# Patient Record
Sex: MALE | Race: WHITE | NOT HISPANIC OR LATINO | Employment: OTHER | URBAN - METROPOLITAN AREA
[De-identification: names, ages, dates, MRNs, and addresses within clinical notes are randomized per-mention and may not be internally consistent; named-entity substitution may affect disease eponyms.]

---

## 2020-03-02 NOTE — PATIENT INSTRUCTIONS
Heart Healthy Diet   AMBULATORY CARE:   A heart healthy diet  is an eating plan low in total fat, unhealthy fats, and sodium (salt)  A heart healthy diet helps decrease your risk for heart disease and stroke  Limit the amount of fat you eat to 25% to 35% of your total daily calories  Limit sodium to less than 2,300 mg each day  Healthy fats:  Healthy fats can help improve cholesterol levels  The risk for heart disease is decreased when cholesterol levels are normal  Choose healthy fats, such as the following:  · Unsaturated fat  is found in foods such as soybean, canola, olive, corn, and safflower oils  It is also found in soft tub margarine that is made with liquid vegetable oil  · Omega-3 fat  is found in certain fish, such as salmon, tuna, and trout, and in walnuts and flaxseed  Unhealthy fats:  Unhealthy fats can cause unhealthy cholesterol levels in your blood and increase your risk of heart disease  Limit unhealthy fats, such as the following:  · Cholesterol  is found in animal foods, such as eggs and lobster, and in dairy products made from whole milk  Limit cholesterol to less than 300 milligrams (mg) each day  You may need to limit cholesterol to 200 mg each day if you have heart disease  · Saturated fat  is found in meats, such as pitts and hamburger  It is also found in chicken or turkey skin, whole milk, and butter  Limit saturated fat to less than 7% of your total daily calories  Limit saturated fat to less than 6% if you have heart disease or are at increased risk for it  · Trans fat  is found in packaged foods, such as potato chips and cookies  It is also in hard margarine, some fried foods, and shortening  Avoid trans fats as much as possible    Heart healthy foods and drinks to include:  Ask your dietitian or healthcare provider how many servings to have from each of the following food groups:  · Grains:      ¨ Whole-wheat breads, cereals, and pastas, and brown rice    ¨ Low-fat, low-sodium crackers and chips    · Vegetables:      ¨ Broccoli, green beans, green peas, and spinach    ¨ Collards, kale, and lima beans    ¨ Carrots, sweet potatoes, tomatoes, and peppers    ¨ Canned vegetables with no salt added    · Fruits:      ¨ Bananas, peaches, pears, and pineapple    ¨ Grapes, raisins, and dates    ¨ Oranges, tangerines, grapefruit, orange juice, and grapefruit juice    ¨ Apricots, mangoes, melons, and papaya    ¨ Raspberries and strawberries    ¨ Canned fruit with no added sugar    · Low-fat dairy products:      ¨ Nonfat (skim) milk, 1% milk, and low-fat almond, cashew, or soy milks fortified with calcium    ¨ Low-fat cheese, regular or frozen yogurt, and cottage cheese    · Meats and proteins , such as lean cuts of beef and pork (loin, leg, round), skinless chicken and turkey, legumes, soy products, egg whites, and nuts  Foods and drinks to limit or avoid:  Ask your dietitian or healthcare provider about these and other foods that are high in unhealthy fat, sodium, and sugar:  · Snack or packaged foods , such as frozen dinners, cookies, macaroni and cheese, and cereals with more than 300 mg of sodium per serving    · Canned or dry mixes  for cakes, soups, sauces, or gravies    · Vegetables with added sodium , such as instant potatoes, vegetables with added sauces, or regular canned vegetables    · Other foods high in sodium , such as ketchup, barbecue sauce, salad dressing, pickles, olives, soy sauce, and miso    · High-fat dairy foods  such as whole or 2% milk, cream cheese, or sour cream, and cheeses     · High-fat protein foods  such as high-fat cuts of beef (T-bone steaks, ribs), chicken or turkey with skin, and organ meats, such as liver    · Cured or smoked meats , such as hot dogs, pitts, and sausage    · Unhealthy fats and oils , such as butter, stick margarine, shortening, and cooking oils such as coconut or palm oil    · Food and drinks high in sugar , such as soft drinks (soda), sports drinks, sweetened tea, candy, cake, cookies, pies, and doughnuts  Other diet guidelines to follow:   · Eat more foods containing omega-3 fats  Eat fish high in omega-3 fats at least 2 times a week  · Limit alcohol  Too much alcohol can damage your heart and raise your blood pressure  Women should limit alcohol to 1 drink a day  Men should limit alcohol to 2 drinks a day  A drink of alcohol is 12 ounces of beer, 5 ounces of wine, or 1½ ounces of liquor  · Choose low-sodium foods  High-sodium foods can lead to high blood pressure  Add little or no salt to food you prepare  Use herbs and spices in place of salt  · Eat more fiber  to help lower cholesterol levels  Eat at least 5 servings of fruits and vegetables each day  Eat 3 ounces of whole-grain foods each day  Legumes (beans) are also a good source of fiber  Lifestyle guidelines:   · Do not smoke  Nicotine and other chemicals in cigarettes and cigars can cause lung and heart damage  Ask your healthcare provider for information if you currently smoke and need help to quit  E-cigarettes or smokeless tobacco still contain nicotine  Talk to your healthcare provider before you use these products  · Exercise regularly  to help you maintain a healthy weight and improve your blood pressure and cholesterol levels  Ask your healthcare provider about the best exercise plan for you  Do not start an exercise program without asking your healthcare provider  Follow up with your healthcare provider as directed:  Write down your questions so you remember to ask them during your visits  © 2017 [address] [employer] Information is for End User's use only and may not be sold, redistributed or otherwise used for commercial purposes  All illustrations and images included in CareNotes® are the copyrighted property of A [employer]  or Hussein Muller  The above information is an  only   It is not intended as medical advice for individual conditions or treatments  Talk to your doctor, nurse or pharmacist before following any medical regimen to see if it is safe and effective for you  Exercise Safety   AMBULATORY CARE:   Exercise safety  includes using correct equipment and positions to work the muscles without causing more injury  You will need to know which exercises to do and how often to do them  You will also need to know what to do if you feel pain or think an exercise caused injury  Do not start an exercise program before you talk to your healthcare provider  You may need to wait until your swelling and pain have gone down before you start to exercise  Contact your healthcare provider if:   · You have sharp pain during exercise or at rest     · You have questions or concerns about your stretches or exercises  What you need to know before you exercise:   · Exercises help lower pain and swelling after an injury or surgery  They also help strengthen the muscles that support your joints and bones  This may help prevent another injury  · You may need a light weight or an exercise band for some exercises  Your healthcare provider will tell you how much weight to exercise with  Ask your healthcare provider where to buy a weight or an exercise band  · Your healthcare provider will tell you how often to do each exercise  The provider will also tell you how many times to repeat each exercise and how many sets you should do  You may be told to do different exercises on different days  · Warm up and stretch before you exercise  Walk or ride a stationary bike for 5 to 10 minutes to help you warm up  Stretching helps increase range of motion  It may also relieve muscle soreness and help prevent another injury  Your healthcare provider will show you which stretches you need to do  What you can do to exercise safely:   · Move slowly and smoothly  Avoid fast or jerky motions  This will help prevent another injury       · Breathe normally  Do not hold your breath  It is important to breathe in and out so you do not tense up during exercise  Tension could prevent you from moving your joint in a full range of motion  · Stop if you feel sharp pain or an increase in pain  Stop the exercise and contact your healthcare provider if you have these symptoms  It is normal to feel some discomfort, such as a dull ache, during exercise  Regular exercise will help decrease your discomfort over time  Follow up with your physical therapist as directed:  Write down your questions so you remember to ask them during your visits  © 2017 [address] [facility] Information is for End User's use only and may not be sold, redistributed or otherwise used for commercial purposes  All illustrations and images included in CareNotes® are the copyrighted property of A D A M , Inc  or Hussein Muller  The above information is an  only  It is not intended as medical advice for individual conditions or treatments  Talk to your doctor, nurse or pharmacist before following any medical regimen to see if it is safe and effective for you  Prostate specific antigen measurement   GENERAL INFORMATION:  What is this test?  This test measures the amount of prostate specific antigen (PSA) in blood  This test may be used when benign prostatic hyperplasia (BPH) is suspected  It may also be used to screen for prostate cancer, to help diagnose prostate cancer when it is suspected, and to monitor prostate cancer after treatment  What are other names for this test?  · PSA measurement  · PSA - Prostate-specific antigen level  · PSA - Serum prostate specific antigen level  · tPSA measurement - Total prostate specific antigen measurement  What are related tests? · Rectal examination  · Transrectal biopsy of prostate  Why do I need this test?  Laboratory tests may be done for many reasons   Tests are performed for routine health screenings or if a disease or toxicity is suspected  Lab tests may be used to determine if a medical condition is improving or worsening  Lab tests may also be used to measure the success or failure of a medication or treatment plan  Lab tests may be ordered for professional or legal reasons  You may need this test if you have:   · BPH - Benign prostatic hypertrophy  · Prostate cancer  When and how often should I have this test?  When and how often laboratory tests are done may depend on many factors  The timing of laboratory tests may rely on the results or completion of other tests, procedures, or treatments  Lab tests may be performed immediately in an emergency, or tests may be delayed as a condition is treated or monitored  A test may be suggested or become necessary when certain signs or symptoms appear  Due to changes in the way your body naturally functions through the course of a day, lab tests may need to be performed at a certain time of day  If you have prepared for a test by changing your food or fluid intake, lab tests may be timed in accordance with those changes  Timing of tests may be based on increased and decreased levels of medications, drugs or other substances in the body  The age or gender of the person being tested may affect when and how often a lab test is required  Chronic or progressive conditions may need ongoing monitoring through the use of lab tests  Conditions that worsen and improve may also need frequent monitoring  Certain tests may be repeated to obtain a series of results, or tests may need to be repeated to confirm or disprove results  Timing and frequency of lab tests may vary if they are performed for professional or legal reasons  How should I get ready for the test?  Before having blood collected, tell the person drawing your blood if you are allergic to latex  Tell the healthcare worker if you have a medical condition or are using a medication or supplement that causes excessive bleeding  Also tell the healthcare worker if you have felt nauseated, lightheaded, or have fainted while having blood drawn in the past   How is the test done? When a blood sample from a vein is needed, a vein in your arm is usually selected  A tourniquet (large rubber strap) may be secured above the vein  The skin over the vein will be cleaned, and a needle will be inserted  You will be asked to hold very still while your blood is collected  Blood will be collected into one or more tubes, and the tourniquet will be removed  When enough blood has been collected, the healthcare worker will take the needle out  How will the test feel? The amount of discomfort you feel will depend on many factors, including your sensitivity to pain  Communicate how you are feeling with the person doing the test  Inform the person doing the test if you feel that you cannot continue with the test   During a blood draw, you may feel mild discomfort at the location where the blood sample is being collected  What should I do after the test?  After a blood sample is collected from your vein, a bandage, cotton ball, or gauze may be placed on the area where the needle was inserted  You may be asked to apply pressure to the area  Avoid strenuous exercise immediately after your blood draw  Contact your healthcare worker if you feel pain or see redness, swelling, or discharge from the puncture site  What are the risks? Blood: During a blood draw, a hematoma (blood-filled bump under the skin) or slight bleeding from the puncture site may occur  After a blood draw, a bruise or infection may occur at the puncture site  The person doing this test may need to perform it more than once  Talk to your healthcare worker if you have any concerns about the risks of this test   What are normal results for this test?  Laboratory test results may vary depending on your age, gender, health history, the method used for the test, and many other factors   If your results are different from the results suggested below, this may not mean that you have a disease  Contact your healthcare worker if you have any questions  The following are considered to be normal results for this test:  · Males, ?36years of age: 0-2 ng/mL (0-2 mcg/L) :  · Males, >36years of age: 0-4 ng/mL (0-4 mcg/L)  · Females: <0 5 ng/mL (<0 5 mcg/L) : What might affect my test results? Drug Therapy Use:  Some medications may affect the results of the test  These medications include:  · Finasteride (Oral route, Tablet)  · Indium In 111 Capromab Pendetide (Intravenous route, Kit)  Ejaculation and digital rectal exam can cause an insignificant rise in PSA levels while prostate biopsy and cystoscopy can cause substantial increases; PSA testing should be delayed until 3 to 4 weeks after these procedures  Exercise, infection, and some medications can also cause a rise in PSA levels  Finasteride will lower PSA by approximately 50%   What follow up should I do after this test?  Ask your healthcare worker how you will be informed of the test results  You may be asked to call for results, schedule an appointment to discuss results, or notified of results by mail  Follow up care varies depending on many factors related to your test  Sometimes there is no follow up after you have been notified of test results  At other times follow up may be suggested or necessary  Some examples of follow up care include changes to medication or treatment plans, referral to a specialist, more or less frequent monitoring, and additional tests or procedures  Talk with your healthcare worker about any concerns or questions you have regarding follow up care or instructions  Where can I get more information? Related Companies   ? American Urological Association - https://www foster org/  org  ? National Kidney and Urologic Diseases Information Clearinghouse - http://kidney  niddk nih gov/    CARE AGREEMENT:   You have the right to help plan your care  To help with this plan, you must learn about your health condition and how it may be treated  You can then discuss treatment options with your caregivers  Work with them to decide what care may be used to treat you  You always have the right to refuse treatment  © Copyright [employer] 2018  Information is for End User's use only and may not be sold, redistributed or otherwise used for commercial purposes  The above information is an  only  It is not intended as a substitute for medical advice for your individual conditions or treatments  Talk to your doctor, nurse or pharmacist before following any medical regimen to see if it is safe and effective for you  Hypertension   AMBULATORY CARE:   Hypertension  is high blood pressure (BP)  Your BP is the force of your blood moving against the walls of your arteries  Normal BP is less than 120/80  Prehypertension is between 120/80 and 139/89  Hypertension is 140/90 or higher  Hypertension causes your BP to get so high that your heart has to work much harder than normal  This can damage your heart  You can control hypertension with a healthy lifestyle or medicines  A controlled blood pressure helps protect your organs, such as your heart, lungs, brain, and kidneys  Common symptoms include the following:   · Headache     · Blurred vision     · Chest pain     · Dizziness or weakness     · Trouble breathing    · Nosebleeds  Call 911 for any of the following:   · You have discomfort in your chest that feels like squeezing, pressure, fullness, or pain  · You become confused or have difficulty speaking  · You suddenly feel lightheaded or have trouble breathing  · You have pain or discomfort in your back, neck, jaw, stomach, or arm  Seek care immediately if:   · You have a severe headache or vision loss  · You have weakness in an arm or leg    Contact your healthcare provider if:   · You feel faint, dizzy, confused, or drowsy  · You have been taking your BP medicine and your BP is still higher than your healthcare provider says it should be  · You have questions or concerns about your condition or care  Treatment for hypertension  may include medicine to lower your BP and lower your cholesterol level  A low cholesterol level helps prevent heart disease and makes it easier to control your blood pressure  You may also need to make lifestyle changes  Take your medicine exactly as directed  Manage hypertension:  Talk with your healthcare provider about these and other ways to manage hypertension:  · Check your BP at home  Sit and rest for 5 minutes before you take your BP  Extend your arm and support it on a flat surface  Your arm should be at the same level as your heart  Follow the directions that came with your BP monitor  If possible, take at least 2 BP readings each time  Take your BP at least twice a day at the same times each day, such as morning and evening  Keep a record of your BP readings and bring it to your follow-up visits  Ask your healthcare provider what your BP should be  · Limit sodium (salt) as directed  Too much sodium can affect your fluid balance  Check labels to find low-sodium or no-salt-added foods  Some low-sodium foods use potassium salts for flavor  Too much potassium can also cause health problems  Your healthcare provider will tell you how much sodium and potassium are safe for you to have in a day  He or she may recommend that you limit sodium to 2,300 mg a day  · Follow the meal plan recommended by your healthcare provider  A dietitian or your provider can give you more information on low-sodium plans or the DASH (Dietary Approaches to Stop Hypertension) eating plan  The DASH plan is low in sodium, unhealthy fats, and total fat  It is high in potassium, calcium, and fiber  · Exercise to maintain a healthy weight    Exercise at least 30 minutes per day, on most days of the week  This will help decrease your blood pressure  Ask your healthcare provider about the best exercise plan for you  · Decrease stress  This may help lower your BP  Learn ways to relax, such as deep breathing or listening to music  · Limit alcohol  Women should limit alcohol to 1 drink a day  Men should limit alcohol to 2 drinks a day  A drink of alcohol is 12 ounces of beer, 5 ounces of wine, or 1½ ounces of liquor  · Do not smoke  Nicotine and other chemicals in cigarettes and cigars can increase your BP and also cause lung damage  Ask your healthcare provider for information if you currently smoke and need help to quit  E-cigarettes or smokeless tobacco still contain nicotine  Talk to your healthcare provider before you use these products  · Manage any other health conditions you have  Health conditions such as diabetes can increase your risk for hypertension  Follow your healthcare provider's instructions and take all your medicines as directed  Follow up with your healthcare provider as directed: You will need to return to have your BP checked and to have other lab tests done  Write down your questions so you remember to ask them during your visits  © 2017 [address] [facility] Information is for End User's use only and may not be sold, redistributed or otherwise used for commercial purposes  All illustrations and images included in CareNotes® are the copyrighted property of A D A M , Inc  or Hussein Muller  The above information is an  only  It is not intended as medical advice for individual conditions or treatments  Talk to your doctor, nurse or pharmacist before following any medical regimen to see if it is safe and effective for you  DASH Eating Plan   AMBULATORY CARE:   The DASH (Dietary Approaches to Stop Hypertension) Eating Plan  is designed to help prevent or lower high blood pressure   It can also help to lower LDL (bad) cholesterol and decrease your risk for heart disease  The plan is low in sodium, sugar, unhealthy fats, and total fat  It is high in potassium, calcium, magnesium, and fiber  These nutrients are added when you eat more fruits, vegetables, and whole grains  Your sodium limit each day: Your dietitian will tell you how much sodium is safe for you to have each day  People with high blood pressure should have no more than 1,500 to 2,300 mg of sodium in a day  A teaspoon (tsp) of salt has 2,300 mg of sodium  This may seem like a difficult goal, but small changes to the foods you eat can make a big difference  Your healthcare provider or dietitian can help you create a meal plan that follows your sodium limit  How to limit sodium:   · Read food labels  Food labels can help you choose foods that are low in sodium  The amount of sodium is listed in milligrams (mg)  The % Daily Value (DV) column tells you how much of your daily needs are met by 1 serving of the food for each nutrient listed  Choose foods that have less than 5% of the DV of sodium  These foods are considered low in sodium  Foods that have 20% or more of the DV of sodium are considered high in sodium  Avoid foods that have more than 300 mg of sodium in each serving  Choose foods that say low-sodium, reduced-sodium, or no salt added on the food label  · Avoid salt  Do not salt food at the table, and add very little salt to foods during cooking  Use herbs and spices, such as onions, garlic, and salt-free seasonings to add flavor to foods  Try lemon or lime juice or vinegar to give foods a tart flavor  Use hot peppers or a small amount of hot pepper sauce to add a spicy flavor to foods  · Ask about salt substitutes  Ask your healthcare provider if you may use salt substitutes  Some salt substitutes have ingredients that can be harmful if you have certain health conditions  · Choose foods carefully at restaurants    Meals from restaurants, especially fast food restaurants, are often high in sodium  Some restaurants have nutrition information that tells you the amount of sodium in their foods  Ask to have your food prepared with less, or no salt  What you need to know about fats:   · Include healthy fats  Examples are unsaturated fats and omega-3 fatty acids  Unsaturated fats are found in soybean, canola, olive, or sunflower oil, and liquid and soft tub margarines  Omega-3 fatty acids are found in fatty fish, such as salmon, tuna, mackerel, and sardines  It is also found in flaxseed oil and ground flaxseed  · Avoid unhealthy fats  Do not eat unhealthy fats, such as saturated fats and trans fats  Saturated fats are found in foods that contain fat from animals  Examples are fatty meats, whole milk, butter, cream, and other dairy foods  It is also found in shortening, stick margarine, palm oil, and coconut oil  Trans fats are found in fried foods, crackers, chips, and baked goods made with margarine or shortening  Foods to include: With the DASH eating plan, you need to eat a certain number of servings from each food group  This will help you get enough of certain nutrients and limit others  The amount of servings you should eat depends on how many calories you need  Your dietitian can tell you how many calories you need  The number of servings listed next to the food groups below are for people who need about 2,000 calories each day    · Grains:  6 to 8 servings (3 of these servings should be whole-grain foods)    ¨ 1 slice of whole-grain bread     ¨ 1 ounce of dry cereal    ¨ ½ cup of cooked cereal, pasta, or brown rice    · Vegetables and fruits:  4 to 5 servings of fruits and 4 to 5 servings of vegetables    ¨ 1 medium fruit    ¨ ½ cup of frozen, canned (no added salt), or chopped fresh vegetables     ¨ ½ cup of fresh, frozen, dried, or canned fruit (canned in light syrup or fruit juice)    ¨ ½ cup of vegetable or fruit juice    · Dairy:  2 to 3 servings    ¨ 1 cup of nonfat (skim) or 1% milk    ¨ 1½ ounces of fat-free or low-fat cheese    ¨ 6 ounces of nonfat or low-fat yogurt    · Lean meat, poultry, and fish:  6 ounces or less    [employer] (chicken, turkey) with no skin    ¨ Fish (especially fatty fish, such as salmon, fresh tuna, or mackerel)    ¨ Lean beef and pork (loin, round, extra lean hamburger)    ¨ Egg whites and egg substitutes    · Nuts, seeds, and legumes:  4 to 5 servings each week    ¨ ½ cup of cooked beans and peas    ¨ 1½ ounces of unsalted nuts    ¨ 2 tablespoons of peanut butter or seeds    · Sweets and added sugars:  5 or less each week    ¨ 1 tablespoon of sugar, jelly, or jam    ¨ ½ cup of sorbet or gelatin    ¨ 1 cup of lemonade    · Fats:  2 to 3 servings each week    ¨ 1 teaspoon of soft margarine or vegetable oil    ¨ 1 tablespoon of mayonnaise    ¨ 2 tablespoons of salad dressing  Foods to avoid:   · Grains:      [employer], such as doughnuts, pastries, cookies, and biscuits (high in fat and sugar)    ¨ Mixes for cornbread and biscuits, packaged foods, such as bread stuffing, rice and pasta mixes, macaroni and cheese, and instant cereals (high in sodium)    · Fruits and vegetables:      ¨ Regular, canned vegetables (high in sodium)    ¨ Sauerkraut, pickled vegetables, and other foods prepared in brine (high in sodium)    ¨ Fried vegetables or vegetables in butter or high-fat sauces    ¨ Fruit in cream or butter sauce (high in fat)    · Dairy:      ¨ Whole milk, 2% milk, and cream (high in fat)    ¨ Regular cheese and processed cheese (high in fat and sodium)    · Meats and protein foods:      ¨ Smoked or cured meat, such as corned beef, pitts, ham, hot dogs, and sausage (high in fat and sodium)    ¨ Canned beans and canned meats or spreads, such as potted meats, sardines, anchovies, and imitation seafood (high in sodium)    ¨ Deli or lunch meats, such as bologna, ham, turkey, and roast beef (high in sodium)    ¨ High-fat meat (T-bone steak, regular hamburger, and ribs)    ¨ Whole eggs and egg yolks (high in fat)    · Other:      ¨ Seasonings made with salt, such as garlic salt, celery salt, onion salt, seasoned salt, meat tenderizers, and monosodium glutamate (MSG)    ¨ Miso soup and canned or dried soup mixes (high in sodium)    ¨ Regular soy sauce, barbecue sauce, teriyaki sauce, steak sauce, Worcestershire sauce, and most flavored vinegars (high in sodium)    ¨ Regular condiments, such as mustard, ketchup, and salad dressings (high in sodium)    ¨ Gravy and sauces, such as [employer] or cheese sauces (high in sodium and fat)    ¨ Drinks high in sugar, such as soda or fruit drinks    [employer] foods, such as salted chips, popcorn, pretzels, pork rinds, salted crackers, and salted nuts    ¨ Frozen foods, such as dinners, entrees, vegetables with sauces, and breaded meats (high in sodium)  Other guidelines to follow:   · Maintain a healthy weight  Your risk for heart disease is higher if you are overweight  Your healthcare provider may suggest that you lose weight if you are overweight  You can lose weight by eating fewer calories and foods that have added sugars and fat  The DASH meal plan can help you do this  Decrease calories by eating smaller portions at each meal and fewer snacks  Ask your healthcare provider for more information about how to lose weight  · Exercise regularly  Regular exercise can help you reach or maintain a healthy weight  Regular exercise can also help decrease your blood pressure and improve your cholesterol levels  Get 30 minutes or more of moderate exercise each day of the week  To lose weight, get at least 60 minutes of exercise  Talk to your healthcare provider about the best exercise program for you  · Limit alcohol  Women should limit alcohol to 1 drink a day  Men should limit alcohol to 2 drinks a day  A drink of alcohol is 12 ounces of beer, 5 ounces of wine, or 1½ ounces of liquor    © 2017 [facility] [address] is for End User's use only and may not be sold, redistributed or otherwise used for commercial purposes  All illustrations and images included in CareNotes® are the copyrighted property of A D A M , Inc  or Hussein Muller  The above information is an  only  It is not intended as medical advice for individual conditions or treatments  Talk to your doctor, nurse or pharmacist before following any medical regimen to see if it is safe and effective for you  Blood Pressure Diary  Jessica Gonzalez  COMMENTS                                                                            Keep a log of your blood pressure readings at home  Please take blood pressure at same time of day  Please record date and time blood pressure was taken  Make sure to take your blood pressure when you are seated and resting for about 3 minutes  You can send these to me via [employer] or by calling the office  This will help me manage your blood pressure better

## 2020-03-02 NOTE — PROGRESS NOTES
Assessment/Plan:    Problem List Items Addressed This Visit     Essential hypertension    Relevant Medications    lisinopril (ZESTRIL) 40 mg tablet    nadolol (CORGARD) 40 mg tablet      Other Visit Diagnoses     Encounter to establish care    -  Primary    Screening for thyroid disorder        Relevant Orders    TSH, 3rd generation with Free T4 reflex    Screening for hyperlipidemia        Relevant Orders    Lipid panel    Screening for deficiency anemia        Relevant Orders    CBC and differential    Screening for diabetes mellitus        Relevant Orders    Comprehensive metabolic panel    Vitamin D deficiency        Relevant Orders    Vitamin D 25 hydroxy    Screening for HIV (human immunodeficiency virus)        Relevant Orders    HIV 1/2 AG-AB combo    Screening for malignant neoplasm of prostate        Relevant Orders    PSA, Total Screen    Need for hepatitis C screening test        Relevant Orders    Hepatitis C antibody    Anxiety        Relevant Medications    nadolol (CORGARD) 40 mg tablet           Diagnoses and all orders for this visit:    Encounter to establish care    Essential hypertension  -     lisinopril (ZESTRIL) 40 mg tablet; Take 1 tablet (40 mg total) by mouth daily  -     nadolol (CORGARD) 40 mg tablet; Take 1 tablet (40 mg total) by mouth daily    Screening for thyroid disorder  -     TSH, 3rd generation with Free T4 reflex; Future    Screening for hyperlipidemia  -     Lipid panel; Future    Screening for deficiency anemia  -     CBC and differential; Future    Screening for diabetes mellitus  -     Comprehensive metabolic panel; Future    Vitamin D deficiency  -     Vitamin D 25 hydroxy; Future    Screening for HIV (human immunodeficiency virus)  -     HIV 1/2 AG-AB combo; Future    Screening for malignant neoplasm of prostate  -     PSA, Total Screen; Future    Need for hepatitis C screening test  -     Hepatitis C antibody; Future    Anxiety  -     nadolol (CORGARD) 40 mg tablet;  Take 1 tablet (40 mg total) by mouth daily    Other orders  -     Cancel: Ambulatory referral to Gastroenterology; Future  -     Cancel: Cologuard; Future  -     Cancel: influenza vaccine, [id number], quadrivalent, recombinant, PF, 0 5 mL, for patients 18 yr+ (FLUBLOK)  -     Discontinue: lisinopril (ZESTRIL) 40 mg tablet; Take 40 mg by mouth daily  -     Discontinue: nadolol (CORGARD) 40 mg tablet; Take 40 mg by mouth daily  -     Multiple Vitamin (MULTIVITAMIN) tablet; Take 1 tablet by mouth daily        No problem-specific Assessment & Plan notes found for this encounter  Subjective:      Patient ID: Samanta De Leon is a 63 y o  male  Samanta De Leon presents today to establish care at this office and routine visit  Previous PCP Consuelo Camilo at [facility] in [place], [place]  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[place], [place]  Pt miranda have Hx of HTN which he states is controlled with ACE-I and Beta blocker  He has been cutting ACE-I in half in fear he would run out  States he take BP at home, average BP is 130/75-80  Retired 2006 when he had 3 ruptured LS discs  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  Provider had Pt on lorazepam 6mg daily in divided doses for anxiety  Reports that the provider was investigated for improper medication prescriptions and no onther provider would prescribe the BZD at the dose Pt ws taking  Pt reports he was in the hospital for a BZD seizure which lead him to outpatient rehab  Since Pt has been off all BZDs and is on the [facility] for anxiety and HTN   Last CRC screening about 5 years ago - states no polyps found and recommended repeat in 10 yrs  Hypertension   This is a chronic problem  The current episode started more than 1 year ago  The problem is uncontrolled  Associated symptoms include anxiety and palpitations   Pertinent negatives include no blurred vision, chest pain, headaches, malaise/fatigue, neck pain, orthopnea, peripheral edema, PND, shortness of breath or sweats  There are no associated agents to hypertension  Risk factors for coronary artery disease include male gender and stress  Past treatments include beta blockers and ACE inhibitors  The current treatment provides moderate improvement  There are no compliance problems  There is no history of angina, kidney disease, CAD/MI, CVA, heart failure, left ventricular hypertrophy, PVD or retinopathy  There is no history of chronic renal disease, a hypertension causing med, sleep apnea or a thyroid problem  Anxiety   Presents for follow-up visit  Symptoms include irritability, nervous/anxious behavior and palpitations  Patient reports no chest pain or shortness of breath  Symptoms occur occasionally  The severity of symptoms is moderate  The quality of sleep is good  Nighttime awakenings: occasional      Compliance with medications is % (nadalol 40mg daily)  The following portions of the patient's history were reviewed and updated as appropriate:   He has a past medical history of Anxiety and Hypertension  ,  does not have any pertinent problems on file  ,   has a past surgical history that includes Rotator cuff repair (Left) and Hand surgery (Left)  ,  family history includes Dementia in his father; Heart disease in his mother; Hypertension in his father  ,   reports that he has never smoked  He has never used smokeless tobacco  He reports that he drinks alcohol  He reports that he has current or past drug history  ,  has No Known Allergies     Current Outpatient Medications   Medication Sig Dispense Refill    lisinopril (ZESTRIL) 40 mg tablet Take 1 tablet (40 mg total) by mouth daily 90 tablet 1    Multiple Vitamin (MULTIVITAMIN) tablet Take 1 tablet by mouth daily      nadolol (CORGARD) 40 mg tablet Take 1 tablet (40 mg total) by mouth daily 90 tablet 1     No current facility-administered medications for this visit        PHQ-9 Depression Screening    PHQ-9: Frequency of the following problems over the past two weeks:       Little interest or pleasure in doing things:  0 - not at all  Feeling down, depressed, or hopeless:  0 - not at all  PHQ-2 Score:  0           Review of Systems   Constitutional: Positive for irritability  Negative for malaise/fatigue  HENT: Negative  Eyes: Negative  Negative for blurred vision  Respiratory: Negative  Negative for shortness of breath  Cardiovascular: Positive for palpitations  Negative for chest pain, orthopnea and PND  Gastrointestinal: Negative  Endocrine: Negative  Genitourinary: Negative  Musculoskeletal: Negative  Negative for neck pain  Skin: Negative  Allergic/Immunologic: Negative  Neurological: Negative  Negative for headaches  Hematological: Negative  Psychiatric/Behavioral: The patient is nervous/anxious  Objective:  Vitals:    03/03/20 1430   BP: 130/90   BP Location: Left arm   Patient Position: Sitting   Cuff Size: Large   Pulse: 61   Temp: 99 7 °F (37 6 °C)   TempSrc: Tympanic   SpO2: 99%   Weight: 84 6 kg (186 lb 9 6 oz)   Height: 5' 9" (1 753 m)     Body mass index is 27 56 kg/m²  BMI Counseling: Body mass index is 27 56 kg/m²  Discussed the patient's BMI with him  The BMI is above normal  Nutrition recommendations include reducing portion sizes, decreasing overall calorie intake, 3-5 servings of fruits/vegetables daily, reducing fast food intake, consuming healthier snacks, decreasing soda and/or juice intake, moderation in carbohydrate intake, increasing intake of lean protein, reducing intake of saturated fat and trans fat and reducing intake of cholesterol  Exercise recommendations include vigorous aerobic physical activity for 75 minutes/week, exercising 3-5 times per week and strength training exercises  Physical Exam   Constitutional: He is oriented to person, place, and time  He appears well-developed and well-nourished     HENT:   Head: Normocephalic and atraumatic  Right Ear: Tympanic membrane, external ear and ear canal normal    Left Ear: Tympanic membrane, external ear and ear canal normal    Nose: Nose normal    Mouth/Throat: Uvula is midline, oropharynx is clear and moist and mucous membranes are normal    Eyes: Pupils are equal, round, and reactive to light  Conjunctivae, EOM and lids are normal    Neck: Trachea normal, normal range of motion, full passive range of motion without pain and phonation normal  Neck supple  No JVD present  No thyroid mass and no thyromegaly present  Cardiovascular: Normal rate, regular rhythm, S1 normal, S2 normal, normal heart sounds, intact distal pulses and normal pulses  Exam reveals no gallop and no friction rub  No murmur heard  Pulmonary/Chest: Effort normal and breath sounds normal  He has no decreased breath sounds  Abdominal: Soft  Normal appearance, normal aorta and bowel sounds are normal  There is no hepatosplenomegaly  There is no tenderness  Genitourinary:   Genitourinary Comments: Deferred   Musculoskeletal: Normal range of motion  Neurological: He is alert and oriented to person, place, and time  He has normal strength  No cranial nerve deficit  Skin: Skin is warm, dry and intact  Capillary refill takes less than 2 seconds  Psychiatric: He has a normal mood and affect  His speech is normal and behavior is normal  Judgment and thought content normal  Cognition and memory are normal    Nursing note and vitals reviewed

## 2020-03-03 ENCOUNTER — OFFICE VISIT (OUTPATIENT)
Dept: FAMILY MEDICINE CLINIC | Facility: CLINIC | Age: 60
End: 2020-03-03
Payer: COMMERCIAL

## 2020-03-03 VITALS
HEIGHT: 69 IN | HEART RATE: 61 BPM | SYSTOLIC BLOOD PRESSURE: 130 MMHG | DIASTOLIC BLOOD PRESSURE: 90 MMHG | OXYGEN SATURATION: 99 % | WEIGHT: 186.6 LBS | TEMPERATURE: 99.7 F | BODY MASS INDEX: 27.64 KG/M2

## 2020-03-03 DIAGNOSIS — Z13.29 SCREENING FOR THYROID DISORDER: ICD-10-CM

## 2020-03-03 DIAGNOSIS — Z13.0 SCREENING FOR DEFICIENCY ANEMIA: ICD-10-CM

## 2020-03-03 DIAGNOSIS — Z13.1 SCREENING FOR DIABETES MELLITUS: ICD-10-CM

## 2020-03-03 DIAGNOSIS — Z11.59 NEED FOR HEPATITIS C SCREENING TEST: ICD-10-CM

## 2020-03-03 DIAGNOSIS — E55.9 VITAMIN D DEFICIENCY: ICD-10-CM

## 2020-03-03 DIAGNOSIS — Z76.89 ENCOUNTER TO ESTABLISH CARE: Primary | ICD-10-CM

## 2020-03-03 DIAGNOSIS — I10 ESSENTIAL HYPERTENSION: ICD-10-CM

## 2020-03-03 DIAGNOSIS — Z13.220 SCREENING FOR HYPERLIPIDEMIA: ICD-10-CM

## 2020-03-03 DIAGNOSIS — F41.9 ANXIETY: ICD-10-CM

## 2020-03-03 DIAGNOSIS — Z12.5 SCREENING FOR MALIGNANT NEOPLASM OF PROSTATE: ICD-10-CM

## 2020-03-03 DIAGNOSIS — Z11.4 SCREENING FOR HIV (HUMAN IMMUNODEFICIENCY VIRUS): ICD-10-CM

## 2020-03-03 PROCEDURE — 3075F SYST BP GE 130 - 139MM HG: CPT | Performed by: NURSE PRACTITIONER

## 2020-03-03 PROCEDURE — 3008F BODY MASS INDEX DOCD: CPT | Performed by: NURSE PRACTITIONER

## 2020-03-03 PROCEDURE — 1036F TOBACCO NON-USER: CPT | Performed by: NURSE PRACTITIONER

## 2020-03-03 PROCEDURE — 99203 OFFICE O/P NEW LOW 30 MIN: CPT | Performed by: NURSE PRACTITIONER

## 2020-03-03 PROCEDURE — 3080F DIAST BP >= 90 MM HG: CPT | Performed by: NURSE PRACTITIONER

## 2020-03-03 RX ORDER — LISINOPRIL 40 MG/1
40 TABLET ORAL DAILY
COMMUNITY
End: 2020-03-03 | Stop reason: SDUPTHER

## 2020-03-03 RX ORDER — MULTIVITAMIN
1 TABLET ORAL DAILY
COMMUNITY
End: 2021-07-15 | Stop reason: HOSPADM

## 2020-03-03 RX ORDER — NADOLOL 40 MG/1
40 TABLET ORAL DAILY
Qty: 90 TABLET | Refills: 1 | Status: SHIPPED | OUTPATIENT
Start: 2020-03-03 | End: 2020-08-19

## 2020-03-03 RX ORDER — NADOLOL 40 MG/1
40 TABLET ORAL DAILY
COMMUNITY
End: 2020-03-03 | Stop reason: SDUPTHER

## 2020-03-03 RX ORDER — LISINOPRIL 40 MG/1
40 TABLET ORAL DAILY
Qty: 90 TABLET | Refills: 1 | Status: SHIPPED | OUTPATIENT
Start: 2020-03-03 | End: 2020-08-19

## 2020-08-19 DIAGNOSIS — I10 ESSENTIAL HYPERTENSION: ICD-10-CM

## 2020-08-19 DIAGNOSIS — F41.9 ANXIETY: ICD-10-CM

## 2020-08-19 RX ORDER — NADOLOL 40 MG/1
TABLET ORAL
Qty: 90 TABLET | Refills: 1 | Status: SHIPPED | OUTPATIENT
Start: 2020-08-19 | End: 2021-03-05 | Stop reason: SDUPTHER

## 2020-08-19 RX ORDER — LISINOPRIL 40 MG/1
TABLET ORAL
Qty: 90 TABLET | Refills: 1 | Status: SHIPPED | OUTPATIENT
Start: 2020-08-19 | End: 2021-03-05 | Stop reason: SDUPTHER

## 2021-02-27 DIAGNOSIS — I10 ESSENTIAL HYPERTENSION: ICD-10-CM

## 2021-02-27 DIAGNOSIS — F41.9 ANXIETY: ICD-10-CM

## 2021-03-01 RX ORDER — NADOLOL 40 MG/1
TABLET ORAL
Qty: 90 TABLET | Refills: 1 | OUTPATIENT
Start: 2021-03-01

## 2021-03-01 RX ORDER — LISINOPRIL 40 MG/1
TABLET ORAL
Qty: 90 TABLET | Refills: 1 | OUTPATIENT
Start: 2021-03-01

## 2021-03-05 ENCOUNTER — OFFICE VISIT (OUTPATIENT)
Dept: FAMILY MEDICINE CLINIC | Facility: CLINIC | Age: 61
End: 2021-03-05
Payer: COMMERCIAL

## 2021-03-05 ENCOUNTER — TELEPHONE (OUTPATIENT)
Dept: ADMINISTRATIVE | Facility: OTHER | Age: 61
End: 2021-03-05

## 2021-03-05 VITALS
HEIGHT: 69 IN | DIASTOLIC BLOOD PRESSURE: 74 MMHG | TEMPERATURE: 98.5 F | HEART RATE: 84 BPM | WEIGHT: 190 LBS | SYSTOLIC BLOOD PRESSURE: 138 MMHG | OXYGEN SATURATION: 98 % | BODY MASS INDEX: 28.14 KG/M2

## 2021-03-05 DIAGNOSIS — Z13.220 SCREENING FOR HYPERLIPIDEMIA: ICD-10-CM

## 2021-03-05 DIAGNOSIS — I10 ESSENTIAL HYPERTENSION: ICD-10-CM

## 2021-03-05 DIAGNOSIS — Z13.0 SCREENING FOR DEFICIENCY ANEMIA: ICD-10-CM

## 2021-03-05 DIAGNOSIS — I10 WHITE COAT SYNDROME WITH DIAGNOSIS OF HYPERTENSION: ICD-10-CM

## 2021-03-05 DIAGNOSIS — E55.9 VITAMIN D DEFICIENCY: ICD-10-CM

## 2021-03-05 DIAGNOSIS — F41.9 ANXIETY: ICD-10-CM

## 2021-03-05 DIAGNOSIS — Z00.01 ENCOUNTER FOR WELL ADULT EXAM WITH ABNORMAL FINDINGS: ICD-10-CM

## 2021-03-05 DIAGNOSIS — Z11.59 NEED FOR HEPATITIS C SCREENING TEST: ICD-10-CM

## 2021-03-05 DIAGNOSIS — Z13.29 SCREENING FOR THYROID DISORDER: ICD-10-CM

## 2021-03-05 DIAGNOSIS — Z00.00 ANNUAL PHYSICAL EXAM: Primary | ICD-10-CM

## 2021-03-05 DIAGNOSIS — H25.9 AGE-RELATED CATARACT OF BOTH EYES, UNSPECIFIED AGE-RELATED CATARACT TYPE: ICD-10-CM

## 2021-03-05 DIAGNOSIS — Z11.4 SCREENING FOR HUMAN IMMUNODEFICIENCY VIRUS: ICD-10-CM

## 2021-03-05 DIAGNOSIS — Z13.1 SCREENING FOR DIABETES MELLITUS: ICD-10-CM

## 2021-03-05 PROCEDURE — 3008F BODY MASS INDEX DOCD: CPT | Performed by: NURSE PRACTITIONER

## 2021-03-05 PROCEDURE — 1036F TOBACCO NON-USER: CPT | Performed by: NURSE PRACTITIONER

## 2021-03-05 PROCEDURE — 99396 PREV VISIT EST AGE 40-64: CPT | Performed by: NURSE PRACTITIONER

## 2021-03-05 PROCEDURE — 3725F SCREEN DEPRESSION PERFORMED: CPT | Performed by: NURSE PRACTITIONER

## 2021-03-05 RX ORDER — NADOLOL 40 MG/1
40 TABLET ORAL DAILY
Qty: 90 TABLET | Refills: 1 | Status: SHIPPED | OUTPATIENT
Start: 2021-03-05 | End: 2021-05-05

## 2021-03-05 RX ORDER — LISINOPRIL 40 MG/1
40 TABLET ORAL DAILY
Qty: 90 TABLET | Refills: 1 | Status: SHIPPED | OUTPATIENT
Start: 2021-03-05 | End: 2021-05-05 | Stop reason: ALTCHOICE

## 2021-03-05 NOTE — TELEPHONE ENCOUNTER
----- Message from Bryanna Rocha MA sent at 3/5/2021 11:19 AM EST -----  Regarding: colonoscopy  Looking for pts most recent colonoscopy done within the past 5 years at    [place], [place] · [phone]

## 2021-03-05 NOTE — LETTER
Procedure Request Form: Colonoscopy      Date Requested: 21  Patient: Yoan Jimenes  Patient : 1960   Referring Provider: Roslyn Huggins, ANIA        Date of Procedure ______________________________       The above patient has informed us that they have completed their   most recent Colonoscopy at your facility  Please complete   this form and attach all corresponding procedure reports/results  Comments __________________________________________________________  ____________________________________________________________________  ____________________________________________________________________  ____________________________________________________________________    Facility Completing Procedure _________________________________________    Form Completed By (print name) _______________________________________      Signature __________________________________________________________      These reports are needed for  compliance    Please fax this completed form and a copy of the procedure report to our office located at [place] [address] as soon as possible to [phone] deepti Freire Class: Phone [phone]    We thank you for your assistance in treating our mutual patient

## 2021-03-05 NOTE — PATIENT INSTRUCTIONS

## 2021-03-05 NOTE — TELEPHONE ENCOUNTER
Upon review of the In Basket request and the patient's chart, initial outreach has been made via fax, please see Contacts section for details       Thank you  Geoffrey Pride

## 2021-03-05 NOTE — PROGRESS NOTES
[facility] CARE    NAME: Pamela Number  AGE: 62 y o  SEX: male  : 1960     DATE: 3/5/2021     Assessment and Plan:     Problem List Items Addressed This Visit     White coat syndrome with diagnosis of hypertension    Relevant Medications    lisinopril (ZESTRIL) 40 mg tablet    nadolol (CORGARD) 40 mg tablet    Anxiety    Relevant Medications    nadolol (CORGARD) 40 mg tablet      Other Visit Diagnoses     Annual physical exam    -  Primary    Screening for human immunodeficiency virus        Relevant Orders    HIV 1/2 Antigen/Antibody (4th Generation) w Reflex [facility]    Need for hepatitis C screening test        Relevant Orders    Hepatitis C antibody    Screening for deficiency anemia        Relevant Orders    CBC and differential    Screening for diabetes mellitus        Relevant Orders    Comprehensive metabolic panel    Screening for hyperlipidemia        Relevant Orders    Lipid panel    Screening for thyroid disorder        Relevant Orders    TSH, 3rd generation with Free T4 reflex    Vitamin D deficiency        Relevant Orders    Vitamin D 25 hydroxy    Encounter for well adult exam with abnormal findings        Age-related cataract of both eyes, unspecified age-related cataract type        Relevant Orders    Ambulatory Referral to Ophthalmology          Immunizations and preventive care screenings were discussed with patient today  Appropriate education was printed on patient's after visit summary  Counseling:  Alcohol/drug use: discussed moderation in alcohol intake, the recommendations for healthy alcohol use, and avoidance of illicit drug use  Dental Health: discussed importance of regular tooth brushing, flossing, and dental visits  Injury prevention: discussed safety/seat belts, safety helmets, smoke detectors, carbon dioxide detectors, and smoking near bedding or upholstery    Sexual health: discussed sexually transmitted diseases, partner selection, use of condoms, avoidance of unintended pregnancy, and contraceptive alternatives  · Exercise: the importance of regular exercise/physical activity was discussed  Recommend exercise 3-5 times per week for at least 30 minutes  BMI Counseling: Body mass index is 28 06 kg/m²  The BMI is above normal  Nutrition recommendations include decreasing portion sizes, encouraging healthy choices of fruits and vegetables, consuming healthier snacks, limiting drinks that contain sugar and moderation in carbohydrate intake  Exercise recommendations include exercising 3-5 times per week  No pharmacotherapy was ordered  Depression Screening and Follow-up Plan: Patient's depression screening was positive with a PHQ-2 score of 0  Clincally patient does not have depression  No treatment is required  Return in 6 months (on 9/5/2021)  Chief Complaint:     Chief Complaint   Patient presents with    Hypertension    Annual Exam      History of Present Illness:   Pt states he was at [facility] for decreased vision R>L  Was advised he has catract R>L and will need ophthalmology referral for consult  Pt found a provider in [facility] he would like to go to  Risks and benefits of Hep C testing discussed to screen for Hep C infection  Neli Kitchen agreeable to the test and order for Hep C ab placed  Discussed with Neli Kitchen CDC recommendations for HIV screening for Patients between 13 y o  and 72 y o  Neli Kitchen acknowledges the test and order for HIV screening placed  Wants to be called with serology results  Hypertension  This is a chronic problem  The problem is uncontrolled  Associated symptoms include anxiety  Pertinent negatives include no blurred vision, chest pain, headaches, neck pain, orthopnea, palpitations, peripheral edema, PND or shortness of breath   Risk factors for coronary artery disease include male gender  Past treatments include ACE inhibitors and beta blockers  The current treatment provides moderate improvement  There are no compliance problems  There is no history of angina, kidney disease, CAD/MI, CVA, heart failure, left ventricular hypertrophy, PVD or retinopathy  There is no history of chronic renal disease, a hypertension causing med, sleep apnea or a thyroid problem  Adult Annual Physical   Patient here for a comprehensive physical exam  The patient reports no problems  Diet and Physical Activity  · Diet/Nutrition: well balanced diet, limited junk food, consuming 3-5 servings of fruits/vegetables daily, adequate fiber intake and adequate whole grain intake  · Exercise: strength training exercises, 5-7 times a week on average and less than 30 minutes on average  Depression Screening  PHQ-9 Depression Screening    PHQ-9:   Frequency of the following problems over the past two weeks:      Little interest or pleasure in doing things: 0 - not at all  Feeling down, depressed, or hopeless: 0 - not at all  PHQ-2 Score: 0       General Health  · Sleep: sleeps well and gets 7-8 hours of sleep on average  · Hearing: normal - bilateral   · Vision: vision problems: cataract OU R>L, most recent eye exam <1 year ago and wears glasses  · Dental: no dental visits for >1 year, brushes teeth twice daily and does not floss   Health  · Symptoms include: none     Review of Systems:     Review of Systems   Constitutional: Negative  HENT: Negative  Eyes: Negative  Negative for blurred vision  Respiratory: Negative  Negative for shortness of breath  Cardiovascular: Negative  Negative for chest pain, palpitations, orthopnea and PND  Gastrointestinal: Negative  Endocrine: Negative  Genitourinary: Negative  Musculoskeletal: Negative  Negative for neck pain  Skin: Negative  Allergic/Immunologic: Negative  Neurological: Negative  Negative for headaches  Hematological: Negative  Psychiatric/Behavioral: Negative         Past Medical History:     Past Medical History:   Diagnosis Date    Anxiety     Hypertension       Past Surgical History:     Past Surgical History:   Procedure Laterality Date    HAND SURGERY Left     ROTATOR CUFF REPAIR Left       Family History:     Family History   Problem Relation Age of Onset    Heart disease Mother     Hypertension Father     Dementia Father       Social History:     E-Cigarette/Vaping    E-Cigarette Use Never User      E-Cigarette/Vaping Substances    Nicotine No     THC No     CBD No     Flavoring No     Other No     Unknown No      Social History     Socioeconomic History    Marital status: /Civil Union     Spouse name: None    Number of children: None    Years of education: None    Highest education level: None   Occupational History    Occupation: Retired   Social Needs    Financial resource strain: None    Food insecurity     Worry: None     Inability: None    Transportation needs     Medical: None     Non-medical: None   Tobacco Use    Smoking status: Never Smoker    Smokeless tobacco: Never Used   Substance and Sexual Activity    Alcohol use: Yes     Frequency: 4 or more times a week    Drug use: Not Currently    Sexual activity: None   Lifestyle    Physical activity     Days per week: None     Minutes per session: None    Stress: None   Relationships    Social connections     Talks on phone: None     Gets together: None     Attends Anabaptism service: None     Active member of club or organization: None     Attends meetings of clubs or organizations: None     Relationship status: None    Intimate partner violence     Fear of current or ex partner: None     Emotionally abused: None     Physically abused: None     Forced sexual activity: None   Other Topics Concern    None   Social History Narrative    None      Current Medications:     Current Outpatient Medications Medication Sig Dispense Refill    lisinopril (ZESTRIL) 40 mg tablet Take 1 tablet (40 mg total) by mouth daily 90 tablet 1    Multiple Vitamin (MULTIVITAMIN) tablet Take 1 tablet by mouth daily      nadolol (CORGARD) 40 mg tablet Take 1 tablet (40 mg total) by mouth daily 90 tablet 1     No current facility-administered medications for this visit  Allergies:     No Known Allergies   Physical Exam:     /74 (BP Location: Left arm, Patient Position: Sitting, Cuff Size: Large)   Pulse 84   Temp 98 5 °F (36 9 °C) (Tympanic)   Ht 5' 9" (1 753 m)   Wt 86 2 kg (190 lb)   SpO2 98%   BMI 28 06 kg/m²     Physical Exam  Vitals signs and nursing note reviewed  Constitutional:       Appearance: He is well-developed  HENT:      Head: Normocephalic and atraumatic  Right Ear: External ear normal       Left Ear: External ear normal       Nose: Nose normal    Eyes:      Conjunctiva/sclera: Conjunctivae normal       Pupils: Pupils are equal, round, and reactive to light  Neck:      Musculoskeletal: Normal range of motion and neck supple  Cardiovascular:      Rate and Rhythm: Normal rate and regular rhythm  Heart sounds: Normal heart sounds  Pulmonary:      Effort: Pulmonary effort is normal       Breath sounds: Normal breath sounds  Abdominal:      General: Bowel sounds are normal       Palpations: Abdomen is soft  Genitourinary:     Comments: Deferred  Musculoskeletal: Normal range of motion  Skin:     General: Skin is warm and dry  Capillary Refill: Capillary refill takes less than 2 seconds  Neurological:      Mental Status: He is alert and oriented to person, place, and time  Psychiatric:         Behavior: Behavior normal          Thought Content:  Thought content normal          Judgment: Judgment normal           ANIA [facility] [address] [place] [address]

## 2021-03-08 NOTE — TELEPHONE ENCOUNTER
Upon review of the In Basket request we were able to locate, review, and update the patient chart as requested for CRC: Colonoscopy  Any additional questions or concerns should be emailed to the Practice Liaisons via [email or web address] email, please do not reply via In Basket      Thank you  Derian Szymanski

## 2021-04-15 ENCOUNTER — APPOINTMENT (INPATIENT)
Dept: RADIOLOGY | Facility: HOSPITAL | Age: 61
DRG: 025 | End: 2021-04-15
Payer: COMMERCIAL

## 2021-04-15 ENCOUNTER — APPOINTMENT (EMERGENCY)
Dept: RADIOLOGY | Facility: HOSPITAL | Age: 61
DRG: 025 | End: 2021-04-15
Payer: COMMERCIAL

## 2021-04-15 ENCOUNTER — HOSPITAL ENCOUNTER (INPATIENT)
Facility: HOSPITAL | Age: 61
LOS: 7 days | DRG: 025 | End: 2021-04-22
Attending: EMERGENCY MEDICINE | Admitting: SURGERY
Payer: COMMERCIAL

## 2021-04-15 ENCOUNTER — ANESTHESIA (EMERGENCY)
Dept: PERIOP | Facility: HOSPITAL | Age: 61
DRG: 025 | End: 2021-04-15
Payer: COMMERCIAL

## 2021-04-15 ENCOUNTER — ANESTHESIA EVENT (EMERGENCY)
Dept: PERIOP | Facility: HOSPITAL | Age: 61
DRG: 025 | End: 2021-04-15
Payer: COMMERCIAL

## 2021-04-15 DIAGNOSIS — S06.5X9A SUBDURAL HEMATOMA (HCC): Primary | ICD-10-CM

## 2021-04-15 DIAGNOSIS — S06.5X9A SDH (SUBDURAL HEMATOMA) (HCC): ICD-10-CM

## 2021-04-15 PROBLEM — S06.5XAA SDH (SUBDURAL HEMATOMA): Status: ACTIVE | Noted: 2021-04-15

## 2021-04-15 PROBLEM — J96.00 ACUTE RESPIRATORY FAILURE (HCC): Status: ACTIVE | Noted: 2021-04-15

## 2021-04-15 PROBLEM — G93.40 ACUTE ENCEPHALOPATHY: Status: ACTIVE | Noted: 2021-04-15

## 2021-04-15 PROBLEM — E87.8 HYPERCHLOREMIA: Status: ACTIVE | Noted: 2021-04-15

## 2021-04-15 LAB
ABO GROUP BLD: NORMAL
ABO GROUP BLD: NORMAL
ALBUMIN SERPL BCP-MCNC: 2.7 G/DL (ref 3.5–5)
ALP SERPL-CCNC: 164 U/L (ref 46–116)
ALT SERPL W P-5'-P-CCNC: 43 U/L (ref 12–78)
ANION GAP SERPL CALCULATED.3IONS-SCNC: 5 MMOL/L (ref 4–13)
ANION GAP SERPL CALCULATED.3IONS-SCNC: 5 MMOL/L (ref 4–13)
APTT PPP: 33 SECONDS (ref 23–37)
APTT PPP: 37 SECONDS (ref 23–37)
AST SERPL W P-5'-P-CCNC: 33 U/L (ref 5–45)
BACTERIA UR QL AUTO: ABNORMAL /HPF
BASE EXCESS BLDA CALC-SCNC: -0.3 MMOL/L
BASE EXCESS BLDA CALC-SCNC: 2 MMOL/L (ref -2–3)
BASOPHILS # BLD AUTO: 0.03 THOUSANDS/ΜL (ref 0–0.1)
BASOPHILS # BLD AUTO: 0.04 THOUSANDS/ΜL (ref 0–0.1)
BASOPHILS NFR BLD AUTO: 0 % (ref 0–1)
BASOPHILS NFR BLD AUTO: 0 % (ref 0–1)
BILIRUB DIRECT SERPL-MCNC: 0.55 MG/DL (ref 0–0.2)
BILIRUB SERPL-MCNC: 0.97 MG/DL (ref 0.2–1)
BILIRUB UR QL STRIP: NEGATIVE
BLD GP AB SCN SERPL QL: NEGATIVE
BODY TEMPERATURE: 99.3 DEGREES FEHRENHEIT
BUN SERPL-MCNC: 11 MG/DL (ref 5–25)
BUN SERPL-MCNC: 12 MG/DL (ref 5–25)
CA-I BLD-SCNC: 1.17 MMOL/L (ref 1.12–1.32)
CA-I BLD-SCNC: 1.18 MMOL/L (ref 1.12–1.32)
CALCIUM SERPL-MCNC: 8.4 MG/DL (ref 8.3–10.1)
CALCIUM SERPL-MCNC: 8.9 MG/DL (ref 8.3–10.1)
CHLORIDE SERPL-SCNC: 110 MMOL/L (ref 100–108)
CHLORIDE SERPL-SCNC: 112 MMOL/L (ref 100–108)
CLARITY UR: CLEAR
CO2 SERPL-SCNC: 24 MMOL/L (ref 21–32)
CO2 SERPL-SCNC: 25 MMOL/L (ref 21–32)
COLOR UR: ABNORMAL
CREAT SERPL-MCNC: 0.6 MG/DL (ref 0.6–1.3)
CREAT SERPL-MCNC: 0.63 MG/DL (ref 0.6–1.3)
EOSINOPHIL # BLD AUTO: 0.01 THOUSAND/ΜL (ref 0–0.61)
EOSINOPHIL # BLD AUTO: 0.03 THOUSAND/ΜL (ref 0–0.61)
EOSINOPHIL NFR BLD AUTO: 0 % (ref 0–6)
EOSINOPHIL NFR BLD AUTO: 0 % (ref 0–6)
ERYTHROCYTE [DISTWIDTH] IN BLOOD BY AUTOMATED COUNT: 12.4 % (ref 11.6–15.1)
ERYTHROCYTE [DISTWIDTH] IN BLOOD BY AUTOMATED COUNT: 12.5 % (ref 11.6–15.1)
GFR SERPL CREATININE-BSD FRML MDRD: 108 ML/MIN/1.73SQ M
GFR SERPL CREATININE-BSD FRML MDRD: 110 ML/MIN/1.73SQ M
GLUCOSE SERPL-MCNC: 143 MG/DL (ref 65–140)
GLUCOSE SERPL-MCNC: 146 MG/DL (ref 65–140)
GLUCOSE SERPL-MCNC: 162 MG/DL (ref 65–140)
GLUCOSE UR STRIP-MCNC: NEGATIVE MG/DL
HCO3 BLDA-SCNC: 24.8 MMOL/L (ref 22–28)
HCO3 BLDA-SCNC: 26.3 MMOL/L (ref 22–28)
HCT VFR BLD AUTO: 41.5 % (ref 36.5–49.3)
HCT VFR BLD AUTO: 45.5 % (ref 36.5–49.3)
HCT VFR BLD CALC: 38 % (ref 36.5–49.3)
HGB BLD-MCNC: 14.2 G/DL (ref 12–17)
HGB BLD-MCNC: 15.4 G/DL (ref 12–17)
HGB BLDA-MCNC: 12.9 G/DL (ref 12–17)
HGB UR QL STRIP.AUTO: NEGATIVE
HOROWITZ INDEX BLDA+IHG-RTO: 50 MM[HG]
HYALINE CASTS #/AREA URNS LPF: ABNORMAL /LPF
IMM GRANULOCYTES # BLD AUTO: 0.1 THOUSAND/UL (ref 0–0.2)
IMM GRANULOCYTES # BLD AUTO: 0.12 THOUSAND/UL (ref 0–0.2)
IMM GRANULOCYTES NFR BLD AUTO: 1 % (ref 0–2)
IMM GRANULOCYTES NFR BLD AUTO: 1 % (ref 0–2)
INR PPP: 1.2 (ref 0.84–1.19)
INR PPP: 1.27 (ref 0.84–1.19)
KETONES UR STRIP-MCNC: NEGATIVE MG/DL
LACTATE SERPL-SCNC: 1.9 MMOL/L (ref 0.5–2)
LACTATE SERPL-SCNC: 2.4 MMOL/L (ref 0.5–2)
LEUKOCYTE ESTERASE UR QL STRIP: ABNORMAL
LYMPHOCYTES # BLD AUTO: 0.61 THOUSANDS/ΜL (ref 0.6–4.47)
LYMPHOCYTES # BLD AUTO: 1.26 THOUSANDS/ΜL (ref 0.6–4.47)
LYMPHOCYTES NFR BLD AUTO: 4 % (ref 14–44)
LYMPHOCYTES NFR BLD AUTO: 9 % (ref 14–44)
MAGNESIUM SERPL-MCNC: 2.1 MG/DL (ref 1.6–2.6)
MCH RBC QN AUTO: 32.9 PG (ref 26.8–34.3)
MCH RBC QN AUTO: 33 PG (ref 26.8–34.3)
MCHC RBC AUTO-ENTMCNC: 33.8 G/DL (ref 31.4–37.4)
MCHC RBC AUTO-ENTMCNC: 34.2 G/DL (ref 31.4–37.4)
MCV RBC AUTO: 97 FL (ref 82–98)
MCV RBC AUTO: 97 FL (ref 82–98)
MONOCYTES # BLD AUTO: 0.7 THOUSAND/ΜL (ref 0.17–1.22)
MONOCYTES # BLD AUTO: 1.28 THOUSAND/ΜL (ref 0.17–1.22)
MONOCYTES NFR BLD AUTO: 5 % (ref 4–12)
MONOCYTES NFR BLD AUTO: 9 % (ref 4–12)
NEUTROPHILS # BLD AUTO: 11.16 THOUSANDS/ΜL (ref 1.85–7.62)
NEUTROPHILS # BLD AUTO: 12.67 THOUSANDS/ΜL (ref 1.85–7.62)
NEUTS SEG NFR BLD AUTO: 81 % (ref 43–75)
NEUTS SEG NFR BLD AUTO: 90 % (ref 43–75)
NITRITE UR QL STRIP: NEGATIVE
NON-SQ EPI CELLS URNS QL MICRO: ABNORMAL /HPF
NRBC BLD AUTO-RTO: 0 /100 WBCS
NRBC BLD AUTO-RTO: 0 /100 WBCS
O2 CT BLDA-SCNC: 20.7 ML/DL (ref 16–23)
OXYHGB MFR BLDA: 98.1 % (ref 94–97)
PCO2 BLD: 27 MMOL/L (ref 21–32)
PCO2 BLD: 38.9 MM HG (ref 36–44)
PCO2 BLDA: 42 MM HG (ref 36–44)
PCO2 TEMP ADJ BLDA: 42.7 MM HG (ref 36–44)
PEEP RESPIRATORY: 6 CM[H2O]
PH BLD: 7.38 [PH] (ref 7.35–7.45)
PH BLD: 7.44 [PH] (ref 7.35–7.45)
PH BLDA: 7.39 [PH] (ref 7.35–7.45)
PH UR STRIP.AUTO: 7.5 [PH]
PHOSPHATE SERPL-MCNC: 3 MG/DL (ref 2.7–4.5)
PLATELET # BLD AUTO: 215 THOUSANDS/UL (ref 149–390)
PLATELET # BLD AUTO: 248 THOUSANDS/UL (ref 149–390)
PMV BLD AUTO: 11.8 FL (ref 8.9–12.7)
PMV BLD AUTO: 11.9 FL (ref 8.9–12.7)
PO2 BLD: 170.5 MM HG (ref 75–129)
PO2 BLD: >400 MM HG (ref 75–129)
PO2 BLDA: 168.2 MM HG (ref 75–129)
POTASSIUM BLD-SCNC: 3.6 MMOL/L (ref 3.5–5.3)
POTASSIUM SERPL-SCNC: 4 MMOL/L (ref 3.5–5.3)
POTASSIUM SERPL-SCNC: 4.1 MMOL/L (ref 3.5–5.3)
PROCALCITONIN SERPL-MCNC: 0.17 NG/ML
PROT SERPL-MCNC: 8.1 G/DL (ref 6.4–8.2)
PROT UR STRIP-MCNC: ABNORMAL MG/DL
PROTHROMBIN TIME: 15.2 SECONDS (ref 11.6–14.5)
PROTHROMBIN TIME: 15.8 SECONDS (ref 11.6–14.5)
RBC # BLD AUTO: 4.3 MILLION/UL (ref 3.88–5.62)
RBC # BLD AUTO: 4.68 MILLION/UL (ref 3.88–5.62)
RBC #/AREA URNS AUTO: ABNORMAL /HPF
RH BLD: POSITIVE
RH BLD: POSITIVE
SODIUM BLD-SCNC: 144 MMOL/L (ref 136–145)
SODIUM SERPL-SCNC: 140 MMOL/L (ref 136–145)
SODIUM SERPL-SCNC: 141 MMOL/L (ref 136–145)
SP GR UR STRIP.AUTO: >1.045 (ref 1–1.03)
SPECIMEN EXPIRATION DATE: NORMAL
SPECIMEN SOURCE: ABNORMAL
SPECIMEN SOURCE: ABNORMAL
UROBILINOGEN UR QL STRIP.AUTO: 4 E.U./DL
VENT AC: 14
VENT- AC: AC
VT SETTING VENT: 500 ML
WBC # BLD AUTO: 13.87 THOUSAND/UL (ref 4.31–10.16)
WBC # BLD AUTO: 14.14 THOUSAND/UL (ref 4.31–10.16)
WBC #/AREA URNS AUTO: ABNORMAL /HPF

## 2021-04-15 PROCEDURE — 85014 HEMATOCRIT: CPT

## 2021-04-15 PROCEDURE — 00C40ZZ EXTIRPATION OF MATTER FROM INTRACRANIAL SUBDURAL SPACE, OPEN APPROACH: ICD-10-PCS | Performed by: NEUROLOGICAL SURGERY

## 2021-04-15 PROCEDURE — 94760 N-INVAS EAR/PLS OXIMETRY 1: CPT

## 2021-04-15 PROCEDURE — 36415 COLL VENOUS BLD VENIPUNCTURE: CPT | Performed by: EMERGENCY MEDICINE

## 2021-04-15 PROCEDURE — 85025 COMPLETE CBC W/AUTO DIFF WBC: CPT | Performed by: EMERGENCY MEDICINE

## 2021-04-15 PROCEDURE — 82803 BLOOD GASES ANY COMBINATION: CPT

## 2021-04-15 PROCEDURE — 85730 THROMBOPLASTIN TIME PARTIAL: CPT | Performed by: STUDENT IN AN ORGANIZED HEALTH CARE EDUCATION/TRAINING PROGRAM

## 2021-04-15 PROCEDURE — 85610 PROTHROMBIN TIME: CPT | Performed by: EMERGENCY MEDICINE

## 2021-04-15 PROCEDURE — 80048 BASIC METABOLIC PNL TOTAL CA: CPT | Performed by: EMERGENCY MEDICINE

## 2021-04-15 PROCEDURE — 87186 SC STD MICRODIL/AGAR DIL: CPT | Performed by: STUDENT IN AN ORGANIZED HEALTH CARE EDUCATION/TRAINING PROGRAM

## 2021-04-15 PROCEDURE — 82330 ASSAY OF CALCIUM: CPT

## 2021-04-15 PROCEDURE — 85025 COMPLETE CBC W/AUTO DIFF WBC: CPT | Performed by: STUDENT IN AN ORGANIZED HEALTH CARE EDUCATION/TRAINING PROGRAM

## 2021-04-15 PROCEDURE — 61312 CRNEC/CRNOT STTL XDRL/SDRL: CPT | Performed by: NEUROLOGICAL SURGERY

## 2021-04-15 PROCEDURE — 99291 CRITICAL CARE FIRST HOUR: CPT | Performed by: SURGERY

## 2021-04-15 PROCEDURE — 87086 URINE CULTURE/COLONY COUNT: CPT | Performed by: STUDENT IN AN ORGANIZED HEALTH CARE EDUCATION/TRAINING PROGRAM

## 2021-04-15 PROCEDURE — 99223 1ST HOSP IP/OBS HIGH 75: CPT | Performed by: NEUROLOGICAL SURGERY

## 2021-04-15 PROCEDURE — NC001 PR NO CHARGE: Performed by: EMERGENCY MEDICINE

## 2021-04-15 PROCEDURE — 84100 ASSAY OF PHOSPHORUS: CPT | Performed by: STUDENT IN AN ORGANIZED HEALTH CARE EDUCATION/TRAINING PROGRAM

## 2021-04-15 PROCEDURE — 86901 BLOOD TYPING SEROLOGIC RH(D): CPT | Performed by: NEUROLOGICAL SURGERY

## 2021-04-15 PROCEDURE — C1781 MESH (IMPLANTABLE): HCPCS | Performed by: NEUROLOGICAL SURGERY

## 2021-04-15 PROCEDURE — 83605 ASSAY OF LACTIC ACID: CPT | Performed by: EMERGENCY MEDICINE

## 2021-04-15 PROCEDURE — 80076 HEPATIC FUNCTION PANEL: CPT | Performed by: EMERGENCY MEDICINE

## 2021-04-15 PROCEDURE — 86900 BLOOD TYPING SEROLOGIC ABO: CPT | Performed by: NEUROLOGICAL SURGERY

## 2021-04-15 PROCEDURE — 85610 PROTHROMBIN TIME: CPT | Performed by: STUDENT IN AN ORGANIZED HEALTH CARE EDUCATION/TRAINING PROGRAM

## 2021-04-15 PROCEDURE — 81001 URINALYSIS AUTO W/SCOPE: CPT | Performed by: STUDENT IN AN ORGANIZED HEALTH CARE EDUCATION/TRAINING PROGRAM

## 2021-04-15 PROCEDURE — 61312 CRNEC/CRNOT STTL XDRL/SDRL: CPT | Performed by: PHYSICIAN ASSISTANT

## 2021-04-15 PROCEDURE — 86850 RBC ANTIBODY SCREEN: CPT | Performed by: NEUROLOGICAL SURGERY

## 2021-04-15 PROCEDURE — 71260 CT THORAX DX C+: CPT

## 2021-04-15 PROCEDURE — 82805 BLOOD GASES W/O2 SATURATION: CPT | Performed by: STUDENT IN AN ORGANIZED HEALTH CARE EDUCATION/TRAINING PROGRAM

## 2021-04-15 PROCEDURE — 84132 ASSAY OF SERUM POTASSIUM: CPT

## 2021-04-15 PROCEDURE — 87040 BLOOD CULTURE FOR BACTERIA: CPT | Performed by: EMERGENCY MEDICINE

## 2021-04-15 PROCEDURE — 99285 EMERGENCY DEPT VISIT HI MDM: CPT | Performed by: EMERGENCY MEDICINE

## 2021-04-15 PROCEDURE — 84295 ASSAY OF SERUM SODIUM: CPT

## 2021-04-15 PROCEDURE — 009400Z DRAINAGE OF INTRACRANIAL SUBDURAL SPACE WITH DRAINAGE DEVICE, OPEN APPROACH: ICD-10-PCS | Performed by: NEUROLOGICAL SURGERY

## 2021-04-15 PROCEDURE — 74177 CT ABD & PELVIS W/CONTRAST: CPT

## 2021-04-15 PROCEDURE — 99285 EMERGENCY DEPT VISIT HI MDM: CPT

## 2021-04-15 PROCEDURE — 71045 X-RAY EXAM CHEST 1 VIEW: CPT

## 2021-04-15 PROCEDURE — 80048 BASIC METABOLIC PNL TOTAL CA: CPT | Performed by: STUDENT IN AN ORGANIZED HEALTH CARE EDUCATION/TRAINING PROGRAM

## 2021-04-15 PROCEDURE — 83735 ASSAY OF MAGNESIUM: CPT | Performed by: STUDENT IN AN ORGANIZED HEALTH CARE EDUCATION/TRAINING PROGRAM

## 2021-04-15 PROCEDURE — 87077 CULTURE AEROBIC IDENTIFY: CPT | Performed by: STUDENT IN AN ORGANIZED HEALTH CARE EDUCATION/TRAINING PROGRAM

## 2021-04-15 PROCEDURE — 94002 VENT MGMT INPAT INIT DAY: CPT

## 2021-04-15 PROCEDURE — 00N00ZZ RELEASE BRAIN, OPEN APPROACH: ICD-10-PCS | Performed by: NEUROLOGICAL SURGERY

## 2021-04-15 PROCEDURE — 82330 ASSAY OF CALCIUM: CPT | Performed by: STUDENT IN AN ORGANIZED HEALTH CARE EDUCATION/TRAINING PROGRAM

## 2021-04-15 PROCEDURE — 70450 CT HEAD/BRAIN W/O DYE: CPT

## 2021-04-15 PROCEDURE — 83605 ASSAY OF LACTIC ACID: CPT | Performed by: STUDENT IN AN ORGANIZED HEALTH CARE EDUCATION/TRAINING PROGRAM

## 2021-04-15 PROCEDURE — 86920 COMPATIBILITY TEST SPIN: CPT

## 2021-04-15 PROCEDURE — 85730 THROMBOPLASTIN TIME PARTIAL: CPT | Performed by: EMERGENCY MEDICINE

## 2021-04-15 PROCEDURE — 84145 PROCALCITONIN (PCT): CPT | Performed by: EMERGENCY MEDICINE

## 2021-04-15 PROCEDURE — 82947 ASSAY GLUCOSE BLOOD QUANT: CPT

## 2021-04-15 DEVICE — DURA 62105 SUBSTITUTE DUREPAIR 3X3IN NCE
Type: IMPLANTABLE DEVICE | Site: BRAIN | Status: FUNCTIONAL
Brand: DUREPAIR®

## 2021-04-15 RX ORDER — FENTANYL CITRATE 50 UG/ML
50 INJECTION, SOLUTION INTRAMUSCULAR; INTRAVENOUS
Status: DISCONTINUED | OUTPATIENT
Start: 2021-04-15 | End: 2021-04-16

## 2021-04-15 RX ORDER — CEFAZOLIN SODIUM 2 G/50ML
2000 SOLUTION INTRAVENOUS ONCE
Status: DISCONTINUED | OUTPATIENT
Start: 2021-04-15 | End: 2021-04-15 | Stop reason: CLARIF

## 2021-04-15 RX ORDER — ROCURONIUM BROMIDE 10 MG/ML
INJECTION, SOLUTION INTRAVENOUS AS NEEDED
Status: DISCONTINUED | OUTPATIENT
Start: 2021-04-15 | End: 2021-04-15

## 2021-04-15 RX ORDER — DEXAMETHASONE SODIUM PHOSPHATE 10 MG/ML
INJECTION, SOLUTION INTRAMUSCULAR; INTRAVENOUS AS NEEDED
Status: DISCONTINUED | OUTPATIENT
Start: 2021-04-15 | End: 2021-04-15

## 2021-04-15 RX ORDER — ONDANSETRON 2 MG/ML
4 INJECTION INTRAMUSCULAR; INTRAVENOUS EVERY 6 HOURS PRN
Status: DISCONTINUED | OUTPATIENT
Start: 2021-04-15 | End: 2021-04-22 | Stop reason: HOSPADM

## 2021-04-15 RX ORDER — CHLORHEXIDINE GLUCONATE 0.12 MG/ML
15 RINSE ORAL EVERY 12 HOURS SCHEDULED
Status: DISCONTINUED | OUTPATIENT
Start: 2021-04-15 | End: 2021-04-16

## 2021-04-15 RX ORDER — CHLORHEXIDINE GLUCONATE 0.12 MG/ML
15 RINSE ORAL EVERY 12 HOURS SCHEDULED
Status: DISCONTINUED | OUTPATIENT
Start: 2021-04-15 | End: 2021-04-15

## 2021-04-15 RX ORDER — SODIUM CHLORIDE 3 G/100ML
250 INJECTION, SOLUTION INTRAVENOUS ONCE
Status: DISCONTINUED | OUTPATIENT
Start: 2021-04-15 | End: 2021-04-15

## 2021-04-15 RX ORDER — HYDROMORPHONE HCL/PF 1 MG/ML
SYRINGE (ML) INJECTION AS NEEDED
Status: DISCONTINUED | OUTPATIENT
Start: 2021-04-15 | End: 2021-04-15

## 2021-04-15 RX ORDER — LABETALOL 20 MG/4 ML (5 MG/ML) INTRAVENOUS SYRINGE
Status: COMPLETED
Start: 2021-04-15 | End: 2021-04-15

## 2021-04-15 RX ORDER — CEFAZOLIN SODIUM 1 G/3ML
INJECTION, POWDER, FOR SOLUTION INTRAMUSCULAR; INTRAVENOUS AS NEEDED
Status: DISCONTINUED | OUTPATIENT
Start: 2021-04-15 | End: 2021-04-15

## 2021-04-15 RX ORDER — LIDOCAINE HYDROCHLORIDE AND EPINEPHRINE 10; 10 MG/ML; UG/ML
INJECTION, SOLUTION INFILTRATION; PERINEURAL AS NEEDED
Status: DISCONTINUED | OUTPATIENT
Start: 2021-04-15 | End: 2021-04-15 | Stop reason: HOSPADM

## 2021-04-15 RX ORDER — SODIUM CHLORIDE, SODIUM LACTATE, POTASSIUM CHLORIDE, CALCIUM CHLORIDE 600; 310; 30; 20 MG/100ML; MG/100ML; MG/100ML; MG/100ML
INJECTION, SOLUTION INTRAVENOUS CONTINUOUS PRN
Status: DISCONTINUED | OUTPATIENT
Start: 2021-04-15 | End: 2021-04-15

## 2021-04-15 RX ORDER — PROPOFOL 10 MG/ML
INJECTION, EMULSION INTRAVENOUS AS NEEDED
Status: DISCONTINUED | OUTPATIENT
Start: 2021-04-15 | End: 2021-04-15

## 2021-04-15 RX ORDER — LABETALOL 20 MG/4 ML (5 MG/ML) INTRAVENOUS SYRINGE
10 EVERY 4 HOURS PRN
Status: DISCONTINUED | OUTPATIENT
Start: 2021-04-15 | End: 2021-04-16

## 2021-04-15 RX ORDER — SODIUM CHLORIDE 9 MG/ML
125 INJECTION, SOLUTION INTRAVENOUS CONTINUOUS
Status: DISCONTINUED | OUTPATIENT
Start: 2021-04-15 | End: 2021-04-15

## 2021-04-15 RX ORDER — FENTANYL CITRATE 50 UG/ML
INJECTION, SOLUTION INTRAMUSCULAR; INTRAVENOUS AS NEEDED
Status: DISCONTINUED | OUTPATIENT
Start: 2021-04-15 | End: 2021-04-15

## 2021-04-15 RX ORDER — CHLORHEXIDINE GLUCONATE 0.12 MG/ML
15 RINSE ORAL ONCE
Status: DISCONTINUED | OUTPATIENT
Start: 2021-04-15 | End: 2021-04-15

## 2021-04-15 RX ORDER — LEVETIRACETAM 500 MG/5ML
INJECTION, SOLUTION, CONCENTRATE INTRAVENOUS AS NEEDED
Status: DISCONTINUED | OUTPATIENT
Start: 2021-04-15 | End: 2021-04-15

## 2021-04-15 RX ORDER — 3% SODIUM CHLORIDE 3 G/100ML
30 INJECTION, SOLUTION INTRAVENOUS CONTINUOUS
Status: DISCONTINUED | OUTPATIENT
Start: 2021-04-15 | End: 2021-04-15

## 2021-04-15 RX ORDER — SODIUM CHLORIDE, SODIUM LACTATE, POTASSIUM CHLORIDE, CALCIUM CHLORIDE 600; 310; 30; 20 MG/100ML; MG/100ML; MG/100ML; MG/100ML
125 INJECTION, SOLUTION INTRAVENOUS CONTINUOUS
Status: DISCONTINUED | OUTPATIENT
Start: 2021-04-15 | End: 2021-04-16

## 2021-04-15 RX ORDER — SODIUM CHLORIDE 9 MG/ML
INJECTION, SOLUTION INTRAVENOUS CONTINUOUS PRN
Status: DISCONTINUED | OUTPATIENT
Start: 2021-04-15 | End: 2021-04-15

## 2021-04-15 RX ORDER — CEFAZOLIN SODIUM 2 G/50ML
2000 SOLUTION INTRAVENOUS EVERY 8 HOURS
Status: COMPLETED | OUTPATIENT
Start: 2021-04-15 | End: 2021-04-16

## 2021-04-15 RX ORDER — SODIUM CHLORIDE 9 MG/ML
100 INJECTION, SOLUTION INTRAVENOUS CONTINUOUS
Status: DISCONTINUED | OUTPATIENT
Start: 2021-04-15 | End: 2021-04-15

## 2021-04-15 RX ADMIN — HYDROMORPHONE HYDROCHLORIDE 1 MG: 1 INJECTION, SOLUTION INTRAMUSCULAR; INTRAVENOUS; SUBCUTANEOUS at 16:13

## 2021-04-15 RX ADMIN — ROCURONIUM BROMIDE 100 MG: 50 INJECTION, SOLUTION INTRAVENOUS at 14:36

## 2021-04-15 RX ADMIN — PROPOFOL 150 MG: 10 INJECTION, EMULSION INTRAVENOUS at 14:36

## 2021-04-15 RX ADMIN — DEXAMETHASONE SODIUM PHOSPHATE 10 MG: 10 INJECTION, SOLUTION INTRAMUSCULAR; INTRAVENOUS at 14:39

## 2021-04-15 RX ADMIN — PHENYLEPHRINE HYDROCHLORIDE 200 MCG: 10 INJECTION INTRAVENOUS at 14:57

## 2021-04-15 RX ADMIN — PHENYLEPHRINE HYDROCHLORIDE 200 MCG: 10 INJECTION INTRAVENOUS at 14:39

## 2021-04-15 RX ADMIN — SODIUM CHLORIDE: 0.9 INJECTION, SOLUTION INTRAVENOUS at 14:36

## 2021-04-15 RX ADMIN — PHENYLEPHRINE HYDROCHLORIDE 200 MCG: 10 INJECTION INTRAVENOUS at 14:44

## 2021-04-15 RX ADMIN — CEFAZOLIN SODIUM 2000 MG: 2 SOLUTION INTRAVENOUS at 23:09

## 2021-04-15 RX ADMIN — FENTANYL CITRATE 100 MCG: 50 INJECTION INTRAMUSCULAR; INTRAVENOUS at 14:35

## 2021-04-15 RX ADMIN — LABETALOL 20 MG/4 ML (5 MG/ML) INTRAVENOUS SYRINGE 10 MG: at 17:18

## 2021-04-15 RX ADMIN — IOHEXOL 100 ML: 350 INJECTION, SOLUTION INTRAVENOUS at 13:35

## 2021-04-15 RX ADMIN — PHENYLEPHRINE HYDROCHLORIDE 60 MCG/MIN: 10 INJECTION INTRAVENOUS at 14:57

## 2021-04-15 RX ADMIN — PHENYLEPHRINE HYDROCHLORIDE 200 MCG: 10 INJECTION INTRAVENOUS at 14:42

## 2021-04-15 RX ADMIN — LEVETIRACETAM 500 MG: 100 INJECTION, SOLUTION INTRAVENOUS at 22:32

## 2021-04-15 RX ADMIN — CEFAZOLIN 2000 MG: 1 INJECTION, POWDER, FOR SOLUTION INTRAMUSCULAR; INTRAVENOUS at 14:37

## 2021-04-15 RX ADMIN — SODIUM CHLORIDE, SODIUM LACTATE, POTASSIUM CHLORIDE, AND CALCIUM CHLORIDE 125 ML/HR: .6; .31; .03; .02 INJECTION, SOLUTION INTRAVENOUS at 17:29

## 2021-04-15 RX ADMIN — CHLORHEXIDINE GLUCONATE 15 ML: 1.2 SOLUTION ORAL at 22:31

## 2021-04-15 RX ADMIN — PHENYLEPHRINE HYDROCHLORIDE 100 MCG: 10 INJECTION INTRAVENOUS at 14:38

## 2021-04-15 RX ADMIN — SUGAMMADEX 200 MG: 100 INJECTION, SOLUTION INTRAVENOUS at 16:15

## 2021-04-15 RX ADMIN — SODIUM CHLORIDE, SODIUM LACTATE, POTASSIUM CHLORIDE, AND CALCIUM CHLORIDE: .6; .31; .03; .02 INJECTION, SOLUTION INTRAVENOUS at 14:45

## 2021-04-15 RX ADMIN — LEVETIRACETAM 1000 MG: 100 INJECTION, SOLUTION INTRAVENOUS at 14:38

## 2021-04-15 RX ADMIN — PHENYLEPHRINE HYDROCHLORIDE 200 MCG: 10 INJECTION INTRAVENOUS at 14:50

## 2021-04-15 RX ADMIN — SODIUM CHLORIDE, SODIUM LACTATE, POTASSIUM CHLORIDE, AND CALCIUM CHLORIDE: .6; .31; .03; .02 INJECTION, SOLUTION INTRAVENOUS at 16:05

## 2021-04-15 RX ADMIN — PHENYLEPHRINE HYDROCHLORIDE 300 MCG: 10 INJECTION INTRAVENOUS at 15:02

## 2021-04-15 NOTE — ANESTHESIA PREPROCEDURE EVALUATION
Procedure:  Right CRANIOTOMY FOR SUBDURAL HEMATOMA (Right Head)    61 y/o with Hx of SDH and SAH after a fall on 4/4/21-4/14 in [place], G tube placed, discharged on tube feeds  Presented to the ED for lethargy  Transferred to [facility] yesterday  Transferred here for worsening mental status and midline shift seen on CT Head  Relevant Problems   CARDIO   (+) White coat syndrome with diagnosis of hypertension      NEURO/PSYCH   (+) Anxiety      CT Scan shows worsening right sided subdural hematoma with midline shft       Anesthesia Plan  ASA Score- 5 Emergent    Anesthesia Type- general with ASA Monitors  Additional Monitors:   Airway Plan: ETT  Comment: I spoke with the patient's wife, Markus Waite, regarding the plan for general anesthesia  I explained that the patient would have an ETT placed as well as an arterial line and extra peripheral IV  I explained that the patient is at high risk for complications from anesthesia during the perioperative period secondary to the nature of his acute illness  I also explained that he may remain intubated and in the ICU after the procedure  Markus Waite said that she understood the risks and would like to proceed  She also admitted that the patient does not smoke but prior to his fall he did drink one 6 pack of beer per day  She stated that his last drink was on 4/4/21  Socorro Peggy Plan Factors-    Chart reviewed  Imaging results reviewed  Patient summary reviewed  Induction- rapid sequence induction  Postoperative Plan- Plan for postoperative opioid use  Informed Consent- Anesthetic plan and risks discussed with spouse  I personally reviewed this patient with the CRNA  Discussed and agreed on the Anesthesia Plan with the CRNA  Socorro Woods

## 2021-04-15 NOTE — OP NOTE
OPERATIVE REPORT  PATIENT NAME: Dustin Gu    :  1960  MRN: [id number]  Pt Location: BE OR ROOM 18    SURGERY DATE: 4/15/2021    Surgeon(s) and Role:     * Daphne Villagomez MD - Primary     * Soren Muhammad PA-C - Assisting    Preop Diagnosis:  Subdural hematoma ([address] ) [K85 [id number]]    Post-Op Diagnosis Codes:     * Subdural hematoma ([address] ) [[id number]]    Procedure(s) (LRB):  Right CRANIOTOMY FOR SUBDURAL HEMATOMA (Right)    Specimen(s):  * No specimens in log *    Estimated Blood Loss:   350 mL    Drains:  Closed/Suction Drain Right Head Bulb 10 Fr  (Active)   Number of days: 0       Urethral Catheter Latex 16 Fr  (Active)   Number of days: 0       Anesthesia Type:   General    Operative Indications:  Subdural hematoma ([address] ) [[id number]]   32-year-old gentleman with subacute subdural hematoma and 15 mm midline shift to presented with altered mental status and declining neurologic exam   Given his current exam and radiographic findings at a discussion with the wife regarding surgery including decompressive craniectomy versus evacuation of subdural   She understood the risks and benefits including bleeding, infection, stroke, seizure and death    Operative Findings:   compressive subdural hematoma with mass effect  Contused brain  Complications:   None    Procedure and Technique:  After obtaining verbal informed consent the patient was brought to the operating room   The general endotracheal anesthesia was induced   the head was then rotated to the left  Leita Lipa was clipped in the usual fashion   A reverse question jhonny incision was planned   The head was prepped and draped in the usual sterile fashion  A surgical time-out was performed      A 10 blade was then used to open the incision   Myocutaneous flap was then reflected anteriorly   Monopolar cautery was then used to dissect the temporalis   With the Myocutaneous flap reflected anteriorly with the temporalis muscle a Midas Issac  drill was used to make bur holes, 1 along the keyhole, temporal fossa, parietal   A B1 craniotome was then used to turn a craniotomy flap   The bone flap was then stored on the back table in bacitracin soaked sponge       The dura was then opened with Metzenbaum scissors and reflected anteriorly   The brain was under pressure    the large hematoma was evacuated  The brain had multiple areas of contusion  The brain did not reclaim the space, however it did appear pulsatile  After ensuring decompression of the brain and evacuation of the subdural I turned my attention to further bony decompression  The temporal fossa was evaluated and using a Leksell rongeur bone removed until it was decompressed to the temporal floor   Peripheral tack-up stitches were then made and secured with 4 0 Nurolon      At this juncture I ensured hemostasis  Once this was done 3 dura repair were placed over the dura to allow for an expansile duraplasty    A 10 Kinyarwanda PVC drain was then tunneled in a subgaleal fashion   The Myocutaneous flap was then reflected back onto the drain       The galea was then closed with 2 0 Vicryl   The skin was closed staples   The drain was secured in place with a 2-0 nylon   Sterile dressing was applied   The patient was then transferred to the ICU in stable but critical condition      There were 2 uninterrupted counts   A surgical sign-out was performed      There was no qualified resident aid in this case  As such, due to its complex nature Luis Eduardo Sevilla was present and assisted for the duration of the case including exposure,  Evacuation, and closure        I was present for the entire procedure    Patient Disposition:  Critical Care Unit    SIGNATURE: Asuncion Soliz MD  DATE: April 15, 2021  TIME: 5:11 PM

## 2021-04-15 NOTE — H&P
H&P Exam - Trauma   Cayden Cordova 62 y o  male MRN: [id number]  Unit/Bed#: ICU 04 Encounter: [id number]    Assessment/Plan   Trauma Alert: Evaluation  Model of Arrival: Ambulance  Trauma Team: Attending Barrie Salas and ABDULAZIZ Velázquez  Consultants: Neurosurgery: Dr Jeaneth Hernandez  Time of Arrival: 1300    Trauma Active Problems:   Acute on Chronic SDH  Altered Mental Status    Trauma Plan:   Admit to critical care  OR with neurosurgery now    Chief Complaint: AMS    History of Present Illness   HPI:  Cayden Cordova is a 62 y o  male who presents with worsening mental status following a previous fall and SDH  Patient fell on 4/04 in [facility] and was found to have a SDH/SAH  He was treated and discharged to [facility] for rehabilitation yesterday  Patient reported to be more lethargic and was sent to the ER for evaluation  Repeat CTH showed acute on chronic subdural hemorrhage with surrounding edema and midline shift  Patient protecting his airway in the ED but with GCS of 10  Neurosurgery planning to take patient to the OR  Mechanism: Fall    Review of Systems   Unable to perform ROS: Mental status change   Endocrine: Negative for heat intolerance  12-point, complete review of systems was reviewed and negative except as stated above  Historical Information   History is unobtainable from the patient due to AMS    Efforts to obtain history included the following sources: obtained from other records    Past Medical History:   Diagnosis Date    Anxiety     Hypertension      Past Surgical History:   Procedure Laterality Date    HAND SURGERY Left     ROTATOR CUFF REPAIR Left      Social History   Social History     Substance and Sexual Activity   Alcohol Use Yes    Frequency: 4 or more times a week     Social History     Substance and Sexual Activity   Drug Use Not Currently     Social History     Tobacco Use   Smoking Status Never Smoker   Smokeless Tobacco Never Used     E-Cigarette/Vaping    E-Cigarette Use Never User      E-Cigarette/Vaping Substances    Nicotine No     THC No     CBD No     Flavoring No     Other No     Unknown No        There is no immunization history on file for this patient  Last Tetanus: Unknown  Family History: Non-contributory  Unable to obtain/limited by AMS      Meds/Allergies   all current active meds have been reviewed    No Known Allergies      PHYSICAL EXAM      Objective   Vitals:   First set: Temperature: 99 6 °F (37 6 °C) (04/15/21 1255)  Pulse: 89 (04/15/21 1255)  Respirations: 20 (04/15/21 1255)  Blood Pressure: 167/77 (04/15/21 1255)  Arterial Line BP: 162/64 (04/15/21 1700)    Primary Survey:   (A) Airway: Intact  (B) Breathing: Bilateral breath sounds, equal  (C) Circulation: Pulses:   normal  (D) Disabliity:  GCS Total:  10  (E) Expose:  Completed    Secondary Survey: (Click on Physical Exam tab above)  Physical Exam  Vitals signs reviewed  HENT:      Head: Normocephalic and atraumatic  Nose: Nose normal       Mouth/Throat:      Mouth: Mucous membranes are moist    Eyes:      Pupils: Pupils are equal, round, and reactive to light  Cardiovascular:      Rate and Rhythm: Normal rate and regular rhythm  Pulses: Normal pulses  Heart sounds: Normal heart sounds  Pulmonary:      Effort: Pulmonary effort is normal       Breath sounds: Normal breath sounds  No wheezing, rhonchi or rales  Abdominal:      General: Abdomen is flat  Bowel sounds are normal  There is no distension  Palpations: Abdomen is soft  Tenderness: There is no abdominal tenderness  Musculoskeletal:      Right lower leg: No edema  Left lower leg: No edema  Skin:     General: Skin is warm and dry  Capillary Refill: Capillary refill takes less than 2 seconds  Neurological:      GCS: GCS eye subscore is 3  GCS verbal subscore is 1  GCS motor subscore is 6  Comments: Patient following commands in RUE, not following commands in LUE   Withdraws to pain weakly in LUE  Will not follow commands in b/l LE but withdrwas to pain in b/l LE  Invasive Devices     Peripheral Intravenous Line            Peripheral IV 04/15/21 Distal;Right;Upper;Ventral (anterior) Arm less than 1 day    Peripheral IV 04/15/21 Left Hand less than 1 day    Peripheral IV 04/15/21 Left;Ventral (anterior) Hand less than 1 day          Arterial Line            Arterial Line 04/15/21 Right Radial less than 1 day          Drain            Closed/Suction Drain Right Head Bulb 10 Fr  less than 1 day    Urethral Catheter Latex 16 Fr  less than 1 day          Airway            ETT  Hi-Lo 8 mm less than 1 day                Lab Results: Results: I have personally reviewed pertinent reports  Imaging/EKG Studies: Results: I have personally reviewed pertinent reports      Other Studies: N/A    Code Status: Level 1 - Full Code  Advance Directive and Living Will:      Power of :    POLST:

## 2021-04-15 NOTE — PROGRESS NOTES
Patient seen in ER  Somulent  Opens eyes the deep stim  Slowly localizes with RUE and moves lue  Wd bl lowers  Spoke to wife regarding emergent craniotomy/craniectomy for sdh  She understands risks/benefits including bleeding infection seizure stroke and death  Verbal consent witnessed   Stat or    Lab Results   Component Value Date/Time    SODIUM 140 04/15/2021 01:20 PM    CREATININE 0 63 04/15/2021 01:20 PM    WBC 13 87 (H) 04/15/2021 01:20 PM    HGB 15 4 04/15/2021 01:20 PM     04/15/2021 01:20 PM    PTT 33 04/15/2021 01:24 PM    INR 1 20 (H) 04/15/2021 01:20 PM

## 2021-04-15 NOTE — H&P
H&P Exam - 1 Cj Schneider 62 y o  male MRN: [id number]  Unit/Bed#: ICU 04 Encounter: [id number]      -------------------------------------------------------------------------------------------------------------  Chief Complaint: Subdural hematoma, Subarachnoid hemorrhage    History of Present Illness   HX and PE limited by: Intubated patient, history obtained from chart review  Joy Gorman is a 62 y o  male who presents with multi compartmental intracranial hemorrhage  He reportedly had a fall on 04/04 in [place] and was found to have a subdural hemorrhage and subarachnoid hemorrhage  He had a G-tube placed was discharged on tube feeds  He was subsequently transferred to [facility] for rehabilitation yesterday  Upon arrival he was found to be more lethargic than was reported in [place] so he was referred to the emergency department for further evaluation  A repeat CT scan showed increased subdural hemorrhage with severe midline shift  History obtained from chart review    ---------------------------------------------------------------------------------------s----------------------  Assessment and Plan:    Neuro:    Diagnosis:  Multicompartmental intracranial hemorrhage  o CT showed large subdural hematoma along the right hemisphere and subarachnoid blood in the right temporal, bilateral frontal, and bilateral parietal lobes  o Status post right craniectomy and subdural drain placement on 04/15  o Frequent neurological checks  o Target systolic blood pressure less than 160  o Keppra 500 BID for seizure prophylaxis   Diagnosis: Sedation  o Plan: PRN Fentanyl      CV:    History of HTN  o PRN labetalol, hydralazine  o Maintain SBP < 160  o Maintain right radial arterial line for monitoring      Pulm:   Diagnosis: Intubated   o Continue mechanical ventilation  o Wean settings as able      GI:    Diagnosis: No acute issues  o G tube in place from outside hospital, leave to low intermittent suction/gravity      :    No acute issues  o Bai catheter in situ, maintain for accurate I&O's  o Cr 0 60      F/E/N:    Plan: [email or web address], Trend and replete electrolytes PRN, NPO      Heme/Onc:    No acute issues  o DVT PPX held in the setting of intracranial hemorrhage, will defer to neurosurgery      Endo:    No acute issues      ID:    No acute issues      MSK/Skin:    No acute issues  o Frequent turning      Disposition: Continue Critical Care   Code Status: Level 1 - Full Code  --------------------------------------------------------------------------------------------------------------  Review of Systems   Unable to perform ROS: Intubated     Review of systems was unable to be performed secondary to intubated patient    Physical Exam  Constitutional:       Comments: NAD, Intubated   HENT:      Head:      Comments: S/P Right craniotomy, subdural YANETH drain in place with scant sanguinous output     Nose: Nose normal       Mouth/Throat:      Mouth: Mucous membranes are moist       Pharynx: Oropharynx is clear  Cardiovascular:      Rate and Rhythm: Normal rate and regular rhythm  Pulses: Normal pulses  Pulmonary:      Comments: Intubated and mechanically ventilated, ACVC 14/500/50/6  Abdominal:      General: Abdomen is flat  Palpations: Abdomen is soft  Tenderness: There is no abdominal tenderness  Musculoskeletal:      Comments: No signs of external trauma   Skin:     General: Skin is warm and dry  Neurological:      GCS: GCS eye subscore is 3  GCS verbal subscore is 1   GCS motor subscore is 4        --------------------------------------------------------------------------------------------------------------  Vitals:   Vitals:    04/15/21 1330 04/15/21 1345 04/15/21 1415 04/15/21 1700   BP: (!) 183/86 (!) 178/81 (!) 179/83 144/76   BP Location:   Right arm    Pulse: 99 102 104 80   Resp: 20 22 16 18   Temp:    99 3 °F (37 4 °C)   TempSrc:    Oral   SpO2: 97% 99% 99% 100%     Temp  Min: 99 3 °F (37 4 °C)  Max: 99 6 °F (37 6 °C)        There is no height or weight on file to calculate BMI  N/A    Laboratory and Diagnostics:  Results from last 7 days   Lab Units 04/15/21  1320   WBC Thousand/uL 13 87*   HEMOGLOBIN g/dL 15 4   HEMATOCRIT % 45 5   PLATELETS Thousands/uL 248   NEUTROS PCT % 81*   MONOS PCT % 9     Results from last 7 days   Lab Units 04/15/21  1320   SODIUM mmol/L 140   POTASSIUM mmol/L 4 1   CHLORIDE mmol/L 110*   CO2 mmol/L 25   ANION GAP mmol/L 5   BUN mg/dL 12   CREATININE mg/dL 0 63   CALCIUM mg/dL 8 9   GLUCOSE RANDOM mg/dL 162*   ALT U/L 43   AST U/L 33   ALK PHOS U/L 164*   ALBUMIN g/dL 2 7*   TOTAL BILIRUBIN mg/dL 0 97          Results from last 7 days   Lab Units 04/15/21  1324 04/15/21  1320   INR   --  1 20*   PTT seconds 33  --           Results from last 7 days   Lab Units 04/15/21  1320   LACTIC ACID mmol/L 2 4*     ABG:    VBG:    Results from last 7 days   Lab Units 04/15/21  1320   PROCALCITONIN ng/ml 0 17       Micro:  Results from last 7 days   Lab Units 04/15/21  1320   BLOOD CULTURE  Received in Microbiology Lab  Culture in Progress  EKG: See telemetry  Imaging: I have personally reviewed pertinent reports        Historical Information   Past Medical History:   Diagnosis Date    Anxiety     Hypertension      Past Surgical History:   Procedure Laterality Date    HAND SURGERY Left     ROTATOR CUFF REPAIR Left      Social History   Social History     Substance and Sexual Activity   Alcohol Use Yes    Frequency: 4 or more times a week     Social History     Substance and Sexual Activity   Drug Use Not Currently     Social History     Tobacco Use   Smoking Status Never Smoker   Smokeless Tobacco Never Used     Family History:   Family History   Problem Relation Age of Onset    Heart disease Mother     Hypertension Father     Dementia Father      I have reviewed this patient's family history and commented on sigificant items within the HPI      Medications:  Current Facility-Administered Medications   Medication Dose Route Frequency    chlorhexidine (PERIDEX) 0 12 % oral rinse 15 mL  15 mL Swish & Spit Q12H [facility]    lactated ringers infusion  125 mL/hr Intravenous Continuous    levETIRAcetam (KEPPRA) 500 mg in sodium chloride 0 9 % 100 mL IVPB  500 mg Intravenous Q12H [facility] medications:  Prior to Admission Medications   Prescriptions Last Dose Informant Patient Reported? Taking? Multiple Vitamin (MULTIVITAMIN) tablet  Self Yes No   Sig: Take 1 tablet by mouth daily   lisinopril (ZESTRIL) 40 mg tablet   No No   Sig: Take 1 tablet (40 mg total) by mouth daily   nadolol (CORGARD) 40 mg tablet   No No   Sig: Take 1 tablet (40 mg total) by mouth daily      Facility-Administered Medications: None     Allergies:  No Known Allergies  ------------------------------------------------------------------------------------------------------------  Advance Directive and Living Will:      Power of :    POLST:    ------------------------------------------------------------------------------------------------------------  Anticipated Length of Stay is > 2 midnights    Care Time Delivered:   No Critical Care time spent       An Jewell MD        Portions of the record may have been created with voice recognition software  Occasional wrong word or "sound a like" substitutions may have occurred due to the inherent limitations of voice recognition software    Read the chart carefully and recognize, using context, where substitutions have occurred

## 2021-04-15 NOTE — ANESTHESIA POSTPROCEDURE EVALUATION
Post-Op Assessment Note    CV Status:  Stable    Pain management: adequate     Mental Status:  Sleepy   Hydration Status:  Euvolemic   PONV Controlled:  Controlled      Post Op Vitals Reviewed: Yes      Staff: Anesthesiologist, CRNA   Comments: intubated, vital signs stable, report to ICU staff        No complications documented      BP  145/67   Temp      Pulse  76   Resp   AC/14/50/6   SpO2   100

## 2021-04-15 NOTE — RESPIRATORY THERAPY NOTE
RT Ventilator Management Note  Hannah Hernandez 62 y o  male MRN: [id number]  Unit/Bed#: ICU 04 Encounter: [id number]      Daily Screen     No data found in the last 10 encounters  Physical Exam:   Assessment Type: (P) Assess only  General Appearance: (P) Sedated  Respiratory Pattern: (P) Assisted  Chest Assessment: (P) Chest expansion symmetrical, Trachea midline  Bilateral Breath Sounds: (P) Clear, Diminished      Resp Comments: (P) Pt evaluated per resp protocol after being received from the OR s/p subdural hemorrhage  Pt has no pulmonary hx in his EMR and his BS are CTA B/L  Will keep pt on protocol per policy and continue to monitor and adjust as necessary

## 2021-04-15 NOTE — ASSESSMENT & PLAN NOTE
Transfer from TBI rehab with AMS, found to have large right sided SDH with midline shift and brain compression  Recent hospitalization from 04/04/2021-04/14/2021 in [facility] secondary to fall on the sidewalk with head strike, noted to have right subdural hematoma, GCS 14 on discharge to TBI rehab with PEG tube    Imaging reviewed personally and with attending, results are as follows:   CT head wo contrast 4/15/2021:  Official read pending however per my interpretation large subdural hematoma with midline shift and brain compression noted    Plan:  [address] [place] [address] for urgent neurosurgical intervention today for evacuation of subdural hematoma  o NPO  o No supposed protocol ordered however given urgency of surgical intervention, likely will not be able to be done  o Labs reviewed, UA when able  o Hold all anticoagulation/antiplatelet therapy, no DVT prophylaxis for now  o Consent verbally obtained by attending from significant other   Systolic blood pressure less than 160   Hold all anticoagulation/antiplatelet therapy   Will likely need Keppra postoperatively for 1 week for seizure prophylaxis   Medical management and pain control per primary team, will likely need ICU stay   DVT ppx:  SCDs, hold for surgical intervention   Mobilize as tolerated with assistance, PT / OT evaluation postoperatively    Neurosurgery will continue to follow  Please call with questions or concerns

## 2021-04-15 NOTE — PLAN OF CARE
Problem: PAIN - ADULT  Goal: Verbalizes/displays adequate comfort level or baseline comfort level  Description: Interventions:  - Encourage patient to monitor pain and request assistance  - Assess pain using appropriate pain scale  - Administer analgesics based on type and severity of pain and evaluate response  - Implement non-pharmacological measures as appropriate and evaluate response  - Consider cultural and social influences on pain and pain management  - Notify physician/advanced practitioner if interventions unsuccessful or patient reports new pain  Outcome: Progressing     Problem: INFECTION - ADULT  Goal: Absence or prevention of progression during hospitalization  Description: INTERVENTIONS:  - Assess and monitor for signs and symptoms of infection  - Monitor lab/diagnostic results  - Monitor all insertion sites, i e  indwelling lines, tubes, and drains  - Monitor endotracheal if appropriate and nasal secretions for changes in amount and color  - Onward appropriate cooling/warming therapies per order  - Administer medications as ordered  - Instruct and encourage patient and family to use good hand hygiene technique  - Identify and instruct in appropriate isolation precautions for identified infection/condition  Outcome: Progressing  Goal: Absence of fever/infection during neutropenic period  Description: INTERVENTIONS:  - Monitor WBC    Outcome: Progressing     Problem: SAFETY ADULT  Goal: Patient will remain free of falls  Description: INTERVENTIONS:  - Assess patient frequently for physical needs  -  Identify cognitive and physical deficits and behaviors that affect risk of falls    -  Onward fall precautions as indicated by assessment   - Educate patient/family on patient safety including physical limitations  - Instruct patient to call for assistance with activity based on assessment  - Modify environment to reduce risk of injury  - Consider OT/PT consult to assist with strengthening/mobility  Outcome: Progressing  Goal: Maintain or return to baseline ADL function  Description: INTERVENTIONS:  -  Assess patient's ability to carry out ADLs; assess patient's baseline for ADL function and identify physical deficits which impact ability to perform ADLs (bathing, care of mouth/teeth, toileting, grooming, dressing, etc )  - Assess/evaluate cause of self-care deficits   - Assess range of motion  - Assess patient's mobility; develop plan if impaired  - Assess patient's need for assistive devices and provide as appropriate  - Encourage maximum independence but intervene and supervise when necessary  - Involve family in performance of ADLs  - Assess for home care needs following discharge   - Consider OT consult to assist with ADL evaluation and planning for discharge  - Provide patient education as appropriate  Outcome: Progressing  Goal: Maintain or return mobility status to optimal level  Description: INTERVENTIONS:  - Assess patient's baseline mobility status (ambulation, transfers, stairs, etc )    - Identify cognitive and physical deficits and behaviors that affect mobility  - Identify mobility aids required to assist with transfers and/or ambulation (gait belt, sit-to-stand, lift, walker, cane, etc )  - Sutton fall precautions as indicated by assessment  - Record patient progress and toleration of activity level on Mobility SBAR; progress patient to next Phase/Stage  - Instruct patient to call for assistance with activity based on assessment  - Consider rehabilitation consult to assist with strengthening/weightbearing, etc   Outcome: Progressing     Problem: DISCHARGE PLANNING  Goal: Discharge to home or other facility with appropriate resources  Description: INTERVENTIONS:  - Identify barriers to discharge w/patient and caregiver  - Arrange for needed discharge resources and transportation as appropriate  - Identify discharge learning needs (meds, wound care, etc )  - Arrange for interpretive services to assist at discharge as needed  - Refer to Case Management Department for coordinating discharge planning if the patient needs post-hospital services based on physician/advanced practitioner order or complex needs related to functional status, cognitive ability, or social support system  Outcome: Progressing     Problem: Knowledge Deficit  Goal: Patient/family/caregiver demonstrates understanding of disease process, treatment plan, medications, and discharge instructions  Description: Complete learning assessment and assess knowledge base  Interventions:  - Provide teaching at level of understanding  - Provide teaching via preferred learning methods  Outcome: Progressing     Problem: Nutrition/Hydration-ADULT  Goal: Nutrient/Hydration intake appropriate for improving, restoring or maintaining nutritional needs  Description: Monitor and assess patient's nutrition/hydration status for malnutrition  Collaborate with interdisciplinary team and initiate plan and interventions as ordered  Monitor patient's weight and dietary intake as ordered or per policy  Utilize nutrition screening tool and intervene as necessary  Determine patient's food preferences and provide high-protein, high-caloric foods as appropriate       INTERVENTIONS:  - Monitor oral intake, urinary output, labs, and treatment plans  - Assess nutrition and hydration status and recommend course of action  - Evaluate amount of meals eaten  - Assist patient with eating if necessary   - Allow adequate time for meals  - Recommend/ encourage appropriate diets, oral nutritional supplements, and vitamin/mineral supplements  - Order, calculate, and assess calorie counts as needed  - Recommend, monitor, and adjust tube feedings and TPN/PPN based on assessed needs  - Assess need for intravenous fluids  - Provide specific nutrition/hydration education as appropriate  - Include patient/family/caregiver in decisions related to nutrition  Outcome: Progressing     Problem: Potential for Falls  Goal: Patient will remain free of falls  Description: INTERVENTIONS:  - Assess patient frequently for physical needs  -  Identify cognitive and physical deficits and behaviors that affect risk of falls    -  Sutton fall precautions as indicated by assessment   - Educate patient/family on patient safety including physical limitations  - Instruct patient to call for assistance with activity based on assessment  - Modify environment to reduce risk of injury  - Consider OT/PT consult to assist with strengthening/mobility  Outcome: Progressing     Problem: SAFETY,RESTRAINT: NV/NON-SELF DESTRUCTIVE BEHAVIOR  Goal: Remains free of harm/injury (restraint for non violent/non self-detsructive behavior)  Description: INTERVENTIONS:  - Instruct patient/family regarding restraint use   - Assess and monitor physiologic and psychological status   - Provide interventions and comfort measures to meet assessed patient needs   - Identify and implement measures to help patient regain control  - Assess readiness for release of restraint   Outcome: Progressing  Goal: Returns to optimal restraint-free functioning  Description: INTERVENTIONS:  - Assess the patient's behavior and symptoms that indicate continued need for restraint  - Identify and implement measures to help patient regain control  - Assess readiness for release of restraint   Outcome: Progressing     Problem: Prexisting or High Potential for Compromised Skin Integrity  Goal: Skin integrity is maintained or improved  Description: INTERVENTIONS:  - Identify patients at risk for skin breakdown  - Assess and monitor skin integrity  - Assess and monitor nutrition and hydration status  - Monitor labs   - Assess for incontinence   - Turn and reposition patient  - Assist with mobility/ambulation  - Relieve pressure over bony prominences  - Avoid friction and shearing  - Provide appropriate hygiene as needed including keeping skin clean and dry  - Evaluate need for skin moisturizer/barrier cream  - Collaborate with interdisciplinary team   - Patient/family teaching  - Consider wound care consult   Outcome: Progressing

## 2021-04-15 NOTE — RESPIRATORY THERAPY NOTE
RT Ventilator Management Note  Jersey Blocker 62 y o  male MRN: [id number]  Unit/Bed#: ICU 04 Encounter: [id number]      Daily Screen     No data found in the last 10 encounters  Physical Exam:   Assessment Type: Assess only  General Appearance: Sedated  Respiratory Pattern: Assisted  Chest Assessment: Chest expansion symmetrical, Trachea midline  Bilateral Breath Sounds: Clear, Diminished      Resp Comments: (P) pt remains on ventilator at this time  vt decreased at this time per order  will continue to monitor pt per protocol  vent disinfected at this time  pt suctioned inline

## 2021-04-15 NOTE — ED PROVIDER NOTES
History  Chief Complaint   Patient presents with    Head Injury     pt brought in by EMS from [facility] reports of traumatic subdural hemorrhage worsening since yesterday     61 y/o with Hx of SDH and SAH after a fall on 4/4/21-4/14 in [place], G tube placed, discharged on tube feeds  Presents for increasing lethargy  Transferred to [facility] yesterday  Was more lethargic than what was reported in [place]  Repeat CT scan with increased SDH and shift  Patient is unable to speak or give history  History obtained via chart review  CT on 4/4:  Subarachnoid hemorrhage, extending along the right sylvian fissure to the right parietal convexity  Subdural hematoma, extending from the right frontal convexity to the occipital lobe, measuring 9 mm in greatest transverse dimension, axial image 44  Approximately 5 mm of midline shift, right to left, at the level of the third ventricle, image 35  Small subdural adjacent to the left frontal lobe, measuring 5 mm in greatest dimension, axial image 22  At this level bilateral intraparenchymal hemorrhages suspected, axial image 22; coronal image 16  Probable small focus of subarachnoid hemorrhage involving the right frontal lobe at this level, image 24  Possible small amount of hemorrhage in the third ventricle, image 33  No gross layering hematocrit level within the occipital horns      Altered Mental Status  Presenting symptoms: disorientation, lethargy and unresponsiveness    Timing:  Constant  Progression:  Worsening  Chronicity:  Recurrent  Context: head injury        Prior to Admission Medications   Prescriptions Last Dose Informant Patient Reported? Taking?    Multiple Vitamin (MULTIVITAMIN) tablet  Self Yes No   Sig: Take 1 tablet by mouth daily   lisinopril (ZESTRIL) 40 mg tablet   No No   Sig: Take 1 tablet (40 mg total) by mouth daily   nadolol (CORGARD) 40 mg tablet   No No   Sig: Take 1 tablet (40 mg total) by mouth daily      Facility-Administered Medications: None       Past Medical History:   Diagnosis Date    Anxiety     Hypertension        Past Surgical History:   Procedure Laterality Date    HAND SURGERY Left     ROTATOR CUFF REPAIR Left        Family History   Problem Relation Age of Onset    Heart disease Mother     Hypertension Father     Dementia Father      I have reviewed and agree with the history as documented  E-Cigarette/Vaping    E-Cigarette Use Never User      E-Cigarette/Vaping Substances    Nicotine No     THC No     CBD No     Flavoring No     Other No     Unknown No      Social History     Tobacco Use    Smoking status: Never Smoker    Smokeless tobacco: Never Used   Substance Use Topics    Alcohol use: Yes     Frequency: 4 or more times a week    Drug use: Not Currently        Review of Systems   Unable to perform ROS: Acuity of condition       Physical Exam  ED Triage Vitals [04/15/21 1255]   Temperature Pulse Respirations Blood Pressure SpO2   99 6 °F (37 6 °C) 89 20 167/77 99 %      Temp Source Heart Rate Source Patient Position - Orthostatic VS BP Location FiO2 (%)   Oral Monitor Lying Right arm --      Pain Score       --             Orthostatic Vital Signs  Vitals:    04/15/21 1315 04/15/21 1330 04/15/21 1345 04/15/21 1415   BP: 168/79 (!) 183/86 (!) 178/81 (!) 179/83   Pulse: 96 99 102 104   Patient Position - Orthostatic VS: Lying Lying Lying Lying       Physical Exam  Vitals signs and nursing note reviewed  Constitutional:       General: He is not in acute distress  Appearance: He is well-developed  He is not diaphoretic  HENT:      Head: Normocephalic  Comments: Well-healing ecchymosis to the face  Right Ear: External ear normal       Left Ear: External ear normal    Eyes:      Conjunctiva/sclera: Conjunctivae normal    Neck:      Vascular: No JVD  Trachea: No tracheal deviation  Cardiovascular:      Rate and Rhythm: Normal rate and regular rhythm        Heart sounds: Normal heart sounds  No murmur  Pulmonary:      Effort: No respiratory distress  Breath sounds: Normal breath sounds  No stridor  No wheezing or rales  Comments: Tachypnea    Abdominal:      General: Bowel sounds are normal  There is no distension  Palpations: Abdomen is soft  There is no mass  Tenderness: There is abdominal tenderness  There is no guarding or rebound  Genitourinary:     Comments: Deferred  Musculoskeletal:         General: No tenderness or deformity  Skin:     General: Skin is warm and dry  Capillary Refill: Capillary refill takes less than 2 seconds  Coloration: Skin is not pale  Findings: No erythema or rash  Neurological:      Motor: No abnormal muscle tone  Comments: GCS 10  Patient opens his eyes to loud noises  Patient is able to follow my finger with his eyes  Patient is unable to speak  Patient squeezes my hand when instructed, but is very slowed  Moves both upper extremities very slowly  Retracts from pain when squeezed in lower extremities  Upgoing Babinski bilaterally      Patient appears very sedated         ED Medications  Medications   sodium chloride (HYPERTONIC) 3 % bolus (TRAUMA BAY) 250 mL ( Intravenous MAR Hold 4/15/21 1429)     Followed by   sodium chloride (HYPERTONIC) 3 % infusion ( Intravenous MAR Hold 4/15/21 1429)   bacitracin 100,000 Units in sodium chloride 0 9 % 1,000 mL irrigation bottle (has no administration in time range)   lidocaine-epinephrine (XYLOCAINE/EPINEPHRINE) 1 %-1:100,000 injection (20 mL Infiltration Given 4/15/21 1501)   chlorhexidine (PERIDEX) 0 12 % oral rinse 15 mL (has no administration in time range)   thrombin (THROMBIN-JMI) 5,000 units topical kit (5,000 Units Topical Given 4/15/21 1548)   iohexol (OMNIPAQUE) 350 MG/ML injection (MULTI-DOSE) 100 mL (100 mL Intravenous Given 4/15/21 1335)   bacitracin 100,000 Units in sodium chloride 0 9 % 1,000 mL irrigation bottle (2,000 mL Irrigation Given 4/15/21 1534) Diagnostic Studies  Results Reviewed     Procedure Component Value Units Date/Time    Blood culture #1 [[id number]] Collected: 04/15/21 1433    Lab Status: No result Specimen: Blood from Arm, Left     Procalcitonin with AM Reflex [[id number]]  (Normal) Collected: 04/15/21 1320    Lab Status: Final result Specimen: Blood from Arm, Right Updated: 04/15/21 1429     Procalcitonin 0 17 ng/ml     Procalcitonin Reflex [[id number]]     Lab Status: No result Specimen: Blood     Lactic acid [[id number]]  (Abnormal) Collected: 04/15/21 1320    Lab Status: Final result Specimen: Blood from Arm, Right Updated: 04/15/21 1419     LACTIC ACID 2 4 mmol/L     Narrative:      Result may be elevated if tourniquet was used during collection      Lactic acid 2 Hours [[id number]]     Lab Status: No result Specimen: Blood     Protime-INR [[id number]]  (Abnormal) Collected: 04/15/21 1320    Lab Status: Final result Specimen: Blood from Arm, Right Updated: 04/15/21 1411     Protime 15 2 seconds      INR 1 20    APTT [[id number]]  (Normal) Collected: 04/15/21 1324    Lab Status: Final result Specimen: Blood from Arm, Right Updated: 04/15/21 1407     PTT 33 seconds     Basic metabolic panel [[id number]]  (Abnormal) Collected: 04/15/21 1320    Lab Status: Final result Specimen: Blood from Arm, Right Updated: 04/15/21 1354     Sodium 140 mmol/L      Potassium 4 1 mmol/L      Chloride 110 mmol/L      CO2 25 mmol/L      ANION GAP 5 mmol/L      BUN 12 mg/dL      Creatinine 0 63 mg/dL      Glucose 162 mg/dL      Calcium 8 9 mg/dL      eGFR 108 ml/min/1 73sq m     Narrative:      Katelyn guidelines for Chronic Kidney Disease (CKD):     Stage 1 with normal or high GFR (GFR > 90 mL/min/1 73 square meters)    Stage 2 Mild CKD (GFR = 60-89 mL/min/1 73 square meters)    Stage 3A Moderate CKD (GFR = 45-59 mL/min/1 73 square meters)    Stage 3B Moderate CKD (GFR = 30-44 mL/min/1 73 square meters)    Stage 4 Severe CKD (GFR = 15-29 mL/min/1 73 square meters)    Stage 5 End Stage CKD (GFR <15 mL/min/1 73 square meters)  Note: GFR calculation is accurate only with a steady state creatinine    Hepatic function panel [[id number]]  (Abnormal) Collected: 04/15/21 1320    Lab Status: Final result Specimen: Blood from Arm, Right Updated: 04/15/21 1354     Total Bilirubin 0 97 mg/dL      Bilirubin, Direct 0 55 mg/dL      Alkaline Phosphatase 164 U/L      AST 33 U/L      ALT 43 U/L      Total Protein 8 1 g/dL      Albumin 2 7 g/dL     CBC and differential [[id number]]  (Abnormal) Collected: 04/15/21 1320    Lab Status: Final result Specimen: Blood from Arm, Right Updated: 04/15/21 1343     WBC 13 87 Thousand/uL      RBC 4 68 Million/uL      Hemoglobin 15 4 g/dL      Hematocrit 45 5 %      MCV 97 fL      MCH 32 9 pg      MCHC 33 8 g/dL      RDW 12 5 %      MPV 11 9 fL      Platelets 446 Thousands/uL      nRBC 0 /100 WBCs      Neutrophils Relative 81 %      Immat GRANS % 1 %      Lymphocytes Relative 9 %      Monocytes Relative 9 %      Eosinophils Relative 0 %      Basophils Relative 0 %      Neutrophils Absolute 11 16 Thousands/µL      Immature Grans Absolute 0 10 Thousand/uL      Lymphocytes Absolute 1 26 Thousands/µL      Monocytes Absolute 1 28 Thousand/µL      Eosinophils Absolute 0 03 Thousand/µL      Basophils Absolute 0 04 Thousands/µL     Blood culture #2 [[id number]] Collected: 04/15/21 1320    Lab Status: In process Specimen: Blood from Arm, Right Updated: 04/15/21 1329    UA w Reflex to Microscopic w Reflex to Culture [[id number]]     Lab Status: No result Specimen: Urine                  CT head without contrast   Final Result by Reji Iniguez MD (04/15 1441)      Stable multicompartment hemorrhage compared with the CT from earlier the same day including a large subdural hematoma along the right hemisphere and subarachnoid blood in the right temporal, bilateral frontal, and bilateral parietal lobes    There is    surrounding edema particularly in the bilateral frontal and right temporal lobes with stable severe mass effect and leftward midline shift  I personally discussed this study with SAILAJA SOW on 4/15/2021 at 2:40 PM                         Workstation performed: [id number]         CT chest abdomen pelvis w contrast   Final Result by Jennifer Corrales MD (04/15 3435)      1  Focal opacity in the left upper lobe  This has a patchy appearance and may represent atelectasis versus a small infiltrate  Evaluation is limited by respiratory motion, however  A short interval follow-up CT in 3 months is recommended to ensure    resolution  2   No acute abnormality in the abdomen or pelvis  3   Cholelithiasis  The study was marked in [facility] for immediate notification  Workstation performed: [id number]               Procedures  Procedures      ED Course                                       MDM  Number of Diagnoses or Management Options  Diagnosis management comments: 55-year-old male presents for altered mental status  Known intracranial bleed  Worsening per the nursing home  Tachypneic and altered on physical examination  GCS 10  Protecting his airway this point  Very sluggish in his muscular movements  Opens his eyes to voice  Likely intracranial bleed, possibly sepsis  Will evaluate for both      Patient also has abdominal pain when palpating around his gastric tube  Will evaluate for acute intrathoracic and intra-abdominal abnormalities with CT scan  CT of head with 13 mm of shift per my read  Acute on chronic subdural bleed  Subarachnoid blood also noted  Patient be admitted to Trauma  Neurosurgery consulted who will take the patient directly to the OR from the emergency department         Amount and/or Complexity of Data Reviewed  Clinical lab tests: ordered and reviewed  Tests in the radiology section of CPT®: ordered and reviewed  Decide to obtain previous medical records or to obtain history from someone other than the patient: yes  Review and summarize past medical records: yes  Discuss the patient with other providers: yes  Independent visualization of images, tracings, or specimens: yes    Risk of Complications, Morbidity, and/or Mortality  Presenting problems: high  Diagnostic procedures: high  Management options: high    Patient Progress  Patient progress: stable      Disposition  Final diagnoses:   Subdural hematoma ([address] )     Time reflects when diagnosis was documented in both MDM as applicable and the Disposition within this note     Time User Action Codes Description Comment    4/15/2021  2:05 PM Soraya Garnett Add [[id number]] Subdural hematoma [facility])       ED Disposition     ED Disposition Condition Date/Time Comment    Admit Critical Thu Apr 15, 2021  2:13 PM Case was discussed with trauma and the patient's admission status was agreed to be Admission Status: inpatient status to the service of Dr Sherlyn Esteves  Follow-up Information    None         Current Discharge Medication List      CONTINUE these medications which have NOT CHANGED    Details   lisinopril (ZESTRIL) 40 mg tablet Take 1 tablet (40 mg total) by mouth daily  Qty: 90 tablet, Refills: 1    Comments: DX Code Needed    Associated Diagnoses: Essential hypertension      Multiple Vitamin (MULTIVITAMIN) tablet Take 1 tablet by mouth daily      nadolol (CORGARD) 40 mg tablet Take 1 tablet (40 mg total) by mouth daily  Qty: 90 tablet, Refills: 1    Comments: DX Code Needed    Associated Diagnoses: Essential hypertension; Anxiety           No discharge procedures on file  PDMP Review       Value Time User    PDMP Reviewed  Yes 3/3/2020  2:15 PM Lalitha Nelson, [facility]           ED Provider  Attending physically available and evaluated Hannah Hernandez I managed the patient along with the ED Attending      Electronically Signed by         Annmarie Jovel DO  04/15/21 7060

## 2021-04-15 NOTE — ED ATTENDING ATTESTATION
4/15/2021  Alirio PATINO DO, saw and evaluated the patient  I have discussed the patient with the resident/non-physician practitioner and agree with the resident's/non-physician practitioner's findings, Plan of Care, and MDM as documented in the resident's/non-physician practitioner's note, except where noted  All available labs and Radiology studies were reviewed  I was present for key portions of any procedure(s) performed by the resident/non-physician practitioner and I was immediately available to provide assistance  At this point I agree with the current assessment done in the Emergency Department  I have conducted an independent evaluation of this patient a history and physical is as follows:    ED Course     Patient transferred from [facility] secondary to altered mental status  Patient was transferred to [facility] after acute hospitalization for fall for which she had a subdural hematoma  CT scan was performed at [facility] which showed increasing shift with cerebral edema  Patient was known to have subdural     Upon initial evaluation in emergency department patient is not verbalizing  He will open his eyes to voice and does not appear to gaze in all directions  He did follow commands squeezing his right upper extremity as well as within his toes on the right and some slight movement was appreciated left lower extremity  No wheezing/hand grasp appreciated with left upper extremity  Patient does have ecchymosis of left upper eyelid  Peg tube is also noted to be in place  Patient did have some abdominal tenderness with palpation in area adjacent to PEG tube  No diffuse tenderness or rebound appreciated      Visit Vitals  /77   Pulse 86   Temp 99 6 °F (37 6 °C) (Oral)   Resp 20   SpO2 99%   Smoking Status Never Smoker     GEN: NAD,  HEENT: PERRLA, MMM, left upper lid ecchymosis  CV: RRR,  no Murmur  Resp:  CTA, no R/R/W  GI: soft,NT/ND, peg tube in place  : urinary catheter external  Neuro:  Nonverbal, squeezes right hand and moves right foot with slight movement of left foot, no movement of left upper extremity  Skin: warm, dry    MDM:  With patient's altered mental status plan to repeat imaging concern that subdural may have expanded in size  Patient may require surgical intervention  Also plan to check rectal temperature and perform infectious workup with recent hospitalization since 04/04/2021  Patient is at risk for pneumonia versus UTI  Laboratory data and imaging pending    Patient will likely require admission    Critical Care Time  Procedures

## 2021-04-15 NOTE — ANESTHESIA PROCEDURE NOTES
Arterial Line Insertion  Performed by: Chintan Gamble CRNA  Authorized by: Glynn Nelson DO   Consent: The procedure was performed in an emergent situation  Patient identity confirmed: arm band  Preparation: Patient was prepped and draped in the usual sterile fashion  Indications: hemodynamic monitoring  Orientation:  Right  Location: radial artery  Sedation:  Patient sedated: geta      Procedure Details:  Clayton's test normal: yes  Needle gauge: 20  Number of attempts: 1    Post-procedure:  Post-procedure: dressing applied  Waveform: good waveform and waveform confirmed  Post-procedure CNS: normal and unchanged  Patient tolerance: Patient tolerated the procedure well with no immediate complications

## 2021-04-15 NOTE — PLAN OF CARE
Problem: PAIN - ADULT  Goal: Verbalizes/displays adequate comfort level or baseline comfort level  Description: Interventions:  - Encourage patient to monitor pain and request assistance  - Assess pain using appropriate pain scale  - Administer analgesics based on type and severity of pain and evaluate response  - Implement non-pharmacological measures as appropriate and evaluate response  - Consider cultural and social influences on pain and pain management  - Notify physician/advanced practitioner if interventions unsuccessful or patient reports new pain  Outcome: Progressing     Problem: INFECTION - ADULT  Goal: Absence or prevention of progression during hospitalization  Description: INTERVENTIONS:  - Assess and monitor for signs and symptoms of infection  - Monitor lab/diagnostic results  - Monitor all insertion sites, i e  indwelling lines, tubes, and drains  - Monitor endotracheal if appropriate and nasal secretions for changes in amount and color  - [place] appropriate cooling/warming therapies per order  - Administer medications as ordered  - Instruct and encourage patient and family to use good hand hygiene technique  - Identify and instruct in appropriate isolation precautions for identified infection/condition  Outcome: Progressing  Goal: Absence of fever/infection during neutropenic period  Description: INTERVENTIONS:  - Monitor WBC    Outcome: Progressing     Problem: SAFETY ADULT  Goal: Patient will remain free of falls  Description: INTERVENTIONS:  - Assess patient frequently for physical needs  -  Identify cognitive and physical deficits and behaviors that affect risk of falls    -  [place] fall precautions as indicated by assessment   - Educate patient/family on patient safety including physical limitations  - Instruct patient to call for assistance with activity based on assessment  - Modify environment to reduce risk of injury  - Consider OT/PT consult to assist with strengthening/mobility  Outcome: Progressing  Goal: Maintain or return to baseline ADL function  Description: INTERVENTIONS:  -  Assess patient's ability to carry out ADLs; assess patient's baseline for ADL function and identify physical deficits which impact ability to perform ADLs (bathing, care of mouth/teeth, toileting, grooming, dressing, etc )  - Assess/evaluate cause of self-care deficits   - Assess range of motion  - Assess patient's mobility; develop plan if impaired  - Assess patient's need for assistive devices and provide as appropriate  - Encourage maximum independence but intervene and supervise when necessary  - Involve family in performance of ADLs  - Assess for home care needs following discharge   - Consider OT consult to assist with ADL evaluation and planning for discharge  - Provide patient education as appropriate  Outcome: Progressing  Goal: Maintain or return mobility status to optimal level  Description: INTERVENTIONS:  - Assess patient's baseline mobility status (ambulation, transfers, stairs, etc )    - Identify cognitive and physical deficits and behaviors that affect mobility  - Identify mobility aids required to assist with transfers and/or ambulation (gait belt, sit-to-stand, lift, walker, cane, etc )  - [place] fall precautions as indicated by assessment  - Record patient progress and toleration of activity level on Mobility SBAR; progress patient to next Phase/Stage  - Instruct patient to call for assistance with activity based on assessment  - Consider rehabilitation consult to assist with strengthening/weightbearing, etc   Outcome: Progressing     Problem: DISCHARGE PLANNING  Goal: Discharge to home or other facility with appropriate resources  Description: INTERVENTIONS:  - Identify barriers to discharge w/patient and caregiver  - Arrange for needed discharge resources and transportation as appropriate  - Identify discharge learning needs (meds, wound care, etc )  - Arrange for interpretive services to assist at discharge as needed  - Refer to Case Management Department for coordinating discharge planning if the patient needs post-hospital services based on physician/advanced practitioner order or complex needs related to functional status, cognitive ability, or social support system  Outcome: Progressing     Problem: Knowledge Deficit  Goal: Patient/family/caregiver demonstrates understanding of disease process, treatment plan, medications, and discharge instructions  Description: Complete learning assessment and assess knowledge base  Interventions:  - Provide teaching at level of understanding  - Provide teaching via preferred learning methods  Outcome: Progressing     Problem: Nutrition/Hydration-ADULT  Goal: Nutrient/Hydration intake appropriate for improving, restoring or maintaining nutritional needs  Description: Monitor and assess patient's nutrition/hydration status for malnutrition  Collaborate with interdisciplinary team and initiate plan and interventions as ordered  Monitor patient's weight and dietary intake as ordered or per policy  Utilize nutrition screening tool and intervene as necessary  Determine patient's food preferences and provide high-protein, high-caloric foods as appropriate       INTERVENTIONS:  - Monitor oral intake, urinary output, labs, and treatment plans  - Assess nutrition and hydration status and recommend course of action  - Evaluate amount of meals eaten  - Assist patient with eating if necessary   - Allow adequate time for meals  - Recommend/ encourage appropriate diets, oral nutritional supplements, and vitamin/mineral supplements  - Order, calculate, and assess calorie counts as needed  - Recommend, monitor, and adjust tube feedings and TPN/PPN based on assessed needs  - Assess need for intravenous fluids  - Provide specific nutrition/hydration education as appropriate  - Include patient/family/caregiver in decisions related to nutrition  Outcome: Progressing     Problem: Potential for Falls  Goal: Patient will remain free of falls  Description: INTERVENTIONS:  - Assess patient frequently for physical needs  -  Identify cognitive and physical deficits and behaviors that affect risk of falls    -  [place] fall precautions as indicated by assessment   - Educate patient/family on patient safety including physical limitations  - Instruct patient to call for assistance with activity based on assessment  - Modify environment to reduce risk of injury  - Consider OT/PT consult to assist with strengthening/mobility  Outcome: Progressing     Problem: SAFETY,RESTRAINT: NV/NON-SELF DESTRUCTIVE BEHAVIOR  Goal: Remains free of harm/injury (restraint for non violent/non self-detsructive behavior)  Description: INTERVENTIONS:  - Instruct patient/family regarding restraint use   - Assess and monitor physiologic and psychological status   - Provide interventions and comfort measures to meet assessed patient needs   - Identify and implement measures to help patient regain control  - Assess readiness for release of restraint   Outcome: Progressing  Goal: Returns to optimal restraint-free functioning  Description: INTERVENTIONS:  - Assess the patient's behavior and symptoms that indicate continued need for restraint  - Identify and implement measures to help patient regain control  - Assess readiness for release of restraint   Outcome: Progressing

## 2021-04-16 ENCOUNTER — APPOINTMENT (INPATIENT)
Dept: RADIOLOGY | Facility: HOSPITAL | Age: 61
DRG: 025 | End: 2021-04-16
Payer: COMMERCIAL

## 2021-04-16 PROBLEM — R13.10 DYSPHAGIA: Status: ACTIVE | Noted: 2021-04-16

## 2021-04-16 PROBLEM — E87.8 HYPERCHLOREMIA: Status: RESOLVED | Noted: 2021-04-15 | Resolved: 2021-04-16

## 2021-04-16 LAB
ANION GAP SERPL CALCULATED.3IONS-SCNC: 4 MMOL/L (ref 4–13)
APTT PPP: 34 SECONDS (ref 23–37)
ATRIAL RATE: 75 BPM
BASOPHILS # BLD AUTO: 0.01 THOUSANDS/ΜL (ref 0–0.1)
BASOPHILS NFR BLD AUTO: 0 % (ref 0–1)
BUN SERPL-MCNC: 11 MG/DL (ref 5–25)
CA-I BLD-SCNC: 1.13 MMOL/L (ref 1.12–1.32)
CALCIUM SERPL-MCNC: 8.4 MG/DL (ref 8.3–10.1)
CHLORIDE SERPL-SCNC: 112 MMOL/L (ref 100–108)
CO2 SERPL-SCNC: 28 MMOL/L (ref 21–32)
CREAT SERPL-MCNC: 0.64 MG/DL (ref 0.6–1.3)
EOSINOPHIL # BLD AUTO: 0 THOUSAND/ΜL (ref 0–0.61)
EOSINOPHIL NFR BLD AUTO: 0 % (ref 0–6)
ERYTHROCYTE [DISTWIDTH] IN BLOOD BY AUTOMATED COUNT: 12.2 % (ref 11.6–15.1)
GFR SERPL CREATININE-BSD FRML MDRD: 107 ML/MIN/1.73SQ M
GLUCOSE SERPL-MCNC: 126 MG/DL (ref 65–140)
HCT VFR BLD AUTO: 38.4 % (ref 36.5–49.3)
HGB BLD-MCNC: 13.2 G/DL (ref 12–17)
IMM GRANULOCYTES # BLD AUTO: 0.09 THOUSAND/UL (ref 0–0.2)
IMM GRANULOCYTES NFR BLD AUTO: 1 % (ref 0–2)
INR PPP: 1.31 (ref 0.84–1.19)
LYMPHOCYTES # BLD AUTO: 1.21 THOUSANDS/ΜL (ref 0.6–4.47)
LYMPHOCYTES NFR BLD AUTO: 8 % (ref 14–44)
MAGNESIUM SERPL-MCNC: 2.3 MG/DL (ref 1.6–2.6)
MCH RBC QN AUTO: 33.4 PG (ref 26.8–34.3)
MCHC RBC AUTO-ENTMCNC: 34.4 G/DL (ref 31.4–37.4)
MCV RBC AUTO: 97 FL (ref 82–98)
MONOCYTES # BLD AUTO: 1.51 THOUSAND/ΜL (ref 0.17–1.22)
MONOCYTES NFR BLD AUTO: 9 % (ref 4–12)
NEUTROPHILS # BLD AUTO: 13.3 THOUSANDS/ΜL (ref 1.85–7.62)
NEUTS SEG NFR BLD AUTO: 82 % (ref 43–75)
NRBC BLD AUTO-RTO: 0 /100 WBCS
P AXIS: 53 DEGREES
PHOSPHATE SERPL-MCNC: 3.5 MG/DL (ref 2.7–4.5)
PLATELET # BLD AUTO: 205 THOUSANDS/UL (ref 149–390)
PMV BLD AUTO: 12 FL (ref 8.9–12.7)
POTASSIUM SERPL-SCNC: 4.3 MMOL/L (ref 3.5–5.3)
PR INTERVAL: 163 MS
PROTHROMBIN TIME: 16.3 SECONDS (ref 11.6–14.5)
QRS AXIS: 40 DEGREES
QRSD INTERVAL: 79 MS
QT INTERVAL: 388 MS
QTC INTERVAL: 434 MS
RBC # BLD AUTO: 3.95 MILLION/UL (ref 3.88–5.62)
SODIUM SERPL-SCNC: 144 MMOL/L (ref 136–145)
T WAVE AXIS: 52 DEGREES
VENTRICULAR RATE: 75 BPM
WBC # BLD AUTO: 16.12 THOUSAND/UL (ref 4.31–10.16)

## 2021-04-16 PROCEDURE — 70450 CT HEAD/BRAIN W/O DYE: CPT

## 2021-04-16 PROCEDURE — 97167 OT EVAL HIGH COMPLEX 60 MIN: CPT

## 2021-04-16 PROCEDURE — 97163 PT EVAL HIGH COMPLEX 45 MIN: CPT

## 2021-04-16 PROCEDURE — 80048 BASIC METABOLIC PNL TOTAL CA: CPT | Performed by: STUDENT IN AN ORGANIZED HEALTH CARE EDUCATION/TRAINING PROGRAM

## 2021-04-16 PROCEDURE — 84100 ASSAY OF PHOSPHORUS: CPT | Performed by: STUDENT IN AN ORGANIZED HEALTH CARE EDUCATION/TRAINING PROGRAM

## 2021-04-16 PROCEDURE — 99024 POSTOP FOLLOW-UP VISIT: CPT | Performed by: PHYSICIAN ASSISTANT

## 2021-04-16 PROCEDURE — 93010 ELECTROCARDIOGRAM REPORT: CPT | Performed by: INTERNAL MEDICINE

## 2021-04-16 PROCEDURE — 94760 N-INVAS EAR/PLS OXIMETRY 1: CPT

## 2021-04-16 PROCEDURE — 83735 ASSAY OF MAGNESIUM: CPT | Performed by: STUDENT IN AN ORGANIZED HEALTH CARE EDUCATION/TRAINING PROGRAM

## 2021-04-16 PROCEDURE — 85025 COMPLETE CBC W/AUTO DIFF WBC: CPT | Performed by: STUDENT IN AN ORGANIZED HEALTH CARE EDUCATION/TRAINING PROGRAM

## 2021-04-16 PROCEDURE — 82330 ASSAY OF CALCIUM: CPT | Performed by: STUDENT IN AN ORGANIZED HEALTH CARE EDUCATION/TRAINING PROGRAM

## 2021-04-16 PROCEDURE — 85730 THROMBOPLASTIN TIME PARTIAL: CPT | Performed by: STUDENT IN AN ORGANIZED HEALTH CARE EDUCATION/TRAINING PROGRAM

## 2021-04-16 PROCEDURE — 94003 VENT MGMT INPAT SUBQ DAY: CPT

## 2021-04-16 PROCEDURE — G1004 CDSM NDSC: HCPCS

## 2021-04-16 PROCEDURE — 85610 PROTHROMBIN TIME: CPT | Performed by: STUDENT IN AN ORGANIZED HEALTH CARE EDUCATION/TRAINING PROGRAM

## 2021-04-16 PROCEDURE — 99233 SBSQ HOSP IP/OBS HIGH 50: CPT | Performed by: SURGERY

## 2021-04-16 PROCEDURE — 93005 ELECTROCARDIOGRAM TRACING: CPT

## 2021-04-16 RX ORDER — LABETALOL 20 MG/4 ML (5 MG/ML) INTRAVENOUS SYRINGE
10 EVERY 4 HOURS PRN
Status: DISCONTINUED | OUTPATIENT
Start: 2021-04-16 | End: 2021-04-22 | Stop reason: HOSPADM

## 2021-04-16 RX ORDER — ACETAMINOPHEN 160 MG/5ML
650 SUSPENSION, ORAL (FINAL DOSE FORM) ORAL EVERY 6 HOURS PRN
Status: DISCONTINUED | OUTPATIENT
Start: 2021-04-16 | End: 2021-04-22 | Stop reason: HOSPADM

## 2021-04-16 RX ORDER — SENNOSIDES 8.8 MG/5ML
8.8 LIQUID ORAL
Status: DISCONTINUED | OUTPATIENT
Start: 2021-04-16 | End: 2021-04-22 | Stop reason: HOSPADM

## 2021-04-16 RX ORDER — HYDROMORPHONE HCL/PF 1 MG/ML
0.5 SYRINGE (ML) INJECTION EVERY 4 HOURS PRN
Status: DISCONTINUED | OUTPATIENT
Start: 2021-04-16 | End: 2021-04-22 | Stop reason: HOSPADM

## 2021-04-16 RX ORDER — OXYCODONE HCL 5 MG/5 ML
2.5 SOLUTION, ORAL ORAL EVERY 4 HOURS PRN
Status: DISCONTINUED | OUTPATIENT
Start: 2021-04-16 | End: 2021-04-22 | Stop reason: HOSPADM

## 2021-04-16 RX ORDER — LEVETIRACETAM 100 MG/ML
500 SOLUTION ORAL EVERY 12 HOURS SCHEDULED
Status: DISCONTINUED | OUTPATIENT
Start: 2021-04-16 | End: 2021-04-22

## 2021-04-16 RX ORDER — LISINOPRIL 20 MG/1
40 TABLET ORAL DAILY
Status: DISCONTINUED | OUTPATIENT
Start: 2021-04-16 | End: 2021-04-22 | Stop reason: HOSPADM

## 2021-04-16 RX ORDER — OXYCODONE HCL 5 MG/5 ML
5 SOLUTION, ORAL ORAL EVERY 4 HOURS PRN
Status: DISCONTINUED | OUTPATIENT
Start: 2021-04-16 | End: 2021-04-22 | Stop reason: HOSPADM

## 2021-04-16 RX ADMIN — SENNOSIDES 8.8 MG: 8.8 SYRUP ORAL at 21:02

## 2021-04-16 RX ADMIN — SODIUM CHLORIDE, SODIUM LACTATE, POTASSIUM CHLORIDE, AND CALCIUM CHLORIDE 125 ML/HR: .6; .31; .03; .02 INJECTION, SOLUTION INTRAVENOUS at 09:59

## 2021-04-16 RX ADMIN — LEVETIRACETAM 500 MG: 100 SOLUTION ORAL at 21:02

## 2021-04-16 RX ADMIN — LISINOPRIL 40 MG: 20 TABLET ORAL at 09:54

## 2021-04-16 RX ADMIN — SODIUM CHLORIDE, SODIUM LACTATE, POTASSIUM CHLORIDE, AND CALCIUM CHLORIDE 125 ML/HR: .6; .31; .03; .02 INJECTION, SOLUTION INTRAVENOUS at 01:12

## 2021-04-16 RX ADMIN — LEVETIRACETAM 500 MG: 100 INJECTION, SOLUTION INTRAVENOUS at 08:26

## 2021-04-16 RX ADMIN — ENOXAPARIN SODIUM 30 MG: 30 INJECTION SUBCUTANEOUS at 09:54

## 2021-04-16 RX ADMIN — CHLORHEXIDINE GLUCONATE 15 ML: 1.2 SOLUTION ORAL at 08:55

## 2021-04-16 RX ADMIN — ENOXAPARIN SODIUM 30 MG: 30 INJECTION SUBCUTANEOUS at 21:02

## 2021-04-16 RX ADMIN — OXYCODONE HYDROCHLORIDE 5 MG: 5 SOLUTION ORAL at 21:14

## 2021-04-16 RX ADMIN — LABETALOL 20 MG/4 ML (5 MG/ML) INTRAVENOUS SYRINGE 10 MG: at 21:18

## 2021-04-16 RX ADMIN — CEFAZOLIN SODIUM 2000 MG: 2 SOLUTION INTRAVENOUS at 05:53

## 2021-04-16 NOTE — OCCUPATIONAL THERAPY NOTE
OT CANCEL NOTE    OT orders received  Chart reviewed  Pt is currently intubated/sedated and not appropriate to engage in skilled OT services at this time  Will hold initial OT evaluation  Will continue to follow pt on caseload and see pt when medically stable and as clinically appropriate         04/16/21 5594   Note Type   Cancel Reasons Intubated/sedated       Zenon Ortega MS, OTR/L

## 2021-04-16 NOTE — CASE MANAGEMENT
Pt is not a Bundle  Pt is a 30 Day Readmission (but was at another hospital)    CM spoke to pt's wife Mark Ruvalcaba, as the pt is currently intubated  Prior to coming to this hospital, the pt was injured in [place], had a hospital course and then was discharged to [facility] in [facility]  Pt was at [facility] for less than 24 hours before being admitted to [facility]  Prior to that, the pt was living with his wife (who works) in a 2 story home which has 12STE from the garage and 5 LIONEL from the front door  Pt's bedroom and bathroom (large walk-in shower with grab bars, built in bench and raised toilet) on the 1st floor  Pt has 13 steps to the 2nd floor  Pt drives, works as a , and reports being fully independent for all ADL/IADLs PTA  Pt's had 0 falls in the last 6 months  Pt enjoys flying model airplanes, fishing, and photography  Pt owns no DME  Pt has a living will  Pt's pharmacy is [facility] in [place]  Pt has a hx of Anxiety but no IP Psych  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  Pt has no hx of IP rehab (aside from this recent [facility] admission) or [facility]  CM reviewed d/c planning process including the following: identifying help at home, patient preference for d/c planning needs, Discharge Lounge, [facility] Meds to Bed program, availability of treatment team to discuss questions or concerns patient and/or family may have regarding understanding medications and recognizing signs and symptoms once discharged  CM also encouraged patient to follow up with all recommended appointments after discharge  Patient advised of importance for patient and family to participate in managing patients medical well being

## 2021-04-16 NOTE — RESPIRATORY THERAPY NOTE
RT Ventilator Management Note  Mark Khan 62 y o  male MRN: [id number]  Unit/Bed#: ICU 04 Encounter: [id number]      Daily Screen       4/16/2021  0042 4/16/2021  0731          Patient safety screen outcome[de-identified]  Passed  Passed      Spont breathing trial % for 30 min:  --  --      Spont breathing trial outcome[de-identified]  --  Passed              Physical Exam:   Assessment Type: (P) Assess only  General Appearance: (P) Awake, Drowsy  Respiratory Pattern: (P) Assisted, Spontaneous  Chest Assessment: (P) Chest expansion symmetrical  Bilateral Breath Sounds: (P) Clear, Diminished      Resp Comments: (P) Pt continues on PS settings  No acute resp distress noted  No changes made to settings at this time  Will continue to monitor per resp protocol

## 2021-04-16 NOTE — PLAN OF CARE
Problem: PAIN - ADULT  Goal: Verbalizes/displays adequate comfort level or baseline comfort level  Description: Interventions:  - Encourage patient to monitor pain and request assistance  - Assess pain using appropriate pain scale  - Administer analgesics based on type and severity of pain and evaluate response  - Implement non-pharmacological measures as appropriate and evaluate response  - Consider cultural and social influences on pain and pain management  - Notify physician/advanced practitioner if interventions unsuccessful or patient reports new pain  4/16/2021 0756 by Yvon Funes RN  Outcome: Progressing  4/15/2021 1916 by Yvon Funes RN  Outcome: Progressing     Problem: INFECTION - ADULT  Goal: Absence or prevention of progression during hospitalization  Description: INTERVENTIONS:  - Assess and monitor for signs and symptoms of infection  - Monitor lab/diagnostic results  - Monitor all insertion sites, i e  indwelling lines, tubes, and drains  - Monitor endotracheal if appropriate and nasal secretions for changes in amount and color  - [place] appropriate cooling/warming therapies per order  - Administer medications as ordered  - Instruct and encourage patient and family to use good hand hygiene technique  - Identify and instruct in appropriate isolation precautions for identified infection/condition  4/16/2021 0756 by Yvon Funes RN  Outcome: Progressing  4/15/2021 1916 by Yvon Funes RN  Outcome: Progressing  Goal: Absence of fever/infection during neutropenic period  Description: INTERVENTIONS:  - Monitor WBC    4/16/2021 0756 by Yvon Funes RN  Outcome: Progressing  4/15/2021 1916 by Yvon Funes RN  Outcome: Progressing     Problem: SAFETY ADULT  Goal: Patient will remain free of falls  Description: INTERVENTIONS:  - Assess patient frequently for physical needs  -  Identify cognitive and physical deficits and behaviors that affect risk of falls    -  [place] fall precautions as indicated by assessment   - Educate patient/family on patient safety including physical limitations  - Instruct patient to call for assistance with activity based on assessment  - Modify environment to reduce risk of injury  - Consider OT/PT consult to assist with strengthening/mobility  4/16/2021 0756 by Cass Bustillos RN  Outcome: Progressing  4/15/2021 1916 by Cass Bustillos RN  Outcome: Progressing  Goal: Maintain or return to baseline ADL function  Description: INTERVENTIONS:  -  Assess patient's ability to carry out ADLs; assess patient's baseline for ADL function and identify physical deficits which impact ability to perform ADLs (bathing, care of mouth/teeth, toileting, grooming, dressing, etc )  - Assess/evaluate cause of self-care deficits   - Assess range of motion  - Assess patient's mobility; develop plan if impaired  - Assess patient's need for assistive devices and provide as appropriate  - Encourage maximum independence but intervene and supervise when necessary  - Involve family in performance of ADLs  - Assess for home care needs following discharge   - Consider OT consult to assist with ADL evaluation and planning for discharge  - Provide patient education as appropriate  4/16/2021 0756 by Cass Bustillos RN  Outcome: Progressing  4/15/2021 1916 by Cass Bustillos RN  Outcome: Progressing  Goal: Maintain or return mobility status to optimal level  Description: INTERVENTIONS:  - Assess patient's baseline mobility status (ambulation, transfers, stairs, etc )    - Identify cognitive and physical deficits and behaviors that affect mobility  - Identify mobility aids required to assist with transfers and/or ambulation (gait belt, sit-to-stand, lift, walker, cane, etc )  - [place] fall precautions as indicated by assessment  - Record patient progress and toleration of activity level on Mobility SBAR; progress patient to next Phase/Stage  - Instruct patient to call for assistance with activity based on assessment  - Consider rehabilitation consult to assist with strengthening/weightbearing, etc   4/16/2021 0756 by Rosemarie Becerra RN  Outcome: Progressing  4/15/2021 1916 by Rosemarie Becerra RN  Outcome: Progressing     Problem: DISCHARGE PLANNING  Goal: Discharge to home or other facility with appropriate resources  Description: INTERVENTIONS:  - Identify barriers to discharge w/patient and caregiver  - Arrange for needed discharge resources and transportation as appropriate  - Identify discharge learning needs (meds, wound care, etc )  - Arrange for interpretive services to assist at discharge as needed  - Refer to Case Management Department for coordinating discharge planning if the patient needs post-hospital services based on physician/advanced practitioner order or complex needs related to functional status, cognitive ability, or social support system  4/16/2021 0756 by Rosemarie Becerra RN  Outcome: Progressing  4/15/2021 1916 by Rosemarie Becerra RN  Outcome: Progressing     Problem: Knowledge Deficit  Goal: Patient/family/caregiver demonstrates understanding of disease process, treatment plan, medications, and discharge instructions  Description: Complete learning assessment and assess knowledge base  Interventions:  - Provide teaching at level of understanding  - Provide teaching via preferred learning methods  4/16/2021 0756 by Rosemarie Becerra RN  Outcome: Progressing  4/15/2021 1916 by Rosemarie Becerra RN  Outcome: Progressing     Problem: Nutrition/Hydration-ADULT  Goal: Nutrient/Hydration intake appropriate for improving, restoring or maintaining nutritional needs  Description: Monitor and assess patient's nutrition/hydration status for malnutrition  Collaborate with interdisciplinary team and initiate plan and interventions as ordered  Monitor patient's weight and dietary intake as ordered or per policy  Utilize nutrition screening tool and intervene as necessary   Determine patient's food preferences and provide high-protein, high-caloric foods as appropriate  INTERVENTIONS:  - Monitor oral intake, urinary output, labs, and treatment plans  - Assess nutrition and hydration status and recommend course of action  - Evaluate amount of meals eaten  - Assist patient with eating if necessary   - Allow adequate time for meals  - Recommend/ encourage appropriate diets, oral nutritional supplements, and vitamin/mineral supplements  - Order, calculate, and assess calorie counts as needed  - Recommend, monitor, and adjust tube feedings and TPN/PPN based on assessed needs  - Assess need for intravenous fluids  - Provide specific nutrition/hydration education as appropriate  - Include patient/family/caregiver in decisions related to nutrition  4/16/2021 0756 by Thelma Garcia RN  Outcome: Progressing  4/15/2021 1916 by Thelma Garcia RN  Outcome: Progressing     Problem: Potential for Falls  Goal: Patient will remain free of falls  Description: INTERVENTIONS:  - Assess patient frequently for physical needs  -  Identify cognitive and physical deficits and behaviors that affect risk of falls    -  [place] fall precautions as indicated by assessment   - Educate patient/family on patient safety including physical limitations  - Instruct patient to call for assistance with activity based on assessment  - Modify environment to reduce risk of injury  - Consider OT/PT consult to assist with strengthening/mobility  4/16/2021 0756 by Thelma Garcia RN  Outcome: Progressing  4/15/2021 1916 by Thelma Garcia RN  Outcome: Progressing     Problem: SAFETY,RESTRAINT: NV/NON-SELF DESTRUCTIVE BEHAVIOR  Goal: Remains free of harm/injury (restraint for non violent/non self-detsructive behavior)  Description: INTERVENTIONS:  - Instruct patient/family regarding restraint use   - Assess and monitor physiologic and psychological status   - Provide interventions and comfort measures to meet assessed patient needs   - Identify and implement measures to help patient regain control  - Assess readiness for release of restraint   4/16/2021 0756 by Aneta Oneal RN  Outcome: Progressing  4/15/2021 1916 by Aneta Oneal RN  Outcome: Progressing  Goal: Returns to optimal restraint-free functioning  Description: INTERVENTIONS:  - Assess the patient's behavior and symptoms that indicate continued need for restraint  - Identify and implement measures to help patient regain control  - Assess readiness for release of restraint   4/16/2021 0756 by Aneta Oneal RN  Outcome: Progressing  4/15/2021 1916 by Aneta Oneal RN  Outcome: Progressing     Problem: Prexisting or High Potential for Compromised Skin Integrity  Goal: Skin integrity is maintained or improved  Description: INTERVENTIONS:  - Identify patients at risk for skin breakdown  - Assess and monitor skin integrity  - Assess and monitor nutrition and hydration status  - Monitor labs   - Assess for incontinence   - Turn and reposition patient  - Assist with mobility/ambulation  - Relieve pressure over bony prominences  - Avoid friction and shearing  - Provide appropriate hygiene as needed including keeping skin clean and dry  - Evaluate need for skin moisturizer/barrier cream  - Collaborate with interdisciplinary team   - Patient/family teaching  - Consider wound care consult   4/16/2021 0756 by Aneta Oneal RN  Outcome: Progressing  4/15/2021 1916 by Aneta Oneal RN  Outcome: Progressing

## 2021-04-16 NOTE — PLAN OF CARE
Problem: OCCUPATIONAL THERAPY ADULT  Goal: Performs self-care activities at highest level of function for planned discharge setting  See evaluation for individualized goals  Description: Treatment Interventions: ADL retraining, Functional transfer training, Visual perceptual retraining, UE strengthening/ROM, Endurance training, Cognitive reorientation, Patient/family training, Equipment evaluation/education, Fine motor coordination activities, Neuromuscular reeducation, Compensatory technique education, Continued evaluation, Energy conservation, Activityengagement          See flowsheet documentation for full assessment, interventions and recommendations  Note: Limitation: Decreased ADL status, Decreased UE ROM, Decreased UE strength, Decreased Safe judgement during ADL, Decreased cognition, Decreased endurance, Visual deficit, Decreased fine motor control, Decreased self-care trans, Decreased high-level ADLs  Prognosis: Fair  Assessment: Pt is a 63 y/o male seen for OT eval s/p adm to B w/ worsening mental status while @ rehab  Pt had recent fall on 4/4 in [place] and found to have SDH/SAH  Pt transferred to [facility] on 4/14 and then found to be more lethargic  CTH showed acute on chronic subdural hemorrhage  Pt is s/p the following procedure "Right CRANIOTOMY FOR SUBDURAL HEMATOMA (Right)" performed on 4/15  Pt is dx'd w/ subdural hematoma  Pt  has a past medical history of Anxiety and Hypertension  Pt with active OT orders and up with assistance  orders  Pt lives with his spouse in [address] [facility] w/ basement, 5-6 LIONEL front, 0 LIONEL basement then FF to 1st floor where bed/bath is located  Pt was I w/  ADLS and IADLS, drove, & required no use of DME PTA but has available a built in shower bench and GB  Pt is currently demonstrating the following occupational deficits: Max A UB ADLS, Total A LB ADLS, Max A X2 bed mobility, Max A x2 STS transfers, Min-Mod A x1 EOB sitting balance   These deficits that are impacting pt's baseline areas of occupation are a result of the following impairments: pain, endurance, activity tolerance, functional mobility, forward functional reach, balance, trunk control, functional standing tolerance, decreased I w/ ADLS/IADLS, strength, ROM, visual deficits, cognitive impairments, decreased safety awareness, decreased insight into deficits, impaired fine motor skills and coordination deficits  The following Occupational Performance Areas to address include: eating, grooming, bathing/shower, toilet hygiene, dressing, medication management, socialization, health maintenance, functional mobility, community mobility, clothing management and household maintenance  Based on the aforementioned OT evaluation, functional performance deficits, and assessments, pt has been identified as a high complexity evaluation  Recommend STR upon D/C  The patient's raw score on the AM-PAC Daily Activity inpatient short form is 11, standardized score is 29 04, less than 39 4  Patients at this level are likely to benefit from discharge to post-acute rehabilitation services  Please refer to the recommendation of the Occupational Therapist for safe discharge planning  Pt to continue to benefit from acute immediate OT services to address the following goals 3-5x/week to  w/in 10-14 days:      OT Discharge Recommendation: Post acute rehabilitation services  OT - OK to Discharge: Yes(when medically stable)     Felicity Harrell MS, OTR/L

## 2021-04-16 NOTE — NUTRITION
Recommend start Jevity 1 2 at 10 ml/hr and increase by 10 ml q 4 hrs as angel to goal rate of 80 ml/hr  (no PROSOURCE) to provide qd: 1920 ml,2304 Kcal,107 gm PRO,325 gm CHO,75 gm Fat,1549 ml Free H2O,2 7 mg CHO/kg/min  Recommend 120 ml Free H2O flush q 4 hrs

## 2021-04-16 NOTE — PROGRESS NOTES
Progress Note - Critical Care   Jersey Patel 62 y o  male MRN: [id number]  Unit/Bed#: ICU 04 Encounter: [id number]    HPI/24hr events: HX and PE limited by: Intubated patient, history obtained from chart review  eJrsey Patel is a 62 y o  male who presents with multi compartmental intracranial hemorrhage  He reportedly had a fall on 04/04 in [place] and was found to have a subdural hemorrhage and subarachnoid hemorrhage  He had a G-tube placed was discharged on tube feeds  He was subsequently transferred to [facility] for rehabilitation yesterday  Upon arrival he was found to be more lethargic than was reported in [place] so he was referred to the emergency department for further evaluation  A repeat CT scan showed increased subdural hemorrhage with severe midline shift  4/15 he was taken for right sided craniotomy for subdural hematoma, returned from OR intubated  GCS 10t     Lines   Right subgaleal drain  Right radial arterial line  PEG tube   Bai catheter   ETT  Left and right UE peripheral IVs     Infusions  LR 125mL/hr          Assessment:   Principal Problem:    SDH (subdural hematoma) (HCC)  Active Problems:    Hypertension    Hyperchloremia    Acute respiratory failure (HCC)    Acute encephalopathy  Resolved Problems:    * No resolved hospital problems  *    Plan:   · Neuro:   · Diagnosis:  Multicompartmental intracranial hemorrhage  ? CT showed large subdural hematoma along the right hemisphere and subarachnoid blood in the right temporal, bilateral frontal, and bilateral parietal lobes  ? Status post right craniectomy and subdural drain placement on 04/15  ? Frequent neurological checks  ? Target systolic blood pressure less than 160  ?  GCS 10T, follows commands, opens eyes spontaneously  · Keppra 500 BID for seizure prophylaxis  · CT head post procedure shows anticipated postoperative changes and improved midline shift     · CV:   · History of HTN  ? PRN labetalol, hydralazine  ? Maintain SBP < 160  · Maintain right radial arterial line for monitoring    · Lung:   · Diagnosis: Intubated   ? Continue mechanical ventilation  ? Wean settings as able  Respiratory    Lab Data (Last 4 hours)    None         O2/Vent Data (Last 4 hours)      04/16 0425           Vent Mode CPAP/PS Spont       FIO2 (%) (%) 40       PEEP (cmH2O) (cmH2O) 6       Pressure Support (cmH2O) (cmH20) 5       MV (Obs) 5 97       RSBI 60               ·    · Continue Respiratory Protocol and Airway Clearance Protocol      · GI:   · Diagnosis: No acute issues  · G tube in place from outside hospital, leave to low intermittent suction/gravity  · FEN:   · Plan: [email or web address], Trend and replete electrolytes PRN, NPO  · K 4 3 today  · :   · No acute issues  ? Bai catheter in situ, maintain for accurate I&O's  ? Cr 0 64  I/O last 24 hours: In: 3714 6 [I V :3564 6; IV Piggyback:150]  Out: 1167 [Urine:1025; Blood:350]  · Monitor I&O's, BUN/Cr  · ID:   · No acute issues  · Monitor WBC/temp curve  · Heme:   · No acute issues  ? DVT PPX held in the setting of intracranial hemorrhage, will defer to neurosurgery  ? WBC 15 8-> 16 3  ? Hbg 14 2-> 13 2    · Endo:   · No acute issues  · Goal -180    · Msk/Skin:   · S/P craniotomy, monitor surgical site  · PT/OT  · Turning/repositioning  · Wound care   · Disposition: Continue critical care    ______________________________________________________________________    ______________________________________________________________________  Physical Exam:       Physical Exam  Vitals signs reviewed  Constitutional:       Comments: intubated   HENT:      Head:      Comments: Right craniotomy     Right Ear: External ear normal       Left Ear: External ear normal       Nose: Nose normal       Mouth/Throat:      Mouth: Mucous membranes are moist    Eyes:      Extraocular Movements: Extraocular movements intact  Pupils: Pupils are equal, round, and reactive to light     Neck: Musculoskeletal: Neck supple  Cardiovascular:      Rate and Rhythm: Normal rate and regular rhythm  Pulmonary:      Breath sounds: Normal breath sounds  Abdominal:      Palpations: Abdomen is soft  Tenderness: There is no abdominal tenderness  Neurological:      Comments: GCS 10t    Left UE and LE weaker than right        ______________________________________________________________________  Temperature:   Temp (24hrs), Av 8 °F (37 7 °C), Min:99 3 °F (37 4 °C), Max:100 3 °F (37 9 °C)    Current Temperature: 100 3 °F (37 9 °C)    Vitals:    21 0425 21 0500 21 0558 21 0600   BP:  146/65     Pulse: 80 78  56   Resp:  14  16   Temp:       TempSrc:       SpO2: 100% 100%  100%   Weight:   82 9 kg (182 lb 12 2 oz)      Arterial Line BP: 100/68       Weights:        Body mass index is 26 99 kg/m²  Weight (last 2 days)     Date/Time   Weight    21 0558   82 9 (182 76)               Hemodynamic Monitoring:  PAP:         Ventilator Settings:   Vent Mode: CPAP/PS Spont                      Lab Results   Component Value Date    PHART 7 389 04/15/2021    YFW4BPA 42 0 04/15/2021    PO2ART 168 2 (H) 04/15/2021    WJQ3MQT 24 8 04/15/2021    BEART -0 3 04/15/2021    SOURCE Line, Arterial 04/15/2021       Intake and Outputs:  I/O        0701 - 04/15 0700 04/15 0701 -  0700    I V   2066 7    Total Intake  2066 7    Urine  405    Drains  0    Blood  350    Total Output  755    Net  +1311 7                Nutrition:        Diet Orders   (From admission, onward)             Start     Ordered    04/15/21 1725  Diet NPO  Diet effective now     Question Answer Comment   Diet Type NPO    RD to adjust diet per protocol?  Yes        04/15/21 172                Labs:   Results from last 7 days   Lab Units 21  0543 04/15/21  1712 04/15/21  1448 04/15/21  1320   WBC Thousand/uL 16 12* 14 14*  --  13 87*   HEMOGLOBIN g/dL 13 2 14 2  --  15 4   I STAT HEMOGLOBIN g/dl  -- --  12 9  --    HEMATOCRIT % 38 4 41 5  --  45 5   HEMATOCRIT, ISTAT %  --   --  38  --    PLATELETS Thousands/uL 205 215  --  248   NEUTROS PCT % 82* 90*  --  81*   MONOS PCT % 9 5  --  9      Results from last 7 days   Lab Units 04/16/21  0543 04/15/21  1712 04/15/21  1448 04/15/21  1320   POTASSIUM mmol/L 4 3 4 0  --  4 1   CHLORIDE mmol/L 112* 112*  --  110*   CO2 mmol/L 28 24  --  25   CO2, I-STAT mmol/L  --   --  27  --    BUN mg/dL 11 11  --  12   CREATININE mg/dL 0 64 0 60  --  0 63   CALCIUM mg/dL 8 4 8 4  --  8 9   ALK PHOS U/L  --   --   --  164*   ALT U/L  --   --   --  43   AST U/L  --   --   --  33   GLUCOSE, ISTAT mg/dl  --   --  146*  --      Results from last 7 days   Lab Units 04/16/21  0543 04/15/21  1712   MAGNESIUM mg/dL 2 3 2 1     Results from last 7 days   Lab Units 04/16/21  0543 04/15/21  1712   PHOSPHORUS mg/dL 3 5 3 0      Results from last 7 days   Lab Units 04/16/21  0543 04/15/21  1712 04/15/21  1324 04/15/21  1320   INR  1 31* 1 27*  --  1 20*   PTT seconds 34 37 33  --      Results from last 7 days   Lab Units 04/15/21  1716   LACTIC ACID mmol/L 1 9     No results found for: TROPONINI  Imaging:  I have personally reviewed pertinent reports  EKG: see tele  Micro:  Blood Culture:   Lab Results   Component Value Date    BLOODCX Received in Microbiology Lab  Culture in Progress  04/15/2021     Urine Culture: No results found for: URINECX  Sputum Culture: No components found for: SPUTUMCX  Wound Culure: No results found for: [facility]     Lab Results   Component Value Date    BLOODCX Received in Microbiology Lab  Culture in Progress   04/15/2021     Allergies: No Known Allergies  Medications:   Scheduled Meds:  Current Facility-Administered Medications   Medication Dose Route Frequency Provider Last Rate    chlorhexidine  15 mL Swish & Spit Q12H [address] Alicia Arnold MD      fentanyl citrate (PF)  50 mcg Intravenous Q1H PRN Nubia Figueroa PA-C      Labetalol HCl  10 mg Intravenous Q4H PRN Frank Sierra PA-C      lactated ringers  125 mL/hr Intravenous Continuous Bacilio Mckeon  mL/hr (04/16/21 0112)    levETIRAcetam  500 mg Intravenous Q12H [address] Alicia Finley MD Stopped (04/15/21 2247)    ondansetron  4 mg Intravenous Q6H PRN Alicia Finley MD       Continuous Infusions:lactated ringers, 125 mL/hr, Last Rate: 125 mL/hr (04/16/21 0112)      PRN Meds:  fentanyl citrate (PF), 50 mcg, Q1H PRN  Labetalol HCl, 10 mg, Q4H PRN  ondansetron, 4 mg, Q6H PRN      VTE Pharmacologic Prophylaxis: Sequential compression device (Venodyne)   VTE Mechanical Prophylaxis: sequential compression device  Invasive lines and devices: Invasive Devices     Peripheral Intravenous Line            Peripheral IV 04/15/21 Distal;Right;Upper;Ventral (anterior) Arm less than 1 day    Peripheral IV 04/15/21 Left Hand less than 1 day    Peripheral IV 04/15/21 Left;Ventral (anterior) Hand less than 1 day          Arterial Line            Arterial Line 04/15/21 Right Radial less than 1 day          Drain            Gastrostomy/Enterostomy Percutaneous endoscopic gastrostomy (PEG) -- days    Closed/Suction Drain Right Head Bulb 10 Fr  less than 1 day    Urethral Catheter Latex 16 Fr  less than 1 day          Airway            ETT  Hi-Lo 8 mm less than 1 day                   Code Status: Level 1 - Full Code    Portions of the record may have been created with voice recognition software  Occasional wrong word or "sound a like" substitutions may have occurred due to the inherent limitations of voice recognition software  Read the chart carefully and recognize, using context, where substitutions have occurred      Stephanie Martinez,

## 2021-04-16 NOTE — NURSING NOTE
Patient's wife called via phone  All questions were answered  Wife is cooperative and friendly via phone

## 2021-04-16 NOTE — ASSESSMENT & PLAN NOTE
· Patient was a GCS 10 on presentation, not following commands  · Patient remains a GCS 10-11T currently but is following commands in all 4 extremities  · Continue to monitor neuro exam

## 2021-04-16 NOTE — UTILIZATION REVIEW
Notification of Inpatient Admission/Inpatient Authorization Request   This is a Notification of Inpatient Admission for [address]  Be advised that this patient was admitted to our facility under Inpatient Status  Contact José Manuel Sheehan at [phone] for additional admission information  [facility]  DEDICATED -[phone]  Patient Name:   Shaun Amaral   YOB: 1960       [address]   [address]:   [facility]  Tax ID: [id number]  NPI: [id number] Attending Provider/NPI:  Phone:  Address: [place], [place] [[id number]]  [phone]  Same as NICKOLAS/Checo Mendez 1106 of Service Code: 24 Place of Service Name:  [address] [place] [address]   Start Date: 4/15/21 1523 Discharge Date & Time: No discharge date for patient encounter  Type of Admission: Inpatient Status Discharge Disposition (if discharged): Final discharge disposition not confirmed   Patient Diagnoses: Subdural hematoma ([address] ) [T22 5G5R]  Intracranial bleed ([address] ) [I62 9]     Orders: Admission Orders (From admission, onward)     Ordered        04/15/21 1524  Inpatient Admission  Once         04/15/21 1523  Inpatient Admission  Once                    Assigned Utilization Review Contact: José Manuel Sheehan  Utilization ,   Network Utilization Review Department  Phone: [phone]; Fax [phone]   Email: [email or web address]   ATTENTION PAYERS: Please call the assigned Utilization  directly with any questions or concerns ALL voicemails in the department are confidential  Send all requests for admission clinical reviews, approved or denied determinations and any other requests to dedicated fax number belonging to the campus where the patient is receiving treatment

## 2021-04-16 NOTE — RESPIRATORY THERAPY NOTE
RT Ventilator Management Note  Maggie Duncan 62 y o  male MRN: [id number]  Unit/Bed#: ICU 04 Encounter: [id number]      Daily Screen     No data found in the last 10 encounters  Physical Exam:   Assessment Type: Assess only  General Appearance: Sedated  Respiratory Pattern: Assisted  Chest Assessment: Chest expansion symmetrical, Trachea midline  Bilateral Breath Sounds: Clear, Diminished      Resp Comments: (P) pt placed on cpap/ps at this time

## 2021-04-16 NOTE — ORTHOTIC NOTE
Orthotic Note            Date: 4/16/2021      Patient Name: Harshad Robbins            Reason for Consult:  Patient Active Problem List   Diagnosis    Hypertension    Anxiety    [facility]) [facility] )    Acute respiratory failure ([facility] )    Acute encephalopathy    Dysphagia     Measured patient at 22 1/2 inches at the brow line  Fit patient for a large Danmar helmet while he was sitting up in bed, with assistance from SUKUMAR Jimenez  Patient still had YANETH drain in during fitting  Micaela Cowart PA-C, approved fitting with drain in place  Added thin layer of padding at the forehead to prevent helmet from sliding  Helmet was doffed after fitting and left in box in patient's room  Will continue to follow up  Recommendations:  Please call [employer] at extension [phone] with any questions or adjustments      JUAN Herrera

## 2021-04-16 NOTE — ASSESSMENT & PLAN NOTE
POD#1 Right CRANIOTOMY FOR SUBDURAL HEMATOMA  · Transfer from TBI rehab with AMS, found to have large right sided SDH with midline shift and brain compression  · Recent hospitalization from 04/04/2021-04/14/2021 in [facility] secondary to fall on the sidewalk with head strike, noted to have right subdural hematoma, GCS 14 on discharge to TBI rehab with PEG tube    Imaging reviewed personally and with attending, results are as follows:   CT head without contrast 04/16/2021:  Status post right-sided craniectomy and evacuation of subdural hematoma  Anticipated postoperative changes of the brain parenchyma and overlying soft tissues  Improved mass effect and midline shift  Areas of hemorrhagic contusion in the frontal lobes bilaterally and right temporal lobe  Small amount of subarachnoid hemorrhage along the high parietal convexities bilaterally  Plan:   Continue to monitor neuro exam, patient currently GCS 10-11T (E3-4, V1T, M6), follows commands in all 4 extremities with generalized weakness slightly worse on the left, +cough, no Berry or clonus noted   Repeat CT head stat if GCS declines more than 2 points in 1 hour   YANETH drain with 0 output since surgery, will remain in for now   Systolic blood pressure less than 160   Hold all anticoagulation/antiplatelet therapy   Continue Keppra 500 mg b i d  X1 week for seizure prophylaxis   Medical management and pain control per primary team   DVT ppx:  SCDs, okay for DVT prophylaxis, can start Lovenox if warranted per primary team   Mobilize as tolerated with assistance, PT / OT evaluation postoperatively    Neurosurgery will continue to follow  Please call with questions or concerns

## 2021-04-16 NOTE — PHYSICAL THERAPY NOTE
Physical Therapy Evaluation    Patient's Name: Pauly Painter    Admitting Diagnosis  Subdural hematoma ([facility] ) [S06 5X9A]  Intrac[facility] bleed [facility] ) [I62 9]    Problem List  Patient Active Problem List   Diagnosis    Hypertension    Anxiety    SDH (subdural hematoma) ([facility] )    Acute respiratory failure (HCC)    Acute encephalopathy    Dysphagia       Past Medical History  Past Medical History:   Diagnosis Date    Anxiety     Hypertension        Past Surgical History  Past Surgical History:   Procedure Laterality Date    BRAIN HEMATOMA EVACUATION Right 4/15/2021    Procedure: Right CRANIOTOMY FOR SUBDURAL HEMATOMA;  Surgeon: Tabby Jackson MD;  [facility];  Service: Neurosurgery    HAND SURGERY Left     ROTATOR CUFF REPAIR Left           04/16/21 1448   PT Last Visit   PT Visit Date 04/16/21   Note Type   Note type Evaluation   Pain Assessment   Pain Assessment Tool FLACC   Pain Rating: FLACC (Rest) - Face 0   Pain Rating: FLACC (Rest) - Legs 0   Pain Rating: FLACC (Rest) - Activity 1   Pain Rating: FLACC (Rest) - Cry 1   Pain Rating: FLACC (Rest) - Consolability 0   Score: FLACC (Rest) 2   Pain Rating: FLACC (Activity) - Face 0   Pain Rating: FLACC (Activity) - Legs 0   Pain Rating: FLACC (Activity) - Activity 1   Pain Rating: FLACC (Activity) - Cry 1   Pain Rating: FLACC (Activity) - Consolability 0   Score: FLACC (Activity) 2   Home Living   Type of Home House   Home Layout Two level; Able to live on main level with bedroom/bathroom;Stairs to enter with rails  ([facility] + basement; 5 LION[address])   Bathroom Shower/Tub Walk-in shower   Bathroom Toilet Standard   Bathroom Equipment Grab bars in shower;Built-in [facility] Other (Comment)  (none)   Additional Comments Home setup gathered from wife   Prior Function   Level of [place] Independent with ADLs and functional mobility   Lives With Spouse   Receives Help From Family   ADL Assistance Independent   IADLs Independent   Falls in the last 6 months 1 to 4  (1 (4/4/2021))   Vocational Retired  (Retired )   Comments PLOF gathered from wife   Restrictions/Precautions   [place] Bearing Precautions Per Order No   Braces or Orthoses Craniectomy Helmet   Other Precautions Bed Alarm;Multiple lines;Telemetry; Fall Risk;Pain;Visual impairment   General   Family/Caregiver Present Yes  (wife)   Cognition   Overall Cognitive Status Impaired   Orientation Level Oriented to person;Oriented to place;Oriented to time;Disoriented to situation  (able to respond by hand sqeezes (yes/no))   Memory Unable to assess   Comments Pt lethargic throughout session  Cooperative when upright and asked to participate  Easily fatigued   RLE Assessment   RLE Assessment X   Strength RLE   R Knee Extension 3+/5   R Ankle Dorsiflexion 3+/5   LLE Assessment   LLE Assessment X   Strength LLE   L Knee Extension 3-/5   L Ankle Dorsiflexion 3-/5   Bed Mobility   Supine to Sit 2  Maximal assistance   Additional items Assist x 2;HOB elevated; Increased time required;Verbal cues;LE management   Sit to Supine 2  Maximal assistance   Additional items Assist x 2; Increased time required;Verbal cues;LE management   Additional Comments Pt completed supine>sit transfer requiring Max Ax2  LE management and UB support secondary to LE weakness and decreased trunk support/control  Able to sit EOB with Poor(+) sitting balance/trunk control  Unable to recover from posterior lean  Tolerated sitting EOB for 8-10 minutes during UE/LE MMTs  VC for bedrail use to improve stability  Pt demonstrates ability to grasp bedrail but with insignificant effect to trunk control and sitting balance  Pt returned to supine at end of session with Max Ax2 for LE management and UB support  Repositioned into chair position with pillows for comfort and helmet on for safety     Transfers   Sit to Stand 2  Maximal assistance   Additional items Assist x 2   Additional Comments Attempted to perform STS with Max Ax2 and B/L A for support  B/L knee blocks for safety  Pt unable to clear backside at this time  Plan to reassess when appropriate  Balance   Static Sitting Poor +   Dynamic Sitting Poor   Static Standing Zero   Endurance Deficit   Endurance Deficit Yes   Endurance Deficit Description limited by fatigue, pain   Activity Tolerance   Activity Tolerance Patient limited by fatigue;Patient limited by pain   Medical Staff Made Aware OT   Nurse Made Aware Yes   Assessment   Prognosis Guarded   Problem List Decreased strength;Decreased range of motion;Decreased endurance; Impaired balance;Decreased mobility; Decreased coordination;Decreased cognition;Decreased safety awareness; Impaired vision;Pain   Assessment Pt is a 62 y o  male who [facility] with admitting dx of SDH  HPI: Pt presented to ED by EMS from [facility] on 4/15/2021 with worsening mental status following a previous fall (4/4/2021) and SDH  Pt previously treated for SDH/SAH and was D/C to [facility] for rehab 4/14/2021  CTH showed an acute on chronic SDH with surrounding edema and midline shift  Pt is now s/p craniotomy for right SDH (4/15/2021)  Pt's pertinent PMH includes: HTN, HLD, acute respiratory failure, acute encephalopathy and anxiety  Pt's PSH includes: left hand and left rotator cuff surgery  PT with active orders to assess pt's functional mobility and determine D/C needs  Pt currently unable to respond to PT questions regarding home setup and PLOF  Pt's wife reports pt lives with her in a [facility] with 5 LIONEL (+) railing  PTA, pt's wife states pt was independent with all ADLs/IADLs  No use of AD at baseline  Other home setup information can be found above  Pt's wife is retired and is able and willing to help prn  Pt is currently demonstrating the following mobility deficits: Max Ax2 for bed mobility, Poor(+) balance for EOB sitting, and is unable to perform STS transfers or ambulate  Other objective findings can be found above   Pt's impairments in pain, LE strength, ROM, endurance, activity tolerance, sitting/standing balance, sitting/standing tolerance, trunk control/core strength, functional mobility, and decreased safety awareness impact their ability to function at baseline  Pt is of high complexity at this time secondary to the aforementioned impairments and deficits as well as the acuity of his current diagnosis that increases the risk for falls and injury  Pt would benefit from skilled PT 3-6x/wk to address these deficits as well as immediate acute care needs and underlying performance skills to maximize functional mobility and safety to return to PLOF  The patient's AM-PAC Basic Mobility Inpatient Short Form Low Function Raw Score 12 , Standardized Score is 18 33  A standardized score less 42 9 suggests the patient may benefit from discharge to post-acute rehab services  Please also refer to the recommendation of the Physical Therapist for safe discharge planning  Pt's wife updated with examination findings from a mobility standpoint  Educated on patient progress and plan of care while in hospital    Barriers to Discharge Inaccessible home environment   Goals   Patient Goals None verbalized at this time  STG Expiration Date 04/28/21   Short Term Goal #1 In 12 days, pt will be able to: STG1) perform all aspects of bed mobility requiring Min A/supervision to decrease burden on caregiver  STG2) tolerate 35-50 minutes of increase activity to improve activity tolerance and endurance to allow for progression to higher level activities  STG3) improve sitting and standing balance by 1-2 grades for increased stability with mobility and to decrease risk for falls and injury  STG4) improve LE strength by 1-2 grades to prepare for STS transfers and increase ease of bed mobility  Short Term Goal #2 Plan to establish STS, ambulation, and stair goals when appropriate     PT Treatment Day 0   Plan   Treatment/Interventions ADL retraining;Functional transfer training;LE strengthening/ROM; Elevations; Therapeutic exercise; Endurance training;Cognitive reorientation;Patient/family training;Bed mobility;Gait training;Continued evaluation;Spoke to nursing;Spoke to case management;OT;Family   PT Frequency Other (Comment)  (3-6x/wk)   Recommendation   PT Discharge Recommendation Post acute rehabilitation services   PT - OK to Discharge Yes   Additional Comments if to rehab; pending medical clearance   AM-PAC Basic Mobility Inpatient   Turning in Bed Without Bedrails 1   Lying on Back to Sitting on Edge of Flat Bed 1   Moving Bed to Chair 1   Standing Up From Chair 1   Walk in Room 1   Climb 3-5 Stairs 1   Basic Mobility Inpatient Raw Score 6   Turning Head Towards Sound 3   Follow Simple Instructions 3   Low Function Basic Mobility Raw Score 12   Low Function Basic Mobility Standardized Score 18 33         Hood Avila, SPT

## 2021-04-16 NOTE — PROGRESS NOTES
Emilee Tripp visited Pt      04/16/21 1300   Clinical Encounter Type   Visited With Patient   Surgical Visit Post-op   Tenriism Encounters   Tenriism Needs Prayer   Sacramental Encounters   Sacrament of Sick-Anointing Anointed

## 2021-04-16 NOTE — PROGRESS NOTES
[address] [facility]  Progress Note - Sol Velarde 1960, 62 y o  male MRN: [id number]  Unit/Bed#: ICU 04 Encounter: [id number]  Primary Care Provider: ANIA Read   Date and time admitted to hospital: 4/15/2021 12:50 PM    * SDH (subdural hematoma) ([facility] [address] )  Assessment & Plan  POD#1 Right CRANIOTOMY FOR SUBDURAL HEMATOMA  · Transfer from TBI rehab with AMS, found to have large right sided SDH with midline shift and brain compression  · Recent hospitalization from 04/04/2021-04/14/2021 in [facility] secondary to fall on the sidewalk with head strike, noted to have right subdural hematoma, GCS 14 on discharge to TBI rehab with PEG tube    Imaging reviewed personally and with attending, results are as follows:   CT head without contrast 04/16/2021:  Status post right-sided craniectomy and evacuation of subdural hematoma  Anticipated postoperative changes of the brain parenchyma and overlying soft tissues  Improved mass effect and midline shift  Areas of hemorrhagic contusion in the frontal lobes bilaterally and right temporal lobe  Small amount of subarachnoid hemorrhage along the high parietal convexities bilaterally  Plan:   Continue to monitor neuro exam, patient currently GCS 10-11T (E3-4, V1T, M6), follows commands in all 4 extremities with generalized weakness slightly worse on the left, +cough, no Berry or clonus noted   Repeat CT head stat if GCS declines more than 2 points in 1 hour   YANETH drain with 0 output since surgery, will remain in for now   Systolic blood pressure less than 160   Hold all anticoagulation/antiplatelet therapy   Continue Keppra 500 mg b i d   X1 week for seizure prophylaxis   Medical management and pain control per primary team   DVT ppx:  SCDs, okay for DVT prophylaxis, can start Lovenox if warranted per primary team   Mobilize as tolerated with assistance, PT / OT evaluation postoperatively    Neurosurgery will continue to follow  Please call with questions or concerns  Acute encephalopathy  Assessment & Plan  · Patient was a GCS 10 on presentation, not following commands  · Patient remains a GCS 10-11T currently but is following commands in all 4 extremities  · Continue to monitor neuro exam    Acute respiratory failure ([address] )  Assessment & Plan  · Remains intubated at this time  · Wean as tolerated per primary team    Subjective/Objective   Chief Complaint:  Follow-up right craniectomy    Subjective:  Patient intubated, cannot participate in ROS  No significant overnight events noted  Objective:  Lying in bed, intubated, no sedation, NAD    I/O       04/14 0701 - 04/15 0700 04/15 0701 - 04/16 0700 04/16 0701 - 04/17 0700    I V  (mL/kg)  3564 6 (43)     IV Piggyback  150     Total Intake(mL/kg)  3714 6 (44 8)     Urine (mL/kg/hr)  1025     Drains  0     Blood  350     Total Output  1375     Net  +[phone] 6                  Invasive Devices     Peripheral Intravenous Line            Peripheral IV 04/15/21 Distal;Right;Upper;Ventral (anterior) Arm less than 1 day    Peripheral IV 04/15/21 Left Hand less than 1 day    Peripheral IV 04/15/21 Left;Ventral (anterior) Hand less than 1 day          Arterial Line            Arterial Line 04/15/21 Right Radial less than 1 day          Drain            Gastrostomy/Enterostomy Percutaneous endoscopic gastrostomy (PEG) -- days    Closed/Suction Drain Right Head Bulb 10 Fr  less than 1 day    Urethral Catheter Latex 16 Fr  less than 1 day          Airway            ETT  Hi-Lo 8 mm less than 1 day                Physical Exam:  Vitals: Blood pressure 156/61, pulse 57, temperature 100 3 °F (37 9 °C), temperature source Oral, resp  rate 17, weight 82 9 kg (182 lb 12 2 oz), SpO2 100 %  ,Body mass index is 26 99 kg/m²        General appearance:  Intubated, no sedation appears stated age, cooperative and no distress  Head:  Right craniectomy site full but ballotable, dressing over incision clean dry intact  Eyes: EOMI  Neck: supple, symmetrical, trachea midline   Lungs: non labored breathing  Heart: regular heart rate  Neurologic:   Mental status:  GCS 10-11T (E3-4, V1T, M6), intubated, not sedated, questionably has eyes open spontaneously but does open to voice, following commands in all 4 extremities with slight weakness noted more so on the left  Cranial nerves: grossly intact (Cranial nerves II-XII)  Sensory:  Intact to crude touch  Motor: moving all extremities with generalized weakness but slightly worse on the left   RUE:  4/5 , 4/5 bicep, 4-/5 tricep, 3/5 deltoid   LUE:  4-/5 , 3-4-/5 bicep/tricep, 2-3/5 deltoid   RLE:  4+/5 HF, 4/5 DF/PF   LLE:  4/5 HF, 4-/5 DF/PF  Reflexes: no obrien or clonus noted, +cough      Lab Results:  Results from last 7 days   Lab Units 04/16/21  0543 04/15/21  1712 04/15/21  1448 04/15/21  1320   WBC Thousand/uL 16 12* 14 14*  --  13 87*   HEMOGLOBIN g/dL 13 2 14 2  --  15 4   I STAT HEMOGLOBIN g/dl  --   --  12 9  --    HEMATOCRIT % 38 4 41 5  --  45 5   HEMATOCRIT, ISTAT %  --   --  38  --    PLATELETS Thousands/uL 205 215  --  248   NEUTROS PCT % 82* 90*  --  81*   MONOS PCT % 9 5  --  9     Results from last 7 days   Lab Units 04/16/21  0543 04/15/21  1712 04/15/21  1448 04/15/21  1320   POTASSIUM mmol/L 4 3 4 0  --  4 1   CHLORIDE mmol/L 112* 112*  --  110*   CO2 mmol/L 28 24  --  25   CO2, I-STAT mmol/L  --   --  27  --    BUN mg/dL 11 11  --  12   CREATININE mg/dL 0 64 0 60  --  0 63   CALCIUM mg/dL 8 4 8 4  --  8 9   ALK PHOS U/L  --   --   --  164*   ALT U/L  --   --   --  43   AST U/L  --   --   --  33   GLUCOSE, ISTAT mg/dl  --   --  146*  --      Results from last 7 days   Lab Units 04/16/21  0543 04/15/21  1712   MAGNESIUM mg/dL 2 3 2 1     Results from last 7 days   Lab Units 04/16/21  0543 04/15/21  1712   PHOSPHORUS mg/dL 3 5 3 0     Results from last 7 days   Lab Units 04/16/21  0543 04/15/21  1712 04/15/21  1324 04/15/21  1320 INR  1 31* 1 27*  --  1 20*   PTT seconds 34 37 33  --      No results found for: TROPONINT  ABG:  Lab Results   Component Value Date    PHART 7 389 04/15/2021    BLB3YPX 42 0 04/15/2021    PO2ART 168 2 (H) 04/15/2021    [id number] 24 8 04/15/2021    BEART -0 3 04/15/2021    SOURCE Line, Arterial 04/15/2021       Imaging Studies: I have personally reviewed pertinent reports  and I have personally reviewed pertinent films in PACS    Xr Chest Portable    Result Date: 4/16/2021  Impression: 1  Focal opacification left mid lung which may reflect atelectasis or pneumonia  2   Endotracheal tube as noted  3   Persistent elevation right hemidiaphragm  Workstation performed: [id number]     Ct Head Wo Contrast    Result Date: 4/16/2021  Impression: 1  Status post right-sided craniectomy and evacuation of subdural hematoma  Anticipated postoperative changes within the brain parenchyma and overlying soft tissues  Improved mass effect and midline shift  2   Areas of hemorrhagic contusion in the frontal lobes bilaterally and right temporal lobe  3   Small amount of subarachnoid hemorrhage along the high parietal convexities bilaterally  Workstation performed: [id number]     Ct Head Without Contrast    Result Date: 4/15/2021  Impression: Stable multicompartment hemorrhage compared with the CT from earlier the same day including a large subdural hematoma along the right hemisphere and subarachnoid blood in the right temporal, bilateral frontal, and bilateral parietal lobes  There is surrounding edema particularly in the bilateral frontal and right temporal lobes with stable severe mass effect and leftward midline shift  I personally discussed this study with Viss on 4/15/2021 at 2:40 PM  Workstation performed: [id number]     Ct Chest Abdomen Pelvis W Contrast    Result Date: 4/15/2021  Impression: 1  Focal opacity in the left upper lobe    This has a patchy appearance and may represent atelectasis versus a small infiltrate  Evaluation is limited by respiratory motion, however  A short interval follow-up CT in 3 months is recommended to ensure resolution  2   No acute abnormality in the abdomen or pelvis  3   Cholelithiasis  The study was marked in [facility] for immediate notification  Workstation performed: [id number]       EKG, Pathology, and Other Studies: I have personally reviewed pertinent reports        VTE Pharmacologic Prophylaxis:  Okay for pharmacological DVT prophylaxis from Neurosurgery standpoint    VTE Mechanical Prophylaxis: sequential compression device

## 2021-04-16 NOTE — PLAN OF CARE
Problem: PHYSICAL THERAPY ADULT  Goal: Performs mobility at highest level of function for planned discharge setting  See evaluation for individualized goals  Description: Treatment/Interventions: ADL retraining, Functional transfer training, LE strengthening/ROM, Elevations, Therapeutic exercise, Endurance training, Cognitive reorientation, Patient/family training, Bed mobility, Gait training, Continued evaluation, Spoke to nursing, Spoke to case management, OT, Family          See flowsheet documentation for full assessment, interventions and recommendations  Note: Prognosis: Guarded  Problem List: Decreased strength, Decreased range of motion, Decreased endurance, Impaired balance, Decreased mobility, Decreased coordination, Decreased cognition, Decreased safety awareness, Impaired vision, Pain  Assessment: Pt is a 62 y o  male who [facility] with admitting dx of SDH  HPI: Pt presented to ED by EMS from [facility] on 4/15/2021 with worsening mental status following a previous fall (4/4/2021) and SDH  Pt previously treated for SDH/SAH and was D/C to [facility] for rehab 4/14/2021  CTH showed an acute on chronic SDH with surrounding edema and midline shift  Pt is now s/p craniotomy for right SDH (4/15/2021)  Pt's pertinent PMH includes: HTN, HLD, acute respiratory failure, acute encephalopathy and anxiety  Pt's PSH includes: left hand and left rotator cuff surgery  PT with active orders to assess pt's functional mobility and determine D/C needs  Pt currently unable to respond to PT questions regarding home setup and PLOF  Pt's wife reports pt lives with her in a [facility] with 5 LIONEL (+) railing  PTA, pt's wife states pt was independent with all ADLs/IADLs  No use of AD at baseline  Other home setup information can be found above  Pt's wife is retired and is able and willing to help prn  Pt is currently demonstrating the following mobility deficits:  Max Ax2 for bed mobility, Poor(+) balance for EOB sitting, and is unable to perform STS transfers or ambulate  Other objective findings can be found above  Pt's impairments in pain, LE strength, ROM, endurance, activity tolerance, sitting/standing balance, sitting/standing tolerance, trunk control/core strength, functional mobility, and decreased safety awareness impact their ability to function at baseline  Pt is of high complexity at this time secondary to the aforementioned impairments and deficits as well as the acuity of his current diagnosis that increases the risk for falls and injury  Pt would benefit from skilled PT 3-6x/wk to address these deficits as well as immediate acute care needs and underlying performance skills to maximize functional mobility and safety to return to PLOF  The patient's AM-PAC Basic Mobility Inpatient Short Form Low Function Raw Score 12 , Standardized Score is 18 33  A standardized score less 42 9 suggests the patient may benefit from discharge to post-acute rehab services  Please also refer to the recommendation of the Physical Therapist for safe discharge planning  Pt's wife updated with examination findings from a mobility standpoint  Educated on patient progress and plan of care while in hospital   Barriers to Discharge: Inaccessible home environment        PT Discharge Recommendation: Post acute rehabilitation services     PT - OK to Discharge: Yes    See flowsheet documentation for full assessment

## 2021-04-16 NOTE — UTILIZATION REVIEW
Initial Clinical Review    Admission: Date/Time/Statement:   Admission Orders (From admission, onward)     Ordered        04/15/21 1523  Inpatient Admission  Once                Orders Placed This Encounter   Procedures    Inpatient Admission     Standing Status:   Standing     Number of Occurrences:   1     Order Specific Question:   Level of Care     Answer:   Critical Care [15]     Order Specific Question:   Estimated length of stay     Answer:   More than 2 Midnights     Order Specific Question:   Certification     Answer:   I certify that inpatient services are medically necessary for this patient for a duration of greater than two midnights  See H&P and MD Progress Notes for additional information about the patient's course of treatment  ED Arrival Information     Expected Arrival Acuity Means of Arrival Escorted By Service Admission Type    - 4/15/2021 12:50 Emergent Ambulance [facility] Trauma Emergency    Arrival Complaint    brain bleed        Chief Complaint   Patient presents with    Head Injury     pt brought in by EMS from [facility] reports of traumatic subdural hemorrhage worsening since yesterday       Initial Presentation: 61 y/o male presented to ED by EMS with worsening mental status following a previous fall and SDH  Pt fell on 4/4 in [place] and was found to have a SDH/SAH, tx'd and d/c'd to [facility] for rehab yesterday, and ths AM was found to be more lethargic and sent to ED for eval   Repeat CTH showed an acute on chronic SDH with surrounding edema and midline shift  On exam GCS 10, following commands in RUE, not following commands in LUE  Withdraws to pain weakly in LUE  Will not follow commands in b/l LE but withdrwas to pain in b/l LE   NSx eval'd and plan to take to OR  Admit inpatient to Critical Care unit with acute SDH  Npo, SCD's for OR  Hold all anticoagulants       OPERATIVE REPORT  SURGERY DATE: 4/15/2021  Procedure(s) (LRB):  Right CRANIOTOMY FOR SUBDURAL HEMATOMA (Right)   Anesthesia Type:   General   Operative Indications:  Subdural hematoma ([address] ) [S55X9A]   52-year-old gentleman with subacute subdural hematoma and 15 mm midline shift to presented with altered mental status and declining neurologic exam   Given his current exam and radiographic findings at a discussion with the wife regarding surgery including decompressive craniectomy versus evacuation of subdural   She understood the risks and benefits including bleeding, infection, stroke, seizure and death   Operative Findings:   compressive subdural hematoma with mass effect  Contused brain        Date: 4/16   Day 2:  Weaned off vent to 4L nc  Continue neuro checks q2h, follows commands in all 4 extremities with generalized weakness slightly worse on the left, +cough, no Berry or clonus noted  Repeat CTH stat if GCS declines >2 points in 1 hr  YANETH drain with 0 output since surgery, will remain for now  SBP <160  Hold all AC/Ap  IVF's  Continue Keppra for seizure ppx  Pain control  SCD's  PT/OT, mobilize as angel with assist  A-line, R subgaleal drain, Peg tube, haque in place       ED Triage Vitals [04/15/21 1255]   Temperature Pulse Respirations Blood Pressure SpO2   99 6 °F (37 6 °C) 89 20 167/77 99 %      Temp Source Heart Rate Source Patient Position - Orthostatic VS BP Location FiO2 (%)   Oral Monitor Lying Right arm --      Pain Score       --          Wt Readings from Last 1 Encounters:   04/16/21 82 9 kg (182 lb 12 2 oz)     Additional Vital Signs:   Date/Time  Temp  Pulse  Resp  BP  MAP (mmHg)  Arterial Line BP  MAP  SpO2  Calculated FIO2 (%) - Nasal Cannula  Nasal Cannula O2 Flow Rate (L/min)  O2 Device   04/16/21 1000  --  58  18  --  --  --  --  100 %  --  --  --   04/16/21 0930  --  76  --  --  --  --  --  99 %  36  4 L/min  Nasal cannula   04/16/21 0900  100 5 °F (38 1 °C)  48Abnormal   15  159/68  104  --  --  100 %  --  --  --   04/16/21 0800  --  72  12  152/66  106  100/92  96 mmHg 100 %  --  --  Ventilator   04/16/21 0700  --  54Abnormal   17  156/61  89  106/82  96 mmHg  100 %  --  --  --   04/16/21 0600  --  56  16  --  --  100/68  84 mmHg  100 %  --  --  --   04/16/21 0000  100 °F (37 8 °C)  66  15  111/55  79  102/40  60 mmHg  100 %  --  --  --   04/15/21 1700  99 3 °F (37 4 °C)  80  18  144/76  98  162/64  92 mmHg  100 %  --  --  Ventilator   04/15/21 1415  --  104  16  179/83Abnormal   119  --  --  99 %  --  --  None (Room air)   04/15/21 1330  --  99  20  183/86Abnormal   --  --  --  97 %  --  --  --   04/15/21 1300  --  86  20  167/77  --  --  --  99 %  --  --  --   04/15/21 1255  99 6 °F (37 6 °C)  89  20  167/77  111  --  --  99 %  --  --  None (Room air)       Pertinent Labs/Diagnostic Test Results:   · CT head without contrast 04/16/2021:  Status post right-sided craniectomy and evacuation of subdural hematoma  Anticipated postoperative changes of the brain parenchyma and overlying soft tissues  Improved mass effect and midline shift  Areas of hemorrhagic contusion in the frontal lobes bilaterally and right temporal lobe  Small amount of subarachnoid hemorrhage along the high parietal convexities bilaterally  · CT head without contract 4/15/2021: Stable multicompartment hemorrhage compared with the CT from earlier the same day including a large subdural hematoma along the right hemisphere and subarachnoid blood in the right temporal, bilateral frontal, and bilateral parietal lobes   There is surrounding edema particularly in the bilateral frontal and right temporal lobes with stable severe mass effect and leftward midline shift  · CT c/a/p 4/15/2021: 1   Focal opacity in the left upper lobe   This has a patchy appearance and may represent atelectasis versus a small infiltrate   Evaluation is limited by respiratory motion, however   A short interval follow-up CT in 3 months is recommended to ensure   resolution  2   No acute abnormality in the abdomen or pelvis   3   Cholelithiasis  · CXR 4/16/2021: 1   Focal opacification left mid lung which may reflect atelectasis or pneumonia  2   Endotracheal tube as noted  3   Persistent elevation right hemidiaphragm         Results from last 7 days   Lab Units 04/16/21  0543 04/15/21  1712 04/15/21  1448 04/15/21  1320   WBC Thousand/uL 16 12* 14 14*  --  13 87*   HEMOGLOBIN g/dL 13 2 14 2  --  15 4   I STAT HEMOGLOBIN g/dl  --   --  12 9  --    HEMATOCRIT % 38 4 41 5  --  45 5   HEMATOCRIT, ISTAT %  --   --  38  --    PLATELETS Thousands/uL 205 215  --  248   NEUTROS ABS Thousands/µL 13 30* 12 67*  --  11 16*     Results from last 7 days   Lab Units 04/16/21  0543 04/15/21  1712 04/15/21  1448 04/15/21  1320   SODIUM mmol/L 144 141  --  140   POTASSIUM mmol/L 4 3 4 0  --  4 1   CHLORIDE mmol/L 112* 112*  --  110*   CO2 mmol/L 28 24  --  25   CO2, I-STAT mmol/L  --   --  27  --    ANION GAP mmol/L 4 5  --  5   BUN mg/dL 11 11  --  12   CREATININE mg/dL 0 64 0 60  --  0 63   EGFR ml/min/1 73sq m 107 110  --  108   CALCIUM mg/dL 8 4 8 4  --  8 9   CALCIUM, IONIZED mmol/L 1 13 1 18  --   --    CALCIUM, IONIZED, ISTAT mmol/L  --   --  1 17  --    MAGNESIUM mg/dL 2 3 2 1  --   --    PHOSPHORUS mg/dL 3 5 3 0  --   --      Results from last 7 days   Lab Units 04/15/21  1320   AST U/L 33   ALT U/L 43   ALK PHOS U/L 164*   TOTAL PROTEIN g/dL 8 1   ALBUMIN g/dL 2 7*   TOTAL BILIRUBIN mg/dL 0 97   BILIRUBIN DIRECT mg/dL 0 55*     Results from last 7 days   Lab Units 04/16/21  0543 04/15/21  1712 04/15/21  1320   GLUCOSE RANDOM mg/dL 126 143* 162*     Results from last 7 days   Lab Units 04/15/21  1716   PH ART  7 389   PCO2 ART mm Hg 42 0   PO2 ART mm Hg 168 2*   HCO3 ART mmol/L 24 8   BASE EXC ART mmol/L -0 3   O2 CONTENT ART mL/dL 20 7   O2 HGB, ARTERIAL % 98 1*   ABG SOURCE  Line, Arterial     Results from last 7 days   Lab Units 04/15/21  1448   I STAT BASE EXC mmol/L 2   ISTAT PH ART  7 437   I STAT ART PCO2 mm HG 38 9   I STAT ART PO2 mm HG >400 0*   I STAT ART HCO3 mmol/L 26 3     Results from last 7 days   Lab Units 04/16/21  0543 04/15/21  1712 04/15/21  1324 04/15/21  1320   PROTIME seconds 16 3* 15 8*  --  15 2*   INR  1 31* 1 27*  --  1 20*   PTT seconds 34 37 33  --      Results from last 7 days   Lab Units 04/15/21  1320   PROCALCITONIN ng/ml 0 17     Results from last 7 days   Lab Units 04/15/21  1716 04/15/21  1320   LACTIC ACID mmol/L 1 9 2 4*     Results from last 7 days   Lab Units 04/15/21  1815   CLARITY UA  Clear   COLOR UA  Dk Yellow   SPEC GRAV UA  >1 045*   PH UA  7 5   GLUCOSE UA mg/dl Negative   KETONES UA mg/dl Negative   BLOOD UA  Negative   PROTEIN UA mg/dl Trace*   NITRITE UA  Negative   BILIRUBIN UA  Negative   UROBILINOGEN UA E U /dl 4 0*   LEUKOCYTES UA  Small*   WBC UA /hpf 20-30*   RBC UA /hpf None Seen   BACTERIA UA /hpf None Seen   EPITHELIAL CELLS WET PREP /hpf None Seen     Results from last 7 days   Lab Units 04/15/21  1320   BLOOD CULTURE  Received in Microbiology Lab  Culture in Progress       ED Treatment:  No meds given    Past Medical History:   Diagnosis Date    Anxiety     Hypertension      Present on Admission:   SDH (subdural hematoma) (HCC)   Hypertension   (Resolved) Hyperchloremia      Admitting Diagnosis: Subdural hematoma (HCC) [S06 5X9A]  Intracranial bleed (HCC) [I62 9]  Age/Sex: 62 y o  male  Admission Orders:  Scheduled Medications:  enoxaparin, 30 mg, Subcutaneous, Q12H [address]  levETIRAcetam, 500 mg, Per PEG Tube, Q12H [address]  lisinopril, 40 mg, Per PEG Tube, Daily  senna, 8 8 mg, Per PEG Tube, HS       PRN Meds:  acetaminophen, 650 mg, Per PEG Tube, Q6H PRN  HYDROmorphone, 0 5 mg, Intravenous, Q4H PRN  Labetalol HCl, 10 mg, Intravenous, Q4H PRN 4/15 x1  ondansetron, 4 mg, Intravenous, Q6H PRN  oxyCODONE, 2 5 mg, Per PEG Tube, Q4H PRN    Or  oxyCODONE, 5 mg, Per PEG Tube, Q4H PRN        IP CONSULT TO CASE MANAGEMENT  IP CONSULT TO NUTRITION SERVICES    Network Utilization Review Department  ATTENTION: Please call with any questions or concerns to [phone] and carefully listen to the prompts so that you are directed to the right person  All voicemails are confidential   Candia Siemens all requests for admission clinical reviews, approved or denied determinations and any other requests to dedicated fax number below belonging to the campus where the patient is receiving treatment   List of dedicated fax numbers for the Facilities:  [address] DENIALS (Administrative/Medical Necessity) [phone]   [address] (Maternity/NICU/Pediatrics) [phone] [address] Demian De La Rosa 0390 (  Kylah Ortiz "Sayra" 103) [address] [place] [address] [address] [place] [address] [phone]

## 2021-04-16 NOTE — OCCUPATIONAL THERAPY NOTE
Occupational Therapy Evaluation     Patient Name: Harshad Robbins  [id number] Date: 4/16/2021  Problem List  Principal Problem:    SDH (subdural hematoma) ([facility] [address] )  Active Problems:    Hypertension    Acute respiratory failure ([facility] [address] )    Acute encephalopathy    Dysphagia    Past Medical History  Past Medical History:   Diagnosis Date    Anxiety     Hypertension      Past Surgical History  Past Surgical History:   Procedure Laterality Date    BRAIN HEMATOMA EVACUATION Right 4/15/2021    Procedure: Right CRANIOTOMY FOR SUBDURAL HEMATOMA;  Surgeon: Asuncion Soliz MD;  [facility];  Service: Neurosurgery    HAND SURGERY Left     ROTATOR CUFF REPAIR Left              04/16/21 1449   OT Last Visit   OT Visit Date 04/16/21   Note Type   Note type Evaluation   Restrictions/Precautions   Weight Bearing Precautions Per Order No   Braces or Orthoses Craniectomy Helmet   Other Precautions Cognitive; Bed Alarm;Multiple lines;Telemetry; Fall Risk;Pain;Visual impairment   Pain Assessment   Pain Assessment Tool FLACC   Pain Rating: FLACC (Rest) - Face 0   Pain Rating: FLACC (Rest) - Legs 0   Pain Rating: FLACC (Rest) - Activity 1   Pain Rating: FLACC (Rest) - Cry 1   Pain Rating: FLACC (Rest) - Consolability 0   Score: FLACC (Rest) 2   Pain Rating: FLACC (Activity) - Face 0   Pain Rating: FLACC (Activity) - Legs 0   Pain Rating: FLACC (Activity) - Activity 1   Pain Rating: FLACC (Activity) - Cry 1   Pain Rating: FLACC (Activity) - Consolability 0   Score: FLACC (Activity) 2   Home Living   Type of Home House   Home Layout Two level;Performs ADLs on one level; Able to live on main level with bedroom/bathroom  (5-6 LIONEL)   Bathroom Shower/Tub Walk-in shower   Bathroom Toilet Raised   Bathroom Equipment Built-in shower seat;Grab bars in [facility] Other (Comment)  (denies any)   Additional Comments Pt nonverbal @ this time, home setup obtained from pt's spouse who was present in room   Per spouse, pt and spouse live in [address] [place] [address] w/ basement, 5-6 LIONEL through front w/ 1st floor setup w/ full bed/bath  0 LIONEL through garage/basement level then full flight of steps to 1st floor  Prior Function   Level of [place] Independent with ADLs and functional mobility   Lives With Spouse   Receives Help From Family   ADL Assistance Independent   IADLs Independent   Falls in the last 6 months   (1)   Vocational Retired   Comments Per pt's spouse, pt was I w/ ADLS/IADLS, transfers and functional mobility  Pt had recent fall and was dx'd w/ SDH (at hospital in [place]), pt was transferred to [facility] on 4/14 so recently has been needing assistance but prior to rehab and the fall pt was I w/ functional tasks  Lifestyle   Autonomy I ADLS/IADLS, transfers and functional mobility PTA   Reciprocal Relationships Pt lives w/ his spouse   Service to Others Pt is a retired    Intrinsic Gratification Enjoys staying active; recently purchased a [employer] in [place], likes fishing/boating   Psychosocial   Psychosocial ([id number] [employer] X   Patient Behaviors/Mood Appropriate for situation;Calm   Length of Time/Family Visitation 31 min -1hr  (spouse)   ADL   Eating Assistance Unable to assess   Grooming Assistance 2  Maximal Assistance   [id number] 2  Maximal Assistance   LB Bathing Assistance 1  Total Assistance   UB Dressing Assistance 2  Maximal [address] 1  Total [address]  2  Maximal [address] 2  Maximal Assistance   Functional Deficit Steadying;Verbal cueing;Supervision/safety; Increased time to complete  (AX2)   Bed Mobility   Supine to Sit 2  Maximal assistance   Additional items Assist x 2;HOB elevated; Increased time required;Verbal cues;LE management   Sit to Supine 2  Maximal assistance   Additional items Assist x 2; Increased time required;Verbal cues;LE management   Additional Comments Pt went from supine<>sit w/ Max A x2, HOB elevated for support   Assist for UB and LE management  Pt sat EOB w/ flucuating Min-Mod A for sitting balance/trunk control  Presents w/ R lateral and posterior lean  Needs VC/TC for upright posture/trunk control  Attempts to actively initiate grabbing R sided bed rail for support into upright sitting  Needed ongoing VC/TC for head/neck control and extension   Transfers   Sit to Stand 2  Maximal assistance   Additional items Assist x 2; Increased time required;Verbal cues   Stand to Sit 2  Maximal assistance   Additional items Assist x 2; Increased time required;Verbal cues   Additional Comments Attempted STS from EOB w/ Max A x2, B/L Knee block performed  HHA used  VC for hand placement  Pt unable to clear buttocks from EOB  Functional Mobility   Additional Comments Not appropriate @ this time   Balance   Static Sitting Poor +   Dynamic Sitting Poor   Static Standing Poor -   Activity Tolerance   Activity Tolerance Patient limited by fatigue;Patient limited by pain;Treatment limited secondary to medical complications (Comment); Other (Comment)  (lethargy)   Medical Staff Made Aware PT   Nurse Made Aware yes   RUE Assessment   RUE Assessment X   RUE Overall PROM   R Mass Grasp Fair (+)   RUE Strength   R Shoulder Flexion 3/5   R Elbow Flexion 4-/5   R Elbow Extension 3+/5   LUE Assessment   LUE Assessment X   LUE Overall PROM   L Mass Grasp Fair (+)   LUE Strength   L Shoulder Flexion 3-/5   L Elbow Flexion 4-/5   L Elbow Extension 3+/5   Hand Function   Gross Motor Coordination Impaired   Fine Motor Coordination Impaired   Sensation   Light Touch   (pt unable to report)   Vision - Complex Assessment   Ocular Range of Motion Restricted looking up; Restricted looking down;Restricted on the right   Tracking Requires cues, head turns, or add eye shifts to track; Decreased smoothness of horizontal tracking;Decreased smoothness of vertical tracking   Additional Comments Pt's R eye is swollen, L eye attempts to track but does not follow in all directions   Pt can turn head L/R and up/down w/ VC   Cognition   Overall Cognitive Status Impaired   Arousal/Participation Arousable; Cooperative;Persistent stimuli required   Attention Difficulty attending to directions   Orientation Level Oriented to person;Oriented to place;Oriented to time   Memory Unable to assess   Following Commands Follows one step commands with increased time or repetition   Comments Pt is cooperative; nonverbal except for occasional groaning  Able to follow simple 1 step commands atleast 75% of the time or more  Squeezes hand to yes/no or multiple choice questions  Overall is lethargic, needs VC/TC to remain alert/awake and to initiate functional tasks   Assessment   Limitation Decreased ADL status; Decreased UE ROM; Decreased UE strength;Decreased Safe judgement during ADL;Decreased cognition;Decreased endurance;Visual deficit; Decreased fine motor control;Decreased self-care trans;Decreased high-level ADLs   Prognosis Fair   Assessment Pt is a 61 y/o male seen for OT eval s/p adm to [facility] w/ worsening mental status while @ rehab  Pt had recent fall on 4/4 in [place] and found to have SDH/SAH  Pt transferred to [facility] on 4/14 and then found to be more lethargic  CTH showed acute on chronic subdural hemorrhage  Pt is s/p the following procedure "Right CRANIOTOMY FOR SUBDURAL HEMATOMA (Right)" performed on 4/15  Pt is dx'd w/ subdural hematoma  Pt  has a past medical history of Anxiety and Hypertension  Pt with active OT orders and up with assistance  orders  Pt lives with his spouse in [address] [place] [address] w/ basement, 5-6 LIONEL front, 0 LIONEL basement then FF to 1st floor where bed/bath is located  Pt was I w/  ADLS and IADLS, drove, & required no use of DME PTA but has available a built in shower bench and GB  Pt is currently demonstrating the following occupational deficits: Max A UB ADLS, Total A LB ADLS, Max A X2 bed mobility, Max A x2 STS transfers, Min-Mod A x1 EOB sitting balance   These deficits that are impacting pt's baseline areas of occupation are a result of the following impairments: pain, endurance, activity tolerance, functional mobility, forward functional reach, balance, trunk control, functional standing tolerance, decreased I w/ ADLS/IADLS, strength, ROM, visual deficits, cognitive impairments, decreased safety awareness, decreased insight into deficits, impaired fine motor skills and coordination deficits  The following Occupational Performance Areas to address include: eating, grooming, bathing/shower, toilet hygiene, dressing, medication management, socialization, health maintenance, functional mobility, community mobility, clothing management and household maintenance  Based on the aforementioned OT evaluation, functional performance deficits, and assessments, pt has been identified as a high complexity evaluation  Recommend STR upon D/C  The patient's raw score on the -PAC Daily Activity inpatient short form is 11, standardized score is 29 04, less than 39 4  Patients at this level are likely to benefit from discharge to post-acute rehabilitation services  Please refer to the recommendation of the Occupational Therapist for safe discharge planning  Pt to continue to benefit from acute immediate OT services to address the following goals 3-5x/week to  w/in 10-14 days:    Goals   Patient Goals not able to express 2/2 being nonverbal @ this time   LTG Time Frame 10-14   Long Term Goal #1 see below listed goals   Plan   Treatment Interventions ADL retraining;Functional transfer training;Visual perceptual retraining;UE strengthening/ROM; Endurance training;Cognitive reorientation;Patient/family training;Equipment evaluation/education; Fine motor coordination activities; Neuromuscular reeducation; Compensatory technique education;Continued evaluation; Energy conservation; Activityengagement   Goal Expiration Date 21   OT Frequency 3-5x/wk   Recommendation   OT Discharge Recommendation Post acute rehabilitation services OT - OK to Discharge Yes  (when medically stable)   AM-PAC Daily Activity Inpatient   Lower Body Dressing 2   Bathing 2   Toileting 2   Upper Body Dressing 2   Grooming 2   Eating 1   Daily Activity Raw Score 11   Daily Activity Standardized Score (Calc for Raw Score >=11) 29 04   AM-PAC Applied Cognition Inpatient   Following a Speech/Presentation 1   Understanding Ordinary Conversation 2   Taking Medications 2   Remembering Where Things Are Placed or Put Away 1   Remembering List of 4-5 Errands 1   Taking Care of Complicated Tasks 1   Applied Cognition Raw Score 8   Applied Cognition Standardized Score 19 32     GOALS    1) Pt will complete rolling left/right in bed with Mod assist, as prerequisite for further engagement in ADLS   2) Pt will complete supine to sit transfer with Mod A using B/L UEs to initiate bed mobility   3) Pt will tolerate sitting at EOB 20 minutes with S assist and stable vital signs, as prerequisite for further engagement in ADLS   4) Pt will complete grooming task with Min assist and increased time to increase independence in functional tasks  5) Pt will improve UB fx'l use/ROM to [facility] and strength 1/2 MMT via AROM/AAROM/PROM in all planes as tolerated s/p skilled education of HEP w/ 2 verbal cues for carryover  6) Pt will complete UB ADLS with Min A and use of AD/DME as needed to increase independence in functional tasks  7) Pt will complete LB ADLS with Mod A and use of AD/DME as needed to increase independence in functional tasks  8) Pt will complete sit to stand transfer / sit pivot transfer / stand pivot transfer with Mod assist on/off all ADL surfaces   9) Pt will follow 100% simple one step verbal commands and be A/Ox4 consistently t/o use of external environmental cues to increase awareness for functional tasks  10) Pt will demonstrate 100% carryover of E C  techniques t/o fx'l I/ADL/ leisure tasks w/ 2 verbal cues s/p skilled education  11) Pt will engage be attentive 100% of the time during ongoing functional/formal cognitive assessment w/ G participation to A w/ safe d/c planning/recommendations  12) Pt will engage in depression screen/leisure interest checklist w/ G participation to monitor s/s depression and ID 3 positive coping strategies to A w/ emotional regulation and management  13) Pt will engage in ongoing  assessments, screens, and activities t/o fx'l I/ADL/leisure tasks w/ G participation to A w/ adaptation and accomodations or rule out visual perceptual impairments    Alen Rehman MS, OTR/L

## 2021-04-17 LAB
ANION GAP SERPL CALCULATED.3IONS-SCNC: 3 MMOL/L (ref 4–13)
BACTERIA UR CULT: ABNORMAL
BUN SERPL-MCNC: 12 MG/DL (ref 5–25)
CALCIUM SERPL-MCNC: 8.4 MG/DL (ref 8.3–10.1)
CHLORIDE SERPL-SCNC: 108 MMOL/L (ref 100–108)
CO2 SERPL-SCNC: 28 MMOL/L (ref 21–32)
CREAT SERPL-MCNC: 0.58 MG/DL (ref 0.6–1.3)
ERYTHROCYTE [DISTWIDTH] IN BLOOD BY AUTOMATED COUNT: 11.9 % (ref 11.6–15.1)
GFR SERPL CREATININE-BSD FRML MDRD: 112 ML/MIN/1.73SQ M
GLUCOSE SERPL-MCNC: 129 MG/DL (ref 65–140)
HCT VFR BLD AUTO: 39.1 % (ref 36.5–49.3)
HGB BLD-MCNC: 13.4 G/DL (ref 12–17)
MAGNESIUM SERPL-MCNC: 2.3 MG/DL (ref 1.6–2.6)
MCH RBC QN AUTO: 32.9 PG (ref 26.8–34.3)
MCHC RBC AUTO-ENTMCNC: 34.3 G/DL (ref 31.4–37.4)
MCV RBC AUTO: 96 FL (ref 82–98)
PHOSPHATE SERPL-MCNC: 3.1 MG/DL (ref 2.7–4.5)
PLATELET # BLD AUTO: 184 THOUSANDS/UL (ref 149–390)
PMV BLD AUTO: 11.9 FL (ref 8.9–12.7)
POTASSIUM SERPL-SCNC: 4 MMOL/L (ref 3.5–5.3)
RBC # BLD AUTO: 4.07 MILLION/UL (ref 3.88–5.62)
SODIUM SERPL-SCNC: 139 MMOL/L (ref 136–145)
WBC # BLD AUTO: 10.76 THOUSAND/UL (ref 4.31–10.16)

## 2021-04-17 PROCEDURE — 83735 ASSAY OF MAGNESIUM: CPT | Performed by: PHYSICIAN ASSISTANT

## 2021-04-17 PROCEDURE — 85027 COMPLETE CBC AUTOMATED: CPT | Performed by: PHYSICIAN ASSISTANT

## 2021-04-17 PROCEDURE — 80048 BASIC METABOLIC PNL TOTAL CA: CPT | Performed by: PHYSICIAN ASSISTANT

## 2021-04-17 PROCEDURE — 84100 ASSAY OF PHOSPHORUS: CPT | Performed by: PHYSICIAN ASSISTANT

## 2021-04-17 PROCEDURE — 99024 POSTOP FOLLOW-UP VISIT: CPT | Performed by: NEUROLOGICAL SURGERY

## 2021-04-17 PROCEDURE — 99233 SBSQ HOSP IP/OBS HIGH 50: CPT | Performed by: SURGERY

## 2021-04-17 RX ORDER — HYDRALAZINE HYDROCHLORIDE 20 MG/ML
10 INJECTION INTRAMUSCULAR; INTRAVENOUS EVERY 6 HOURS PRN
Status: DISCONTINUED | OUTPATIENT
Start: 2021-04-17 | End: 2021-04-22 | Stop reason: HOSPADM

## 2021-04-17 RX ADMIN — LEVETIRACETAM 500 MG: 100 SOLUTION ORAL at 21:23

## 2021-04-17 RX ADMIN — SENNOSIDES 8.8 MG: 8.8 SYRUP ORAL at 21:23

## 2021-04-17 RX ADMIN — OXYCODONE HYDROCHLORIDE 5 MG: 5 SOLUTION ORAL at 09:00

## 2021-04-17 RX ADMIN — ENOXAPARIN SODIUM 30 MG: 30 INJECTION SUBCUTANEOUS at 08:44

## 2021-04-17 RX ADMIN — LISINOPRIL 40 MG: 20 TABLET ORAL at 08:44

## 2021-04-17 RX ADMIN — LEVETIRACETAM 500 MG: 100 SOLUTION ORAL at 08:45

## 2021-04-17 NOTE — PROGRESS NOTES
Daily Progress Note - Critical Care   Leanna Rock 62 y o  male MRN: [id number]  Unit/Bed#: ICU 04 Encounter: [id number]        ----------------------------------------------------------------------------------------  HPI/24hr events: 63-yr old male with multi-compartmental intracranial hemorrhage; now status post R craniectomy: 4/15  Still quite somnolent; he however obeys commands and responds with single words to questions asked  R cranial YANETH: 0   Heart rate in the 50s/60s; otherwise, stable VS in room air with SBPs mainly ranging between 130-150; had a dose of hydralazine to keep SBP < 160       ---------------------------------------------------------------------------------------  SUBJECTIVE  C/o: somnolent and really non-verbal; said 'no' when asked if he was in pain  Review of Systems   Unable to perform ROS: Mental status change     Review of systems was reviewed and negative unless stated above in HPI/24-hour events   ---------------------------------------------------------------------------------------  Assessment and Plan:    Neuro:    Diagnosis: multi-compartmental intracranial hemorrhage; s/p R craniectomy with placement of subgaleal drain  o Plan: continue serial neuro checks  o Target SBP < 160; PRN labetalol and hydralazine to achieve this   o GCS: 14 (confused); [id number]   o keppra BID (500 mg) for seizure prophylaxis  o Post-operative head CT showed improvement in midline shift (5 mm from 1 4 cm); residual bilateral frontal SAH with significant edema   o Subgaleal drain in place; no effluent in > 24 hours; management per NSGY team   o Continue multi-modal analgesia  CV:    Diagnosis: HTN  o Plan: PRN labetalol and hydralazine  o Maintain SBP < 160  Pulm:   Diagnosis: extubated 4/16  o Plan: O2 sats: % on room air   o Incentive spirometry  o PT/OOB  GI:    Diagnosis: no acute issues  o Plan: PEG tube in place; TFs currently running at 40    o Advance TFs to goal of 70 as tolerated  :    Diagnosis: asymptomatic bacteriuria    o Plan: condom cath in place  o Continue daily monitoring of UO  F/E/N:    Plan: IVF have been discontinued   Tube feeds currently running at 40; will advance to goal of 70 as tolerated   Continue prosource; one packed daily   Continue 200 mL Q6H free water flushes  Heme/Onc:    Diagnosis: no acute issues  o Plan: continue DVT/PE chemoprophylaxis (lovenox)  o Leucocytosis resolving: white count today 10 (16)  Endo:    Diagnosis: no acute issues  o Plan: Blood glucose goal: 140-180  ID:    Diagnosis: no acute issues; leucocytosis resolving; afebrile  o Plan: monitor temperature curve/trend white counts  MSK/Skin:    Diagnosis: s/p R craniectomy  o Plan: local wound care  o PT/OT  o Padding of pressure points/pressure offloading  Disposition: Continue Critical Care   Code Status: Level 1 - Full Code  ---------------------------------------------------------------------------------------  ICU CORE MEASURES    Prophylaxis   VTE Pharmacologic Prophylaxis: Enoxaparin (Lovenox)  VTE Mechanical Prophylaxis: sequential compression device  Stress Ulcer Prophylaxis: Prophylaxis Not Indicated     ABCDE Protocol (if indicated)  Plan to perform spontaneous awakening trial today? Not applicable  Plan to perform spontaneous breathing trial today? Not applicable  Obvious barriers to extubation? Not applicable    Invasive Devices Review  Invasive Devices     Peripheral Intravenous Line            Peripheral IV 04/15/21 Distal;Right;Upper;Ventral (anterior) Arm 1 day    Peripheral IV 04/15/21 Left Hand 1 day          Drain            Gastrostomy/Enterostomy Percutaneous endoscopic gastrostomy (PEG) -- days    Closed/Suction Drain Right Head Bulb 10 Fr  1 day    External Urinary Catheter Medium less than 1 day              Can any invasive devices be discontinued today? NA  ---------------------------------------------------------------------------------------  OBJECTIVE    Vitals   Vitals:    21 0200 21 0300 21 0400 21 0447   BP: 152/65 157/68 158/65    Pulse: (!) 52 (!) 54 (!) 54    Resp:  18    Temp:    99 9 °F (37 7 °C)   TempSrc:    Oral   SpO2: 97% 100% 96%    Weight:       Height:         Temp (24hrs), Av 6 °F (37 6 °C), Min:98 8 °F (37 1 °C), Max:100 5 °F (38 1 °C)  Current: Temperature: 99 9 °F (37 7 °C)    Respiratory:  SpO2: SpO2: 96 %  Nasal Cannula O2 Flow Rate (L/min): 4 L/min    Invasive/non-invasive ventilation settings   Respiratory    Lab Data (Last 4 hours)    None         O2/Vent Data (Last 4 hours)    None                Physical Exam  Vitals signs reviewed  Constitutional:       General: He is not in acute distress  Appearance: He is ill-appearing  He is not diaphoretic  HENT:      Head: Normocephalic  Comments: Dressing over scalp encompassing R craniectomy site; minimally blood stained  R subgaleal drain in situ; no effluent  Right Ear: External ear normal       Left Ear: External ear normal    Eyes:      Pupils: Pupils are equal, round, and reactive to light  Comments: R jackie-orbital edema preventing spontaneous eye opening on the ipsilateral side  Cardiovascular:      Rate and Rhythm: Regular rhythm  Bradycardia present  Pulses: Normal pulses  Pulmonary:      Effort: Pulmonary effort is normal  No respiratory distress  Abdominal:      Palpations: Abdomen is soft  Tenderness: There is no abdominal tenderness  Comments: PEG tube in situ; non-erythematous surrounding skin,   Genitourinary:     Comments: condom catheter in situ  Skin:     General: Skin is warm  Capillary Refill: Capillary refill takes less than 2 seconds  Neurological:      Mental Status: He is lethargic and confused  GCS: GCS eye subscore is 4  GCS verbal subscore is 4  GCS motor subscore is 6  Motor: Weakness and atrophy present           Laboratory and Diagnostics:  Results from last 7 days   Lab Units 04/17/21  0453 04/16/21  0543 04/15/21  1712 04/15/21  1448 04/15/21  1320   WBC Thousand/uL 10 76* 16 12* 14 14*  --  13 87*   HEMOGLOBIN g/dL 13 4 13 2 14 2  --  15 4   I STAT HEMOGLOBIN g/dl  --   --   --  12 9  --    HEMATOCRIT % 39 1 38 4 41 5  --  45 5   HEMATOCRIT, ISTAT %  --   --   --  38  --    PLATELETS Thousands/uL 184 205 215  --  248   NEUTROS PCT %  --  82* 90*  --  81*   MONOS PCT %  --  9 5  --  9     Results from last 7 days   Lab Units 04/17/21  0453 04/16/21  0543 04/15/21  1712 04/15/21  1448 04/15/21  1320   SODIUM mmol/L 139 144 141  --  140   POTASSIUM mmol/L 4 0 4 3 4 0  --  4 1   CHLORIDE mmol/L 108 112* 112*  --  110*   CO2 mmol/L 28 28 24  --  25   CO2, I-STAT mmol/L  --   --   --  27  --    ANION GAP mmol/L 3* 4 5  --  5   BUN mg/dL 12 11 11  --  12   CREATININE mg/dL 0 58* 0 64 0 60  --  0 63   CALCIUM mg/dL 8 4 8 4 8 4  --  8 9   GLUCOSE RANDOM mg/dL 129 126 143*  --  162*   ALT U/L  --   --   --   --  43   AST U/L  --   --   --   --  33   ALK PHOS U/L  --   --   --   --  164*   ALBUMIN g/dL  --   --   --   --  2 7*   TOTAL BILIRUBIN mg/dL  --   --   --   --  0 97     Results from last 7 days   Lab Units 04/17/21  0453 04/16/21  0543 04/15/21  1712   MAGNESIUM mg/dL 2 3 2 3 2 1   PHOSPHORUS mg/dL 3 1 3 5 3 0      Results from last 7 days   Lab Units 04/16/21  0543 04/15/21  1712 04/15/21  1324 04/15/21  1320   INR  1 31* 1 27*  --  1 20*   PTT seconds 34 37 33  --           Results from last 7 days   Lab Units 04/15/21  1716 04/15/21  1320   LACTIC ACID mmol/L 1 9 2 4*     ABG:  Results from last 7 days   Lab Units 04/15/21  1716   PH ART  7 389   PCO2 ART mm Hg 42 0   PO2 ART mm Hg 168 2*   HCO3 ART mmol/L 24 8   BASE EXC ART mmol/L -0 3   ABG SOURCE  Line, Arterial     VBG:  Results from last 7 days   Lab Units 04/15/21  1716   ABG SOURCE  Line, Arterial     Results from last 7 days   Lab Units 04/15/21  1320   PROCALCITONIN ng/ml 0 17       Micro  Results from last 7 days   Lab Units 04/15/21  1815 04/15/21  1320   BLOOD CULTURE   --  No Growth at 24 hrs  URINE CULTURE  40,000-49,000 cfu/ml Enterococcus faecalis*  --        Imaging:  I have personally reviewed pertinent reports  Intake and Output  I/O       04/15 0701 - 04/16 0700 04/16 0701 - 04/17 0700    I V  (mL/kg) 3564 6 (43) 970 8 (11 7)    NG/GT  30    IV Piggyback 150 100    Total Intake(mL/kg) 3714 6 (44 8) 1100 8 (13 3)    Urine (mL/kg/hr) 1025 1975 (1)    Drains 0 0    Blood 350     Total Output 1375 1975    Net +2339 6 -874 2          Unmeasured Urine Occurrence  1 x          Height and Weights   Height: 5' 9" (175 3 cm)  IBW (Ideal Body Weight): 70 7 kg  Body mass index is 26 99 kg/m²  Weight (last 2 days)     Date/Time   Weight    04/16/21 1421   82 9 (182 76)    04/16/21 0558   82 9 (182 76)                Nutrition       Diet Orders   (From admission, onward)             Start     Ordered    04/16/21 1040  Diet Enteral/Parenteral; Tube Feeding No Oral Diet; Jevity 1 2 Omar; Continuous; 70; Prosource Protein Liquid - One Packet; 200; Water; Every 6 hours  Diet effective now     Comments: Please start @ 10cc/hr and increase by 20cc/hr to goal   Question Answer Comment   Diet Type Enteral/Parenteral    Enteral/Parenteral Tube Feeding No Oral Diet    Tube Feeding Formula: Jevity 1 2 Omar    Bolus/Cyclic/Continuous Continuous    Tube Feeding Goal Rate (mL/hr): 70    Prosource Protein Liquid - No Carb Prosource Protein Liquid - One Packet    Tube Feeding flush (mL): 200    Water Flush type: Water    Water flush frequency: Every 6 hours    RD to adjust diet per protocol? Yes        04/16/21 1041              TF currently running at 40 ml/hr with a goal of 70 ml/hr   Formula: jevity 1 2      Active Medications  Scheduled Meds:  Current Facility-Administered Medications   Medication Dose Route Frequency Provider Last Rate    acetaminophen  650 mg Per PEG Tube Q6H PRN Christine Person PA-C      enoxaparin  30 mg Subcutaneous Q12H [address] Segundo Nguyen PA-C      hydrALAZINE  10 mg Intravenous Q6H PRN Francisca Raman PA-C      HYDROmorphone  0 5 mg Intravenous Q4H PRN Christine Person PA-C      Labetalol HCl  10 mg Intravenous Q4H PRN Christine Person PA-C      levETIRAcetam  500 mg Per PEG Tube Q12H [address] Segundo Nguyen PA-C      lisinopril  40 mg Per PEG Tube Daily Segundo Nguyen PA-C      ondansetron  4 mg Intravenous Q6H PRN Alicia Panchal MD      oxyCODONE  2 5 mg Per PEG Tube Q4H PRN Christine Person PA-C      Or    oxyCODONE  5 mg Per PEG Tube Q4H PRN Christine Person PA-C      senna  8 8 mg Per PEG Tube HS Lucina FLY Nguyen PA-C       Continuous Infusions:     PRN Meds:   acetaminophen, 650 mg, Q6H PRN  hydrALAZINE, 10 mg, Q6H PRN  HYDROmorphone, 0 5 mg, Q4H PRN  Labetalol HCl, 10 mg, Q4H PRN  ondansetron, 4 mg, Q6H PRN  oxyCODONE, 2 5 mg, Q4H PRN    Or  oxyCODONE, 5 mg, Q4H PRN        Allergies   No Known Allergies  ---------------------------------------------------------------------------------------  Advance Directive and Living Will:      Power of :    POLST:    ---------------------------------------------------------------------------------------  Care Time Delivered:   40 mins    Grazyna Velazquez MD      Portions of the record may have been created with voice recognition software  Occasional wrong word or "sound a like" substitutions may have occurred due to the inherent limitations of voice recognition software    Read the chart carefully and recognize, using context, where substitutions have occurred

## 2021-04-17 NOTE — PROGRESS NOTES
Progress Note - Neurosurgery   Don Powers 62 y o  male MRN: [id number]  Unit/Bed#: ICU 04 Encounter: [id number]    Assessment/Plan:    · POD #2 from Procedure(s):  · Right CRANIOTOMY FOR SUBDURAL HEMATOMA  · Neuro: GCS 10; baseline unknown; on AED  · Will need helmet  · CV: WNL  · Pulm: RA  · FEN: TF, Na WNL; I/O -1 L last 24h  · ID: no ABx, afeb; leukocytosis normalized  · Heme: stable  · GI: PEG  · Endo: stable  · : [place] cath  · VTE ppx: Enox BID; given this AM  Will d/c drain later today or tomorr  Subjective:     No events o/n    Meds:     all current active meds have been reviewed and current meds:   Current Facility-Administered Medications   Medication Dose Route Frequency    acetaminophen (TYLENOL) oral suspension 650 mg  650 mg Per PEG Tube Q6H PRN    enoxaparin (LOVENOX) subcutaneous injection 30 mg  30 mg Subcutaneous Q12H [facility]    hydrALAZINE (APRESOLINE) injection 10 mg  10 mg Intravenous Q6H PRN    HYDROmorphone (DILAUDID) injection 0 5 mg  0 5 mg Intravenous Q4H PRN    Labetalol HCl (NORMODYNE) injection 10 mg  10 mg Intravenous Q4H PRN    levETIRAcetam (KEPPRA) oral solution 500 mg  500 mg Per PEG Tube Q12H [facility]    lisinopril (ZESTRIL) tablet 40 mg  40 mg Per PEG Tube Daily    ondansetron (ZOFRAN) injection 4 mg  4 mg Intravenous Q6H PRN    oxyCODONE (ROXICODONE) oral solution 2 5 mg  2 5 mg Per PEG Tube Q4H PRN    Or    oxyCODONE (ROXICODONE) oral solution 5 mg  5 mg Per PEG Tube Q4H PRN    senna oral syrup 8 8 mg  8 8 mg Per PEG Tube HS       Objective:     Vitals: Blood pressure 147/58, pulse 56, temperature 99 2 °F (37 3 °C), temperature source Oral, resp  rate (!) 37, height 5' 9" (1 753 m), weight 82 9 kg (182 lb 12 2 oz), SpO2 96 %  ,Body mass index is 26 99 kg/m²  Drain with no output last 24h    EO to voice  L>R 2/2 cranial incision (v  Mild periorb edema)  +/- FC    Somnolent  Essentially non-verbal - simple sounds only  MCKEON - localizes BUE and moves BLE spontaneously/to stim  GCS [id number]    Incision CDI, flap flat  Data:    Labs:    Results from last 7 days   Lab Units 04/17/21 0453 04/16/21  0543 04/15/21  1712  04/15/21  1320   WBC Thousand/uL 10 76* 16 12* 14 14*  --  13 87*   HEMOGLOBIN g/dL 13 4 13 2 14 2  --  15 4   I STAT HEMOGLOBIN   --   --   --    < >  --    HEMATOCRIT % 39 1 38 4 41 5  --  45 5   HEMATOCRIT, ISTAT   --   --   --    < >  --    PLATELETS Thousands/uL 184 205 215  --  248   NEUTROS PCT %  --  82* 90*  --  81*   MONOS PCT %  --  9 5  --  9    < > = values in this interval not displayed  Results from last 7 days   Lab Units 04/17/21  0453 04/16/21  0543 04/15/21  1712  04/15/21  1320   SODIUM mmol/L 139 144 141  --  140   POTASSIUM mmol/L 4 0 4 3 4 0  --  4 1   CHLORIDE mmol/L 108 112* 112*  --  110*   CO2 mmol/L 28 28 24  --  25   CO2, I-STAT   --   --   --    < >  --    BUN mg/dL 12 11 11  --  12   CREATININE mg/dL 0 58* 0 64 0 60  --  0 63   CALCIUM mg/dL 8 4 8 4 8 4  --  8 9   ALK PHOS U/L  --   --   --   --  164*   ALT U/L  --   --   --   --  43   AST U/L  --   --   --   --  33   GLUCOSE RANDOM mg/dL 129 126 143*  --  162*    < > = values in this interval not displayed       Results from last 7 days   Lab Units 04/16/21  0543 04/15/21  1712 04/15/21  1324 04/15/21  1320   INR  1 31* 1 27*  --  1 20*   PTT seconds 34 37 33  --

## 2021-04-18 PROBLEM — F10.10 ALCOHOL ABUSE: Status: ACTIVE | Noted: 2021-04-18

## 2021-04-18 PROBLEM — J96.00 ACUTE RESPIRATORY FAILURE (HCC): Status: RESOLVED | Noted: 2021-04-15 | Resolved: 2021-04-18

## 2021-04-18 LAB
ANION GAP SERPL CALCULATED.3IONS-SCNC: 5 MMOL/L (ref 4–13)
BASOPHILS # BLD AUTO: 0.01 THOUSANDS/ΜL (ref 0–0.1)
BASOPHILS NFR BLD AUTO: 0 % (ref 0–1)
BUN SERPL-MCNC: 10 MG/DL (ref 5–25)
CA-I BLD-SCNC: 1.14 MMOL/L (ref 1.12–1.32)
CALCIUM SERPL-MCNC: 8.7 MG/DL (ref 8.3–10.1)
CHLORIDE SERPL-SCNC: 108 MMOL/L (ref 100–108)
CO2 SERPL-SCNC: 25 MMOL/L (ref 21–32)
CREAT SERPL-MCNC: 0.49 MG/DL (ref 0.6–1.3)
EOSINOPHIL # BLD AUTO: 0.04 THOUSAND/ΜL (ref 0–0.61)
EOSINOPHIL NFR BLD AUTO: 1 % (ref 0–6)
ERYTHROCYTE [DISTWIDTH] IN BLOOD BY AUTOMATED COUNT: 11.9 % (ref 11.6–15.1)
GFR SERPL CREATININE-BSD FRML MDRD: 120 ML/MIN/1.73SQ M
GLUCOSE SERPL-MCNC: 151 MG/DL (ref 65–140)
GLUCOSE SERPL-MCNC: 152 MG/DL (ref 65–140)
GLUCOSE SERPL-MCNC: 177 MG/DL (ref 65–140)
HCT VFR BLD AUTO: 39.1 % (ref 36.5–49.3)
HGB BLD-MCNC: 13.7 G/DL (ref 12–17)
IMM GRANULOCYTES # BLD AUTO: 0.07 THOUSAND/UL (ref 0–0.2)
IMM GRANULOCYTES NFR BLD AUTO: 1 % (ref 0–2)
LYMPHOCYTES # BLD AUTO: 1.53 THOUSANDS/ΜL (ref 0.6–4.47)
LYMPHOCYTES NFR BLD AUTO: 18 % (ref 14–44)
MAGNESIUM SERPL-MCNC: 2.4 MG/DL (ref 1.6–2.6)
MCH RBC QN AUTO: 33.3 PG (ref 26.8–34.3)
MCHC RBC AUTO-ENTMCNC: 35 G/DL (ref 31.4–37.4)
MCV RBC AUTO: 95 FL (ref 82–98)
MONOCYTES # BLD AUTO: 0.86 THOUSAND/ΜL (ref 0.17–1.22)
MONOCYTES NFR BLD AUTO: 10 % (ref 4–12)
NEUTROPHILS # BLD AUTO: 6.21 THOUSANDS/ΜL (ref 1.85–7.62)
NEUTS SEG NFR BLD AUTO: 70 % (ref 43–75)
NRBC BLD AUTO-RTO: 0 /100 WBCS
PHOSPHATE SERPL-MCNC: 2.9 MG/DL (ref 2.7–4.5)
PLATELET # BLD AUTO: 214 THOUSANDS/UL (ref 149–390)
PMV BLD AUTO: 12.2 FL (ref 8.9–12.7)
POTASSIUM SERPL-SCNC: 4.1 MMOL/L (ref 3.5–5.3)
RBC # BLD AUTO: 4.12 MILLION/UL (ref 3.88–5.62)
SODIUM SERPL-SCNC: 138 MMOL/L (ref 136–145)
WBC # BLD AUTO: 8.72 THOUSAND/UL (ref 4.31–10.16)

## 2021-04-18 PROCEDURE — 80048 BASIC METABOLIC PNL TOTAL CA: CPT | Performed by: PHYSICIAN ASSISTANT

## 2021-04-18 PROCEDURE — NC001 PR NO CHARGE: Performed by: SURGERY

## 2021-04-18 PROCEDURE — 82948 REAGENT STRIP/BLOOD GLUCOSE: CPT

## 2021-04-18 PROCEDURE — 84100 ASSAY OF PHOSPHORUS: CPT | Performed by: PHYSICIAN ASSISTANT

## 2021-04-18 PROCEDURE — 82330 ASSAY OF CALCIUM: CPT | Performed by: PHYSICIAN ASSISTANT

## 2021-04-18 PROCEDURE — 83735 ASSAY OF MAGNESIUM: CPT | Performed by: PHYSICIAN ASSISTANT

## 2021-04-18 PROCEDURE — 99024 POSTOP FOLLOW-UP VISIT: CPT | Performed by: NEUROLOGICAL SURGERY

## 2021-04-18 PROCEDURE — 85025 COMPLETE CBC W/AUTO DIFF WBC: CPT | Performed by: PHYSICIAN ASSISTANT

## 2021-04-18 RX ADMIN — INSULIN LISPRO 1 UNITS: 100 INJECTION, SOLUTION INTRAVENOUS; SUBCUTANEOUS at 13:28

## 2021-04-18 RX ADMIN — LEVETIRACETAM 500 MG: 100 SOLUTION ORAL at 23:07

## 2021-04-18 RX ADMIN — LEVETIRACETAM 500 MG: 100 SOLUTION ORAL at 08:58

## 2021-04-18 RX ADMIN — LISINOPRIL 40 MG: 20 TABLET ORAL at 08:57

## 2021-04-18 RX ADMIN — ENOXAPARIN SODIUM 30 MG: 30 INJECTION SUBCUTANEOUS at 23:07

## 2021-04-18 RX ADMIN — INSULIN LISPRO 1 UNITS: 100 INJECTION, SOLUTION INTRAVENOUS; SUBCUTANEOUS at 18:19

## 2021-04-18 RX ADMIN — OXYCODONE HYDROCHLORIDE 5 MG: 5 SOLUTION ORAL at 08:58

## 2021-04-18 RX ADMIN — HYDRALAZINE HYDROCHLORIDE 10 MG: 20 INJECTION, SOLUTION INTRAMUSCULAR; INTRAVENOUS at 00:44

## 2021-04-18 RX ADMIN — OXYCODONE HYDROCHLORIDE 5 MG: 5 SOLUTION ORAL at 00:44

## 2021-04-18 NOTE — ASSESSMENT & PLAN NOTE
Nursing Transfer Note    Receiving Transfer Note    2018 3215  Received in transfer from PICU to [facility] in arms of mom w/ myself and picu nurse( Cookie) at side- portable o2 in place at 0.5 lpm by nasal cannual.   Report received as documented in PER Handoff on Doc Flowsheet.  See Doc Flowsheet for VS's and complete assessment.  Continuous EKG monitoring in place  Yes  Chart received with patient: Yes  What Caregiver / Guardian was Notified of Arrival: Mother at side.  Patient and / or caregiver / guardian oriented to room and nurse call system.  MARY Arriaga  2018 1308.       · Patient with about 6 pack per day drinking history  · Has had W/D seizures in past  · Had extended course of libirium at last hospital stay  · Hold now for lethargy  · Follow for S/S of withdrawal

## 2021-04-18 NOTE — PROGRESS NOTES
[facility]  Transfer Note - Juan José Motley 1960, 62 y o  male MRN: [id number]  Unit/Bed#: ICU 04 Encounter: [id number]  Primary Care Provider: ANIA Smallwood   Date and time admitted to hospital: 4/15/2021 12:50 PM    Acute encephalopathy  Assessment & Plan  · Multifactorial, acute on chronic subdural, toxic/metabolic, delirium  · Patient arrived GCS 6, after crani, has been GCS 10-14, initially non-verbal but now starting to say yes/no at times and answer simple questions  · Lingering encephalopathy may be 2/2 long acting benzo use at OSH with initial trauma vs delirium from days of poor sleep  · Delirium precautions  · Holding benzos, monitor closely for S/S of withdrawal    * SDH (subdural hematoma) ([facility])  Assessment & Plan  · S/p right crani, subgaleal drain  · Drain removed today  · Lovenox restart this evening  · Will need helmet  · Keppra for 7 days  · Neurosurgery following  · No plan for MMA embo    Dysphagia  Assessment & Plan  · S/p PEG placement in outside hospital  · Continue TF, Jevity 1 2 at 70 cc/hr    Hypertension  Assessment & Plan  · Continue outpatient lisinopril    Alcohol abuse  Assessment & Plan  · Patient with about 6 pack per day drinking history  · Has had W/D seizures in past  · Had extended course of libirium at last hospital stay  · Hold now for lethargy  · Follow for S/S of withdrawal        Code Status: Level 1 - Full Code  POA:    POLST:      Reason for ICU admission:   Acute on Chronic SDH    Active problems:   Principal Problem:    SDH (subdural hematoma) ([facility] [address] )  Active Problems:    Acute encephalopathy    Hypertension    Dysphagia    Alcohol abuse  Resolved Problems:    Hyperchloremia    Acute respiratory failure [facility])      Consultants:   Neurosurgery    History of Present Illness:   Per H&P by Latesha Leslie PA-C DOS 4/15:  "Juan José Motley is a 62 y o  male who presents with worsening mental status following a previous fall and SDH  Patient fell on 4/04 in [facility] and was found to have a SDH/SAH  He was treated and discharged to [facility] for rehabilitation yesterday  Patient reported to be more lethargic and was sent to the ER for evaluation  Repeat CTH showed acute on chronic subdural hemorrhage with surrounding edema and midline shift  Patient protecting his airway in the ED but with GCS of 10  Neurosurgery planning to take patient to the OR "    Summary of clinical course:   Patient went for crani on 4/15 and a subgaleal drain was placed  He had minimal improvement in his mental status initially  Over the course of the next 3 days, mental status is slowly improving  He now at times speaks name and "yes/no" questions  He will consistently awaken to voice and follow commands  Etiology of this persistent encephalopathy is unclear but may be an iatrogenic component due to librium use in first hospital stay as well as delirium component given poor sleep/wake hygiene as reported by patient's spouse  He is now off of benzos, but vigilance is needed in assessing for signs of W/D given the patient's seizure history with EtOH withdrawal  He had subgaleal drain out today and has lovenox ordered to start tonight       Recent or scheduled procedures:   4/15: Right craniectomy  4/16: extubated    Outstanding/pending diagnostics:   None    Cultures:   N/A       Mobilization Plan:   OOB with assist    Nutrition Plan:   Clarke CHEATHAM 1 2 at 70 cc/hr through existing G tube    Invasive Devices Review  Invasive Devices     Peripheral Intravenous Line            Peripheral IV 04/15/21 Distal;Right;Upper;Ventral (anterior) Arm 2 days    Peripheral IV 04/15/21 Left Hand 2 days          Drain            Gastrostomy/Enterostomy Percutaneous endoscopic gastrostomy (PEG) -- days    External Urinary Catheter Medium 2 days                Rationale for remaining devices: N/A    VTE Pharmacologic Prophylaxis: Enoxaparin (Lovenox)  VTE Mechanical Prophylaxis: sequential compression device    Discharge Plan:   Patient should be ready for discharge to [facility] in next 48-72 hours    Initial Physical Therapy Recommendations: Post Acute  Initial Occupational Therapy Recommendations: Post Acute  Initial /Plan: Following    Home medications that are not reordered and reason why:   N/A    Spoke with Dima Rivero PA-C  regarding transfer  Please contact critical care via [employer] with any questions or concerns  Portions of the record may have been created with voice recognition software  Occasional wrong word or "sound a like" substitutions may have occurred due to the inherent limitations of voice recognition software  Read the chart carefully and recognize, using context, where substitutions have occurred

## 2021-04-18 NOTE — ASSESSMENT & PLAN NOTE
· Multifactorial, acute on chronic subdural, toxic/metabolic, delirium  · Patient arrived GCS 6, after crani, has been GCS 10-14, initially non-verbal but now starting to say yes/no at times and answer simple questions  · Lingering encephalopathy may be 2/2 long acting benzo use at OSH with initial trauma vs delirium from days of poor sleep  · Delirium precautions  · Holding benzos, monitor closely for S/S of withdrawal

## 2021-04-18 NOTE — PROGRESS NOTES
Progress Note - Critical Care   David Soliz 62 y o  male MRN: [id number]  Unit/Bed#: ICU 04 Encounter: [id number]    HPI/24hr events: No significant overnight events  63-yr old male with multi-compartmental intracranial hemorrhage; now status post R craniectomy: 4/15  R cranial YANETH drain output: 5cc    Lines   R cranial YANETH drain  B/L peripheral UE IVs  PEG tube   haque catheter        Infusions  Tube feeds at 70    Assessment:   Principal Problem:    SDH (subdural hematoma) (HCC)  Active Problems:    Hypertension    Acute respiratory failure (HCC)    Acute encephalopathy    Dysphagia  Resolved Problems:    Hyperchloremia    Plan:   · Neuro:   · Diagnosis: multi-compartmental intracranial hemorrhage; s/p R craniectomy with placement of subgaleal drain  ? Plan: continue serial neuro checks  ? Target SBP < 160; PRN labetalol and hydralazine to achieve this  ? GCS: 14; E4V4M6  Could say his name  ? keppra BID (500 mg) for seizure prophylaxis  ? Post-operative head CT showed improvement in midline shift (5 mm from 1 4 cm); residual bilateral frontal SAH with significant edema  ? Subgaleal drain in place; no effluent in > 24 hours; management per NSGY team   · Continue multi-modal analgesia  · CV:   · Diagnosis: HTN  ? Plan: PRN labetalol and hydralazine  ? Maintain SBP < 160  · Lung:   · Diagnosis: extubated 4/16  ? Plan: O2 sats: % on room air  · Incentive spirometry  Respiratory    Lab Data (Last 4 hours)    None         O2/Vent Data (Last 4 hours)    None              · GI:   · Diagnosis: no acute issues  · Plan: PEG tube in place; TFs currently running at 70    · FEN:   · Fluids: no maintenance   · Electrolytes: trend and replete as needed  · K=4  · :   ? Diagnosis: asymptomatic bacteriuria   ? Plan: condom cath in place  · Cr 0 58 this AM  I/O last 24 hours: In: 0196 [NG/GT:1060;  VRFKLCXS:2768]  Out: 3225 [Urine:3225]  · Monitor I&O's, BUN/Cr  · ID:   · No acute issues · WBC 16 12-> 10 76  · Monitor WBC/temp curve  · Heme:   · Diagnosis: no acute issues  · Plan: continue DVT/PE chemoprophylaxis (lovenox)  · Hbg 13  · Endo:   · Diagnosis: no acute issues  · Plan: Blood glucose goal: 140-180  · Msk/Skin:   · Diagnosis: s/p R craniectomy  ? Plan: local wound care   ? PT/OT  · Turning/repositioning  · Wound care       ______________________________________________________________________    ROS   cannot be completed due to pt's limited ability to speak    ______________________________________________________________________  Physical Exam:       Physical Exam  Vitals signs reviewed  Constitutional:       Comments: somnolent   HENT:      Head:      Comments: Right craniectomy with tube in place     Right Ear: External ear normal       Left Ear: External ear normal       Nose: Nose normal       Mouth/Throat:      Mouth: Mucous membranes are moist    Eyes:      Extraocular Movements: Extraocular movements intact  Conjunctiva/sclera: Conjunctivae normal       Pupils: Pupils are equal, round, and reactive to light  Neck:      Musculoskeletal: Normal range of motion  Cardiovascular:      Rate and Rhythm: Normal rate and regular rhythm  Pulses: Normal pulses  Heart sounds: Normal heart sounds  Pulmonary:      Effort: Pulmonary effort is normal       Breath sounds: Normal breath sounds  Abdominal:      Palpations: Abdomen is soft  Tenderness: There is no abdominal tenderness  Musculoskeletal:      Comments: Moves all extremities to commands, left sided weakness   Skin:     General: Skin is warm  Capillary Refill: Capillary refill takes less than 2 seconds     Neurological:      Comments: GCS 14        ______________________________________________________________________  Temperature:   Temp (24hrs), Av 1 °F (37 3 °C), Min:98 2 °F (36 8 °C), Max:100 1 °F (37 8 °C)    Current Temperature: 99 °F (37 2 °C)    Vitals:    21 2355 21 0100 04/18/21 0545 04/18/21 0554   BP:  157/68     BP Location:       Pulse: 64 58     Resp: 16 19     Temp: 98 2 °F (36 8 °C)  99 °F (37 2 °C)    TempSrc: Oral  Oral    SpO2: 99% 98%     Weight:    79 1 kg (174 lb 6 1 oz)   Height:         Arterial Line BP: 100/92       Weights:   IBW (Ideal Body Weight): 70 7 kg    Body mass index is 25 75 kg/m²  Weight (last 2 days)     Date/Time   Weight    04/18/21 0554   79 1 (174 38)    04/16/21 1421   82 9 (182 76)    04/16/21 0558   82 9 (182 76)            Height: 5' 9" (175 3 cm)  Hemodynamic Monitoring:  PAP:         Ventilator Settings:   Vent Mode: CPAP/PS Spont                      No results found for: PHART, BMG7GAR, PO2ART, DKO5AYD, E9LHBMRB, BEART, SOURCE    Intake and Outputs:  I/O       04/16 0701 - 04/17 0700 04/17 0701 - 04/18 0700    I V  (mL/kg) 970 8 (11 7)     NG/GT 30 560    IV Piggyback 100     Feedings 360 1114    Total Intake(mL/kg) 1460 8 (17 6) 1674 (20 2)    Urine (mL/kg/hr) 2375 (1 2) 1525 (0 8)    Drains 0     Total Output 2375 1525    Net -914 2 +149          Unmeasured Urine Occurrence 1 x           Nutrition:        Diet Orders   (From admission, onward)             Start     Ordered    04/16/21 1040  Diet Enteral/Parenteral; Tube Feeding No Oral Diet; Jevity 1 2 Omar; Continuous; 70; Prosource Protein Liquid - One Packet; 200; Water; Every 6 hours  Diet effective now     Comments: Please start @ 10cc/hr and increase by 20cc/hr to goal   Question Answer Comment   Diet Type Enteral/Parenteral    Enteral/Parenteral Tube Feeding No Oral Diet    Tube Feeding Formula: Jevity 1 2 Omar    Bolus/Cyclic/Continuous Continuous    Tube Feeding Goal Rate (mL/hr): 70    Prosource Protein Liquid - No Carb Prosource Protein Liquid - One Packet    Tube Feeding flush (mL): 200    Water Flush type: Water    Water flush frequency: Every 6 hours    RD to adjust diet per protocol?  Yes        04/16/21 1041                Labs:   Results from last 7 days   Lab Units 04/17/21 0453 04/16/21  0543 04/15/21  1712  04/15/21  1320   WBC Thousand/uL 10 76* 16 12* 14 14*  --  13 87*   HEMOGLOBIN g/dL 13 4 13 2 14 2  --  15 4   I STAT HEMOGLOBIN   --   --   --    < >  --    HEMATOCRIT % 39 1 38 4 41 5  --  45 5   HEMATOCRIT, ISTAT   --   --   --    < >  --    PLATELETS Thousands/uL 184 205 215  --  248   NEUTROS PCT %  --  82* 90*  --  81*   MONOS PCT %  --  9 5  --  9    < > = values in this interval not displayed  Results from last 7 days   Lab Units 04/17/21 0453 04/16/21  0543 04/15/21  1712 04/15/21  1448 04/15/21  1320   POTASSIUM mmol/L 4 0 4 3 4 0  --  4 1   CHLORIDE mmol/L 108 112* 112*  --  110*   CO2 mmol/L 28 28 24  --  25   CO2, I-STAT mmol/L  --   --   --  27  --    BUN mg/dL 12 11 11  --  12   CREATININE mg/dL 0 58* 0 64 0 60  --  0 63   CALCIUM mg/dL 8 4 8 4 8 4  --  8 9   ALK PHOS U/L  --   --   --   --  164*   ALT U/L  --   --   --   --  43   AST U/L  --   --   --   --  33   GLUCOSE, ISTAT mg/dl  --   --   --  146*  --      Results from last 7 days   Lab Units 04/17/21 0453 04/16/21  0543 04/15/21  1712   MAGNESIUM mg/dL 2 3 2 3 2 1     Results from last 7 days   Lab Units 04/17/21 0453 04/16/21  0543 04/15/21  1712   PHOSPHORUS mg/dL 3 1 3 5 3 0      Results from last 7 days   Lab Units 04/16/21  0543 04/15/21  1712 04/15/21  1324 04/15/21  1320   INR  1 31* 1 27*  --  1 20*   PTT seconds 34 37 33  --      Results from last 7 days   Lab Units 04/15/21  1716   LACTIC ACID mmol/L 1 9     No results found for: TROPONINI  Imaging:  I have personally reviewed pertinent reports      EKG: see tele  Micro:  Blood Culture:   Lab Results   Component Value Date    BLOODCX No Growth at 48 hrs  04/15/2021     Urine Culture:   Lab Results   Component Value Date    URINECX 40,000-49,000 cfu/ml Enterococcus faecalis (A) 04/15/2021     Sputum Culture: No components found for: SPUTUMCX  Wound Culure: No results found for: [facility]     Lab Results   Component Value Date BLOODCX No Growth at 48 hrs  04/15/2021    URINECX 40,000-49,000 cfu/ml Enterococcus faecalis (A) 04/15/2021     Allergies: No Known Allergies  Medications:   Scheduled Meds:  Current Facility-Administered Medications   Medication Dose Route Frequency Provider Last Rate    acetaminophen  650 mg Per PEG Tube Q6H PRN Anice Pall, PA-C      hydrALAZINE  10 mg Intravenous Q6H PRN Earlis Shark, PA-C      HYDROmorphone  0 5 mg Intravenous Q4H PRN Anice Pall, PA-C      Labetalol HCl  10 mg Intravenous Q4H PRN Anice Pall, PA-C      levETIRAcetam  500 mg Per PEG Tube Q12H [address] Sanjay Nguyen PA-C      lisinopril  40 mg Per PEG Tube Daily Sanjay Nguyen PA-NICKOLAS      ondansetron  4 mg Intravenous Q6H PRN Alicia Chowdary MD      oxyCODONE  2 5 mg Per PEG Tube Q4H PRN Anice Pall, PA-C      Or    oxyCODONE  5 mg Per PEG Tube Q4H PRN Anice Pall, PA-C      senna  8 8 mg Per PEG Tube HS Lucina R Patrick PA-C       Continuous Infusions:   PRN Meds:  acetaminophen, 650 mg, Q6H PRN  hydrALAZINE, 10 mg, Q6H PRN  HYDROmorphone, 0 5 mg, Q4H PRN  Labetalol HCl, 10 mg, Q4H PRN  ondansetron, 4 mg, Q6H PRN  oxyCODONE, 2 5 mg, Q4H PRN    Or  oxyCODONE, 5 mg, Q4H PRN      VTE Pharmacologic Prophylaxis: Sequential compression device (Venodyne)  and Fondaparinux (Arixtra)  VTE Mechanical Prophylaxis: sequential compression device  Invasive lines and devices: Invasive Devices     Peripheral Intravenous Line            Peripheral IV 04/15/21 Distal;Right;Upper;Ventral (anterior) Arm 2 days    Peripheral IV 04/15/21 Left Hand 2 days          Drain            Gastrostomy/Enterostomy Percutaneous endoscopic gastrostomy (PEG) -- days    Closed/Suction Drain Right Head Bulb 10 Fr  2 days    External Urinary Catheter Medium 2 days                   Code Status: Level 1 - Full Code    Portions of the record may have been created with voice recognition software    Occasional wrong word or "sound a like" substitutions may have occurred due to the inherent limitations of voice recognition software  Read the chart carefully and recognize, using context, where substitutions have occurred      Janina Barron, DO

## 2021-04-18 NOTE — ASSESSMENT & PLAN NOTE
· S/p right crani, subgaleal drain  · Drain removed today  · Lovenox restart this evening  · Will need helmet  · Keppra for 7 days  · Neurosurgery following  · No plan for MMA embo

## 2021-04-18 NOTE — PROGRESS NOTES
Progress Note - Neurosurgery   Sonia Hughes 62 y o  male MRN: [id number]  Unit/Bed#: ICU 04 Encounter: [id number]    Assessment/Plan:    · POD #3 from Procedure(s):  · Right CRANIOTOMY FOR SUBDURAL HEMATOMA  · Neuro: GCS 11-12, better slightly than yesterday; baseline unknown; on AED  · SCC believe some of his MS change may be related to medications - they are titrating  · Will need helmet  · CV: WNL  · Pulm: RA  · FEN: TF, Na WNL; I/O +668 L last 24h  · ID: no ABx, afeb; leukocytosis resolved  · Heme: stable  · GI: PEG  · Endo: stable  · : [place] cath  · VTE ppx: Enox can restart in PM, now that YANETH D/C'd  Subjective:     No events o/n    Meds:     all current active meds have been reviewed and current meds:   Current Facility-Administered Medications   Medication Dose Route Frequency    acetaminophen (TYLENOL) oral suspension 650 mg  650 mg Per PEG Tube Q6H PRN    hydrALAZINE (APRESOLINE) injection 10 mg  10 mg Intravenous Q6H PRN    HYDROmorphone (DILAUDID) injection 0 5 mg  0 5 mg Intravenous Q4H PRN    Labetalol HCl (NORMODYNE) injection 10 mg  10 mg Intravenous Q4H PRN    levETIRAcetam (KEPPRA) oral solution 500 mg  500 mg Per PEG Tube Q12H [address]    lisinopril (ZESTRIL) tablet 40 mg  40 mg Per PEG Tube Daily    ondansetron (ZOFRAN) injection 4 mg  4 mg Intravenous Q6H PRN    oxyCODONE (ROXICODONE) oral solution 2 5 mg  2 5 mg Per PEG Tube Q4H PRN    Or    oxyCODONE (ROXICODONE) oral solution 5 mg  5 mg Per PEG Tube Q4H PRN    senna oral syrup 8 8 mg  8 8 mg Per PEG Tube HS       Objective:     Vitals: Blood pressure 136/56, pulse 68, temperature 99 9 °F (37 7 °C), temperature source Oral, resp  rate (!) 29, height 5' 9" (1 753 m), weight 79 1 kg (174 lb 6 1 oz), SpO2 97 %  ,Body mass index is 25 75 kg/m²  Drain with no output last 24h - D/C'd     EO to voice  +/- FC    Somnolent  Said name and 'ow that hurts' to noxious  MCKEON - localizes BUE and moves BLE spontaneously/to stim  GCS [id number]    Incision CDI, flap flat  Data:    Labs:    Results from last 7 days   Lab Units 04/18/21  0553 04/17/21  0453 04/16/21  0543 04/15/21  1712   WBC Thousand/uL 8 72 10 76* 16 12* 14 14*   HEMOGLOBIN g/dL 13 7 13 4 13 2 14 2   HEMATOCRIT % 39 1 39 1 38 4 41 5   PLATELETS Thousands/uL 214 184 205 215   NEUTROS PCT % 70  --  82* 90*   MONOS PCT % 10  --  9 5     Results from last 7 days   Lab Units 04/18/21  0553 04/17/21  0453 04/16/21  0543  04/15/21  1320   SODIUM mmol/L 138 139 144   < > 140   POTASSIUM mmol/L 4 1 4 0 4 3   < > 4 1   CHLORIDE mmol/L 108 108 112*   < > 110*   CO2 mmol/L 25 28 28   < > 25   CO2, I-STAT   --   --   --    < >  --    BUN mg/dL 10 12 11   < > 12   CREATININE mg/dL 0 49* 0 58* 0 64   < > 0 63   CALCIUM mg/dL 8 7 8 4 8 4   < > 8 9   ALK PHOS U/L  --   --   --   --  164*   ALT U/L  --   --   --   --  43   AST U/L  --   --   --   --  33   GLUCOSE RANDOM mg/dL 151* 129 126   < > 162*    < > = values in this interval not displayed       Results from last 7 days   Lab Units 04/16/21  0543 04/15/21  1712 04/15/21  1324 04/15/21  1320   INR  1 31* 1 27*  --  1 20*   PTT seconds 34 37 33  --

## 2021-04-19 PROBLEM — Z74.09 IMPAIRED MOBILITY AND ACTIVITIES OF DAILY LIVING: Status: ACTIVE | Noted: 2021-04-19

## 2021-04-19 PROBLEM — Z78.9 IMPAIRED MOBILITY AND ACTIVITIES OF DAILY LIVING: Status: ACTIVE | Noted: 2021-04-19

## 2021-04-19 LAB
ABO GROUP BLD BPU: NORMAL
ABO GROUP BLD BPU: NORMAL
BPU ID: NORMAL
BPU ID: NORMAL
CROSSMATCH: NORMAL
CROSSMATCH: NORMAL
GLUCOSE SERPL-MCNC: 136 MG/DL (ref 65–140)
GLUCOSE SERPL-MCNC: 145 MG/DL (ref 65–140)
GLUCOSE SERPL-MCNC: 149 MG/DL (ref 65–140)
GLUCOSE SERPL-MCNC: 153 MG/DL (ref 65–140)
UNIT DISPENSE STATUS: NORMAL
UNIT DISPENSE STATUS: NORMAL
UNIT PRODUCT CODE: NORMAL
UNIT PRODUCT CODE: NORMAL
UNIT RH: NORMAL
UNIT RH: NORMAL

## 2021-04-19 PROCEDURE — 99024 POSTOP FOLLOW-UP VISIT: CPT | Performed by: PHYSICIAN ASSISTANT

## 2021-04-19 PROCEDURE — 97535 SELF CARE MNGMENT TRAINING: CPT

## 2021-04-19 PROCEDURE — 99255 IP/OBS CONSLTJ NEW/EST HI 80: CPT | Performed by: PHYSICAL MEDICINE & REHABILITATION

## 2021-04-19 PROCEDURE — 82948 REAGENT STRIP/BLOOD GLUCOSE: CPT

## 2021-04-19 PROCEDURE — 99232 SBSQ HOSP IP/OBS MODERATE 35: CPT | Performed by: SURGERY

## 2021-04-19 RX ADMIN — ENOXAPARIN SODIUM 30 MG: 30 INJECTION SUBCUTANEOUS at 22:22

## 2021-04-19 RX ADMIN — ENOXAPARIN SODIUM 30 MG: 30 INJECTION SUBCUTANEOUS at 08:48

## 2021-04-19 RX ADMIN — LISINOPRIL 40 MG: 20 TABLET ORAL at 08:48

## 2021-04-19 RX ADMIN — ACETAMINOPHEN 650 MG: 650 SUSPENSION ORAL at 14:36

## 2021-04-19 RX ADMIN — LEVETIRACETAM 500 MG: 100 SOLUTION ORAL at 12:13

## 2021-04-19 RX ADMIN — INSULIN LISPRO 1 UNITS: 100 INJECTION, SOLUTION INTRAVENOUS; SUBCUTANEOUS at 05:23

## 2021-04-19 RX ADMIN — LEVETIRACETAM 500 MG: 100 SOLUTION ORAL at 22:23

## 2021-04-19 NOTE — ASSESSMENT & PLAN NOTE
POD#4 Right CRANIOTOMY FOR SUBDURAL HEMATOMA  · Transfer from TBI rehab with AMS, found to have large right sided SDH with midline shift and brain compression  · Recent hospitalization from 04/04/2021-04/14/2021 in [facility] secondary to fall on the sidewalk with head strike, noted to have right subdural hematoma, GCS 14 on discharge to TBI rehab with PEG tube    Imaging reviewed personally and with attending, results are as follows:   CT head without contrast 04/16/2021:  Status post right-sided craniectomy and evacuation of subdural hematoma  Anticipated postoperative changes of the brain parenchyma and overlying soft tissues  Improved mass effect and midline shift  Areas of hemorrhagic contusion in the frontal lobes bilaterally and right temporal lobe  Small amount of subarachnoid hemorrhage along the high parietal convexities bilaterally  Plan:   Continue to monitor neuro exam   Repeat CT head stat if GCS declines more than 2 points in 1 hour   YANETH drain was d/c'ed 4/18/21   Systolic blood pressure goal less than 160   Hold all anticoagulation/antiplatelet therapy   Continue Keppra 500 mg b i d  X1 week for seizure prophylaxis   Medical management and pain control per primary team   DVT ppx:  SCDs, Lovenox   Mobilize as tolerated with assistance, PT / OT evaluation postoperatively   No further nsx intervention anticipated at this time   Nsx will see pt PRN during the remainder of his hospitalization  Pt will have 2 week POV with repeat CTH and 6 wk POV with repeat CTH  Please call with any questions or concerns

## 2021-04-19 NOTE — UTILIZATION REVIEW
Continued Stay Review    Date: 4/18                        Current Patient Class: Inpatient Current Level of Care: Med Surg    HPI:60 y o  male initially admitted on 4/15    Assessment/Plan: 4/15 POD #3 S/p right crani, subgaleal drain  4/16 Extubated    Acute encephalopathy - Multifactorial, acute on chronic subdural, toxic/metabolic, delirium  Holding benzos, monitor closely for S/S of withdrawal  SDH - Drain removed today  Restart Lovenox this evening  Will need helmet  Keppra 7 days  No plan for MMA endo  Dysphagia - PEG, continue tube feeds  Alcohol abuse; Had extended course of libirium at last hospital stay  Hold now for lethargy  Per Neurosurgery; Neuro: GCS 11-12, better slightly than yesterday; baseline unknown; on AED  Believe some of his MS change may be related to medications - they are titrating  Somnolent       Vital Signs:   04/18/21 1200  99 1 °F (37 3 °C)  64  13  124/55  72  98 %  36  4 L/min  None (Room air)  Lying   04/18/21 1100  --  62  12  115/54  85  97 %  --  --  --  --   04/18/21 1000  --  62  13  113/50  79  96 %  --  --  --  --   04/18/21 0900  --  68  29Abnormal   136/56  87  97 %  --  --  --  --   04/18/21 0800  99 9 °F (37 7 °C)  80  27Abnormal   131/55  81  98 %  --  --  None (Room air)       Pertinent Labs/Diagnostic Results:       Results from last 7 days   Lab Units 04/18/21  0553 04/17/21  0453 04/16/21  0543 04/15/21  1712 04/15/21  1448   WBC Thousand/uL 8 72 10 76* 16 12* 14 14*  --    HEMOGLOBIN g/dL 13 7 13 4 13 2 14 2  --    I STAT HEMOGLOBIN g/dl  --   --   --   --  12 9   HEMATOCRIT % 39 1 39 1 38 4 41 5  --    HEMATOCRIT, ISTAT %  --   --   --   --  38   PLATELETS Thousands/uL 214 184 205 215  --    NEUTROS ABS Thousands/µL 6 21  --  13 30* 12 67*  --          Results from last 7 days   Lab Units 04/18/21  0553 04/17/21  0453 04/16/21  0543 04/15/21  1712 04/15/21  1448 04/15/21  1320   SODIUM mmol/L 138 139 144 141  --  140   POTASSIUM mmol/L 4 1 4 0 4 3 4 0  -- 4  1   CHLORIDE mmol/L 108 108 112* 112*  --  110*   CO2 mmol/L 25 28 28 24  --  25   CO2, I-STAT mmol/L  --   --   --   --  27  --    ANION GAP mmol/L 5 3* 4 5  --  5   BUN mg/dL 10 12 11 11  --  12   CREATININE mg/dL 0 49* 0 58* 0 64 0 60  --  0 63   EGFR ml/min/1 73sq m 120 112 107 110  --  108   CALCIUM mg/dL 8 7 8 4 8 4 8 4  --  8 9   CALCIUM, IONIZED mmol/L 1 14  --  1 13 1 18  --   --    CALCIUM, IONIZED, ISTAT mmol/L  --   --   --   --  1 17  --    MAGNESIUM mg/dL 2 4 2 3 2 3 2 1  --   --    PHOSPHORUS mg/dL 2 9 3 1 3 5 3 0  --   --      Results from last 7 days   Lab Units 04/15/21  1320   AST U/L 33   ALT U/L 43   ALK PHOS U/L 164*   TOTAL PROTEIN g/dL 8 1   ALBUMIN g/dL 2 7*   TOTAL BILIRUBIN mg/dL 0 97   BILIRUBIN DIRECT mg/dL 0 55*     Results from last 7 days   Lab Units 04/19/21  1157 04/19/21  0522 04/19/21  0038 04/18/21  1734 04/18/21  1325   POC GLUCOSE mg/dl 145* 153* 149* 177* 152*     Results from last 7 days   Lab Units 04/18/21  0553 04/17/21  0453 04/16/21  0543 04/15/21  1712 04/15/21  1320   GLUCOSE RANDOM mg/dL 151* 129 126 143* 162*       Results from last 7 days   Lab Units 04/15/21  1716   PH ART  7 389   PCO2 ART mm Hg 42 0   PO2 ART mm Hg 168 2*   HCO3 ART mmol/L 24 8   BASE EXC ART mmol/L -0 3   O2 CONTENT ART mL/dL 20 7   O2 HGB, ARTERIAL % 98 1*   ABG SOURCE  Line, Arterial         Results from last 7 days   Lab Units 04/15/21  1448   I STAT BASE EXC mmol/L 2   ISTAT PH ART  7 437   I STAT ART PCO2 mm HG 38 9   I STAT ART PO2 mm HG >400 0*   I STAT ART HCO3 mmol/L 26 3                 Results from last 7 days   Lab Units 04/16/21  0543 04/15/21  1712 04/15/21  1324 04/15/21  1320   PROTIME seconds 16 3* 15 8*  --  15 2*   INR  1 31* 1 27*  --  1 20*   PTT seconds 34 37 33  --          Results from last 7 days   Lab Units 04/15/21  1320   PROCALCITONIN ng/ml 0 17     Results from last 7 days   Lab Units 04/15/21  1716 04/15/21  1320   LACTIC ACID mmol/L 1 9 2 4*       Results from last 7 days   Lab Units 04/15/21  1815   CLARITY UA  Clear   COLOR UA  Dk Yellow   SPEC GRAV UA  >1 045*   PH UA  7 5   GLUCOSE UA mg/dl Negative   KETONES UA mg/dl Negative   BLOOD UA  Negative   PROTEIN UA mg/dl Trace*   NITRITE UA  Negative   BILIRUBIN UA  Negative   UROBILINOGEN UA E U /dl 4 0*   LEUKOCYTES UA  Small*   WBC UA /hpf 20-30*   RBC UA /hpf None Seen   BACTERIA UA /hpf None Seen   EPITHELIAL CELLS WET PREP /hpf None Seen       Results from last 7 days   Lab Units 04/15/21  1815 04/15/21  1320   BLOOD CULTURE   --  No Growth After 4 Days  URINE CULTURE  40,000-49,000 cfu/ml Enterococcus faecalis*  --      Medications:   Scheduled Medications:  enoxaparin, 30 mg, Subcutaneous, Q12H JOSEFINA  insulin lispro, 1-5 Units, Subcutaneous, Q6H JOSEFINA  levETIRAcetam, 500 mg, Per PEG Tube, Q12H [address]  lisinopril, 40 mg, Per PEG Tube, Daily  senna, 8 8 mg, Per PEG Tube, HS      Continuous IV Infusions: None     PRN Meds:  acetaminophen, 650 mg, Per PEG Tube, Q6H PRN  hydrALAZINE, 10 mg, Intravenous, Q6H PRN  HYDROmorphone, 0 5 mg, Intravenous, Q4H PRN 4/18 x1  Labetalol HCl, 10 mg, Intravenous, Q4H PRN  ondansetron, 4 mg, Intravenous, Q6H PRN  oxyCODONE, 2 5 mg, Per PEG Tube, Q4H PRN    Or  oxyCODONE, 5 mg, Per PEG Tube, Q4H PRN 4/18 x2      Discharge Plan: D    Network Utilization Review Department  ATTENTION: Please call with any questions or concerns to [phone] and carefully listen to the prompts so that you are directed to the right person  All voicemails are confidential   Rocco Morales all requests for admission clinical reviews, approved or denied determinations and any other requests to dedicated fax number below belonging to the campus where the patient is receiving treatment   List of dedicated fax numbers for the Facilities:  [address] DENIALS (Administrative/Medical Necessity) [phone]   [address] (Maternity/NICU/Pediatrics) [phone] 5000 [facility] Sera Moore [phone]   [address] [facility] [place] [facility] [address] [address] [place] [address] [address]z [address] [phone] 4191 [facility] [phone]

## 2021-04-19 NOTE — ASSESSMENT & PLAN NOTE
- initially patient had fall on 4/4 and sustained SDH/SAH and was admitted to [facility] and eventually tx to [facility] AT [facility] however patient admitted to acute care on 4/15 with lethargy and on [facility] with chronic SDH with surrounding edema and midline shift  - underwent Rt craniotomy for SDH on 4/15 by Dr Trung Mcallistre   - helmet per neurosx recommendation   - on keppra per neurosx

## 2021-04-19 NOTE — QUICK NOTE
QUICK NOTE - Deterioration Index  Don Powers 62 y o  male MRN: [id number]  [facility] Encounter: [id number]    Date Paged: 21  Time Paged: 1619  Room #: 65  Arrival Time: 1650  Deterioration index score at time of page: 58 2  %  Spoke with primary RN from primary team  Need to escalate level of care: no     PROBLEMS resulting in high DI score:   37% Chay coma scale is 10   17% Age is 62   16% Neurological exam is Lethargic   42% Systolic is 92   8% Pulse is 112   7% Cardiac rhythm is Sinus bradycardia   2% WBC count is 8 72 Thousand/uL   1% Sodium is 138 mmol/L   1% Respiratory rate is 17   <1% Pulse oximetry is 98 %   <1% Potassium is 4 1 mmol/L   <1% Temperature is 98 5 °F (36 9 °C)   <1% Hematocrit is 39 1 %          PLAN:     DI score down to 58 upon exam   Bedside RN reports no significant changes or any concerns at this time    Vitals and labs reviewed   Continue to monitor under current service    Please contact critical care via [employer] with any questions or concerns       Vitals:   Vitals:    21 0817 21 0818 21 0845 21 1549   BP: 129/80 129/80  92/61   BP Location:    Right arm   Pulse:  100  99   Resp: 18 16  17   Temp: 98 4 °F (36 9 °C) 98 4 °F (36 9 °C)  98 5 °F (36 9 °C)   TempSrc:    Oral   SpO2:  97% 99% 97%   Weight:       Height:           Respiratory:  SpO2: SpO2: 97 %, SpO2 Activity: SpO2 Activity: At Rest, SpO2 Device: O2 Device: None (Room air)  Nasal Cannula O2 Flow Rate (L/min): 4 L/min    Temperature: Temp (24hrs), Av 4 °F (36 9 °C), Min:98 4 °F (36 9 °C), Max:98 5 °F (36 9 °C)  Current: Temperature: 98 5 °F (36 9 °C)    Labs:   Results from last 7 days   Lab Units 21  0553 21  0453 21  0543 04/15/21  1712   WBC Thousand/uL 8 72 10 76* 16 12* 14 14*   HEMOGLOBIN g/dL 13 7 13 4 13 2 14 2   HEMATOCRIT % 39 1 39 1 38 4 41 5   PLATELETS Thousands/uL 214 184 205 215   NEUTROS PCT % 70  --  82* 90*   MONOS PCT % 10  --  9 5     Results from last 7 days   Lab Units 04/18/21  0553 04/17/21  0453 04/16/21  0543  04/15/21  1448 04/15/21  1320   SODIUM mmol/L 138 139 144   < >  --  140   POTASSIUM mmol/L 4 1 4 0 4 3   < >  --  4 1   CHLORIDE mmol/L 108 108 112*   < >  --  110*   CO2 mmol/L 25 28 28   < >  --  25   CO2, I-STAT mmol/L  --   --   --   --  27  --    BUN mg/dL 10 12 11   < >  --  12   CREATININE mg/dL 0 49* 0 58* 0 64   < >  --  0 63   CALCIUM mg/dL 8 7 8 4 8 4   < >  --  8 9   ALK PHOS U/L  --   --   --   --   --  164*   ALT U/L  --   --   --   --   --  43   AST U/L  --   --   --   --   --  33   GLUCOSE, ISTAT mg/dl  --   --   --   --  146*  --     < > = values in this interval not displayed       Results from last 7 days   Lab Units 04/18/21  0553 04/17/21  0453 04/16/21  0543   MAGNESIUM mg/dL 2 4 2 3 2 3     Results from last 7 days   Lab Units 04/15/21  1716 04/15/21  1320   LACTIC ACID mmol/L 1 9 2 4*         Results from last 7 days   Lab Units 04/15/21  1320   PROCALCITONIN ng/ml 0 17       Code Status: Level 1 - Full Code

## 2021-04-19 NOTE — ASSESSMENT & PLAN NOTE
· S/p R craniectomy and evacuation   · Craniectomy helmet when OOB  · keppra x7 days  · Subgaleal drain has been discontinued   · Continue Q4hr NC  · GCS 12 ([id number]), L sided hemiparesis  · PT/OT appreciated

## 2021-04-19 NOTE — DISCHARGE INSTRUCTIONS
Discharge Instructions  Evacuation of subdural hematoma    Activity:  1  Do not lift, push or pull more than 10 pounds for 2 weeks  2  Avoid bending, lifting and twisting for 2 weeks  No running  No athletic activities until cleared  3  No driving for at least 2 weeks or until cleared by Neurosurgery  4  When able to shower, continue to use clean towel and washcloth for 2 weeks post-op  5  Do not use a hair dryer, and avoid hair products such as mousse, oils, and gels  Do not brush your hair away from the incision since this will put strain on the suture line  6  Do not dye or perm hair for 6 weeks or until cleared by physician  7  Continue to change bed linens and pajamas more frequently  Wear clean clothes daily  8  May walk as tolerated  Recommend 4 short walks daily  Surgical incision care:  1  Keep dressings in place for 3 days  After 3 days, incisions may be left open to air, but should remain clean  2  Keep incisions dry for 3 days  3  May shower after 3 days using a baby shampoo including head incision  Rinse off shampoo and pat dry  4  Avoid rubbing the incision but gently massage hair  5  Do not immerse the incisions in water for 6 weeks  6  Staples/suture will be removed at your 2 week postoperative visit  7  Do not apply any creams or ointments to the incision, unless otherwise instructed by [facility]  8  Contact office if increasing redness, drainage, pain or swelling occurs around the incisions or if you develop a fever greater than 101F  9  Do not dye/perm hair or use any hair products until cleared by Neurosurgery  Postoperative medication:  1  [facility] will provide pain medication in the postoperative period  All prescriptions must come from a single practice  a  Take medications as prescribed  Call office with any questions/concerns  b  May use over the counter Tylenol  No NSAIDs (ie   Ibuprofen, Aleve, Advil, Naproxen)  2  Please contact office for questions regarding dosage and modifications  3  No antiplatelet or anticoagulation medication (ie  Coumadin, Aspirin, Plavix) until cleared by [facility], unless otherwise instructed  Please contact [facility] if you have any questions about the effects of any of your medications on blood clotting  4  Do not operate heavy machinery or vehicles while taking sedating medications  5  Use a bowel regimen while on opioids as they induce constipation  Ie  Senokot-S, Miralax, Colace, etc  Increase fiber and water intake  Follow-up for 2 week incision check/suture removal with a repeat CT head without contrast prior to visit  Follow-up for a 6 week post-operative visit  Please complete a repeat CT head without contrast to be completed prior to visit  ** Please notify the office if incision becomes red, swollen, tender, or has increased drainage, and temp>101  Return to the ER if you experience increased headache, drowsiness, weakness, nausea/vomiting, or seizures  **

## 2021-04-19 NOTE — OCCUPATIONAL THERAPY NOTE
Occupational Therapy Treatment Note:       04/19/21 1535   OT Last Visit   OT Visit Date 04/19/21   Note Type   Note Type Treatment   Restrictions/Precautions   Braces or Orthoses Craniectomy Helmet   Other Precautions Cognitive; Bed Alarm;Aspiration; Fall Risk  (npo, peg tube)   Pain Assessment   Pain Assessment Tool FLACC   Pain Rating: FLACC (Rest) - Face 0   Pain Rating: FLACC (Rest) - Legs 0   Pain Rating: FLACC (Rest) - Activity 0   Pain Rating: FLACC (Rest) - Cry 0   Pain Rating: FLACC (Rest) - Consolability 0   Score: FLACC (Rest) 0   Pain Rating: FLACC (Activity) - Face 0   Pain Rating: FLACC (Activity) - Legs 0   Pain Rating: FLACC (Activity) - Activity 0   Pain Rating: FLACC (Activity) - Cry 0   Pain Rating: FLACC (Activity) - Consolability 0   Score: FLACC (Activity) 0   ADL   Where Assessed Supine, bed   Grooming Assistance 2  Maximal Assistance   Grooming Comments pt was handed a toothbrush and was noted to take to top of head and performed combing motion   UB Dressing Assistance 2  Maximal Assistance   LB Dressing Assistance 2  Maximal Assistance   Toileting Assistance  1  Total Assistance   Bed Mobility   Rolling R 2  Maximal assistance   Rolling L 2  Maximal assistance   Supine to Sit 2  Maximal assistance   Additional items Assist x 2   Sit to Supine 2  Maximal assistance   Additional items Assist x 2   Therapeutic Exercise - ROM   UE-ROM Yes   ROM - Left Upper Extremities    L Shoulder AAROM   L Elbow AAROM   L Wrist PROM   L Hand PROM   L Weight/Reps/Sets pt with noted tone and aarom in l ue although when not asked does not use functionally  or as an assist   Cognition   Overall Cognitive Status Impaired   Arousal/Participation Lethargic  (pt with eyes closed 95 % of session, followed 80% commands)   Attention Attends with cues to redirect   Memory Unable to assess   Following Commands Follows one step commands with increased time or repetition   Vision   Vision Comments eyes closed most of session  pts wife reports pt had had eyes open for hours this am attempting to communicate with her   Activity Tolerance   Activity Tolerance Patient tolerated treatment well   Assessment   Assessment pt participated in am ot session and was seen focusing on  v/p cognitive tasks, direction following and sitting balance posture  pt with poor sitting balance , needed asst to maintain upright head and neck/ trunk vis maximal posterior support  pt with + helmet donned and doffed  in bed   despite eyes closed pt was able to follow more simple commands than previous session  pt requires heavy asst x 2 for bed mobility   pt with mnimal tonal / movement / aarom in l ue  pt with incontinence of bowel prior to session  peg tube   Plan   Treatment Interventions ADL retraining;Visual perceptual retraining;Functional transfer training;UE strengthening/ROM; Endurance training;Cognitive reorientation;Patient/family training;Equipment evaluation/education; Neuromuscular reeducation; Fine motor coordination activities; Compensatory technique education; Activityengagement   Goal Expiration Date 04/30/21   OT Treatment Day 1   OT Frequency 3-5x/wk   Recommendation   OT Discharge Recommendation Post acute rehabilitation services   OT - OK to Discharge Yes   AM-PAC Daily Activity Inpatient   Lower Body Dressing 2   Bathing 2   Toileting 1   Upper Body Dressing 2   Grooming 2   Eating 1   Daily Activity Raw Score 10   Turning Head Towards Sound 2   Follow Simple Instructions 2   Low Function Daily Activity Raw Score 14   Low Function Daily Activity Standardized Score 24 79   April A RUSLAN Mosquera

## 2021-04-19 NOTE — ASSESSMENT & PLAN NOTE
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

## 2021-04-19 NOTE — CONSULTS
PHYSICAL MEDICINE AND REHABILITATION CONSULT NOTE  Dustin Gu 62 y o  male MRN: [id number]  [facility] Encounter: [id number]      CC: SDH    History of Present Illness:   Dustin Gu is a 62 y o  male who initially had a fall on 4/4 and sustained a SDH/SAH and was admitted to OSH and eventually transferred to [facility] for rehab however was transferred to [facility] due to lethargy and CTH revealed chronic SDH with surrounding edema and midline shift therefore patient underwent Rt craniotomy on 4/15 by Dr Bernabe Linn      Location: brain   Quality: SDH  Severity: severe  Duration: initial event occurred on 4/4  Timing: acute   Context: fall  Modifying factors: craniotomy   Associating signs & symptoms: lethargy        Subjective: patient seen at bedside and appeared comfortable     Review of Systems: A 10-point review of systems was performed  Negative except as listed above      Plan:     Impaired mobility and activities of daily living  [address] [place] [address]  - lives with spouse  -  setup available  - 5-6 LIONEL  - I PTA   - 4/16 max-total ADLs  - 4/16 max x 2 bed mobility and transfers   - will continue to follow however anticipate patient will require acute inpt rehab in dedicated TBI unit    * SDH (subdural hematoma) (HCC)  Assessment & Plan  - initially patient had fall on 4/4 and sustained SDH/SAH and was admitted to OHS and eventually tx to [facility] however patient admitted to acute care on 4/15 with lethargy and on [facility] with chronic SDH with surrounding edema and midline shift  - underwent Rt craniotomy for SDH on 4/15 by Dr Bernabe Linn   - helmet per neurosx recommendation   - on keppra per neurosx      Dysphagia  Assessment & Plan  - on TF via PEG    Hypertension  Assessment & Plan  - on lisinopril per primary team      Drug regimen reviewed, all potential adverse effects identified and addressed:    Scheduled Meds:  Current Facility-Administered Medications   Medication Dose Route Frequency Provider Last Rate    acetaminophen  650 mg Per PEG Tube Q6H PRN Constantinozuhair Cortes, PA-C      enoxaparin  30 mg Subcutaneous Q12H [address] [place], [place]      hydrALAZINE  10 mg Intravenous Q6H PRN Constantinozuhair Cortes, PA-C      HYDROmorphone  0 5 mg Intravenous Q4H PRN Constantino Sophia, PA-C      insulin lispro  1-5 Units Subcutaneous Q6H [address] [place], [place]      Labetalol HCl  10 mg Intravenous Q4H PRN [facility], PA-C      levETIRAcetam  500 mg Per PEG Tube Q12H [address] [facility], PA-NICKOLAS      lisinopril  40 mg Per PEG Tube Daily Constantino Sophia, PA-NICKOLAS      ondansetron  4 mg Intravenous Q6H PRN Constantinozuhair Cortes, PA-C      oxyCODONE  2 5 mg Per PEG Tube Q4H PRN [facility], PA-[facility]      Or    oxyCODONE  5 mg Per PEG Tube Q4H PRN [facility], PA-C      senna  8 8 mg Per PEG Tube HS [place], [place]                 Physical Exam:  Vitals:    04/19/21 0818   BP: 129/80   Pulse: 100   Resp: 16   Temp: 98 4 °F (36 9 °C)   SpO2: 97%         General: NAD  HEENT:  craniotomy site C/DI   Ears: Normal external ears  Nose: Normal external nose, mucus membranes  Pulmonary: respirations unlabored   Cardiovascular: +S1/2  Abdomen: soft NT   Extremity/skin: no cyanosis or clubbing  Neurologic: 4/5 RUE/RLE, 0-1/5 LUE/LLE, limited command following    Psych: mood/affect currently stable         Laboratory:    Results from last 7 days   Lab Units 04/18/21  0553 04/17/21  0453 04/16/21  0543   HEMOGLOBIN g/dL 13 7 13 4 13 2   HEMATOCRIT % 39 1 39 1 38 4   WBC Thousand/uL 8 72 10 76* 16 12*     Results from last 7 days   Lab Units 04/18/21  0553 04/17/21  0453 04/16/21  0543  04/15/21  1448 04/15/21  1320   BUN mg/dL 10 12 11   < >  --  12   SODIUM mmol/L 138 139 144   < >  --  140   POTASSIUM mmol/L 4 1 4 0 4 3   < >  --  4 1   CHLORIDE mmol/L 108 108 112*   < >  --  110*   GLUCOSE, ISTAT mg/dl  --   --   --   --  146*  --    CREATININE mg/dL 0 49* 0 58* 0 64   < >  --  0 63   AST U/L  --   --   --   --   --  33   ALT U/L  --   --   --   --   --  43    < > = values in this interval not displayed       Results from last 7 days   Lab Units 04/16/21  0543 04/15/21  1712 04/15/21  1320   PROTIME seconds 16 3* 15 8* 15 2*   INR  1 31* 1 27* 1 20*            Past Medical History:   Past Surgical History:   Family History:   Social history:   Past Medical History:   Diagnosis Date    Anxiety     Hypertension     Past Surgical History:   Procedure Laterality Date    BRAIN HEMATOMA EVACUATION Right 4/15/2021    Procedure: Right CRANIOTOMY FOR SUBDURAL HEMATOMA;  Surgeon: Kathie Hernandez MD;  [facility];  Service: Neurosurgery    HAND SURGERY Left     ROTATOR CUFF REPAIR Left      Family History   Problem Relation Age of Onset    Heart disease Mother     Hypertension Father     Dementia Father       Social History     Socioeconomic History    Marital status: /Civil Union     Spouse name: Not on file    Number of children: Not on file    Years of education: Not on file    Highest education level: Not on file   Occupational History    Occupation: Retired   Social Needs    Financial resource strain: Not on file    Food insecurity     Worry: Not on file     Inability: Not on file   [employer] needs     Medical: Not on file     Non-medical: Not on file   Tobacco Use    Smoking status: Never Smoker    Smokeless tobacco: Never Used   Substance and Sexual Activity    Alcohol use: Yes     Frequency: 4 or more times a week    Drug use: Not Currently    Sexual activity: Not on file   Lifestyle    Physical activity     Days per week: Not on file     Minutes per session: Not on file    Stress: Not on file   Relationships    Social connections     Talks on phone: Not on file     Gets together: Not on file     Attends Sikhism service: Not on file     Active member of club or organization: Not on file     Attends meetings of clubs or organizations: Not on file     Relationship status: Not on file    Intimate partner violence     Fear of current or ex partner: Not on file     Emotionally abused: Not on file     Physically abused: Not on file     Forced sexual activity: Not on file   Other Topics Concern    Not on file   Social History Narrative    Not on file          Current Medical Diagnosis Allergies   Patient Active Problem List   Diagnosis    Hypertension    Anxiety    SDH (subdural hematoma) ([facility] [address] )    Acute encephalopathy    Dysphagia    Alcohol abuse    Impaired mobility and activities of daily living    No Known Allergies

## 2021-04-19 NOTE — ASSESSMENT & PLAN NOTE
· S/p PEG tube placement  · All meds/feeds through PEG  · Consider speech consult if mental status improves

## 2021-04-19 NOTE — PLAN OF CARE
Problem: OCCUPATIONAL THERAPY ADULT  Goal: Performs self-care activities at highest level of function for planned discharge setting  See evaluation for individualized goals  Description: Treatment Interventions: ADL retraining, Visual perceptual retraining, Functional transfer training, UE strengthening/ROM, Endurance training, Cognitive reorientation, Patient/family training, Equipment evaluation/education, Neuromuscular reeducation, Fine motor coordination activities, Compensatory technique education, Activityengagement          See flowsheet documentation for full assessment, interventions and recommendations  Outcome: Progressing  Note: Limitation: Decreased ADL status, Decreased UE ROM, Decreased UE strength, Decreased Safe judgement during ADL, Decreased cognition, Decreased endurance, Visual deficit, Decreased fine motor control, Decreased self-care trans, Decreased high-level ADLs  Prognosis: Fair  Assessment: pt participated in am ot session and was seen focusing on  v/p cognitive tasks, direction following and sitting balance posture  pt with poor sitting balance , needed asst to maintain upright head and neck/ trunk vis maximal posterior support  pt with + helmet donned and doffed  in bed   despite eyes closed pt was able to follow more simple commands than previous session  pt requires heavy asst x 2 for bed mobility   pt with mnimal tonal / movement / aarom in l ue  pt with incontinence of bowel prior to session  peg tube     OT Discharge Recommendation: Post acute rehabilitation services  OT - OK to Discharge:  Yes     April A RUSLAN Mosquera

## 2021-04-19 NOTE — CASE MANAGEMENT
Cm met with pt and his wife to discuss d/c plan  Pt is recommended for a dedicated TBI rehab  CM reviewed multiple TBI rehab locations  A post acute care recommendation was made by your care team for Acute Rehab  Discussed [facility] with caregiver  List of facilities given to caregiver via in person  caregiver aware the list is custom filtered for them by preference  and that [facility] post acute providers are designated  Pt's wife would like CM to place referral to all and then follow up

## 2021-04-19 NOTE — PLAN OF CARE
Problem: PAIN - ADULT  Goal: Verbalizes/displays adequate comfort level or baseline comfort level  Description: Interventions:  - Encourage patient to monitor pain and request assistance  - Assess pain using appropriate pain scale  - Administer analgesics based on type and severity of pain and evaluate response  - Implement non-pharmacological measures as appropriate and evaluate response  - Consider cultural and social influences on pain and pain management  - Notify physician/advanced practitioner if interventions unsuccessful or patient reports new pain  Outcome: Progressing     Problem: INFECTION - ADULT  Goal: Absence or prevention of progression during hospitalization  Description: INTERVENTIONS:  - Assess and monitor for signs and symptoms of infection  - Monitor lab/diagnostic results  - Monitor all insertion sites, i e  indwelling lines, tubes, and drains  - Monitor endotracheal if appropriate and nasal secretions for changes in amount and color  - [place] appropriate cooling/warming therapies per order  - Administer medications as ordered  - Instruct and encourage patient and family to use good hand hygiene technique  - Identify and instruct in appropriate isolation precautions for identified infection/condition  Outcome: Progressing  Goal: Absence of fever/infection during neutropenic period  Description: INTERVENTIONS:  - Monitor WBC    Outcome: Progressing     Problem: SAFETY ADULT  Goal: Patient will remain free of falls  Description: INTERVENTIONS:  - Assess patient frequently for physical needs  -  Identify cognitive and physical deficits and behaviors that affect risk of falls    -  [place] fall precautions as indicated by assessment   - Educate patient/family on patient safety including physical limitations  - Instruct patient to call for assistance with activity based on assessment  - Modify environment to reduce risk of injury  - Consider OT/PT consult to assist with strengthening/mobility  Outcome: Progressing  Goal: Maintain or return to baseline ADL function  Description: INTERVENTIONS:  -  Assess patient's ability to carry out ADLs; assess patient's baseline for ADL function and identify physical deficits which impact ability to perform ADLs (bathing, care of mouth/teeth, toileting, grooming, dressing, etc )  - Assess/evaluate cause of self-care deficits   - Assess range of motion  - Assess patient's mobility; develop plan if impaired  - Assess patient's need for assistive devices and provide as appropriate  - Encourage maximum independence but intervene and supervise when necessary  - Involve family in performance of ADLs  - Assess for home care needs following discharge   - Consider OT consult to assist with ADL evaluation and planning for discharge  - Provide patient education as appropriate  Outcome: Progressing  Goal: Maintain or return mobility status to optimal level  Description: INTERVENTIONS:  - Assess patient's baseline mobility status (ambulation, transfers, stairs, etc )    - Identify cognitive and physical deficits and behaviors that affect mobility  - Identify mobility aids required to assist with transfers and/or ambulation (gait belt, sit-to-stand, lift, walker, cane, etc )  - [place] fall precautions as indicated by assessment  - Record patient progress and toleration of activity level on Mobility SBAR; progress patient to next Phase/Stage  - Instruct patient to call for assistance with activity based on assessment  - Consider rehabilitation consult to assist with strengthening/weightbearing, etc   Outcome: Progressing     Problem: DISCHARGE PLANNING  Goal: Discharge to home or other facility with appropriate resources  Description: INTERVENTIONS:  - Identify barriers to discharge w/patient and caregiver  - Arrange for needed discharge resources and transportation as appropriate  - Identify discharge learning needs (meds, wound care, etc )  - Arrange for interpretive services to assist at discharge as needed  - Refer to Case Management Department for coordinating discharge planning if the patient needs post-hospital services based on physician/advanced practitioner order or complex needs related to functional status, cognitive ability, or social support system  Outcome: Progressing     Problem: Knowledge Deficit  Goal: Patient/family/caregiver demonstrates understanding of disease process, treatment plan, medications, and discharge instructions  Description: Complete learning assessment and assess knowledge base  Interventions:  - Provide teaching at level of understanding  - Provide teaching via preferred learning methods  Outcome: Progressing     Problem: Nutrition/Hydration-ADULT  Goal: Nutrient/Hydration intake appropriate for improving, restoring or maintaining nutritional needs  Description: Monitor and assess patient's nutrition/hydration status for malnutrition  Collaborate with interdisciplinary team and initiate plan and interventions as ordered  Monitor patient's weight and dietary intake as ordered or per policy  Utilize nutrition screening tool and intervene as necessary  Determine patient's food preferences and provide high-protein, high-caloric foods as appropriate       INTERVENTIONS:  - Monitor oral intake, urinary output, labs, and treatment plans  - Assess nutrition and hydration status and recommend course of action  - Evaluate amount of meals eaten  - Assist patient with eating if necessary   - Allow adequate time for meals  - Recommend/ encourage appropriate diets, oral nutritional supplements, and vitamin/mineral supplements  - Order, calculate, and assess calorie counts as needed  - Recommend, monitor, and adjust tube feedings and TPN/PPN based on assessed needs  - Assess need for intravenous fluids  - Provide specific nutrition/hydration education as appropriate  - Include patient/family/caregiver in decisions related to nutrition  Outcome: Progressing     Problem: Potential for Falls  Goal: Patient will remain free of falls  Description: INTERVENTIONS:  - Assess patient frequently for physical needs  -  Identify cognitive and physical deficits and behaviors that affect risk of falls    -  [place] fall precautions as indicated by assessment   - Educate patient/family on patient safety including physical limitations  - Instruct patient to call for assistance with activity based on assessment  - Modify environment to reduce risk of injury  - Consider OT/PT consult to assist with strengthening/mobility  Outcome: Progressing     Problem: Prexisting or High Potential for Compromised Skin Integrity  Goal: Skin integrity is maintained or improved  Description: INTERVENTIONS:  - Identify patients at risk for skin breakdown  - Assess and monitor skin integrity  - Assess and monitor nutrition and hydration status  - Monitor labs   - Assess for incontinence   - Turn and reposition patient  - Assist with mobility/ambulation  - Relieve pressure over bony prominences  - Avoid friction and shearing  - Provide appropriate hygiene as needed including keeping skin clean and dry  - Evaluate need for skin moisturizer/barrier cream  - Collaborate with interdisciplinary team   - Patient/family teaching  - Consider wound care consult   Outcome: Progressing

## 2021-04-19 NOTE — PROGRESS NOTES
[facility] [facility]  Progress Note - David Soliz 1960, 62 y o  male MRN: [id number]  Unit/Bed#: [facility] 620-01 Encounter: [id number]  Primary Care Provider: Waynard Leventhal, CRNP   Date and time admitted to hospital: 4/15/2021 12:50 PM    * SDH (subdural hematoma) ([address] )  Assessment & Plan  POD#4 Right CRANIOTOMY FOR SUBDURAL HEMATOMA  · Transfer from TBI rehab with AMS, found to have large right sided SDH with midline shift and brain compression  · Recent hospitalization from 04/04/2021-04/14/2021 in [facility] secondary to fall on the sidewalk with head strike, noted to have right subdural hematoma, GCS 14 on discharge to TBI rehab with PEG tube    Imaging reviewed personally and with attending, results are as follows:   CT head without contrast 04/16/2021:  Status post right-sided craniectomy and evacuation of subdural hematoma  Anticipated postoperative changes of the brain parenchyma and overlying soft tissues  Improved mass effect and midline shift  Areas of hemorrhagic contusion in the frontal lobes bilaterally and right temporal lobe  Small amount of subarachnoid hemorrhage along the high parietal convexities bilaterally  Plan:   Continue to monitor neuro exam   Repeat CT head stat if GCS declines more than 2 points in 1 hour   YANETH drain was d/c'ed 4/18/21   Systolic blood pressure goal less than 160   Hold all anticoagulation/antiplatelet therapy   Continue Keppra 500 mg b i d  X1 week for seizure prophylaxis   Medical management and pain control per primary team   DVT ppx:  SCDs, Lovenox   Mobilize as tolerated with assistance, PT / OT evaluation postoperatively   No further nsx intervention anticipated at this time   Nsx will see pt PRN during the remainder of his hospitalization  Pt will have 2 week POV with repeat CTH and 6 wk POV with repeat CTH  Please call with any questions or concerns         Subjective/Objective     Chief Complaint: Pt with minimal verbal response but indicated he had headache by gripping hand to command    Subjective: Per report pt with minimal verbal response and on this assessment did not provide comprehensible response to questions asked but followed motor comamnds  Pt did squeeze hand to indicate that he had a headache  Per report, pt had bowel movement today  Continues to have haque catheter in place  Objective: Drowsy but opens eyes spontaneously at times to environmental stimuli  I/O       04/17 0701 - 04/18 0700 04/18 0701 - 04/19 0700 04/19 0701 - 04/20 0700    I V  (mL/kg)       NG/GT 1060 1170 260    IV Piggyback       Feedings 1638 1680 560    Total Intake(mL/kg) 2698 (34 1) 2850 (36 9) 820 (10 6)    Urine (mL/kg/hr) 2025 (1 1) 1950 (1 1) 1000 (1 5)    Drains 5      Stool 0 0     Total Output 2030 1950 1000    Net +668 +900 -180           Unmeasured Urine Occurrence 1 x 2 x     Unmeasured Stool Occurrence 3 x 2 x           Invasive Devices     Peripheral Intravenous Line            Peripheral IV 04/15/21 Distal;Right;Upper;Ventral (anterior) Arm 3 days    Peripheral IV 04/19/21 Left;Proximal;Ventral (anterior) Forearm less than 1 day          Drain            Gastrostomy/Enterostomy Percutaneous endoscopic gastrostomy (PEG) -- days    External Urinary Catheter Medium 3 days                Physical Exam:  Vitals: Blood pressure 129/80, pulse 100, temperature 98 4 °F (36 9 °C), resp  rate 16, height 5' 9" (1 753 m), weight 77 2 kg (170 lb 3 1 oz), SpO2 97 %  ,Body mass index is 25 13 kg/m²  General appearance:  Appears stated age  Head: Right scalp incision CDI with staples in place  Eyes: Opens eyes to stimuli, tracks well in the room  Neck: supple, symmetrical, trachea midline   Lungs: non labored breathing  Heart: regular heart rate  Neurologic:   Mental status: GCS 11  E3 V2 M6  Cranial nerves: grossly intact (Cranial nerves II-XII)  Sensory: withdraws to pain x 4     Motor: Left sided weakness, LUE/LLE 3-/5, RUE 4/5, LLE 4-/5  Coordination:Drift on left arm, no drift on right arm  Lab Results:  Results from last 7 days   Lab Units 04/18/21  0553 04/17/21  0453 04/16/21  0543 04/15/21  1712   WBC Thousand/uL 8 72 10 76* 16 12* 14 14*   HEMOGLOBIN g/dL 13 7 13 4 13 2 14 2   HEMATOCRIT % 39 1 39 1 38 4 41 5   PLATELETS Thousands/uL 214 184 205 215   NEUTROS PCT % 70  --  82* 90*   MONOS PCT % 10  --  9 5     Results from last 7 days   Lab Units 04/18/21  0553 04/17/21  0453 04/16/21  0543  04/15/21  1448 04/15/21  1320   POTASSIUM mmol/L 4 1 4 0 4 3   < >  --  4 1   CHLORIDE mmol/L 108 108 112*   < >  --  110*   CO2 mmol/L 25 28 28   < >  --  25   CO2, I-STAT mmol/L  --   --   --   --  27  --    BUN mg/dL 10 12 11   < >  --  12   CREATININE mg/dL 0 49* 0 58* 0 64   < >  --  0 63   CALCIUM mg/dL 8 7 8 4 8 4   < >  --  8 9   ALK PHOS U/L  --   --   --   --   --  164*   ALT U/L  --   --   --   --   --  43   AST U/L  --   --   --   --   --  33   GLUCOSE, ISTAT mg/dl  --   --   --   --  146*  --     < > = values in this interval not displayed  Results from last 7 days   Lab Units 04/18/21  0553 04/17/21  0453 04/16/21  0543   MAGNESIUM mg/dL 2 4 2 3 2 3     Results from last 7 days   Lab Units 04/18/21  0553 04/17/21  0453 04/16/21  0543   PHOSPHORUS mg/dL 2 9 3 1 3 5     Results from last 7 days   Lab Units 04/16/21  0543 04/15/21  1712 04/15/21  1324 04/15/21  1320   INR  1 31* 1 27*  --  1 20*   PTT seconds 34 37 33  --      No results found for: TROPONINT  ABG:  Lab Results   Component Value Date    PHART 7 389 04/15/2021    VBJ6IQR 42 0 04/15/2021    PO2ART 168 2 (H) 04/15/2021    HDX8EZY 24 8 04/15/2021    BEART -0 3 04/15/2021    SOURCE Line, Arterial 04/15/2021       Imaging Studies: I have personally reviewed pertinent reports and I have personally reviewed pertinent films in PACS    EKG, Pathology, and Other Studies: I have personally reviewed pertinent reports        VTE Pharmacologic Prophylaxis: Enoxaparin (Lovenox)    VTE Mechanical Prophylaxis: sequential compression device    PLEASE NOTE:  This encounter may have been completed utilizing the M- Mod[employer]Fluency Direct Speech Voice Recognition Software  Grammatical errors, random word insertions, pronoun errors and incomplete sentences are occasional consequences of the system due to software limitations, ambient noise and hardware issues  These may be missed by proof reading prior to affixing electronic signature  Any questions or concerns about the content, text or information contained within the body of this dictation should be directly addressed to the advanced practitioner or physician for clarification

## 2021-04-19 NOTE — PROGRESS NOTES
[facility]  Progress Note - Harshad Robbins 1960, 62 y o  male MRN: [id number]  Unit/Bed#: [facility] 620-01 Encounter: [id number]  Primary Care Provider: ANIA Rosenberg   Date and time admitted to hospital: 4/15/2021 12:50 PM    Alcohol abuse  Assessment & Plan  · Per wife has had w/darawal seizure in the past  · Was treated for w/drawal with extended course librium at OSH    Dysphagia  Assessment & Plan  · S/p PEG tube placement  · All meds/feeds through PEG  · Consider speech consult if mental status improves    Acute encephalopathy  Assessment & Plan  · 2/2 SDH as above    Hypertension  Assessment & Plan  · Continue lisinopril   · PRN hydralazine     * SDH (subdural hematoma) (HCC)  Assessment & Plan  · S/p R craniectomy and evacuation   · Craniectomy helmet when OOB  · keppra x7 days  · Subgaleal drain has been discontinued   · Continue Q4hr NC  · GCS 12 (e4v2m6)  · PT/OT appreciated          Disposition: med surg, needs placement       SUBJECTIVE:  Chief Complaint: Unable to offer    Subjective: Patient's mental status seems to be minimally improved this AM, GCS 12   Not speaking but making attempts      OBJECTIVE:     Meds/Allergies     Current Facility-Administered Medications:     acetaminophen (TYLENOL) oral suspension 650 mg, 650 mg, Per PEG Tube, Q6H PRN, Karen Cea, PA-C    enoxaparin (LOVENOX) subcutaneous injection 30 mg, 30 mg, Subcutaneous, Q12H [address], Karen Cea, PA-C, 30 mg at 04/19/21 0848    hydrALAZINE (APRESOLINE) injection 10 mg, 10 mg, Intravenous, Q6H PRN, Karen Cea, PA-C, 10 mg at 04/18/21 0044    HYDROmorphone (DILAUDID) injection 0 5 mg, 0 5 mg, Intravenous, Q4H PRN, Karen Cea, PA-C    insulin lispro (HumaLOG) 100 units/mL subcutaneous injection 1-5 Units, 1-5 Units, Subcutaneous, Q6H [address], 1 Units at 04/19/21 0523 **AND** Fingerstick Glucose (POCT), , , Q6H, Karen Cea, PA-C    Labetalol HCl (NORMODYNE) injection 10 mg, 10 mg, Intravenous, Q4H PRN, Horacio Oats, PA-C, 10 mg at 04/16/21 2118    levETIRAcetam (KEPPRA) oral solution 500 mg, 500 mg, Per PEG Tube, Q12H [facility], Horacio Beckwiths, PA-C, 500 mg at 04/18/21 2307    lisinopril (ZESTRIL) tablet 40 mg, 40 mg, Per PEG Tube, Daily, Horacio Oats, PA-C, 40 mg at 04/19/21 0848    ondansetron (ZOFRAN) injection 4 mg, 4 mg, Intravenous, Q6H PRN, Horacio Oats, PA-C    oxyCODONE (ROXICODONE) oral solution 2 5 mg, 2 5 mg, Per PEG Tube, Q4H PRN **OR** oxyCODONE (ROXICODONE) oral solution 5 mg, 5 mg, Per PEG Tube, Q4H PRN, Horacio Oats, PA-C, 5 mg at 04/18/21 0858    senna oral syrup 8 8 mg, 8 8 mg, Per PEG Tube, HS, Horacio Oats, PA-C, 8 8 mg at 04/17/21 2123     Vitals:   Vitals:    04/19/21 0818   BP: 129/80   Pulse: 100   Resp: 16   Temp: 98 4 °F (36 9 °C)   SpO2: 97%       Intake/Output:  I/O       04/17 0701 - 04/18 0700 04/18 0701 - 04/19 0700 04/19 0701 - 04/20 0700    I V  (mL/kg)       NG/GT 1060 1170     IV Piggyback       Feedings 1638 1680     Total Intake(mL/kg) 2698 (34 1) 2850 (36 9)     Urine (mL/kg/hr) 2025 (1 1) 1950 (1 1)     Drains 5      Stool 0 0     Total Output 2030 1950     Net +668 +900            Unmeasured Urine Occurrence 1 x 2 x     Unmeasured Stool Occurrence 3 x 2 x            Nutrition/GI Proph/Bowel Reg: tube feeds via PEG    Physical Exam:   GENERAL APPEARANCE: WNWd  NEURO: GCS 12 (e4v2m6), Flap soft   HEENT: PERRLA  CV: RRR  LUNGS: CTAB  GI: PEG in place  MSK: L sided hemiparesis    Invasive Devices     Peripheral Intravenous Line            Peripheral IV 04/15/21 Distal;Right;Upper;Ventral (anterior) Arm 3 days    Peripheral IV 04/19/21 Left;Proximal;Ventral (anterior) Forearm less than 1 day          Drain            Gastrostomy/Enterostomy Percutaneous endoscopic gastrostomy (PEG) -- days    External Urinary Catheter Medium 3 days                 Lab Results: Results: I have personally reviewed pertinent reports     and I have personally reviewed pertinent films in PACS and BMP/CMP: No results found for: SODIUM, K, CL, CO2, ANIONGAP, BUN, CREATININE, GLUCOSE, CALCIUM, AST, ALT, ALKPHOS, PROT, BILITOT, EGFR    VTE Prophylaxis: Enoxaparin (Lovenox)

## 2021-04-19 NOTE — ASSESSMENT & PLAN NOTE
- lives with spouse  - FF setup available  - 5-6 LIONEL  - I PTA   - 4/19 OT--> max x 2 bed mobility, max-total ADLs  - 4/16 max x 2 bed mobility and transfers   - will continue to follow however anticipate patient will require acute inpt rehab in dedicated TBI unit

## 2021-04-20 ENCOUNTER — BMR PREADMISSION ASSESSMENT (OUTPATIENT)
Dept: ADMISSIONS | Facility: REHABILITATION | Age: 61
End: 2021-04-20

## 2021-04-20 ENCOUNTER — TELEPHONE (OUTPATIENT)
Dept: NEUROSURGERY | Facility: CLINIC | Age: 61
End: 2021-04-20

## 2021-04-20 LAB
ANION GAP SERPL CALCULATED.3IONS-SCNC: 5 MMOL/L (ref 4–13)
BACTERIA BLD CULT: NORMAL
BUN SERPL-MCNC: 13 MG/DL (ref 5–25)
CALCIUM SERPL-MCNC: 8.9 MG/DL (ref 8.3–10.1)
CHLORIDE SERPL-SCNC: 109 MMOL/L (ref 100–108)
CO2 SERPL-SCNC: 25 MMOL/L (ref 21–32)
CREAT SERPL-MCNC: 0.53 MG/DL (ref 0.6–1.3)
ERYTHROCYTE [DISTWIDTH] IN BLOOD BY AUTOMATED COUNT: 11.9 % (ref 11.6–15.1)
GFR SERPL CREATININE-BSD FRML MDRD: 116 ML/MIN/1.73SQ M
GLUCOSE SERPL-MCNC: 128 MG/DL (ref 65–140)
GLUCOSE SERPL-MCNC: 142 MG/DL (ref 65–140)
GLUCOSE SERPL-MCNC: 142 MG/DL (ref 65–140)
GLUCOSE SERPL-MCNC: 153 MG/DL (ref 65–140)
GLUCOSE SERPL-MCNC: 162 MG/DL (ref 65–140)
HCT VFR BLD AUTO: 38.5 % (ref 36.5–49.3)
HGB BLD-MCNC: 13.1 G/DL (ref 12–17)
MCH RBC QN AUTO: 32.7 PG (ref 26.8–34.3)
MCHC RBC AUTO-ENTMCNC: 34 G/DL (ref 31.4–37.4)
MCV RBC AUTO: 96 FL (ref 82–98)
PLATELET # BLD AUTO: 225 THOUSANDS/UL (ref 149–390)
PMV BLD AUTO: 13.1 FL (ref 8.9–12.7)
POTASSIUM SERPL-SCNC: 4.3 MMOL/L (ref 3.5–5.3)
RBC # BLD AUTO: 4.01 MILLION/UL (ref 3.88–5.62)
SODIUM SERPL-SCNC: 139 MMOL/L (ref 136–145)
WBC # BLD AUTO: 9.37 THOUSAND/UL (ref 4.31–10.16)

## 2021-04-20 PROCEDURE — 85027 COMPLETE CBC AUTOMATED: CPT | Performed by: PHYSICIAN ASSISTANT

## 2021-04-20 PROCEDURE — 80048 BASIC METABOLIC PNL TOTAL CA: CPT | Performed by: PHYSICIAN ASSISTANT

## 2021-04-20 PROCEDURE — 99232 SBSQ HOSP IP/OBS MODERATE 35: CPT | Performed by: PHYSICAL MEDICINE & REHABILITATION

## 2021-04-20 PROCEDURE — 82948 REAGENT STRIP/BLOOD GLUCOSE: CPT

## 2021-04-20 PROCEDURE — 97535 SELF CARE MNGMENT TRAINING: CPT

## 2021-04-20 PROCEDURE — 99232 SBSQ HOSP IP/OBS MODERATE 35: CPT | Performed by: SURGERY

## 2021-04-20 RX ADMIN — ENOXAPARIN SODIUM 30 MG: 30 INJECTION SUBCUTANEOUS at 21:25

## 2021-04-20 RX ADMIN — LEVETIRACETAM 500 MG: 100 SOLUTION ORAL at 21:32

## 2021-04-20 RX ADMIN — LISINOPRIL 40 MG: 20 TABLET ORAL at 10:18

## 2021-04-20 RX ADMIN — INSULIN LISPRO 1 UNITS: 100 INJECTION, SOLUTION INTRAVENOUS; SUBCUTANEOUS at 14:25

## 2021-04-20 RX ADMIN — ENOXAPARIN SODIUM 30 MG: 30 INJECTION SUBCUTANEOUS at 10:18

## 2021-04-20 RX ADMIN — INSULIN LISPRO 1 UNITS: 100 INJECTION, SOLUTION INTRAVENOUS; SUBCUTANEOUS at 17:10

## 2021-04-20 RX ADMIN — ACETAMINOPHEN 650 MG: 650 SUSPENSION ORAL at 06:13

## 2021-04-20 RX ADMIN — LEVETIRACETAM 500 MG: 100 SOLUTION ORAL at 10:14

## 2021-04-20 NOTE — TELEPHONE ENCOUNTER
04/23/2021-PT DISCHARGED TO [facility]     WAITING FOR CT TO BE SCHEDULED PRIOR TO 2 WK POV APT         04/22/2021-PT STILL IN HOSPITAL    04/21/2021-PT STILL IN HOSPITAL    04/20/2021-PT STILL IN HOSPITAL  05/05/2021-2 WK POV W/CT HEAD  05/26/2021-6 WK POV      04/19/2021-(KENIA)WILL SEE PRN  PT HAS 2 WEEK AND 6WK POV APTS MADE WITH [facility]

## 2021-04-20 NOTE — PLAN OF CARE
Problem: OCCUPATIONAL THERAPY ADULT  Goal: Performs self-care activities at highest level of function for planned discharge setting  See evaluation for individualized goals  Description: Treatment Interventions: ADL retraining, Visual perceptual retraining, Functional transfer training, UE strengthening/ROM, Endurance training, Cognitive reorientation, Patient/family training, Equipment evaluation/education, Neuromuscular reeducation, Fine motor coordination activities, Compensatory technique education, Activityengagement          See flowsheet documentation for full assessment, interventions and recommendations  Outcome: Progressing  Note: Limitation: Decreased ADL status, Decreased UE ROM, Decreased UE strength, Decreased Safe judgement during ADL, Decreased cognition, Decreased endurance, Visual deficit, Decreased fine motor control, Decreased self-care trans, Decreased high-level ADLs  Prognosis: Fair  Assessment: pt participated in am ot session and was seen focusng on grooming, direction following, cognitive and visual scanning task  pt  required ta for toileting / incontence, max asst hands and finger washing  pt with good ability to point out appropriate color number and word card from [employer] deck  answered 5/5 correctly  mild tone  noted l ue this session  was unable to lift and sustain hold on beachball using b ue's  pt was incontinent of bowel needing clean up at beginning of session  max asst to roll  nsg notified as pt had soreness / redened skin in jackie area  OT Discharge Recommendation: Post acute rehabilitation services  OT - OK to Discharge:  Yes  RUSLAN Wilcox

## 2021-04-20 NOTE — OCCUPATIONAL THERAPY NOTE
Occupational Therapy Treatment Note:       04/20/21 1300   ADL   Grooming Assistance 2  Maximal Assistance   Grooming Deficit   (pt initiated washing b hands)   Grooming Comments washing face, comb mustache and wash hands    LB Bathing Assistance 2  Maximal Assistance   Toileting Assistance  1  Total Assistance   Toileting Comments pt with incont of soft stool needing ta for clean up  pt rolled r and l for bed change   Bed Mobility   Rolling R 2  Maximal assistance   Rolling L 2  Maximal assistance   ROM - Left Upper Extremities    L Shoulder PROM   L Elbow PROM   L Wrist PROM   L Hand PROM   LUE ROM Comment   (minimal tone noted throughout ue)   Cognition   Overall Cognitive Status Impaired   Arousal/Participation Lethargic   Attention Attends with cues to redirect   Following Commands Follows one step commands with increased time or repetition   Comments pt  was able to point to approprite color, number and word card when 2 cards held in visual field  pt needed hand oiver hand guiding inorder to initiate using  r fingers to  touch appropriate card  pt was noted to keep eyes mostly closed  does open briefly with request    Vision   Vision Comments pt was able to locate 2 cards held in her midline  with good ability to cross to r and l side of midline   Activity Tolerance   Activity Tolerance Patient tolerated treatment well   Assessment   Assessment pt participated in am ot session and was seen focusng on grooming, direction following, cognitive and visual scanning task  pt  required ta for toileting / incontence, max asst hands and finger washing  pt with good ability to point out appropriate color number and word card from [employer] deck  answered 5/5 correctly  mild tone  noted l ue this session  was unable to lift and sustain hold on beachball using b ue's  pt was incontinent of bowel needing clean up at beginning of session  max asst to roll  nsg notified as pt had soreness / redened skin in jackie area     Plan Treatment Interventions ADL retraining;Functional transfer training;UE strengthening/ROM; Endurance training;Cognitive reorientation;Patient/family training;Equipment evaluation/education; Activityengagement   Goal Expiration Date 04/30/21   OT Treatment Day 2   OT Frequency 3-5x/wk   Recommendation   OT Discharge Recommendation Post acute rehabilitation services   OT - OK to Discharge Yes   AM-PAC Daily Activity Inpatient   Lower Body Dressing 2   Bathing 2   Toileting 1   Upper Body Dressing 2   Grooming 2   Eating 1   Daily Activity Raw Score 10   Turning Head Towards Sound 4   Follow Simple Instructions 3   Low Function Daily Activity Raw Score 17   Low Function Daily Activity Standardized Score 28 95   AM-PAC Applied Cognition Inpatient   Following a Speech/Presentation 1   Understanding Ordinary Conversation 2   Taking Medications 1   Remembering Where Things Are Placed or Put Away 1   Remembering List of 4-5 Errands 1   Taking Care of Complicated Tasks 1   Applied Cognition Raw Score 7   Applied Cognition Standardized Score 15 17   April A RUSLAN Mosquera

## 2021-04-20 NOTE — PROGRESS NOTES
[address] [facility]  Progress Note - Harshad Robbins 1960, 62 y o  male MRN: [id number]  Unit/Bed#: [facility] 620-01 Encounter: [id number]  Primary Care Provider: ANIA Rosenberg   Date and time admitted to hospital: 4/15/2021 12:50 PM    Alcohol abuse  Assessment & Plan  · Per wife has had w/darawal seizure in the past  · Was treated for w/drawal with extended course librium at OSH    Dysphagia  Assessment & Plan  · S/p PEG tube placement  · All meds/feeds through PEG  · Consider speech consult if mental status improves    Acute encephalopathy  Assessment & Plan  · 2/2 SDH as above    Hypertension  Assessment & Plan  · Continue lisinopril   · PRN hydralazine     * SDH (subdural hematoma) (HCC)  Assessment & Plan  · S/p R craniectomy and evacuation   · Craniectomy helmet when OOB  · keppra x7 days  · Subgaleal drain has been discontinued   · Continue Q4hr NC  · GCS 12 (e4v2m6), L sided hemiparesis  · PT/OT appreciated          Disposition: Pending placement      SUBJECTIVE:  Chief Complaint: Confusion    Subjective: No acute events overnight  Pt remains alert but confused, stable GCS of 12  Pt appears comfortable, follows commands but does not communicate further        OBJECTIVE:     Meds/Allergies     Current Facility-Administered Medications:     acetaminophen (TYLENOL) oral suspension 650 mg, 650 mg, Per PEG Tube, Q6H PRN, Karen Cea, PA-C, 650 mg at 04/20/21 7845    enoxaparin (LOVENOX) subcutaneous injection 30 mg, 30 mg, Subcutaneous, Q12H [address], Karen Cea, PA-C, 30 mg at 04/20/21 1018    hydrALAZINE (APRESOLINE) injection 10 mg, 10 mg, Intravenous, Q6H PRN, Karen Cea, PA-C, 10 mg at 04/18/21 0044    HYDROmorphone (DILAUDID) injection 0 5 mg, 0 5 mg, Intravenous, Q4H PRN, Karen Cea, PA-C    insulin lispro (HumaLOG) 100 units/mL subcutaneous injection 1-5 Units, 1-5 Units, Subcutaneous, Q6H [address], 1 Units at 04/19/21 0523 **AND** Fingerstick Glucose (POCT), , , Q6H, Horacio Saldaña, PA-C    Labetalol HCl (NORMODYNE) injection 10 mg, 10 mg, Intravenous, Q4H PRN, Horacio Oats, PA-C, 10 mg at 04/16/21 2118    levETIRAcetam (KEPPRA) oral solution 500 mg, 500 mg, Per PEG Tube, Q12H [facility], Horaciorobbi Saldaña, PA-C, 500 mg at 04/20/21 1014    lisinopril (ZESTRIL) tablet 40 mg, 40 mg, Per PEG Tube, Daily, Horacio Saldaña, PA-C, 40 mg at 04/20/21 1018    ondansetron (ZOFRAN) injection 4 mg, 4 mg, Intravenous, Q6H PRN, Horacio Saldaña, PA-C    oxyCODONE (ROXICODONE) oral solution 2 5 mg, 2 5 mg, Per PEG Tube, Q4H PRN **OR** oxyCODONE (ROXICODONE) oral solution 5 mg, 5 mg, Per PEG Tube, Q4H PRN, Horacio Saldaña, PA-C, 5 mg at 04/18/21 0858    senna oral syrup 8 8 mg, 8 8 mg, Per PEG Tube, HS, Horacio Saldaña, PA-C, 8 8 mg at 04/17/21 2123     Vitals:   Vitals:    04/20/21 0825   BP: 131/74   Pulse: 102   Resp: 16   Temp: 98 4 °F (36 9 °C)   SpO2: 94%       Intake/Output:  I/O       04/18 0701 - 04/19 0700 04/19 0701 - 04/20 0700 04/20 0701 - 04/21 0700    P  O   0     NG/GT 1170 320     Feedings 1680 560     Total Intake(mL/kg) 2850 (36 9) 880 (11 8)     Urine (mL/kg/hr) 1950 (1 1) 2184 (1 2)     Drains       Stool 0 0     Total Output 1950 2184     Net +900 -1304            Unmeasured Urine Occurrence 2 x      Unmeasured Stool Occurrence 2 x 3 x            Nutrition/GI Proph/Bowel Reg: Senna    Physical Exam:   Vitals signs and nursing note reviewed  Constitutional:       General: No acute distress  Appearance: Well-developed  Not diaphoretic  HENT:      Head: Normocephalic  Right Ear: External ear normal       Left Ear: External ear normal       Nose: Nose normal    Eyes:      Conjunctiva/sclera: Conjunctivae normal    Neck:      Musculoskeletal: Normal range of motion  Trachea: No tracheal deviation  Cardiovascular:      Rate and Rhythm: Normal rate  Pulmonary:      Effort: Pulmonary effort is normal  No respiratory distress  Abdominal:      General: There is no distension  Musculoskeletal:         General: No deformity  Skin:     General: Normal skin color  No rash  Neurological:      General: No focal deficit present  Mental Status: Alert  GCS 12  Follows commands and moves extremities x4  Psychiatric:         Behavior: Behavior normal          Invasive Devices     Peripheral Intravenous Line            Peripheral IV 04/19/21 Left;Proximal;Ventral (anterior) Forearm 1 day          Drain            Gastrostomy/Enterostomy Percutaneous endoscopic gastrostomy (PEG) -- days    External Urinary Catheter Medium 4 days                 Lab Results: Results: I have personally reviewed pertinent reports  Imaging/EKG Studies: Results: I have personally reviewed pertinent reports      Other Studies: Reviewed  VTE Prophylaxis: Sequential compression device (Venodyne)  and Enoxaparin (Lovenox)

## 2021-04-20 NOTE — PROGRESS NOTES
Physical Medicine and Rehabilitation Progress Note  Sol Velarde 62 y o  male MRN: [id number]  Unit/Bed#: [facility] 620-01 Encounter: [id number]    HPI: Sol Velarde is a 62 y o  male who initially had a fall on 4/4 and sustained a SDH/SAH and was admitted to OSH and eventually transferred to [facility] for rehab however was transferred to [facility] due to lethargy and CTH revealed chronic SDH with surrounding edema and midline shift therefore patient underwent Rt craniotomy on 4/15 by Dr Demetrio Brush    Chief Complaint: SDH    Subjective: patient seen at bedside and was lethargic     ROS: A 10 point ROS was performed; negative except as noted above       Assessment/Plan:      Impaired mobility and activities of daily living  [address] [place] [address]  - lives with spouse  - FF setup available  - 5-6 LIONEL  - I PTA   - 4/19 OT--> max x 2 bed mobility, max-total ADLs  - 4/16 max x 2 bed mobility and transfers   - will continue to follow however anticipate patient will require acute inpt rehab in dedicated TBI unit    * SDH (subdural hematoma) ([facility] [address] )  Assessment & Plan  - initially patient had fall on 4/4 and sustained SDH/SAH and was admitted to OHS and eventually tx to [facility] however patient admitted to acute care on 4/15 with lethargy and on CTH with chronic SDH with surrounding edema and midline shift  - underwent Rt craniotomy for SDH on 4/15 by Dr Demetrio Brush   - helmet per neurosx recommendation   - on keppra per neurosx      Dysphagia  Assessment & Plan  - on TF via PEG    Hypertension  Assessment & Plan  - on lisinopril per primary team      Scheduled Meds:  Current Facility-Administered Medications   Medication Dose Route Frequency Provider Last Rate    acetaminophen  650 mg Per PEG Tube Q6H PRN [place] [employer], PA-C      enoxaparin  30 mg Subcutaneous Q12H [facility], [place]      hydrALAZINE  10 mg Intravenous Q6H PRN [place] [employer], PA-C      HYDROmorphone  0 5 mg Intravenous Q4H PRN [place] [employer], PA-C      insulin lispro  1-5 Units Subcutaneous [facility] [place]      Labetalol HCl  10 mg Intravenous Q4H PRN Darío Latif PA-C      levETIRAcetam  500 mg Per PEG Tube Q12H [facility] [place], [place]      lisinopril  40 mg Per PEG Tube [facility]      ondansetron  4 mg Intravenous Q6H PRN [place], [place]      oxyCODONE  2 5 mg Per PEG Tube Q4H PRN Darío Latif PA-C      Or    oxyCODONE  5 mg Per PEG Tube Q4H PRN Darío Latif PA-C      senna  8 8 mg Per PEG Tube HS Darío Latif PA-C          Objective:      Physical Exam:  Vitals:    04/20/21 0825   BP: 131/74   Pulse: 102   Resp: 16   Temp: 98 4 °F (36 9 °C)   SpO2: 94%         General: lethargic  HEENT:  Eye: Normal external eye, conjunctiva, lidsc cornea  Ears: Normal external ears  Nose: Normal external nose, mucus membranes  s/p craniotomy   CARDIAC:  +S1/2  LUNGS:  no abnormal respiratory pattern, no retractions noted, non-labored breathing   ABDOMEN:  soft NT  EXTREMITIES:  no cyanosis or clubbing  NEURO:  awake, alert  PSYCH:  currently calm

## 2021-04-20 NOTE — CASE MANAGEMENT
Pt accepted to both [facility] and [facility]   Pt's wife aware and in attempting to decide on location

## 2021-04-21 ENCOUNTER — APPOINTMENT (INPATIENT)
Dept: RADIOLOGY | Facility: HOSPITAL | Age: 61
DRG: 025 | End: 2021-04-21
Payer: COMMERCIAL

## 2021-04-21 LAB
GLUCOSE SERPL-MCNC: 141 MG/DL (ref 65–140)
GLUCOSE SERPL-MCNC: 147 MG/DL (ref 65–140)
GLUCOSE SERPL-MCNC: 154 MG/DL (ref 65–140)
GLUCOSE SERPL-MCNC: 168 MG/DL (ref 65–140)
GLUCOSE SERPL-MCNC: 177 MG/DL (ref 65–140)

## 2021-04-21 PROCEDURE — 99024 POSTOP FOLLOW-UP VISIT: CPT | Performed by: PHYSICIAN ASSISTANT

## 2021-04-21 PROCEDURE — 99232 SBSQ HOSP IP/OBS MODERATE 35: CPT | Performed by: SURGERY

## 2021-04-21 PROCEDURE — 97112 NEUROMUSCULAR REEDUCATION: CPT

## 2021-04-21 PROCEDURE — 70450 CT HEAD/BRAIN W/O DYE: CPT

## 2021-04-21 PROCEDURE — 99024 POSTOP FOLLOW-UP VISIT: CPT | Performed by: NEUROLOGICAL SURGERY

## 2021-04-21 PROCEDURE — 82948 REAGENT STRIP/BLOOD GLUCOSE: CPT

## 2021-04-21 PROCEDURE — G1004 CDSM NDSC: HCPCS

## 2021-04-21 RX ORDER — MICONAZOLE NITRATE 20 MG/G
CREAM TOPICAL 2 TIMES DAILY
Status: DISCONTINUED | OUTPATIENT
Start: 2021-04-21 | End: 2021-04-22 | Stop reason: HOSPADM

## 2021-04-21 RX ADMIN — LEVETIRACETAM 500 MG: 100 SOLUTION ORAL at 10:13

## 2021-04-21 RX ADMIN — ENOXAPARIN SODIUM 30 MG: 30 INJECTION SUBCUTANEOUS at 10:13

## 2021-04-21 RX ADMIN — INSULIN LISPRO 1 UNITS: 100 INJECTION, SOLUTION INTRAVENOUS; SUBCUTANEOUS at 05:50

## 2021-04-21 RX ADMIN — MICONAZOLE NITRATE 1 APPLICATION: 20 CREAM TOPICAL at 18:06

## 2021-04-21 RX ADMIN — INSULIN LISPRO 1 UNITS: 100 INJECTION, SOLUTION INTRAVENOUS; SUBCUTANEOUS at 12:11

## 2021-04-21 RX ADMIN — LEVETIRACETAM 500 MG: 100 SOLUTION ORAL at 22:08

## 2021-04-21 RX ADMIN — LISINOPRIL 40 MG: 20 TABLET ORAL at 10:13

## 2021-04-21 RX ADMIN — INSULIN LISPRO 1 UNITS: 100 INJECTION, SOLUTION INTRAVENOUS; SUBCUTANEOUS at 00:54

## 2021-04-21 NOTE — PHYSICAL THERAPY NOTE
Physical Therapy Progress Note     04/21/21 1003   PT Last Visit   PT Visit Date 04/21/21   Note Type   Note Type Treatment   Pain Assessment   Pain Assessment Tool FLACC   Pain Rating: FLACC (Rest) - Face 1   Pain Rating: FLACC (Rest) - Legs 0   Pain Rating: FLACC (Rest) - Activity 1   Pain Rating: FLACC (Rest) - Cry 0   Pain Rating: FLACC (Rest) - Consolability 0   Score: FLACC (Rest) 2   Pain Rating: FLACC (Activity) - Face 1   Pain Rating: FLACC (Activity) - Legs 0   Pain Rating: FLACC (Activity) - Activity 1   Pain Rating: FLACC (Activity) - Cry 0   Pain Rating: FLACC (Activity) - Consolability 0   Score: FLACC (Activity) 2   Restrictions/Precautions   Braces or Orthoses Craniectomy Helmet   Other Precautions Cognitive; Bed Alarm;Pain; Fall Risk;Multiple lines  (PEG tube, Bai)   Subjective   Subjective pt encountered supine in bed, appeared fatigued & lethargic  Did demonstrate slightly increased alertness with activity, and was able to attend to instructions with increased time to procress & occassional repetition  Bed Mobility   Supine to Sit 2  Maximal assistance   Additional items Assist x 2   Sit to Supine 2  Maximal assistance   Additional items Assist x 2   Balance   Static Sitting Poor  (poor + with instructions)   Dynamic Sitting Poor   Endurance Deficit   Endurance Deficit Yes   Endurance Deficit Description fatigue   Activity Tolerance   Activity Tolerance Patient tolerated treatment well;Patient limited by fatigue   Nurse [address], RN   Assessment   Prognosis Guarded   Problem List Decreased strength;Decreased range of motion;Decreased endurance; Impaired balance;Decreased mobility; Decreased coordination;Decreased cognition;Decreased safety awareness; Impaired vision;Pain   Assessment Pt continues to require considerable assist for all mobiilty tasks at this time  was able to sit EOB ~12 minutes with varying levels of assist for balance    Performed LE exercises as noted above with constant physical assist & instructions for participation  Palpated trace muscle strength LLE, but pt demosntrated minimal active movement without assist   Demosntrated improved stength on RLE, but required assist to improve AROM  Pt able to follow commands for reaching out & touching hand in front while static sitting,with RUE, but not with L  Frequent instructions given for upright posture & cervical extension with inconsistant follow through  Anticipate improved participation & strength when pt more alert, and will attempt further mobiilty at that time  Pt unable  to participate to assist with lateral scoots at EOB  POC and discharge plan remain appropriate at this time to maximize activity tolerance & participation in tasks to reduce burden of care at this time  Goals   Patient Goals none stated due to aphasia   STG Expiration Date 04/28/21   PT Treatment Day 1   Plan   Treatment/Interventions Functional transfer training;LE strengthening/ROM; Therapeutic exercise; Endurance training;Patient/family training;Equipment eval/education; Bed mobility   Progress Progressing toward goals   PT Frequency Other (Comment)  (3-6x/week)   Recommendation   PT Discharge Recommendation Post acute rehabilitation services   Equipment Recommended   (TBD)   AM-PAC Basic Mobility Inpatient   Turning in Bed Without Bedrails 1   Lying on Back to Sitting on Edge of Flat Bed 1   Moving Bed to Chair 1   Standing Up From Chair 1   Walk in Room 1   Climb 3-5 Stairs 1   Basic Mobility Inpatient Raw Score 6   Turning Head Towards Sound 3   Follow Simple Instructions 3   Low Function Basic Mobility Raw Score 12   Low Function Basic Mobility Standardized Score 18 33   The patient's AM-PAC Basic Mobility Inpatient Short Form Low Function Raw Score 12 , Standardized Score is 18 33  A standardized score less 42 9 suggests the patient may benefit from discharge to post-acute rehab services   Please also refer to the recommendation of the Physical Therapist for safe discharge planning        Miguel Woodward, PTA

## 2021-04-21 NOTE — WOUND OSTOMY CARE
Consult Note - Wound   Jun Benito 62 y o  male MRN: [id number]  Unit/Bed#: [facility] [place] 620-01 Encounter: [id number]        History and Present Illness:  Patient is seen for wound care consult today   The patient is a 62year old male with a SDH   Max A of 2 for turning in the bed   Incontinent at of bowel and bladder   Condom catheter on to manage the urine   Patient turned and off loaded the area   Heels elevated   Assessment Findings:   1  Bilateral heels dry and intact   2  Scrotal and inner jackie rectal area with scattered MASD and noted redness related to a candidiasis rash from moisture   3  Sacral is dry and intact   Discussed the plan of care with the bedside RN   Skin care plans:  1-Hydraguard to bilateral  heels BID and PRN  2-Elevate heels to offload pressure  3-Ehob cushion in chair when out of bed  4-Moisturize skin daily with skin nourishing cream   5-Turn/reposition q2h or when medically stable for pressure re-distribution on skin  6  Jackie rectal / buttocks scrotal area - cleanse with soap and water then pat dry apply FAUSTINA antifungal cream bid and prn         Wounds:  Wound 04/15/21 Head Right (Active)   Wound Description Clean;Dry 04/21/21 0830   Wound Site Closure Staples 04/21/21 0830   Drainage Amount None 04/21/21 0100   Drainage Description Serosanguineous 04/21/21 0100   Dressing Open to air 04/21/21 0830   Dressing Status Dry; Intact 04/21/21 0100       Wound 04/21/21 Perineum (Active)   Wound Image     04/21/21 0929   Wound Description Beefy red 04/21/21 0830   Jackie-wound Assessment Clean;Fragile 04/21/21 0929   Wound Length (cm) 28 cm 04/21/21 0929   Wound Width (cm) 6 cm 04/21/21 0929   Wound Depth (cm) 0 1 cm 04/21/21 0929   Wound Surface Area (cm^2) 168 cm^2 04/21/21 0929   Wound Volume (cm^3) 16 8 cm^3 04/21/21 0929   Calculated Wound Volume (cm^3) 16 8 cm^3 04/21/21 0929   Drainage Amount Scant 04/21/21 0929   Drainage Description Serous 04/21/21 0929   Non-staged Wound Description Partial thickness 04/21/21 0929   Treatments Cleansed;Site care 04/21/21 0929   Patient Tolerance Tolerated well 04/21/21 0929         Wound care will follow weekly call or tiger text with questions     Onecamryn Desouza RN BSN CWOCN

## 2021-04-21 NOTE — PROGRESS NOTES
[address] [facility]  Progress Note - Mercy Saver 1960, 62 y o  male MRN: [id number]  Unit/Bed#: [facility] 620-01 Encounter: [id number]  Primary Care Provider: ANIA Adams   Date and time admitted to hospital: 4/15/2021 12:50 PM    Alcohol abuse  Assessment & Plan  · Per wife has had w/darawal seizure in the past  · Was treated for w/drawal with extended course librium at OSH    Dysphagia  Assessment & Plan  · S/p PEG tube placement  · All meds/feeds through PEG  · Consider speech consult if mental status improves    Acute encephalopathy  Assessment & Plan  · 2/2 SDH as above    Hypertension  Assessment & Plan  · Continue lisinopril   · PRN hydralazine     * SDH (subdural hematoma) (HCC)  Assessment & Plan  · S/p R craniectomy and evacuation   · Craniectomy helmet when OOB  · keppra x7 days  · Continue Q4hr NC  · GCS 12 (e4v2m6), L sided hemiparesis  · PT/OT appreciated          Disposition: TBI Rehab      SUBJECTIVE:  Chief Complaint: Pt is non verbal    Subjective: No acute overnight events      OBJECTIVE:     Meds/Allergies     Current Facility-Administered Medications:     acetaminophen (TYLENOL) oral suspension 650 mg, 650 mg, Per PEG Tube, Q6H PRN, Constantino Cortes PA-C, 650 mg at 04/20/21 6120    enoxaparin (LOVENOX) subcutaneous injection 30 mg, 30 mg, Subcutaneous, Q12H [facility], Constantino Cortes PA-C, 30 mg at 04/21/21 1013    hydrALAZINE (APRESOLINE) injection 10 mg, 10 mg, Intravenous, Q6H PRN, Constantino Cortes PA-C, 10 mg at 04/18/21 0044    HYDROmorphone (DILAUDID) injection 0 5 mg, 0 5 mg, Intravenous, Q4H PRN, Constantino Cortes PA-C    insulin lispro (HumaLOG) 100 units/mL subcutaneous injection 1-5 Units, 1-5 Units, Subcutaneous, Q6H [facility], 1 Units at 04/21/21 1211 **AND** Fingerstick Glucose (POCT), , , Q6H, Constantino Cortes PA-C    Labetalol HCl (NORMODYNE) injection 10 mg, 10 mg, Intravenous, Q4H PRN, Constantino Cortes PA-C, 10 mg at 04/16/21 2118    levETIRAcetam (KEPPRA) oral solution 500 mg, 500 mg, Per PEG Tube, Q12H [facility], Nguyen Rodas PA-C, 500 mg at 04/21/21 1013    lisinopril (ZESTRIL) tablet 40 mg, 40 mg, Per PEG Tube, Daily, Nguyen Rodas PA-C, 40 mg at 04/21/21 1013    ondansetron (ZOFRAN) injection 4 mg, 4 mg, Intravenous, Q6H PRN, Nguyen Rodas PA-C    oxyCODONE (ROXICODONE) oral solution 2 5 mg, 2 5 mg, Per PEG Tube, Q4H PRN **OR** oxyCODONE (ROXICODONE) oral solution 5 mg, 5 mg, Per PEG Tube, Q4H PRN, Nguyen Rodas PA-C, 5 mg at 04/18/21 0858    senna oral syrup 8 8 mg, 8 8 mg, Per PEG Tube, HS, Nguyen Rodas PA-C, Stopped at 04/20/21 2124     Vitals:   Vitals:    04/21/21 1026   BP:    Pulse: 95   Resp:    Temp: 98 4 °F (36 9 °C)   SpO2: 97%       Intake/Output:  I/O       04/19 0701 - 04/20 0700 04/20 0701 - 04/21 0700 04/21 0701 - 04/22 0700    P  O  0 0     NG/ 200     Feedings 560 350     Total Intake(mL/kg) 880 (11 8) 550 (7 3)     Urine (mL/kg/hr) 2184 (1 2) 1650 (0 9)     Stool 0 0     Total Output 2184 1650     Net -1304 -1100            Unmeasured Stool Occurrence 3 x 1 x            Nutrition/GI Proph/Bowel Reg: Tolerating tube feeds    Physical Exam:   NEURO: Left sided weakness, follows commands with right hand, nonverbal  HEENT: Craniectomy scar with staples in place, pupils equal and reactive  CV: RRR, no murmurs appreciated  LUNGS: clear and equal, no wheezes  GI: Soft/NT/ND - PEG in place without drainage  SKIN: No breakdown, rash on buttock and groin    Invasive Devices     Peripheral Intravenous Line            Peripheral IV 04/19/21 Left;Proximal;Ventral (anterior) Forearm 2 days          Drain            Gastrostomy/Enterostomy Percutaneous endoscopic gastrostomy (PEG) -- days    External Urinary Catheter Medium 5 days                   VTE Prophylaxis: Enoxaparin (Lovenox)

## 2021-04-21 NOTE — ASSESSMENT & PLAN NOTE
· S/p R craniectomy and evacuation   · Craniectomy helmet when OOB  · keppra x7 days  · Continue Q4hr NC  · GCS 12 (e4v2m6), L sided hemiparesis  · PT/OT appreciated

## 2021-04-21 NOTE — DISCHARGE INSTR - OTHER ORDERS
Skin care plans:  1-Hydraguard to bilateral  heels BID and PRN  2-Elevate heels to offload pressure  3-Ehob cushion in chair when out of bed  4-Moisturize skin daily with skin nourishing cream   5-Turn/reposition q2h or when medically stable for pressure re-distribution on skin     6  Briseyda rectal / buttocks scrotal area - cleanse with soap and water then pat dry apply FAUSTINA antifungal cream bid and prn

## 2021-04-21 NOTE — PROGRESS NOTES
Patient seen and examined  Had routine head CT prior to dc planning  On exam he has eyes closed, but opens to voice and stim  He follows commands with right upper, but does not fc with lue  He says "ow" to pain and will localize with his lue  Moves bilateral lowers spontaneously  His flap is soft  HCT with evidence of his prior extensive trauma, but new extra-axial hemorrhage  There is increasing shift from his post-op scan  Despite this new hemorrhage, it does not appear that his exam has worsened  Clinically, he has had a stable exam since the weekend  Today he spoke to his wife softly  I will repeat HCT for the morning but defer further surgery at this time  Should hemorrhage increase tomorrow will re-evaluate  If stable will defer surgery  I had an extensive conversation with his wife regarding this plan  She is in agreement and understands the plan

## 2021-04-21 NOTE — CASE MANAGEMENT
CM spoke to pt's wife  She would like the pt to d/c to [facility] for TBI rehab  CM informed facility who will submit for auth and pt will hopefully d/c tomorrow   Pt's wife also made it clear she doesn't want the pt discharging to rehab without a CT Head before leaving

## 2021-04-21 NOTE — PROGRESS NOTES
[address] [facility]  Progress Note - Leanna Rock 1960, 62 y o  male MRN: [id number]  Unit/Bed#: [facility] 620-01 Encounter: [id number]  Primary Care Provider: ANIA Messer   Date and time admitted to hospital: 4/15/2021 12:50 PM    * SDH (subdural hematoma) ([facility] [address] )  Assessment & Plan  POD#6 Right CRANIOTOMY FOR SUBDURAL HEMATOMA  · Transfer from TBI rehab with AMS, found to have large right sided SDH with midline shift and brain compression  · Recent hospitalization from 04/04/2021-04/14/2021 in [facility] secondary to fall on the sidewalk with head strike, noted to have right subdural hematoma, GCS 14 on discharge to TBI rehab with PEG tube    Imaging reviewed personally and with attending, results are as follows:   CT head without contrast 04/16/2021:  Status post right-sided craniectomy and evacuation of subdural hematoma  Anticipated postoperative changes of the brain parenchyma and overlying soft tissues  Improved mass effect and midline shift  Areas of hemorrhagic contusion in the frontal lobes bilaterally and right temporal lobe  Small amount of subarachnoid hemorrhage along the high parietal convexities bilaterally   [facility] 4/21/21: There is an enlarging hematoma identified within the extradural space anteriorly at the craniectomy site on the right  This results in increasing mass effect upon the lateral aspect of the right frontal lobe and increasing right to left shift, now measuring 1 3 cm compared to  0 5 cm previously  Basilar cisterns remain patent  Stable sequela of hemorrhagic contusions within the inferior frontal lobes and right posterior lateral temporal lobe undergoing expected evolutionary change  Slight improvement in mild residual peripheral subarachnoid hemorrhage    Plan:   Continue to monitor neuro exam   Repeat CT head stat if GCS declines more than 2 points in 1 hour   YANETH drain was d/c'ed 4/18/21     Systolic blood pressure goal less than 160   Hold all anticoagulation/antiplatelet therapy   Continue Keppra 500 mg b i d  X1 week for seizure prophylaxis   Medical management and pain control per primary team   DVT ppx:  SCDs, hold pharm dvt ppx at this time   Eval and Mobilize as tolerated by PT/OT   Pt noted to have increased hematoma in the right extradural space in [facility] today compared to prior CT  Will plan for repeat CTH in the AM   Keep NPO tonight   Will re-evaluate tomorrow, if pt needs further surgical intervention for evacuation of the right extra axial collection  [facility] will continue to follow  Please call with any questions or concerns  Subjective/Objective     Chief Complaint: Pt did not offer a complaint on this assessment/ follow up s/p Right craniectomy for evacuation of SDH  Subjective: Pt did not answer questions asked but did whisper to wife once  Per pt's wife who was at bedside, pt was more awake this morning and whispered to her that he wanted to use the bathroom and commented on the weather when it got cloudy/darker outside  She reported that he was drowsy this afternoon compared to this AM  Per report, pt continues to have left sided weakness particularly in the left arm than left leg  Objective: Drowsy but awakens to verbal or tactile stimuli  Laying in bed  NAD  I/O       04/19 0701 - 04/20 0700 04/20 0701 - 04/21 0700 04/21 0701 - 04/22 0700    P  O  0 0 0    NG/ 200 200    Feedings 560 350 560    Total Intake(mL/kg) 880 (11 8) 550 (7 3) 760 (10 1)    Urine (mL/kg/hr) 2184 (1 2) 1650 (0 9)     Stool 0 0     Total Output 2184 1650     Net -1304 -1100 +760           Unmeasured Urine Occurrence   2 x    Unmeasured Stool Occurrence 3 x 1 x           Invasive Devices     Peripheral Intravenous Line            Peripheral IV 04/19/21 Left;Proximal;Ventral (anterior) Forearm 2 days          Drain            Gastrostomy/Enterostomy Percutaneous endoscopic gastrostomy (PEG) -- days    External Urinary Catheter Medium 5 days                Physical Exam:  Vitals: Blood pressure 119/70, pulse 89, temperature 98 3 °F (36 8 °C), resp  rate 16, height 5' 9" (1 753 m), weight 74 9 kg (165 lb 2 oz), SpO2 97 %  ,Body mass index is 24 38 kg/m²  General appearance:  Appears stated age  Head: Right scalp incision is CDI with staples in place  Eyes: EOMI  Neck: supple, symmetrical, trachea midline   Lungs: non labored breathing  Heart: regular heart rate  Neurologic:   Mental status: GCS 11-12 E3-4 V2-3 M6 (follows commands on the right)  Sensory: Withdraws to pain  Motor:  LUE 2/5, LLE HF/KF 3/5 (crossed left leg over right leg spontaneously), RUE 4/5, RLE 3-4/5  Coordination: Not able to lift the left arm against gravity, no drift on the right  Lab Results:  Results from last 7 days   Lab Units 04/20/21  0505 04/18/21  0553 04/17/21  0453 04/16/21  0543 04/15/21  1712   WBC Thousand/uL 9 37 8 72 10 76* 16 12* 14 14*   HEMOGLOBIN g/dL 13 1 13 7 13 4 13 2 14 2   HEMATOCRIT % 38 5 39 1 39 1 38 4 41 5   PLATELETS Thousands/uL 225 214 184 205 215   NEUTROS PCT %  --  70  --  82* 90*   MONOS PCT %  --  10  --  9 5     Results from last 7 days   Lab Units 04/20/21  0505 04/18/21  0553 04/17/21  0453  04/15/21  1448 04/15/21  1320   POTASSIUM mmol/L 4 3 4 1 4 0   < >  --  4 1   CHLORIDE mmol/L 109* 108 108   < >  --  110*   CO2 mmol/L 25 25 28   < >  --  25   CO2, I-STAT mmol/L  --   --   --   --  27  --    BUN mg/dL 13 10 12   < >  --  12   CREATININE mg/dL 0 53* 0 49* 0 58*   < >  --  0 63   CALCIUM mg/dL 8 9 8 7 8 4   < >  --  8 9   ALK PHOS U/L  --   --   --   --   --  164*   ALT U/L  --   --   --   --   --  43   AST U/L  --   --   --   --   --  33   GLUCOSE, ISTAT mg/dl  --   --   --   --  146*  --     < > = values in this interval not displayed       Results from last 7 days   Lab Units 04/18/21  0553 04/17/21  0453 04/16/21  0543   MAGNESIUM mg/dL 2 4 2 3 2 3     Results from last 7 days   Lab Units 04/18/21  0553 04/17/21  0453 04/16/21  0543   PHOSPHORUS mg/dL 2 9 3 1 3 5     Results from last 7 days   Lab Units 04/16/21  0543 04/15/21  1712 04/15/21  1324 04/15/21  1320   INR  1 31* 1 27*  --  1 20*   PTT seconds 34 37 33  --      No results found for: TROPONINT  ABG:  Lab Results   Component Value Date    PHART 7 389 04/15/2021    VBP1MCS 42 0 04/15/2021    PO2ART 168 2 (H) 04/15/2021    FRU8DSH 24 8 04/15/2021    BEART -0 3 04/15/2021    SOURCE Line, Arterial 04/15/2021       Imaging Studies: I have personally reviewed pertinent reports and I have personally reviewed pertinent films in PACS    EKG, Pathology, and Other Studies: I have personally reviewed pertinent reports  VTE Pharmacologic Prophylaxis: Reason for no pharmacologic prophylaxis hold at this time  VTE Mechanical Prophylaxis: sequential compression device    PLEASE NOTE:  This encounter may have been completed utilizing the [email or web address] Direct Speech Voice Recognition Software  Grammatical errors, random word insertions, pronoun errors and incomplete sentences are occasional consequences of the system due to software limitations, ambient noise and hardware issues  These may be missed by proof reading prior to affixing electronic signature  Any questions or concerns about the content, text or information contained within the body of this dictation should be directly addressed to the advanced practitioner or physician for clarification

## 2021-04-21 NOTE — PHYSICAL THERAPY NOTE
Physical Therapy Progress Note     04/21/21 1003   PT Last Visit   PT Visit Date 04/21/21   Note Type   Note Type Treatment   Pain Assessment   Pain Assessment Tool FLACC   Pain Rating: FLACC (Rest) - Face 1   Pain Rating: FLACC (Rest) - Legs 0   Pain Rating: FLACC (Rest) - Activity 1   Pain Rating: FLACC (Rest) - Cry 0   Pain Rating: FLACC (Rest) - Consolability 0   Score: FLACC (Rest) 2   Pain Rating: FLACC (Activity) - Face 1   Pain Rating: FLACC (Activity) - Legs 0   Pain Rating: FLACC (Activity) - Activity 1   Pain Rating: FLACC (Activity) - Cry 0   Pain Rating: FLACC (Activity) - Consolability 0   Score: FLACC (Activity) 2   Restrictions/Precautions   Braces or Orthoses Craniectomy Helmet   Other Precautions Cognitive; Bed Alarm;Pain; Fall Risk;Multiple lines  (PEG tube, Bai)   Subjective   Subjective pt encountered supine in bed, appeared fatigued & lethargic  Did demonstrate slightly increased alertness with activity, and was able to attend to instructions with increased time to procress & occassional repetition  Bed Mobility   Supine to Sit 2  Maximal assistance   Additional items Assist x 2   Sit to Supine 2  Maximal assistance   Additional items Assist x 2   Balance   Static Sitting Poor  (poor + with instructions)   Dynamic Sitting Poor   Endurance Deficit   Endurance Deficit Yes   Endurance Deficit Description fatigue   Activity Tolerance   Activity Tolerance Patient tolerated treatment well;Patient limited by fatigue   Nurse [address], RN   Exercises   Hip Abduction Sitting;10 reps;AAROM; Bilateral   Knee AROM Long Arc Quad Sitting;10 reps;AAROM; Bilateral   Marching Sitting;10 reps;AAROM; Bilateral   Assessment   Prognosis Guarded   Problem List Decreased strength;Decreased range of motion;Decreased endurance; Impaired balance;Decreased mobility; Decreased coordination;Decreased cognition;Decreased safety awareness; Impaired vision;Pain   Assessment Pt continues to require considerable assist for all mobiilty tasks at this time  was able to sit EOB ~12 minutes with varying levels of assist for balance  Performed LE exercises as noted above with constant physical assist & instructions for participation  Palpated trace muscle strength LLE, but pt demosntrated minimal active movement without assist   Demosntrated improved stength on RLE, but required assist to improve AROM  Pt able to follow commands for reaching out & touching hand in front while static sitting,with RUE, but not with L  Frequent instructions given for upright posture & cervical extension with inconsistant follow through  Anticipate improved participation & strength when pt more alert, and will attempt further mobiilty at that time  Pt unable  to participate to assist with lateral scoots at EOB  POC and discharge plan remain appropriate at this time to maximize activity tolerance & participation in tasks to reduce burden of care at this time  Goals   Patient Goals none stated due to aphasia   STG Expiration Date 04/28/21   PT Treatment Day 1   Plan   Treatment/Interventions Functional transfer training;LE strengthening/ROM; Therapeutic exercise; Endurance training;Patient/family training;Equipment eval/education; Bed mobility   Progress Progressing toward goals   PT Frequency Other (Comment)  (3-6x/week)   Recommendation   PT Discharge Recommendation Post acute rehabilitation services   Equipment Recommended   (TBD)   AM-PAC Basic Mobility Inpatient   Turning in Bed Without Bedrails 1   Lying on Back to Sitting on Edge of Flat Bed 1   Moving Bed to Chair 1   Standing Up From Chair 1   Walk in Room 1   Climb 3-5 Stairs 1   Basic Mobility Inpatient Raw Score 6   Turning Head Towards Sound 3   Follow Simple Instructions 3   Low Function Basic Mobility Raw Score 12   Low Function Basic Mobility Standardized Score 18 33   The patient's AM-PAC Basic Mobility Inpatient Short Form Low Function Raw Score 12 , Standardized Score is 18 33   A standardized score less 42 9 suggests the patient may benefit from discharge to post-acute rehab services  Please also refer to the recommendation of the Physical Therapist for safe discharge planning        John Bustamante PTA

## 2021-04-21 NOTE — ASSESSMENT & PLAN NOTE
POD#6 Right CRANIOTOMY FOR SUBDURAL HEMATOMA  · Transfer from TBI rehab with AMS, found to have large right sided SDH with midline shift and brain compression  · Recent hospitalization from 04/04/2021-04/14/2021 in [facility] secondary to fall on the sidewalk with head strike, noted to have right subdural hematoma, GCS 14 on discharge to TBI rehab with PEG tube    Imaging reviewed personally and with attending, results are as follows:   CT head without contrast 04/16/2021:  Status post right-sided craniectomy and evacuation of subdural hematoma  Anticipated postoperative changes of the brain parenchyma and overlying soft tissues  Improved mass effect and midline shift  Areas of hemorrhagic contusion in the frontal lobes bilaterally and right temporal lobe  Small amount of subarachnoid hemorrhage along the high parietal convexities bilaterally   [facility] 4/21/21: There is an enlarging hematoma identified within the extradural space anteriorly at the craniectomy site on the right  This results in increasing mass effect upon the lateral aspect of the right frontal lobe and increasing right to left shift, now measuring 1 3 cm compared to  0 5 cm previously  Basilar cisterns remain patent  Stable sequela of hemorrhagic contusions within the inferior frontal lobes and right posterior lateral temporal lobe undergoing expected evolutionary change  Slight improvement in mild residual peripheral subarachnoid hemorrhage    Plan:   Continue to monitor neuro exam   Repeat CT head stat if GCS declines more than 2 points in 1 hour   YANETH drain was d/c'ed 4/18/21   Systolic blood pressure goal less than 160   Hold all anticoagulation/antiplatelet therapy   Continue Keppra 500 mg b i d  X1 week for seizure prophylaxis   Medical management and pain control per primary team   DVT ppx:  SCDs, hold pharm dvt ppx at this time      Eval and Mobilize as tolerated by PT/OT   Pt noted to have increased hematoma in the right extradural space in [facility] today compared to prior CT  Will plan for repeat CTH in the AM   Keep NPO tonight   Will re-evaluate tomorrow, if pt needs further surgical intervention for evacuation of the right extra axial collection  [facility] will continue to follow  Please call with any questions or concerns

## 2021-04-21 NOTE — PLAN OF CARE
Problem: PHYSICAL THERAPY ADULT  Goal: Performs mobility at highest level of function for planned discharge setting  See evaluation for individualized goals  Description: Treatment/Interventions: ADL retraining, Functional transfer training, LE strengthening/ROM, Elevations, Therapeutic exercise, Endurance training, Cognitive reorientation, Patient/family training, Bed mobility, Gait training, Continued evaluation, Spoke to nursing, Spoke to case management, OT, Family          See flowsheet documentation for full assessment, interventions and recommendations  Outcome: Progressing  Note: Prognosis: Guarded  Problem List: Decreased strength, Decreased range of motion, Decreased endurance, Impaired balance, Decreased mobility, Decreased coordination, Decreased cognition, Decreased safety awareness, Impaired vision, Pain  Assessment: Pt continues to require considerable assist for all mobiilty tasks at this time  was able to sit EOB ~12 minutes with varying levels of assist for balance  Performed LE exercises as noted above with constant physical assist & instructions for participation  Palpated trace muscle strength LLE, but pt demosntrated minimal active movement without assist   Demosntrated improved stength on RLE, but required assist to improve AROM  Pt able to follow commands for reaching out & touching hand in front while static sitting,with RUE, but not with L  Frequent instructions given for upright posture & cervical extension with inconsistant follow through  Anticipate improved participation & strength when pt more alert, and will attempt further mobiilty at that time  Pt unable  to participate to assist with lateral scoots at EOB  POC and discharge plan remain appropriate at this time to maximize activity tolerance & participation in tasks to reduce burden of care at this time    Barriers to Discharge: Inaccessible home environment        PT Discharge Recommendation: Post acute rehabilitation services     PT - OK to Discharge: Yes    See flowsheet documentation for full assessment

## 2021-04-21 NOTE — PLAN OF CARE
Problem: PAIN - ADULT  Goal: Verbalizes/displays adequate comfort level or baseline comfort level  Description: Interventions:  - Encourage patient to monitor pain and request assistance  - Assess pain using appropriate pain scale  - Administer analgesics based on type and severity of pain and evaluate response  - Implement non-pharmacological measures as appropriate and evaluate response  - Consider cultural and social influences on pain and pain management  - Notify physician/advanced practitioner if interventions unsuccessful or patient reports new pain  Outcome: Progressing     Problem: SAFETY ADULT  Goal: Patient will remain free of falls  Description: INTERVENTIONS:  - Assess patient frequently for physical needs  -  Identify cognitive and physical deficits and behaviors that affect risk of falls    -  [place] fall precautions as indicated by assessment   - Educate patient/family on patient safety including physical limitations  - Instruct patient to call for assistance with activity based on assessment  - Modify environment to reduce risk of injury  - Consider OT/PT consult to assist with strengthening/mobility  Outcome: Progressing  Goal: Maintain or return to baseline ADL function  Description: INTERVENTIONS:  -  Assess patient's ability to carry out ADLs; assess patient's baseline for ADL function and identify physical deficits which impact ability to perform ADLs (bathing, care of mouth/teeth, toileting, grooming, dressing, etc )  - Assess/evaluate cause of self-care deficits   - Assess range of motion  - Assess patient's mobility; develop plan if impaired  - Assess patient's need for assistive devices and provide as appropriate  - Encourage maximum independence but intervene and supervise when necessary  - Involve family in performance of ADLs  - Assess for home care needs following discharge   - Consider OT consult to assist with ADL evaluation and planning for discharge  - Provide patient education as appropriate  Outcome: Progressing  Goal: Maintain or return mobility status to optimal level  Description: INTERVENTIONS:  - Assess patient's baseline mobility status (ambulation, transfers, stairs, etc )    - Identify cognitive and physical deficits and behaviors that affect mobility  - Identify mobility aids required to assist with transfers and/or ambulation (gait belt, sit-to-stand, lift, walker, cane, etc )  - [place] fall precautions as indicated by assessment  - Record patient progress and toleration of activity level on Mobility SBAR; progress patient to next Phase/Stage  - Instruct patient to call for assistance with activity based on assessment  - Consider rehabilitation consult to assist with strengthening/weightbearing, etc   Outcome: Progressing

## 2021-04-22 ENCOUNTER — APPOINTMENT (INPATIENT)
Dept: RADIOLOGY | Facility: HOSPITAL | Age: 61
DRG: 025 | End: 2021-04-22
Payer: COMMERCIAL

## 2021-04-22 VITALS
BODY MASS INDEX: 24.46 KG/M2 | HEIGHT: 69 IN | OXYGEN SATURATION: 97 % | HEART RATE: 99 BPM | DIASTOLIC BLOOD PRESSURE: 74 MMHG | SYSTOLIC BLOOD PRESSURE: 131 MMHG | TEMPERATURE: 98.6 F | RESPIRATION RATE: 16 BRPM | WEIGHT: 165.12 LBS

## 2021-04-22 PROBLEM — R13.10 DYSPHAGIA: Status: ACTIVE | Noted: 2021-04-16

## 2021-04-22 PROBLEM — S06.5XAA SDH (SUBDURAL HEMATOMA) (CMS/HCC): Status: ACTIVE | Noted: 2021-04-15

## 2021-04-22 PROBLEM — G93.40 ACUTE ENCEPHALOPATHY: Status: ACTIVE | Noted: 2021-04-15

## 2021-04-22 PROBLEM — I10 HYPERTENSION: Status: ACTIVE | Noted: 2020-03-03

## 2021-04-22 PROBLEM — Z78.9 IMPAIRED MOBILITY AND ACTIVITIES OF DAILY LIVING: Status: ACTIVE | Noted: 2021-04-19

## 2021-04-22 PROBLEM — Z74.09 IMPAIRED MOBILITY AND ACTIVITIES OF DAILY LIVING: Status: ACTIVE | Noted: 2021-04-19

## 2021-04-22 PROBLEM — F10.10 ALCOHOL ABUSE: Status: ACTIVE | Noted: 2021-04-18

## 2021-04-22 LAB
ABO GROUP BLD: NORMAL
ANION GAP SERPL CALCULATED.3IONS-SCNC: 8 MMOL/L (ref 4–13)
BLD GP AB SCN SERPL QL: NEGATIVE
BUN SERPL-MCNC: 16 MG/DL (ref 5–25)
CALCIUM SERPL-MCNC: 9.6 MG/DL (ref 8.3–10.1)
CHLORIDE SERPL-SCNC: 107 MMOL/L (ref 100–108)
CO2 SERPL-SCNC: 24 MMOL/L (ref 21–32)
CREAT SERPL-MCNC: 0.56 MG/DL (ref 0.6–1.3)
ERYTHROCYTE [DISTWIDTH] IN BLOOD BY AUTOMATED COUNT: 12.1 % (ref 11.6–15.1)
GFR SERPL CREATININE-BSD FRML MDRD: 113 ML/MIN/1.73SQ M
GLUCOSE SERPL-MCNC: 108 MG/DL (ref 65–140)
GLUCOSE SERPL-MCNC: 109 MG/DL (ref 65–140)
GLUCOSE SERPL-MCNC: 120 MG/DL (ref 65–140)
HCT VFR BLD AUTO: 40.4 % (ref 36.5–49.3)
HGB BLD-MCNC: 13.8 G/DL (ref 12–17)
INR PPP: 1.14 (ref 0.84–1.19)
MCH RBC QN AUTO: 33.3 PG (ref 26.8–34.3)
MCHC RBC AUTO-ENTMCNC: 34.2 G/DL (ref 31.4–37.4)
MCV RBC AUTO: 97 FL (ref 82–98)
PLATELET # BLD AUTO: 287 THOUSANDS/UL (ref 149–390)
PMV BLD AUTO: 12 FL (ref 8.9–12.7)
POTASSIUM SERPL-SCNC: 4.9 MMOL/L (ref 3.5–5.3)
PROTHROMBIN TIME: 14.6 SECONDS (ref 11.6–14.5)
RBC # BLD AUTO: 4.15 MILLION/UL (ref 3.88–5.62)
RH BLD: POSITIVE
SODIUM SERPL-SCNC: 139 MMOL/L (ref 136–145)
SPECIMEN EXPIRATION DATE: NORMAL
WBC # BLD AUTO: 11.05 THOUSAND/UL (ref 4.31–10.16)

## 2021-04-22 PROCEDURE — 82948 REAGENT STRIP/BLOOD GLUCOSE: CPT

## 2021-04-22 PROCEDURE — G1004 CDSM NDSC: HCPCS

## 2021-04-22 PROCEDURE — 86901 BLOOD TYPING SEROLOGIC RH(D): CPT | Performed by: NEUROLOGICAL SURGERY

## 2021-04-22 PROCEDURE — 99238 HOSP IP/OBS DSCHRG MGMT 30/<: CPT | Performed by: SURGERY

## 2021-04-22 PROCEDURE — 86900 BLOOD TYPING SEROLOGIC ABO: CPT | Performed by: NEUROLOGICAL SURGERY

## 2021-04-22 PROCEDURE — 99024 POSTOP FOLLOW-UP VISIT: CPT | Performed by: NEUROLOGICAL SURGERY

## 2021-04-22 PROCEDURE — 85027 COMPLETE CBC AUTOMATED: CPT | Performed by: PHYSICIAN ASSISTANT

## 2021-04-22 PROCEDURE — 80048 BASIC METABOLIC PNL TOTAL CA: CPT | Performed by: PHYSICIAN ASSISTANT

## 2021-04-22 PROCEDURE — 85610 PROTHROMBIN TIME: CPT | Performed by: PHYSICIAN ASSISTANT

## 2021-04-22 PROCEDURE — 70450 CT HEAD/BRAIN W/O DYE: CPT

## 2021-04-22 PROCEDURE — 99024 POSTOP FOLLOW-UP VISIT: CPT | Performed by: PHYSICIAN ASSISTANT

## 2021-04-22 PROCEDURE — 86850 RBC ANTIBODY SCREEN: CPT | Performed by: NEUROLOGICAL SURGERY

## 2021-04-22 RX ORDER — ACETAMINOPHEN 160 MG/5ML
650 SUSPENSION, ORAL (FINAL DOSE FORM) ORAL EVERY 6 HOURS PRN
Refills: 0
Start: 2021-04-22 | End: 2021-05-05

## 2021-04-22 RX ORDER — SENNOSIDES 8.8 MG/5ML
8.8 LIQUID ORAL
Refills: 0
Start: 2021-04-22 | End: 2021-05-05

## 2021-04-22 RX ADMIN — MICONAZOLE NITRATE: 20 CREAM TOPICAL at 08:38

## 2021-04-22 RX ADMIN — INSULIN LISPRO 1 UNITS: 100 INJECTION, SOLUTION INTRAVENOUS; SUBCUTANEOUS at 00:22

## 2021-04-22 RX ADMIN — LISINOPRIL 40 MG: 20 TABLET ORAL at 08:38

## 2021-04-22 NOTE — INCIDENTAL FINDINGS
The following findings require follow up:  Radiographic finding    Focal opacity in the left upper lobe  This has a patchy appearance and may represent atelectasis versus a small infiltrate  Evaluation is limited by respiratory motion, however  A short interval follow-up CT in 3 months is recommended to ensure resolution  Spoke with wife, Addis Pérez, regarding incidental finding and follow up      Please notify the following clinician to assist with the follow up:  Mattie Brush

## 2021-04-22 NOTE — PROGRESS NOTES
[address] [facility]  Progress Note - David Soliz 1960, 62 y o  male MRN: [id number]  Unit/Bed#: [facility] 620-01 Encounter: [id number]  Primary Care Provider: Waynard Leventhal, CRNP   Date and time admitted to hospital: 4/15/2021 12:50 PM    * SDH (subdural hematoma) ([address] )  Assessment & Plan  7 Days Post-Op Procedure(s):  Right CRANIOTOMY FOR SUBDURAL HEMATOMA    · Transfer from TBI rehab with AMS, found to have large right sided SDH with midline shift and brain compression  · Recent hospitalization from 04/04/2021-04/14/2021 in [facility] secondary to fall on the sidewalk with head strike, noted to have right subdural hematoma, GCS 14 on discharge to TBI rehab with PEG tube    Imaging reviewed personally and with attending, results are as follows:   CT head without contrast 04/16/2021:  Status post right-sided craniectomy and evacuation of subdural hematoma  Anticipated postoperative changes of the brain parenchyma and overlying soft tissues  Improved mass effect and midline shift  Areas of hemorrhagic contusion in the frontal lobes bilaterally and right temporal lobe  Small amount of subarachnoid hemorrhage along the high parietal convexities bilaterally   [facility] 4/21/21: There is an enlarging hematoma identified within the extradural space anteriorly at the craniectomy site on the right  This results in increasing mass effect upon the lateral aspect of the right frontal lobe and increasing right to left shift, now measuring 1 3 cm compared to  0 5 cm previously  Basilar cisterns remain patent  Stable sequela of hemorrhagic contusions within the inferior frontal lobes and right posterior lateral temporal lobe undergoing expected evolutionary change    Slight improvement in mild residual peripheral subarachnoid hemorrhage    Plan:   Continue to monitor neuro exam   Repeat CT head stat if GCS declines more than 2 points in 1 hour   YANETH drain was d/c'ed 4/18/21   Systolic blood pressure goal less than 160   Hold all anticoagulation/antiplatelet therapy   Continue Keppra 500 mg b i d  X1 week for seizure prophylaxis   Medical management and pain control per primary team   DVT ppx:  SCDs, hold pharm dvt ppx at this time   Eval and Mobilize as tolerated by PT/OT   Pt noted to have increased hematoma in the right extradural space in [facility] today compared to prior CT  Will plan for repeat CTH in the AM     o CTH was reviewed by Dr Brendan Beauchamp and team  - Stable to improving scan in comparison to previous scan   o Discussed plan of care an patient's exam with wife, Danni  - Family noted that plan of care was also discussed by Dr Brendan Beauchamp  Neurosurgery will follow PRN until he is discharged to rehab sometime today around 2pm per  CM  Please call with any questions or concerns  Rounds conducted with Gladis Dawn RN      Subjective/Objective     Patient seen laying comfortably sleeping in his bed  Will open his eyes and responds to his name  Patient is slow to speak however showed evidence of his sense of humor  Unable to with his left arm with much strength but placed effort into responding to commands  Patient denied any symptoms of headaches, nausea, dizziness, of tingling  I/O       04/20 0701 - 04/21 0700 04/21 0701 - 04/22 0700 04/22 0701 - 04/23 0700    P  O  0 0 0    NG/ 200     Feedings 350 560     Total Intake(mL/kg) 550 (7 3) 760 (10 1) 0 (0)    Urine (mL/kg/hr) 1650 (0 9) 1000 (0 6) 600 (1 7)    Stool 0      Total Output 1650 1000 600    Net -1100 -240 -600           Unmeasured Urine Occurrence  2 x     Unmeasured Stool Occurrence 1 x            Invasive Devices     Peripheral Intravenous Line            Peripheral IV 04/19/21 Left;Proximal;Ventral (anterior) Forearm 3 days          Drain            Gastrostomy/Enterostomy Percutaneous endoscopic gastrostomy (PEG) -- days    External Urinary Catheter Medium 6 days                Physical Exam:  Vitals: Blood pressure 131/74, pulse 99, temperature 98 6 °F (37 °C), resp  rate 16, height 5' 9" (1 753 m), weight 74 9 kg (165 lb 2 oz), SpO2 97 %  ,Body mass index is 24 38 kg/m²  Hemodynamic Monitoring: MAP: Arterial Line MAP (mmHg): 96 mmHg    General appearance: alert, appears stated age, cooperative and no distress  Head: Normocephalic, without obvious abnormality, atraumatic  Eyes: EOMI, PERRL  Neck: supple, symmetrical, trachea midline and NT  Back: no kyphosis present, no tenderness to percussion or palpation  Lungs: non labored breathing  Heart: regular heart rate  Neurologic:   Mental status: sleepy, arousalble to saying his name, oriented x 3, thought content appropriate  Cranial nerves: grossly intact (Cranial nerves II-XII)  Sensory: normal to LT  Motor: moving all extremities except his left upper extremity has noted 3+-4-/5 weakness  Left Dorsiflexion 4-/5  Coordination: Unable to complete finger to nose normal bilaterally and drift bilaterally- due to his inability top keep left arm up against gravity  GCS: [id number]    Lab Results:  Results from last 7 days   Lab Units 04/22/21  0751 04/20/21  0505 04/18/21  0553  04/16/21  0543 04/15/21  1712   WBC Thousand/uL 11 05* 9 37 8 72   < > 16 12* 14 14*   HEMOGLOBIN g/dL 13 8 13 1 13 7   < > 13 2 14 2   HEMATOCRIT % 40 4 38 5 39 1   < > 38 4 41 5   PLATELETS Thousands/uL 287 225 214   < > 205 215   NEUTROS PCT %  --   --  70  --  82* 90*   MONOS PCT %  --   --  10  --  9 5    < > = values in this interval not displayed       Results from last 7 days   Lab Units 04/22/21  0531 04/20/21  0505 04/18/21  0553  04/15/21  1448 04/15/21  1320   POTASSIUM mmol/L 4 9 4 3 4 1   < >  --  4 1   CHLORIDE mmol/L 107 109* 108   < >  --  110*   CO2 mmol/L 24 25 25   < >  --  25   CO2, I-STAT mmol/L  --   --   --   --  27  --    BUN mg/dL 16 13 10   < >  --  12   CREATININE mg/dL 0 56* 0 53* 0 49*   < >  --  0 63   CALCIUM mg/dL 9 6 8 9 8 7   < >  --  8 9   ALK PHOS U/L  --   --   --   --   --  164*   ALT U/L  --   --   --   --   --  43   AST U/L  --   --   --   --   --  33   GLUCOSE, ISTAT mg/dl  --   --   --   --  146*  --     < > = values in this interval not displayed  Results from last 7 days   Lab Units 04/18/21  0553 04/17/21  0453 04/16/21  0543   MAGNESIUM mg/dL 2 4 2 3 2 3     Results from last 7 days   Lab Units 04/18/21  0553 04/17/21  0453 04/16/21  0543   PHOSPHORUS mg/dL 2 9 3 1 3 5     Results from last 7 days   Lab Units 04/22/21  0531 04/16/21  0543 04/15/21  1712 04/15/21  1324   INR  1 14 1 31* 1 27*  --    PTT seconds  --  34 37 33     No results found for: TROPONINT  ABG:  Lab Results   Component Value Date    PHART 7 389 04/15/2021    KZX4VTC 42 0 04/15/2021    PO2ART 168 2 (H) 04/15/2021    VBA7UPO 24 8 04/15/2021    BEART -0 3 04/15/2021    SOURCE Line, Arterial 04/15/2021       Imaging Studies: I have personally reviewed pertinent reports  and I have personally reviewed pertinent films in PACS    Xr Chest Portable    Result Date: 4/16/2021  Impression: 1  Focal opacification left mid lung which may reflect atelectasis or pneumonia  2   Endotracheal tube as noted  3   Persistent elevation right hemidiaphragm  Workstation performed: [id number]     Ct Head Wo Contrast    Result Date: 4/16/2021  Impression: 1  Status post right-sided craniectomy and evacuation of subdural hematoma  Anticipated postoperative changes within the brain parenchyma and overlying soft tissues  Improved mass effect and midline shift  2   Areas of hemorrhagic contusion in the frontal lobes bilaterally and right temporal lobe  3   Small amount of subarachnoid hemorrhage along the high parietal convexities bilaterally   Workstation performed: [id number]     Ct Head Without Contrast    Result Date: 4/15/2021  Impression: Stable multicompartment hemorrhage compared with the CT from earlier the same day including a large subdural hematoma along the right hemisphere and subarachnoid blood in the right temporal, bilateral frontal, and bilateral parietal lobes  There is surrounding edema particularly in the bilateral frontal and right temporal lobes with stable severe mass effect and leftward midline shift  I personally discussed this study with Jason Estrada on 4/15/2021 at 2:40 PM  Workstation performed: [id number]     Ct Chest Abdomen Pelvis W Contrast    Result Date: 4/15/2021  Impression: 1  Focal opacity in the left upper lobe  This has a patchy appearance and may represent atelectasis versus a small infiltrate  Evaluation is limited by respiratory motion, however  A short interval follow-up CT in 3 months is recommended to ensure resolution  2   No acute abnormality in the abdomen or pelvis  3   Cholelithiasis  The study was marked in [facility] for immediate notification  Workstation performed: [id number]       EKG, Pathology, and Other Studies: I have personally reviewed pertinent reports

## 2021-04-22 NOTE — PROGRESS NOTES
Patient seen and examined independently this morning  He is asleep but arouses with stimulation  He mumbles, but when I ask him if he says he is okay he says yes  He spontaneously moves his right upper and lower extremity  He localizes with his left upper extremity and will spontaneously move it  He does not follow commands with his left upper extremity  His flap is sunken and soft  Head CT is stable to improved today  No plan for surgical intervention at this time  Would plan on repeating head CT in 2 weeks or if any neurologic deterioration  I discussed this with his wife

## 2021-04-22 NOTE — HOSPITAL COURSE
Mark Vicente is a 60 y.o. male who presents with worsening mental status following a previous fall and SDH. Patient fell on 4/04 in [place] and was found to have a SDH/SAH. He was treated and discharged to [facility] for rehabilitation yesterday (4/14). Patient reported to be more lethargic and was sent to the ER for evaluation. Repeat CTH showed acute on chronic subdural hemorrhage with surrounding edema and midline shift. Patient protecting his airway in the ED but with GCS of 10. Neurosurgery planning to take patient to the OR.

## 2021-04-22 NOTE — SPEECH THERAPY NOTE
Speech Language/Pathology  Speech-Language Pathology Bedside Note      Patient Name: Hannah Hernandez    [id number] Date: 4/22/2021     Problem List  Principal Problem:    SDH (subdural hematoma) (HCC)  Active Problems:    Hypertension    Acute encephalopathy    Dysphagia    Alcohol abuse    Impaired mobility and activities of daily living      Past Medical History  Past Medical History:   Diagnosis Date    Anxiety     Hypertension        Past Surgical History  Past Surgical History:   Procedure Laterality Date    BRAIN HEMATOMA EVACUATION Right 4/15/2021    Procedure: Right CRANIOTOMY FOR SUBDURAL HEMATOMA;  Surgeon: Kathie Hernandez MD;  [facility]: [facility];  Service: Neurosurgery    HAND SURGERY Left     ROTATOR CUFF REPAIR Left        Summary   Pt to d/c to facility today  Will defer full assessments at this time  Please continue w/u at admitting rehab

## 2021-04-22 NOTE — ASSESSMENT & PLAN NOTE
· S/p R craniectomy and evacuation   · Craniectomy helmet when OOB  · keppra x7 days, completed  · Continue Q4hr NC  · GCS 12 (e4v2m6), L sided hemiparesis  · PT/OT appreciated

## 2021-04-22 NOTE — CASE MANAGEMENT
Pt's Nancy Clay was approved for [facility] TBI rehab   Cm will send pt to their facility today should they be cleared by [place]

## 2021-04-22 NOTE — TRANSPORTATION MEDICAL NECESSITY
Section I - General Information    Name of Patient: Harshad Robbins                 : 1960    Medicare #: [id number]  Transport Date: 21 (PCS is valid for round trips on this date and for all repetitive trips in the 60-day range as noted below )  Origin: [address] [facility]                                                         Destination: [facility]  Is the pt's stay covered under Medicare Part A (PPS/DRG)   []     Closest appropriate facility? If no, why is transport to more distant facility required? Yes  If hospice pt, is this transport related to pt's terminal illness? No       Section II - Medical Necessity Questionnaire  Ambulance transportation is medically necessary only if other means of transport are contraindicated or would be potentially harmful to the patient  To meet this requirement, the patient must either be "bed confined" or suffer from a condition such that transport by means other than ambulance is contraindicated by the patient's condition  The following questions must be answered by the medical professional signing below for this form to be valid:    1)  Describe the MEDICAL CONDITION (physical and/or mental) of this patient AT [address] [place] [address] that requires the patient to be transported in an ambulance and why transport by other means is contraindicated by the patient's condition: fall, SDH, s/p crani    2) Is the patient "bed confined" as defined below? Yes  To be "be confined" the patient must satisfy all three of the following conditions: (1) unable to get up from bed without Assistance; AND (2) unable to ambulate; AND (3) unable to sit in a chair or wheelchair  3) Can this patient safely be transported by car or wheelchair van (i e , seated during transport without a medical attendant or monitoring)?    No    4) In addition to completing questions 1-3 above, please check any of the following conditions that apply*:   *Note: supporting documentation for any boxes checked must be maintained in the patient's medical records  If hosp-hosp transfer, describe services needed at 2nd facility not available at 1st facility? Patient is confused  Moderate/severe pain on movement   Unable to tolerate seated position for time needed to transport       Section III - Signature of Physician or Healthcare Professional  I certify that the above information is true and correct based on my evaluation of this patient, and represent that the patient requires transport by ambulance and that other forms of transport are contraindicated  I understand that this information will be used by the Centers for Medicare and Medicaid Services (CMS) to support the determination of medical necessity for ambulance services, and I represent that I have personal knowledge of the patient's condition at time of transport  []  If this box is checked, I also certify that the patient is physically or mentally incapable of signing the ambulance service's claim and that the institution with which I am affiliated has furnished care, services, or assistance to the patient  My signature below is made on behalf of the patient pursuant to 42 CFR §424 36(b)(4)  In accordance with 42 CFR §424 37, the specific reason(s) that the patient is physically or mentally incapable of signing the claim form is as follows:      Signature of Physician* or Healthcare Professional______________________________________________________________  Signature Date 04/22/21 (For scheduled repetitive transports, this form is not valid for transports performed more than 60 days after this date)    Printed Name & Credentials of Physician or Healthcare Professional (MD, DO, RN, etc )___Felix Clemens _____________________________  *Form must be signed by patient's attending physician for scheduled, repetitive transports   For non-repetitive, unscheduled ambulance transports, if unable to obtain the signature of the attending physician, any of the following may sign (choose appropriate option below)  [] Physician Assistant []  Clinical Nurse Specialist []  Registered Nurse  []  Nurse Practitioner  [x] Discharge Planner

## 2021-04-22 NOTE — DISCHARGE SUMMARY
[facility]  Discharge- Zayra Watson 1960, 62 y o  male MRN: [id number]  Unit/Bed#: [facility] 620-01 Encounter: [id number]  Primary Care Provider: ANIA Angulo   Date and time admitted to hospital: 4/15/2021 12:50 PM    Impaired mobility and activities of daily living  [address] [place] [address]  - PMR consult  - DC to [facility] today    Alcohol abuse  Assessment & Plan  · Per wife has had withdrawl seizure in the past  · Was treated for withdrawl with extended course librium at OSH    Dysphagia  Assessment & Plan  · S/p PEG tube placement  · All meds/feeds through PEG  · Consider speech consult if mental status improves    Acute encephalopathy  Assessment & Plan  · 2/2 SDH as above    Hypertension  Assessment & Plan  · Continue lisinopril   · PRN hydralazine     * SDH (subdural hematoma) (HCC)  Assessment & Plan  · S/p R craniectomy and evacuation   · Craniectomy helmet when OOB  · keppra x7 days, completed  · Continue Q4hr NC  · GCS 12 (e4v2m6), L sided hemiparesis  · PT/OT appreciated      Disposition: Discharge to [facility], follow up with Neurosurgery and PCP        SUBJECTIVE:  Chief Complaint: "yes"    Subjective:  Patient softly responds "yes" when asked if he is doing okay  Patient has GCS of 14 (4, 4, 6) which is baseline          OBJECTIVE:     Meds/Allergies     Current Facility-Administered Medications:     acetaminophen (TYLENOL) oral suspension 650 mg, 650 mg, Per PEG Tube, Q6H PRN, Nguyen Rodas PA-C, 650 mg at 04/20/21 9638    hydrALAZINE (APRESOLINE) injection 10 mg, 10 mg, Intravenous, Q6H PRN, Nguyen Rodas PA-C, 10 mg at 04/18/21 0044    HYDROmorphone (DILAUDID) injection 0 5 mg, 0 5 mg, Intravenous, Q4H PRN, Nguyen Rodas PA-C    insulin lispro (HumaLOG) 100 units/mL subcutaneous injection 1-5 Units, 1-5 Units, Subcutaneous, Q6H [address], 1 Units at 04/22/21 0022 **AND** Fingerstick Glucose (POCT), , , Q6H, Nguyen Rodas PA-C    Labetalol HCl (Simeon Warner) injection 10 mg, 10 mg, Intravenous, Q4H PRN, GradyJESSICA Ramsey-C, 10 mg at 04/16/21 2118    lisinopril (ZESTRIL) tablet 40 mg, 40 mg, Per PEG Tube, Daily, GradyJESSICA Ramsey-NICKOLAS, 40 mg at 04/22/21 0838    moisture barrier miconazole 2% cream (aka FAUSTINA MOISTURE BARRIER ANTIFUNGAL CREAM), , Topical, BID, Anya Romo PA-C, Given at 04/22/21 0838    ondansetron (ZOFRAN) injection 4 mg, 4 mg, Intravenous, Q6H PRN, GradyJESSICA Ramsey-NICKOLAS    oxyCODONE (ROXICODONE) oral solution 2 5 mg, 2 5 mg, Per PEG Tube, Q4H PRN **OR** oxyCODONE (ROXICODONE) oral solution 5 mg, 5 mg, Per PEG Tube, Q4H PRN, Grady Jane PA-C, 5 mg at 04/18/21 0858    senna oral syrup 8 8 mg, 8 8 mg, Per PEG Tube, HS, Grady Jane PA-C, Stopped at 04/20/21 2124     Vitals:   Vitals:    04/22/21 0728   BP: 131/74   Pulse: 99   Resp: 16   Temp: 98 6 °F (37 °C)   SpO2: 97%       Intake/Output:  I/O       04/20 0701 - 04/21 0700 04/21 0701 - 04/22 0700 04/22 0701 - 04/23 0700    P  O  0 0 0    NG/ 200     Feedings 350 560     Total Intake(mL/kg) 550 (7 3) 760 (10 1) 0 (0)    Urine (mL/kg/hr) 1650 (0 9) 1000 (0 6) 600 (2 3)    Stool 0      Total Output 1650 1000 600    Net -1100 -240 -600           Unmeasured Urine Occurrence  2 x     Unmeasured Stool Occurrence 1 x             Nutrition/GI Proph/Bowel Reg: Senna syrup    Physical Exam:   GENERAL APPEARANCE: Patient in no acute distress  Sleeping  HEENT: Staples in place, healing surgical wound; PERRL, EOMs intact; Mucous membranes moist  CV: Regular rate and rhythm; no murmur/gallops/rubs appreciated  CHEST / LUNGS: Clear to auscultation; no wheezes/rales/rhonci  ABD: NABS; soft; non-distended; non-tender  : Condom catheter in place  EXT: Left sided hemiparalysis improving, moves LUE, LLE spontaneously  +2 pulses bilaterally upper & lower extremities; no edema  NEURO: GCS 14 (4, 4, 6);  neurovascularly intact  SKIN: Warm, dry and well perfused; no rash; no jaundice        Invasive Devices Peripheral Intravenous Line            Peripheral IV 04/19/21 Left;Proximal;Ventral (anterior) Forearm 3 days          Drain            Gastrostomy/Enterostomy Percutaneous endoscopic gastrostomy (PEG) -- days    External Urinary Catheter Medium 6 days                 Lab Results:   BMP/CMP:   Lab Results   Component Value Date    SODIUM 139 04/22/2021    K 4 9 04/22/2021     04/22/2021    CO2 24 04/22/2021    BUN 16 04/22/2021    CREATININE 0 56 (L) 04/22/2021    CALCIUM 9 6 04/22/2021    EGFR 113 04/22/2021    and CBC:   Lab Results   Component Value Date    WBC 11 05 (H) 04/22/2021    HGB 13 8 04/22/2021    HCT 40 4 04/22/2021    MCV 97 04/22/2021     04/22/2021    MCH 33 3 04/22/2021    MCHC 34 2 04/22/2021    RDW 12 1 04/22/2021    MPV 12 0 04/22/2021     Imaging/EKG Studies: Results: I have personally reviewed pertinent reports  Other Studies:   CT head wo contrast   Final Result by Manuel Howell DO (04/22 1090)   1  Status post right-sided craniectomy  Resolving fluid, air and blood products in the extra-axial space along the right cerebral convexity  2   Stable areas of hemorrhagic contusion in the frontal lobes bilaterally, right side larger than left, as well as the right temporal lobe  3   Slight decrease in the size of the collection of fluid, air and blood products in the soft tissues above the dura along the right craniectomy site  There is improved mass effect and midline shift  Midline shift now measures 9 mm to the left    (previously 12 mm)  Workstation performed: [id number]         CT head wo contrast   Final Result by Aissatou Aguilar DO (04/21 2446)      There is an enlarging hematoma identified within the extradural space anteriorly at the craniectomy site on the right  This results in increasing mass effect upon the lateral aspect of the right frontal lobe and increasing right to left shift, now    measuring 1 3 cm compared to  0 5 cm previously  Basilar cisterns remain patent  Stable sequela of hemorrhagic contusions within the inferior frontal lobes and right posterior lateral temporal lobe undergoing expected evolutionary change  Slight improvement in mild residual peripheral subarachnoid hemorrhage  I personally discussed this study with Dr Josette Boyd of neurosurgery on 4/21/2021 at 3:46 PM                            Workstation performed: [id number]         CT head wo contrast   Final Result by Julio Edwards DO (04/16 0018)   1  Status post right-sided craniectomy and evacuation of subdural hematoma  Anticipated postoperative changes within the brain parenchyma and overlying soft tissues  Improved mass effect and midline shift  2   Areas of hemorrhagic contusion in the frontal lobes bilaterally and right temporal lobe  3   Small amount of subarachnoid hemorrhage along the high parietal convexities bilaterally  Workstation performed: [id number]         XR chest portable   Final Result by Cherelle Barbosa MD (04/16 0830)         1  Focal opacification left mid lung which may reflect atelectasis or pneumonia  2   Endotracheal tube as noted  3   Persistent elevation right hemidiaphragm  Workstation performed: [id number]         CT head without contrast   Final Result by Catherine Leija MD (04/15 1441)      Stable multicompartment hemorrhage compared with the CT from earlier the same day including a large subdural hematoma along the right hemisphere and subarachnoid blood in the right temporal, bilateral frontal, and bilateral parietal lobes  There is    surrounding edema particularly in the bilateral frontal and right temporal lobes with stable severe mass effect and leftward midline shift         I personally discussed this study with [employer] on 4/15/2021 at 2:40 PM                         Workstation performed: [id number]         CT chest abdomen pelvis w contrast   Final Result by Reid Moeller MD (24/07 8573)      1  Focal opacity in the left upper lobe  This has a patchy appearance and may represent atelectasis versus a small infiltrate  Evaluation is limited by respiratory motion, however  A short interval follow-up CT in 3 months is recommended to ensure    resolution  2   No acute abnormality in the abdomen or pelvis  3   Cholelithiasis  The study was marked in [facility] for immediate notification  Workstation performed: [id number]         CT head wo contrast    (Results Pending)   CT head wo contrast    (Results Pending)       VTE Prophylaxis:Sequential compression device Abundio Emily)            Resolved Problems  Date Reviewed: 4/22/2021          Resolved    Hyperchloremia 4/16/2021     Resolved by  James Lopez PA-C    Acute respiratory failure ([facility] [address] ) 4/18/2021     Resolved by  Rober Mondragon PA-C          Admission Date:   Admission Orders (From admission, onward)     Ordered        04/15/21 1524  Inpatient Admission  Once         04/15/21 1523  Inpatient Admission  Once                     Admitting Diagnosis: Subdural hematoma ([address] ) [S06 5X9A]  Intracranial bleed ([address] ) [I62 9]    HPI written by Kristian Link PA-C 4/15/2021 "Maggie Duncan is a 62 y o  male who presents with worsening mental status following a previous fall and SDH  Patient fell on 4/04 in [facility] and was found to have a SDH/SAH  He was treated and discharged to [facility] for rehabilitation yesterday  Patient reported to be more lethargic and was sent to the ER for evaluation  Repeat CTH showed acute on chronic subdural hemorrhage with surrounding edema and midline shift  Patient protecting his airway in the ED but with GCS of 10  Neurosurgery planning to take patient to the OR "    Procedures Performed: No orders of the defined types were placed in this encounter        Summary of Hospital Course:  52-year-old male evaluated by trauma service after history of traumatic subdural/subarachnoid hemorrhages and worsening GCS at his rehabilitation center  Repeat imaging indicated worsening of intracerebral hemorrhages  Patient was admitted to critical care and taken to the OR with Neurosurgery 4/15 for right craniotomy for evacuation of right SDH  Patient was transitioned out of the ICU to step down 2 level of care 4/18 after YANETH drain removal   He was on Keppra 500 mg b i d  For 1 week for seizure prophylaxis  SCD only for DVT prophylaxis  He was evaluated and treated by Physical therapy, Occupational therapy, consultation by Physical Medicine Rehab  Patient will be discharged to [facility] rehabilitation  He will follow up outpatient with Neurosurgery  Significant Findings, Care, Treatment and Services Provided: Neurosurgery intervention of subdural hematoma  Complications: None    Condition at Discharge: stable         Discharge instructions/Information to patient and family:   See after visit summary for information provided to patient and family  Provisions for Follow-Up Care:  See after visit summary for information related to follow-up care and any pertinent home health orders  PCP: ANIA Angulo    Disposition: [facility] Rehab     Planned Readmission: No    Discharge Statement   I spent 25 minutes discharging the patient  This time was spent on the day of discharge  I had direct contact with the patient on the day of discharge  Additional documentation is required if more than 30 minutes were spent on discharge  Discharge Medications:  See after visit summary for reconciled discharge medications provided to patient and family

## 2021-04-22 NOTE — ASSESSMENT & PLAN NOTE
7 Days Post-Op Procedure(s):  Right CRANIOTOMY FOR SUBDURAL HEMATOMA    · Transfer from TBI rehab with [facility], found to have large right sided SDH with midline shift and brain compression  · Recent hospitalization from 04/04/2021-04/14/2021 in [facility] secondary to fall on the sidewalk with head strike, noted to have right subdural hematoma, GCS 14 on discharge to TBI rehab with PEG tube    Imaging reviewed personally and with attending, results are as follows:   CT head without contrast 04/16/2021:  Status post right-sided craniectomy and evacuation of subdural hematoma  Anticipated postoperative changes of the brain parenchyma and overlying soft tissues  Improved mass effect and midline shift  Areas of hemorrhagic contusion in the frontal lobes bilaterally and right temporal lobe  Small amount of subarachnoid hemorrhage along the high parietal convexities bilaterally   [facility] 4/21/21: There is an enlarging hematoma identified within the extradural space anteriorly at the craniectomy site on the right  This results in increasing mass effect upon the lateral aspect of the right frontal lobe and increasing right to left shift, now measuring 1 3 cm compared to  0 5 cm previously  Basilar cisterns remain patent  Stable sequela of hemorrhagic contusions within the inferior frontal lobes and right posterior lateral temporal lobe undergoing expected evolutionary change  Slight improvement in mild residual peripheral subarachnoid hemorrhage    Plan:   Continue to monitor neuro exam   Repeat CT head stat if GCS declines more than 2 points in 1 hour   YANETH drain was d/c'ed 4/18/21   Systolic blood pressure goal less than 160   Hold all anticoagulation/antiplatelet therapy   Continue Keppra 500 mg b i d  X1 week for seizure prophylaxis   Medical management and pain control per primary team   DVT ppx:  SCDs, hold pharm dvt ppx at this time      Eval and Mobilize as tolerated by PT/OT   Pt noted to have increased hematoma in the right extradural space in [address] today compared to prior CT  Will plan for repeat CTH in the AM     o CTH was reviewed by Dr Giuseppe Valdivia and team  - Stable to improving scan in comparison to previous scan   o Discussed plan of care an patient's exam with wife, Emma Palomo  - Family noted that plan of care was also discussed by Dr Giuseppe Valdivia  Neurosurgery will follow PRN until he is discharged to rehab sometime today around 2pm per  CM  Please call with any questions or concerns       Rounds conducted with Jesusita Bryant

## 2021-04-22 NOTE — PROGRESS NOTES
Pastoral Care Progress Note    2021  Patient: Merlin Better : 1960  Admission Date & Time: 4/15/2021 1250  MRN: [id number] [facility]: [id number]                     Chaplaincy Interventions Utilized:   Empowerment: Encouraged focus on present, Normalized experience of patient/family and Provided chaplaincy education    Exploration: Explored hope, Explored emotional needs & resources and Explored spiritual needs & resources    Relationship Building: Cultivated a relationship of care and support and Listened empathically    Chaplaincy Outcomes Achieved:  Expressed gratitude, Expressed intermediate hope and Identified meaningful connections    Spiritual Coping Strategies Utilized:   Connectedness       21 1200   Clinical Encounter Type   Visited With Patient   Routine Visit Introduction

## 2021-04-22 NOTE — ASSESSMENT & PLAN NOTE
· Per wife has had withdrawl seizure in the past  · Was treated for withdrawl with extended course librium at OSH

## 2021-04-22 NOTE — PROGRESS NOTES
04/22/21 1300   Clinical Encounter Type   Visited With Patient   Sikhism Encounters   Sikhism Needs Prayer   Sacramental Encounters   Sacrament of Sick-Anointing Anointed

## 2021-04-23 ENCOUNTER — TRANSITIONAL CARE MANAGEMENT (OUTPATIENT)
Dept: FAMILY MEDICINE CLINIC | Facility: CLINIC | Age: 61
End: 2021-04-23

## 2021-04-23 NOTE — PROGRESS NOTES
Called patient, patient wife answered the phone patient was sent to rehab with [facility] [place] once he is discharge from rehab she will call us to schedule an appointment

## 2021-04-23 NOTE — UTILIZATION REVIEW
Notification of Discharge   This is a Notification of Discharge from our facility [address]  Please be advised that this patient has been discharge from our facility  Below you will find the admission and discharge date and time including the patients disposition  UTILIZATION REVIEW CONTACT:  Ruma Humphrey  Utilization   Network Utilization Review Department  Phone: [phone] x carefully listen to the prompts  All voicemails are confidential   Email: [email or web address]     PHYSICIAN ADVISORY SERVICES:  FOR ENAJ-YK-BRRE REVIEW - MEDICAL NECESSITY DENIAL  Phone: [phone]  Fax: [phone]  Email: [email or web address]     PRESENTATION DATE: 4/15/2021 12:50 PM  OBERVATION ADMISSION DATE:   INPATIENT ADMISSION DATE: 4/15/21 1523   DISCHARGE DATE: 4/22/2021  3:14 PM  DISPOSITION: Non [facility] Non [facility]      IMPORTANT INFORMATION:  Send all requests for admission clinical reviews, approved or denied determinations and any other requests to dedicated fax number below belonging to the campus where the patient is receiving treatment   List of dedicated fax numbers:  [address] [place] [address] DENIALS (Administrative/Medical Necessity) [phone]   [address] [place] [address] (Maternity/NICU/Pediatrics) [phone]   [place] Landaverde [phone]   Latonya Saldana [phone]   [place]awk Moreno [phone]   Vito Worthington [facility] [address] [facility] [phone]   [facility]  [phone]   [address] [facility]  [address] [address] [facility] [phone]

## 2021-04-27 NOTE — TELEPHONE ENCOUNTER
Received call back from [place] [phone]  Confirmed follow up appt with them and reminded them that patient needs CT head prior to appt on 5/5  He will be completing it there on 5/3 and they will have the disc w/ the report couriered to our office after it is completed

## 2021-05-05 ENCOUNTER — TELEMEDICINE (OUTPATIENT)
Dept: NEUROSURGERY | Facility: CLINIC | Age: 61
End: 2021-05-05

## 2021-05-05 DIAGNOSIS — S06.5X9A SDH (SUBDURAL HEMATOMA) (HCC): Primary | ICD-10-CM

## 2021-05-05 PROCEDURE — 99024 POSTOP FOLLOW-UP VISIT: CPT | Performed by: PHYSICIAN ASSISTANT

## 2021-05-05 RX ORDER — OXYCODONE HYDROCHLORIDE 5 MG/1
5 CAPSULE ORAL EVERY 6 HOURS PRN
COMMUNITY
End: 2021-05-21 | Stop reason: SDUPTHER

## 2021-05-05 RX ORDER — ACETAMINOPHEN 500 MG
500 TABLET ORAL EVERY 4 HOURS PRN
COMMUNITY
End: 2022-03-10 | Stop reason: HOSPADM

## 2021-05-05 RX ORDER — LISINOPRIL 20 MG/1
20 TABLET ORAL DAILY
COMMUNITY
End: 2021-05-21 | Stop reason: SDUPTHER

## 2021-05-05 RX ORDER — PANTOPRAZOLE SODIUM 20 MG/1
20 TABLET, DELAYED RELEASE ORAL DAILY
COMMUNITY
End: 2021-05-21 | Stop reason: DRUGHIGH

## 2021-05-05 NOTE — PROGRESS NOTES
Virtual Regular Visit      Assessment/Plan:    Problem List Items Addressed This Visit        Nervous and Auditory    SDH (subdural hematoma) ([facility] [address] ) - Primary     2 weeks s/p right craniectomy for evacuation of SDH on 4/15/21 by Dr Trung Mcallister  · Transfer from TBI rehab with AMS, found to have large right sided SDH with midline shift and brain compression  · Recent hospitalization from 04/04/2021-04/14/2021 at [facility], [facility] in [place] secondary to fall on the sidewalk with head strike, noted to have right subdural hematoma, GCS 14 on discharge to TBI rehab with PEG tube  · LUE weakness post op with confusion  · Patient currently at [facility] making excellent progress    Imaging:   CT head without contrast, 5/3/21: imaging was obtained at [facility]   This shows evidence of a right-sided craniectomy defect with a persistent 6 cm fluid collection  There is slightly less mass effect compared to CT head on 04/22/2021  Acute blood appears to be resolving  Overall, CT is improved compared to priors  Plan:  · Patient is currently A&O x4, able to answer all questions appropriately and make jokes, and can move all extremities antigravity  He is wearing a craniectomy home it  He can raise his left upper extremity above his head  He feels he is making excellent progress  Per provider report, he is able to walk 300 ft with assistance  · He is scheduled to be discharged from [facility] to home tomorrow  · Continue to work craniectomy helmet at all times  · Continue to avoid all anticoagulant and anti-platelet medication  · Call with any exam changes, worsening headaches or dizziness, or falls  · Patient will follow-up in 4 weeks for a 6 week postoperative visit with Dr Shoshana Lombard  Patient should obtain a repeat CT head prior to that appointment                 Reason for visit is   Chief Complaint   Patient presents with    Virtual Regular Visit        Encounter provider Leidy Carpio PA-C    Provider located at [address] [place]  [address] [place] [id number] [phone]      Recent Visits  No visits were found meeting these conditions  Showing recent visits within past 7 days and meeting all other requirements     Today's Visits  Date Type Provider Dept   05/05/21 Telemedicine Vazquez Sullivan today's visits and meeting all other requirements     Future Appointments  No visits were found meeting these conditions  Showing future appointments within next 150 days and meeting all other requirements        The patient was identified by name and date of birth  Karo Ledezma was informed that this is a telemedicine visit and that the visit is being conducted through [address] [place] [address] Now and patient was informed that this is a secure, HIPAA-compliant platform  He agrees to proceed     My office door was closed  No one else was in the room  He acknowledged consent and understanding of privacy and security of the video platform  The patient has agreed to participate and understands they can discontinue the visit at any time  This appointment was transitioned to a telephone call 10 minutes into the appointment secondary to difficulties with Internet connection  Patient is aware this is a billable service  Subjective  Karo Ledezma is a 62 y o  male with past medical history significant for hypertension, anxiety who presented to [facility] on 04/15/2021 from TBI rehab with altered mental status  He had fallen onto the sidewalk and was hospitalized at [facility], [facility] in [address] [place] [address] for 10 days with a right subdural hematoma  Unfortunately, upon his discharge he developed altered mental status and was transferred to our hospital for operative management  On initial exam, he was nonverbal and unable to provide any history  He had failed a swallow study and had a PEG tube placed    He underwent right craniectomy for subdural evaluation on 04/15/2021 by Dr Portia Cody  Repeat CT head on 04/21/2021 did show an enlarging extradural hematoma at the craniectomy site  However, he was deemed stable for discharge to [facility]   Postoperatively he did have persistent confusion and mental slowness in addition to left upper extremity decreased strength  Today he is doing very well  He is wearing his craniectomy helmet  He states that he can walk approximately 300 ft with assistance  He has improved strength in his left upper extremity and can lift his arm above his head  He is able to answer all orientation questions appropriately  He is able to make jokes and have a conversation  He is scheduled to be discharged from [facility] tomorrow and go home with his wife  Incision is CDI  There is some pressure over the incision  There is no drainage or erythema per provider report as Internet was not working well during this part of the exam   The AP will remove staples prior to his discharge  CT head was reviewed with the patient and his wife today  It shows a it slowly improving right-sided extra-axial collection with slightly improved midline shift  There is still some pneumocephalus present  At this time, he should follow-up in 4 weeks for his postoperative visit  He will need a repeat CT head prior to that appointment  All questions were answered to their satisfaction      HPI     Past Medical History:   Diagnosis Date    Anxiety     Hypertension        Past Surgical History:   Procedure Laterality Date    BRAIN HEMATOMA EVACUATION Right 4/15/2021    Procedure: Right CRANIOTOMY FOR SUBDURAL HEMATOMA;  Surgeon: Kat Renteria MD;  [facility];  Service: Neurosurgery    HAND SURGERY Left     ROTATOR CUFF REPAIR Left        Current Outpatient Medications   Medication Sig Dispense Refill    acetaminophen (TYLENOL) 500 mg tablet Take 500 mg by mouth every 4 (four) hours as needed for mild pain      lisinopril (ZESTRIL) 20 mg tablet Take 20 mg by mouth daily      metoprolol tartrate (LOPRESSOR) 25 mg tablet Take 25 mg by mouth every 12 (twelve) hours      Multiple Vitamin (MULTIVITAMIN) tablet Take 1 tablet by mouth daily      oxyCODONE (OXY-IR) 5 MG capsule Take 5 mg by mouth every 6 (six) hours as needed for moderate pain      pantoprazole (PROTONIX) 20 mg tablet Take 20 mg by mouth daily       No current facility-administered medications for this visit  No Known Allergies    Review of Systems   HENT: Negative  Eyes: Negative  Respiratory: Negative  Cardiovascular: Negative  Gastrointestinal: Negative  Endocrine: Negative  Genitourinary: Negative  Musculoskeletal: Positive for gait problem (rolling walker w/supervision)  Skin: Negative  Allergic/Immunologic: Negative  Neurological: Positive for headaches (intermittent; improving)  Hematological: Negative  Psychiatric/Behavioral: Negative  All other systems reviewed and are negative  ROS was personally reviewed and changes made as needed     Video Exam    There were no vitals filed for this visit  Physical Exam     AOx4, able to perform simple calculations and name 3 cities in [address] [place] [address] to name president and make jokes  Able to lift all extremities antigravity; able to lift LUE above his head  Craniectomy helmet in place  Incision healing well without drainage  Some eschar present over incision  AP at [facility] will remove stables today  I spent 20 minutes with patient today in which greater than 50% of the time was spent in counseling/coordination of care regarding imaging review, physical exam, treatment plan      VIRTUAL VISIT [address] acknowledges that he has consented to an online visit or consultation   He understands that the online visit is based solely on information provided by him, and that, in the absence of a face-to-face physical evaluation by the physician, the diagnosis he receives is both limited and provisional in terms of accuracy and completeness  This is not intended to replace a full medical face-to-face evaluation by the physician  Harshad Robbins understands and accepts these terms

## 2021-05-05 NOTE — ASSESSMENT & PLAN NOTE
2 weeks s/p right craniectomy for evacuation of SDH on 4/15/21 by Dr Jakob Lopez  · Transfer from TBI rehab with AMS, found to have large right sided SDH with midline shift and brain compression  · Recent hospitalization from 04/04/2021-04/14/2021 at [facility], [facility] in [place] secondary to fall on the sidewalk with head strike, noted to have right subdural hematoma, GCS 14 on discharge to TBI rehab with PEG tube  · LUE weakness post op with confusion  · Patient currently at [facility] making excellent progress    Imaging:   CT head without contrast, 5/3/21: imaging was obtained at [facility]   This shows evidence of a right-sided craniectomy defect with a persistent 6 cm fluid collection  There is slightly less mass effect compared to CT head on 04/22/2021  Acute blood appears to be resolving  Overall, CT is improved compared to priors  Plan:  · Patient is currently A&O x4, able to answer all questions appropriately and make jokes, and can move all extremities antigravity  He is wearing a craniectomy home it  He can raise his left upper extremity above his head  He feels he is making excellent progress  Per provider report, he is able to walk 300 ft with assistance  · He is scheduled to be discharged from [facility] to home tomorrow  · Continue to work craniectomy helmet at all times  · Continue to avoid all anticoagulant and anti-platelet medication  · Call with any exam changes, worsening headaches or dizziness, or falls  · Patient will follow-up in 4 weeks for a 6 week postoperative visit with Dr Haim Orozco  Patient should obtain a repeat CT head prior to that appointment

## 2021-05-19 ENCOUNTER — OFFICE VISIT (OUTPATIENT)
Dept: SURGERY | Facility: CLINIC | Age: 61
End: 2021-05-19
Payer: COMMERCIAL

## 2021-05-19 ENCOUNTER — HOSPITAL ENCOUNTER (OUTPATIENT)
Dept: RADIOLOGY | Facility: HOSPITAL | Age: 61
Discharge: HOME | End: 2021-05-19
Payer: COMMERCIAL

## 2021-05-19 ENCOUNTER — TRANSCRIBE ORDERS (OUTPATIENT)
Dept: RADIOLOGY | Facility: HOSPITAL | Age: 61
End: 2021-05-19

## 2021-05-19 VITALS — HEIGHT: 69 IN | WEIGHT: 171 LBS | TEMPERATURE: 99.7 F | BODY MASS INDEX: 25.33 KG/M2

## 2021-05-19 DIAGNOSIS — S06.5X9A SDH (SUBDURAL HEMATOMA) (HCC): ICD-10-CM

## 2021-05-19 DIAGNOSIS — Z93.1 STATUS POST INSERTION OF PERCUTANEOUS ENDOSCOPIC GASTROSTOMY (PEG) TUBE (HCC): Primary | ICD-10-CM

## 2021-05-19 PROCEDURE — 70450 CT HEAD/BRAIN W/O DYE: CPT

## 2021-05-19 PROCEDURE — 99213 OFFICE O/P EST LOW 20 MIN: CPT | Performed by: SURGERY

## 2021-05-19 PROCEDURE — G1004 CDSM NDSC: HCPCS

## 2021-05-19 PROCEDURE — 1036F TOBACCO NON-USER: CPT | Performed by: SURGERY

## 2021-05-19 NOTE — ASSESSMENT & PLAN NOTE
Feeding tube no longer needed  Tube was removed without incident in the office  He is asked to stay NPO for couple of hours  I instructed them that he may continue to have some drainage but it should settle down in the next few days  See him back here if needed

## 2021-05-19 NOTE — PROGRESS NOTES
Office Visit - General Surgery  Bernardo Alford MRN: [id number]  Encounter: [id number]    Assessment and Plan    Problem List Items Addressed This Visit        Other    Status post insertion of percutaneous endoscopic gastrostomy (PEG) tube ([facility] [address] ) - Primary      Feeding tube no longer needed  Tube was removed without incident in the office  He is asked to stay NPO for couple of hours  I instructed them that he may continue to have some drainage but it should settle down in the next few days  See him back here if needed  Chief Complaint:  Bernardo Alford is a 62 y o  male who presents for Follow-up (Peg tube removal )    Subjective  72-year-old male status post  PEG tube     He is tolerating diet having no problems no longer needs a PEG tube    Past Medical History  Past Medical History:   Diagnosis Date    Anxiety     Hypertension        Past Surgical History  Past Surgical History:   Procedure Laterality Date    BRAIN HEMATOMA EVACUATION Right 4/15/2021    Procedure: Right CRANIOTOMY FOR SUBDURAL HEMATOMA;  Surgeon: Sadiq Raymond MD;  [facility];  Service: Neurosurgery    HAND SURGERY Left     ROTATOR CUFF REPAIR Left        Family History  Family History   Problem Relation Age of Onset    Heart disease Mother     Hypertension Father     Dementia Father        Social History  Social History     Socioeconomic History    Marital status: /Civil Union     Spouse name: None    Number of children: None    Years of education: None    Highest education level: None   Occupational History    Occupation: Retired   Social Needs    Financial resource strain: None    Food insecurity     Worry: None     Inability: None    Transportation needs     Medical: None     Non-medical: None   Tobacco Use    Smoking status: Never Smoker    Smokeless tobacco: Never Used   Substance and Sexual Activity    Alcohol use: Yes     Frequency: 4 or more times a week    Drug use: Not Currently    Sexual activity: None   Lifestyle    Physical activity     Days per week: None     Minutes per session: None    Stress: None   Relationships    Social connections     Talks on phone: None     Gets together: None     Attends Sikhism service: None     Active member of club or organization: None     Attends meetings of clubs or organizations: None     Relationship status: None    Intimate partner violence     Fear of current or ex partner: None     Emotionally abused: None     Physically abused: None     Forced sexual activity: None   Other Topics Concern    None   Social History Narrative    None        Medications  Current Outpatient Medications on File Prior to Visit   Medication Sig Dispense Refill    acetaminophen (TYLENOL) 500 mg tablet Take 500 mg by mouth every 4 (four) hours as needed for mild pain      lisinopril (ZESTRIL) 20 mg tablet Take 20 mg by mouth daily      metoprolol tartrate (LOPRESSOR) 25 mg tablet Take 25 mg by mouth every 12 (twelve) hours      Multiple Vitamin (MULTIVITAMIN) tablet Take 1 tablet by mouth daily      oxyCODONE (OXY-IR) 5 MG capsule Take 5 mg by mouth every 6 (six) hours as needed for moderate pain      pantoprazole (PROTONIX) 20 mg tablet Take 20 mg by mouth daily       No current facility-administered medications on file prior to visit          Allergies  No Known Allergies    Review of Systems    Objective  Vitals:    05/19/21 1259   Temp: 99 7 °F (37 6 °C)       Physical Exam    abdomen:  Peg tube upper midline

## 2021-05-20 NOTE — PROGRESS NOTES
Transition of Care  Follow-up After Hospitalization    Karo Ledezma 62 y o  male   Date:  5/21/2021    TCM Call (since 4/20/2021)     Date and time call was made  4/23/2021  8:22 9 Asia Rutledge care reviewed  Records reviewed    Patient was hospitialized at  [facility]        Date of Admission  04/15/21    Date of discharge  04/22/21    Diagnosis  SDH (subdural hematoma)    Disposition  Home      TCM Call (since 4/20/2021)     Scheduled for follow up? Yes    Did you obtain your prescribed medications  Yes    Do you need help managing your prescriptions or medications  No    Is transportation to your appointment needed  No    I have advised the patient to call PCP with any new or worsening symptoms  Devang FALL     Are you recieving any outpatient services  No    Are you recieving home care services  No    Are you using any community resources  No    Current waiver services  No    Have you fallen in the last 12 months  No    Interperter language line needed  No    Counseling  Patient          Hospital records were reviewed  Medications upon discharge reviewed/updated  Medication Changes: none  Imaging: CTH W/O  Consults: Neurosurgery, PMR,   Follow up visits with other specialists: Neurosurgery      Assessment and Plan:    Antonio Ramsey was seen today for transition of care management  Diagnoses and all orders for this visit:    Encounter for support and coordination of transition of care    Essential hypertension  -     metoprolol tartrate (LOPRESSOR) 25 mg tablet; Take 1 tablet (25 mg total) by mouth every 12 (twelve) hours  -     lisinopril (ZESTRIL) 20 mg tablet; Take 1 tablet (20 mg total) by mouth daily    Status post craniotomy  -     oxyCODONE (OXY-IR) 5 MG capsule;  Take 1 capsule (5 mg total) by mouth every 6 (six) hours as needed for severe painMax Daily Amount: 20 mg    SDH (subdural hematoma) (HCC)    Acute encephalopathy            HPI:  Karo Ledezma present for TCM visit s/p hospitalization for SDH, acute encephalopathy, dysphagia, HTN  SDH occurred in neighbors driveway s/p fall  Pt is taking food PO w/o difficulty    F/U Neuro Sx 5/26/2021    Hypertension  This is a chronic problem  The problem is controlled  Pertinent negatives include no anxiety, blurred vision, chest pain, headaches, neck pain, orthopnea, palpitations, peripheral edema, PND or shortness of breath  There are no associated agents to hypertension  Risk factors for coronary artery disease include male gender  Past treatments include beta blockers and ACE inhibitors  The current treatment provides mild improvement  There are no compliance problems  There is no history of angina, kidney disease, CAD/MI, CVA, heart failure, left ventricular hypertrophy, PVD or retinopathy  There is no history of chronic renal disease, a hypertension causing med, sleep apnea or a thyroid problem  ROS: Review of Systems   Constitutional: Negative  HENT: Negative  Eyes: Negative  Negative for blurred vision  Respiratory: Negative  Negative for shortness of breath  Cardiovascular: Negative  Negative for chest pain, palpitations, orthopnea and PND  Gastrointestinal: Negative  Endocrine: Negative  Genitourinary: Negative  Musculoskeletal: Negative  Negative for neck pain  Skin: Negative  Allergic/Immunologic: Negative  Neurological: Negative  Negative for headaches  Hematological: Negative  Psychiatric/Behavioral: Negative          Past Medical History:   Diagnosis Date    Anxiety     Dysphagia 4/16/2021    Hematoma, subdural, with loss of consciousness, traumatic ([address] )     Hypertension     Status post insertion of percutaneous endoscopic gastrostomy (PEG) tube ([address] ) 5/19/2021       Past Surgical History:   Procedure Laterality Date    BRAIN HEMATOMA EVACUATION Right 4/15/2021    Procedure: Right CRANIOTOMY FOR SUBDURAL HEMATOMA;  Surgeon: Kathie Hernandez MD;  [facility];  Service: Neurosurgery    CRANIOTOMY Right     HAND SURGERY Left     PEG TUBE PLACEMENT      PEG TUBE REMOVAL      ROTATOR CUFF REPAIR Left        Social History     Socioeconomic History    Marital status: /Civil Union     Spouse name: None    Number of children: None    Years of education: None    Highest education level: None   Occupational History    Occupation: Retired   Social Needs    Financial resource strain: None    Food insecurity     Worry: None     Inability: None    Transportation needs     Medical: None     Non-medical: None   Tobacco Use    Smoking status: Never Smoker    Smokeless tobacco: Never Used   Substance and Sexual Activity    Alcohol use: Yes     Frequency: 4 or more times a week    Drug use: Not Currently    Sexual activity: None   Lifestyle    Physical activity     Days per week: None     Minutes per session: None    Stress: None   Relationships    Social connections     Talks on phone: None     Gets together: None     Attends Mosque service: None     Active member of club or organization: None     Attends meetings of clubs or organizations: None     Relationship status: None    Intimate partner violence     Fear of current or ex partner: None     Emotionally abused: None     Physically abused: None     Forced sexual activity: None   Other Topics Concern    None   Social History Narrative    None       Family History   Problem Relation Age of Onset    Heart disease Mother     Hypertension Father     Dementia Father        No Known Allergies      Current Outpatient Medications:     acetaminophen (TYLENOL) 500 mg tablet, Take 500 mg by mouth every 4 (four) hours as needed for mild pain, Disp: , Rfl:     lisinopril (ZESTRIL) 20 mg tablet, Take 1 tablet (20 mg total) by mouth daily, Disp: 90 tablet, Rfl: 1    Melatonin 5 MG TABS, Take 1 tablet by mouth daily at bedtime, Disp: , Rfl:     metoprolol tartrate (LOPRESSOR) 25 mg tablet, Take 1 tablet (25 mg total) by mouth every 12 (twelve) hours, Disp: 180 tablet, Rfl: 1    Multiple Vitamin (MULTIVITAMIN) tablet, Take 1 tablet by mouth daily, Disp: , Rfl:     oxyCODONE (OXY-IR) 5 MG capsule, Take 1 capsule (5 mg total) by mouth every 6 (six) hours as needed for severe painMax Daily Amount: 20 mg, Disp: 30 capsule, Rfl: 0    pantoprazole (PROTONIX) 40 mg tablet, Take 1 tablet by mouth daily, Disp: , Rfl:       Physical Exam:  /70 (BP Location: Left arm, Patient Position: Sitting, Cuff Size: Standard)   Pulse 74   Temp 98 6 °F (37 °C) (Tympanic)   Ht 5' 9" (1 753 m)   Wt 77 1 kg (170 lb)   SpO2 99%   BMI 25 10 kg/m²     Physical Exam  Vitals signs and nursing note reviewed  Constitutional:       Appearance: Normal appearance  He is well-developed  HENT:      Head: Normocephalic and atraumatic  Right Ear: Tympanic membrane, ear canal and external ear normal       Left Ear: Tympanic membrane, ear canal and external ear normal       Nose: Nose normal       Mouth/Throat:      Mouth: Mucous membranes are moist       Pharynx: Uvula midline  Eyes:      General: Lids are normal       Conjunctiva/sclera: Conjunctivae normal       Pupils: Pupils are equal, round, and reactive to light  Neck:      Musculoskeletal: Full passive range of motion without pain, normal range of motion and neck supple  Thyroid: No thyroid mass  Vascular: No JVD  Trachea: Trachea and phonation normal    Cardiovascular:      Rate and Rhythm: Normal rate and regular rhythm  Pulses: Normal pulses  Heart sounds: Normal heart sounds, S1 normal and S2 normal  No murmur  No friction rub  No gallop  Pulmonary:      Effort: Pulmonary effort is normal       Breath sounds: Normal breath sounds  Abdominal:      General: Bowel sounds are normal       Palpations: Abdomen is soft  Tenderness: There is no abdominal tenderness     Genitourinary:     Comments: Deferred  Musculoskeletal: Normal range of motion  Right lower leg: No edema  Left lower leg: No edema  Lymphadenopathy:      Head:      Right side of head: No submental, submandibular, tonsillar, preauricular, posterior auricular or occipital adenopathy  Left side of head: No submental, submandibular, tonsillar, preauricular, posterior auricular or occipital adenopathy  Cervical: No cervical adenopathy  Skin:     General: Skin is warm and dry  Capillary Refill: Capillary refill takes less than 2 seconds  Neurological:      General: No focal deficit present  Mental Status: He is alert and oriented to person, place, and time  GCS: GCS eye subscore is 4  GCS verbal subscore is 5  GCS motor subscore is 6  Cranial Nerves: Cranial nerves are intact  Sensory: Sensation is intact  Motor: Motor function is intact  Coordination: Coordination is intact  Gait: Gait is intact  Deep Tendon Reflexes: Reflexes are normal and symmetric  Psychiatric:         Attention and Perception: Attention and perception normal          Mood and Affect: Mood and affect normal          Speech: Speech normal          Behavior: Behavior normal  Behavior is cooperative  Thought Content:  Thought content normal          Cognition and Memory: Cognition normal          Judgment: Judgment normal              Labs:  Lab Results   Component Value Date    WBC 11 05 (H) 04/22/2021    HGB 13 8 04/22/2021    HCT 40 4 04/22/2021    MCV 97 04/22/2021     04/22/2021     Lab Results   Component Value Date    K 4 9 04/22/2021     04/22/2021    CO2 24 04/22/2021    BUN 16 04/22/2021    CREATININE 0 56 (L) 04/22/2021    GLUCOSE 146 (H) 04/15/2021    CALCIUM 9 6 04/22/2021    AST 33 04/15/2021    ALT 43 04/15/2021    ALKPHOS 164 (H) 04/15/2021    EGFR 113 04/22/2021

## 2021-05-21 ENCOUNTER — OFFICE VISIT (OUTPATIENT)
Dept: FAMILY MEDICINE CLINIC | Facility: CLINIC | Age: 61
End: 2021-05-21
Payer: COMMERCIAL

## 2021-05-21 VITALS
WEIGHT: 170 LBS | SYSTOLIC BLOOD PRESSURE: 140 MMHG | HEART RATE: 74 BPM | DIASTOLIC BLOOD PRESSURE: 70 MMHG | BODY MASS INDEX: 25.18 KG/M2 | HEIGHT: 69 IN | TEMPERATURE: 98.6 F | OXYGEN SATURATION: 99 %

## 2021-05-21 DIAGNOSIS — G93.40 ACUTE ENCEPHALOPATHY: ICD-10-CM

## 2021-05-21 DIAGNOSIS — I10 ESSENTIAL HYPERTENSION: ICD-10-CM

## 2021-05-21 DIAGNOSIS — Z98.890 STATUS POST CRANIOTOMY: ICD-10-CM

## 2021-05-21 DIAGNOSIS — S06.5X9A SDH (SUBDURAL HEMATOMA) (HCC): ICD-10-CM

## 2021-05-21 DIAGNOSIS — Z76.89 ENCOUNTER FOR SUPPORT AND COORDINATION OF TRANSITION OF CARE: Primary | ICD-10-CM

## 2021-05-21 PROBLEM — R13.10 DYSPHAGIA: Status: RESOLVED | Noted: 2021-04-16 | Resolved: 2021-05-21

## 2021-05-21 PROBLEM — Z93.1 STATUS POST INSERTION OF PERCUTANEOUS ENDOSCOPIC GASTROSTOMY (PEG) TUBE (HCC): Status: RESOLVED | Noted: 2021-05-19 | Resolved: 2021-05-21

## 2021-05-21 PROCEDURE — 99496 TRANSJ CARE MGMT HIGH F2F 7D: CPT | Performed by: NURSE PRACTITIONER

## 2021-05-21 RX ORDER — OXYCODONE HYDROCHLORIDE 5 MG/1
5 CAPSULE ORAL EVERY 6 HOURS PRN
Qty: 30 CAPSULE | Refills: 0 | Status: SHIPPED | OUTPATIENT
Start: 2021-05-21 | End: 2021-07-07 | Stop reason: ALTCHOICE

## 2021-05-21 RX ORDER — PANTOPRAZOLE SODIUM 40 MG/1
1 TABLET, DELAYED RELEASE ORAL DAILY
COMMUNITY
Start: 2021-05-06 | End: 2021-07-07 | Stop reason: ALTCHOICE

## 2021-05-21 RX ORDER — LISINOPRIL 20 MG/1
20 TABLET ORAL DAILY
Qty: 90 TABLET | Refills: 1 | Status: ON HOLD | OUTPATIENT
Start: 2021-05-21 | End: 2021-11-22

## 2021-05-21 RX ORDER — CHOLECALCIFEROL (VITAMIN D3) 125 MCG
1 CAPSULE ORAL AS NEEDED
Status: ON HOLD | COMMUNITY
End: 2022-07-19 | Stop reason: ALTCHOICE

## 2021-05-26 ENCOUNTER — OFFICE VISIT (OUTPATIENT)
Dept: NEUROSURGERY | Facility: CLINIC | Age: 61
End: 2021-05-26

## 2021-05-26 VITALS
TEMPERATURE: 99 F | HEIGHT: 70 IN | SYSTOLIC BLOOD PRESSURE: 144 MMHG | WEIGHT: 173 LBS | BODY MASS INDEX: 24.77 KG/M2 | RESPIRATION RATE: 16 BRPM | HEART RATE: 76 BPM | DIASTOLIC BLOOD PRESSURE: 86 MMHG

## 2021-05-26 DIAGNOSIS — Z98.890 STATUS POST CRANIECTOMY: Primary | ICD-10-CM

## 2021-05-26 PROCEDURE — 3008F BODY MASS INDEX DOCD: CPT | Performed by: SURGERY

## 2021-05-26 PROCEDURE — 99024 POSTOP FOLLOW-UP VISIT: CPT | Performed by: NEUROLOGICAL SURGERY

## 2021-05-26 RX ORDER — CHLORHEXIDINE GLUCONATE 0.12 MG/ML
15 RINSE ORAL ONCE
Status: CANCELLED | OUTPATIENT
Start: 2021-05-26 | End: 2021-05-26

## 2021-05-26 NOTE — PROGRESS NOTES
Patient Id: Juan Vance is a 62 y o  male        Handedness: Right     Assessment/Plan:    Diagnoses and all orders for this visit:    Status post craniectomy  -     Case request operating room: RIGHT AUTOLOGOUS CRANIOPLASTY; Standing  -     PAT Covid Screening; Future  -     Case request operating room: RIGHT AUTOLOGOUS CRANIOPLASTY    Other orders  -     Diet NPO; Sips with meds; Standing  -     Nursing Communication [facility] Interventions Implemented; Standing  -     Nursing Communication CHG bath, have staff wash entire body (neck down) per pre-op bathing protocol  Routine, evening prior to, and day of surgery ; Standing  -     Nursing Communication Swab both nares with Povidone-Iodine solution, EXCLUDE if patient has shellfish/Iodine allergy  Routine, day of surgery, on call to OR; Standing  -     chlorhexidine (PERIDEX) 0 12 % oral rinse 15 mL  -     Void on call to OR; Standing  -     Insert peripheral IV; Standing        Discussion Summary:   1  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, 04/15/2021  He has made a significant neurologic recovery  He is now back at his neurologic baseline per his wife  He is independent and walking with a cane  They have canceled his physical therapy as they do not believe he needs it  His CT scan demonstrates significant decompression of his brain and is ready for cranioplasty  We discussed the risks and benefits associated with cranioplasty including bleeding, infection, seizure, stroke, paralysis and death  He signed consent today  Unfortunately, his wound has significant scabbing  I would like to see some of this improving prior to proceeding with a cranioplasty  We discussed the importance of wound care including daily showering and antibiotic ointment twice daily      I will see him back 2 weeks as a nurse visit to evaluate his incision prior to cranioplasty      Chief Complaint: Post-op      HPI:   This is a 30-year-old gentleman who presented to the hospital after a transfer from rehab after sustaining a TBI and subdural hematoma in [place]  Fortunately his exam significantly deteriorated to where he would barely open eyes and minimally localized his bilateral uppers and lowers  He had significant bifrontal contusions as well as a right-sided subdural with significant amount of compression and mass effect with shift  As such she underwent a right-sided craniectomy  His brain was found to be quite contused at the time of surgery  The hematoma was evacuated with a craniectomy  Ultimately he was discharged to [facility]   There he made a significant recovery  His PEG tube has been since removed  He returns today without any significant complaints  He is eager to have his bone flap back in  He does note some significant scabbing along his incision as well as intermittent reddish drainage from his head  No evidence of purulence  No fevers, chills nausea or vomiting  Review of systems obtained by the MA reviewed and updated below  Review of Systems   Constitutional: Negative  HENT: Negative  Eyes: Negative  Respiratory: Negative  Cardiovascular: Negative  Gastrointestinal: Negative  Endocrine: Negative  Genitourinary: Negative  Musculoskeletal: Negative  Skin: Negative  Allergic/Immunologic: Negative  Neurological: Negative  Hematological: Negative  Psychiatric/Behavioral: Negative  Physical Exam  Vitals:    05/26/21 1028   BP: 144/86   Pulse: 76   Resp: 16   Temp: 99 °F (37 2 °C)   He is well appearing  Affect is appropriate  His BMI is Body mass index is 24 82 kg/m²  Gerhardt Sep He is awake alert and oriented  Hearing and vision are grossly intact  His pupils are equal round reactive to light  His extraocular movements are intact  His face is symmetric  Tongue is midline  Facial sensation is intact and symmetric throughout    Shoulder shrug is 5/5   There is no drift or dysmetria  He has full strength in his bilateral upper and lower extremities  He has normal muscle tone muscle bulk  Sensation intact to light touch and pinprick throughout  He ambulates with minimal assistance of a cane    His heart rate is regular  Normal respiratory effort  Incision is healing well, unfortunately there was significant scabbing along the entirety views incision  No evidence of significant erythema  No drainage    The following portions of the patient's history were reviewed and updated as appropriate: allergies, current medications, past family history, past medical history, past social history, past surgical history and problem list     Active Ambulatory Problems     Diagnosis Date Noted    Hypertension 03/03/2020    Anxiety 03/05/2021    SDH [facility]subdural hematoma) ([facility] ) 04/15/2021    Acute encephalopathy 04/15/2021    Alcohol abuse 04/18/2021    Impaired mobility and activities of daily living 04/19/2021     Resolved Ambulatory Problems     Diagnosis Date Noted    Hyperchloremia 04/15/2021    Acute respiratory failure ([facility] ) 04/15/2021    Dysphagia 04/16/2021    Status post insertion of percutaneous endoscopic gastrostomy (PEG) tube ([facility] ) 05/19/2021     Past Medical History:   Diagnosis Date    Hematoma, subdural, with loss of consciousness, traumatic ([facility] )        Past Surgical History:   Procedure Laterality Date    BRAIN HEMATOMA EVACUATION Right 4/15/2021    Procedure: Right CRANIOTOMY FOR SUBDURAL HEMATOMA;  Surgeon: Bartolo Peacock MD;  [facility]: [facility];  Service: Neurosurgery    CRANIOTOMY Right     HAND SURGERY Left     PEG TUBE PLACEMENT      PEG TUBE REMOVAL      ROTATOR CUFF REPAIR Left          Current Outpatient Medications:     acetaminophen (TYLENOL) 500 mg tablet, Take 500 mg by mouth every 4 (four) hours as needed for mild pain, Disp: , Rfl:     lisinopril (ZESTRIL) 20 mg tablet, Take 1 tablet (20 mg total) by mouth daily, Disp: 90 tablet, Rfl: 1    Melatonin 5 MG TABS, Take 1 tablet by mouth daily at bedtime, Disp: , Rfl:     metoprolol tartrate (LOPRESSOR) 25 mg tablet, Take 1 tablet (25 mg total) by mouth every 12 (twelve) hours, Disp: 180 tablet, Rfl: 1    Multiple Vitamin (MULTIVITAMIN) tablet, Take 1 tablet by mouth daily, Disp: , Rfl:     oxyCODONE (OXY-IR) 5 MG capsule, Take 1 capsule (5 mg total) by mouth every 6 (six) hours as needed for severe painMax Daily Amount: 20 mg, Disp: 30 capsule, Rfl: 0    pantoprazole (PROTONIX) 40 mg tablet, Take 1 tablet by mouth daily, Disp: , Rfl:     Results/Data: We reviewed his imaging in detail

## 2021-06-07 ENCOUNTER — TELEPHONE (OUTPATIENT)
Dept: NEUROSURGERY | Facility: CLINIC | Age: 61
End: 2021-06-07

## 2021-06-07 NOTE — TELEPHONE ENCOUNTER
Returned call to [facility] to update on Dr Stacy Calle response  She clarified to indicated that the incision was unchanged from their last visit and so she wanted to know if they can send a picture to show that the scabbing is still present  Returned call to [facility] to update on Dr Stacy Calle response  She clarified to indicated that the incision was unchanged from their last visit and so she wanted to know if they can send a picture to show that the scabbing is still present  Discussed with Dr Miguel Taylor who said ok to move OV out another week or 2 and see if the appearance of the incision improves  Reinforced bathing instructions to wash the incision gently with the pad of a clean hand and apply neosporin BID to help break up the scabbing  Rescheduled OV for incision check

## 2021-06-07 NOTE — TELEPHONE ENCOUNTER
I would like to look at the incision so I can touch and inspect it as needed to assess it prior to proceeding with surgery  I apologize for the inconvenience

## 2021-06-07 NOTE — TELEPHONE ENCOUNTER
Received a call from Jorden Luna asking if just sending a picture of Veronica Gudino incision would be acceptable or if he absolutely had to come in person for his next visit  Noted that he was here with Dr Miguel Taylor about 2 weeks ago and was asked to return to assess incision before proceeding with cranioplasty  Will confirm Dr Stacy Calle preference before providing answer

## 2021-06-10 DIAGNOSIS — Z98.890 STATUS POST CRANIOTOMY: Primary | ICD-10-CM

## 2021-06-11 RX ORDER — OXYCODONE HYDROCHLORIDE 5 MG/1
5 TABLET ORAL EVERY 4 HOURS PRN
Qty: 30 TABLET | Refills: 0 | Status: SHIPPED | OUTPATIENT
Start: 2021-06-11 | End: 2021-07-15 | Stop reason: HOSPADM

## 2021-06-14 NOTE — TELEPHONE ENCOUNTER
Received updated picture of surgical site  Discussed with Dr Trung Mcallister who would like to keep patients planned in person visit for Wednesday 6/16/2021   Notified patient of this request

## 2021-06-15 NOTE — CONSULTS
Uus-Kimberly 24 1960, 62 y o  male MRN: [id number]  Unit/Bed#: OR POOL Encounter: [id number]  Primary Care Provider: ANIA Adams   Date and time admitted to hospital: 4/15/2021 12:50 PM    Consults    SDH (subdural hematoma) [facility])  Assessment & Plan  Transfer from TBI rehab with AMS, found to have large right sided SDH with midline shift and brain compression  Recent hospitalization from 04/04/2021-04/14/2021 in [facility] secondary to fall on the sidewalk with head strike, noted to have right subdural hematoma, GCS 14 on discharge to TBI rehab with PEG tube    Imaging reviewed personally and with attending, results are as follows:   CT head wo contrast 4/15/2021:  Official read pending however per my interpretation large subdural hematoma with midline shift and brain compression noted    Plan:  [address] for urgent neurosurgical intervention today for evacuation of subdural hematoma  o NPO  o No supposed protocol ordered however given urgency of surgical intervention, likely will not be able to be done  o Labs reviewed, UA when able  o Hold all anticoagulation/antiplatelet therapy, no DVT prophylaxis for now  o Consent verbally obtained by attending from significant other   Systolic blood pressure less than 160   Hold all anticoagulation/antiplatelet therapy   Will likely need Keppra postoperatively for 1 week for seizure prophylaxis   Medical management and pain control per primary team, will likely need ICU stay   DVT ppx:  SCDs, hold for surgical intervention   Mobilize as tolerated with assistance, PT / OT evaluation postoperatively    Neurosurgery will continue to follow  Please call with questions or concerns      History of Present Illness   HPI: Judy Henry is a 62y o  year old male with PMH including known history of subdural hematoma with recent admission in [facility] [address] and recent discharge to TBI rehab, hypertension, anxiety who presents to the hospital from TBI rehab with altered mental status, stat CT scan demonstrates large right subdural hematoma  Patient is essentially nonverbal on examination and unable to provide history  Per record review patient had about a 2 week stay in the beginning of April 2021 (4/4-4/14) status post fall from standing with right-sided subdural hematoma  Per Care everywhere patient tripped over the sidewalk and fell backwards with positive head strike  At that time he denies any anticoagulation therapies but did admit to alcohol use prior to fall  At the time of his discharge he was a GCS 14, confusion, was moving everything with about 4/5 strength, had PEG tube placed for repeated failed swallows  Apparently upon discharge to TBI rehab on 04/14/2021, patient was noted to have altered mental status and stat imaging was significant for right-sided subdural hematoma with brain compression  He was transferred for neurosurgical evaluation and operative management        Review of Systems   Unable to perform ROS: Patient nonverbal       Historical Information   Past Medical History:   Diagnosis Date    Anxiety     Hypertension      Past Surgical History:   Procedure Laterality Date    HAND SURGERY Left     ROTATOR CUFF REPAIR Left      Social History     Substance and Sexual Activity   Alcohol Use Yes    Frequency: 4 or more times a week     Social History     Substance and Sexual Activity   Drug Use Not Currently     Social History     Tobacco Use   Smoking Status Never Smoker   Smokeless Tobacco Never Used     Family History   Problem Relation Age of Onset    Heart disease Mother     Hypertension Father     Dementia Father        Meds/Allergies   all current active meds have been reviewed, current meds:   Current Facility-Administered Medications   Medication Dose Route Frequency    [MAR Hold] sodium chloride (HYPERTONIC) 3 % bolus (TRAUMA BAY) 250 mL  250 mL Intravenous Once    Followed by   Stefano Hudson Hold] sodium chloride (HYPERTONIC) 3 % infusion  30 mL/hr Intravenous Continuous    and PTA meds:   Prior to Admission Medications   Prescriptions Last Dose Informant Patient Reported? Taking? Multiple Vitamin (MULTIVITAMIN) tablet  Self Yes No   Sig: Take 1 tablet by mouth daily   lisinopril (ZESTRIL) 40 mg tablet   No No   Sig: Take 1 tablet (40 mg total) by mouth daily   nadolol (CORGARD) 40 mg tablet   No No   Sig: Take 1 tablet (40 mg total) by mouth daily      Facility-Administered Medications: None     No Known Allergies    Objective   I/O     None          Physical Exam  Constitutional:       Comments: Patient is drowsy, not   HENT:      Head: Normocephalic and atraumatic  Eyes:      Conjunctiva/sclera: Conjunctivae normal       Comments: Gaze appears conjugate   Cardiovascular:      Rate and Rhythm: Regular rhythm  Pulmonary:      Effort: Pulmonary effort is normal  No respiratory distress  Skin:     General: Skin is warm and dry  Psychiatric:         Speech: He is noncommunicative  Behavior: Behavior is uncooperative  Cognition and Memory: Cognition is impaired  Memory is impaired         Neurologic Exam     Mental Status   GCS 10 (E3, V2, M5), grossly nonverbal but does say ouch to painful stimuli once, localizing to painful stimuli right upper extremity more than left upper extremity, withdrawing from pain briskly right lower extremity, mild withdrawal to pain left lower extremity, opens eyes to voice, does not follow commands, does not answer any orientation questions     Cranial Nerves     CN III, IV, VI   Conjugate gaze: present  Patient does not track finger, no facial droop appreciated     Motor Exam   Muscle bulk: normal  Overall muscle tone: normal  Right arm pronator drift: absent  Left arm pronator drift: absentUnable to assess strength accurately as patient is not following commands     Sensory Exam   Intact to crude touch Gait, Coordination, and Reflexes     Reflexes   Right [place]: absent  Left [place]: absent  Right ankle clonus: absent  Left ankle clonus: absent      Vitals:Blood pressure (!) 179/83, pulse 104, temperature 99 6 °F (37 6 °C), temperature source Oral, resp  rate 16, SpO2 99 %  ,There is no height or weight on file to calculate BMI  Lab Results:   Results from last 7 days   Lab Units 04/15/21  1320   WBC Thousand/uL 13 87*   HEMOGLOBIN g/dL 15 4   HEMATOCRIT % 45 5   PLATELETS Thousands/uL 248   NEUTROS PCT % 81*   MONOS PCT % 9     Results from last 7 days   Lab Units 04/15/21  1320   POTASSIUM mmol/L 4 1   CHLORIDE mmol/L 110*   CO2 mmol/L 25   BUN mg/dL 12   CREATININE mg/dL 0 63   CALCIUM mg/dL 8 9   ALK PHOS U/L 164*   ALT U/L 43   AST U/L 33             Results from last 7 days   Lab Units 04/15/21  1324 04/15/21  1320   INR   --  1 20*   PTT seconds 33  --        Imaging Studies: I have personally reviewed pertinent reports  and I have personally reviewed pertinent films in PACS    No results found  EKG, Pathology, and Other Studies: I have personally reviewed pertinent reports  VTE Prophylaxis: Sequential compression device (Venodyne)  and Reason for no pharmacologic prophylaxis - OR today    Code Status: No Order  Advance Directive and Living Will:      Power of :    POLST:      Counseling / Coordination of Care  I spent 20 minutes with the patient  14-Feb-2021

## 2021-06-16 ENCOUNTER — CLINICAL SUPPORT (OUTPATIENT)
Dept: NEUROSURGERY | Facility: CLINIC | Age: 61
End: 2021-06-16

## 2021-06-16 VITALS — SYSTOLIC BLOOD PRESSURE: 124 MMHG | DIASTOLIC BLOOD PRESSURE: 76 MMHG | TEMPERATURE: 98.3 F

## 2021-06-16 DIAGNOSIS — Z98.890 POST-OPERATIVE STATE: Primary | ICD-10-CM

## 2021-06-16 PROCEDURE — 99024 POSTOP FOLLOW-UP VISIT: CPT

## 2021-06-16 RX ORDER — OMEPRAZOLE 20 MG/1
20 CAPSULE, DELAYED RELEASE ORAL DAILY
COMMUNITY

## 2021-06-16 NOTE — PROGRESS NOTES
Post-Op Visit- Neurosurgery    Grace Velazquez 62 y o  male MRN: [id number]    Chief Complaint:  Patient presents post: Right CRANIOTOMY FOR SUBDURAL HEMATOMA - Right    History of Present Illness:  Patient presents for surgical site assessment  Is approximately 2 month post op  Was asked to return for reassessment due to persistent scabbing before proceeding with cranioplasty  Current Outpatient Medications:     acetaminophen (TYLENOL) 500 mg tablet, Take 500 mg by mouth every 4 (four) hours as needed for mild pain, Disp: , Rfl:     lisinopril (ZESTRIL) 20 mg tablet, Take 1 tablet (20 mg total) by mouth daily, Disp: 90 tablet, Rfl: 1    Melatonin 5 MG TABS, Take 1 tablet by mouth daily at bedtime, Disp: , Rfl:     metoprolol tartrate (LOPRESSOR) 25 mg tablet, Take 1 tablet (25 mg total) by mouth every 12 (twelve) hours, Disp: 180 tablet, Rfl: 1    Multiple Vitamin (MULTIVITAMIN) tablet, Take 1 tablet by mouth daily, Disp: , Rfl:     omeprazole (PriLOSEC) 20 mg delayed release capsule, Take 20 mg by mouth daily, Disp: , Rfl:     oxyCODONE (OXY-IR) 5 MG capsule, Take 1 capsule (5 mg total) by mouth every 6 (six) hours as needed for severe painMax Daily Amount: 20 mg, Disp: 30 capsule, Rfl: 0    oxyCODONE (ROXICODONE) 5 mg immediate release tablet, Take 1 tablet (5 mg total) by mouth every 4 (four) hours as needed for moderate painMax Daily Amount: 30 mg (Patient not taking: Reported on 6/16/2021), Disp: 30 tablet, Rfl: 0    pantoprazole (PROTONIX) 40 mg tablet, Take 1 tablet by mouth daily, Disp: , Rfl:      No Known Allergies       Assessment:    Vitals:    06/16/21 1041   BP: 124/76   Temp: 98 3 °F (36 8 °C)       Wound Exam: Incision is dry and in tact  Darm scabbing still observed along the incision  See below:     Before          Procedure:  Surgical site assessment  Dr Marlena Hoyos inspected the incision thoroughly  Cleansed the area with chlorhexidine swab and removed some of the remaining scabbing  See below:    After            Complications: None  Discussion/Summary  Patient was instructed to apply neosporin to the site daily  Scabbing expected to have fallen off in the next few days  Will return 6/28 for reassessment

## 2021-06-28 ENCOUNTER — CLINICAL SUPPORT (OUTPATIENT)
Dept: NEUROSURGERY | Facility: CLINIC | Age: 61
End: 2021-06-28

## 2021-06-28 VITALS — DIASTOLIC BLOOD PRESSURE: 70 MMHG | SYSTOLIC BLOOD PRESSURE: 140 MMHG | TEMPERATURE: 99.1 F

## 2021-06-28 DIAGNOSIS — Z98.890 POST-OPERATIVE STATE: Primary | ICD-10-CM

## 2021-06-28 PROCEDURE — 99024 POSTOP FOLLOW-UP VISIT: CPT

## 2021-06-28 NOTE — PROGRESS NOTES
Post-Op Visit- Neurosurgery    Don Powers 62 y o  male MRN: [id number]    Chief Complaint:  Patient presents post: Right CRANIOTOMY FOR SUBDURAL HEMATOMA - Right    History of Present Illness:  Patient presents for repeat surgical site assessment to determine if ok to move forward with cranioplasty  Arrived accompanied by his wife, wearing helmet, and ambulated well without assistive device  Current Outpatient Medications:     acetaminophen (TYLENOL) 500 mg tablet, Take 500 mg by mouth every 4 (four) hours as needed for mild pain, Disp: , Rfl:     lisinopril (ZESTRIL) 20 mg tablet, Take 1 tablet (20 mg total) by mouth daily, Disp: 90 tablet, Rfl: 1    Melatonin 5 MG TABS, Take 1 tablet by mouth daily at bedtime, Disp: , Rfl:     metoprolol tartrate (LOPRESSOR) 25 mg tablet, Take 1 tablet (25 mg total) by mouth every 12 (twelve) hours, Disp: 180 tablet, Rfl: 1    Multiple Vitamin (MULTIVITAMIN) tablet, Take 1 tablet by mouth daily, Disp: , Rfl:     omeprazole (PriLOSEC) 20 mg delayed release capsule, Take 20 mg by mouth daily, Disp: , Rfl:     oxyCODONE (OXY-IR) 5 MG capsule, Take 1 capsule (5 mg total) by mouth every 6 (six) hours as needed for severe painMax Daily Amount: 20 mg, Disp: 30 capsule, Rfl: 0    pantoprazole (PROTONIX) 40 mg tablet, Take 1 tablet by mouth daily, Disp: , Rfl:     oxyCODONE (ROXICODONE) 5 mg immediate release tablet, Take 1 tablet (5 mg total) by mouth every 4 (four) hours as needed for moderate painMax Daily Amount: 30 mg (Patient not taking: Reported on 6/16/2021), Disp: 30 tablet, Rfl: 0     No Known Allergies       Assessment:    Vitals:    06/28/21 1318   BP: 140/70   Temp: 99 1 °F (37 3 °C)       Wound Exam: Remaining scabbing finally fell off  No drainage or dehiscence observed  See below:             Discussion/Summary  Dr Thomas Gallo saw the patient and assessed personally   Consent was signed to proceed with cranioplasty  Provided letter to excuse him from jury duty  Patient should expect call from surgery coordinator to arrange surgery

## 2021-07-07 ENCOUNTER — APPOINTMENT (OUTPATIENT)
Dept: LAB | Facility: HOSPITAL | Age: 61
End: 2021-07-07
Payer: COMMERCIAL

## 2021-07-07 ENCOUNTER — CONSULT (OUTPATIENT)
Dept: FAMILY MEDICINE CLINIC | Facility: CLINIC | Age: 61
End: 2021-07-07
Payer: COMMERCIAL

## 2021-07-07 ENCOUNTER — LAB REQUISITION (OUTPATIENT)
Dept: LAB | Facility: HOSPITAL | Age: 61
End: 2021-07-07
Payer: COMMERCIAL

## 2021-07-07 VITALS
WEIGHT: 179 LBS | TEMPERATURE: 97.4 F | OXYGEN SATURATION: 99 % | BODY MASS INDEX: 25.62 KG/M2 | SYSTOLIC BLOOD PRESSURE: 152 MMHG | HEIGHT: 70 IN | HEART RATE: 100 BPM | DIASTOLIC BLOOD PRESSURE: 68 MMHG

## 2021-07-07 DIAGNOSIS — Z98.890 STATUS POST CRANIECTOMY: ICD-10-CM

## 2021-07-07 DIAGNOSIS — Z98.890 OTHER SPECIFIED POSTPROCEDURAL STATES: ICD-10-CM

## 2021-07-07 DIAGNOSIS — Z01.818 PREOPERATIVE EXAMINATION: Primary | ICD-10-CM

## 2021-07-07 LAB
ABO GROUP BLD: NORMAL
ALBUMIN SERPL BCP-MCNC: 3.9 G/DL (ref 3.5–5.7)
ALP SERPL-CCNC: 76 U/L (ref 40–150)
ALT SERPL W P-5'-P-CCNC: 21 U/L (ref 7–52)
ANION GAP SERPL CALCULATED.3IONS-SCNC: 9 MMOL/L (ref 4–13)
APTT PPP: 29 SECONDS (ref 23–37)
AST SERPL W P-5'-P-CCNC: 27 U/L (ref 13–39)
BACTERIA UR QL AUTO: NORMAL /HPF
BASOPHILS # BLD AUTO: 0 THOUSANDS/ΜL (ref 0–0.1)
BASOPHILS NFR BLD AUTO: 0 % (ref 0–2)
BILIRUB SERPL-MCNC: 1.1 MG/DL (ref 0.2–1)
BILIRUB UR QL STRIP: NEGATIVE
BLD GP AB SCN SERPL QL: NEGATIVE
BUN SERPL-MCNC: 8 MG/DL (ref 7–25)
CALCIUM SERPL-MCNC: 9.5 MG/DL (ref 8.6–10.5)
CHLORIDE SERPL-SCNC: 102 MMOL/L (ref 98–107)
CLARITY UR: ABNORMAL
CO2 SERPL-SCNC: 28 MMOL/L (ref 21–31)
COLOR UR: ABNORMAL
CREAT SERPL-MCNC: 0.65 MG/DL (ref 0.7–1.3)
EOSINOPHIL # BLD AUTO: 0.1 THOUSAND/ΜL (ref 0–0.61)
EOSINOPHIL NFR BLD AUTO: 1 % (ref 0–5)
ERYTHROCYTE [DISTWIDTH] IN BLOOD BY AUTOMATED COUNT: 14.1 % (ref 11.5–14.5)
EST. AVERAGE GLUCOSE BLD GHB EST-MCNC: 97 MG/DL
GFR SERPL CREATININE-BSD FRML MDRD: 106 ML/MIN/1.73SQ M
GLUCOSE P FAST SERPL-MCNC: 99 MG/DL (ref 65–99)
GLUCOSE UR STRIP-MCNC: NEGATIVE MG/DL
HBA1C MFR BLD: 5 %
HCT VFR BLD AUTO: 46.1 % (ref 42–47)
HGB BLD-MCNC: 15.7 G/DL (ref 14–18)
HGB UR QL STRIP.AUTO: NEGATIVE
INR PPP: 1.19 (ref 0.84–1.19)
KETONES UR STRIP-MCNC: NEGATIVE MG/DL
LEUKOCYTE ESTERASE UR QL STRIP: ABNORMAL
LYMPHOCYTES # BLD AUTO: 2 THOUSANDS/ΜL (ref 0.6–4.47)
LYMPHOCYTES NFR BLD AUTO: 26 % (ref 21–51)
MCH RBC QN AUTO: 32.3 PG (ref 26–34)
MCHC RBC AUTO-ENTMCNC: 34.1 G/DL (ref 31–37)
MCV RBC AUTO: 95 FL (ref 81–99)
MONOCYTES # BLD AUTO: 0.6 THOUSAND/ΜL (ref 0.17–1.22)
MONOCYTES NFR BLD AUTO: 8 % (ref 2–12)
NEUTROPHILS # BLD AUTO: 5 THOUSANDS/ΜL (ref 1.4–6.5)
NEUTS SEG NFR BLD AUTO: 64 % (ref 42–75)
NITRITE UR QL STRIP: NEGATIVE
NON-SQ EPI CELLS URNS QL MICRO: NORMAL /HPF
PH UR STRIP.AUTO: 6 [PH]
PLATELET # BLD AUTO: 149 THOUSANDS/UL (ref 149–390)
PMV BLD AUTO: 10 FL (ref 8.6–11.7)
POTASSIUM SERPL-SCNC: 4 MMOL/L (ref 3.5–5.5)
PROT SERPL-MCNC: 8.3 G/DL (ref 6.4–8.9)
PROT UR STRIP-MCNC: NEGATIVE MG/DL
PROTHROMBIN TIME: 15 SECONDS (ref 11.6–14.5)
RBC # BLD AUTO: 4.87 MILLION/UL (ref 4.3–5.9)
RBC #/AREA URNS AUTO: NORMAL /HPF
RH BLD: POSITIVE
SODIUM SERPL-SCNC: 139 MMOL/L (ref 134–143)
SP GR UR STRIP.AUTO: 1.02 (ref 1–1.03)
SPECIMEN EXPIRATION DATE: NORMAL
UROBILINOGEN UR QL STRIP.AUTO: 0.2 E.U./DL
WBC # BLD AUTO: 7.7 THOUSAND/UL (ref 4.8–10.8)
WBC #/AREA URNS AUTO: NORMAL /HPF

## 2021-07-07 PROCEDURE — U0003 INFECTIOUS AGENT DETECTION BY NUCLEIC ACID (DNA OR RNA); SEVERE ACUTE RESPIRATORY SYNDROME CORONAVIRUS 2 (SARS-COV-2) (CORONAVIRUS DISEASE [COVID-19]), AMPLIFIED PROBE TECHNIQUE, MAKING USE OF HIGH THROUGHPUT TECHNOLOGIES AS DESCRIBED BY CMS-2020-01-R: HCPCS | Performed by: NEUROLOGICAL SURGERY

## 2021-07-07 PROCEDURE — 85730 THROMBOPLASTIN TIME PARTIAL: CPT

## 2021-07-07 PROCEDURE — 1036F TOBACCO NON-USER: CPT | Performed by: FAMILY MEDICINE

## 2021-07-07 PROCEDURE — 81001 URINALYSIS AUTO W/SCOPE: CPT | Performed by: NEUROLOGICAL SURGERY

## 2021-07-07 PROCEDURE — 83036 HEMOGLOBIN GLYCOSYLATED A1C: CPT

## 2021-07-07 PROCEDURE — 80053 COMPREHEN METABOLIC PANEL: CPT

## 2021-07-07 PROCEDURE — 3725F SCREEN DEPRESSION PERFORMED: CPT | Performed by: FAMILY MEDICINE

## 2021-07-07 PROCEDURE — U0005 INFEC AGEN DETEC AMPLI PROBE: HCPCS | Performed by: NEUROLOGICAL SURGERY

## 2021-07-07 PROCEDURE — 86850 RBC ANTIBODY SCREEN: CPT | Performed by: NEUROLOGICAL SURGERY

## 2021-07-07 PROCEDURE — 86901 BLOOD TYPING SEROLOGIC RH(D): CPT | Performed by: NEUROLOGICAL SURGERY

## 2021-07-07 PROCEDURE — 99214 OFFICE O/P EST MOD 30 MIN: CPT | Performed by: FAMILY MEDICINE

## 2021-07-07 PROCEDURE — 85025 COMPLETE CBC W/AUTO DIFF WBC: CPT

## 2021-07-07 PROCEDURE — 85610 PROTHROMBIN TIME: CPT

## 2021-07-07 PROCEDURE — 86900 BLOOD TYPING SEROLOGIC ABO: CPT | Performed by: NEUROLOGICAL SURGERY

## 2021-07-07 PROCEDURE — 3008F BODY MASS INDEX DOCD: CPT | Performed by: FAMILY MEDICINE

## 2021-07-07 PROCEDURE — 36415 COLL VENOUS BLD VENIPUNCTURE: CPT

## 2021-07-07 NOTE — PROGRESS NOTES
TIARA Soto is a 62y o  year old male who presents for preoperative evaluation at the request of Maria Del Rosario Cabral MD (surgeon) who plans on performing RIGHT AUTOLOGOUS CRANIOPLASTY (Right Head)  (surgery) on 07/13/21 0730  He previously had a craniotomy for a subdural hematoma/TBI 3 months ago on 04/15/2021  Per chart review "Patient fell on 4/04 in [facility] and was found to have a SDH/SAH  He was treated and discharged to [facility] for rehabilitation yesterday  Patient reported to be more lethargic and was sent to the ER for evaluation  Repeat CTH showed acute on chronic subdural hemorrhage with surrounding edema and midline shift "  Planned anesthesia is general  The patient has the following known anesthesia issues:  no prior problems with endotracheal intubation or anesthesia  Predictors  Unstable coronary syndromes including unstable or severe angina or recent MI? NO   Decompensated heart failure including NYHA functional class IV or worsening or new-onset HF? NO   Significant arrhythmias including high grade AV block, symptomatic ventricular arrhythmias, supraventricular arrhythmias with ventricular rate >100 bpm at rest, symptomatic bradycardia, and newly recognized ventricular tachycardia? NO   Severe heart valve disease including severe aortic stenosis or symptomatic mitral stenosis? NO   History of ischemic heart disease? NO   History of cerebrovascular disease? NO   History of compensated heart failure or prior heart failure? NO   DM? NO   Renal Insufficiency? NO       Functional Status  Can take care of self, such as eat, dress or use the toilet (1 MET)? YES   Can walk up a flight of steps or a hill (4 METs)? YES   Can do heavy work around the house such as scrubbing floors or lifting or moving heavy furniture (between 4 and 10 METs)? YES   Can participate in strenuous sports such as swimming, singles tennis, football, basketball, and skiing (>10 METs)?  NO       Surgery Risk  Patients Risk High       High  (Death or MI Often) Aortic and other major vascular surgery, Peripheral artery surgery  Intermediate  (Death or MI 1-5%) Carotid endarterectomy, Head and neck surgery, Intraperitoneal and intrathoracic surgery, Orthopedic surgery, Prostate surgery   Low  (Death or MI <1%) Ambulatory surgery, Endoscopic procedures, Superficial procedure, Cataract surgery, Breast surgery     Social History     Tobacco Use    Smoking status: Never Smoker    Smokeless tobacco: Never Used   Vaping Use    Vaping Use: Never used   Substance Use Topics    Alcohol use: Yes    Drug use: Not Currently       ROS  Review of Systems    Physical Exam  /68 (BP Location: Left arm, Patient Position: Sitting, Cuff Size: Standard)   Pulse 100   Temp (!) 97 4 °F (36 3 °C) (Tympanic)   Ht 5' 10" (1 778 m)   Wt 81 2 kg (179 lb)   SpO2 99%   BMI 25 68 kg/m²   Physical Exam    Pre-Operative Testing  EKG · Patients with a least one clinical risk factor who require vascular surgical procedures  1  Patients with atherosclerotic cardiovascular disease scheduled for intermediate-risk procedures and above  · Patients with no clinical risk factors who require vascular surgical procedures  NO   CXR · New or unstable cardiopulmonary signs or symptoms  NO   U/A · Patients having urologic procedure  · Implantation of foreign material  NO   Electrolytes · Electrolyte Abnormalities  · Renal Impairment NO   A1C · Only if result would   NO   CBC · History of Anemia  · Extensive blood loss anticipated  YES   Coagulation · Taking Anti-Coagulants  · History of Bleeding  · Hypocoagulable Conditions  (Liver Disease) NO     Cardiac Studies (if available or applicable):  @ECG@    Assessment / Hiral Charlton was seen today for pre-op exam     Diagnoses and all orders for this visit:    Preoperative examination  Comments:  for cranioplasty s/p craniotomy from sub/sah following fall   overall doing well and denies symptoms  Advised to stop use of alcohol prior to surgery as of now        Known risk factors for perioperative complications: Alcohol overuse  Patient currently drinks 2-3, 12 ounce beers nightly  CAGE negative  Wife and patient both report they "do not think its a problem " On chart review there was not of withdrawal seizure from alcohol but patient reports it was from ativan withdrawal  Advised no drinking starting at the visit  Advised on signs of withdrawal to look out for  Last drink was 7/6/2021 last night had only one beer, 12 ounces around 800 PM     Difficulty with intubations is not anticipated  Functional Status Assessment: 4-10 METS (Climbing stairs-1 flight, walking level ground 4mph, running short distance, game of golf)  Surgery Risk High      Current medications which may produce withdrawal symptoms if withheld perioperatively: none  - Preoperative workup as follows hemoglobin recommended due to expected blood loss; however, surgeon had ordered multiple other labs  Prothrombin was slightly high but remaining LFTS within normal limits  May be sign of alcohol use but unclear at this time and apart from discontinuing alcohol use no other treatment warranted prior to surgery  Hemoglobin A1c within normal limits  UA with slight leuks but patient denied symptoms during visit  CBC without anemia  CMP with elevated bilirubin but no other significant findings  - Change in medication regimen before surgery: none, continue medication regimen including morning of surgery, with sip of water and or per surgeons instructions  - Patient is not Opioid Tolerant   - Prophylaxis for cardiac events with perioperative beta-blockers: not indicated  - Invasive hemodynamic monitoring perioperatively and surveillance for postoperative MI at the discretion of anesthesiologist  DVT prophylaxis postoperatively at the discretion of the surgical team if applicable    - Post-Operative Recovery: Patient currently lives with their spouse and post-operative rehab or home health care can be considered  Patient is medically optimized for surgery with the above recommendations

## 2021-07-07 NOTE — PATIENT INSTRUCTIONS
Stop drinking as of now, no more alcohol atleast until the recovery of the surgery   If any withdrawal, seizure like activity etc please go to the emergency department    Take sips of water with metoprolol and lisinopril the morning of surgery unless otherwise advised by your surgeon  - hold the rest of your medications  - Avoid NSAIDs until after the surgery

## 2021-07-09 NOTE — PRE-PROCEDURE INSTRUCTIONS
Pre-Surgery Instructions:   Medication Instructions    acetaminophen (TYLENOL) 500 mg tablet Instructed to take as needed including DOS with sips water    lisinopril (ZESTRIL) 20 mg tablet Instructed to take per normal schedule except DOS    Melatonin 5 MG TABS Take per normal schedule     metoprolol tartrate (LOPRESSOR) 25 mg tablet Instructed to take per normal schedule including DOS with sips water    Multiple Vitamin (MULTIVITAMIN) tablet Instructed to avoid all ASA/NSAIDs and OTC Vit/Supp from now until after surgery  Tylenol ok prn    omeprazole (PriLOSEC) 20 mg delayed release capsule Instructed to take per normal schedule including DOS with sips water   Pre-op medication, and showering instructions with antibacteral soap reviewed  Instructed to avoid all ASA/NSAIDs and OTC Vit/Supp from now until after surgery  Tylenol ok prn   Pt  Verbalized an understanding of all instructions reviewed and offers no concerns at this time

## 2021-07-12 ENCOUNTER — ANESTHESIA EVENT (OUTPATIENT)
Dept: PERIOP | Facility: HOSPITAL | Age: 61
DRG: 517 | End: 2021-07-12
Payer: COMMERCIAL

## 2021-07-12 ENCOUNTER — DOCUMENTATION (OUTPATIENT)
Dept: NEUROSURGERY | Facility: CLINIC | Age: 61
End: 2021-07-12

## 2021-07-12 NOTE — ANESTHESIA PREPROCEDURE EVALUATION
Procedure:  RIGHT AUTOLOGOUS CRANIOPLASTY (Right Head)    Relevant Problems   CARDIO   (+) Hypertension      NEURO/PSYCH   (+) Anxiety      Hypertension  Anxiety    Hematoma, subdural, with loss of consciousness, traumatic (HCC)  Dysphagia    Status post insertion of percutaneous endoscopic gastrostomy (PEG) tube (HCC)  GERD (gastroesophageal reflux disease)        Physical Exam    Airway    Mallampati score: II  TM Distance: >3 FB  Neck ROM: full     Dental       Cardiovascular      Pulmonary      Other Findings        Anesthesia Plan  ASA Score- 3     Anesthesia Type- general with ASA Monitors  Additional Monitors:   Airway Plan: ETT  Plan Factors-    Chart reviewed  Induction- intravenous  Postoperative Plan-     Informed Consent- Anesthetic plan and risks discussed with patient  I personally reviewed this patient with the CRNA  Discussed and agreed on the Anesthesia Plan with the CRNA  Pierce Villanueva

## 2021-07-13 ENCOUNTER — HOSPITAL ENCOUNTER (INPATIENT)
Facility: HOSPITAL | Age: 61
LOS: 2 days | Discharge: HOME | DRG: 517 | End: 2021-07-15
Attending: NEUROLOGICAL SURGERY | Admitting: NEUROLOGICAL SURGERY
Payer: COMMERCIAL

## 2021-07-13 ENCOUNTER — ANESTHESIA (OUTPATIENT)
Dept: PERIOP | Facility: HOSPITAL | Age: 61
DRG: 517 | End: 2021-07-13
Payer: COMMERCIAL

## 2021-07-13 DIAGNOSIS — Z98.890 POST-OPERATIVE STATE: Primary | ICD-10-CM

## 2021-07-13 LAB — GLUCOSE SERPL-MCNC: 129 MG/DL (ref 65–140)

## 2021-07-13 PROCEDURE — 62143 RPL B1 FLP/PROSTC PLATE SKL: CPT | Performed by: NEUROLOGICAL SURGERY

## 2021-07-13 PROCEDURE — 99024 POSTOP FOLLOW-UP VISIT: CPT | Performed by: NEUROLOGICAL SURGERY

## 2021-07-13 PROCEDURE — C1713 ANCHOR/SCREW BN/BN,TIS/BN: HCPCS | Performed by: NEUROLOGICAL SURGERY

## 2021-07-13 PROCEDURE — 82948 REAGENT STRIP/BLOOD GLUCOSE: CPT

## 2021-07-13 PROCEDURE — 0NS004Z REPOSITION SKULL WITH INTERNAL FIXATION DEVICE, OPEN APPROACH: ICD-10-PCS | Performed by: NEUROLOGICAL SURGERY

## 2021-07-13 DEVICE — IMPLANTABLE DEVICE
Type: IMPLANTABLE DEVICE | Site: CRANIAL | Status: NON-FUNCTIONAL
Brand: THINFLAP
Removed: 2021-11-16

## 2021-07-13 DEVICE — IMPLANTABLE DEVICE
Type: IMPLANTABLE DEVICE | Site: CRANIAL | Status: NON-FUNCTIONAL
Brand: THINFLAP SYSTEM
Removed: 2021-11-16

## 2021-07-13 DEVICE — BONE FLAP
Type: IMPLANTABLE DEVICE | Site: CRANIAL | Status: NON-FUNCTIONAL
Removed: 2021-11-16

## 2021-07-13 RX ORDER — SODIUM CHLORIDE 9 MG/ML
INJECTION, SOLUTION INTRAVENOUS CONTINUOUS PRN
Status: DISCONTINUED | OUTPATIENT
Start: 2021-07-13 | End: 2021-07-13

## 2021-07-13 RX ORDER — PANTOPRAZOLE SODIUM 40 MG/1
40 TABLET, DELAYED RELEASE ORAL
Status: DISCONTINUED | OUTPATIENT
Start: 2021-07-14 | End: 2021-07-15 | Stop reason: HOSPADM

## 2021-07-13 RX ORDER — ALBUMIN, HUMAN INJ 5% 5 %
SOLUTION INTRAVENOUS CONTINUOUS PRN
Status: DISCONTINUED | OUTPATIENT
Start: 2021-07-13 | End: 2021-07-13

## 2021-07-13 RX ORDER — ACETAMINOPHEN 325 MG/1
975 TABLET ORAL EVERY 8 HOURS SCHEDULED
Status: DISCONTINUED | OUTPATIENT
Start: 2021-07-13 | End: 2021-07-15 | Stop reason: HOSPADM

## 2021-07-13 RX ORDER — LIDOCAINE HYDROCHLORIDE 10 MG/ML
INJECTION, SOLUTION EPIDURAL; INFILTRATION; INTRACAUDAL; PERINEURAL AS NEEDED
Status: DISCONTINUED | OUTPATIENT
Start: 2021-07-13 | End: 2021-07-13

## 2021-07-13 RX ORDER — ONDANSETRON 2 MG/ML
4 INJECTION INTRAMUSCULAR; INTRAVENOUS ONCE AS NEEDED
Status: COMPLETED | OUTPATIENT
Start: 2021-07-13 | End: 2021-07-13

## 2021-07-13 RX ORDER — ONDANSETRON 2 MG/ML
INJECTION INTRAMUSCULAR; INTRAVENOUS AS NEEDED
Status: DISCONTINUED | OUTPATIENT
Start: 2021-07-13 | End: 2021-07-13

## 2021-07-13 RX ORDER — HYDROMORPHONE HCL/PF 1 MG/ML
0.5 SYRINGE (ML) INJECTION
Status: DISCONTINUED | OUTPATIENT
Start: 2021-07-13 | End: 2021-07-13 | Stop reason: HOSPADM

## 2021-07-13 RX ORDER — CHLORHEXIDINE GLUCONATE 0.12 MG/ML
15 RINSE ORAL ONCE
Status: COMPLETED | OUTPATIENT
Start: 2021-07-13 | End: 2021-07-13

## 2021-07-13 RX ORDER — FENTANYL CITRATE 50 UG/ML
INJECTION, SOLUTION INTRAMUSCULAR; INTRAVENOUS AS NEEDED
Status: DISCONTINUED | OUTPATIENT
Start: 2021-07-13 | End: 2021-07-13

## 2021-07-13 RX ORDER — OXYCODONE HYDROCHLORIDE 5 MG/1
10 TABLET ORAL EVERY 4 HOURS PRN
Status: DISCONTINUED | OUTPATIENT
Start: 2021-07-13 | End: 2021-07-15 | Stop reason: HOSPADM

## 2021-07-13 RX ORDER — LEVETIRACETAM 500 MG/1
500 TABLET ORAL EVERY 12 HOURS SCHEDULED
Status: DISCONTINUED | OUTPATIENT
Start: 2021-07-13 | End: 2021-07-15 | Stop reason: HOSPADM

## 2021-07-13 RX ORDER — SODIUM CHLORIDE, SODIUM LACTATE, POTASSIUM CHLORIDE, CALCIUM CHLORIDE 600; 310; 30; 20 MG/100ML; MG/100ML; MG/100ML; MG/100ML
50 INJECTION, SOLUTION INTRAVENOUS CONTINUOUS
Status: DISCONTINUED | OUTPATIENT
Start: 2021-07-13 | End: 2021-07-13

## 2021-07-13 RX ORDER — GINSENG 100 MG
CAPSULE ORAL AS NEEDED
Status: DISCONTINUED | OUTPATIENT
Start: 2021-07-13 | End: 2021-07-13 | Stop reason: HOSPADM

## 2021-07-13 RX ORDER — CEFAZOLIN SODIUM 2 G/50ML
SOLUTION INTRAVENOUS AS NEEDED
Status: DISCONTINUED | OUTPATIENT
Start: 2021-07-13 | End: 2021-07-13

## 2021-07-13 RX ORDER — PROPOFOL 10 MG/ML
INJECTION, EMULSION INTRAVENOUS AS NEEDED
Status: DISCONTINUED | OUTPATIENT
Start: 2021-07-13 | End: 2021-07-13

## 2021-07-13 RX ORDER — METHOCARBAMOL 750 MG/1
750 TABLET, FILM COATED ORAL EVERY 8 HOURS SCHEDULED
Status: DISCONTINUED | OUTPATIENT
Start: 2021-07-13 | End: 2021-07-15 | Stop reason: HOSPADM

## 2021-07-13 RX ORDER — OXYCODONE HYDROCHLORIDE 5 MG/1
5 TABLET ORAL EVERY 4 HOURS PRN
Status: DISCONTINUED | OUTPATIENT
Start: 2021-07-13 | End: 2021-07-15 | Stop reason: HOSPADM

## 2021-07-13 RX ORDER — EPHEDRINE SULFATE 50 MG/ML
INJECTION INTRAVENOUS AS NEEDED
Status: DISCONTINUED | OUTPATIENT
Start: 2021-07-13 | End: 2021-07-13

## 2021-07-13 RX ORDER — KETAMINE HCL IN NACL, ISO-OSM 100MG/10ML
SYRINGE (ML) INJECTION AS NEEDED
Status: DISCONTINUED | OUTPATIENT
Start: 2021-07-13 | End: 2021-07-13

## 2021-07-13 RX ORDER — METOCLOPRAMIDE HYDROCHLORIDE 5 MG/ML
10 INJECTION INTRAMUSCULAR; INTRAVENOUS ONCE AS NEEDED
Status: DISCONTINUED | OUTPATIENT
Start: 2021-07-13 | End: 2021-07-13 | Stop reason: HOSPADM

## 2021-07-13 RX ORDER — ONDANSETRON 2 MG/ML
4 INJECTION INTRAMUSCULAR; INTRAVENOUS EVERY 4 HOURS PRN
Status: DISCONTINUED | OUTPATIENT
Start: 2021-07-13 | End: 2021-07-15 | Stop reason: HOSPADM

## 2021-07-13 RX ORDER — ROCURONIUM BROMIDE 10 MG/ML
INJECTION, SOLUTION INTRAVENOUS AS NEEDED
Status: DISCONTINUED | OUTPATIENT
Start: 2021-07-13 | End: 2021-07-13

## 2021-07-13 RX ORDER — CEFAZOLIN SODIUM 1 G/50ML
1000 SOLUTION INTRAVENOUS EVERY 8 HOURS
Status: COMPLETED | OUTPATIENT
Start: 2021-07-13 | End: 2021-07-14

## 2021-07-13 RX ORDER — LISINOPRIL 20 MG/1
20 TABLET ORAL DAILY
Status: DISCONTINUED | OUTPATIENT
Start: 2021-07-14 | End: 2021-07-15 | Stop reason: HOSPADM

## 2021-07-13 RX ORDER — POLYETHYLENE GLYCOL 3350 17 G/17G
17 POWDER, FOR SOLUTION ORAL DAILY
Status: DISCONTINUED | OUTPATIENT
Start: 2021-07-14 | End: 2021-07-15 | Stop reason: HOSPADM

## 2021-07-13 RX ORDER — SODIUM CHLORIDE 9 MG/ML
75 INJECTION, SOLUTION INTRAVENOUS CONTINUOUS
Status: DISCONTINUED | OUTPATIENT
Start: 2021-07-13 | End: 2021-07-14

## 2021-07-13 RX ORDER — CALCIUM CARBONATE 200(500)MG
1000 TABLET,CHEWABLE ORAL DAILY PRN
Status: DISCONTINUED | OUTPATIENT
Start: 2021-07-13 | End: 2021-07-15 | Stop reason: HOSPADM

## 2021-07-13 RX ORDER — SENNOSIDES 8.6 MG
1 TABLET ORAL DAILY
Status: DISCONTINUED | OUTPATIENT
Start: 2021-07-14 | End: 2021-07-15 | Stop reason: HOSPADM

## 2021-07-13 RX ORDER — GLYCOPYRROLATE 0.2 MG/ML
INJECTION INTRAMUSCULAR; INTRAVENOUS AS NEEDED
Status: DISCONTINUED | OUTPATIENT
Start: 2021-07-13 | End: 2021-07-13

## 2021-07-13 RX ORDER — LANOLIN ALCOHOL/MO/W.PET/CERES
6 CREAM (GRAM) TOPICAL
Status: DISCONTINUED | OUTPATIENT
Start: 2021-07-13 | End: 2021-07-15 | Stop reason: HOSPADM

## 2021-07-13 RX ORDER — HYDROMORPHONE HCL 110MG/55ML
PATIENT CONTROLLED ANALGESIA SYRINGE INTRAVENOUS AS NEEDED
Status: DISCONTINUED | OUTPATIENT
Start: 2021-07-13 | End: 2021-07-13

## 2021-07-13 RX ORDER — DOCUSATE SODIUM 100 MG/1
100 CAPSULE, LIQUID FILLED ORAL 2 TIMES DAILY
Status: DISCONTINUED | OUTPATIENT
Start: 2021-07-13 | End: 2021-07-15 | Stop reason: HOSPADM

## 2021-07-13 RX ORDER — DEXAMETHASONE SODIUM PHOSPHATE 10 MG/ML
INJECTION, SOLUTION INTRAMUSCULAR; INTRAVENOUS AS NEEDED
Status: DISCONTINUED | OUTPATIENT
Start: 2021-07-13 | End: 2021-07-13

## 2021-07-13 RX ORDER — HYDROMORPHONE HCL/PF 1 MG/ML
0.5 SYRINGE (ML) INJECTION
Status: DISCONTINUED | OUTPATIENT
Start: 2021-07-13 | End: 2021-07-15

## 2021-07-13 RX ADMIN — PROPOFOL 50 MG: 10 INJECTION, EMULSION INTRAVENOUS at 08:04

## 2021-07-13 RX ADMIN — OXYCODONE HYDROCHLORIDE 10 MG: 5 TABLET ORAL at 19:25

## 2021-07-13 RX ADMIN — HYDROMORPHONE HYDROCHLORIDE 0.5 MG: 1 INJECTION, SOLUTION INTRAMUSCULAR; INTRAVENOUS; SUBCUTANEOUS at 13:13

## 2021-07-13 RX ADMIN — FENTANYL CITRATE 50 MCG: 50 INJECTION INTRAMUSCULAR; INTRAVENOUS at 07:45

## 2021-07-13 RX ADMIN — FENTANYL CITRATE 50 MCG: 50 INJECTION INTRAMUSCULAR; INTRAVENOUS at 07:34

## 2021-07-13 RX ADMIN — LIDOCAINE HYDROCHLORIDE 100 MG: 10 INJECTION, SOLUTION EPIDURAL; INFILTRATION; INTRACAUDAL; PERINEURAL at 07:34

## 2021-07-13 RX ADMIN — HYDROMORPHONE HYDROCHLORIDE 0.5 MG: 2 INJECTION, SOLUTION INTRAMUSCULAR; INTRAVENOUS; SUBCUTANEOUS at 09:22

## 2021-07-13 RX ADMIN — HYDROMORPHONE HYDROCHLORIDE 0.5 MG: 1 INJECTION, SOLUTION INTRAMUSCULAR; INTRAVENOUS; SUBCUTANEOUS at 14:10

## 2021-07-13 RX ADMIN — Medication 10 MG: at 09:03

## 2021-07-13 RX ADMIN — SUGAMMADEX 159 MG: 100 INJECTION, SOLUTION INTRAVENOUS at 09:30

## 2021-07-13 RX ADMIN — CEFAZOLIN SODIUM 2000 MG: 2 SOLUTION INTRAVENOUS at 07:45

## 2021-07-13 RX ADMIN — SODIUM CHLORIDE: 9 INJECTION, SOLUTION INTRAVENOUS at 07:20

## 2021-07-13 RX ADMIN — METHOCARBAMOL TABLETS 750 MG: 750 TABLET, COATED ORAL at 15:25

## 2021-07-13 RX ADMIN — Medication 10 MG: at 09:27

## 2021-07-13 RX ADMIN — ROCURONIUM BROMIDE 30 MG: 50 INJECTION, SOLUTION INTRAVENOUS at 07:34

## 2021-07-13 RX ADMIN — CEFAZOLIN SODIUM 1000 MG: 1 SOLUTION INTRAVENOUS at 15:25

## 2021-07-13 RX ADMIN — HYDROMORPHONE HYDROCHLORIDE 0.5 MG: 1 INJECTION, SOLUTION INTRAMUSCULAR; INTRAVENOUS; SUBCUTANEOUS at 21:52

## 2021-07-13 RX ADMIN — METHOCARBAMOL TABLETS 750 MG: 750 TABLET, COATED ORAL at 21:52

## 2021-07-13 RX ADMIN — EPHEDRINE SULFATE 5 MG: 50 INJECTION, SOLUTION INTRAVENOUS at 09:05

## 2021-07-13 RX ADMIN — ROCURONIUM BROMIDE 20 MG: 50 INJECTION, SOLUTION INTRAVENOUS at 08:05

## 2021-07-13 RX ADMIN — HYDROMORPHONE HYDROCHLORIDE 0.5 MG: 2 INJECTION, SOLUTION INTRAMUSCULAR; INTRAVENOUS; SUBCUTANEOUS at 07:56

## 2021-07-13 RX ADMIN — PHENYLEPHRINE HYDROCHLORIDE 20 MCG/MIN: 10 INJECTION INTRAVENOUS at 07:40

## 2021-07-13 RX ADMIN — LEVETIRACETAM 500 MG: 500 TABLET, FILM COATED ORAL at 21:51

## 2021-07-13 RX ADMIN — PROPOFOL 200 MG: 10 INJECTION, EMULSION INTRAVENOUS at 07:34

## 2021-07-13 RX ADMIN — ONDANSETRON 4 MG: 2 INJECTION INTRAMUSCULAR; INTRAVENOUS at 07:52

## 2021-07-13 RX ADMIN — SODIUM CHLORIDE 75 ML/HR: 0.9 INJECTION, SOLUTION INTRAVENOUS at 13:14

## 2021-07-13 RX ADMIN — GLYCOPYRROLATE 0.1 MG: 0.2 INJECTION, SOLUTION INTRAMUSCULAR; INTRAVENOUS at 07:52

## 2021-07-13 RX ADMIN — ONDANSETRON 4 MG: 2 INJECTION INTRAMUSCULAR; INTRAVENOUS at 09:54

## 2021-07-13 RX ADMIN — HYDROMORPHONE HYDROCHLORIDE 0.5 MG: 1 INJECTION, SOLUTION INTRAMUSCULAR; INTRAVENOUS; SUBCUTANEOUS at 17:33

## 2021-07-13 RX ADMIN — Medication 10 MG: at 09:15

## 2021-07-13 RX ADMIN — CHLORHEXIDINE GLUCONATE 15 ML: 1.2 SOLUTION ORAL at 06:12

## 2021-07-13 RX ADMIN — DEXAMETHASONE SODIUM PHOSPHATE 10 MG: 10 INJECTION, SOLUTION INTRAMUSCULAR; INTRAVENOUS at 07:52

## 2021-07-13 RX ADMIN — MELATONIN 6 MG: at 21:51

## 2021-07-13 RX ADMIN — SODIUM CHLORIDE 0.2 MCG/KG/HR: 900 INJECTION INTRAVENOUS at 08:03

## 2021-07-13 RX ADMIN — FENTANYL CITRATE 50 MCG: 50 INJECTION INTRAMUSCULAR; INTRAVENOUS at 08:19

## 2021-07-13 RX ADMIN — ALBUMIN (HUMAN): 12.5 INJECTION, SOLUTION INTRAVENOUS at 08:36

## 2021-07-13 RX ADMIN — SODIUM CHLORIDE: 0.9 INJECTION, SOLUTION INTRAVENOUS at 07:37

## 2021-07-13 RX ADMIN — OXYCODONE HYDROCHLORIDE 10 MG: 5 TABLET ORAL at 15:25

## 2021-07-13 RX ADMIN — FENTANYL CITRATE 50 MCG: 50 INJECTION INTRAMUSCULAR; INTRAVENOUS at 08:25

## 2021-07-13 RX ADMIN — ACETAMINOPHEN 975 MG: 325 TABLET, FILM COATED ORAL at 21:52

## 2021-07-13 RX ADMIN — HYDROMORPHONE HYDROCHLORIDE 0.5 MG: 1 INJECTION, SOLUTION INTRAMUSCULAR; INTRAVENOUS; SUBCUTANEOUS at 11:54

## 2021-07-13 RX ADMIN — DOCUSATE SODIUM 100 MG: 100 CAPSULE ORAL at 17:33

## 2021-07-13 RX ADMIN — ACETAMINOPHEN 975 MG: 325 TABLET, FILM COATED ORAL at 15:25

## 2021-07-13 RX ADMIN — Medication 25 MG: at 21:51

## 2021-07-13 RX ADMIN — EPHEDRINE SULFATE 5 MG: 50 INJECTION, SOLUTION INTRAVENOUS at 08:06

## 2021-07-13 NOTE — OP NOTE
OPERATIVE REPORT  PATIENT NAME: Celine Mcgowan    :  1960  MRN: [id number]  Pt Location: [facility] OR ROOM 17    SURGERY DATE: 2021    Surgeon(s) and Role:     * Goldie Hogan MD - Primary     * Hillary Khan PA-C - Assisting    Preop Diagnosis:  Status post craniectomy [Z98 890]    Post-Op Diagnosis Codes:     * Status post craniectomy [Z98 890]    Procedure(s) (LRB):  RIGHT AUTOLOGOUS CRANIOPLASTY (Right)    Specimen(s):  * No specimens in log *    Estimated Blood Loss:   Minimal    Drains:  Closed/Suction Drain Right Head Bulb 10 Fr  (Active)   Dressing Status Clean;Dry; Intact 21 0908   Number of days: 0       Gastrostomy/Enterostomy Percutaneous endoscopic gastrostomy (PEG) (Active)   Number of days:        External Urinary Catheter Medium (Active)   Number of days: 88       Anesthesia Type:   General    Operative Indications:  Status post craniectomy [[id number]]  52-year-old gentleman with history of craniectomy who presents for cranioplasty today  He and his family understood the risks and benefits including bleeding, vascular injury, stroke, seizure, paralysis and death  They elected to proceed  Operative Findings:  Cranioplasty >12YE    Complications:   None    Procedure and Technique:  After obtaining written informed consent the patient was brought to the operating room  General endotracheal anesthesia was induced  The head was then placed on a horseshoe with the head turned exposing the prior craniectomy  The hair was then clipped and the head scrubbed in the usual fashion  Patient was then prepped and draped in the usual sterile fashion  A surgical time-out was performed  A separate sterile environment the bone flap was opened and scrubbed for 10 minutes with chlorhexidine scrub and then soaked in Betadine on the field  Using a 10 blade and scissors the prior incision was opened  Bharath clips were placed along the large flap    The temporalis was then isolated and opened  The myocutaneous flap was reflected anteriorly  Using a periosteal and monopolar cautery the plane between the prior dural repair and the temporalis was elevated  The bony edges of the craniectomy were exposed and the temporalis reflected anteriorly  The bone flap was then washed of the remaining Betadine and replaced using titanium plating system  The wound was then copiously irrigated with antibiotic irrigation  Once that was satisfied with hemostasis a 10 Haitian PVC drain was tunneled under the temporalis muscle  Temporalis was then reapproximated using 2 0 Vicryl  The galea was closed using inverted 2 0 Vicryl  The skin was closed using a running 3 0 nylon  The drain stitch was secured with a 2 0 prolene  A sterile dressing and head wrap was placed  There were 2 uninterrupted and correct surgical counts  Surgical sign-out was performed  The patient was then woken from general anesthesia and found to be in baseline neurologic condition  There was no qualified resident aid in this case  As such, due to its complex nature Hillary Khan was present and assisted for the duration of the case including exposure, cranioplasty, and closure        I was present for the entire procedure    Patient Disposition:  PACU     SIGNATURE: Goldie Hogan MD  DATE: July 13, 2021  TIME: 9:47 AM

## 2021-07-13 NOTE — H&P
Assessment and plan  HO right craniectomy after fall  R cranioplasty today  CC cranioplasty  Hpi:  This is a 71-year-old gentleman who presented to the hospital after a transfer from rehab after sustaining a TBI and subdural hematoma in [place]  Fortunately his exam significantly deteriorated to where he would barely open eyes and minimally localized his bilateral uppers and lowers  He had significant bifrontal contusions as well as a right-sided subdural with significant amount of compression and mass effect with shift  As such she underwent a right-sided craniectomy  His brain was found to be quite contused at the time of surgery  The hematoma was evacuated with a craniectomy  Ultimately he was discharged to [facility]   There he made a significant recovery  His PEG tube has been since removed  He returns today without any significant complaints  J Luis Sullivan for cranioplasty    ROS negative    Past Medical History:   Diagnosis Date    Anxiety     Dysphagia 4/16/2021    GERD (gastroesophageal reflux disease)     Hematoma, subdural, with loss of consciousness, traumatic ([address] )     Hypertension     Status post insertion of percutaneous endoscopic gastrostomy (PEG) tube ([facility] [address] ) 5/19/2021       Past Surgical History:   Procedure Laterality Date    BRAIN HEMATOMA EVACUATION Right 4/15/2021    Procedure: Right CRANIOTOMY FOR SUBDURAL HEMATOMA;  Surgeon: Roni Mendes MD;  Location: [facility];  Service: Neurosurgery    CRANIOTOMY Right     HAND SURGERY Left     PEG TUBE PLACEMENT      PEG TUBE REMOVAL      ROTATOR CUFF REPAIR Left      No Known Allergies  Temp:  [98 1 °F (36 7 °C)] 98 1 °F (36 7 °C)  HR:  [64] 64  Resp:  [16] 16  BP: (151)/(75) 151/75  He is well appearing  Affect is appropriate  His BMI is Body mass index is 25 11 kg/m²  Stephanie Romo He is awake alert and oriented  Hearing and vision are grossly intact  His pupils are equal round reactive to light    His extraocular movements are intact  His face is symmetric  Tongue is midline  Facial sensation is intact and symmetric throughout  Shoulder shrug is 5/5  There is no drift or dysmetria  He has full strength in his bilateral upper and lower extremities  His heart rate is regular   Normal resp effort

## 2021-07-13 NOTE — ANESTHESIA POSTPROCEDURE EVALUATION
Post-Op Assessment Note    CV Status:  Stable  Pain Score: 0    Pain management: adequate     Mental Status:  Alert and awake   Hydration Status:  Euvolemic and stable   PONV Controlled:  Controlled   Airway Patency:  Patent      Post Op Vitals Reviewed: Yes      Staff: CRNA         No complications documented      /64 (07/13/21 0946)    Temp (!) 96 9 °F (36 1 °C) (07/13/21 0946)    Pulse 80 (07/13/21 0946)   Resp 14 (07/13/21 0946)    SpO2   100 6L

## 2021-07-13 NOTE — CASE MANAGEMENT
Met with patient and wife, Anneliese Cohen, who was at bedside, explained CM role, CM program  LOS 1d  Bundle no  Unplanned readmit risk color green  30 day readmit no  Lives in 2 story home with wife, 5 steps to enter through front door, 15 steps to enter through garage  IADLS and ambulation, does not drive due to difficulty seeing, wife drives patient to appointments  Currently on disability  Has RW, shower chair, commode, and cane at home  Has hx of IP rehab at [facility], no VNA or OP therapy  Denies D or A abuse  Reports history of anxiety, for which patient has never been hospitalized  PCP [facility]  Primary contact Na Burciaga [phone]  States has POA and LW, wife, Anneliese Cohen is medical POA  Family can transport on DC    CM reviewed d/c planning process including the following: identifying help at home, patient preference for d/c planning needs, Discharge Lounge, [facility] Meds to Bed program, availability of treatment team to discuss questions or concerns patient and/or family may have regarding understanding medications and recognizing signs and symptoms once discharged  CM also encouraged patient to follow up with all recommended appointments after discharge  Patient advised of importance for patient and family to participate in managing patients medical well being

## 2021-07-14 ENCOUNTER — APPOINTMENT (INPATIENT)
Dept: RADIOLOGY | Facility: HOSPITAL | Age: 61
DRG: 517 | End: 2021-07-14
Payer: COMMERCIAL

## 2021-07-14 LAB
ANION GAP SERPL CALCULATED.3IONS-SCNC: 4 MMOL/L (ref 4–13)
APTT PPP: 28 SECONDS (ref 23–37)
BUN SERPL-MCNC: 9 MG/DL (ref 5–25)
CALCIUM SERPL-MCNC: 9 MG/DL (ref 8.3–10.1)
CHLORIDE SERPL-SCNC: 110 MMOL/L (ref 100–108)
CO2 SERPL-SCNC: 25 MMOL/L (ref 21–32)
CREAT SERPL-MCNC: 0.58 MG/DL (ref 0.6–1.3)
ERYTHROCYTE [DISTWIDTH] IN BLOOD BY AUTOMATED COUNT: 13.4 % (ref 11.6–15.1)
GFR SERPL CREATININE-BSD FRML MDRD: 112 ML/MIN/1.73SQ M
GLUCOSE SERPL-MCNC: 136 MG/DL (ref 65–140)
HCT VFR BLD AUTO: 39.3 % (ref 36.5–49.3)
HGB BLD-MCNC: 13.4 G/DL (ref 12–17)
INR PPP: 1.2 (ref 0.84–1.19)
MCH RBC QN AUTO: 32.5 PG (ref 26.8–34.3)
MCHC RBC AUTO-ENTMCNC: 34.1 G/DL (ref 31.4–37.4)
MCV RBC AUTO: 95 FL (ref 82–98)
PLATELET # BLD AUTO: 118 THOUSANDS/UL (ref 149–390)
PLATELET # BLD AUTO: 127 THOUSANDS/UL (ref 149–390)
PMV BLD AUTO: 11.4 FL (ref 8.9–12.7)
PMV BLD AUTO: 12 FL (ref 8.9–12.7)
POTASSIUM SERPL-SCNC: 3.9 MMOL/L (ref 3.5–5.3)
PROTHROMBIN TIME: 15.2 SECONDS (ref 11.6–14.5)
RBC # BLD AUTO: 4.12 MILLION/UL (ref 3.88–5.62)
SODIUM SERPL-SCNC: 139 MMOL/L (ref 136–145)
WBC # BLD AUTO: 9.06 THOUSAND/UL (ref 4.31–10.16)

## 2021-07-14 PROCEDURE — 80048 BASIC METABOLIC PNL TOTAL CA: CPT | Performed by: PHYSICIAN ASSISTANT

## 2021-07-14 PROCEDURE — 85027 COMPLETE CBC AUTOMATED: CPT | Performed by: PHYSICIAN ASSISTANT

## 2021-07-14 PROCEDURE — G1004 CDSM NDSC: HCPCS

## 2021-07-14 PROCEDURE — 70450 CT HEAD/BRAIN W/O DYE: CPT

## 2021-07-14 PROCEDURE — 85730 THROMBOPLASTIN TIME PARTIAL: CPT | Performed by: PHYSICIAN ASSISTANT

## 2021-07-14 PROCEDURE — 85610 PROTHROMBIN TIME: CPT | Performed by: PHYSICIAN ASSISTANT

## 2021-07-14 PROCEDURE — 97166 OT EVAL MOD COMPLEX 45 MIN: CPT

## 2021-07-14 PROCEDURE — 85049 AUTOMATED PLATELET COUNT: CPT | Performed by: PHYSICIAN ASSISTANT

## 2021-07-14 PROCEDURE — 97163 PT EVAL HIGH COMPLEX 45 MIN: CPT

## 2021-07-14 PROCEDURE — 99024 POSTOP FOLLOW-UP VISIT: CPT | Performed by: NURSE PRACTITIONER

## 2021-07-14 RX ADMIN — METHOCARBAMOL TABLETS 750 MG: 750 TABLET, COATED ORAL at 21:42

## 2021-07-14 RX ADMIN — OXYCODONE HYDROCHLORIDE 10 MG: 5 TABLET ORAL at 00:17

## 2021-07-14 RX ADMIN — ACETAMINOPHEN 975 MG: 325 TABLET, FILM COATED ORAL at 21:42

## 2021-07-14 RX ADMIN — METHOCARBAMOL TABLETS 750 MG: 750 TABLET, COATED ORAL at 06:00

## 2021-07-14 RX ADMIN — OXYCODONE HYDROCHLORIDE 10 MG: 5 TABLET ORAL at 19:43

## 2021-07-14 RX ADMIN — SODIUM CHLORIDE 75 ML/HR: 0.9 INJECTION, SOLUTION INTRAVENOUS at 02:23

## 2021-07-14 RX ADMIN — PANTOPRAZOLE SODIUM 40 MG: 40 TABLET, DELAYED RELEASE ORAL at 06:00

## 2021-07-14 RX ADMIN — OXYCODONE HYDROCHLORIDE 10 MG: 5 TABLET ORAL at 15:21

## 2021-07-14 RX ADMIN — ACETAMINOPHEN 975 MG: 325 TABLET, FILM COATED ORAL at 06:00

## 2021-07-14 RX ADMIN — ACETAMINOPHEN 975 MG: 325 TABLET, FILM COATED ORAL at 13:16

## 2021-07-14 RX ADMIN — LISINOPRIL 20 MG: 20 TABLET ORAL at 08:09

## 2021-07-14 RX ADMIN — HYDROMORPHONE HYDROCHLORIDE 0.5 MG: 1 INJECTION, SOLUTION INTRAMUSCULAR; INTRAVENOUS; SUBCUTANEOUS at 02:23

## 2021-07-14 RX ADMIN — HYDROMORPHONE HYDROCHLORIDE 0.5 MG: 1 INJECTION, SOLUTION INTRAMUSCULAR; INTRAVENOUS; SUBCUTANEOUS at 13:13

## 2021-07-14 RX ADMIN — MELATONIN 6 MG: at 21:42

## 2021-07-14 RX ADMIN — SENNOSIDES 8.6 MG: 8.6 TABLET ORAL at 08:09

## 2021-07-14 RX ADMIN — OXYCODONE HYDROCHLORIDE 10 MG: 5 TABLET ORAL at 06:00

## 2021-07-14 RX ADMIN — LEVETIRACETAM 500 MG: 500 TABLET, FILM COATED ORAL at 08:09

## 2021-07-14 RX ADMIN — METHOCARBAMOL TABLETS 750 MG: 750 TABLET, COATED ORAL at 13:16

## 2021-07-14 RX ADMIN — DOCUSATE SODIUM 100 MG: 100 CAPSULE ORAL at 19:06

## 2021-07-14 RX ADMIN — CEFAZOLIN SODIUM 1000 MG: 1 SOLUTION INTRAVENOUS at 00:17

## 2021-07-14 RX ADMIN — Medication 25 MG: at 08:09

## 2021-07-14 RX ADMIN — DOCUSATE SODIUM 100 MG: 100 CAPSULE ORAL at 08:09

## 2021-07-14 RX ADMIN — OXYCODONE HYDROCHLORIDE 10 MG: 5 TABLET ORAL at 10:07

## 2021-07-14 RX ADMIN — Medication 25 MG: at 21:44

## 2021-07-14 RX ADMIN — LEVETIRACETAM 500 MG: 500 TABLET, FILM COATED ORAL at 21:43

## 2021-07-14 NOTE — ASSESSMENT & PLAN NOTE
POD 1 right-sided cranioplasty  · Status post right craniectomy for evacuation of subdural hematoma on 04/15/2021 by Bk Joe  Patient initially was hospitalized from 04/04/2021-04/14/2021 at [facility] in [place] secondary to fall with head strike, noted to have right subdural hematoma and was discharged to TBI rehab  He was a transfer from TBI rehab with altered mental status and found to have a large right-sided subdural hematoma with midline shift and brain compression and subsequently underwent craniectomy for evacuation  Imaging:    CT head wo 7/14/21:Status post right-sided cranioplasty  Small amount of fluid, air and blood products are present in the subdural space  Anticipated postoperative changes in the overlying soft tissues  Encephalomalacia and gliosis in the frontal and temporal lobes bilaterally, consistent with prior hemorrhagic contusions and traumatic brain injury  No acute intracranial abnormality  Plan:  · Continue neurological checks  · Reviewed imaging with patient and wife  · 1 YANETH drain to FS with 158ml/24hrs, serosanguineous drainage  Drain was removed this afternoon without difficulty, 1 suture tied down in place  Dressing removed incision well approximated with sutures, clean, dry, intact    · STAT CT head with any neurological decline including drop GCS of 2pts within 1 hr   · Keppra 500mg Q 12H for seziure ppx x 1 wk  · Pain well controlled on current regimen:  · Tylenol 975 mg q 8 hours  · Robaxin 750 mg q 8 hours  · Oxycodone 5 mg q4hrs prn for moderate pain  · Oxycodone 10 mg q4hrs prn for severe pain  · IV Dilaudid 0 5 mg q3hrs prn for breakthrough pain  · Bowel regimen:  Colace, MiraLax, and Senokot  · PT/OT recommending home with family support in outpatient PT  · Home meds reordered   · IVF discontinued, patient tolerating oral diet and voiding without difficulty  · DVT PPX: SCDs and will start SQ Lovenox tomorrow    Neurosurgery will continue to follow as primary,  patient is not medically stable for discharge home, call with any further questions or concerns

## 2021-07-14 NOTE — UTILIZATION REVIEW
Initial Clinical Review    Elective INPT surgical procedure  Age/Sex: 62 y o  male  Surgery Date: 7/13/2021  Procedure: RIGHT AUTOLOGOUS CRANIOPLASTY (Right)  Anesthesia: GENERAL  Operative Findings: Cranioplasty >10cm    POD#1 Progress Note:   * Status post craniectomy  Assessment & Plan  POD 1 right-sided cranioplasty  · Status post right craniectomy for evacuation of subdural hematoma on 04/15/2021 by Tyson Hdz  Patient initially was hospitalized from 04/04/2021-04/14/2021 at [facility] in [place] secondary to fall with head strike, noted to have right subdural hematoma and was discharged to TBI rehab  He was a transfer from TBI rehab with altered mental status and found to have a large right-sided subdural hematoma with midline shift and brain compression and subsequently underwent craniectomy for evacuation      Imaging:     CT head wo 7/14/21:Status post right-sided cranioplasty   Small amount of fluid, air and blood products are present in the subdural space   Anticipated postoperative changes in the overlying soft tissues  Encephalomalacia and gliosis in the frontal and temporal lobes bilaterally, consistent with prior hemorrhagic contusions and traumatic brain injury  No acute intracranial abnormality      Plan:  · Continue neurological checks  · Reviewed imaging with patient and wife  · 1 YANETH drain to FS with 158ml/24hrs, serosanguineous drainage  Drain was removed this afternoon without difficulty, 1 suture tied down in place  Dressing removed incision well approximated with sutures, clean, dry, intact  · STAT CT head with any neurological decline including drop GCS of 2pts within 1 hr   · Keppra 500mg Q 12H for seziure ppx x 1 wk  · Pain well controlled on current regimen:  ? Tylenol 975 mg q 8 hours  ? Robaxin 750 mg q 8 hours  ? Oxycodone 5 mg q4hrs prn for moderate pain  ? Oxycodone 10 mg q4hrs prn for severe pain  ? IV Dilaudid 0 5 mg q3hrs prn for breakthrough pain  ?  Bowel regimen: Colace, MiraLax, and Senokot  · PT/OT recommending home with family support in outpatient PT  · Home meds reordered   · IVF discontinued, patient tolerating oral diet and voiding without difficulty  · DVT PPX: SCDs and will start SQ Lovenox tomorrow     Neurosurgery will continue to follow as primary,  patient is not medically stable for discharge home, call with any further questions or concerns            SDH (subdural hematoma) (HCC)  Assessment & Plan  · See above plan     Hypertension  Assessment & Plan  · Reordered home medication       Admission Orders: Date/Time/Statement:   Admission Orders (From admission, onward)     Ordered        07/13/21 1004  Inpatient Admission  Once                   Orders Placed This Encounter   Procedures    Inpatient Admission     Standing Status:   Standing     Number of Occurrences:   1     Order Specific Question:   Level of Care     Answer:   Level 2 Stepdown / HOT [14]     Order Specific Question:   Estimated length of stay     Answer:   Inpatient Only Surgery     Vital Signs: /74   Pulse 62   Temp 97 7 °F (36 5 °C)   Resp 19   Ht 5' 10" (1 778 m)   Wt 79 4 kg (175 lb)   SpO2 97%   BMI 25 11 kg/m²   07/14/21 06:57:13  97 7 °F (36 5 °C)  62  19  144/74  97  97 %  --  --  --  --   07/14/21 03:20:30  98 5 °F (36 9 °C)  61  --  146/75  99  98 %  --  --  --  --                                                 07/13/21 2225  --  67  --  135/78  --  --  --  --  --  --   07/13/21 21:45:39  --  107Abnormal   --  170/87  115  95 %  --  --  --  --   07/13/21 2000  --  --  --  --  --  --  --  --  None (Room air)  --   07/13/21 19:17:01  98 4 °F (36 9 °C)  96  20  144/80  101  94 %  --  --  --  --   07/13/21 1900  --  91  --  --  --  95 %  --  --  --  --   07/13/21 17:50:29  99 1 °F (37 3 °C)  112Abnormal   20  157/92  114  95 %  --  --  --  --   07/13/21 1733  98 °F (36 7 °C)  --  --  --  --  --  --  --  --  --   07/13/21 16:50:27  97 4 °F (36 3 °C)Abnormal   89  18 157/88  111  95 %  --  --  --  --   07/13/21 15:50:54  97 7 °F (36 5 °C)  76  18  160/86  111  94 %  --  --  --  --                                                                        07/13/21 1415  --  78  16  156/87  --  98 %  --  --  None (Room air)  --   07/13/21 1408  97 2 °F (36 2 °C)Abnormal   70  18  150/72  --  98 %  --  --  None (Room air)  --                                                 07/13/21 1330  --  70  16  155/76  --  97 %  --  --  None (Room air)  --                                                                                               07/13/21 1215  --  60  13  149/78  --  97 %  --  --  None (Room air)  --                                                 07/13/21 1130  --  70  13  154/85  --  98 %  28  2 L/min  Nasal cannula  --                                                                                               07/13/21 1015  --  66  12  143/81  --  98 %  28  2 L/min  Nasal cannula  --   07/13/21 1000  --  68  14  143/78  --  100 %  28  2 L/min  Nasal cannula  WDL   07/13/21 0946  96 9 °F (36 1 °C)Abnormal   80  14  107/64  --  98 %  44  6 L/min  Simple mask  --   07/13/21 0616  98 1 °F (36 7 °C)  64  16  151/75  --  100 %  --  --  None (Room air)         Pertinent Labs/Diagnostic Test Results:   Results from last 7 days   Lab Units 07/07/21  1245   SARS-COV-2  Negative     Results from last 7 days   Lab Units 07/14/21  0538 07/14/21  0015 07/07/21  1158   WBC Thousand/uL 9 06  --  7 70   HEMOGLOBIN g/dL 13 4  --  15 7   HEMATOCRIT % 39 3  --  46 1   PLATELETS Thousands/uL 127* 118* 149   NEUTROS ABS Thousands/µL  --   --  5 00         Results from last 7 days   Lab Units 07/14/21  0015 07/07/21  1158   SODIUM mmol/L 139 139   POTASSIUM mmol/L 3 9 4 0   CHLORIDE mmol/L 110* 102   CO2 mmol/L 25 28   ANION GAP mmol/L 4 9   BUN mg/dL 9 8   CREATININE mg/dL 0 58* 0 65*   EGFR ml/min/1 73sq m 112 106   CALCIUM mg/dL 9 0 9 5     Results from last 7 days   Lab Units 07/07/21  5116 AST U/L 27   ALT U/L 21   ALK PHOS U/L 76   TOTAL PROTEIN g/dL 8 3   ALBUMIN g/dL 3 9   TOTAL BILIRUBIN mg/dL 1 10*     Results from last 7 days   Lab Units 07/13/21  1003   POC GLUCOSE mg/dl 129     Results from last 7 days   Lab Units 07/14/21  0015   GLUCOSE RANDOM mg/dL 136         Results from last 7 days   Lab Units 07/07/21  1158   HEMOGLOBIN A1C % 5 0   EAG mg/dl 97       Results from last 7 days   Lab Units 07/14/21  0538 07/07/21  1158   PROTIME seconds 15 2* 15 0*   INR  1 20* 1 19   PTT seconds 28 29       Results from last 7 days   Lab Units 07/07/21  1255   CLARITY UA  Cloudy   COLOR UA  Dk Yellow   SPEC GRAV UA  1 022   PH UA  6 0   GLUCOSE UA mg/dl Negative   KETONES UA mg/dl Negative   BLOOD UA  Negative   PROTEIN UA mg/dl Negative   NITRITE UA  Negative   BILIRUBIN UA  Negative   UROBILINOGEN UA E U /dl 0 2   LEUKOCYTES UA  Trace*   WBC UA /hpf 2-4   RBC UA /hpf None Seen   BACTERIA UA /hpf None Seen   EPITHELIAL CELLS WET PREP /hpf None Seen     7/14 CT HEAD:1   Status post right-sided cranioplasty  Small amount of fluid, air and blood products are present in the subdural space  Anticipated postoperative changes in the overlying soft tissues      2   Encephalomalacia and gliosis in the frontal and temporal lobes bilaterally, consistent with prior hemorrhagic contusions and traumatic brain injury      3  No acute intracranial abnormality        Diet: REG DIET  Mobility: OOB  DVT Prophylaxis: SCD    Medications/Pain Control:   Scheduled Medications:  acetaminophen, 975 mg, Oral, Q8H JOSEFINA  docusate sodium, 100 mg, Oral, BID  enoxaparin, 40 mg, Subcutaneous, Daily  levETIRAcetam, 500 mg, Oral, Q12H JOSEFINA  lisinopril, 20 mg, Oral, Daily  melatonin, 6 mg, Oral, HS  methocarbamol, 750 mg, Oral, Q8H JOSEFINA  metoprolol tartrate, 25 mg, Oral, Q12H JOSEFINA  pantoprazole, 40 mg, Oral, Early Morning  polyethylene glycol, 17 g, Oral, Daily  senna, 1 tablet, Oral, Daily      Continuous IV Infusions:  sodium chloride, 75 mL/hr, Intravenous, Continuous      PRN Meds:  calcium carbonate, 1,000 mg, Oral, Daily PRN  HYDROmorphone, 0 5 mg, Intravenous, Q3H PRN 7/13 X 5, 7/14 X 1  ondansetron, 4 mg, Intravenous, Q4H PRN 7/13 X 1  oxyCODONE, 10 mg, Oral, Q4H PRN 7/13 X 2, 7/14 X 2  oxyCODONE, 5 mg, Oral, Q4H PRN    NEURO CHECKS Q1H  PT/OT  WOUND DRAIN AND CARE  DRAIN TO FULL SUCTION  SEIZURE PRECAUTIONS  I&O  DAILY WEIGHTS      Network Utilization Review Department  ATTENTION: Please call with any questions or concerns to [phone] and carefully listen to the prompts so that you are directed to the right person  All voicemails are confidential   Whitley Abdul all requests for admission clinical reviews, approved or denied determinations and any other requests to dedicated fax number below belonging to the campus where the patient is receiving treatment   List of dedicated fax numbers for the Facilities:  [address] DENIALS (Administrative/Medical Necessity) [phone]   [address] (Maternity/NICU/Pediatrics) [phone]   [address] [place] [address] [address] [place] [address] [address] [phone]

## 2021-07-14 NOTE — PROGRESS NOTES
[facility]  Progress Note - Aurelia Gruber 1960, 62 y o  male MRN: [id number]  Unit/Bed#: [facility] 858-40 Encounter: [id number]  Primary Care Provider: ANIA Bowman   Date and time admitted to hospital: 7/13/2021  5:30 AM    * Status post craniectomy  Assessment & Plan  POD 1 right-sided cranioplasty  · Status post right craniectomy for evacuation of subdural hematoma on 04/15/2021 by Charles Perry  Patient initially was hospitalized from 04/04/2021-04/14/2021 at [facility] in [place] secondary to fall with head strike, noted to have right subdural hematoma and was discharged to TBI rehab  He was a transfer from TBI rehab with altered mental status and found to have a large right-sided subdural hematoma with midline shift and brain compression and subsequently underwent craniectomy for evacuation  Imaging:    CT head wo 7/14/21:Status post right-sided cranioplasty  Small amount of fluid, air and blood products are present in the subdural space  Anticipated postoperative changes in the overlying soft tissues  Encephalomalacia and gliosis in the frontal and temporal lobes bilaterally, consistent with prior hemorrhagic contusions and traumatic brain injury  No acute intracranial abnormality  Plan:  · Continue neurological checks  · Reviewed imaging with patient and wife  · 1 YANETH drain to FS with 158ml/24hrs, serosanguineous drainage  Drain was removed this afternoon without difficulty, 1 suture tied down in place  Dressing removed incision well approximated with sutures, clean, dry, intact    · STAT CT head with any neurological decline including drop GCS of 2pts within 1 hr   · Keppra 500mg Q 12H for seziure ppx x 1 wk  · Pain well controlled on current regimen:  · Tylenol 975 mg q 8 hours  · Robaxin 750 mg q 8 hours  · Oxycodone 5 mg q4hrs prn for moderate pain  · Oxycodone 10 mg q4hrs prn for severe pain  · IV Dilaudid 0 5 mg q3hrs prn for breakthrough pain  · Bowel regimen:  Colace, MiraLax, and Senokot  · PT/OT recommending home with family support in outpatient PT  · Home meds reordered   · IVF discontinued, patient tolerating oral diet and voiding without difficulty  · DVT PPX: SCDs and will start SQ Lovenox tomorrow    Neurosurgery will continue to follow as primary,  patient is not medically stable for discharge home, call with any further questions or concerns  SDH (subdural hematoma) ([facility])  Assessment & Plan  · See above plan    Hypertension  Assessment & Plan  · Reordered home medication      Subjective/Objective     Chief Complaint: "Can you take drain out?"    Subjective:  Patient currently complaining of pain over his right eye which he rates on the pain scale 8/10 which is dull in nature  Patient reports his current pain regimen helps with his pain  Patient does report some visual changes which are unchanged, patient reports he was supposed to have cataract surgery but has since been on hold secondary to his fall  He denies any dizziness, chest pain, shortness of breath, abdominal pain, nausea, vomiting, diarrhea, no problems with bowel or bladder, no new weakness or numbness/tingling  Patient denies having a BM but is passing gas  Objective:  Patient comfortably sitting out recliner, YOLA  Spoke with [facility] RN, updated on plan of care  I/O       07/12 0701 - 07/13 0700 07/13 0701 - 07/14 0700 07/14 0701 - 07/15 0700    P  O   240 360    I V  (mL/kg)  3041 3 (38 3) 617 5 (7 8)    IV Piggyback  350     Total Intake(mL/kg)  3631 3 (45 7) 977 5 (12 3)    Urine (mL/kg/hr)  1825 (1)     Drains  158     Total Output  1983     Net  +1648 3 +977 5           Unmeasured Urine Occurrence   3 x    Unmeasured Stool Occurrence   0 x          Invasive Devices     Peripheral Intravenous Line            Peripheral IV 07/13/21 Left Antecubital 1 day    Peripheral IV 07/13/21 Left Forearm 1 day          Drain Gastrostomy/Enterostomy Percutaneous endoscopic gastrostomy (PEG) -- days    External Urinary Catheter Medium 89 days    Closed/Suction Drain Right Head Bulb 10 Fr  1 day                Physical Exam:  Vitals: Blood pressure 140/76, pulse 61, temperature 97 6 °F (36 4 °C), resp  rate 18, height 5' 10" (1 778 m), weight 79 4 kg (175 lb), SpO2 96 %  ,Body mass index is 25 11 kg/m²  General appearance: alert, appears stated age, cooperative and no distress  Head: Normocephalic, head dressing removed, incision well approximated with sutures clean, dry, intact  YANETH drain removed without difficulty, 1 suture tied down in place    Eyes: EOMI, PERRL, conjugate gaze  Neck: supple, symmetrical, trachea midline  Lungs: non labored breathing  Heart: regular heart rate  Neurologic:   Mental status: Alert, oriented x3, thought content appropriate, speech is clear, following commands  Cranial nerves: grossly intact (Cranial nerves II-XII)  Sensory: normal to LT in all extremities x4, JPS and DST intact  Motor: moving all extremities, strength 5/5 throughout  Reflexes: 2+ and symmetric, no Davenport's or clonus appreciated  Coordination: finger to nose normal bilaterally, no drift bilaterally      Lab Results:  Results from last 7 days   Lab Units 07/14/21  0538 07/14/21  0015   WBC Thousand/uL 9 06  --    HEMOGLOBIN g/dL 13 4  --    HEMATOCRIT % 39 3  --    PLATELETS Thousands/uL 127* 118*     Results from last 7 days   Lab Units 07/14/21  0015   POTASSIUM mmol/L 3 9   CHLORIDE mmol/L 110*   CO2 mmol/L 25   BUN mg/dL 9   CREATININE mg/dL 0 58*   CALCIUM mg/dL 9 0             Results from last 7 days   Lab Units 07/14/21  0538   INR  1 20*   PTT seconds 28     No results found for: TROPONINT  ABG:  Lab Results   Component Value Date    PHART 7 389 04/15/2021    HIZ7ZAP 42 0 04/15/2021    PO2ART 168 2 (H) 04/15/2021    ACK3PQA 24 8 04/15/2021    BEART -0 3 04/15/2021    SOURCE Line, Arterial 04/15/2021       Imaging Studies: I have personally reviewed pertinent reports  and I have personally reviewed pertinent films in PACS    CT head wo contrast    Result Date: 7/14/2021  Impression: 1  Status post right-sided cranioplasty  Small amount of fluid, air and blood products are present in the subdural space  Anticipated postoperative changes in the overlying soft tissues  2   Encephalomalacia and gliosis in the frontal and temporal lobes bilaterally, consistent with prior hemorrhagic contusions and traumatic brain injury  3   No acute intracranial abnormality  Workstation performed: [id number]       EKG, Pathology, and Other Studies: I have personally reviewed pertinent reports        VTE Pharmacologic Prophylaxis: Enoxaparin (Lovenox)    VTE Mechanical Prophylaxis: sequential compression device

## 2021-07-14 NOTE — UTILIZATION REVIEW
Inpatient Admission Authorization Request   NOTIFICATION OF INPATIENT ADMISSION/INPATIENT AUTHORIZATION REQUEST   SERVICING FACILITY:   [facility]  Address: [address] [place] [address], [address] [place] [id number]  Tax ID: [id number]  NPI: [id number]  Place of Service: Inpatient [address] [facility] Code: 24     ATTENDING PROVIDER:  Attending Name and NPI#: Scott Ramos Md [[id number]]  Address: [address] [place] [address], [address] [place] [address]  Phone: [phone]     UTILIZATION REVIEW CONTACT:  [facility] Utilization   Network Utilization Review Department  Phone: [phone]  Fax: [phone]  Email: [email or web address]     PHYSICIAN ADVISORY SERVICES:  FOR [id number]-OS-NPWZ REVIEW - MEDICAL NECESSITY DENIAL  Phone: [phone]  Fax: [phone]  Email: [email or web address]     TYPE OF REQUEST:  Inpatient Status     ADMISSION INFORMATION:  ADMISSION DATE/TIME: 7/13/21 10:04 AM  PATIENT DIAGNOSIS CODE/DESCRIPTION:  Status post craniectomy [[id number]]  DISCHARGE DATE/TIME: No discharge date for patient encounter  DISCHARGE DISPOSITION (IF DISCHARGED): Non [facility]     IMPORTANT INFORMATION:  Please contact the [facility] directly with any questions or concerns regarding this request  Department voicemails are confidential     Send requests for admission clinical reviews, concurrent reviews, approvals, and administrative denials due to lack of clinical to fax [phone]

## 2021-07-14 NOTE — PLAN OF CARE
Problem: Prexisting or High Potential for Compromised Skin Integrity  Goal: Skin integrity is maintained or improved  Description: INTERVENTIONS:  - Identify patients at risk for skin breakdown  - Assess and monitor skin integrity  - Assess and monitor nutrition and hydration status  - Monitor labs   - Assess for incontinence   - Turn and reposition patient  - Assist with mobility/ambulation  - Relieve pressure over bony prominences  - Avoid friction and shearing  - Provide appropriate hygiene as needed including keeping skin clean and dry  - Evaluate need for skin moisturizer/barrier cream  - Collaborate with interdisciplinary team   - Patient/family teaching  - Consider wound care consult   Outcome: Progressing     Problem: MOBILITY - ADULT  Goal: Maintain or return to baseline ADL function  Description: INTERVENTIONS:  -  Assess patient's ability to carry out ADLs; assess patient's baseline for ADL function and identify physical deficits which impact ability to perform ADLs (bathing, care of mouth/teeth, toileting, grooming, dressing, etc )  - Assess/evaluate cause of self-care deficits   - Assess range of motion  - Assess patient's mobility; develop plan if impaired  - Assess patient's need for assistive devices and provide as appropriate  - Encourage maximum independence but intervene and supervise when necessary  - Involve family in performance of ADLs  - Assess for home care needs following discharge   - Consider OT consult to assist with ADL evaluation and planning for discharge  - Provide patient education as appropriate  Outcome: Progressing  Goal: Maintains/Returns to pre admission functional level  Description: INTERVENTIONS:  - Perform BMAT or MOVE assessment daily    - Set and communicate daily mobility goal to care team and patient/family/caregiver     - Collaborate with rehabilitation services on mobility goals if consulted  - Out of bed for toileting  - Record patient progress and toleration of activity level   Outcome: Progressing     Problem: PAIN - ADULT  Goal: Verbalizes/displays adequate comfort level or baseline comfort level  Description: Interventions:  - Encourage patient to monitor pain and request assistance  - Assess pain using appropriate pain scale  - Administer analgesics based on type and severity of pain and evaluate response  - Implement non-pharmacological measures as appropriate and evaluate response  - Consider cultural and social influences on pain and pain management  - Notify physician/advanced practitioner if interventions unsuccessful or patient reports new pain  Outcome: Progressing     Problem: INFECTION - ADULT  Goal: Absence or prevention of progression during hospitalization  Description: INTERVENTIONS:  - Assess and monitor for signs and symptoms of infection  - Monitor lab/diagnostic results  - Monitor all insertion sites, i e  indwelling lines, tubes, and drains  - Monitor endotracheal if appropriate and nasal secretions for changes in amount and color  - [place] appropriate cooling/warming therapies per order  - Administer medications as ordered  - Instruct and encourage patient and family to use good hand hygiene technique  - Identify and instruct in appropriate isolation precautions for identified infection/condition  Outcome: Progressing     Problem: SAFETY ADULT  Goal: Maintain or return to baseline ADL function  Description: INTERVENTIONS:  -  Assess patient's ability to carry out ADLs; assess patient's baseline for ADL function and identify physical deficits which impact ability to perform ADLs (bathing, care of mouth/teeth, toileting, grooming, dressing, etc )  - Assess/evaluate cause of self-care deficits   - Assess range of motion  - Assess patient's mobility; develop plan if impaired  - Assess patient's need for assistive devices and provide as appropriate  - Encourage maximum independence but intervene and supervise when necessary  - Involve family in performance of ADLs  - Assess for home care needs following discharge   - Consider OT consult to assist with ADL evaluation and planning for discharge  - Provide patient education as appropriate  Outcome: Progressing  Goal: Maintains/Returns to pre admission functional level  Description: INTERVENTIONS:  - Perform BMAT or MOVE assessment daily    - Set and communicate daily mobility goal to care team and patient/family/caregiver     - Collaborate with rehabilitation services on mobility goals if consulted  - Out of bed for toileting  - Record patient progress and toleration of activity level   Outcome: Progressing  Goal: Patient will remain free of falls  Description: INTERVENTIONS:  - Educate patient/family on patient safety including physical limitations  - Instruct patient to call for assistance with activity   - Consult OT/PT to assist with strengthening/mobility   - Keep Call bell within reach  - Keep bed low and locked with side rails adjusted as appropriate  - Keep care items and personal belongings within reach  - Initiate and maintain comfort rounds  - Make Fall Risk Sign visible to staff  - Offer Toileting every 2 Hours, in advance of need  - Initiate/Maintain bed alarm  - Obtain necessary fall risk management equipment  - Apply yellow socks and bracelet for high fall risk patients  - Consider moving patient to room near nurses station  Outcome: Progressing     Problem: DISCHARGE PLANNING  Goal: Discharge to home or other facility with appropriate resources  Description: INTERVENTIONS:  - Identify barriers to discharge w/patient and caregiver  - Arrange for needed discharge resources and transportation as appropriate  - Identify discharge learning needs (meds, wound care, etc )  - Arrange for interpretive services to assist at discharge as needed  - Refer to Case Management Department for coordinating discharge planning if the patient needs post-hospital services based on physician/advanced practitioner order or complex needs related to functional status, cognitive ability, or social support system  Outcome: Progressing     Problem: Knowledge Deficit  Goal: Patient/family/caregiver demonstrates understanding of disease process, treatment plan, medications, and discharge instructions  Description: Complete learning assessment and assess knowledge base  Interventions:  - Provide teaching at level of understanding  - Provide teaching via preferred learning methods  Outcome: Progressing     Problem: Nutrition/Hydration-ADULT  Goal: Nutrient/Hydration intake appropriate for improving, restoring or maintaining nutritional needs  Description: Monitor and assess patient's nutrition/hydration status for malnutrition  Collaborate with interdisciplinary team and initiate plan and interventions as ordered  Monitor patient's weight and dietary intake as ordered or per policy  Utilize nutrition screening tool and intervene as necessary  Determine patient's food preferences and provide high-protein, high-caloric foods as appropriate       INTERVENTIONS:  - Monitor oral intake, urinary output, labs, and treatment plans  - Assess nutrition and hydration status and recommend course of action  - Evaluate amount of meals eaten  - Assist patient with eating if necessary   - Allow adequate time for meals  - Recommend/ encourage appropriate diets, oral nutritional supplements, and vitamin/mineral supplements  - Order, calculate, and assess calorie counts as needed  - Recommend, monitor, and adjust tube feedings and TPN/PPN based on assessed needs  - Assess need for intravenous fluids  - Provide specific nutrition/hydration education as appropriate  - Include patient/family/caregiver in decisions related to nutrition  Outcome: Progressing     Problem: NEUROSENSORY - ADULT  Goal: Achieves stable or improved neurological status  Description: INTERVENTIONS  - Monitor and report changes in neurological status  - Monitor vital signs such as temperature, blood pressure, glucose, and any other labs ordered   - Initiate measures to prevent increased intracranial pressure  - Monitor for seizure activity and implement precautions if appropriate      Outcome: Progressing  Goal: Remains free of injury related to seizures activity  Description: INTERVENTIONS  - Maintain airway, patient safety  and administer oxygen as ordered  - Monitor patient for seizure activity, document and report duration and description of seizure to physician/advanced practitioner  - If seizure occurs,  ensure patient safety during seizure  - Reorient patient post seizure  - Seizure pads on all 4 side rails  - Instruct patient/family to notify RN of any seizure activity including if an aura is experienced  - Instruct patient/family to call for assistance with activity based on nursing assessment  - Administer anti-seizure medications if ordered    Outcome: Progressing  Goal: Achieves maximal functionality and self care  Description: INTERVENTIONS  - Monitor swallowing and airway patency with patient fatigue and changes in neurological status  - Encourage and assist patient to increase activity and self care     - Encourage visually impaired, hearing impaired and aphasic patients to use assistive/communication devices  Outcome: Progressing     Problem: CARDIOVASCULAR - ADULT  Goal: Maintains optimal cardiac output and hemodynamic stability  Description: INTERVENTIONS:  - Monitor I/O, vital signs and rhythm  - Monitor for S/S and trends of decreased cardiac output  - Administer and titrate ordered vasoactive medications to optimize hemodynamic stability  - Assess quality of pulses, skin color and temperature  - Assess for signs of decreased coronary artery perfusion  - Instruct patient to report change in severity of symptoms  Outcome: Progressing     Problem: RESPIRATORY - ADULT  Goal: Achieves optimal ventilation and oxygenation  Description: INTERVENTIONS:  - Assess for changes in respiratory status  - Assess for changes in mentation and behavior  - Position to facilitate oxygenation and minimize respiratory effort  - Oxygen administered by appropriate delivery if ordered  - Initiate smoking cessation education as indicated  - Encourage broncho-pulmonary hygiene including cough, deep breathe, Incentive Spirometry  - Assess the need for suctioning and aspirate as needed  - Assess and instruct to report SOB or any respiratory difficulty  - Respiratory Therapy support as indicated  Outcome: Progressing     Problem: GASTROINTESTINAL - ADULT  Goal: Minimal or absence of nausea and/or vomiting  Description: INTERVENTIONS:  - Administer IV fluids if ordered to ensure adequate hydration  - Maintain NPO status until nausea and vomiting are resolved  - Nasogastric tube if ordered  - Administer ordered antiemetic medications as needed  - Provide nonpharmacologic comfort measures as appropriate  - Advance diet as tolerated, if ordered  - Consider nutrition services referral to assist patient with adequate nutrition and appropriate food choices  Outcome: Progressing     Problem: GENITOURINARY - ADULT  Goal: Maintains or returns to baseline urinary function  Description: INTERVENTIONS:  - Assess urinary function  - Encourage oral fluids to ensure adequate hydration if ordered  - Administer IV fluids as ordered to ensure adequate hydration  - Administer ordered medications as needed  - Offer frequent toileting  - Follow urinary retention protocol if ordered  Outcome: Progressing  Goal: Absence of urinary retention  Description: INTERVENTIONS:  - Assess patients ability to void and empty bladder  - Monitor I/O  - Bladder scan as needed  - Discuss with physician/AP medications to alleviate retention as needed  - Discuss catheterization for long term situations as appropriate  Outcome: Progressing     Problem: METABOLIC, FLUID AND ELECTROLYTES - ADULT  Goal: Electrolytes maintained within normal limits  Description: INTERVENTIONS:  - Monitor labs and assess patient for signs and symptoms of electrolyte imbalances  - Administer electrolyte replacement as ordered  - Monitor response to electrolyte replacements, including repeat lab results as appropriate  - Instruct patient on fluid and nutrition as appropriate  Outcome: Progressing     Problem: SKIN/TISSUE INTEGRITY - ADULT  Goal: Skin Integrity remains intact(Skin Breakdown Prevention)  Description: Assess:  -Perform Yasmani assessment  -Clean and moisturize skin  -Inspect skin when repositioning, toileting, and assisting with ADLS  -Assess under medical devices  -Assess extremities for adequate circulation and sensation     Bed Management:  -Have minimal linens on bed & keep smooth, unwrinkled  -Change linens as needed when moist or perspiring  -Avoid sitting or lying in one position for long periods of time    Toileting:  -Offer bedside commode  -Assess for incontinence  -Use incontinent care products after each incontinent episode    Activity:  -Mobilize patient  -Encourage activity and walks on unit  -Encourage or provide ROM exercises   -Turn and reposition patient  -Use appropriate equipment to lift or move patient in bed  -Instruct/ Assist with weight shifting  -Consider limitation of chair time    Skin Care:  -Avoid use of baby powder, tape, friction and shearing, hot water or constrictive clothing  -Relieve pressure over bony prominences  -Do not massage red bony areas    Next Steps:  -Teach patient strategies to minimize risks  -Consider consults to  interdisciplinary teams  Outcome: Progressing     Problem: HEMATOLOGIC - ADULT  Goal: Maintains hematologic stability  Description: INTERVENTIONS  - Assess for signs and symptoms of bleeding or hemorrhage  - Monitor labs  - Administer supportive blood products/factors as ordered and appropriate  Outcome: Progressing     Problem: MUSCULOSKELETAL - ADULT  Goal: Maintain or return mobility to safest level of function  Description: INTERVENTIONS:  - Assess patient's ability to carry out ADLs; assess patient's baseline for ADL function and identify physical deficits which impact ability to perform ADLs (bathing, care of mouth/teeth, toileting, grooming, dressing, etc )  - Assess/evaluate cause of self-care deficits   - Assess range of motion  - Assess patient's mobility  - Assess patient's need for assistive devices and provide as appropriate  - Encourage maximum independence but intervene and supervise when necessary  - Involve family in performance of ADLs  - Assess for home care needs following discharge   - Consider OT consult to assist with ADL evaluation and planning for discharge  - Provide patient education as appropriate  Outcome: Progressing     Problem: Potential for Falls  Goal: Patient will remain free of falls  Description: INTERVENTIONS:  - Educate patient/family on patient safety including physical limitations  - Instruct patient to call for assistance with activity   - Consult OT/PT to assist with strengthening/mobility   - Keep Call bell within reach  - Keep bed low and locked with side rails adjusted as appropriate  - Keep care items and personal belongings within reach  - Initiate and maintain comfort rounds  - Make Fall Risk Sign visible to staff  - Offer Toileting every 2 Hours, in advance of need  - Initiate/Maintain bed alarm  - Obtain necessary fall risk management equipment  - Apply yellow socks and bracelet for high fall risk patients  - Consider moving patient to room near nurses station  Outcome: Progressing

## 2021-07-14 NOTE — OCCUPATIONAL THERAPY NOTE
Occupational Therapy Evaluation     Patient Name: Aurelia BORREGO Date: 7/14/2021  Problem List  Principal Problem:    Status post craniectomy  Active Problems:    Hypertension    SDH (subdural hematoma) (HCC)    Past Medical History  Past Medical History:   Diagnosis Date    Anxiety     Dysphagia 4/16/2021    GERD (gastroesophageal reflux disease)     Hematoma, subdural, with loss of consciousness, traumatic ([facility] [address] )     Hypertension     Status post insertion of percutaneous endoscopic gastrostomy (PEG) tube ([facility] [address] ) 5/19/2021     Past Surgical History  Past Surgical History:   Procedure Laterality Date    BRAIN HEMATOMA EVACUATION Right 4/15/2021    Procedure: Right CRANIOTOMY FOR SUBDURAL HEMATOMA;  Surgeon: Kamron Rush MD;  [facility];  Service: Neurosurgery    CRANIOTOMY Right     HAND SURGERY Left     PEG TUBE PLACEMENT      PEG TUBE REMOVAL      OR REPLACE SKULL PLATE/FLAP Right 0/32/2581    Procedure: RIGHT AUTOLOGOUS CRANIOPLASTY;  Surgeon: Kamron Rush MD;  [facility];  Service: Neurosurgery    ROTATOR CUFF REPAIR Left          07/14/21 0825   OT Last Visit   OT Visit Date 07/14/21   Note Type   Note type Evaluation   Restrictions/Precautions   Weight Bearing Precautions Per Order No   Other Precautions Multiple lines; Fall Risk;Pain  (YANETH DRAIN )   Pain Assessment   Pain Assessment Tool 0-10   Pain Score 8   Pain Location/Orientation Location: Head   Patient's Stated Pain Goal No pain   Hospital Pain Intervention(s) Repositioned; Ambulation/increased activity; Emotional support   Home Living   Type of [address] [place] [address] Two level; Able to live on main level with bedroom/bathroom;Stairs to enter with rails  (3-4 LIONEL )   Bathroom Shower/Tub Tub/shower unit   Bathroom Toilet Standard   Home Equipment Walker;Cane   Additional Comments NO USE OF DME PTA    Prior Function   Level of [place] Independent with ADLs and functional mobility   Lives With Spouse   Receives Help From Family   ADL Assistance Independent   IADLs Independent   Falls in the last 6 months 1 to 4  (CAUSE OF INITIAL INJURY )   Vocational Retired   Lifestyle   [employer] PT REPORTS BEING I WITH ADLS/IADLS PTA  HAD COMPLETED INPT REHAB AT [place] [address], [address]  RELIES ON SPOUSE TO ASSIST WITH DRIVING 2' BASELINE VISUAL IMPAIRMENT    Reciprocal Relationships LIVES WITH SUPPORTIVE SPOUSE    Service to Others RETIRED;     Intrinsic Gratification ENJOYS VISITING HIS [facility]    Psychosocial   Psychosocial (WDL) WDL   ADL   Eating Assistance 7  Independent   Grooming Assistance 5  Supervision/Setup   UB Bathing Assistance 5  Supervision/Setup   LB Bathing Assistance 4  Minimal Assistance   UB Dressing Assistance 5  Supervision/Setup   LB Dressing Assistance 4  Minimal Assistance   Toileting Assistance  5  Supervision/Setup   Functional Assistance 4  Minimal Assistance   Bed Mobility   Supine to Sit 5  Supervision   Additional items Increased time required   Transfers   Sit to Stand 5  Supervision   Additional items Assist x 1; Increased time required;Verbal cues   Stand to Sit 5  Supervision   Additional items Assist x 1; Increased time required;Verbal cues   Functional Mobility   Functional Mobility 4  Minimal assistance  (CGA )   Additional items Rolling walker   Balance   Static Sitting Fair   Static Standing Fair -   Ambulatory Poor +   Activity Tolerance   Activity Tolerance Patient limited by pain   Medical Staff Made Aware PT SEEN FOR CO-SESSION WITH SKILLED PHYSICAL THERAPIST 2' CLINICALLY UNSTABLE PRESENTATION, NEW PRECAUTIONS/LIMITATIONS, LIMITED ACTIVITY TOLERANCE AND PRESENT IMPAIRMENTS WHICH ARE A REGRESSION FROM THE PT'S BASELINE AND IMPACTING OVERALL OCCUPATIONAL PERFORMANCE      Nurse Made Aware APPROPRIATE TO SEE    RUE Assessment   RUE Assessment WFL   LUE Assessment   LUE Assessment WFL   Hand Function   Gross Motor Coordination Functional   Fine Motor Coordination Functional   Cognition   Overall Cognitive Status WFL   Arousal/Participation Alert; Cooperative   Attention Attends with cues to redirect   Orientation Level Oriented X4   Memory Within functional limits   Following Commands Follows multistep commands without difficulty   Comments PT IS OVERALL PLEASANT AND COOPERATIVE  ? FLAT AFFECT AND POSSIBLE HIGHER LEVEL COG DEFICITS   Assessment   Assessment 61 YO Male SEEN FOR INITIAL OCCUPATIONAL THERAPY EVALUATION S/P R CRANIOPLASTY ON 7/13/21  PROBLEMS LIST INCLUDES H/O R CRANIETOMY FOR SDH IN 4/21, Anxiety, Dysphagia, GERD (gastroesophageal reflux disease), Hematoma, subdural, with loss of consciousness, traumatic ([facility] [address] ), Hypertension, and Status post insertion of percutaneous endoscopic gastrostomy (PEG) tube ([facility] [address] )  PT IS FROM HOME WITH SUPPORTIVE SPOUSE WHERE HE REPORTS BEING INDEPENDENT WITH ADLS/IADLS PTA  PT REPORTS INPT REHAB ADMISSION FOLLOWING INITIAL INJURY WITH D/C HOME  PT CURRENTLY REQUIRES OVERALL SUPERVISION-MIN A WITH ADLS, TRANSFERS AND FUNCTIONAL MOBILITY WITH USE OF RW  PT IS LIMITED 2' PAIN, FATIGUE, IMPAIRED BALANCE, FLAT AFFECT, LIMITED ACTIVITY TOLERANCE AND POSSIBLE HIGHER LEVEL COG DEFICITS  PT EDUCATED ON DEEP BREATHING TECHNIQUES T/O ACTIVITY, SLOWING OF PACE, ENERGY CONSERVATION TECHNIQUES FOR CARRY OVER UPON D/C, INCREASED FAMILY SUPPORT and CONTINUE PARTICIPATION IN SELF-CARE/MOBILITY WITH STAFF [address] [facility]   The patient's raw score on the AM-PAC Daily Activity inpatient short form is 20, standardized score is 42 03, greater than 39 4  Patients at this level are likely to benefit from discharge to home  Please refer to the recommendation of the Occupational Therapist for safe discharge planning  FROM AN OCCUPATIONAL THERAPY PERSPECTIVE, PT WOULD BENEFIT FROM F/U WITH OUTPATIENT OT SERVICES + INCREASED FAMILY SUPPORT UPON D/C  ALL QUESTIONS/CONCERNS ADDRESSED  NO ADDITIONAL ACUTE CARE OT NEEDS  D/C OT      Goals   Patient Goals TO RETURN HOME    Recommendation   OT Discharge Recommendation Home with outpatient rehabilitation   OT - OK to Discharge Yes   AM-PAC Daily Activity Inpatient   Lower Body Dressing 3   Bathing 3   Toileting 3   Upper Body Dressing 4   Grooming 3   Eating 4   Daily Activity Raw Score 20   Daily Activity Standardized Score (Calc for Raw Score >=11) 42 03   AM-PAC Applied Cognition Inpatient   Following a Speech/Presentation 3   Understanding Ordinary Conversation 4   Taking Medications 3   Remembering Where Things Are Placed or Put Away 4   Remembering List of 4-5 Errands 4   Taking Care of Complicated Tasks 3   Applied Cognition Raw Score 21   Applied Cognition Standardized Score 44 3   Modified [place] Scale   Modified [place] Scale 3       Documentation completed by MELINDA Gonzalez, OTR/L

## 2021-07-14 NOTE — PHYSICAL THERAPY NOTE
PHYSICAL THERAPY EVALUATION  NAME:  Henry Shafer  DATE: 07/14/21    AGE:   62 y o   Mrn:   [id number]  ADMIT DX:  Status post craniectomy [Z98 [id number]]    Past Medical History:   Diagnosis Date    Anxiety     Dysphagia 4/16/2021    GERD (gastroesophageal reflux disease)     Hematoma, subdural, with loss of consciousness, traumatic ([facility] [address] )     Hypertension     Status post insertion of percutaneous endoscopic gastrostomy (PEG) tube ([facility] [address] ) 5/19/2021       Past Surgical History:   Procedure Laterality Date    BRAIN HEMATOMA EVACUATION Right 4/15/2021    Procedure: Right CRANIOTOMY FOR SUBDURAL HEMATOMA;  Surgeon: Kamron Good MD;  [facility];  Service: Neurosurgery    CRANIOTOMY Right     HAND SURGERY Left     PEG TUBE PLACEMENT      PEG TUBE REMOVAL      [place] REPLACE SKULL PLATE/FLAP Right 2/85/5368    Procedure: RIGHT AUTOLOGOUS CRANIOPLASTY;  Surgeon: Kamron Good MD;  [facility];  Service: Neurosurgery    ROTATOR CUFF REPAIR Left        Length Of Stay: 1    PHYSICAL THERAPY EVALUATION:        07/14/21 0827   Note Type   Note type Evaluation   Pain Assessment   Pain Assessment Tool 0-10   Pain Score 8   Pain Location/Orientation Location: Head   Pain Onset/Description Onset: Ongoing;Frequency: Constant/Continuous; Descriptor: Aching   Effect of Pain on Daily Activities Increased pain with activity   Patient's Stated Pain Goal No pain   Hospital Pain Intervention(s) Ambulation/increased activity;Repositioned   Home Living   Type of [address] [place] [address] Two level; Able to live on main level with bedroom/bathroom;Stairs to enter with rails  (3-4 LIONEL )   Home Equipment Walker;Cane   Additional Comments Patient reports living with supportive spouse who is able to assist as needed    Patient states his spouse is retired and able to assist as needed   Prior Function   Level of [place] Independent with ADLs and functional mobility   Lives With [facility] ADL Assistance Independent   Falls in the last 6 months 1 to 4  (1 per pt )   Comments Patient denies use of assistive device for ambulation prior to admission   Restrictions/Precautions   Weight Bearing Precautions Per Order No   Other Precautions Multiple lines;Pain; Fall Risk  (YANETH drain )   General   Additional Pertinent History Patient was previously admitted at [facility] from 4/15-4/22/21  Pt underwent a Right CRANIOTOMY FOR SUBDURAL HEMATOMA which was performed on 4/15/21  Pt was ultimately discharged from [facility] [place] [address] and was eventually d/c from rehab to home      Family/Caregiver Present No   Cognition   Overall Cognitive Status WFL   Arousal/Participation Alert   Orientation Level Oriented to person;Oriented to place;Oriented to time;Oriented to situation   Memory Within functional limits   Following Commands Follows all commands and directions without difficulty   RUE Assessment   RUE Assessment WFL   LUE Assessment   LUE Assessment WFL   RLE Assessment   RLE Assessment WFL   Strength RLE   RLE Overall Strength 4/5   LLE Assessment   LLE Assessment WFL   Strength LLE   LLE Overall Strength 4/5   Bed Mobility   Supine to Sit 5  Supervision   Additional items Increased time required   Transfers   Sit to Stand 5  Supervision   Additional items Increased time required   Stand to Sit 5  Supervision   Additional items Increased time required   Additional Comments VC and TC needed for hand placement during transfers    Ambulation/Elevation   Gait pattern Short stride   Gait Assistance 4  Minimal assist  (Contact guard assist)   Additional items Assist x 1   Assistive Device Rolling walker   Distance 65ft x 2    Stair Management Assistance 4  Minimal assist  (contact guard assist )   Additional items Assist x 1   Balance   Static Sitting Fair   Static Standing Fair -   Ambulatory Fair -   Endurance Deficit   Endurance Deficit Yes   Endurance Deficit Description pain    Activity Tolerance   Activity Tolerance Patient limited by pain   Medical Staff Made Aware Chise, OT; OT present for co evaluation due to pts unstable presentation, being s/p sx intervention, and decreased activity tolerance which all limit pts overall physical performance    Nurse Made Aware Patient appropriate to be seen and mobilized per nursing   Assessment   Prognosis Good   Problem List Pain;Decreased skin integrity; Impaired balance;Decreased mobility   Assessment Pt is 62 y o  male seen for PT evaluation s/p admit to [facility] 7/13/2021  Two pt identifiers were used to confirm  Pt presented for scheduled RIGHT AUTOLOGOUS CRANIOPLASTY (Right) which was performed on 7/13/21   Pt was admitted with a primary dx of: hx of craniectomy now s/p RIGHT AUTOLOGOUS CRANIOPLASTY (Right)  PT now consulted for assessment of mobility and d/c needs  Pt with Out of bed orders  Pts current co morbidities affecting treatment include: Anxiety, dysphagia, GERD, subdural hematoma, HTN, and personal factors including steps to manage at home  Pts current clinical presentation is Unstable/ Unpredictable (high complexity) due to Ongoing medical management for primary dx, Increased reliance on more restrictive AD compared to baseline, Decreased activity tolerance compared to baseline, Continuous pulse oximetry monitoring , YANETH drain in place at current time, s/p surgical intervention    Upon evaluation, pt currently is requiring Supervision for bed mobility; Supervision for transfers and Min Ax1 for ambulation w/ RW   Patient denies any lightheadedness or dizziness with ambulation  Pt presents at PT eval functioning below baseline and currently w/ overall mobility deficits 2* to: impaired balance, pain, decreased activity tolerance compared to baseline, decreased skin integrity  At conclusion of PT session pt returned back in chair with phone and call bell within reach  Pt denies any further questions at this time   PT is currently recommending Home with family support and Outpatient PT when appropriate  D/C acute care PT at this time due to pt being supervision/ contact guard assist with all mobility and having supportive spouse who is able to assist as needed  Pt denies any mobility or safety concerns about returning home at d/c  Recommend pt continues to mobilize with nsg and restorative techs during hospital stay  Barriers to Discharge None   Barriers to Discharge Comments Patient denies any mobility or safety concerns about returning home at time of discharge  Patient states his supportive spouse is able to assist as needed   Goals   Patient Goals " to go home"   Plan   PT Frequency   (DC IP PT )   Recommendation   PT Discharge Recommendation Home with outpatient rehabilitation  (home with family support, OPPT when appropriate )   Equipment Recommended Walker  (pt already owns )   Sireli [address] walker   PT - OK to Discharge Yes  (When medically cleared)   Additional Comments Patient denies any mobility or safety concerns about returning home at time of discharge  Patient states his supportive spouse is able to assist as needed   AM-PAC Basic Mobility Inpatient   Turning in Bed Without Bedrails 4   Lying on Back to Sitting on Edge of Flat Bed 4   Moving Bed to Chair 4   Standing Up From Chair 4   Walk in Room 3   Climb 3-5 Stairs 3   Basic Mobility Inpatient Raw Score 22   Basic Mobility Standardized Score 47 4   Modified [place] Scale   Modified Arben Scale 3   Barthel Index   Feeding 10   Bathing 5   Grooming Score 5   Dressing Score 5   Bladder Score 10   Bowels Score 10   Toilet Use Score 10   Transfers (Bed/Chair) Score 10   Mobility (Level Surface) Score 10   Stairs Score 5   Barthel Index Score 80   Portions of the documentation may have been created using voice recognition software  Occasional wrong word or sound alike substitutions may have occurred due to the inherent limitations of the voice recognition software   Read the chart carefully and recognize, using context, where substitutions have occurred      Demarco Douglas PT, DPT

## 2021-07-15 VITALS
TEMPERATURE: 98.5 F | SYSTOLIC BLOOD PRESSURE: 129 MMHG | OXYGEN SATURATION: 97 % | WEIGHT: 175 LBS | BODY MASS INDEX: 25.05 KG/M2 | DIASTOLIC BLOOD PRESSURE: 70 MMHG | RESPIRATION RATE: 18 BRPM | HEIGHT: 70 IN | HEART RATE: 65 BPM

## 2021-07-15 PROCEDURE — 99024 POSTOP FOLLOW-UP VISIT: CPT | Performed by: NURSE PRACTITIONER

## 2021-07-15 RX ORDER — LEVETIRACETAM 500 MG/1
500 TABLET ORAL EVERY 12 HOURS SCHEDULED
Qty: 10 TABLET | Refills: 0 | Status: SHIPPED | OUTPATIENT
Start: 2021-07-15 | End: 2021-08-25 | Stop reason: SDUPTHER

## 2021-07-15 RX ORDER — DOCUSATE SODIUM 100 MG/1
100 CAPSULE, LIQUID FILLED ORAL 2 TIMES DAILY
Qty: 10 CAPSULE | Refills: 0
Start: 2021-07-15 | End: 2021-11-12

## 2021-07-15 RX ORDER — METHOCARBAMOL 750 MG/1
750 TABLET, FILM COATED ORAL EVERY 8 HOURS SCHEDULED
Qty: 30 TABLET | Refills: 0 | Status: SHIPPED | OUTPATIENT
Start: 2021-07-15 | End: 2021-11-12

## 2021-07-15 RX ORDER — OXYCODONE HYDROCHLORIDE 5 MG/1
5 TABLET ORAL EVERY 4 HOURS PRN
Qty: 42 TABLET | Refills: 0 | Status: SHIPPED | OUTPATIENT
Start: 2021-07-15 | End: 2021-07-20 | Stop reason: SDUPTHER

## 2021-07-15 RX ORDER — BISACODYL 10 MG
10 SUPPOSITORY, RECTAL RECTAL DAILY PRN
Status: DISCONTINUED | OUTPATIENT
Start: 2021-07-15 | End: 2021-07-15 | Stop reason: HOSPADM

## 2021-07-15 RX ADMIN — LISINOPRIL 20 MG: 20 TABLET ORAL at 08:45

## 2021-07-15 RX ADMIN — ACETAMINOPHEN 975 MG: 325 TABLET, FILM COATED ORAL at 05:32

## 2021-07-15 RX ADMIN — OXYCODONE HYDROCHLORIDE 10 MG: 5 TABLET ORAL at 05:38

## 2021-07-15 RX ADMIN — LEVETIRACETAM 500 MG: 500 TABLET, FILM COATED ORAL at 08:45

## 2021-07-15 RX ADMIN — ENOXAPARIN SODIUM 40 MG: 40 INJECTION SUBCUTANEOUS at 08:45

## 2021-07-15 RX ADMIN — METHOCARBAMOL TABLETS 750 MG: 750 TABLET, COATED ORAL at 05:32

## 2021-07-15 RX ADMIN — OXYCODONE HYDROCHLORIDE 10 MG: 5 TABLET ORAL at 15:46

## 2021-07-15 RX ADMIN — Medication 25 MG: at 08:45

## 2021-07-15 RX ADMIN — ACETAMINOPHEN 975 MG: 325 TABLET, FILM COATED ORAL at 14:16

## 2021-07-15 RX ADMIN — OXYCODONE HYDROCHLORIDE 10 MG: 5 TABLET ORAL at 11:44

## 2021-07-15 RX ADMIN — PANTOPRAZOLE SODIUM 40 MG: 40 TABLET, DELAYED RELEASE ORAL at 05:38

## 2021-07-15 RX ADMIN — METHOCARBAMOL TABLETS 750 MG: 750 TABLET, COATED ORAL at 14:16

## 2021-07-15 NOTE — PROGRESS NOTES
Pastoral Care Progress Note    7/15/2021  Patient: Celine Mcgowan : 1960  Admission Date & Time: 2021 0530  MRN: [id number] CSN: [id number]         21 1300   Clinical Encounter Type   Visited With Patient   Orthodox Encounters   Orthodox Needs Prayer   Sacramental Encounters   Sacrament of Sick-Anointing Anointed

## 2021-07-15 NOTE — DISCHARGE INSTRUCTIONS
Discharge Instructions  Cranioplasty    Activity:  1  Do not lift, pushing or pulling more than 10 pounds for 2 weeks  2  Avoid bending, lifting and twisting for 2 weeks  3  Walk as tolerated  Recommend at least four short walks daily  4  No driving for at least 2 weeks or until cleared by Neurosurgery  5  Do not use a hair dryer, and avoid hair products such as mousse, oils, and gels  Do not brush your hair away from the incision since this will put strain on the suture line  6  Do not dye or perm hair for 6 weeks or until cleared by physician  7  Continue to change bed linens and pajamas more frequently  Wear clean clothes daily  Surgical incision care:  1  Keep dressings in place for 3 days  2  After 3 days, incisions may be left open to air, but should remain clean  3  Keep incisions dry for 3 days  4  May shower after 3 days using a baby shampoo including head incision  Rinse off shampoo and pat dry  Avoid, rubbing incision but gently massage hair  a  Continue to use clean towel and washcloth with each shower for 2 weeks post-op  5  Do not immerse the incisions in water for 6 weeks or until cleared  6  Do not apply any creams or ointments to the incision, unless otherwise instructed by [facility]  7  Contact office if increasing redness, drainage, pain or swelling occurs around the incisions or if you develop a fever greater than 101F  8  Do not dye/perm hair or use any hair products until cleared by Neurosurgery  Postoperative medication:  1  [facility] will provide pain medication in the postoperative period  All prescriptions must come from a single provider  a  Take medications as prescribed  Call office with any questions/concerns  b  May use over the counter Tylenol  No NSAIDs (ie  Ibuprofen, Aleve, Advil, Naproxen, etc )  2  Please contact office for questions regarding dosage and modifications    3  No antiplatelet or anticoagulation medication until cleared by [employer], unless otherwise instructed  Please contact [facility] if you have any questions about the effects of any of your medications on blood clotting  4  Do not operate heavy machinery or vehicles while taking sedating medications  5  Use a bowel regimen while on opioids as they induce constipation  Ie  Senokot-S, Miralax, Colace, etc  Increase fiber and water intake  Please follow-up in 2 weeks for incision check and 6 weeks for postoperative visit with CT head, please have done prior to appointment  ** Please notify the office if incision becomes red, swollen, tender, or has increased drainage, and temp>101  Return to the ER if you experience increased headache, difficulty walking, change in vision or speech, drowsiness, weakness, nausea/vomiting, or seizures  **

## 2021-07-15 NOTE — PLAN OF CARE
Problem: Prexisting or High Potential for Compromised Skin Integrity  Goal: Skin integrity is maintained or improved  Description: INTERVENTIONS:  - Identify patients at risk for skin breakdown  - Assess and monitor skin integrity  - Assess and monitor nutrition and hydration status  - Monitor labs   - Assess for incontinence   - Turn and reposition patient  - Assist with mobility/ambulation  - Relieve pressure over bony prominences  - Avoid friction and shearing  - Provide appropriate hygiene as needed including keeping skin clean and dry  - Evaluate need for skin moisturizer/barrier cream  - Collaborate with interdisciplinary team   - Patient/family teaching  - Consider wound care consult   Outcome: Progressing     Problem: MOBILITY - ADULT  Goal: Maintain or return to baseline ADL function  Description: INTERVENTIONS:  -  Assess patient's ability to carry out ADLs; assess patient's baseline for ADL function and identify physical deficits which impact ability to perform ADLs (bathing, care of mouth/teeth, toileting, grooming, dressing, etc )  - Assess/evaluate cause of self-care deficits   - Assess range of motion  - Assess patient's mobility; develop plan if impaired  - Assess patient's need for assistive devices and provide as appropriate  - Encourage maximum independence but intervene and supervise when necessary  - Involve family in performance of ADLs  - Assess for home care needs following discharge   - Consider OT consult to assist with ADL evaluation and planning for discharge  - Provide patient education as appropriate  Outcome: Progressing  Goal: Maintains/Returns to pre admission functional level  Description: INTERVENTIONS:  - Perform BMAT or MOVE assessment daily    - Set and communicate daily mobility goal to care team and patient/family/caregiver  - Collaborate with rehabilitation services on mobility goals if consulted  - Perform Range of Motion 4times a day    - Reposition patient every  2 hours   - Dangle patient 3 times a day  - Stand patient 4 times a day  - Ambulate patient 3 times a day  - Out of bed to chair 3 times a day   - Out of bed for meals 3 times a day  - Out of bed for toileting  - Record patient progress and toleration of activity level   Outcome: Progressing     Problem: PAIN - ADULT  Goal: Verbalizes/displays adequate comfort level or baseline comfort level  Description: Interventions:  - Encourage patient to monitor pain and request assistance  - Assess pain using appropriate pain scale  - Administer analgesics based on type and severity of pain and evaluate response  - Implement non-pharmacological measures as appropriate and evaluate response  - Consider cultural and social influences on pain and pain management  - Notify physician/advanced practitioner if interventions unsuccessful or patient reports new pain  Outcome: Progressing     Problem: INFECTION - ADULT  Goal: Absence or prevention of progression during hospitalization  Description: INTERVENTIONS:  - Assess and monitor for signs and symptoms of infection  - Monitor lab/diagnostic results  - Monitor all insertion sites, i e  indwelling lines, tubes, and drains  - Monitor endotracheal if appropriate and nasal secretions for changes in amount and color  - [place] appropriate cooling/warming therapies per order  - Administer medications as ordered  - Instruct and encourage patient and family to use good hand hygiene technique  - Identify and instruct in appropriate isolation precautions for identified infection/condition  Outcome: Progressing     Problem: SAFETY ADULT  Goal: Maintain or return to baseline ADL function  Description: INTERVENTIONS:  -  Assess patient's ability to carry out ADLs; assess patient's baseline for ADL function and identify physical deficits which impact ability to perform ADLs (bathing, care of mouth/teeth, toileting, grooming, dressing, etc )  - Assess/evaluate cause of self-care deficits   - Assess range of motion  - Assess patient's mobility; develop plan if impaired  - Assess patient's need for assistive devices and provide as appropriate  - Encourage maximum independence but intervene and supervise when necessary  - Involve family in performance of ADLs  - Assess for home care needs following discharge   - Consider OT consult to assist with ADL evaluation and planning for discharge  - Provide patient education as appropriate  Outcome: Progressing  Goal: Maintains/Returns to pre admission functional level  Description: INTERVENTIONS:  - Perform BMAT or MOVE assessment daily    - Set and communicate daily mobility goal to care team and patient/family/caregiver  - Collaborate with rehabilitation services on mobility goals if consulted  - Perform Range of Motion 3 times a day  - Reposition patient every 2 hours    - Dangle patient 3times a day  - Stand patient 4 times a day  - Ambulate patient 3 times a day  - Out of bed to chair 4 times a day   - Out of bed for meals 4 times a day  - Out of bed for toileting  - Record patient progress and toleration of activity level   Outcome: Progressing  Goal: Patient will remain free of falls  Description: INTERVENTIONS:  - Educate patient/family on patient safety including physical limitations  - Instruct patient to call for assistance with activity   - Consult OT/PT to assist with strengthening/mobility   - Keep Call bell within reach  - Keep bed low and locked with side rails adjusted as appropriate  - Keep care items and personal belongings within reach  - Initiate and maintain comfort rounds  - Make Fall Risk Sign visible to staff  - Offer Toileting every 2 Hours, in advance of need  - Initiate/Maintain  chair alarm  - Obtain necessary fall risk management equipment:   - Apply yellow socks and bracelet for high fall risk patients  - Consider moving patient to room near nurses station  Outcome: Progressing

## 2021-07-15 NOTE — DISCHARGE SUMMARY
[address] [facility]  Discharge- Mitchell Cooks 1960, 62 y o  male MRN: [id number]  Unit/Bed#: [facility] 020-68 Encounter: [id number]  Primary Care Provider: ANIA Klein   Date and time admitted to hospital: 7/13/2021  5:30 AM    * Status post craniectomy  Assessment & Plan  POD 2 right-sided cranioplasty  · Status post right craniectomy for evacuation of subdural hematoma on 04/15/2021 by Dakota Armstrong  Patient initially was hospitalized from 04/04/2021-04/14/2021 at [facility], [facility] in [place] secondary to fall with head strike, noted to have right subdural hematoma and was discharged to TBI rehab  He was a transfer from TBI rehab with altered mental status and found to have a large right-sided subdural hematoma with midline shift and brain compression and subsequently underwent craniectomy for evacuation  Imaging:    CT head wo 7/14/21:Status post right-sided cranioplasty  Small amount of fluid, air and blood products are present in the subdural space  Anticipated postoperative changes in the overlying soft tissues  Encephalomalacia and gliosis in the frontal and temporal lobes bilaterally, consistent with prior hemorrhagic contusions and traumatic brain injury  No acute intracranial abnormality      Plan:  · Continue neurological checks  · YANETH drain removed yesterday 7/14  · STAT CT head with any neurological decline including drop GCS of 2pts within 1 hr   · Keppra 500mg Q 12H for seziure ppx x 1 wk  · Pain well controlled on current regimen:  · Tylenol 975 mg q 8 hours  · Robaxin 750 mg q 8 hours  · Oxycodone 5 mg q4hrs prn for moderate pain  · Oxycodone 10 mg q4hrs prn for severe pain  · DC'd IV Dilaudid 0 5 mg q3hrs prn for breakthrough pain  · Bowel regimen:  Colace, MiraLax, and Senokot  · PT/OT recommending home with family support in outpatient PT  · Home meds reordered   · IVF discontinued, patient tolerating oral diet and voiding without difficulty  · Patient had bowel movement today  · DVT PPX: SCDs and SQ lovenox    Neurosurgery will continue to follow as primary,  patient is medically stable for discharge home today, call with any further questions or concerns  Patient will follow-up in 2 weeks for incision check and 6 weeks for postoperative visit with CT head  SDH (subdural hematoma) (HCC)  Assessment & Plan  · See above plan    Hypertension  Assessment & Plan  · Reordered home medication          Medical Problems     Resolved Problems  Date Reviewed: 7/7/2021    None                Subjective/objective:    Chief complaint: "When can I leave?"    Subjective:  Patient currently complaining of 7-8/10 right-sided tension headache, patient reports his current pain regimen helps with his pain  He also reports chronic blurry vision secondary to need for cataract surgery  He denies any dizziness, chest pain, shortness of breath, abdominal pain, nausea, vomiting, diarrhea, no problems with bowel or bladder, no new weakness or numbness/tingling  Patient states he had a BM this morning and is tolerating a regular diet and voiding without difficulty  Objective:  Patient comfortably walking around his room, NAD  Spoke with Shweta Buck RN, updated on plan of care  General appearance: alert, appears stated age, cooperative and no distress  Head: Normocephalic, cranioplasty incision well approximated with sutures, clean, dry, intact no active drainage    Eyes: EOMI, PERRL, conjugate gaze  Neck: supple, symmetrical, trachea midline   Lungs: non labored breathing  Heart: regular heart rate  Neurologic:   Mental status: Alert, oriented x3, thought content appropriate, speech is clear, following commands  Cranial nerves: grossly intact (Cranial nerves II-XII)  Sensory: normal to LT in all extremities x4, JPS and DST intact  Motor: moving all extremities without focal weakness, strength 5/5 throughout  Reflexes: 2+ and symmetric, no Davenport's or clonus appreciated  Coordination: finger to nose normal bilaterally, no drift bilaterally    Discharge Date:  07/15/2021    Admitting Diagnosis: Status post craniectomy [Z98 [id number]]  Hypertension  Subdural hematoma  s/p right cranioplasty    Discharge Diagnosis: 1  Status post craniectomy  2  Hypertension  3  Subdural hematoma  4  Status post right cranioplasty    Attending: Dr Carter Angelo Physician(s): n/a    Procedures Performed:  Status post right cranioplasty    Radiology:    CT head wo contrast    Result Date: 7/14/2021  Narrative: CT BRAIN - WITHOUT CONTRAST INDICATION:   s/p cranioplasty  43-year-old male with history of prior traumatic brain injury April 6, 2021  Patient sustained multiple compartmental intracranial hemorrhages  Status post right-sided craniotomy for subdural hemorrhage April 15, 2021  Patient is status post right-sided multilevel disc cranioplasty July 13, 2021  Follow-up cranioplasty  COMPARISON:  Numerous prior studies, most recent of which is a noncontrast CT brain May 19, 2021  TECHNIQUE:  CT examination of the brain was performed  In addition to axial images, sagittal and coronal 2D reformatted images were created and submitted for interpretation  Radiation dose length product (DLP) for this visit:  844 65 mGy-cm   This examination, like all CT scans performed in the [facility], was performed utilizing techniques to minimize radiation dose exposure, including the use of iterative  reconstruction and automated exposure control  IMAGE QUALITY:  Diagnostic  FINDINGS: PARENCHYMA:  No acute intraparenchymal hemorrhage  Areas of encephalomalacia and gliosis are present in the frontal lobes bilaterally as well as the temporal lobes bilaterally, left side greater than right  Findings most compatible with prior traumatic brain injury  No acute infarctions   VENTRICLES AND EXTRA-AXIAL SPACES:  Fluid, air and blood products are present in the subdural space, consistent with recent cranioplasty  Minimal mass effect with sulcal effacement  No significant midline shift  No evidence for obstructing hydrocephalus  VISUALIZED ORBITS AND PARANASAL SINUSES:  Unremarkable  CALVARIUM AND EXTRACRANIAL SOFT TISSUES:  Anticipated postoperative changes are present, consistent with right-sided cranioplasty  There is a drainage catheter present in the soft tissues overlying the cranioplasty  Impression: 1  Status post right-sided cranioplasty  Small amount of fluid, air and blood products are present in the subdural space  Anticipated postoperative changes in the overlying soft tissues  2   Encephalomalacia and gliosis in the frontal and temporal lobes bilaterally, consistent with prior hemorrhagic contusions and traumatic brain injury  3   No acute intracranial abnormality  Workstation performed: [id number]       Hospital Course: Cristi Maldonado is a 63 y/o male who presented for elective right-sided cranioplasty  Patient is status post right craniectomy for evacuation of subdural hematoma on 04/15/2021  Patient initially was hospitalized from 04/04/2021-04/14/2021 at [facility], [facility] in [place] secondary to fall with head strike, patient was noted to have right subdural hematoma and was discharged to TBI rehab  Patient was then transferred from TBI rehab with altered mental status and found to have a large right-sided subdural hematoma with midline shift and brain compression and subsequently underwent craniectomy for evacuation  Patient underwent a right sided cranioplasty under general anesthesia with minimal blood loss and no complications  Patient was kept in the PACU until stable and then moved to the floor  Patient received CT head  postoperatively which demonstrated Status post right-sided cranioplasty  Small amount of fluid, air and blood products are present in the subdural space    Anticipated postoperative changes in the overlying soft tissues  Encephalomalacia and gliosis in the frontal and temporal lobes bilaterally, consistent with prior hemorrhagic contusions and traumatic brain injury  No acute intracranial abnormality  YANETH hernesto  was placed perioperatively and removed without difficulty on 07/14, suture tied down in place; patient tolerated well  PT and OT were consulted and recommend home with outpatient PT  Patient was cleared medically for discharge  Prior to discharge, postoperative instructions were discussed with patient  During that time, all questions and concerns were addressed  Patient will follow up outpatient in 2 weeks for an incision check and 6 weeks with repeat CT head  Condition at Discharge: good     Discharge instructions/Information to patient and family:   See after visit summary for information provided to patient and family  Provisions for Follow-Up Care:  See after visit summary for information related to follow-up care and any pertinent home health orders  Disposition: Home        Planned Readmission: No    Discharge Statement   I spent 26 minutes discharging the patient  This time was spent on the day of discharge  I had direct contact with the patient on the day of discharge  Additional documentation is required if more than 30 minutes were spent on discharge  Discharge Medications:  See after visit summary for reconciled discharge medications provided to patient and family

## 2021-07-15 NOTE — ASSESSMENT & PLAN NOTE
POD 2 right-sided cranioplasty  · Status post right craniectomy for evacuation of subdural hematoma on 04/15/2021 by Joyce Euceda  Patient initially was hospitalized from 04/04/2021-04/14/2021 at [facility] in [place] secondary to fall with head strike, noted to have right subdural hematoma and was discharged to TBI rehab  He was a transfer from TBI rehab with altered mental status and found to have a large right-sided subdural hematoma with midline shift and brain compression and subsequently underwent craniectomy for evacuation  Imaging:    CT head wo 7/14/21:Status post right-sided cranioplasty  Small amount of fluid, air and blood products are present in the subdural space  Anticipated postoperative changes in the overlying soft tissues  Encephalomalacia and gliosis in the frontal and temporal lobes bilaterally, consistent with prior hemorrhagic contusions and traumatic brain injury  No acute intracranial abnormality  Plan:  · Continue neurological checks  · YANETH drain removed yesterday 7/14  · STAT CT head with any neurological decline including drop GCS of 2pts within 1 hr   · Keppra 500mg Q 12H for seziure ppx x 1 wk  · Pain well controlled on current regimen:  · Tylenol 975 mg q 8 hours  · Robaxin 750 mg q 8 hours  · Oxycodone 5 mg q4hrs prn for moderate pain  · Oxycodone 10 mg q4hrs prn for severe pain  · DC'd IV Dilaudid 0 5 mg q3hrs prn for breakthrough pain  · Bowel regimen:  Colace, MiraLax, and Senokot  · PT/OT recommending home with family support in outpatient PT  · Home meds reordered   · IVF discontinued, patient tolerating oral diet and voiding without difficulty  · Patient had bowel movement today  · DVT PPX: SCDs and SQ lovenox    Neurosurgery will continue to follow as primary,  patient is medically stable for discharge home today, call with any further questions or concerns    Patient will follow-up in 2 weeks for incision check and 6 weeks for postoperative visit with CT head

## 2021-07-16 ENCOUNTER — TELEPHONE (OUTPATIENT)
Dept: NEUROSURGERY | Facility: CLINIC | Age: 61
End: 2021-07-16

## 2021-07-16 ENCOUNTER — TELEPHONE (OUTPATIENT)
Dept: FAMILY MEDICINE CLINIC | Facility: CLINIC | Age: 61
End: 2021-07-16

## 2021-07-16 ENCOUNTER — TRANSITIONAL CARE MANAGEMENT (OUTPATIENT)
Dept: FAMILY MEDICINE CLINIC | Facility: CLINIC | Age: 61
End: 2021-07-16

## 2021-07-16 NOTE — TELEPHONE ENCOUNTER
Patient is scheduled for his tcm for 7/20/2021  Wife also wanted to notifie us that Jimbo Brush was prescribed oxycodone from the hospital   She just wanted to make you aware sine he has a signed pain agreement with us

## 2021-07-16 NOTE — TELEPHONE ENCOUNTER
Called patient to see how he is doing after surgery and spoke with his wife  She reports he is doing well overall and denies any incisional issues or fevers  Wife reports that he has some mild swelling around his right eye but reports that last night was the first night that he was  lying flat to sleep  Patient is able to ambulate around the house and complete ADLs with assistance  Educated the patient's wife about the importance of preventing blood clots and provided measures how to prevent them  Patient has moved his bowels since the surgery  Encouraged patient to take an over the counter stool softener, if he is taking narcotic pain medication  Encouraged fiber intake and fluids  Reviewed incision care with the patient's wife  Advised that he may take a shower and gently wash the surgical site with soap and water or baby shampoo  Use clean wash cloth, towels, and clothing  Do not submerge in water until cleared by the surgeon  Do not apply any creams, ointments, or lotions to the site  Wife is aware to call the office if any redness, swelling, drainage, dehiscence of incision, or fever >100 F occurs  Wife is aware to call the office if any concerns or questions may arise  Reminded her of his upcoming appointments with the date/time/location  She was appreciative for the call

## 2021-07-16 NOTE — TELEPHONE ENCOUNTER
7/16/21- PT DISCHARGED HOME    2WK POV SCHEDULED 7/28/21  6WK POV SCHEDULED 8/25/21  PT WILL NEED CT HEAD AT 6WKS    7/15/21- S/O, F/U IN 2WKS FOR POV AND 6WKS FOR POV W/ CT HEAD

## 2021-07-16 NOTE — UTILIZATION REVIEW
Notification of Discharge   This is a Notification of Discharge from our facility [address]  Please be advised that this patient has been discharge from our facility  Below you will find the admission and discharge date and time including the patients disposition  UTILIZATION REVIEW CONTACT:  Trenton Santana  Utilization   Network Utilization Review Department  Phone: [phone] x carefully listen to the prompts  All voicemails are confidential   Email: [email or web address]     PHYSICIAN ADVISORY SERVICES:  FOR [id number] REVIEW - MEDICAL NECESSITY DENIAL  Phone: [phone]  Fax: [phone]  Email: [email or web address]     PRESENTATION DATE: 7/13/2021  5:30 AM  OBERVATION ADMISSION DATE:   INPATIENT ADMISSION DATE: 7/13/21 10:04 AM   DISCHARGE DATE: 7/15/2021  5:00 PM  DISPOSITION: Home/Self Care Home/Self Care      IMPORTANT INFORMATION:  Send all requests for admission clinical reviews, approved or denied determinations and any other requests to dedicated fax number below belonging to the campus where the patient is receiving treatment   List of dedicated fax numbers:  [address] DENIALS (Administrative/Medical Necessity) [phone]   [address] [place] [address] (Maternity/NICU/Pediatrics) [phone]   Mehul Perez [phone]   Shaheen Chang [phone]   Óscar Walsh [phone]   [address] [phone]   [facility]  [phone]   [address] [facility]  [address] [facility] [address] [facility] [phone]

## 2021-07-20 ENCOUNTER — TELEPHONE (OUTPATIENT)
Dept: NEUROSURGERY | Facility: CLINIC | Age: 61
End: 2021-07-20

## 2021-07-20 ENCOUNTER — OFFICE VISIT (OUTPATIENT)
Dept: FAMILY MEDICINE CLINIC | Facility: CLINIC | Age: 61
End: 2021-07-20
Payer: COMMERCIAL

## 2021-07-20 ENCOUNTER — DOCUMENTATION (OUTPATIENT)
Dept: NEUROSURGERY | Facility: CLINIC | Age: 61
End: 2021-07-20

## 2021-07-20 VITALS
HEIGHT: 70 IN | BODY MASS INDEX: 25.48 KG/M2 | TEMPERATURE: 98 F | WEIGHT: 178 LBS | SYSTOLIC BLOOD PRESSURE: 140 MMHG | HEART RATE: 68 BPM | OXYGEN SATURATION: 99 % | DIASTOLIC BLOOD PRESSURE: 78 MMHG

## 2021-07-20 DIAGNOSIS — Z98.890 POST-OPERATIVE STATE: ICD-10-CM

## 2021-07-20 DIAGNOSIS — Z76.89 ENCOUNTER FOR SUPPORT AND COORDINATION OF TRANSITION OF CARE: Primary | ICD-10-CM

## 2021-07-20 DIAGNOSIS — S06.5X9A SDH (SUBDURAL HEMATOMA) (HCC): ICD-10-CM

## 2021-07-20 DIAGNOSIS — Z98.890 POST-OPERATIVE STATE: Primary | ICD-10-CM

## 2021-07-20 DIAGNOSIS — Z98.890 HISTORY OF CRANIOPLASTY: ICD-10-CM

## 2021-07-20 DIAGNOSIS — Z98.890 STATUS POST CRANIECTOMY: ICD-10-CM

## 2021-07-20 DIAGNOSIS — R43.0 LOSS OF SMELL: ICD-10-CM

## 2021-07-20 DIAGNOSIS — R43.0 ANOSMIA: ICD-10-CM

## 2021-07-20 DIAGNOSIS — R43.2 LOSS OF TASTE: ICD-10-CM

## 2021-07-20 PROCEDURE — 3008F BODY MASS INDEX DOCD: CPT | Performed by: FAMILY MEDICINE

## 2021-07-20 PROCEDURE — 1111F DSCHRG MED/CURRENT MED MERGE: CPT | Performed by: NURSE PRACTITIONER

## 2021-07-20 PROCEDURE — 99495 TRANSJ CARE MGMT MOD F2F 14D: CPT | Performed by: NURSE PRACTITIONER

## 2021-07-20 RX ORDER — OXYCODONE HYDROCHLORIDE 5 MG/1
5 TABLET ORAL EVERY 4 HOURS PRN
Qty: 24 TABLET | Refills: 0 | Status: ON HOLD | OUTPATIENT
Start: 2021-07-20 | End: 2021-11-23 | Stop reason: SDUPTHER

## 2021-07-20 NOTE — PROGRESS NOTES
Transition of Care  Follow-up After Hospitalization    Kayla Cabrales 62 y o  male   Date:  7/20/2021    TCM Call (since 6/19/2021)     Date and time call was made  7/16/2021  9:12 AM    Hospital care reviewed  Records reviewed    Patient was hospitialized at  [facility]        Date of Admission  07/13/21    Date of discharge  07/15/21    Diagnosis  Status post craniectomy    Disposition  Home      TCM Call (since 6/19/2021)     Scheduled for follow up? Yes    Did you obtain your prescribed medications  Yes    Do you need help managing your prescriptions or medications  Yes    Is transportation to your appointment needed  No    I have advised the patient to call PCP with any new or worsening symptoms  Gricelda Andrukaitis RMA     Living Arrangements  Spouse or Significiant other    Are you recieving any outpatient services  No    Are you recieving home care services  No    Are you using any community resources  No    Current waiver services  No    Have you fallen in the last 12 months  Yes    How many times  1    Interperter language line needed  No    Counseling  Patient    Counseling topics  Prognosis          Hospital records were reviewed  Medications upon discharge reviewed/updated  Medication Changes: none  Imaging: CTH  Consults: None  Follow up visits with other specialists: Neurosurgery      Assessment and Plan:    Astrid Villafuerte was seen today for transition of care management  Diagnoses and all orders for this visit:    Encounter for support and coordination of transition of care    [facility]    History of cranioplasty  Comments:  on right  F/U Neuro Sx per schedule  CTH 1 week AC Appt  Status post craniectomy    Anosmia  Comments:  liekly r/t anesthesia            HPI:   Kayla Cabrales present for TCM visit s/p hospitalization for right autologous cranioplasty 7/31  ROS: Review of Systems   Constitutional: Negative  HENT: Negative  Eyes: Negative  Respiratory: Negative  Cardiovascular: Negative  Gastrointestinal: Negative  Endocrine: Negative  Genitourinary: Negative  Musculoskeletal: Negative  Skin: Negative  Allergic/Immunologic: Negative  Neurological: Negative  Hematological: Negative  Psychiatric/Behavioral: Negative  Past Medical History:   Diagnosis Date    Anxiety     Dysphagia 4/16/2021    GERD (gastroesophageal reflux disease)     Hematoma, subdural, with loss of consciousness[facility] )     Hypertension     Status [facility] 5/19/2021       Past Surgical History:   Procedure Laterality Date    BRAIN HEMATOMA EVACUATION Right 4/15/2021    Procedure: Right CRANIOTOMY FOR SUBDURAL HEMATOMA;  Surgeon: Goldie Hogan MD;  [facility];  Service: Neurosurgery    CRANIOTOMY Right     HAND SURGERY Left     PEG TUBE PLACEMENT      PEG TUBE REMOVAL      TN REPLACE SKULL PLATE/FLAP Right 3/20/0957    Procedure: RIGHT AUTOLOGOUS CRANIOPLASTY;  Surgeon: Goldie Hogan MD;  [facility];  Service: Neurosurgery    ROTATOR CUFF REPAIR Left        Social History     Socioeconomic History    Marital status: /Civil Union     Spouse name: None    Number of children: None    Years of education: None    Highest education level: None   Occupational History    Occupation: Retired   Tobacco Use    Smoking status: Never Smoker    Smokeless tobacco: Never Used   Vaping Use    Vaping Use: Never used   Substance and Sexual Activity    Alcohol use:  Yes     Alcohol/week: 3 0 standard drinks     Types: 3 Cans of beer per week     Comment: daily    Drug use: Not Currently    Sexual activity: None   Other Topics Concern    None   Social History Narrative    None     Social Determinants of Health     Financial Resource Strain:     Difficulty of Paying Living Expenses:    Food Insecurity:     Worried About Running Out of Food in the Last Year:    [address] in the Last Year:    Transportation Needs:     Lack of Transportation (Medical):  Lack of Transportation (Non-Medical):    Physical Activity:     Days of Exercise per Week:     Minutes of Exercise per Session:    Stress:     Feeling of Stress :    Social Connections:     Frequency of Communication with Friends and Family:     Frequency of Social Gatherings with Friends and Family:     Attends Sabianism Services:     Active Member of Clubs or Organizations:     Attends Club or Organization Meetings:     Marital Status:    Intimate Partner Violence:     Fear of Current or Ex-Partner:     Emotionally Abused:     Physically Abused:     Sexually Abused:        Family History   Problem Relation Age of Onset    Heart disease Mother     Hypertension Father     Dementia Father        No Known Allergies      Current Outpatient Medications:     acetaminophen (TYLENOL) 500 mg tablet, Take 500 mg by mouth every 4 (four) hours as needed for mild pain, Disp: , Rfl:     lisinopril (ZESTRIL) 20 mg tablet, Take 1 tablet (20 mg total) by mouth daily, Disp: 90 tablet, Rfl: 1    Melatonin 5 MG TABS, Take 1 tablet by mouth daily at bedtime, Disp: , Rfl:     methocarbamol (ROBAXIN) 750 mg tablet, Take 1 tablet (750 mg total) by mouth every 8 (eight) hours, Disp: 30 tablet, Rfl: 0    metoprolol tartrate (LOPRESSOR) 25 mg tablet, Take 1 tablet (25 mg total) by mouth every 12 (twelve) hours, Disp: 180 tablet, Rfl: 1    omeprazole (PriLOSEC) 20 mg delayed release capsule, Take 20 mg by mouth daily, Disp: , Rfl:     oxyCODONE (ROXICODONE) 5 mg immediate release tablet, Take 1 tablet (5 mg total) by mouth every 4 (four) hours as needed for moderate pain or severe pain (Take 1 tablet p r n  For moderate pain and take 2 tablets p r n   For severe pain)Max Daily Amount: 30 mg, Disp: 42 tablet, Rfl: 0    docusate sodium (COLACE) 100 mg capsule, Take 1 capsule (100 mg total) by mouth 2 (two) times a day (Patient not taking: Reported on 7/20/2021), Disp: 10 capsule, Rfl: 0    levETIRAcetam (KEPPRA) 500 mg tablet, Take 1 tablet (500 mg total) by mouth every 12 (twelve) hours for 10 doses (Patient not taking: Reported on 7/20/2021), Disp: 10 tablet, Rfl: 0      Physical Exam:  /78 (BP Location: Left arm, Patient Position: Sitting, Cuff Size: Standard)   Pulse 68   Temp 98 °F (36 7 °C) (Tympanic)   Ht 5' 10" (1 778 m)   Wt 80 7 kg (178 lb)   SpO2 99%   BMI 25 54 kg/m²     Physical Exam  Vitals and nursing note reviewed  Constitutional:       Appearance: Normal appearance  He is well-developed  HENT:      Head: Normocephalic and atraumatic  Right Ear: Tympanic membrane, ear canal and external ear normal       Left Ear: Tympanic membrane, ear canal and external ear normal       Nose: Nose normal       Mouth/Throat:      Mouth: Mucous membranes are moist       Pharynx: Uvula midline  Eyes:      General: Lids are normal       Conjunctiva/sclera: Conjunctivae normal       Pupils: Pupils are equal, round, and reactive to light  Neck:      Thyroid: No thyroid mass  Vascular: No JVD  Trachea: Trachea and phonation normal    Cardiovascular:      Rate and Rhythm: Normal rate and regular rhythm  Pulses: Normal pulses  Heart sounds: Normal heart sounds, S1 normal and S2 normal  No murmur heard  No friction rub  No gallop  Pulmonary:      Effort: Pulmonary effort is normal       Breath sounds: Normal breath sounds  Abdominal:      General: Bowel sounds are normal       Palpations: Abdomen is soft  Tenderness: There is no abdominal tenderness  Genitourinary:     Comments: Deferred  Musculoskeletal:         General: Normal range of motion  Cervical back: Full passive range of motion without pain, normal range of motion and neck supple  Right lower leg: No edema  Left lower leg: No edema     Lymphadenopathy:      Head:      Right side of head: No submental, submandibular, tonsillar, preauricular, posterior auricular or occipital adenopathy  Left side of head: No submental, submandibular, tonsillar, preauricular, posterior auricular or occipital adenopathy  Cervical: No cervical adenopathy  Skin:     General: Skin is warm and dry  Capillary Refill: Capillary refill takes less than 2 seconds  Neurological:      General: No focal deficit present  Mental Status: He is alert and oriented to person, place, and time  Mental status is at baseline  GCS: GCS eye subscore is 4  GCS verbal subscore is 5  GCS motor subscore is 6  Cranial Nerves: Cranial nerves are intact  Sensory: Sensation is intact  Motor: Motor function is intact  Coordination: Coordination is intact  Gait: Gait is intact  Comments: Pt miranda c/o anosmia since Sx procedure  Psychiatric:         Attention and Perception: Attention and perception normal          Mood and Affect: Mood and affect normal          Speech: Speech normal          Behavior: Behavior normal  Behavior is cooperative  Thought Content:  Thought content normal          Cognition and Memory: Cognition normal          Judgment: Judgment normal              Labs:  Lab Results   Component Value Date    WBC 9 06 07/14/2021    HGB 13 4 07/14/2021    HCT 39 3 07/14/2021    MCV 95 07/14/2021     (L) 07/14/2021     Lab Results   Component Value Date    K 3 9 07/14/2021     (H) 07/14/2021    CO2 25 07/14/2021    BUN 9 07/14/2021    CREATININE 0 58 (L) 07/14/2021    GLUCOSE 146 (H) 04/15/2021    GLUF 99 07/07/2021    CALCIUM 9 0 07/14/2021    AST 27 07/07/2021    ALT 21 07/07/2021    ALKPHOS 76 07/07/2021    EGFR 112 07/14/2021

## 2021-07-20 NOTE — TELEPHONE ENCOUNTER
Received email from [address] has noticed a loss of taste and smell since the surgery and just wanted to make sure there was no cause for concern  Gigi Fishman is not vaccinated  He denies any other symptom of COVID-19  Saw PCP this morning who he reports did not seem concerned by this  Will ERINN Vincent

## 2021-07-22 NOTE — TELEPHONE ENCOUNTER
Placed order for COVID test  Advised patient should quarantine for the time being until results returned       Directed to ED with SOB, fever, etc

## 2021-07-23 ENCOUNTER — TELEPHONE (OUTPATIENT)
Dept: NEUROSURGERY | Facility: CLINIC | Age: 61
End: 2021-07-23

## 2021-07-23 PROCEDURE — U0005 INFEC AGEN DETEC AMPLI PROBE: HCPCS | Performed by: NEUROLOGICAL SURGERY

## 2021-07-23 PROCEDURE — U0003 INFECTIOUS AGENT DETECTION BY NUCLEIC ACID (DNA OR RNA); SEVERE ACUTE RESPIRATORY SYNDROME CORONAVIRUS 2 (SARS-COV-2) (CORONAVIRUS DISEASE [COVID-19]), AMPLIFIED PROBE TECHNIQUE, MAKING USE OF HIGH THROUGHPUT TECHNOLOGIES AS DESCRIBED BY CMS-2020-01-R: HCPCS | Performed by: NEUROLOGICAL SURGERY

## 2021-07-23 NOTE — TELEPHONE ENCOUNTER
Received email from Gaynelle Lesches with pictures attached with bleeding from surgical site  Appears to be dry and has subsided  There are no openings or active oozing or drainage  Denies fevers, increased pain at the side  See below:                 Patient was instructed to continue to monitor for now and contact the office with increased drainage, if incision opens

## 2021-07-28 ENCOUNTER — CLINICAL SUPPORT (OUTPATIENT)
Dept: NEUROSURGERY | Facility: CLINIC | Age: 61
End: 2021-07-28

## 2021-07-28 VITALS — SYSTOLIC BLOOD PRESSURE: 132 MMHG | DIASTOLIC BLOOD PRESSURE: 64 MMHG | TEMPERATURE: 97.8 F

## 2021-07-28 DIAGNOSIS — Z98.890 POST-OPERATIVE STATE: Primary | ICD-10-CM

## 2021-07-28 PROCEDURE — 99024 POSTOP FOLLOW-UP VISIT: CPT

## 2021-07-28 NOTE — PROGRESS NOTES
Post-Op Visit- Neurosurgery    Arik Burgess 62 y o  male MRN: [id number]    Chief Complaint:  Patient presents post: Right Autologous Cranioplasty - Right    History of Present Illness:  Patient presents for 2 week POV for incision check arrived accompanied by his wife and ambulated well without assistive device  Reports pain is minimal  Mild soreness along the suture line  Denies significant drainage since incident last week  Small amount of serosanguinous drainage last night but no recurrence         Current Outpatient Medications:     acetaminophen (TYLENOL) 500 mg tablet, Take 500 mg by mouth every 4 (four) hours as needed for mild pain, Disp: , Rfl:     docusate sodium (COLACE) 100 mg capsule, Take 1 capsule (100 mg total) by mouth 2 (two) times a day, Disp: 10 capsule, Rfl: 0    lisinopril (ZESTRIL) 20 mg tablet, Take 1 tablet (20 mg total) by mouth daily, Disp: 90 tablet, Rfl: 1    Melatonin 5 MG TABS, Take 1 tablet by mouth daily at bedtime, Disp: , Rfl:     metoprolol tartrate (LOPRESSOR) 25 mg tablet, Take 1 tablet (25 mg total) by mouth every 12 (twelve) hours, Disp: 180 tablet, Rfl: 1    omeprazole (PriLOSEC) 20 mg delayed release capsule, Take 20 mg by mouth daily, Disp: , Rfl:     oxyCODONE (ROXICODONE) 5 mg immediate release tablet, Take 1 tablet (5 mg total) by mouth every 4 (four) hours as needed for severe painMax Daily Amount: 30 mg, Disp: 24 tablet, Rfl: 0    levETIRAcetam (KEPPRA) 500 mg tablet, Take 1 tablet (500 mg total) by mouth every 12 (twelve) hours for 10 doses (Patient not taking: Reported on 7/20/2021), Disp: 10 tablet, Rfl: 0    methocarbamol (ROBAXIN) 750 mg tablet, Take 1 tablet (750 mg total) by mouth every 8 (eight) hours (Patient not taking: Reported on 7/28/2021), Disp: 30 tablet, Rfl: 0     No Known Allergies       Assessment:    Vitals:    07/28/21 1032   BP: 132/64   Temp: 97 8 °F (36 6 °C)       Wound Exam: Incision is clean, dry, and in tact  No swelling, or drainage  Scabbing observed along the suture line  See below:                 Procedure:  Surgical site assessment  Cleansed incision with CHG before removing nylon and prolene suturing  Upon removal observed area of incisional spreading and scabbed area that looked questionable  Dr Cory Marshall further assessed  To err on the side of caution requested suture be placed at 2 sites  Completed by JESSICA TRAVIS  Complications: None  Discussion/Summary  Doing well postoperatively  Reviewed incision care with patient including daily observation for s/s infection including: increased erythema, edema, drainage, dehiscence of incision or fever >101  Should these be observed, he understands that he is to call and/or return immediately for reassessment  Advised patient to continue cleansing area with mild soap and water and pat dry  Not to apply any lotions, creams, or ointments, & not to submerge in any water for 4  more weeks  He is to maintain activity restrictions until cleared by the surgeon  Activity levels were also reviewed with the patient in detail, he is to lift no greater than 10 pounds and ambulation is encouraged as tolerated  Verified date/time/location of upcoming POV on 8/25/2021, sutures may remain until then  He is to call the office with any further questions or concerns, or if any incisional issues or fevers would arise

## 2021-08-05 ENCOUNTER — DOCUMENTATION (OUTPATIENT)
Dept: NEUROSURGERY | Facility: CLINIC | Age: 61
End: 2021-08-05

## 2021-08-05 ENCOUNTER — TELEPHONE (OUTPATIENT)
Dept: NEUROSURGERY | Facility: CLINIC | Age: 61
End: 2021-08-05

## 2021-08-05 DIAGNOSIS — R56.9 NEW ONSET SEIZURE (HCC): ICD-10-CM

## 2021-08-05 DIAGNOSIS — Z98.890 HISTORY OF CRANIOPLASTY: ICD-10-CM

## 2021-08-05 DIAGNOSIS — Z98.890 STATUS POST CRANIECTOMY: Primary | ICD-10-CM

## 2021-08-05 DIAGNOSIS — S06.5X9A SDH (SUBDURAL HEMATOMA) (HCC): ICD-10-CM

## 2021-08-05 NOTE — PROGRESS NOTES
OSH called regarding new onset seizure  Now awake and nonfocal  Started on Keppra  HCT with small subacute extra axial blood  Likely after cranioplasty  Recommend cont Keppra 500bid  If repeat ct is stable in AM can follow up in clinic in 2 weeks with CT  Will need neuro fu and sz restrictions

## 2021-08-05 NOTE — TELEPHONE ENCOUNTER
Received a call from Dr Cris Chaney regarding Ming Nicholsons  He was contacted last evening by ER physician at [facility]  Patient had a seizure last evening and opted to continue his care down there rather than being transferred to [facility]  He has since been discharged and they will heading back home tomorrow  Will bring disks of imaging completed at outside facility  Dr Cris Chaney requested confirmation that patient is taking Keppra 500mg BID, repeat CT and return visit as already planned  Referral placed to neurology  Also patient aware should not be driving now that he has history of seizure and eventual return to driving will be handled by neuro  Confirmed this with Sheng Garcia the patients wife who was agreeable  Advised that they should return to the ED if he developed visual disturbance, gait difficulty, b/b incontinence, confusion/disorientation, slurred speech, etc      She stated and understanding and was appreciative

## 2021-08-16 ENCOUNTER — HOSPITAL ENCOUNTER (OUTPATIENT)
Dept: CT IMAGING | Facility: HOSPITAL | Age: 61
Discharge: HOME | End: 2021-08-16
Payer: COMMERCIAL

## 2021-08-16 DIAGNOSIS — Z98.890 POST-OPERATIVE STATE: ICD-10-CM

## 2021-08-16 PROCEDURE — G1004 CDSM NDSC: HCPCS

## 2021-08-16 PROCEDURE — 70450 CT HEAD/BRAIN W/O DYE: CPT

## 2021-08-24 ENCOUNTER — TELEPHONE (OUTPATIENT)
Dept: NEUROLOGY | Facility: CLINIC | Age: 61
End: 2021-08-24

## 2021-08-24 NOTE — TELEPHONE ENCOUNTER
Patient is also on the wait list for sooner appointments with Dr Isela Roland and Dr Cathy Vizcarra in [facility] contact number for patient: [phone]    Emergency Contact name and number:    Referring provider and telephone number:    Primary Care Provider Name and if affiliated with [facility]:     Reason for Appointment/Dx: seizures     Have you seen and followed up with a pediatric Neurologist for this disease in the past?      No (If yes ok to schedule with Dr Marisela Duron)    Neurology Location patient would like to be seen:    Order received? Yes                                                 Records Received? Yes    Have you ever seen another Neurologist?       No    Insurance Information    Insurance Name:    ID/Policy #:    Secondary Insurance:    ID/Policy#: Workman's Comp/ Accident/ School  Information      Workman's Comp/Accident/School related?        No    If yes name of Insurance company:    Claim #:    Date of Injury:    Type of Injury:     Name and Telephone Number:    Notes:                   Appointment date: 01/06/2022

## 2021-08-25 ENCOUNTER — OFFICE VISIT (OUTPATIENT)
Dept: NEUROSURGERY | Facility: CLINIC | Age: 61
End: 2021-08-25

## 2021-08-25 VITALS — SYSTOLIC BLOOD PRESSURE: 130 MMHG | DIASTOLIC BLOOD PRESSURE: 72 MMHG | TEMPERATURE: 98.5 F | HEART RATE: 73 BPM

## 2021-08-25 DIAGNOSIS — Z98.890 POST-OPERATIVE STATE: ICD-10-CM

## 2021-08-25 DIAGNOSIS — Z98.890 STATUS POST CRANIECTOMY: ICD-10-CM

## 2021-08-25 DIAGNOSIS — S06.5X9A SDH (SUBDURAL HEMATOMA) (HCC): Primary | ICD-10-CM

## 2021-08-25 DIAGNOSIS — R56.9 SEIZURE (HCC): ICD-10-CM

## 2021-08-25 PROCEDURE — 99024 POSTOP FOLLOW-UP VISIT: CPT | Performed by: NEUROLOGICAL SURGERY

## 2021-08-25 RX ORDER — LEVETIRACETAM 500 MG/1
500 TABLET ORAL EVERY 12 HOURS SCHEDULED
Qty: 180 TABLET | Refills: 0 | Status: SHIPPED | OUTPATIENT
Start: 2021-08-25 | End: 2021-09-03

## 2021-08-25 NOTE — PROGRESS NOTES
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, 04/15/2021  Status post cranioplasty 07/13/2021  Patient has recovered well  Unfortunately he had a new onset seizure and was started on Keppra  He has had no further seizures  [place] dot paperwork was not filled out at that time  We will aid in completing this today  He will also need neurology follow-up which we will help schedule  In regards to his incision continues to heal   There is a small area of thin skin  We will continue to monitor this in 2 weeks with a repeat wound check  2  Seizures  Neurology referral in, scheduled in January  Will attempt to make this appointment earlier  Continue Keppra  I will refill this prescription  He gifty dot paperwork  3  Alcoholism  We discussed the importance of alcohol cessation in the context of his prior brain injury and seizures  I explained to him that he is at high risk for recurrent trauma in the context of alcohol usage  Chief Complaint: Post-op        HPI:   This is a 81-year-old gentleman who presented to the hospital after a transfer from rehab after sustaining a TBI and subdural hematoma in [place]  Fortunately his exam significantly deteriorated to where he would barely open eyes and minimally localized his bilateral uppers and lowers  He had significant bifrontal contusions as well as a right-sided subdural with significant amount of compression and mass effect with shift  As such she underwent a right-sided craniectomy    His brain was found to be quite contused at the time of surgery  The hematoma was evacuated with a craniectomy  Ultimately he was discharged to [facility]   There he made a significant recovery  He is now approximately 5 weeks status post cranioplasty  He has been doing well  He had 1 seizure after surgery  He has been started on Keppra with no further seizures  He has complaints of continued smelling loss as well as numbness along this incision  He has no other complaints at this time  According to his wife he is drinking up to 7 or 8 beers daily  He states that he drinks on weekends  He tries to not let inhibit his decision-making abilities  Review of systems obtained by the MA reviewed and updated below  Review of Systems   Constitutional: Negative  HENT: Negative  Eyes: Negative  Respiratory: Negative  Cardiovascular: Negative  Gastrointestinal: Negative  Endocrine: Negative  Genitourinary: Negative  Skin: Negative  Allergic/Immunologic: Negative  Neurological: Positive for seizures (3 weeks ago) and numbness (around incision)  Negative for dizziness, tremors, speech difficulty, weakness, light-headedness and headaches  Hematological: Negative  Psychiatric/Behavioral: Negative  Physical Exam  Vitals:    08/25/21 1020   BP: 130/72   Pulse: 73   Temp: 98 5 °F (36 9 °C)   He is well appearing  Affect is appropriate  His BMI is There is no height or weight on file to calculate BMI  Yin Sole He is awake alert and oriented  Hearing and vision are grossly intact  His pupils are equal round reactive to light  His extraocular movements are intact  His face is symmetric  Tongue is midline  Facial sensation is intact and symmetric throughout  Shoulder shrug is 5/5  There is no drift or dysmetria  He has full strength in his bilateral upper and lower extremities  He has normal muscle tone muscle bulk  He ambulates without difficulty  His incision is healing well  There is a small area posteriorly within skin  Is completely closed however      The following portions of the patient's history were reviewed and updated as appropriate: allergies, current medications, past family history, past medical history, past social history, past surgical history and problem list     Active Ambulatory Problems     Diagnosis Date Noted    Hypertension 03/03/2020    Anxiety 03/05/2021    [facility] ) 04/15/2021    Acute encephalopathy 04/15/2021    Alcohol abuse 04/18/2021    Impaired mobility and activities of daily living 04/19/2021    Status post craniectomy 07/13/2021    Seizure ([facility] ) 08/25/2021     Resolved Ambulatory Problems     Diagnosis Date Noted    Hyperchloremia 04/15/2021    Acute respiratory failure ([place] [address] ) 04/15/2021    Dysphagia 04/16/2021    Status post insertion of percutaneous endoscopic gastrostomy (PEG) tube ([place] [address] ) 05/19/2021     Past Medical History:   Diagnosis Date    GERD (gastroesophageal reflux disease)     Hematoma, subdural, with loss of consciousness, traumatic (HCC)        Past Surgical History:   Procedure Laterality Date    BRAIN HEMATOMA EVACUATION Right 4/15/2021    Procedure: Right CRANIOTOMY FOR SUBDURAL HEMATOMA;  Surgeon: Alphonso See MD;  [facility];  Service: Neurosurgery    CRANIOTOMY Right     HAND SURGERY Left     PEG TUBE PLACEMENT      PEG TUBE REMOVAL      TN REPLACE SKULL PLATE/FLAP Right 2/88/4697    Procedure: RIGHT AUTOLOGOUS CRANIOPLASTY;  Surgeon: Alphonso See MD;  [facility];  Service: Neurosurgery    ROTATOR CUFF REPAIR Left          Current Outpatient Medications:     acetaminophen (TYLENOL) 500 mg tablet, Take 500 mg by mouth every 4 (four) hours as needed for mild pain, Disp: , Rfl:     docusate sodium (COLACE) 100 mg capsule, Take 1 capsule (100 mg total) by mouth 2 (two) times a day, Disp: 10 capsule, Rfl: 0    levETIRAcetam (KEPPRA) 500 mg tablet, Take 1 tablet (500 mg total) by mouth every 12 (twelve) hours for 180 doses, Disp: 180 tablet, Rfl: 0    lisinopril (ZESTRIL) 20 mg tablet, Take 1 tablet (20 mg total) by mouth daily, Disp: 90 tablet, Rfl: 1    Melatonin 5 MG TABS, Take 1 tablet by mouth daily at bedtime, Disp: , Rfl:     metoprolol tartrate (LOPRESSOR) 25 mg tablet, Take 1 tablet (25 mg total) by mouth every 12 (twelve) hours, Disp: 180 tablet, Rfl: 1    omeprazole (PriLOSEC) 20 mg delayed release capsule, Take 20 mg by mouth daily, Disp: , Rfl:     methocarbamol (ROBAXIN) 750 mg tablet, Take 1 tablet (750 mg total) by mouth every 8 (eight) hours (Patient not taking: Reported on 7/28/2021), Disp: 30 tablet, Rfl: 0    oxyCODONE (ROXICODONE) 5 mg immediate release tablet, Take 1 tablet (5 mg total) by mouth every 4 (four) hours as needed for severe painMax Daily Amount: 30 mg, Disp: 24 tablet, Rfl: 0    Results/Data: We reviewed his imaging in detail  There is a small epidural collection  There is no significant mass effect    I believe that this is consistent with postoperative changes and [place]

## 2021-09-03 ENCOUNTER — CONSULT (OUTPATIENT)
Dept: NEUROLOGY | Facility: CLINIC | Age: 61
End: 2021-09-03
Payer: COMMERCIAL

## 2021-09-03 VITALS
WEIGHT: 183 LBS | TEMPERATURE: 96.8 F | HEART RATE: 71 BPM | SYSTOLIC BLOOD PRESSURE: 174 MMHG | DIASTOLIC BLOOD PRESSURE: 79 MMHG | BODY MASS INDEX: 26.26 KG/M2

## 2021-09-03 DIAGNOSIS — Z98.890 HISTORY OF CRANIOPLASTY: ICD-10-CM

## 2021-09-03 DIAGNOSIS — Z98.890 POST-OPERATIVE STATE: ICD-10-CM

## 2021-09-03 DIAGNOSIS — R56.9 NEW ONSET SEIZURE (HCC): ICD-10-CM

## 2021-09-03 DIAGNOSIS — S06.5X9A SDH (SUBDURAL HEMATOMA) (HCC): ICD-10-CM

## 2021-09-03 PROCEDURE — 99245 OFF/OP CONSLTJ NEW/EST HI 55: CPT | Performed by: PSYCHIATRY & NEUROLOGY

## 2021-09-03 RX ORDER — LEVETIRACETAM 750 MG/1
750 TABLET ORAL EVERY 12 HOURS SCHEDULED
Qty: 180 TABLET | Refills: 3 | Status: SHIPPED | OUTPATIENT
Start: 2021-09-03

## 2021-09-03 NOTE — PROGRESS NOTES
[place] [address] [facility]  Initial Consultation    Impression/Plan    Mr Akin Damon is a 62 y o  male who presents with a one time GTC seizure event after traumatic brain injury occurring approximately 3 weeks after cranioplasty  Hallie Joe His neurological exam is normal  We discussed that given that it was a prolonged period of time after insult, it is likely that his seizure was due to post traumatic changes such as gliosis and this increases his risk of future seizures  We agreed that it would be a good decision to continue Keppra and increase dosing to 750 mg b i d  in order to make sure he is covered appropriately  Our goal is seizure freedom without side effects  Will continue this for at least 2 years prior to considering weaning off  Side effects of Keppra to look out for were discussed  We will get a baseline EEG and Keppra levels at this time  Recommended limiting ETOH to 1-2 drinks per week  We discussed the pathophysiology of epilepsy/seizure and seizure safety/precautions  We discussed factors that can lower seizure threshold and the side effects of antiepileptic medications  Patient Instructions   1  Increase levetiracetam to 750 mg twice daily  2  Complete EEG  3  I recommend alcohol cessation or moderation to 1-2 drinks per week  4  Return in about 3 months  Diagnoses and all orders for this visit:    History of cranioplasty  -     Ambulatory referral to Neurology    New onset seizure [facility])  -     Ambulatory referral to Neurology  -     EEG Awake and asleep; Future  -     Levetiracetam level; Future    SDH (subdural hematoma) (HCC)  -     Ambulatory referral to Neurology    Post-operative state  -     levETIRAcetam (KEPPRA) 750 mg tablet; Take 1 tablet (750 mg total) by mouth every 12 (twelve) hours        Justin Vogt is a 62 y o  right handed male presenting to the [place] [address] [facility] for evaluation of seizures  Pertinent history includes TBI, subdural hematoma and alcohol abuse  Lena Leal suffered a traumatic brain injury on April 4th 2021 after helping his neighbor to move a couch when he fell backwards hitting the back of his head on a concrete floor causing him to lose conciousness  Was called immediately and patient was taken to a [facility] where CT head showed a subdural hematoma  No intervention was done  Hospital course was complicated by difficulty swallowing  On April 14 patient was  Admitted to rehab for TBI patient's however upon evaluation the next morning he was determined to need transfer of care to Neurosurgery  At this time patient was transferred to [facility] and immediately underwent a craniotomy with Dr Nela Siegel  Patient return to rehab at [facility] and on the 13th of July he had a cranioplasty  After the cranioplasty patient states that he lost the sense of taste and smell which he is still trying to recover  Post cranioplasty patient was placed on prophylactic Keppra for 1 week which he completed course  On  August 4th patient woke up with symptoms of nausea (no vomiting)  Patient had poor food and water intake that day due to feeling unwell  At around 10:30 p m  that night patient was in bed with his wife when she noticed he sat up on the edge of the bed, became stiff and then "flipped backwards" and started to shake throughout his body in a rhythmic fashion  Patient bit his tongue  Did denies loss of bowel or bladder continence  Wife states that after the seizure he was making groaning noises  The seizure episode lasted a couple minutes  The wife does not believe this was more than 5 minutes  Lena Leal has no recollection of the event  The next thing he remembers was being put on the stretch her and into the ambulance  Wife is not sure if he was given abortive medication at that time  Patient taken to the ED where CT head  Did not show any changes    Patient was started on 500 mg of Keppra  In terms of his drinking history patient used to drink 3-4 beers daily  On a regular basis  Since his accident patient has resumed drinking  He states that he had a drink the day prior to the seizure  The week prior he had had occasional beer sporadically  In the past patient has been on lorazepam from approximately 2011 to 2017  This was due to a history of anxiety  Patient developed tolerance to it and at 1 point was up to 8 mg a day  He was trying to develop a plan with his prescriber to taper down and off lorazepam however during this time his prescriber lost his license due to overprescribing practices  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  As he was out of Ativan rx, he tried to substitute with alcohol after being off lorazepam for a couple of days  Patient then developed symptoms of withdrawal including sweating and anxiety  He initially went to the ED and no intervention was done  The following day patient had a seizure  He return to emergency department and had a 2nd seizure involving full body convulsions and tongue biting  After this patient was referred to an outpatient withdrawal clinic  He has been off lorazepam since then  Patient does not have any family history of seizures and does not have any personal history of seizures during childhood  Current AEDs:  Levetiracetam 500 mg b i d  Medication side effects: None  Medication adherence: Yes    Event/Seizure semiology:   GTC with tongue biting lasting < 5 min  With post ictal state     Special Features  Status epilepticus: no  Self Injury Seizures: no  Precipitating Factors: TBI    Epilepsy Risk Factors:  Head injury (moderate/severe)  Alcohol abuse    Prior AEDs:  None    Prior Evaluation:  CT 8/16/21: Stable mildly complex extradural collection underlying the large right hemispheric craniotomy consistent with 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  No significant mass effect upon the brain parenchyma or shift present  Stable encephalomalacia and gliosis within the inferior frontal lobes  History Reviewed: The following were reviewed and updated as appropriate: allergies, current medications, past family history, past medical history, past social history, past surgical history and problem list and ros     Psychiatric History:  Anxiety    Social History:   Driving: No  Lives Alone: No  Occupation: retired    ROS:  Constitutional: Negative  Negative for appetite change and fever  HENT: Negative  Negative for hearing loss, tinnitus, trouble swallowing and voice change  Loss of taste and smell  Eyes: Negative  Negative for photophobia and pain  Respiratory: Negative  Negative for shortness of breath  Cardiovascular: Negative  Negative for palpitations  Gastrointestinal: Negative  Negative for nausea and vomiting  Endocrine: Negative  Negative for cold intolerance  Genitourinary: Negative  Negative for dysuria, frequency and urgency  Musculoskeletal: Negative  Negative for myalgias and neck pain  Skin: Negative  Negative for rash  Neurological: Negative  Negative for dizziness, tremors, seizures, syncope, facial asymmetry, speech difficulty, weakness, light-headedness, numbness and headaches  Hematological: Negative  Does not bruise/bleed easily  Psychiatric/Behavioral: Negative  Negative for confusion, hallucinations and sleep disturbance  Objective    BP (!) 174/79 (BP Location: Left arm, Patient Position: Sitting, Cuff Size: Standard)   Pulse 71   Temp (!) 96 8 °F (36 °C) (Temporal)   Wt 83 kg (183 lb)   BMI 26 26 kg/m²      General Exam  General: well developed, no acute distress  HEENT: mucous membranes moist, anicteric sclera  Neck: supple, good ROM  Extremities: no clubbing, cyanosis or edema  Skin: no rash on visible skin      Neurological Exam  Mental Status: awake, alert, and fully oriented to person, place, time, and situation  Attention  and memory intact  Fund of knowledge is appropriate for age and education  There is no neglect  Language: fluency, naming, comprehension, and repetition normal        Cranial Nerves: Pupils equal and reactive to light  Visual fields decreased in all quadrants (cataracts)  Extraocular motions intact with full versions, normal pursuits and saccades  Facial strength full and symmetric  Facial sensation intact in V1-V3  Hearing intact to finger rub bilaterally  Tongue protrudes to midline  Palate elevates symmetrically  Speech clear without notable dysarthria  Shoulder shrug activation full and symmetric  Motor: Normal bulk and tone  No pronator drift  Strength is 5/5 proximally and distally in all 4 extremities  No involuntary movements  Sensory: Sensation intact to light touch distally in all extremities  Diminished vibration sense bilaterally at the toes  Proprioception intact at the toes  Coordination: Normal finger-to-nose  Station and gait: Casual and tandem gait normal  Normal Romberg  Reflexes: Reflexes 2+ throughout and symmetric with exception of diminished L achilles  Both toes down going              Griffin Lehman MD   [facility]  PGY-3 Neurology residency

## 2021-09-03 NOTE — PATIENT INSTRUCTIONS
1  Increase levetiracetam to 750 mg twice daily  2  Complete EEG  3  I recommend alcohol cessation or moderation to 1-2 drinks per week  4  Return in about 3 months  FIRST AID FOR SEIZURES    What to Do If You Witness a Seizure:     Generalized convulsive (called a generalized tonic-clonic or grand mal seizure)  During this seizure, the person may cry out, suddenly stiffen up, make jerking movements, and fall  The person may turn pale or blue from difficulty breathing  Actions:  1  Stay calm  Talk in a soothing voice and if possible keep onlookers away  2  Prevent injury  Move objects away that the person might hit while jerking uncontrollably  3  Time when the seizure starts and ends  Most seizures stop after only a few minutes  4  Turn him or her gently onto one side  This will help keep the airway clear  5  Never place anything in his/her mouth or give him/her anything by mouth during a seizure  -- Do not give the person water, pills, or food until fully alert  6  Loosen tight clothing or jewelry around his/her neck  7  Make the person as comfortable as possible  8  Place something soft under their head  9  Do not hold the person down  If the person having a seizure thrashes around there is no need for you to restrain them  They are more likely to be combative if restrained  Remember to consider your safety as well  10  Keep onlookers away  11  Be sensitive and supportive, and ask others to do the same  12  Stay with the person until he/she is fully alert  Complex partial seizure (confusional spells)  During this kind of seizure, the person may have a glassy stare; give no response or inappropriate responses when questioned; sit, stand, or walk about aimlessly; make lip smacking or chewing motions; fidget with clothes; appear to be drunk, drugged, or confused  Actions:  1  Make sure the person is safe and wont harm themselves    2  Try to remove harmful objects from around the person (tools, utensils, glasses)  3  Do NOT be aggressive or attempt to restrain the person  They are more likely to be combative if restrained  Remember to consider your safety as well  4  Help prevent the person from wandering, and direct the person to chair or safe position  5  Never place anything in his/her mouth or give him/her anything by mouth during a seizure  -- Do not give the person water, pills, or food until fully alert  6  Keep onlookers away  7  Be sensitive and supportive, and ask others to do the same  8  Stay with the person until he/she is fully alert  CALL 911 if:  1  A convulsive seizure lasts more than 5 minutes  2  The person turns blue during the seizure  3  The person does not start breathing after the seizure  Begin mouth to mouth resuscitation if this would occur  4  The person has one seizure right after the other without coming back to normal consciousness between the seizures  5  The person has not regained consciousness or is still confused after 30 minutes  6  You know the person does not have epilepsy  7  You know the person has diabetes or low blood sugar  8  The person is pregnant, ill, or injured  9  The seizure occurred in water, because the person may have inhaled water  10  The person requests an ambulance or medical help  Rescue medication  Your doctor may prescribe a rescue medication such as lorazepam (Ativan), diazepam (Valium / Diastat), or clonazepam (Klonopin) to terminate a seizure or if you have a history of cluster of seizures   Follow the instructions given by your doctor for these medications    Recognizing Common Seizures (examples)   · Simple partial seizures: Isolated twitching, numbness, sweating, dizziness, nausea/vomiting, disturbances to hearing, vision, smell or taste  No loss of consciousness occurs, and the person remains aware of his/her environment     · Complex partial seizures: Staring, motionless, picking at clothes, smacking lips, swallowing repeatedly or wandering around  The person is not aware of their surroundings and is not fully responsive  · Atonic seizures: Drop attacks or sudden, rapid fall to ground with rapid recovery  · Myoclonic seizures: Brief forceful jerks which can affect the whole body or just part of it  · Absence seizures: May appear to be daydreaming or spacing out   The person is momentarily unresponsive and unaware of what is happening around him/her  · Tonic seizures: Stiffening of part or of the entire body  · Generalized Tonic-Clonic Seizures  Grand-mal seizure   Sudden loss of consciousness with body stiffening followed by continuous jerking movements  A blue tinge around the mouth is likely but lack of oxygen is rare  Loss of bladder and/or bowel control may occur  SEIZURE SAFETY    Dont let fear of seizures keep you at home  Be smart about your activities to make sure you are safe  The guidelines below can help you be as safe as possible  Make sure the people around you are aware of:   What happens when you have a seizure   Correct seizure first aid   First aid for choking (consider taking a basic life support class)   When they should know to call 911 or for medical help    Avoid common triggers of seizures:   Missing your medications   Not getting enough sleep   Drinking alcohol   Using illegal drugs    Safety measures for at home:  In the bathroom:    Do not take baths in the bathtub  Instead, take only showers  Try to have a family member available while you are in the shower   Make sure the drain in the bathtub/shower is working properly to avoid pooling of water   You can consider a fitted shower seat  Recessed soap trays can minimize injury if you would happen to fall in the shower   Bathroom doors can be hung to open outwards, so that the door can still be opened if you fall against the door   Do not lock the bathroom door   Use an Occupied sign on the door or other signal to let others know you are in the bathroom  o Safety locks can be obtained from the [facility]   On your water heater, set your water temperature to a warm temperature that is not scalding to avoid burns from very hot water   Put non-skid strips/ in the bathtub   Use an electric shaver   Avoid any electrical appliances (including electric shaver) in the bathroom or near water   Use shatterproof glass for mirrors  In the kitchen:    When possible, cook using a microwave   Only cook or use electrical appliances when someone else is in the house and available   As much as possible, grill food and avoid fryers or frying food   Use the back burners of the stove and turn the pot handles toward the back of the stove   Avoid carrying hot pans, pots of boiling water, or very hot food  Serve food or liquids directly from the stove  At the least, minimize the distance hot food is carried   Use precut foods or food processers to limit the need to use knives   Use plastic or durable cups, dishes, and containers instead of breakable glass items  In the bedroom   Low level and wide beds (like a futon) can reduce risk of injury of falling out of bed  If there is a high risk of falling out of bed, you can consider simply putting the mattress on the floor   Avoid sleeping on top bunks of bunk beds   Place a soft carpet on the floor  Around the house   Pad sharp corners of tables, chairs, etc  Round tables and furniture can be considered to avoid sharp corners   Avoid open flames  Place a screen in front of fireplaces and dont build a fire alone   Allow for open spaces with furniture   Avoid loose throw rugs or slippery floors  Non-slip parag or cushioned parag can help reduce injury from a fall   Fireproof fabrics and furniture are best, and especially important if you smoke     Doors and windows with safety glass are safer if someone falls against them   Avoid lights and heaters that could easily be knocked over   Use curling irons or clothing irons that have automatic shut off switches   Make sure motor driven equipment or lawn mowers have dead mans handles or seats so they will turn off if you release pressure  Safety measures when away from home  [address] law mandates that you cannot drive for 6 months after your most recent seizure   [place] law mandates that you cannot drive for 6 months after your most recent seizure  Work/Travel   Wear a medical alert bracelet or necklace at all times   Wear a helmet and use protective clothing/equipment when appropriate   Consider telling co-workers and travel companions that you have epilepsy and what to do if you have a seizure   Avoid irregular shifts or disruptions of a regular sleep pattern   Take your medications on time and keep an updated list of medications in your purse or wallet   Do not climb to heights or operate heavy machinery   Stand back from the edges of roads or bus/train platforms when traveling   If you wander when you have a seizure, take a friend along for the trip  Recreation  [employer] can be dangerous  Do not swim alone, in open water, or in murky water that you cannot see the bottom  o Caregivers should be with you in the pool at all times and must be physically able to get you out of the water   Use a flotation device   Scuba diving is not recommended since during a seizure people are unaware of their surroundings  o Having a seizure underwater can be deadly and can endanger the lives of others   Kayaking and canoeing can be especially dangerous  o People with epilepsy are at a higher risk of becoming trapped underneath a canoe or kayak   Wear a helmet when playing contact sports, biking, or if there is a risk of falling    o Patients with epilepsy are at a higher risk of head injury when playing contact sports   Avoid riding a bike, running, or other activities around busy roads, steep hills, or secluded areas   Exercise on soft surfaces   [place]: many people with epilepsy can go on rides depending on their type of seizures  o For some people, stress or excitement can trigger a seizure  o Rollercoasters (especially if you are upside-down) are more dangerous for people with epilepsy  Medications   Take your medications on time  If you have trouble remembering, set alarms on your phone  You can visit www  dklfiph6noirezp  com to set up reminders through text message to help you remember to take your medications   Use a pillbox to help you keep track of your medications   When out of the house, take any needed medications with you  Consider keeping one or two extra doses with you in case you are unexpectedly away from home longer than planned   If you realize you missed a dose of your medications and it is less than 2 hours until your next dose, skip the missed dose  Do not double up your medication dose  If it is more than 2 hours until your next dose, you can take the missed dose   Avoid drinking alcohol, since this can enhance effects of your seizure medications   Be aware of common and major side effects of your medications   Keep your medications out of the reach of children  Parenting:  Maryellen Cease your home as much as possible   It is possible that you could drop your baby if you have a seizure while holding or feeding them   If you are nursing a baby, sit on the floor or bed with your back supported so the baby will not fall far if you should lose consciousness   Feed the baby while he or she is seated in an infant seat   Dress, change, and sponge bathe the baby on the floor   Move the baby around in a stroller or small crib     Keep a young baby in a playpen when you are alone, and a toddler in an indoor play yard, or childproof one room and use safety sinha at the doors   When out of the house, use a bungee-type cord or restraint harness so your child cannot wander away if you have a seizure that affects your awareness

## 2021-09-08 ENCOUNTER — TELEMEDICINE (OUTPATIENT)
Dept: NEUROSURGERY | Facility: CLINIC | Age: 61
End: 2021-09-08

## 2021-09-08 DIAGNOSIS — Z98.890 POST-OPERATIVE STATE: Primary | ICD-10-CM

## 2021-09-08 PROCEDURE — 99024 POSTOP FOLLOW-UP VISIT: CPT

## 2021-09-08 NOTE — PROGRESS NOTES
Expand AllCollapse All       Virtual Brief Visit     Verification of patient location:     Patient is located in the following state in which I hold an active license [place]        Assessment/Plan:         Problem List Items Addressed This Visit      None               Visit Diagnoses      Post-operative state    -  Primary                    Reason for visit is       Chief Complaint   Patient presents with    Virtual Brief Visit         Encounter provider Neurosurgery Nurse Bong     Provider located at [address] [place]  [address] [place] [id number] [phone]     Recent Visits  No visits were found meeting these conditions  Showing recent visits within past 7 days and meeting all other requirements  Today's Visits  Date Type Provider Dept   09/08/21 Telemedicine NEUROSURGERY NURSE [address] today's visits and meeting all other requirements  Future Appointments  No visits were found meeting these conditions  Showing future appointments within next 150 days and meeting all other requirements        After connecting through telephone, the patient was identified by name and date of birth  Connie Ta was informed that this is a telemedicine visit and that the visit is being conducted through telephone  My office door was closed  No one else was in the room  He acknowledged consent and understanding of privacy and security of the platform  The patient has agreed to participate and understands he can discontinue the visit at any time      Subjective     Connie Ta is a 62 y o  male s/p RIGHT AUTOLOGOUS CRANIOPLASTY (Right Head)     HPI      Medical History        Past Medical History:   Diagnosis Date    Anxiety      Dysphagia 4/16/2021    GERD (gastroesophageal reflux disease)      Hematoma, subdural, with loss of consciousness, traumatic ([facility] [address] )      Hypertension      Status post insertion of percutaneous endoscopic gastrostomy (PEG) tube ([facility] [address] ) 5/19/2021            Surgical History         Past Surgical History:   Procedure Laterality Date    BRAIN HEMATOMA EVACUATION Right 4/15/2021     Procedure: Right CRANIOTOMY FOR SUBDURAL HEMATOMA;  Surgeon: Yazan Diop MD;  [facility];  Service: Neurosurgery    CRANIOTOMY Right      HAND SURGERY Left      PEG TUBE PLACEMENT        PEG TUBE REMOVAL        OH REPLACE SKULL PLATE/FLAP Right 9/36/0478     Procedure: RIGHT AUTOLOGOUS CRANIOPLASTY;  Surgeon: Yazan Diop MD;  [facility];  Service: Neurosurgery    ROTATOR CUFF REPAIR Left              Current Medications          Current Outpatient Medications   Medication Sig Dispense Refill    acetaminophen (TYLENOL) 500 mg tablet Take 500 mg by mouth every 4 (four) hours as needed for mild pain        docusate sodium (COLACE) 100 mg capsule Take 1 capsule (100 mg total) by mouth 2 (two) times a day (Patient not taking: Reported on 9/3/2021) 10 capsule 0    levETIRAcetam (KEPPRA) 750 mg tablet Take 1 tablet (750 mg total) by mouth every 12 (twelve) hours 180 tablet 3    lisinopril (ZESTRIL) 20 mg tablet Take 1 tablet (20 mg total) by mouth daily 90 tablet 1    Melatonin 5 MG TABS Take 1 tablet by mouth daily at bedtime        methocarbamol (ROBAXIN) 750 mg tablet Take 1 tablet (750 mg total) by mouth every 8 (eight) hours (Patient not taking: Reported on 7/28/2021) 30 tablet 0    metoprolol tartrate (LOPRESSOR) 25 mg tablet Take 1 tablet (25 mg total) by mouth every 12 (twelve) hours 180 tablet 1    omeprazole (PriLOSEC) 20 mg delayed release capsule Take 20 mg by mouth daily        oxyCODONE (ROXICODONE) 5 mg immediate release tablet Take 1 tablet (5 mg total) by mouth every 4 (four) hours as needed for severe painMax Daily Amount: 30 mg (Patient not taking: Reported on 9/3/2021) 24 tablet 0      No current facility-administered medications for this visit             No Known Allergies  Patient following up once more for incision check due to previous delayed healing of cranial surgical site  See below:                    Patient instructed to monitor the incision for now and contact the office with any dehiscence or drainage  Will contact the patient back if plans change based on Dr Rodriguez [employer] verbally agrees to participate in [employer]  Pt is aware that [employer] could be limited without vital signs or the ability to perform a full hands-on physical Jett Hero understands he or the provider may request at any time to terminate the video visit and request the patient to seek care or treatment in person

## 2021-11-09 ENCOUNTER — TELEPHONE (OUTPATIENT)
Dept: NEUROSURGERY | Facility: CLINIC | Age: 61
End: 2021-11-09

## 2021-11-10 ENCOUNTER — OFFICE VISIT (OUTPATIENT)
Dept: LAB | Facility: HOSPITAL | Age: 61
End: 2021-11-10
Attending: NEUROLOGICAL SURGERY
Payer: COMMERCIAL

## 2021-11-10 ENCOUNTER — CLINICAL SUPPORT (OUTPATIENT)
Dept: NEUROSURGERY | Facility: CLINIC | Age: 61
End: 2021-11-10

## 2021-11-10 ENCOUNTER — HOSPITAL ENCOUNTER (OUTPATIENT)
Dept: RADIOLOGY | Facility: HOSPITAL | Age: 61
Discharge: HOME | End: 2021-11-10
Attending: NEUROLOGICAL SURGERY
Payer: COMMERCIAL

## 2021-11-10 ENCOUNTER — APPOINTMENT (OUTPATIENT)
Dept: LAB | Facility: HOSPITAL | Age: 61
End: 2021-11-10
Attending: PSYCHIATRY & NEUROLOGY
Payer: COMMERCIAL

## 2021-11-10 VITALS — SYSTOLIC BLOOD PRESSURE: 142 MMHG | DIASTOLIC BLOOD PRESSURE: 70 MMHG | TEMPERATURE: 97.7 F

## 2021-11-10 DIAGNOSIS — Z01.812 PRE-PROCEDURAL LABORATORY EXAMINATIONS: ICD-10-CM

## 2021-11-10 DIAGNOSIS — Z91.89 AT RISK FOR SURGICAL SITE INFECTION: ICD-10-CM

## 2021-11-10 DIAGNOSIS — Z98.890 HISTORY OF CRANIOPLASTY: ICD-10-CM

## 2021-11-10 DIAGNOSIS — Z01.812 PRE-PROCEDURAL LABORATORY EXAMINATIONS: Primary | ICD-10-CM

## 2021-11-10 DIAGNOSIS — Z98.890 POST-OPERATIVE STATE: ICD-10-CM

## 2021-11-10 DIAGNOSIS — R56.9 NEW ONSET SEIZURE (HCC): ICD-10-CM

## 2021-11-10 DIAGNOSIS — S06.5X9A SDH (SUBDURAL HEMATOMA) (HCC): ICD-10-CM

## 2021-11-10 DIAGNOSIS — S06.5X9A SDH (SUBDURAL HEMATOMA) (HCC): Primary | ICD-10-CM

## 2021-11-10 DIAGNOSIS — Z98.890 HISTORY OF CRANIOPLASTY: Primary | ICD-10-CM

## 2021-11-10 LAB
ABO GROUP BLD: NORMAL
ALBUMIN SERPL BCP-MCNC: 3.8 G/DL (ref 3.5–5)
ALP SERPL-CCNC: 167 U/L (ref 46–116)
ALT SERPL W P-5'-P-CCNC: 75 U/L (ref 12–78)
ANION GAP SERPL CALCULATED.3IONS-SCNC: 13 MMOL/L (ref 4–13)
APTT PPP: 29 SECONDS (ref 23–37)
AST SERPL W P-5'-P-CCNC: 53 U/L (ref 5–45)
ATRIAL RATE: 85 BPM
BASOPHILS # BLD AUTO: 0.02 THOUSANDS/ΜL (ref 0–0.1)
BASOPHILS NFR BLD AUTO: 0 % (ref 0–1)
BILIRUB SERPL-MCNC: 1.29 MG/DL (ref 0.2–1)
BILIRUB UR QL STRIP: NEGATIVE
BLD GP AB SCN SERPL QL: NEGATIVE
BUN SERPL-MCNC: 14 MG/DL (ref 5–25)
CALCIUM SERPL-MCNC: 9.7 MG/DL (ref 8.3–10.1)
CHLORIDE SERPL-SCNC: 106 MMOL/L (ref 100–108)
CLARITY UR: ABNORMAL
CO2 SERPL-SCNC: 25 MMOL/L (ref 21–32)
COLOR UR: ABNORMAL
CREAT SERPL-MCNC: 0.71 MG/DL (ref 0.6–1.3)
CRP SERPL QL: 20.6 MG/L
EOSINOPHIL # BLD AUTO: 0.05 THOUSAND/ΜL (ref 0–0.61)
EOSINOPHIL NFR BLD AUTO: 1 % (ref 0–6)
ERYTHROCYTE [DISTWIDTH] IN BLOOD BY AUTOMATED COUNT: 13.6 % (ref 11.6–15.1)
ERYTHROCYTE [SEDIMENTATION RATE] IN BLOOD: 19 MM/HOUR (ref 0–19)
EST. AVERAGE GLUCOSE BLD GHB EST-MCNC: 103 MG/DL
GFR SERPL CREATININE-BSD FRML MDRD: 102 ML/MIN/1.73SQ M
GLUCOSE P FAST SERPL-MCNC: 107 MG/DL (ref 65–99)
GLUCOSE UR STRIP-MCNC: NEGATIVE MG/DL
HBA1C MFR BLD: 5.2 %
HCT VFR BLD AUTO: 46.5 % (ref 36.5–49.3)
HGB BLD-MCNC: 15.9 G/DL (ref 12–17)
HGB UR QL STRIP.AUTO: NEGATIVE
IMM GRANULOCYTES # BLD AUTO: 0.02 THOUSAND/UL (ref 0–0.2)
IMM GRANULOCYTES NFR BLD AUTO: 0 % (ref 0–2)
INR PPP: 1.12 (ref 0.84–1.19)
KETONES UR STRIP-MCNC: ABNORMAL MG/DL
LEUKOCYTE ESTERASE UR QL STRIP: NEGATIVE
LYMPHOCYTES # BLD AUTO: 1.31 THOUSANDS/ΜL (ref 0.6–4.47)
LYMPHOCYTES NFR BLD AUTO: 19 % (ref 14–44)
MCH RBC QN AUTO: 31.6 PG (ref 26.8–34.3)
MCHC RBC AUTO-ENTMCNC: 34.2 G/DL (ref 31.4–37.4)
MCV RBC AUTO: 92 FL (ref 82–98)
MONOCYTES # BLD AUTO: 0.54 THOUSAND/ΜL (ref 0.17–1.22)
MONOCYTES NFR BLD AUTO: 8 % (ref 4–12)
NEUTROPHILS # BLD AUTO: 4.84 THOUSANDS/ΜL (ref 1.85–7.62)
NEUTS SEG NFR BLD AUTO: 72 % (ref 43–75)
NITRITE UR QL STRIP: NEGATIVE
NRBC BLD AUTO-RTO: 0 /100 WBCS
P AXIS: 24 DEGREES
PH UR STRIP.AUTO: 5.5 [PH]
PLATELET # BLD AUTO: 130 THOUSANDS/UL (ref 149–390)
PMV BLD AUTO: 10.7 FL (ref 8.9–12.7)
POTASSIUM SERPL-SCNC: 4.3 MMOL/L (ref 3.5–5.3)
PR INTERVAL: 164 MS
PREALB SERPL-MCNC: 25.3 MG/DL (ref 18–40)
PROT SERPL-MCNC: 9.4 G/DL (ref 6.4–8.2)
PROT UR STRIP-MCNC: NEGATIVE MG/DL
PROTHROMBIN TIME: 13.9 SECONDS (ref 11.6–14.5)
QRS AXIS: 7 DEGREES
QRSD INTERVAL: 82 MS
QT INTERVAL: 362 MS
QTC INTERVAL: 430 MS
RBC # BLD AUTO: 5.03 MILLION/UL (ref 3.88–5.62)
RH BLD: POSITIVE
SODIUM SERPL-SCNC: 144 MMOL/L (ref 136–145)
SP GR UR STRIP.AUTO: 1.02 (ref 1–1.03)
SPECIMEN EXPIRATION DATE: NORMAL
T WAVE AXIS: 7 DEGREES
UROBILINOGEN UR QL STRIP.AUTO: 1 E.U./DL
VENTRICULAR RATE: 85 BPM
WBC # BLD AUTO: 6.78 THOUSAND/UL (ref 4.31–10.16)

## 2021-11-10 PROCEDURE — 99024 POSTOP FOLLOW-UP VISIT: CPT

## 2021-11-10 PROCEDURE — 85730 THROMBOPLASTIN TIME PARTIAL: CPT

## 2021-11-10 PROCEDURE — 85025 COMPLETE CBC W/AUTO DIFF WBC: CPT

## 2021-11-10 PROCEDURE — 36415 COLL VENOUS BLD VENIPUNCTURE: CPT

## 2021-11-10 PROCEDURE — 86901 BLOOD TYPING SEROLOGIC RH(D): CPT

## 2021-11-10 PROCEDURE — 93005 ELECTROCARDIOGRAM TRACING: CPT

## 2021-11-10 PROCEDURE — U0005 INFEC AGEN DETEC AMPLI PROBE: HCPCS

## 2021-11-10 PROCEDURE — 80053 COMPREHEN METABOLIC PANEL: CPT

## 2021-11-10 PROCEDURE — G1004 CDSM NDSC: HCPCS

## 2021-11-10 PROCEDURE — U0003 INFECTIOUS AGENT DETECTION BY NUCLEIC ACID (DNA OR RNA); SEVERE ACUTE RESPIRATORY SYNDROME CORONAVIRUS 2 (SARS-COV-2) (CORONAVIRUS DISEASE [COVID-19]), AMPLIFIED PROBE TECHNIQUE, MAKING USE OF HIGH THROUGHPUT TECHNOLOGIES AS DESCRIBED BY CMS-2020-01-R: HCPCS

## 2021-11-10 PROCEDURE — 93010 ELECTROCARDIOGRAM REPORT: CPT | Performed by: INTERNAL MEDICINE

## 2021-11-10 PROCEDURE — 70450 CT HEAD/BRAIN W/O DYE: CPT

## 2021-11-10 PROCEDURE — 84134 ASSAY OF PREALBUMIN: CPT

## 2021-11-10 PROCEDURE — 86140 C-REACTIVE PROTEIN: CPT

## 2021-11-10 PROCEDURE — 80177 DRUG SCRN QUAN LEVETIRACETAM: CPT

## 2021-11-10 PROCEDURE — 85610 PROTHROMBIN TIME: CPT

## 2021-11-10 PROCEDURE — 86900 BLOOD TYPING SEROLOGIC ABO: CPT

## 2021-11-10 PROCEDURE — 86850 RBC ANTIBODY SCREEN: CPT

## 2021-11-10 PROCEDURE — 85652 RBC SED RATE AUTOMATED: CPT

## 2021-11-10 PROCEDURE — 83036 HEMOGLOBIN GLYCOSYLATED A1C: CPT

## 2021-11-10 PROCEDURE — 81003 URINALYSIS AUTO W/O SCOPE: CPT

## 2021-11-10 RX ORDER — CEFAZOLIN SODIUM 2 G/50ML
2000 SOLUTION INTRAVENOUS ONCE
Status: CANCELLED | OUTPATIENT
Start: 2021-11-10 | End: 2021-11-10

## 2021-11-10 RX ORDER — CHLORHEXIDINE GLUCONATE 0.12 MG/ML
15 RINSE ORAL ONCE
Status: CANCELLED | OUTPATIENT
Start: 2021-11-10 | End: 2021-11-10

## 2021-11-11 LAB — SARS-COV-2 RNA RESP QL NAA+PROBE: NEGATIVE

## 2021-11-12 ENCOUNTER — TELEPHONE (OUTPATIENT)
Dept: NEUROSURGERY | Facility: CLINIC | Age: 61
End: 2021-11-12

## 2021-11-14 LAB — LEVETIRACETAM SERPL-MCNC: 23.2 UG/ML (ref 10–40)

## 2021-11-15 ENCOUNTER — DOCUMENTATION (OUTPATIENT)
Dept: NEUROSURGERY | Facility: CLINIC | Age: 61
End: 2021-11-15

## 2021-11-15 ENCOUNTER — ANESTHESIA EVENT (OUTPATIENT)
Dept: PERIOP | Facility: HOSPITAL | Age: 61
DRG: 907 | End: 2021-11-15
Payer: COMMERCIAL

## 2021-11-16 ENCOUNTER — ANESTHESIA EVENT (INPATIENT)
Dept: PERIOP | Facility: HOSPITAL | Age: 61
DRG: 907 | End: 2021-11-16
Payer: COMMERCIAL

## 2021-11-16 ENCOUNTER — APPOINTMENT (INPATIENT)
Dept: RADIOLOGY | Facility: HOSPITAL | Age: 61
DRG: 907 | End: 2021-11-16
Payer: COMMERCIAL

## 2021-11-16 ENCOUNTER — ANESTHESIA (INPATIENT)
Dept: PERIOP | Facility: HOSPITAL | Age: 61
DRG: 907 | End: 2021-11-16
Payer: COMMERCIAL

## 2021-11-16 ENCOUNTER — ANESTHESIA (OUTPATIENT)
Dept: PERIOP | Facility: HOSPITAL | Age: 61
DRG: 907 | End: 2021-11-16
Payer: COMMERCIAL

## 2021-11-16 ENCOUNTER — HOSPITAL ENCOUNTER (INPATIENT)
Facility: HOSPITAL | Age: 61
LOS: 7 days | Discharge: HOME HEALTH | DRG: 907 | End: 2021-11-23
Attending: NEUROLOGICAL SURGERY | Admitting: NEUROLOGICAL SURGERY
Payer: COMMERCIAL

## 2021-11-16 DIAGNOSIS — Z98.890 POST-OPERATIVE STATE: ICD-10-CM

## 2021-11-16 DIAGNOSIS — T84.7XXA INFECTION OF CRANIOTOMY PLATE, INITIAL ENCOUNTER (HCC): ICD-10-CM

## 2021-11-16 DIAGNOSIS — Z91.89 AT RISK FOR SURGICAL SITE INFECTION: ICD-10-CM

## 2021-11-16 DIAGNOSIS — Z98.890 STATUS POST CRANIECTOMY: Primary | ICD-10-CM

## 2021-11-16 DIAGNOSIS — Z96.7 INFECTION OF CRANIOTOMY PLATE, INITIAL ENCOUNTER (HCC): ICD-10-CM

## 2021-11-16 DIAGNOSIS — Z98.890 HISTORY OF CRANIOPLASTY: ICD-10-CM

## 2021-11-16 LAB — GLUCOSE SERPL-MCNC: 148 MG/DL (ref 65–140)

## 2021-11-16 PROCEDURE — 10180 I&D COMPLEX PO WOUND INFCTJ: CPT | Performed by: NEUROLOGICAL SURGERY

## 2021-11-16 PROCEDURE — 87205 SMEAR GRAM STAIN: CPT | Performed by: NEUROLOGICAL SURGERY

## 2021-11-16 PROCEDURE — 87070 CULTURE OTHR SPECIMN AEROBIC: CPT | Performed by: NEUROLOGICAL SURGERY

## 2021-11-16 PROCEDURE — 87075 CULTR BACTERIA EXCEPT BLOOD: CPT | Performed by: NEUROLOGICAL SURGERY

## 2021-11-16 PROCEDURE — 0NP00JZ REMOVAL OF SYNTHETIC SUBSTITUTE FROM SKULL, OPEN APPROACH: ICD-10-PCS | Performed by: NEUROLOGICAL SURGERY

## 2021-11-16 PROCEDURE — 82948 REAGENT STRIP/BLOOD GLUCOSE: CPT

## 2021-11-16 PROCEDURE — 86923 COMPATIBILITY TEST ELECTRIC: CPT

## 2021-11-16 PROCEDURE — 70450 CT HEAD/BRAIN W/O DYE: CPT

## 2021-11-16 PROCEDURE — 87116 MYCOBACTERIA CULTURE: CPT | Performed by: NEUROLOGICAL SURGERY

## 2021-11-16 PROCEDURE — 87186 SC STD MICRODIL/AGAR DIL: CPT | Performed by: NEUROLOGICAL SURGERY

## 2021-11-16 PROCEDURE — 87077 CULTURE AEROBIC IDENTIFY: CPT | Performed by: NEUROLOGICAL SURGERY

## 2021-11-16 PROCEDURE — 87206 SMEAR FLUORESCENT/ACID STAI: CPT | Performed by: NEUROLOGICAL SURGERY

## 2021-11-16 PROCEDURE — 3E1Q38X IRRIGATION OF CRANIAL CAVITY AND BRAIN USING IRRIGATING SUBSTANCE, PERCUTANEOUS APPROACH, DIAGNOSTIC: ICD-10-PCS | Performed by: NEUROLOGICAL SURGERY

## 2021-11-16 PROCEDURE — 99024 POSTOP FOLLOW-UP VISIT: CPT | Performed by: NEUROLOGICAL SURGERY

## 2021-11-16 PROCEDURE — 00B00ZZ EXCISION OF BRAIN, OPEN APPROACH: ICD-10-PCS | Performed by: NEUROLOGICAL SURGERY

## 2021-11-16 PROCEDURE — 99291 CRITICAL CARE FIRST HOUR: CPT | Performed by: STUDENT IN AN ORGANIZED HEALTH CARE EDUCATION/TRAINING PROGRAM

## 2021-11-16 PROCEDURE — 62142 RMVL B1 FLP/PROSTC PLATE SKL: CPT | Performed by: NEUROLOGICAL SURGERY

## 2021-11-16 PROCEDURE — 87102 FUNGUS ISOLATION CULTURE: CPT | Performed by: NEUROLOGICAL SURGERY

## 2021-11-16 PROCEDURE — 13160 SEC CLSR SURG WND/DEHSN XTN: CPT | Performed by: NEUROLOGICAL SURGERY

## 2021-11-16 RX ORDER — CHLORHEXIDINE GLUCONATE 0.12 MG/ML
15 RINSE ORAL ONCE
Status: CANCELLED | OUTPATIENT
Start: 2021-11-16 | End: 2021-11-16

## 2021-11-16 RX ORDER — OXYCODONE HYDROCHLORIDE 5 MG/1
5 TABLET ORAL EVERY 4 HOURS PRN
Status: DISCONTINUED | OUTPATIENT
Start: 2021-11-16 | End: 2021-11-23 | Stop reason: HOSPADM

## 2021-11-16 RX ORDER — DEXAMETHASONE SODIUM PHOSPHATE 4 MG/ML
INJECTION, SOLUTION INTRA-ARTICULAR; INTRALESIONAL; INTRAMUSCULAR; INTRAVENOUS; SOFT TISSUE AS NEEDED
Status: DISCONTINUED | OUTPATIENT
Start: 2021-11-16 | End: 2021-11-16

## 2021-11-16 RX ORDER — METHOCARBAMOL 500 MG/1
500 TABLET, FILM COATED ORAL EVERY 6 HOURS SCHEDULED
Status: DISCONTINUED | OUTPATIENT
Start: 2021-11-16 | End: 2021-11-23 | Stop reason: HOSPADM

## 2021-11-16 RX ORDER — CHLORHEXIDINE GLUCONATE 0.12 MG/ML
15 RINSE ORAL ONCE
Status: COMPLETED | OUTPATIENT
Start: 2021-11-16 | End: 2021-11-16

## 2021-11-16 RX ORDER — VANCOMYCIN HYDROCHLORIDE 1 G/200ML
12.5 INJECTION, SOLUTION INTRAVENOUS EVERY 8 HOURS
Status: DISCONTINUED | OUTPATIENT
Start: 2021-11-17 | End: 2021-11-18

## 2021-11-16 RX ORDER — ACETAMINOPHEN 325 MG/1
975 TABLET ORAL EVERY 8 HOURS SCHEDULED
Status: DISCONTINUED | OUTPATIENT
Start: 2021-11-16 | End: 2021-11-23 | Stop reason: HOSPADM

## 2021-11-16 RX ORDER — ONDANSETRON 2 MG/ML
4 INJECTION INTRAMUSCULAR; INTRAVENOUS EVERY 4 HOURS PRN
Status: DISCONTINUED | OUTPATIENT
Start: 2021-11-16 | End: 2021-11-23 | Stop reason: HOSPADM

## 2021-11-16 RX ORDER — GLYCOPYRROLATE 0.2 MG/ML
INJECTION INTRAMUSCULAR; INTRAVENOUS AS NEEDED
Status: DISCONTINUED | OUTPATIENT
Start: 2021-11-16 | End: 2021-11-16

## 2021-11-16 RX ORDER — NEOSTIGMINE METHYLSULFATE 1 MG/ML
INJECTION INTRAVENOUS AS NEEDED
Status: DISCONTINUED | OUTPATIENT
Start: 2021-11-16 | End: 2021-11-16

## 2021-11-16 RX ORDER — MIDAZOLAM HYDROCHLORIDE 2 MG/2ML
INJECTION, SOLUTION INTRAMUSCULAR; INTRAVENOUS AS NEEDED
Status: DISCONTINUED | OUTPATIENT
Start: 2021-11-16 | End: 2021-11-16

## 2021-11-16 RX ORDER — PROPOFOL 10 MG/ML
INJECTION, EMULSION INTRAVENOUS AS NEEDED
Status: DISCONTINUED | OUTPATIENT
Start: 2021-11-16 | End: 2021-11-16

## 2021-11-16 RX ORDER — HYDROMORPHONE HCL IN WATER/PF 6 MG/30 ML
0.2 PATIENT CONTROLLED ANALGESIA SYRINGE INTRAVENOUS
Status: DISCONTINUED | OUTPATIENT
Start: 2021-11-16 | End: 2021-11-16 | Stop reason: HOSPADM

## 2021-11-16 RX ORDER — ONDANSETRON 2 MG/ML
INJECTION INTRAMUSCULAR; INTRAVENOUS AS NEEDED
Status: DISCONTINUED | OUTPATIENT
Start: 2021-11-16 | End: 2021-11-16

## 2021-11-16 RX ORDER — SCOLOPAMINE TRANSDERMAL SYSTEM 1 MG/1
1 PATCH, EXTENDED RELEASE TRANSDERMAL
Status: DISCONTINUED | OUTPATIENT
Start: 2021-11-16 | End: 2021-11-23 | Stop reason: HOSPADM

## 2021-11-16 RX ORDER — SODIUM CHLORIDE 9 MG/ML
125 INJECTION, SOLUTION INTRAVENOUS CONTINUOUS
Status: DISCONTINUED | OUTPATIENT
Start: 2021-11-16 | End: 2021-11-17

## 2021-11-16 RX ORDER — OXYCODONE HYDROCHLORIDE 10 MG/1
10 TABLET ORAL EVERY 4 HOURS PRN
Status: DISCONTINUED | OUTPATIENT
Start: 2021-11-16 | End: 2021-11-23 | Stop reason: HOSPADM

## 2021-11-16 RX ORDER — ONDANSETRON 2 MG/ML
4 INJECTION INTRAMUSCULAR; INTRAVENOUS ONCE AS NEEDED
Status: DISCONTINUED | OUTPATIENT
Start: 2021-11-16 | End: 2021-11-16 | Stop reason: HOSPADM

## 2021-11-16 RX ORDER — HYDROMORPHONE HCL/PF 1 MG/ML
0.5 SYRINGE (ML) INJECTION
Status: DISCONTINUED | OUTPATIENT
Start: 2021-11-16 | End: 2021-11-17

## 2021-11-16 RX ORDER — PANTOPRAZOLE SODIUM 40 MG/1
40 TABLET, DELAYED RELEASE ORAL
Status: DISCONTINUED | OUTPATIENT
Start: 2021-11-17 | End: 2021-11-17

## 2021-11-16 RX ORDER — SODIUM CHLORIDE 9 MG/ML
INJECTION, SOLUTION INTRAVENOUS CONTINUOUS PRN
Status: DISCONTINUED | OUTPATIENT
Start: 2021-11-16 | End: 2021-11-16

## 2021-11-16 RX ORDER — HYDRALAZINE HYDROCHLORIDE 20 MG/ML
5 INJECTION INTRAMUSCULAR; INTRAVENOUS EVERY 6 HOURS PRN
Status: DISCONTINUED | OUTPATIENT
Start: 2021-11-16 | End: 2021-11-17

## 2021-11-16 RX ORDER — ACETAMINOPHEN 325 MG/1
975 TABLET ORAL ONCE
Status: COMPLETED | OUTPATIENT
Start: 2021-11-16 | End: 2021-11-16

## 2021-11-16 RX ORDER — VANCOMYCIN HYDROCHLORIDE 1 G/200ML
INJECTION, SOLUTION INTRAVENOUS AS NEEDED
Status: DISCONTINUED | OUTPATIENT
Start: 2021-11-16 | End: 2021-11-16

## 2021-11-16 RX ORDER — LEVETIRACETAM 750 MG/1
750 TABLET ORAL EVERY 12 HOURS SCHEDULED
Status: DISCONTINUED | OUTPATIENT
Start: 2021-11-16 | End: 2021-11-23 | Stop reason: HOSPADM

## 2021-11-16 RX ORDER — LABETALOL 20 MG/4 ML (5 MG/ML) INTRAVENOUS SYRINGE
AS NEEDED
Status: DISCONTINUED | OUTPATIENT
Start: 2021-11-16 | End: 2021-11-16

## 2021-11-16 RX ORDER — MAGNESIUM HYDROXIDE 1200 MG/15ML
LIQUID ORAL AS NEEDED
Status: DISCONTINUED | OUTPATIENT
Start: 2021-11-16 | End: 2021-11-16 | Stop reason: HOSPADM

## 2021-11-16 RX ORDER — LIDOCAINE HYDROCHLORIDE AND EPINEPHRINE 10; 10 MG/ML; UG/ML
INJECTION, SOLUTION INFILTRATION; PERINEURAL AS NEEDED
Status: DISCONTINUED | OUTPATIENT
Start: 2021-11-16 | End: 2021-11-16 | Stop reason: HOSPADM

## 2021-11-16 RX ORDER — AMOXICILLIN 250 MG
2 CAPSULE ORAL DAILY
Status: DISCONTINUED | OUTPATIENT
Start: 2021-11-17 | End: 2021-11-23 | Stop reason: HOSPADM

## 2021-11-16 RX ORDER — FENTANYL CITRATE 50 UG/ML
INJECTION, SOLUTION INTRAMUSCULAR; INTRAVENOUS AS NEEDED
Status: DISCONTINUED | OUTPATIENT
Start: 2021-11-16 | End: 2021-11-16

## 2021-11-16 RX ORDER — CEFEPIME HYDROCHLORIDE 2 G/50ML
INJECTION, SOLUTION INTRAVENOUS AS NEEDED
Status: DISCONTINUED | OUTPATIENT
Start: 2021-11-16 | End: 2021-11-16

## 2021-11-16 RX ORDER — ROCURONIUM BROMIDE 10 MG/ML
INJECTION, SOLUTION INTRAVENOUS AS NEEDED
Status: DISCONTINUED | OUTPATIENT
Start: 2021-11-16 | End: 2021-11-16

## 2021-11-16 RX ORDER — SODIUM CHLORIDE 9 MG/ML
100 INJECTION, SOLUTION INTRAVENOUS CONTINUOUS
Status: DISCONTINUED | OUTPATIENT
Start: 2021-11-16 | End: 2021-11-16

## 2021-11-16 RX ORDER — LISINOPRIL 20 MG/1
20 TABLET ORAL DAILY
Status: DISCONTINUED | OUTPATIENT
Start: 2021-11-17 | End: 2021-11-23 | Stop reason: HOSPADM

## 2021-11-16 RX ORDER — LIDOCAINE HYDROCHLORIDE 10 MG/ML
INJECTION, SOLUTION EPIDURAL; INFILTRATION; INTRACAUDAL; PERINEURAL AS NEEDED
Status: DISCONTINUED | OUTPATIENT
Start: 2021-11-16 | End: 2021-11-16

## 2021-11-16 RX ORDER — DEXMEDETOMIDINE HYDROCHLORIDE 100 UG/ML
INJECTION, SOLUTION INTRAVENOUS AS NEEDED
Status: DISCONTINUED | OUTPATIENT
Start: 2021-11-16 | End: 2021-11-16

## 2021-11-16 RX ORDER — CEFAZOLIN SODIUM 2 G/50ML
2000 SOLUTION INTRAVENOUS ONCE
Status: DISCONTINUED | OUTPATIENT
Start: 2021-11-16 | End: 2021-11-16 | Stop reason: HOSPADM

## 2021-11-16 RX ORDER — FENTANYL CITRATE/PF 50 MCG/ML
25 SYRINGE (ML) INJECTION
Status: DISCONTINUED | OUTPATIENT
Start: 2021-11-16 | End: 2021-11-16 | Stop reason: HOSPADM

## 2021-11-16 RX ORDER — CALCIUM CARBONATE 200(500)MG
1000 TABLET,CHEWABLE ORAL DAILY PRN
Status: DISCONTINUED | OUTPATIENT
Start: 2021-11-16 | End: 2021-11-23 | Stop reason: HOSPADM

## 2021-11-16 RX ORDER — LANOLIN ALCOHOL/MO/W.PET/CERES
4.5 CREAM (GRAM) TOPICAL
Status: DISCONTINUED | OUTPATIENT
Start: 2021-11-16 | End: 2021-11-23 | Stop reason: HOSPADM

## 2021-11-16 RX ORDER — LABETALOL 20 MG/4 ML (5 MG/ML) INTRAVENOUS SYRINGE
10 EVERY 4 HOURS PRN
Status: DISCONTINUED | OUTPATIENT
Start: 2021-11-16 | End: 2021-11-17

## 2021-11-16 RX ADMIN — GLYCOPYRROLATE 0.8 MG: 0.2 INJECTION, SOLUTION INTRAMUSCULAR; INTRAVENOUS at 17:33

## 2021-11-16 RX ADMIN — HYDROMORPHONE HYDROCHLORIDE 0.2 MG: 0.2 INJECTION, SOLUTION INTRAMUSCULAR; INTRAVENOUS; SUBCUTANEOUS at 18:40

## 2021-11-16 RX ADMIN — MIDAZOLAM 2 MG: 1 INJECTION INTRAMUSCULAR; INTRAVENOUS at 15:30

## 2021-11-16 RX ADMIN — Medication 25 MCG: at 18:08

## 2021-11-16 RX ADMIN — DEXMEDETOMIDINE 16 MCG: 100 INJECTION, SOLUTION, CONCENTRATE INTRAVENOUS at 16:18

## 2021-11-16 RX ADMIN — HYDROMORPHONE HYDROCHLORIDE 0.5 MG: 1 INJECTION, SOLUTION INTRAMUSCULAR; INTRAVENOUS; SUBCUTANEOUS at 19:08

## 2021-11-16 RX ADMIN — LABETALOL HYDROCHLORIDE 10 MG: 5 INJECTION, SOLUTION INTRAVENOUS at 22:16

## 2021-11-16 RX ADMIN — SODIUM CHLORIDE: 0.9 INJECTION, SOLUTION INTRAVENOUS at 15:22

## 2021-11-16 RX ADMIN — DEXAMETHASONE SODIUM PHOSPHATE 10 MG: 4 INJECTION INTRA-ARTICULAR; INTRALESIONAL; INTRAMUSCULAR; INTRAVENOUS; SOFT TISSUE at 15:42

## 2021-11-16 RX ADMIN — VANCOMYCIN HYDROCHLORIDE 1500 MG: 1 INJECTION, SOLUTION INTRAVENOUS at 16:33

## 2021-11-16 RX ADMIN — PROPOFOL 100 MG: 10 INJECTION, EMULSION INTRAVENOUS at 16:00

## 2021-11-16 RX ADMIN — HYDROMORPHONE HYDROCHLORIDE 0.2 MG: 0.2 INJECTION, SOLUTION INTRAMUSCULAR; INTRAVENOUS; SUBCUTANEOUS at 18:30

## 2021-11-16 RX ADMIN — Medication 500 MG: at 16:33

## 2021-11-16 RX ADMIN — FENTANYL CITRATE 100 MCG: 50 INJECTION INTRAMUSCULAR; INTRAVENOUS at 15:40

## 2021-11-16 RX ADMIN — HYDROMORPHONE HYDROCHLORIDE 0.5 MG: 1 INJECTION, SOLUTION INTRAMUSCULAR; INTRAVENOUS; SUBCUTANEOUS at 20:13

## 2021-11-16 RX ADMIN — PROPOFOL 100 MG: 10 INJECTION, EMULSION INTRAVENOUS at 15:42

## 2021-11-16 RX ADMIN — PROPOFOL 200 MG: 10 INJECTION, EMULSION INTRAVENOUS at 15:40

## 2021-11-16 RX ADMIN — ONDANSETRON 4 MG: 2 INJECTION INTRAMUSCULAR; INTRAVENOUS at 16:09

## 2021-11-16 RX ADMIN — SODIUM CHLORIDE: 0.9 INJECTION, SOLUTION INTRAVENOUS at 13:39

## 2021-11-16 RX ADMIN — ROCURONIUM BROMIDE 50 MG: 50 INJECTION, SOLUTION INTRAVENOUS at 15:40

## 2021-11-16 RX ADMIN — ROCURONIUM BROMIDE 20 MG: 50 INJECTION, SOLUTION INTRAVENOUS at 16:04

## 2021-11-16 RX ADMIN — ACETAMINOPHEN 975 MG: 325 TABLET ORAL at 14:14

## 2021-11-16 RX ADMIN — SODIUM CHLORIDE 125 ML/HR: 0.9 INJECTION, SOLUTION INTRAVENOUS at 18:03

## 2021-11-16 RX ADMIN — SCOPALAMINE 1 PATCH: 1 PATCH, EXTENDED RELEASE TRANSDERMAL at 15:18

## 2021-11-16 RX ADMIN — CEFEPIME HYDROCHLORIDE 2000 MG: 2 INJECTION, POWDER, FOR SOLUTION INTRAVENOUS at 16:37

## 2021-11-16 RX ADMIN — REMIFENTANIL HYDROCHLORIDE 0.1 MCG/KG/MIN: 1 INJECTION, POWDER, LYOPHILIZED, FOR SOLUTION INTRAVENOUS at 16:05

## 2021-11-16 RX ADMIN — LABETALOL HYDROCHLORIDE 10 MG: 5 INJECTION, SOLUTION INTRAVENOUS at 17:38

## 2021-11-16 RX ADMIN — PHENYLEPHRINE HYDROCHLORIDE 30 MCG/MIN: 10 INJECTION INTRAVENOUS at 16:08

## 2021-11-16 RX ADMIN — GLYCOPYRROLATE 0.2 MG: 0.2 INJECTION, SOLUTION INTRAMUSCULAR; INTRAVENOUS at 15:50

## 2021-11-16 RX ADMIN — CHLORHEXIDINE GLUCONATE 15 ML: 1.2 SOLUTION ORAL at 14:14

## 2021-11-16 RX ADMIN — OXYCODONE HYDROCHLORIDE 10 MG: 10 TABLET ORAL at 20:42

## 2021-11-16 RX ADMIN — Medication 25 MCG: at 18:04

## 2021-11-16 RX ADMIN — NEOSTIGMINE METHYLSULFATE 4 MG: 1 INJECTION INTRAVENOUS at 17:33

## 2021-11-16 RX ADMIN — LIDOCAINE HYDROCHLORIDE 50 MG: 10 INJECTION, SOLUTION EPIDURAL; INFILTRATION; INTRACAUDAL; PERINEURAL at 15:40

## 2021-11-17 ENCOUNTER — APPOINTMENT (INPATIENT)
Dept: RADIOLOGY | Facility: HOSPITAL | Age: 61
DRG: 907 | End: 2021-11-17
Payer: COMMERCIAL

## 2021-11-17 LAB
AAMA (MAX AMPLITUDE AA): 10.5 MM (ref 51–71)
ABO GROUP BLD BPU: NORMAL
ABO GROUP BLD BPU: NORMAL
ABO GROUP BLD: NORMAL
ACTFMA(MAX AMPLITUDE ACTF): 4 MM (ref 2–19)
ADPMA(MAX AMPLITUDE ADP): 15.9 MM (ref 45–69)
ALBUMIN SERPL BCP-MCNC: 2.7 G/DL (ref 3.5–5)
ALP SERPL-CCNC: 77 U/L (ref 46–116)
ALT SERPL W P-5'-P-CCNC: 55 U/L (ref 12–78)
ANION GAP SERPL CALCULATED.3IONS-SCNC: 5 MMOL/L (ref 4–13)
ANION GAP SERPL CALCULATED.3IONS-SCNC: 6 MMOL/L (ref 4–13)
ANION GAP SERPL CALCULATED.3IONS-SCNC: 7 MMOL/L (ref 4–13)
APTT PPP: 29 SECONDS (ref 23–37)
APTT PPP: 29 SECONDS (ref 23–37)
AST SERPL W P-5'-P-CCNC: 37 U/L (ref 5–45)
BASE EXCESS BLDA CALC-SCNC: -2 MMOL/L (ref -2–3)
BASE EXCESS BLDA CALC-SCNC: -5.3 MMOL/L
BASOPHILS # BLD AUTO: 0 THOUSANDS/ΜL (ref 0–0.1)
BASOPHILS # BLD AUTO: 0 THOUSANDS/ΜL (ref 0–0.1)
BASOPHILS NFR BLD AUTO: 0 % (ref 0–1)
BASOPHILS NFR BLD AUTO: 0 % (ref 0–1)
BILIRUB SERPL-MCNC: 1.38 MG/DL (ref 0.2–1)
BLD GP AB SCN SERPL QL: NEGATIVE
BODY TEMPERATURE: 97.7 DEGREES FEHRENHEIT
BPU ID: NORMAL
BPU ID: NORMAL
BUN SERPL-MCNC: 10 MG/DL (ref 5–25)
BUN SERPL-MCNC: 10 MG/DL (ref 5–25)
BUN SERPL-MCNC: 9 MG/DL (ref 5–25)
CA-I BLD-SCNC: 1.05 MMOL/L (ref 1.12–1.32)
CA-I BLD-SCNC: 1.11 MMOL/L (ref 1.12–1.32)
CALCIUM ALBUM COR SERPL-MCNC: 8.7 MG/DL (ref 8.3–10.1)
CALCIUM SERPL-MCNC: 7.4 MG/DL (ref 8.3–10.1)
CALCIUM SERPL-MCNC: 7.7 MG/DL (ref 8.3–10.1)
CALCIUM SERPL-MCNC: 7.7 MG/DL (ref 8.3–10.1)
CFFMA (FUNCTIONAL FIBRINOGEN MAX AMPLITUDE): 16.6 MM (ref 15–32)
CHLORIDE SERPL-SCNC: 111 MMOL/L (ref 100–108)
CHLORIDE SERPL-SCNC: 112 MMOL/L (ref 100–108)
CHLORIDE SERPL-SCNC: 112 MMOL/L (ref 100–108)
CKLY30: 0 % (ref 0–2.6)
CKR(REACTION TIME): 6.4 MIN (ref 4.6–9.1)
CO2 SERPL-SCNC: 22 MMOL/L (ref 21–32)
CO2 SERPL-SCNC: 22 MMOL/L (ref 21–32)
CO2 SERPL-SCNC: 23 MMOL/L (ref 21–32)
CREAT SERPL-MCNC: 0.72 MG/DL (ref 0.6–1.3)
CREAT SERPL-MCNC: 0.75 MG/DL (ref 0.6–1.3)
CREAT SERPL-MCNC: 0.82 MG/DL (ref 0.6–1.3)
CROSSMATCH: NORMAL
CROSSMATCH: NORMAL
CRTMA(RAPIDTEG MAX AMPLITUDE): 51.9 MM (ref 52–70)
EOSINOPHIL # BLD AUTO: 0 THOUSAND/ΜL (ref 0–0.61)
EOSINOPHIL # BLD AUTO: 0 THOUSAND/ΜL (ref 0–0.61)
EOSINOPHIL NFR BLD AUTO: 0 % (ref 0–6)
EOSINOPHIL NFR BLD AUTO: 0 % (ref 0–6)
ERYTHROCYTE [DISTWIDTH] IN BLOOD BY AUTOMATED COUNT: 13.3 % (ref 11.6–15.1)
ERYTHROCYTE [DISTWIDTH] IN BLOOD BY AUTOMATED COUNT: 13.4 % (ref 11.6–15.1)
ERYTHROCYTE [DISTWIDTH] IN BLOOD BY AUTOMATED COUNT: 13.5 % (ref 11.6–15.1)
GFR SERPL CREATININE-BSD FRML MDRD: 100 ML/MIN/1.73SQ M
GFR SERPL CREATININE-BSD FRML MDRD: 101 ML/MIN/1.73SQ M
GFR SERPL CREATININE-BSD FRML MDRD: 96 ML/MIN/1.73SQ M
GLUCOSE SERPL-MCNC: 135 MG/DL (ref 65–140)
GLUCOSE SERPL-MCNC: 192 MG/DL (ref 65–140)
GLUCOSE SERPL-MCNC: 202 MG/DL (ref 65–140)
GLUCOSE SERPL-MCNC: 212 MG/DL (ref 65–140)
GLUCOSE SERPL-MCNC: 226 MG/DL (ref 65–140)
HCO3 BLDA-SCNC: 19.4 MMOL/L (ref 22–28)
HCO3 BLDA-SCNC: 23.4 MMOL/L (ref 22–28)
HCT VFR BLD AUTO: 35.5 % (ref 36.5–49.3)
HCT VFR BLD AUTO: 36.5 % (ref 36.5–49.3)
HCT VFR BLD AUTO: 36.7 % (ref 36.5–49.3)
HCT VFR BLD CALC: 36 % (ref 36.5–49.3)
HGB BLD-MCNC: 12.1 G/DL (ref 12–17)
HGB BLD-MCNC: 12.5 G/DL (ref 12–17)
HGB BLD-MCNC: 12.6 G/DL (ref 12–17)
HGB BLDA-MCNC: 12.2 G/DL (ref 12–17)
HKHMA(MAX AMPLITUDE KAOLIN): <42 MM (ref 53–68)
HOROWITZ INDEX BLDA+IHG-RTO: 60 MM[HG]
IMM GRANULOCYTES # BLD AUTO: 0.01 THOUSAND/UL (ref 0–0.2)
IMM GRANULOCYTES # BLD AUTO: 0.02 THOUSAND/UL (ref 0–0.2)
IMM GRANULOCYTES NFR BLD AUTO: 0 % (ref 0–2)
IMM GRANULOCYTES NFR BLD AUTO: 0 % (ref 0–2)
INR PPP: 1.3 (ref 0.84–1.19)
INR PPP: 1.31 (ref 0.84–1.19)
INR PPP: 1.32 (ref 0.84–1.19)
LYMPHOCYTES # BLD AUTO: 0.2 THOUSANDS/ΜL (ref 0.6–4.47)
LYMPHOCYTES # BLD AUTO: 0.33 THOUSANDS/ΜL (ref 0.6–4.47)
LYMPHOCYTES NFR BLD AUTO: 4 % (ref 14–44)
LYMPHOCYTES NFR BLD AUTO: 7 % (ref 14–44)
MAGNESIUM SERPL-MCNC: 1.7 MG/DL (ref 1.6–2.6)
MAGNESIUM SERPL-MCNC: 1.8 MG/DL (ref 1.6–2.6)
MCH RBC QN AUTO: 31.5 PG (ref 26.8–34.3)
MCH RBC QN AUTO: 31.6 PG (ref 26.8–34.3)
MCH RBC QN AUTO: 32.1 PG (ref 26.8–34.3)
MCHC RBC AUTO-ENTMCNC: 34.1 G/DL (ref 31.4–37.4)
MCHC RBC AUTO-ENTMCNC: 34.1 G/DL (ref 31.4–37.4)
MCHC RBC AUTO-ENTMCNC: 34.5 G/DL (ref 31.4–37.4)
MCV RBC AUTO: 92 FL (ref 82–98)
MCV RBC AUTO: 93 FL (ref 82–98)
MCV RBC AUTO: 93 FL (ref 82–98)
MONOCYTES # BLD AUTO: 0.08 THOUSAND/ΜL (ref 0.17–1.22)
MONOCYTES # BLD AUTO: 0.08 THOUSAND/ΜL (ref 0.17–1.22)
MONOCYTES NFR BLD AUTO: 2 % (ref 4–12)
MONOCYTES NFR BLD AUTO: 2 % (ref 4–12)
NEUTROPHILS # BLD AUTO: 4.46 THOUSANDS/ΜL (ref 1.85–7.62)
NEUTROPHILS # BLD AUTO: 4.63 THOUSANDS/ΜL (ref 1.85–7.62)
NEUTS SEG NFR BLD AUTO: 91 % (ref 43–75)
NEUTS SEG NFR BLD AUTO: 94 % (ref 43–75)
NRBC BLD AUTO-RTO: 0 /100 WBCS
NRBC BLD AUTO-RTO: 0 /100 WBCS
O2 CT BLDA-SCNC: 18 ML/DL (ref 16–23)
OXYHGB MFR BLDA: 97.2 % (ref 94–97)
PCO2 BLD: 25 MMOL/L (ref 21–32)
PCO2 BLD: 42.2 MM HG (ref 36–44)
PCO2 BLDA: 34.9 MM HG (ref 36–44)
PEEP RESPIRATORY: 6 CM[H2O]
PH BLD: 7.35 [PH] (ref 7.35–7.45)
PH BLDA: 7.36 [PH] (ref 7.35–7.45)
PHOSPHATE SERPL-MCNC: 2.5 MG/DL (ref 2.3–4.1)
PHOSPHATE SERPL-MCNC: 2.6 MG/DL (ref 2.3–4.1)
PLATELET # BLD AUTO: 103 THOUSANDS/UL (ref 149–390)
PLATELET # BLD AUTO: 97 THOUSANDS/UL (ref 149–390)
PLATELET # BLD AUTO: 98 THOUSANDS/UL (ref 149–390)
PMV BLD AUTO: 10.8 FL (ref 8.9–12.7)
PMV BLD AUTO: 10.9 FL (ref 8.9–12.7)
PMV BLD AUTO: 10.9 FL (ref 8.9–12.7)
PO2 BLD: 388 MM HG (ref 75–129)
PO2 BLDA: 141.3 MM HG (ref 75–129)
POTASSIUM BLD-SCNC: 3.4 MMOL/L (ref 3.5–5.3)
POTASSIUM SERPL-SCNC: 3.3 MMOL/L (ref 3.5–5.3)
POTASSIUM SERPL-SCNC: 3.6 MMOL/L (ref 3.5–5.3)
POTASSIUM SERPL-SCNC: 3.7 MMOL/L (ref 3.5–5.3)
PROT SERPL-MCNC: 7.2 G/DL (ref 6.4–8.2)
PROTHROMBIN TIME: 15.6 SECONDS (ref 11.6–14.5)
PROTHROMBIN TIME: 15.7 SECONDS (ref 11.6–14.5)
PROTHROMBIN TIME: 15.8 SECONDS (ref 11.6–14.5)
RBC # BLD AUTO: 3.84 MILLION/UL (ref 3.88–5.62)
RBC # BLD AUTO: 3.93 MILLION/UL (ref 3.88–5.62)
RBC # BLD AUTO: 3.96 MILLION/UL (ref 3.88–5.62)
RH BLD: POSITIVE
SAO2 % BLD FROM PO2: 100 % (ref 60–85)
SODIUM BLD-SCNC: 141 MMOL/L (ref 136–145)
SODIUM SERPL-SCNC: 139 MMOL/L (ref 136–145)
SODIUM SERPL-SCNC: 140 MMOL/L (ref 136–145)
SODIUM SERPL-SCNC: 141 MMOL/L (ref 136–145)
SPECIMEN EXPIRATION DATE: NORMAL
SPECIMEN SOURCE: ABNORMAL
SPECIMEN SOURCE: ABNORMAL
UNIT DISPENSE STATUS: NORMAL
UNIT DISPENSE STATUS: NORMAL
UNIT PRODUCT CODE: NORMAL
UNIT PRODUCT CODE: NORMAL
UNIT PRODUCT VOLUME: 350 ML
UNIT PRODUCT VOLUME: 350 ML
UNIT RH: NORMAL
UNIT RH: NORMAL
VENT AC: 12
VENT- AC: AC
VT SETTING VENT: 450 ML
WBC # BLD AUTO: 4.89 THOUSAND/UL (ref 4.31–10.16)
WBC # BLD AUTO: 4.92 THOUSAND/UL (ref 4.31–10.16)
WBC # BLD AUTO: 7.61 THOUSAND/UL (ref 4.31–10.16)

## 2021-11-17 PROCEDURE — 85397 CLOTTING FUNCT ACTIVITY: CPT | Performed by: ANESTHESIOLOGY

## 2021-11-17 PROCEDURE — 70450 CT HEAD/BRAIN W/O DYE: CPT

## 2021-11-17 PROCEDURE — 86900 BLOOD TYPING SEROLOGIC ABO: CPT | Performed by: REGISTERED NURSE

## 2021-11-17 PROCEDURE — 83735 ASSAY OF MAGNESIUM: CPT | Performed by: REGISTERED NURSE

## 2021-11-17 PROCEDURE — G1004 CDSM NDSC: HCPCS

## 2021-11-17 PROCEDURE — NC001 PR NO CHARGE: Performed by: EMERGENCY MEDICINE

## 2021-11-17 PROCEDURE — 82947 ASSAY GLUCOSE BLOOD QUANT: CPT

## 2021-11-17 PROCEDURE — 84100 ASSAY OF PHOSPHORUS: CPT | Performed by: REGISTERED NURSE

## 2021-11-17 PROCEDURE — 85014 HEMATOCRIT: CPT

## 2021-11-17 PROCEDURE — 85576 BLOOD PLATELET AGGREGATION: CPT | Performed by: ANESTHESIOLOGY

## 2021-11-17 PROCEDURE — 84295 ASSAY OF SERUM SODIUM: CPT

## 2021-11-17 PROCEDURE — 85730 THROMBOPLASTIN TIME PARTIAL: CPT | Performed by: PHYSICIAN ASSISTANT

## 2021-11-17 PROCEDURE — 86901 BLOOD TYPING SEROLOGIC RH(D): CPT | Performed by: REGISTERED NURSE

## 2021-11-17 PROCEDURE — 0BH17EZ INSERTION OF ENDOTRACHEAL AIRWAY INTO TRACHEA, VIA NATURAL OR ARTIFICIAL OPENING: ICD-10-PCS | Performed by: NEUROLOGICAL SURGERY

## 2021-11-17 PROCEDURE — 99255 IP/OBS CONSLTJ NEW/EST HI 80: CPT | Performed by: INTERNAL MEDICINE

## 2021-11-17 PROCEDURE — 94760 N-INVAS EAR/PLS OXIMETRY 1: CPT

## 2021-11-17 PROCEDURE — 85610 PROTHROMBIN TIME: CPT | Performed by: PHYSICIAN ASSISTANT

## 2021-11-17 PROCEDURE — 87081 CULTURE SCREEN ONLY: CPT | Performed by: STUDENT IN AN ORGANIZED HEALTH CARE EDUCATION/TRAINING PROGRAM

## 2021-11-17 PROCEDURE — 009300Z DRAINAGE OF INTRACRANIAL EPIDURAL SPACE WITH DRAINAGE DEVICE, OPEN APPROACH: ICD-10-PCS | Performed by: NEUROLOGICAL SURGERY

## 2021-11-17 PROCEDURE — C9113 INJ PANTOPRAZOLE SODIUM, VIA: HCPCS | Performed by: FAMILY MEDICINE

## 2021-11-17 PROCEDURE — 71045 X-RAY EXAM CHEST 1 VIEW: CPT

## 2021-11-17 PROCEDURE — 99291 CRITICAL CARE FIRST HOUR: CPT | Performed by: STUDENT IN AN ORGANIZED HEALTH CARE EDUCATION/TRAINING PROGRAM

## 2021-11-17 PROCEDURE — 85025 COMPLETE CBC W/AUTO DIFF WBC: CPT | Performed by: REGISTERED NURSE

## 2021-11-17 PROCEDURE — 86850 RBC ANTIBODY SCREEN: CPT | Performed by: REGISTERED NURSE

## 2021-11-17 PROCEDURE — 85347 COAGULATION TIME ACTIVATED: CPT | Performed by: ANESTHESIOLOGY

## 2021-11-17 PROCEDURE — 85730 THROMBOPLASTIN TIME PARTIAL: CPT | Performed by: REGISTERED NURSE

## 2021-11-17 PROCEDURE — 82803 BLOOD GASES ANY COMBINATION: CPT

## 2021-11-17 PROCEDURE — 92610 EVALUATE SWALLOWING FUNCTION: CPT

## 2021-11-17 PROCEDURE — 99024 POSTOP FOLLOW-UP VISIT: CPT | Performed by: NEUROLOGICAL SURGERY

## 2021-11-17 PROCEDURE — 82330 ASSAY OF CALCIUM: CPT | Performed by: REGISTERED NURSE

## 2021-11-17 PROCEDURE — 86923 COMPATIBILITY TEST ELECTRIC: CPT

## 2021-11-17 PROCEDURE — 85384 FIBRINOGEN ACTIVITY: CPT | Performed by: ANESTHESIOLOGY

## 2021-11-17 PROCEDURE — 82948 REAGENT STRIP/BLOOD GLUCOSE: CPT

## 2021-11-17 PROCEDURE — NC001 PR NO CHARGE: Performed by: PHYSICIAN ASSISTANT

## 2021-11-17 PROCEDURE — 61312 CRNEC/CRNOT STTL XDRL/SDRL: CPT | Performed by: NEUROLOGICAL SURGERY

## 2021-11-17 PROCEDURE — 80048 BASIC METABOLIC PNL TOTAL CA: CPT | Performed by: REGISTERED NURSE

## 2021-11-17 PROCEDURE — 80053 COMPREHEN METABOLIC PANEL: CPT | Performed by: REGISTERED NURSE

## 2021-11-17 PROCEDURE — 84132 ASSAY OF SERUM POTASSIUM: CPT

## 2021-11-17 PROCEDURE — 82805 BLOOD GASES W/O2 SATURATION: CPT | Performed by: REGISTERED NURSE

## 2021-11-17 PROCEDURE — 85610 PROTHROMBIN TIME: CPT | Performed by: REGISTERED NURSE

## 2021-11-17 PROCEDURE — 85027 COMPLETE CBC AUTOMATED: CPT | Performed by: PHYSICIAN ASSISTANT

## 2021-11-17 PROCEDURE — 82330 ASSAY OF CALCIUM: CPT

## 2021-11-17 PROCEDURE — 94002 VENT MGMT INPAT INIT DAY: CPT

## 2021-11-17 PROCEDURE — 5A1935Z RESPIRATORY VENTILATION, LESS THAN 24 CONSECUTIVE HOURS: ICD-10-PCS | Performed by: NEUROLOGICAL SURGERY

## 2021-11-17 RX ORDER — MAGNESIUM HYDROXIDE 1200 MG/15ML
LIQUID ORAL AS NEEDED
Status: DISCONTINUED | OUTPATIENT
Start: 2021-11-17 | End: 2021-11-17 | Stop reason: HOSPADM

## 2021-11-17 RX ORDER — LIDOCAINE HYDROCHLORIDE 10 MG/ML
INJECTION, SOLUTION EPIDURAL; INFILTRATION; INTRACAUDAL; PERINEURAL AS NEEDED
Status: DISCONTINUED | OUTPATIENT
Start: 2021-11-17 | End: 2021-11-17

## 2021-11-17 RX ORDER — ONDANSETRON 2 MG/ML
INJECTION INTRAMUSCULAR; INTRAVENOUS AS NEEDED
Status: DISCONTINUED | OUTPATIENT
Start: 2021-11-17 | End: 2021-11-17

## 2021-11-17 RX ORDER — LABETALOL 20 MG/4 ML (5 MG/ML) INTRAVENOUS SYRINGE
10 EVERY 4 HOURS PRN
Status: DISCONTINUED | OUTPATIENT
Start: 2021-11-17 | End: 2021-11-23 | Stop reason: HOSPADM

## 2021-11-17 RX ORDER — CEFAZOLIN SODIUM 1 G/3ML
INJECTION, POWDER, FOR SOLUTION INTRAMUSCULAR; INTRAVENOUS AS NEEDED
Status: DISCONTINUED | OUTPATIENT
Start: 2021-11-17 | End: 2021-11-17

## 2021-11-17 RX ORDER — PROPOFOL 10 MG/ML
INJECTION, EMULSION INTRAVENOUS CONTINUOUS PRN
Status: DISCONTINUED | OUTPATIENT
Start: 2021-11-17 | End: 2021-11-17

## 2021-11-17 RX ORDER — PROPOFOL 10 MG/ML
INJECTION, EMULSION INTRAVENOUS AS NEEDED
Status: DISCONTINUED | OUTPATIENT
Start: 2021-11-17 | End: 2021-11-17

## 2021-11-17 RX ORDER — POTASSIUM CHLORIDE 20MEQ/15ML
40 LIQUID (ML) ORAL ONCE
Status: COMPLETED | OUTPATIENT
Start: 2021-11-17 | End: 2021-11-17

## 2021-11-17 RX ORDER — CHLORHEXIDINE GLUCONATE 0.12 MG/ML
15 RINSE ORAL EVERY 12 HOURS SCHEDULED
Status: DISCONTINUED | OUTPATIENT
Start: 2021-11-17 | End: 2021-11-18

## 2021-11-17 RX ORDER — PROPOFOL 10 MG/ML
5-50 INJECTION, EMULSION INTRAVENOUS
Status: DISCONTINUED | OUTPATIENT
Start: 2021-11-17 | End: 2021-11-17

## 2021-11-17 RX ORDER — GINSENG 100 MG
CAPSULE ORAL AS NEEDED
Status: DISCONTINUED | OUTPATIENT
Start: 2021-11-17 | End: 2021-11-17 | Stop reason: HOSPADM

## 2021-11-17 RX ORDER — ROCURONIUM BROMIDE 10 MG/ML
INJECTION, SOLUTION INTRAVENOUS AS NEEDED
Status: DISCONTINUED | OUTPATIENT
Start: 2021-11-17 | End: 2021-11-17

## 2021-11-17 RX ORDER — HYDROMORPHONE HCL/PF 1 MG/ML
0.5 SYRINGE (ML) INJECTION EVERY 2 HOUR PRN
Status: DISCONTINUED | OUTPATIENT
Start: 2021-11-17 | End: 2021-11-19

## 2021-11-17 RX ORDER — PANTOPRAZOLE SODIUM 40 MG/1
40 INJECTION, POWDER, FOR SOLUTION INTRAVENOUS DAILY
Status: DISCONTINUED | OUTPATIENT
Start: 2021-11-17 | End: 2021-11-17

## 2021-11-17 RX ORDER — FENTANYL CITRATE 50 UG/ML
50 INJECTION, SOLUTION INTRAMUSCULAR; INTRAVENOUS
Status: DISCONTINUED | OUTPATIENT
Start: 2021-11-17 | End: 2021-11-17

## 2021-11-17 RX ORDER — HYDRALAZINE HYDROCHLORIDE 20 MG/ML
5 INJECTION INTRAMUSCULAR; INTRAVENOUS EVERY 6 HOURS PRN
Status: DISCONTINUED | OUTPATIENT
Start: 2021-11-17 | End: 2021-11-23 | Stop reason: HOSPADM

## 2021-11-17 RX ORDER — FENTANYL CITRATE 50 UG/ML
INJECTION, SOLUTION INTRAMUSCULAR; INTRAVENOUS AS NEEDED
Status: DISCONTINUED | OUTPATIENT
Start: 2021-11-17 | End: 2021-11-17

## 2021-11-17 RX ORDER — METRONIDAZOLE 500 MG/1
500 TABLET ORAL EVERY 8 HOURS SCHEDULED
Status: DISCONTINUED | OUTPATIENT
Start: 2021-11-17 | End: 2021-11-18

## 2021-11-17 RX ORDER — SUCCINYLCHOLINE/SOD CL,ISO/PF 100 MG/5ML
SYRINGE (ML) INTRAVENOUS AS NEEDED
Status: DISCONTINUED | OUTPATIENT
Start: 2021-11-17 | End: 2021-11-17

## 2021-11-17 RX ADMIN — OXYCODONE HYDROCHLORIDE 10 MG: 10 TABLET ORAL at 19:09

## 2021-11-17 RX ADMIN — DOCUSATE SODIUM AND SENNOSIDES 2 TABLET: 8.6; 5 TABLET ORAL at 08:37

## 2021-11-17 RX ADMIN — LEVETIRACETAM 750 MG: 750 TABLET, FILM COATED ORAL at 21:46

## 2021-11-17 RX ADMIN — METOPROLOL TARTRATE 25 MG: 25 TABLET, FILM COATED ORAL at 09:34

## 2021-11-17 RX ADMIN — VANCOMYCIN HYDROCHLORIDE 1000 MG: 1 INJECTION, SOLUTION INTRAVENOUS at 04:23

## 2021-11-17 RX ADMIN — ROCURONIUM BROMIDE 20 MG: 50 INJECTION, SOLUTION INTRAVENOUS at 00:40

## 2021-11-17 RX ADMIN — LISINOPRIL 20 MG: 20 TABLET ORAL at 09:34

## 2021-11-17 RX ADMIN — CEFEPIME HYDROCHLORIDE 2000 MG: 2 INJECTION, POWDER, FOR SOLUTION INTRAVENOUS at 03:30

## 2021-11-17 RX ADMIN — FENTANYL CITRATE 50 MCG: 50 INJECTION INTRAMUSCULAR; INTRAVENOUS at 05:45

## 2021-11-17 RX ADMIN — CHLORHEXIDINE GLUCONATE 15 ML: 1.2 SOLUTION ORAL at 08:37

## 2021-11-17 RX ADMIN — CEFAZOLIN 2000 MG: 1 INJECTION, POWDER, FOR SOLUTION INTRAMUSCULAR; INTRAVENOUS at 00:40

## 2021-11-17 RX ADMIN — METHOCARBAMOL 500 MG: 500 TABLET, FILM COATED ORAL at 05:47

## 2021-11-17 RX ADMIN — POTASSIUM CHLORIDE 40 MEQ: 20 SOLUTION ORAL at 05:46

## 2021-11-17 RX ADMIN — LEVETIRACETAM 750 MG: 750 TABLET, FILM COATED ORAL at 08:37

## 2021-11-17 RX ADMIN — METOPROLOL TARTRATE 25 MG: 25 TABLET, FILM COATED ORAL at 21:46

## 2021-11-17 RX ADMIN — FENTANYL CITRATE 50 MCG: 50 INJECTION INTRAMUSCULAR; INTRAVENOUS at 07:47

## 2021-11-17 RX ADMIN — LIDOCAINE HYDROCHLORIDE 50 MG: 10 INJECTION, SOLUTION EPIDURAL; INFILTRATION; INTRACAUDAL; PERINEURAL at 00:24

## 2021-11-17 RX ADMIN — ONDANSETRON 4 MG: 2 INJECTION INTRAMUSCULAR; INTRAVENOUS at 01:51

## 2021-11-17 RX ADMIN — ACETAMINOPHEN 975 MG: 325 TABLET, FILM COATED ORAL at 13:20

## 2021-11-17 RX ADMIN — NICARDIPINE HYDROCHLORIDE 2.55 MG/HR: 2.5 INJECTION, SOLUTION INTRAVENOUS at 02:23

## 2021-11-17 RX ADMIN — OXYCODONE HYDROCHLORIDE 10 MG: 10 TABLET ORAL at 13:20

## 2021-11-17 RX ADMIN — VANCOMYCIN HYDROCHLORIDE 1000 MG: 1 INJECTION, SOLUTION INTRAVENOUS at 19:54

## 2021-11-17 RX ADMIN — CEFEPIME HYDROCHLORIDE 2000 MG: 2 INJECTION, POWDER, FOR SOLUTION INTRAVENOUS at 11:14

## 2021-11-17 RX ADMIN — PROPOFOL 150 MG: 10 INJECTION, EMULSION INTRAVENOUS at 00:24

## 2021-11-17 RX ADMIN — FENTANYL CITRATE 50 MCG: 50 INJECTION INTRAMUSCULAR; INTRAVENOUS at 16:19

## 2021-11-17 RX ADMIN — PROPOFOL 50 MCG/KG/MIN: 10 INJECTION, EMULSION INTRAVENOUS at 05:46

## 2021-11-17 RX ADMIN — HYDROMORPHONE HYDROCHLORIDE 0.5 MG: 1 INJECTION, SOLUTION INTRAMUSCULAR; INTRAVENOUS; SUBCUTANEOUS at 21:48

## 2021-11-17 RX ADMIN — FENTANYL CITRATE 50 MCG: 50 INJECTION INTRAMUSCULAR; INTRAVENOUS at 03:19

## 2021-11-17 RX ADMIN — PROPOFOL 50 MCG/KG/MIN: 10 INJECTION, EMULSION INTRAVENOUS at 02:23

## 2021-11-17 RX ADMIN — METRONIDAZOLE 500 MG: 500 TABLET ORAL at 21:46

## 2021-11-17 RX ADMIN — OXYCODONE HYDROCHLORIDE 10 MG: 10 TABLET ORAL at 05:45

## 2021-11-17 RX ADMIN — CEFEPIME HYDROCHLORIDE 2000 MG: 2 INJECTION, POWDER, FOR SOLUTION INTRAVENOUS at 22:55

## 2021-11-17 RX ADMIN — VANCOMYCIN HYDROCHLORIDE 1000 MG: 1 INJECTION, SOLUTION INTRAVENOUS at 12:15

## 2021-11-17 RX ADMIN — METRONIDAZOLE 500 MG: 500 INJECTION, SOLUTION INTRAVENOUS at 08:37

## 2021-11-17 RX ADMIN — ACETAMINOPHEN 975 MG: 325 TABLET, FILM COATED ORAL at 21:45

## 2021-11-17 RX ADMIN — ROCURONIUM BROMIDE 30 MG: 50 INJECTION, SOLUTION INTRAVENOUS at 01:13

## 2021-11-17 RX ADMIN — METRONIDAZOLE 500 MG: 500 TABLET ORAL at 16:31

## 2021-11-17 RX ADMIN — LEVETIRACETAM 1000 MG: 100 INJECTION, SOLUTION INTRAVENOUS at 00:45

## 2021-11-17 RX ADMIN — Medication 100 MG: at 00:25

## 2021-11-17 RX ADMIN — PANTOPRAZOLE SODIUM 40 MG: 40 INJECTION, POWDER, FOR SOLUTION INTRAVENOUS at 08:37

## 2021-11-17 RX ADMIN — FENTANYL CITRATE 50 MCG: 50 INJECTION INTRAMUSCULAR; INTRAVENOUS at 00:24

## 2021-11-17 RX ADMIN — FENTANYL CITRATE 50 MCG: 50 INJECTION INTRAMUSCULAR; INTRAVENOUS at 00:56

## 2021-11-17 RX ADMIN — METHOCARBAMOL 500 MG: 500 TABLET, FILM COATED ORAL at 18:04

## 2021-11-17 RX ADMIN — FENTANYL CITRATE 50 MCG: 50 INJECTION INTRAMUSCULAR; INTRAVENOUS at 04:41

## 2021-11-17 RX ADMIN — ACETAMINOPHEN 975 MG: 325 TABLET, FILM COATED ORAL at 05:45

## 2021-11-17 RX ADMIN — FENTANYL CITRATE 50 MCG: 50 INJECTION INTRAMUSCULAR; INTRAVENOUS at 12:11

## 2021-11-18 LAB
AAASAAGGREGATION: 6.7 % (ref 89–100)
AAASAINHIBITION: 93.3 % (ref 0–11)
AAMA (MAX AMPLITUDE AA): 17.2 MM (ref 51–71)
ABO GROUP BLD BPU: NORMAL
ACTFMA(MAX AMPLITUDE ACTF): 14.2 MM (ref 2–19)
ADPADPAGGREGATION: 14.5 % (ref 83–100)
ADPADPINHIBITION: 85.5 % (ref 0–17)
ADPMA(MAX AMPLITUDE ADP): 20.7 MM (ref 45–69)
ANION GAP SERPL CALCULATED.3IONS-SCNC: 6 MMOL/L (ref 4–13)
APTT PPP: 28 SECONDS (ref 23–37)
BACTERIA SPEC ANAEROBE CULT: NORMAL
BASOPHILS # BLD AUTO: 0.02 THOUSANDS/ΜL (ref 0–0.1)
BASOPHILS NFR BLD AUTO: 0 % (ref 0–1)
BPU ID: NORMAL
BUN SERPL-MCNC: 10 MG/DL (ref 5–25)
CALCIUM SERPL-MCNC: 8.3 MG/DL (ref 8.3–10.1)
CFFMA (FUNCTIONAL FIBRINOGEN MAX AMPLITUDE): 19.4 MM (ref 15–32)
CHLORIDE SERPL-SCNC: 111 MMOL/L (ref 100–108)
CKLY30: 0.7 % (ref 0–2.6)
CKR(REACTION TIME): 6.9 MIN (ref 4.6–9.1)
CO2 SERPL-SCNC: 24 MMOL/L (ref 21–32)
CREAT SERPL-MCNC: 0.68 MG/DL (ref 0.6–1.3)
CROSSMATCH: NORMAL
CRTMA(RAPIDTEG MAX AMPLITUDE): 57.2 MM (ref 52–70)
EOSINOPHIL # BLD AUTO: 0.04 THOUSAND/ΜL (ref 0–0.61)
EOSINOPHIL NFR BLD AUTO: 1 % (ref 0–6)
ERYTHROCYTE [DISTWIDTH] IN BLOOD BY AUTOMATED COUNT: 13.8 % (ref 11.6–15.1)
GFR SERPL CREATININE-BSD FRML MDRD: 104 ML/MIN/1.73SQ M
GLUCOSE SERPL-MCNC: 101 MG/DL (ref 65–140)
GLUCOSE SERPL-MCNC: 105 MG/DL (ref 65–140)
GLUCOSE SERPL-MCNC: 110 MG/DL (ref 65–140)
GLUCOSE SERPL-MCNC: 112 MG/DL (ref 65–140)
GLUCOSE SERPL-MCNC: 119 MG/DL (ref 65–140)
GLUCOSE SERPL-MCNC: 120 MG/DL (ref 65–140)
HCT VFR BLD AUTO: 35.4 % (ref 36.5–49.3)
HGB BLD-MCNC: 12.1 G/DL (ref 12–17)
HKHMA(MAX AMPLITUDE KAOLIN): 59.1 MM (ref 53–68)
IMM GRANULOCYTES # BLD AUTO: 0.05 THOUSAND/UL (ref 0–0.2)
IMM GRANULOCYTES NFR BLD AUTO: 1 % (ref 0–2)
INR PPP: 1.21 (ref 0.84–1.19)
LYMPHOCYTES # BLD AUTO: 1.35 THOUSANDS/ΜL (ref 0.6–4.47)
LYMPHOCYTES NFR BLD AUTO: 16 % (ref 14–44)
MAGNESIUM SERPL-MCNC: 2 MG/DL (ref 1.6–2.6)
MCH RBC QN AUTO: 32.7 PG (ref 26.8–34.3)
MCHC RBC AUTO-ENTMCNC: 34.2 G/DL (ref 31.4–37.4)
MCV RBC AUTO: 96 FL (ref 82–98)
MONOCYTES # BLD AUTO: 0.55 THOUSAND/ΜL (ref 0.17–1.22)
MONOCYTES NFR BLD AUTO: 6 % (ref 4–12)
MRSA NOSE QL CULT: NORMAL
NEUTROPHILS # BLD AUTO: 6.64 THOUSANDS/ΜL (ref 1.85–7.62)
NEUTS SEG NFR BLD AUTO: 76 % (ref 43–75)
NRBC BLD AUTO-RTO: 0 /100 WBCS
PHOSPHATE SERPL-MCNC: 2.1 MG/DL (ref 2.3–4.1)
PLATELET # BLD AUTO: 109 THOUSANDS/UL (ref 149–390)
PMV BLD AUTO: 11.4 FL (ref 8.9–12.7)
POTASSIUM SERPL-SCNC: 3.5 MMOL/L (ref 3.5–5.3)
PROTHROMBIN TIME: 14.8 SECONDS (ref 11.6–14.5)
RBC # BLD AUTO: 3.7 MILLION/UL (ref 3.88–5.62)
SODIUM SERPL-SCNC: 141 MMOL/L (ref 136–145)
UNIT DISPENSE STATUS: NORMAL
UNIT PRODUCT CODE: NORMAL
UNIT PRODUCT VOLUME: 350 ML
UNIT RH: NORMAL
VANCOMYCIN TROUGH SERPL-MCNC: 15 UG/ML (ref 10–20)
WBC # BLD AUTO: 8.65 THOUSAND/UL (ref 4.31–10.16)

## 2021-11-18 PROCEDURE — 99233 SBSQ HOSP IP/OBS HIGH 50: CPT | Performed by: EMERGENCY MEDICINE

## 2021-11-18 PROCEDURE — 83735 ASSAY OF MAGNESIUM: CPT | Performed by: EMERGENCY MEDICINE

## 2021-11-18 PROCEDURE — 80048 BASIC METABOLIC PNL TOTAL CA: CPT | Performed by: EMERGENCY MEDICINE

## 2021-11-18 PROCEDURE — 99024 POSTOP FOLLOW-UP VISIT: CPT | Performed by: NEUROLOGICAL SURGERY

## 2021-11-18 PROCEDURE — 80202 ASSAY OF VANCOMYCIN: CPT | Performed by: EMERGENCY MEDICINE

## 2021-11-18 PROCEDURE — 82948 REAGENT STRIP/BLOOD GLUCOSE: CPT

## 2021-11-18 PROCEDURE — 85397 CLOTTING FUNCT ACTIVITY: CPT | Performed by: FAMILY MEDICINE

## 2021-11-18 PROCEDURE — 85025 COMPLETE CBC W/AUTO DIFF WBC: CPT | Performed by: EMERGENCY MEDICINE

## 2021-11-18 PROCEDURE — 85610 PROTHROMBIN TIME: CPT | Performed by: EMERGENCY MEDICINE

## 2021-11-18 PROCEDURE — 97167 OT EVAL HIGH COMPLEX 60 MIN: CPT

## 2021-11-18 PROCEDURE — 85384 FIBRINOGEN ACTIVITY: CPT | Performed by: FAMILY MEDICINE

## 2021-11-18 PROCEDURE — 85347 COAGULATION TIME ACTIVATED: CPT | Performed by: FAMILY MEDICINE

## 2021-11-18 PROCEDURE — 85576 BLOOD PLATELET AGGREGATION: CPT | Performed by: FAMILY MEDICINE

## 2021-11-18 PROCEDURE — 99233 SBSQ HOSP IP/OBS HIGH 50: CPT | Performed by: INTERNAL MEDICINE

## 2021-11-18 PROCEDURE — 97163 PT EVAL HIGH COMPLEX 45 MIN: CPT

## 2021-11-18 PROCEDURE — 85730 THROMBOPLASTIN TIME PARTIAL: CPT | Performed by: EMERGENCY MEDICINE

## 2021-11-18 PROCEDURE — 84100 ASSAY OF PHOSPHORUS: CPT | Performed by: EMERGENCY MEDICINE

## 2021-11-18 RX ORDER — PANTOPRAZOLE SODIUM 20 MG/1
20 TABLET, DELAYED RELEASE ORAL
Status: DISCONTINUED | OUTPATIENT
Start: 2021-11-18 | End: 2021-11-23 | Stop reason: HOSPADM

## 2021-11-18 RX ORDER — VANCOMYCIN HYDROCHLORIDE 500 MG/100ML
500 INJECTION, SOLUTION INTRAVENOUS ONCE
Status: DISCONTINUED | OUTPATIENT
Start: 2021-11-18 | End: 2021-11-18

## 2021-11-18 RX ORDER — POLYETHYLENE GLYCOL 3350 17 G/17G
17 POWDER, FOR SOLUTION ORAL DAILY
Status: DISCONTINUED | OUTPATIENT
Start: 2021-11-18 | End: 2021-11-23 | Stop reason: HOSPADM

## 2021-11-18 RX ADMIN — Medication 1500 MG: at 18:12

## 2021-11-18 RX ADMIN — ONDANSETRON 4 MG: 2 INJECTION INTRAMUSCULAR; INTRAVENOUS at 16:50

## 2021-11-18 RX ADMIN — Medication 20 MG: at 09:54

## 2021-11-18 RX ADMIN — HYDRALAZINE HYDROCHLORIDE 5 MG: 20 INJECTION INTRAMUSCULAR; INTRAVENOUS at 16:06

## 2021-11-18 RX ADMIN — ACETAMINOPHEN 975 MG: 325 TABLET, FILM COATED ORAL at 05:30

## 2021-11-18 RX ADMIN — VANCOMYCIN HYDROCHLORIDE 1000 MG: 1 INJECTION, SOLUTION INTRAVENOUS at 13:15

## 2021-11-18 RX ADMIN — METOPROLOL TARTRATE 25 MG: 25 TABLET, FILM COATED ORAL at 20:31

## 2021-11-18 RX ADMIN — HYDROMORPHONE HYDROCHLORIDE 0.5 MG: 1 INJECTION, SOLUTION INTRAMUSCULAR; INTRAVENOUS; SUBCUTANEOUS at 07:49

## 2021-11-18 RX ADMIN — HYDROMORPHONE HYDROCHLORIDE 0.5 MG: 1 INJECTION, SOLUTION INTRAMUSCULAR; INTRAVENOUS; SUBCUTANEOUS at 20:34

## 2021-11-18 RX ADMIN — VANCOMYCIN HYDROCHLORIDE 1000 MG: 1 INJECTION, SOLUTION INTRAVENOUS at 06:37

## 2021-11-18 RX ADMIN — METRONIDAZOLE 500 MG: 500 TABLET ORAL at 05:31

## 2021-11-18 RX ADMIN — HYDROMORPHONE HYDROCHLORIDE 0.5 MG: 1 INJECTION, SOLUTION INTRAMUSCULAR; INTRAVENOUS; SUBCUTANEOUS at 16:07

## 2021-11-18 RX ADMIN — METOPROLOL TARTRATE 25 MG: 25 TABLET, FILM COATED ORAL at 09:53

## 2021-11-18 RX ADMIN — OXYCODONE HYDROCHLORIDE 10 MG: 10 TABLET ORAL at 22:11

## 2021-11-18 RX ADMIN — METHOCARBAMOL 500 MG: 500 TABLET, FILM COATED ORAL at 05:31

## 2021-11-18 RX ADMIN — ACETAMINOPHEN 975 MG: 325 TABLET, FILM COATED ORAL at 20:31

## 2021-11-18 RX ADMIN — METHOCARBAMOL 500 MG: 500 TABLET, FILM COATED ORAL at 00:39

## 2021-11-18 RX ADMIN — METHOCARBAMOL 500 MG: 500 TABLET, FILM COATED ORAL at 18:12

## 2021-11-18 RX ADMIN — METHOCARBAMOL 500 MG: 500 TABLET, FILM COATED ORAL at 11:44

## 2021-11-18 RX ADMIN — CEFEPIME HYDROCHLORIDE 2000 MG: 2 INJECTION, POWDER, FOR SOLUTION INTRAVENOUS at 11:44

## 2021-11-18 RX ADMIN — OXYCODONE HYDROCHLORIDE 10 MG: 10 TABLET ORAL at 14:14

## 2021-11-18 RX ADMIN — CEFEPIME HYDROCHLORIDE 2000 MG: 2 INJECTION, POWDER, FOR SOLUTION INTRAVENOUS at 04:00

## 2021-11-18 RX ADMIN — OXYCODONE HYDROCHLORIDE 10 MG: 10 TABLET ORAL at 10:17

## 2021-11-18 RX ADMIN — OXYCODONE HYDROCHLORIDE 10 MG: 10 TABLET ORAL at 18:11

## 2021-11-18 RX ADMIN — HYDROMORPHONE HYDROCHLORIDE 0.5 MG: 1 INJECTION, SOLUTION INTRAMUSCULAR; INTRAVENOUS; SUBCUTANEOUS at 12:37

## 2021-11-18 RX ADMIN — LEVETIRACETAM 750 MG: 750 TABLET, FILM COATED ORAL at 09:53

## 2021-11-18 RX ADMIN — OXYCODONE HYDROCHLORIDE 10 MG: 10 TABLET ORAL at 00:39

## 2021-11-18 RX ADMIN — ACETAMINOPHEN 975 MG: 325 TABLET, FILM COATED ORAL at 14:11

## 2021-11-18 RX ADMIN — LISINOPRIL 20 MG: 20 TABLET ORAL at 09:53

## 2021-11-18 RX ADMIN — LEVETIRACETAM 750 MG: 750 TABLET, FILM COATED ORAL at 20:30

## 2021-11-18 RX ADMIN — OXYCODONE HYDROCHLORIDE 10 MG: 10 TABLET ORAL at 05:31

## 2021-11-18 RX ADMIN — POTASSIUM PHOSPHATE, MONOBASIC POTASSIUM PHOSPHATE, DIBASIC 30 MMOL: 224; 236 INJECTION, SOLUTION, CONCENTRATE INTRAVENOUS at 09:56

## 2021-11-19 ENCOUNTER — APPOINTMENT (INPATIENT)
Dept: RADIOLOGY | Facility: HOSPITAL | Age: 61
DRG: 907 | End: 2021-11-19
Payer: COMMERCIAL

## 2021-11-19 PROBLEM — T84.7XXA INFECTION OF CRANIOTOMY PLATE (HCC): Status: ACTIVE | Noted: 2021-11-19

## 2021-11-19 PROBLEM — Z96.7 INFECTION OF CRANIOTOMY PLATE (HCC): Status: ACTIVE | Noted: 2021-11-19

## 2021-11-19 PROBLEM — S06.5X9A SDH (SUBDURAL HEMATOMA) (HCC): Status: RESOLVED | Noted: 2021-04-15 | Resolved: 2021-11-19

## 2021-11-19 PROBLEM — S06.5XAA SDH (SUBDURAL HEMATOMA): Status: RESOLVED | Noted: 2021-04-15 | Resolved: 2021-11-19

## 2021-11-19 LAB
ANION GAP SERPL CALCULATED.3IONS-SCNC: 6 MMOL/L (ref 4–13)
BACTERIA SPEC ANAEROBE CULT: NO GROWTH
BACTERIA TISS AEROBE CULT: ABNORMAL
BASOPHILS # BLD AUTO: 0.02 THOUSANDS/ΜL (ref 0–0.1)
BASOPHILS NFR BLD AUTO: 0 % (ref 0–1)
BUN SERPL-MCNC: 8 MG/DL (ref 5–25)
CALCIUM SERPL-MCNC: 8.1 MG/DL (ref 8.3–10.1)
CHLORIDE SERPL-SCNC: 112 MMOL/L (ref 100–108)
CO2 SERPL-SCNC: 23 MMOL/L (ref 21–32)
CREAT SERPL-MCNC: 0.54 MG/DL (ref 0.6–1.3)
EOSINOPHIL # BLD AUTO: 0.13 THOUSAND/ΜL (ref 0–0.61)
EOSINOPHIL NFR BLD AUTO: 2 % (ref 0–6)
ERYTHROCYTE [DISTWIDTH] IN BLOOD BY AUTOMATED COUNT: 13.4 % (ref 11.6–15.1)
GFR SERPL CREATININE-BSD FRML MDRD: 114 ML/MIN/1.73SQ M
GLUCOSE SERPL-MCNC: 111 MG/DL (ref 65–140)
GLUCOSE SERPL-MCNC: 118 MG/DL (ref 65–140)
GLUCOSE SERPL-MCNC: 124 MG/DL (ref 65–140)
GRAM STN SPEC: ABNORMAL
GRAM STN SPEC: ABNORMAL
HCT VFR BLD AUTO: 33.9 % (ref 36.5–49.3)
HGB BLD-MCNC: 11.5 G/DL (ref 12–17)
IMM GRANULOCYTES # BLD AUTO: 0.03 THOUSAND/UL (ref 0–0.2)
IMM GRANULOCYTES NFR BLD AUTO: 1 % (ref 0–2)
INR PPP: 1.2 (ref 0.84–1.19)
LYMPHOCYTES # BLD AUTO: 1.15 THOUSANDS/ΜL (ref 0.6–4.47)
LYMPHOCYTES NFR BLD AUTO: 19 % (ref 14–44)
MAGNESIUM SERPL-MCNC: 2.1 MG/DL (ref 1.6–2.6)
MCH RBC QN AUTO: 32.1 PG (ref 26.8–34.3)
MCHC RBC AUTO-ENTMCNC: 33.9 G/DL (ref 31.4–37.4)
MCV RBC AUTO: 95 FL (ref 82–98)
MONOCYTES # BLD AUTO: 0.5 THOUSAND/ΜL (ref 0.17–1.22)
MONOCYTES NFR BLD AUTO: 8 % (ref 4–12)
NEUTROPHILS # BLD AUTO: 4.34 THOUSANDS/ΜL (ref 1.85–7.62)
NEUTS SEG NFR BLD AUTO: 70 % (ref 43–75)
NRBC BLD AUTO-RTO: 0 /100 WBCS
PHOSPHATE SERPL-MCNC: 2.7 MG/DL (ref 2.3–4.1)
PLATELET # BLD AUTO: 103 THOUSANDS/UL (ref 149–390)
PMV BLD AUTO: 11 FL (ref 8.9–12.7)
POTASSIUM SERPL-SCNC: 3.5 MMOL/L (ref 3.5–5.3)
PROTHROMBIN TIME: 14.7 SECONDS (ref 11.6–14.5)
RBC # BLD AUTO: 3.58 MILLION/UL (ref 3.88–5.62)
SODIUM SERPL-SCNC: 141 MMOL/L (ref 136–145)
VANCOMYCIN TROUGH SERPL-MCNC: 19.5 UG/ML (ref 10–20)
WBC # BLD AUTO: 6.17 THOUSAND/UL (ref 4.31–10.16)

## 2021-11-19 PROCEDURE — 80202 ASSAY OF VANCOMYCIN: CPT | Performed by: INTERNAL MEDICINE

## 2021-11-19 PROCEDURE — 84100 ASSAY OF PHOSPHORUS: CPT | Performed by: EMERGENCY MEDICINE

## 2021-11-19 PROCEDURE — 82948 REAGENT STRIP/BLOOD GLUCOSE: CPT

## 2021-11-19 PROCEDURE — 05HY33Z INSERTION OF INFUSION DEVICE INTO UPPER VEIN, PERCUTANEOUS APPROACH: ICD-10-PCS | Performed by: NEUROLOGICAL SURGERY

## 2021-11-19 PROCEDURE — 99233 SBSQ HOSP IP/OBS HIGH 50: CPT | Performed by: EMERGENCY MEDICINE

## 2021-11-19 PROCEDURE — 99024 POSTOP FOLLOW-UP VISIT: CPT | Performed by: NEUROLOGICAL SURGERY

## 2021-11-19 PROCEDURE — 70450 CT HEAD/BRAIN W/O DYE: CPT

## 2021-11-19 PROCEDURE — 99233 SBSQ HOSP IP/OBS HIGH 50: CPT | Performed by: INTERNAL MEDICINE

## 2021-11-19 PROCEDURE — 85610 PROTHROMBIN TIME: CPT | Performed by: EMERGENCY MEDICINE

## 2021-11-19 PROCEDURE — 83735 ASSAY OF MAGNESIUM: CPT | Performed by: EMERGENCY MEDICINE

## 2021-11-19 PROCEDURE — G1004 CDSM NDSC: HCPCS

## 2021-11-19 PROCEDURE — C1751 CATH, INF, PER/CENT/MIDLINE: HCPCS

## 2021-11-19 PROCEDURE — 36569 INSJ PICC 5 YR+ W/O IMAGING: CPT

## 2021-11-19 PROCEDURE — 80048 BASIC METABOLIC PNL TOTAL CA: CPT | Performed by: EMERGENCY MEDICINE

## 2021-11-19 PROCEDURE — 85025 COMPLETE CBC W/AUTO DIFF WBC: CPT | Performed by: EMERGENCY MEDICINE

## 2021-11-19 RX ADMIN — Medication 1500 MG: at 02:12

## 2021-11-19 RX ADMIN — METOPROLOL TARTRATE 25 MG: 25 TABLET, FILM COATED ORAL at 08:02

## 2021-11-19 RX ADMIN — METOPROLOL TARTRATE 25 MG: 25 TABLET, FILM COATED ORAL at 20:19

## 2021-11-19 RX ADMIN — OXYCODONE HYDROCHLORIDE 10 MG: 10 TABLET ORAL at 20:42

## 2021-11-19 RX ADMIN — ONDANSETRON 4 MG: 2 INJECTION INTRAMUSCULAR; INTRAVENOUS at 19:15

## 2021-11-19 RX ADMIN — ACETAMINOPHEN 975 MG: 325 TABLET, FILM COATED ORAL at 22:34

## 2021-11-19 RX ADMIN — HYDROMORPHONE HYDROCHLORIDE 0.5 MG: 1 INJECTION, SOLUTION INTRAMUSCULAR; INTRAVENOUS; SUBCUTANEOUS at 09:43

## 2021-11-19 RX ADMIN — Medication 1500 MG: at 17:38

## 2021-11-19 RX ADMIN — METHOCARBAMOL 500 MG: 500 TABLET, FILM COATED ORAL at 17:38

## 2021-11-19 RX ADMIN — LEVETIRACETAM 750 MG: 750 TABLET, FILM COATED ORAL at 20:18

## 2021-11-19 RX ADMIN — METHOCARBAMOL 500 MG: 500 TABLET, FILM COATED ORAL at 14:38

## 2021-11-19 RX ADMIN — OXYCODONE HYDROCHLORIDE 10 MG: 10 TABLET ORAL at 11:03

## 2021-11-19 RX ADMIN — ONDANSETRON 4 MG: 2 INJECTION INTRAMUSCULAR; INTRAVENOUS at 07:59

## 2021-11-19 RX ADMIN — OXYCODONE HYDROCHLORIDE 10 MG: 10 TABLET ORAL at 06:30

## 2021-11-19 RX ADMIN — PANTOPRAZOLE SODIUM 20 MG: 20 TABLET, DELAYED RELEASE ORAL at 06:30

## 2021-11-19 RX ADMIN — HYDROMORPHONE HYDROCHLORIDE 0.5 MG: 1 INJECTION, SOLUTION INTRAMUSCULAR; INTRAVENOUS; SUBCUTANEOUS at 04:31

## 2021-11-19 RX ADMIN — Medication 1500 MG: at 09:43

## 2021-11-19 RX ADMIN — ONDANSETRON 4 MG: 2 INJECTION INTRAMUSCULAR; INTRAVENOUS at 14:44

## 2021-11-19 RX ADMIN — LISINOPRIL 20 MG: 20 TABLET ORAL at 08:02

## 2021-11-19 RX ADMIN — ACETAMINOPHEN 975 MG: 325 TABLET, FILM COATED ORAL at 06:29

## 2021-11-19 RX ADMIN — OXYCODONE HYDROCHLORIDE 10 MG: 10 TABLET ORAL at 16:03

## 2021-11-19 RX ADMIN — ACETAMINOPHEN 975 MG: 325 TABLET, FILM COATED ORAL at 14:39

## 2021-11-19 RX ADMIN — LEVETIRACETAM 750 MG: 750 TABLET, FILM COATED ORAL at 08:02

## 2021-11-19 RX ADMIN — METHOCARBAMOL 500 MG: 500 TABLET, FILM COATED ORAL at 00:06

## 2021-11-19 RX ADMIN — OXYCODONE HYDROCHLORIDE 10 MG: 10 TABLET ORAL at 02:20

## 2021-11-19 RX ADMIN — METHOCARBAMOL 500 MG: 500 TABLET, FILM COATED ORAL at 06:29

## 2021-11-20 DIAGNOSIS — I10 ESSENTIAL HYPERTENSION: ICD-10-CM

## 2021-11-20 LAB
ANION GAP SERPL CALCULATED.3IONS-SCNC: 3 MMOL/L (ref 4–13)
BACTERIA SPEC ANAEROBE CULT: NO GROWTH
BACTERIA TISS AEROBE CULT: NO GROWTH
BACTERIA TISS AEROBE CULT: NO GROWTH
BASOPHILS # BLD AUTO: 0.01 THOUSANDS/ΜL (ref 0–0.1)
BASOPHILS NFR BLD AUTO: 0 % (ref 0–1)
BUN SERPL-MCNC: 11 MG/DL (ref 5–25)
CALCIUM SERPL-MCNC: 8.3 MG/DL (ref 8.3–10.1)
CHLORIDE SERPL-SCNC: 110 MMOL/L (ref 100–108)
CO2 SERPL-SCNC: 27 MMOL/L (ref 21–32)
CREAT SERPL-MCNC: 0.58 MG/DL (ref 0.6–1.3)
EOSINOPHIL # BLD AUTO: 0.13 THOUSAND/ΜL (ref 0–0.61)
EOSINOPHIL NFR BLD AUTO: 2 % (ref 0–6)
ERYTHROCYTE [DISTWIDTH] IN BLOOD BY AUTOMATED COUNT: 13.4 % (ref 11.6–15.1)
GFR SERPL CREATININE-BSD FRML MDRD: 111 ML/MIN/1.73SQ M
GLUCOSE SERPL-MCNC: 106 MG/DL (ref 65–140)
GLUCOSE SERPL-MCNC: 110 MG/DL (ref 65–140)
GLUCOSE SERPL-MCNC: 115 MG/DL (ref 65–140)
GLUCOSE SERPL-MCNC: 93 MG/DL (ref 65–140)
GLUCOSE SERPL-MCNC: 94 MG/DL (ref 65–140)
GRAM STN SPEC: ABNORMAL
GRAM STN SPEC: ABNORMAL
GRAM STN SPEC: NORMAL
GRAM STN SPEC: NORMAL
HCT VFR BLD AUTO: 33.6 % (ref 36.5–49.3)
HGB BLD-MCNC: 11.2 G/DL (ref 12–17)
IMM GRANULOCYTES # BLD AUTO: 0.03 THOUSAND/UL (ref 0–0.2)
IMM GRANULOCYTES NFR BLD AUTO: 1 % (ref 0–2)
INR PPP: 1.18 (ref 0.84–1.19)
LYMPHOCYTES # BLD AUTO: 0.83 THOUSANDS/ΜL (ref 0.6–4.47)
LYMPHOCYTES NFR BLD AUTO: 14 % (ref 14–44)
MAGNESIUM SERPL-MCNC: 2.1 MG/DL (ref 1.6–2.6)
MCH RBC QN AUTO: 32 PG (ref 26.8–34.3)
MCHC RBC AUTO-ENTMCNC: 33.3 G/DL (ref 31.4–37.4)
MCV RBC AUTO: 96 FL (ref 82–98)
MONOCYTES # BLD AUTO: 0.49 THOUSAND/ΜL (ref 0.17–1.22)
MONOCYTES NFR BLD AUTO: 8 % (ref 4–12)
NEUTROPHILS # BLD AUTO: 4.57 THOUSANDS/ΜL (ref 1.85–7.62)
NEUTS SEG NFR BLD AUTO: 75 % (ref 43–75)
NRBC BLD AUTO-RTO: 0 /100 WBCS
PHOSPHATE SERPL-MCNC: 3 MG/DL (ref 2.3–4.1)
PLATELET # BLD AUTO: 100 THOUSANDS/UL (ref 149–390)
PMV BLD AUTO: 11.2 FL (ref 8.9–12.7)
POTASSIUM SERPL-SCNC: 3.7 MMOL/L (ref 3.5–5.3)
PROTHROMBIN TIME: 14.5 SECONDS (ref 11.6–14.5)
RBC # BLD AUTO: 3.5 MILLION/UL (ref 3.88–5.62)
SODIUM SERPL-SCNC: 140 MMOL/L (ref 136–145)
VANCOMYCIN TROUGH SERPL-MCNC: 22 UG/ML (ref 10–20)
WBC # BLD AUTO: 6.06 THOUSAND/UL (ref 4.31–10.16)

## 2021-11-20 PROCEDURE — 80202 ASSAY OF VANCOMYCIN: CPT | Performed by: FAMILY MEDICINE

## 2021-11-20 PROCEDURE — 82948 REAGENT STRIP/BLOOD GLUCOSE: CPT

## 2021-11-20 PROCEDURE — 83735 ASSAY OF MAGNESIUM: CPT | Performed by: FAMILY MEDICINE

## 2021-11-20 PROCEDURE — 99024 POSTOP FOLLOW-UP VISIT: CPT | Performed by: PHYSICIAN ASSISTANT

## 2021-11-20 PROCEDURE — 80048 BASIC METABOLIC PNL TOTAL CA: CPT | Performed by: FAMILY MEDICINE

## 2021-11-20 PROCEDURE — 85610 PROTHROMBIN TIME: CPT | Performed by: FAMILY MEDICINE

## 2021-11-20 PROCEDURE — 85025 COMPLETE CBC W/AUTO DIFF WBC: CPT | Performed by: FAMILY MEDICINE

## 2021-11-20 PROCEDURE — 84100 ASSAY OF PHOSPHORUS: CPT | Performed by: FAMILY MEDICINE

## 2021-11-20 RX ADMIN — OXYCODONE HYDROCHLORIDE 10 MG: 10 TABLET ORAL at 00:44

## 2021-11-20 RX ADMIN — METHOCARBAMOL 500 MG: 500 TABLET, FILM COATED ORAL at 12:20

## 2021-11-20 RX ADMIN — OXYCODONE HYDROCHLORIDE 10 MG: 10 TABLET ORAL at 09:29

## 2021-11-20 RX ADMIN — METHOCARBAMOL 500 MG: 500 TABLET, FILM COATED ORAL at 23:26

## 2021-11-20 RX ADMIN — ACETAMINOPHEN 975 MG: 325 TABLET, FILM COATED ORAL at 05:16

## 2021-11-20 RX ADMIN — OXYCODONE HYDROCHLORIDE 10 MG: 10 TABLET ORAL at 21:58

## 2021-11-20 RX ADMIN — Medication 1500 MG: at 17:43

## 2021-11-20 RX ADMIN — ACETAMINOPHEN 975 MG: 325 TABLET, FILM COATED ORAL at 13:42

## 2021-11-20 RX ADMIN — METHOCARBAMOL 500 MG: 500 TABLET, FILM COATED ORAL at 05:16

## 2021-11-20 RX ADMIN — OXYCODONE HYDROCHLORIDE 10 MG: 10 TABLET ORAL at 13:42

## 2021-11-20 RX ADMIN — METHOCARBAMOL 500 MG: 500 TABLET, FILM COATED ORAL at 17:43

## 2021-11-20 RX ADMIN — ACETAMINOPHEN 975 MG: 325 TABLET, FILM COATED ORAL at 21:02

## 2021-11-20 RX ADMIN — PANTOPRAZOLE SODIUM 20 MG: 20 TABLET, DELAYED RELEASE ORAL at 05:16

## 2021-11-20 RX ADMIN — LISINOPRIL 20 MG: 20 TABLET ORAL at 09:31

## 2021-11-20 RX ADMIN — METHOCARBAMOL 500 MG: 500 TABLET, FILM COATED ORAL at 00:44

## 2021-11-20 RX ADMIN — METOPROLOL TARTRATE 25 MG: 25 TABLET, FILM COATED ORAL at 09:29

## 2021-11-20 RX ADMIN — METOPROLOL TARTRATE 25 MG: 25 TABLET, FILM COATED ORAL at 21:02

## 2021-11-20 RX ADMIN — OXYCODONE HYDROCHLORIDE 10 MG: 10 TABLET ORAL at 17:43

## 2021-11-20 RX ADMIN — LEVETIRACETAM 750 MG: 750 TABLET, FILM COATED ORAL at 09:29

## 2021-11-20 RX ADMIN — ONDANSETRON 4 MG: 2 INJECTION INTRAMUSCULAR; INTRAVENOUS at 00:47

## 2021-11-20 RX ADMIN — OXYCODONE HYDROCHLORIDE 10 MG: 10 TABLET ORAL at 05:16

## 2021-11-20 RX ADMIN — Medication 1500 MG: at 03:39

## 2021-11-20 RX ADMIN — LEVETIRACETAM 750 MG: 750 TABLET, FILM COATED ORAL at 21:00

## 2021-11-20 RX ADMIN — ONDANSETRON 4 MG: 2 INJECTION INTRAMUSCULAR; INTRAVENOUS at 20:58

## 2021-11-20 RX ADMIN — Medication 1500 MG: at 09:29

## 2021-11-21 LAB
GLUCOSE SERPL-MCNC: 110 MG/DL (ref 65–140)
GLUCOSE SERPL-MCNC: 113 MG/DL (ref 65–140)
GLUCOSE SERPL-MCNC: 122 MG/DL (ref 65–140)
GLUCOSE SERPL-MCNC: 95 MG/DL (ref 65–140)

## 2021-11-21 PROCEDURE — 82948 REAGENT STRIP/BLOOD GLUCOSE: CPT

## 2021-11-21 PROCEDURE — 99024 POSTOP FOLLOW-UP VISIT: CPT | Performed by: PHYSICIAN ASSISTANT

## 2021-11-21 RX ORDER — HEPARIN SODIUM 5000 [USP'U]/ML
5000 INJECTION, SOLUTION INTRAVENOUS; SUBCUTANEOUS EVERY 8 HOURS SCHEDULED
Status: DISCONTINUED | OUTPATIENT
Start: 2021-11-21 | End: 2021-11-23 | Stop reason: HOSPADM

## 2021-11-21 RX ADMIN — LABETALOL HYDROCHLORIDE 10 MG: 5 INJECTION, SOLUTION INTRAVENOUS at 21:39

## 2021-11-21 RX ADMIN — METHOCARBAMOL 500 MG: 500 TABLET, FILM COATED ORAL at 16:56

## 2021-11-21 RX ADMIN — METOPROLOL TARTRATE 25 MG: 25 TABLET, FILM COATED ORAL at 09:32

## 2021-11-21 RX ADMIN — HEPARIN SODIUM 5000 UNITS: 5000 INJECTION INTRAVENOUS; SUBCUTANEOUS at 23:14

## 2021-11-21 RX ADMIN — LISINOPRIL 20 MG: 20 TABLET ORAL at 09:32

## 2021-11-21 RX ADMIN — METHOCARBAMOL 500 MG: 500 TABLET, FILM COATED ORAL at 23:19

## 2021-11-21 RX ADMIN — PANTOPRAZOLE SODIUM 20 MG: 20 TABLET, DELAYED RELEASE ORAL at 05:23

## 2021-11-21 RX ADMIN — Medication 1500 MG: at 09:32

## 2021-11-21 RX ADMIN — Medication 1500 MG: at 18:27

## 2021-11-21 RX ADMIN — HYDRALAZINE HYDROCHLORIDE 5 MG: 20 INJECTION INTRAMUSCULAR; INTRAVENOUS at 23:13

## 2021-11-21 RX ADMIN — OXYCODONE HYDROCHLORIDE 10 MG: 10 TABLET ORAL at 07:47

## 2021-11-21 RX ADMIN — ACETAMINOPHEN 975 MG: 325 TABLET, FILM COATED ORAL at 21:38

## 2021-11-21 RX ADMIN — OXYCODONE HYDROCHLORIDE 10 MG: 10 TABLET ORAL at 12:06

## 2021-11-21 RX ADMIN — ACETAMINOPHEN 975 MG: 325 TABLET, FILM COATED ORAL at 13:24

## 2021-11-21 RX ADMIN — OXYCODONE HYDROCHLORIDE 10 MG: 10 TABLET ORAL at 03:35

## 2021-11-21 RX ADMIN — OXYCODONE HYDROCHLORIDE 10 MG: 10 TABLET ORAL at 21:38

## 2021-11-21 RX ADMIN — LEVETIRACETAM 750 MG: 750 TABLET, FILM COATED ORAL at 20:14

## 2021-11-21 RX ADMIN — METOPROLOL TARTRATE 25 MG: 25 TABLET, FILM COATED ORAL at 20:14

## 2021-11-21 RX ADMIN — ACETAMINOPHEN 975 MG: 325 TABLET, FILM COATED ORAL at 05:23

## 2021-11-21 RX ADMIN — LEVETIRACETAM 750 MG: 750 TABLET, FILM COATED ORAL at 09:32

## 2021-11-21 RX ADMIN — LABETALOL HYDROCHLORIDE 10 MG: 5 INJECTION, SOLUTION INTRAVENOUS at 03:35

## 2021-11-21 RX ADMIN — OXYCODONE HYDROCHLORIDE 10 MG: 10 TABLET ORAL at 16:56

## 2021-11-21 RX ADMIN — Medication 1500 MG: at 01:53

## 2021-11-21 RX ADMIN — METHOCARBAMOL 500 MG: 500 TABLET, FILM COATED ORAL at 05:23

## 2021-11-21 RX ADMIN — METHOCARBAMOL 500 MG: 500 TABLET, FILM COATED ORAL at 12:06

## 2021-11-22 ENCOUNTER — TELEPHONE (OUTPATIENT)
Dept: INFECTIOUS DISEASES | Facility: CLINIC | Age: 61
End: 2021-11-22

## 2021-11-22 ENCOUNTER — TELEPHONE (OUTPATIENT)
Dept: NEUROSURGERY | Facility: CLINIC | Age: 61
End: 2021-11-22

## 2021-11-22 DIAGNOSIS — Z98.890 POST-OPERATIVE STATE: ICD-10-CM

## 2021-11-22 LAB — GLUCOSE SERPL-MCNC: 113 MG/DL (ref 65–140)

## 2021-11-22 PROCEDURE — 82948 REAGENT STRIP/BLOOD GLUCOSE: CPT

## 2021-11-22 PROCEDURE — 99024 POSTOP FOLLOW-UP VISIT: CPT | Performed by: NEUROLOGICAL SURGERY

## 2021-11-22 PROCEDURE — 99232 SBSQ HOSP IP/OBS MODERATE 35: CPT | Performed by: INTERNAL MEDICINE

## 2021-11-22 RX ORDER — LISINOPRIL 20 MG/1
TABLET ORAL
Qty: 90 TABLET | Refills: 1 | Status: SHIPPED | OUTPATIENT
Start: 2021-11-22 | End: 2022-04-21 | Stop reason: SDUPTHER

## 2021-11-22 RX ORDER — LEVETIRACETAM 500 MG/1
TABLET ORAL
Qty: 180 TABLET | Refills: 0 | OUTPATIENT
Start: 2021-11-22

## 2021-11-22 RX ADMIN — METHOCARBAMOL 500 MG: 500 TABLET, FILM COATED ORAL at 11:53

## 2021-11-22 RX ADMIN — HEPARIN SODIUM 5000 UNITS: 5000 INJECTION INTRAVENOUS; SUBCUTANEOUS at 21:10

## 2021-11-22 RX ADMIN — HEPARIN SODIUM 5000 UNITS: 5000 INJECTION INTRAVENOUS; SUBCUTANEOUS at 05:19

## 2021-11-22 RX ADMIN — Medication 1500 MG: at 02:29

## 2021-11-22 RX ADMIN — OXYCODONE HYDROCHLORIDE 10 MG: 10 TABLET ORAL at 16:40

## 2021-11-22 RX ADMIN — LEVETIRACETAM 750 MG: 750 TABLET, FILM COATED ORAL at 08:10

## 2021-11-22 RX ADMIN — ACETAMINOPHEN 975 MG: 325 TABLET, FILM COATED ORAL at 14:53

## 2021-11-22 RX ADMIN — OXYCODONE HYDROCHLORIDE 10 MG: 10 TABLET ORAL at 04:25

## 2021-11-22 RX ADMIN — LEVETIRACETAM 750 MG: 750 TABLET, FILM COATED ORAL at 21:07

## 2021-11-22 RX ADMIN — Medication 4.5 MG: at 21:07

## 2021-11-22 RX ADMIN — Medication 1500 MG: at 18:08

## 2021-11-22 RX ADMIN — METHOCARBAMOL 500 MG: 500 TABLET, FILM COATED ORAL at 18:07

## 2021-11-22 RX ADMIN — LISINOPRIL 20 MG: 20 TABLET ORAL at 08:10

## 2021-11-22 RX ADMIN — Medication 1500 MG: at 09:24

## 2021-11-22 RX ADMIN — METOPROLOL TARTRATE 25 MG: 25 TABLET, FILM COATED ORAL at 08:10

## 2021-11-22 RX ADMIN — METOPROLOL TARTRATE 25 MG: 25 TABLET, FILM COATED ORAL at 21:07

## 2021-11-22 RX ADMIN — PANTOPRAZOLE SODIUM 20 MG: 20 TABLET, DELAYED RELEASE ORAL at 05:19

## 2021-11-22 RX ADMIN — HEPARIN SODIUM 5000 UNITS: 5000 INJECTION INTRAVENOUS; SUBCUTANEOUS at 14:57

## 2021-11-22 RX ADMIN — ACETAMINOPHEN 975 MG: 325 TABLET, FILM COATED ORAL at 21:08

## 2021-11-22 RX ADMIN — OXYCODONE HYDROCHLORIDE 10 MG: 10 TABLET ORAL at 11:53

## 2021-11-22 RX ADMIN — ACETAMINOPHEN 975 MG: 325 TABLET, FILM COATED ORAL at 05:19

## 2021-11-22 RX ADMIN — OXYCODONE HYDROCHLORIDE 10 MG: 10 TABLET ORAL at 08:09

## 2021-11-22 RX ADMIN — METHOCARBAMOL 500 MG: 500 TABLET, FILM COATED ORAL at 05:18

## 2021-11-22 RX ADMIN — OXYCODONE HYDROCHLORIDE 10 MG: 10 TABLET ORAL at 21:09

## 2021-11-22 RX ADMIN — LABETALOL HYDROCHLORIDE 10 MG: 5 INJECTION, SOLUTION INTRAVENOUS at 02:42

## 2021-11-23 ENCOUNTER — TRANSITIONAL CARE MANAGEMENT (OUTPATIENT)
Dept: FAMILY MEDICINE CLINIC | Facility: CLINIC | Age: 61
End: 2021-11-23

## 2021-11-23 VITALS
DIASTOLIC BLOOD PRESSURE: 77 MMHG | OXYGEN SATURATION: 96 % | TEMPERATURE: 98.1 F | BODY MASS INDEX: 29.35 KG/M2 | WEIGHT: 205.03 LBS | SYSTOLIC BLOOD PRESSURE: 147 MMHG | HEIGHT: 70 IN | HEART RATE: 61 BPM | RESPIRATION RATE: 18 BRPM

## 2021-11-23 DIAGNOSIS — Z96.7 INFECTION OF CRANIOTOMY PLATE, SUBSEQUENT ENCOUNTER: Primary | ICD-10-CM

## 2021-11-23 DIAGNOSIS — T84.7XXD INFECTION OF CRANIOTOMY PLATE, SUBSEQUENT ENCOUNTER: Primary | ICD-10-CM

## 2021-11-23 PROCEDURE — 99024 POSTOP FOLLOW-UP VISIT: CPT | Performed by: PHYSICIAN ASSISTANT

## 2021-11-23 RX ORDER — OXYCODONE HYDROCHLORIDE 5 MG/1
5 TABLET ORAL EVERY 6 HOURS PRN
Qty: 20 TABLET | Refills: 0 | Status: SHIPPED | OUTPATIENT
Start: 2021-11-23 | End: 2021-11-28

## 2021-11-23 RX ORDER — METHOCARBAMOL 500 MG/1
500 TABLET, FILM COATED ORAL EVERY 6 HOURS SCHEDULED
Qty: 28 TABLET | Refills: 0 | Status: SHIPPED | OUTPATIENT
Start: 2021-11-23 | End: 2022-03-10 | Stop reason: HOSPADM

## 2021-11-23 RX ADMIN — LEVETIRACETAM 750 MG: 750 TABLET, FILM COATED ORAL at 08:07

## 2021-11-23 RX ADMIN — METHOCARBAMOL 500 MG: 500 TABLET, FILM COATED ORAL at 05:07

## 2021-11-23 RX ADMIN — OXYCODONE HYDROCHLORIDE 10 MG: 10 TABLET ORAL at 09:03

## 2021-11-23 RX ADMIN — HEPARIN SODIUM 5000 UNITS: 5000 INJECTION INTRAVENOUS; SUBCUTANEOUS at 05:07

## 2021-11-23 RX ADMIN — OXYCODONE HYDROCHLORIDE 10 MG: 10 TABLET ORAL at 04:39

## 2021-11-23 RX ADMIN — PANTOPRAZOLE SODIUM 20 MG: 20 TABLET, DELAYED RELEASE ORAL at 05:07

## 2021-11-23 RX ADMIN — METHOCARBAMOL 500 MG: 500 TABLET, FILM COATED ORAL at 00:03

## 2021-11-23 RX ADMIN — Medication 1500 MG: at 02:59

## 2021-11-23 RX ADMIN — METOPROLOL TARTRATE 25 MG: 25 TABLET, FILM COATED ORAL at 08:07

## 2021-11-23 RX ADMIN — ACETAMINOPHEN 975 MG: 325 TABLET, FILM COATED ORAL at 05:07

## 2021-11-23 RX ADMIN — LISINOPRIL 20 MG: 20 TABLET ORAL at 08:07

## 2021-11-30 ENCOUNTER — TELEPHONE (OUTPATIENT)
Dept: INFECTIOUS DISEASES | Facility: CLINIC | Age: 61
End: 2021-11-30

## 2021-12-01 ENCOUNTER — OFFICE VISIT (OUTPATIENT)
Dept: NEUROSURGERY | Facility: CLINIC | Age: 61
End: 2021-12-01

## 2021-12-01 VITALS
HEART RATE: 62 BPM | HEIGHT: 70 IN | DIASTOLIC BLOOD PRESSURE: 72 MMHG | TEMPERATURE: 98.2 F | RESPIRATION RATE: 16 BRPM | SYSTOLIC BLOOD PRESSURE: 145 MMHG | WEIGHT: 184 LBS | BODY MASS INDEX: 26.34 KG/M2

## 2021-12-01 DIAGNOSIS — T85.738A: ICD-10-CM

## 2021-12-01 DIAGNOSIS — T84.7XXA: Primary | ICD-10-CM

## 2021-12-01 DIAGNOSIS — G89.18 POSTOPERATIVE PAIN: ICD-10-CM

## 2021-12-01 DIAGNOSIS — Z98.890 STATUS POST CRANIOTOMY: ICD-10-CM

## 2021-12-01 PROCEDURE — 99024 POSTOP FOLLOW-UP VISIT: CPT | Performed by: NEUROLOGICAL SURGERY

## 2021-12-01 RX ORDER — OXYCODONE HYDROCHLORIDE 5 MG/1
5 TABLET ORAL EVERY 8 HOURS PRN
Qty: 21 TABLET | Refills: 0 | Status: SHIPPED | OUTPATIENT
Start: 2021-12-01 | End: 2022-02-15

## 2021-12-03 ENCOUNTER — TELEPHONE (OUTPATIENT)
Dept: INFECTIOUS DISEASES | Facility: CLINIC | Age: 61
End: 2021-12-03

## 2021-12-03 ENCOUNTER — LAB REQUISITION (OUTPATIENT)
Dept: LAB | Facility: HOSPITAL | Age: 61
End: 2021-12-03
Payer: COMMERCIAL

## 2021-12-03 DIAGNOSIS — T81.49XA INFECTION FOLLOWING A PROCEDURE, OTHER SURGICAL SITE, INITIAL ENCOUNTER: ICD-10-CM

## 2021-12-03 DIAGNOSIS — A41.9 SEPSIS, UNSPECIFIED ORGANISM (HCC): ICD-10-CM

## 2021-12-03 LAB
BASOPHILS # BLD AUTO: 0.02 THOUSANDS/ΜL (ref 0–0.1)
BASOPHILS NFR BLD AUTO: 0 % (ref 0–1)
CREAT SERPL-MCNC: 0.63 MG/DL (ref 0.6–1.3)
EOSINOPHIL # BLD AUTO: 0.11 THOUSAND/ΜL (ref 0–0.61)
EOSINOPHIL NFR BLD AUTO: 2 % (ref 0–6)
ERYTHROCYTE [DISTWIDTH] IN BLOOD BY AUTOMATED COUNT: 13.3 % (ref 11.6–15.1)
GFR SERPL CREATININE-BSD FRML MDRD: 107 ML/MIN/1.73SQ M
HCT VFR BLD AUTO: 37.8 % (ref 36.5–49.3)
HGB BLD-MCNC: 12.6 G/DL (ref 12–17)
IMM GRANULOCYTES # BLD AUTO: 0.02 THOUSAND/UL (ref 0–0.2)
IMM GRANULOCYTES NFR BLD AUTO: 0 % (ref 0–2)
LYMPHOCYTES # BLD AUTO: 1.49 THOUSANDS/ΜL (ref 0.6–4.47)
LYMPHOCYTES NFR BLD AUTO: 26 % (ref 14–44)
MCH RBC QN AUTO: 31.3 PG (ref 26.8–34.3)
MCHC RBC AUTO-ENTMCNC: 33.3 G/DL (ref 31.4–37.4)
MCV RBC AUTO: 94 FL (ref 82–98)
MONOCYTES # BLD AUTO: 0.51 THOUSAND/ΜL (ref 0.17–1.22)
MONOCYTES NFR BLD AUTO: 9 % (ref 4–12)
NEUTROPHILS # BLD AUTO: 3.62 THOUSANDS/ΜL (ref 1.85–7.62)
NEUTS SEG NFR BLD AUTO: 63 % (ref 43–75)
NRBC BLD AUTO-RTO: 0 /100 WBCS
PLATELET # BLD AUTO: 205 THOUSANDS/UL (ref 149–390)
PMV BLD AUTO: 11.2 FL (ref 8.9–12.7)
RBC # BLD AUTO: 4.02 MILLION/UL (ref 3.88–5.62)
VANCOMYCIN TROUGH SERPL-MCNC: 14.8 UG/ML (ref 10–20)
WBC # BLD AUTO: 5.77 THOUSAND/UL (ref 4.31–10.16)

## 2021-12-03 PROCEDURE — 80202 ASSAY OF VANCOMYCIN: CPT | Performed by: INTERNAL MEDICINE

## 2021-12-03 PROCEDURE — 85025 COMPLETE CBC W/AUTO DIFF WBC: CPT | Performed by: INTERNAL MEDICINE

## 2021-12-03 PROCEDURE — 82565 ASSAY OF CREATININE: CPT | Performed by: INTERNAL MEDICINE

## 2021-12-07 ENCOUNTER — LAB REQUISITION (OUTPATIENT)
Dept: LAB | Facility: HOSPITAL | Age: 61
End: 2021-12-07
Payer: COMMERCIAL

## 2021-12-07 DIAGNOSIS — T81.49XA INFECTION FOLLOWING A PROCEDURE, OTHER SURGICAL SITE, INITIAL ENCOUNTER: ICD-10-CM

## 2021-12-07 DIAGNOSIS — A41.9 SEPSIS, UNSPECIFIED ORGANISM (HCC): ICD-10-CM

## 2021-12-07 LAB
CREAT SERPL-MCNC: 0.7 MG/DL (ref 0.6–1.3)
CRP SERPL QL: 3.9 MG/L
ERYTHROCYTE [SEDIMENTATION RATE] IN BLOOD: 17 MM/HOUR (ref 0–19)
GFR SERPL CREATININE-BSD FRML MDRD: 103 ML/MIN/1.73SQ M
VANCOMYCIN TROUGH SERPL-MCNC: 16.4 UG/ML (ref 10–20)

## 2021-12-07 PROCEDURE — 80202 ASSAY OF VANCOMYCIN: CPT | Performed by: INTERNAL MEDICINE

## 2021-12-07 PROCEDURE — 85652 RBC SED RATE AUTOMATED: CPT | Performed by: INTERNAL MEDICINE

## 2021-12-07 PROCEDURE — 85025 COMPLETE CBC W/AUTO DIFF WBC: CPT | Performed by: INTERNAL MEDICINE

## 2021-12-07 PROCEDURE — 86140 C-REACTIVE PROTEIN: CPT | Performed by: INTERNAL MEDICINE

## 2021-12-07 PROCEDURE — 82565 ASSAY OF CREATININE: CPT | Performed by: INTERNAL MEDICINE

## 2021-12-08 ENCOUNTER — OFFICE VISIT (OUTPATIENT)
Dept: INFECTIOUS DISEASES | Facility: CLINIC | Age: 61
End: 2021-12-08
Payer: COMMERCIAL

## 2021-12-08 ENCOUNTER — OFFICE VISIT (OUTPATIENT)
Dept: NEUROSURGERY | Facility: CLINIC | Age: 61
End: 2021-12-08

## 2021-12-08 VITALS
TEMPERATURE: 98.9 F | HEART RATE: 61 BPM | DIASTOLIC BLOOD PRESSURE: 75 MMHG | BODY MASS INDEX: 26.63 KG/M2 | WEIGHT: 186 LBS | SYSTOLIC BLOOD PRESSURE: 153 MMHG | HEIGHT: 70 IN | RESPIRATION RATE: 16 BRPM

## 2021-12-08 VITALS
BODY MASS INDEX: 26.4 KG/M2 | TEMPERATURE: 98.2 F | WEIGHT: 184.4 LBS | HEART RATE: 66 BPM | SYSTOLIC BLOOD PRESSURE: 128 MMHG | DIASTOLIC BLOOD PRESSURE: 66 MMHG | RESPIRATION RATE: 16 BRPM | HEIGHT: 70 IN

## 2021-12-08 DIAGNOSIS — R56.9 SEIZURE (HCC): ICD-10-CM

## 2021-12-08 DIAGNOSIS — Z98.890 STATUS POST CRANIECTOMY: ICD-10-CM

## 2021-12-08 DIAGNOSIS — T84.7XXA: Primary | ICD-10-CM

## 2021-12-08 DIAGNOSIS — F10.10 ALCOHOL ABUSE: ICD-10-CM

## 2021-12-08 DIAGNOSIS — Z45.2 PICC (PERIPHERALLY INSERTED CENTRAL CATHETER) IN PLACE: ICD-10-CM

## 2021-12-08 LAB
BASOPHILS # BLD AUTO: 0.03 THOUSANDS/ΜL (ref 0–0.1)
BASOPHILS NFR BLD AUTO: 1 % (ref 0–1)
EOSINOPHIL # BLD AUTO: 0.16 THOUSAND/ΜL (ref 0–0.61)
EOSINOPHIL NFR BLD AUTO: 3 % (ref 0–6)
ERYTHROCYTE [DISTWIDTH] IN BLOOD BY AUTOMATED COUNT: 13.2 % (ref 11.6–15.1)
HCT VFR BLD AUTO: 38 % (ref 36.5–49.3)
HGB BLD-MCNC: 12.8 G/DL (ref 12–17)
IMM GRANULOCYTES # BLD AUTO: 0.01 THOUSAND/UL (ref 0–0.2)
IMM GRANULOCYTES NFR BLD AUTO: 0 % (ref 0–2)
LYMPHOCYTES # BLD AUTO: 1.28 THOUSANDS/ΜL (ref 0.6–4.47)
LYMPHOCYTES NFR BLD AUTO: 21 % (ref 14–44)
MCH RBC QN AUTO: 31.3 PG (ref 26.8–34.3)
MCHC RBC AUTO-ENTMCNC: 33.7 G/DL (ref 31.4–37.4)
MCV RBC AUTO: 93 FL (ref 82–98)
MONOCYTES # BLD AUTO: 0.53 THOUSAND/ΜL (ref 0.17–1.22)
MONOCYTES NFR BLD AUTO: 9 % (ref 4–12)
NEUTROPHILS # BLD AUTO: 3.98 THOUSANDS/ΜL (ref 1.85–7.62)
NEUTS SEG NFR BLD AUTO: 66 % (ref 43–75)
NRBC BLD AUTO-RTO: 0 /100 WBCS
PLATELET # BLD AUTO: 224 THOUSANDS/UL (ref 149–390)
PMV BLD AUTO: 11.5 FL (ref 8.9–12.7)
RBC # BLD AUTO: 4.09 MILLION/UL (ref 3.88–5.62)
WBC # BLD AUTO: 5.99 THOUSAND/UL (ref 4.31–10.16)

## 2021-12-08 PROCEDURE — 99024 POSTOP FOLLOW-UP VISIT: CPT | Performed by: NURSE PRACTITIONER

## 2021-12-08 PROCEDURE — 99214 OFFICE O/P EST MOD 30 MIN: CPT | Performed by: NURSE PRACTITIONER

## 2021-12-08 PROCEDURE — 3008F BODY MASS INDEX DOCD: CPT | Performed by: NEUROLOGICAL SURGERY

## 2021-12-08 PROCEDURE — 1111F DSCHRG MED/CURRENT MED MERGE: CPT | Performed by: NURSE PRACTITIONER

## 2021-12-14 ENCOUNTER — LAB REQUISITION (OUTPATIENT)
Dept: LAB | Facility: HOSPITAL | Age: 61
End: 2021-12-14
Payer: COMMERCIAL

## 2021-12-14 DIAGNOSIS — T81.49XA INFECTION FOLLOWING A PROCEDURE, OTHER SURGICAL SITE, INITIAL ENCOUNTER: ICD-10-CM

## 2021-12-14 DIAGNOSIS — A41.9 SEPSIS, UNSPECIFIED ORGANISM (HCC): ICD-10-CM

## 2021-12-14 LAB
BASOPHILS # BLD AUTO: 0.02 THOUSANDS/ΜL (ref 0–0.1)
BASOPHILS NFR BLD AUTO: 0 % (ref 0–1)
CREAT SERPL-MCNC: 0.54 MG/DL (ref 0.6–1.3)
EOSINOPHIL # BLD AUTO: 0.12 THOUSAND/ΜL (ref 0–0.61)
EOSINOPHIL NFR BLD AUTO: 2 % (ref 0–6)
ERYTHROCYTE [DISTWIDTH] IN BLOOD BY AUTOMATED COUNT: 13.1 % (ref 11.6–15.1)
GFR SERPL CREATININE-BSD FRML MDRD: 114 ML/MIN/1.73SQ M
HCT VFR BLD AUTO: 39.3 % (ref 36.5–49.3)
HGB BLD-MCNC: 13.3 G/DL (ref 12–17)
IMM GRANULOCYTES # BLD AUTO: 0.01 THOUSAND/UL (ref 0–0.2)
IMM GRANULOCYTES NFR BLD AUTO: 0 % (ref 0–2)
LYMPHOCYTES # BLD AUTO: 1.2 THOUSANDS/ΜL (ref 0.6–4.47)
LYMPHOCYTES NFR BLD AUTO: 23 % (ref 14–44)
MCH RBC QN AUTO: 31 PG (ref 26.8–34.3)
MCHC RBC AUTO-ENTMCNC: 33.8 G/DL (ref 31.4–37.4)
MCV RBC AUTO: 92 FL (ref 82–98)
MONOCYTES # BLD AUTO: 0.4 THOUSAND/ΜL (ref 0.17–1.22)
MONOCYTES NFR BLD AUTO: 8 % (ref 4–12)
NEUTROPHILS # BLD AUTO: 3.56 THOUSANDS/ΜL (ref 1.85–7.62)
NEUTS SEG NFR BLD AUTO: 67 % (ref 43–75)
NRBC BLD AUTO-RTO: 0 /100 WBCS
PLATELET # BLD AUTO: 140 THOUSANDS/UL (ref 149–390)
PMV BLD AUTO: 12.2 FL (ref 8.9–12.7)
RBC # BLD AUTO: 4.29 MILLION/UL (ref 3.88–5.62)
VANCOMYCIN TROUGH SERPL-MCNC: 15.4 UG/ML (ref 10–20)
WBC # BLD AUTO: 5.31 THOUSAND/UL (ref 4.31–10.16)

## 2021-12-14 PROCEDURE — 85025 COMPLETE CBC W/AUTO DIFF WBC: CPT | Performed by: INTERNAL MEDICINE

## 2021-12-14 PROCEDURE — 80202 ASSAY OF VANCOMYCIN: CPT | Performed by: INTERNAL MEDICINE

## 2021-12-14 PROCEDURE — 82565 ASSAY OF CREATININE: CPT | Performed by: INTERNAL MEDICINE

## 2021-12-20 LAB
FUNGUS SPEC CULT: NORMAL

## 2021-12-21 ENCOUNTER — LAB REQUISITION (OUTPATIENT)
Dept: LAB | Facility: HOSPITAL | Age: 61
End: 2021-12-21
Payer: COMMERCIAL

## 2021-12-21 DIAGNOSIS — S06.5X1A TRAUMATIC SUBDURAL HEMORRHAGE WITH LOSS OF CONSCIOUSNESS OF 30 MINUTES OR LESS, INITIAL ENCOUNTER (HCC): ICD-10-CM

## 2021-12-21 DIAGNOSIS — G40.89 OTHER SEIZURES (HCC): ICD-10-CM

## 2021-12-21 DIAGNOSIS — T81.49XA INFECTION FOLLOWING A PROCEDURE, OTHER SURGICAL SITE, INITIAL ENCOUNTER: ICD-10-CM

## 2021-12-21 DIAGNOSIS — A41.9 SEPSIS, UNSPECIFIED ORGANISM (HCC): ICD-10-CM

## 2021-12-21 LAB
BASOPHILS # BLD AUTO: 0.02 THOUSANDS/ΜL (ref 0–0.1)
BASOPHILS NFR BLD AUTO: 0 % (ref 0–1)
CREAT SERPL-MCNC: 0.6 MG/DL (ref 0.6–1.3)
CRP SERPL QL: 3.1 MG/L
EOSINOPHIL # BLD AUTO: 0.1 THOUSAND/ΜL (ref 0–0.61)
EOSINOPHIL NFR BLD AUTO: 2 % (ref 0–6)
ERYTHROCYTE [DISTWIDTH] IN BLOOD BY AUTOMATED COUNT: 12.9 % (ref 11.6–15.1)
ERYTHROCYTE [SEDIMENTATION RATE] IN BLOOD: 15 MM/HOUR (ref 0–19)
GFR SERPL CREATININE-BSD FRML MDRD: 109 ML/MIN/1.73SQ M
HCT VFR BLD AUTO: 39.4 % (ref 36.5–49.3)
HGB BLD-MCNC: 13.4 G/DL (ref 12–17)
IMM GRANULOCYTES # BLD AUTO: 0 THOUSAND/UL (ref 0–0.2)
IMM GRANULOCYTES NFR BLD AUTO: 0 % (ref 0–2)
LYMPHOCYTES # BLD AUTO: 1.13 THOUSANDS/ΜL (ref 0.6–4.47)
LYMPHOCYTES NFR BLD AUTO: 22 % (ref 14–44)
MCH RBC QN AUTO: 31.1 PG (ref 26.8–34.3)
MCHC RBC AUTO-ENTMCNC: 34 G/DL (ref 31.4–37.4)
MCV RBC AUTO: 91 FL (ref 82–98)
MONOCYTES # BLD AUTO: 0.46 THOUSAND/ΜL (ref 0.17–1.22)
MONOCYTES NFR BLD AUTO: 9 % (ref 4–12)
NEUTROPHILS # BLD AUTO: 3.33 THOUSANDS/ΜL (ref 1.85–7.62)
NEUTS SEG NFR BLD AUTO: 67 % (ref 43–75)
NRBC BLD AUTO-RTO: 0 /100 WBCS
PLATELET # BLD AUTO: 140 THOUSANDS/UL (ref 149–390)
PMV BLD AUTO: 11.9 FL (ref 8.9–12.7)
RBC # BLD AUTO: 4.31 MILLION/UL (ref 3.88–5.62)
VANCOMYCIN TROUGH SERPL-MCNC: 14.8 UG/ML (ref 10–20)
WBC # BLD AUTO: 5.04 THOUSAND/UL (ref 4.31–10.16)

## 2021-12-21 PROCEDURE — 85025 COMPLETE CBC W/AUTO DIFF WBC: CPT | Performed by: INTERNAL MEDICINE

## 2021-12-21 PROCEDURE — 80202 ASSAY OF VANCOMYCIN: CPT | Performed by: INTERNAL MEDICINE

## 2021-12-21 PROCEDURE — 82565 ASSAY OF CREATININE: CPT | Performed by: INTERNAL MEDICINE

## 2021-12-21 PROCEDURE — 86140 C-REACTIVE PROTEIN: CPT | Performed by: INTERNAL MEDICINE

## 2021-12-21 PROCEDURE — 85652 RBC SED RATE AUTOMATED: CPT | Performed by: INTERNAL MEDICINE

## 2021-12-22 ENCOUNTER — TELEMEDICINE (OUTPATIENT)
Dept: INFECTIOUS DISEASES | Facility: CLINIC | Age: 61
End: 2021-12-22
Payer: COMMERCIAL

## 2021-12-22 VITALS — BODY MASS INDEX: 26.46 KG/M2 | HEIGHT: 70 IN

## 2021-12-22 DIAGNOSIS — Z98.890 STATUS POST CRANIECTOMY: ICD-10-CM

## 2021-12-22 DIAGNOSIS — F10.10 ALCOHOL ABUSE: ICD-10-CM

## 2021-12-22 DIAGNOSIS — T84.7XXD INFECTION OF BONE FLAP, SUBSEQUENT ENCOUNTER: Primary | ICD-10-CM

## 2021-12-22 DIAGNOSIS — Z45.2 PICC (PERIPHERALLY INSERTED CENTRAL CATHETER) IN PLACE: ICD-10-CM

## 2021-12-22 DIAGNOSIS — R56.9 SEIZURE (HCC): ICD-10-CM

## 2021-12-22 PROCEDURE — 99212 OFFICE O/P EST SF 10 MIN: CPT | Performed by: NURSE PRACTITIONER

## 2021-12-27 ENCOUNTER — OFFICE VISIT (OUTPATIENT)
Dept: NEUROSURGERY | Facility: CLINIC | Age: 61
End: 2021-12-27
Payer: COMMERCIAL

## 2021-12-27 ENCOUNTER — HOSPITAL ENCOUNTER (OUTPATIENT)
Dept: RADIOLOGY | Facility: HOSPITAL | Age: 61
Discharge: HOME | End: 2021-12-27
Payer: COMMERCIAL

## 2021-12-27 VITALS — SYSTOLIC BLOOD PRESSURE: 152 MMHG | TEMPERATURE: 98.3 F | HEART RATE: 82 BPM | DIASTOLIC BLOOD PRESSURE: 84 MMHG

## 2021-12-27 DIAGNOSIS — T84.7XXD INFECTION OF BONE FLAP, SUBSEQUENT ENCOUNTER: Primary | ICD-10-CM

## 2021-12-27 DIAGNOSIS — T84.7XXA: ICD-10-CM

## 2021-12-27 DIAGNOSIS — R56.9 SEIZURE (HCC): ICD-10-CM

## 2021-12-27 DIAGNOSIS — Z98.890 STATUS POST CRANIECTOMY: ICD-10-CM

## 2021-12-27 PROCEDURE — 70450 CT HEAD/BRAIN W/O DYE: CPT

## 2021-12-27 PROCEDURE — 1036F TOBACCO NON-USER: CPT | Performed by: NEUROLOGICAL SURGERY

## 2021-12-27 PROCEDURE — 99215 OFFICE O/P EST HI 40 MIN: CPT | Performed by: NEUROLOGICAL SURGERY

## 2021-12-27 PROCEDURE — G1004 CDSM NDSC: HCPCS

## 2021-12-27 RX ORDER — CHLORHEXIDINE GLUCONATE 0.12 MG/ML
15 RINSE ORAL ONCE
Status: CANCELLED | OUTPATIENT
Start: 2021-12-27 | End: 2021-12-27

## 2021-12-29 ENCOUNTER — LAB REQUISITION (OUTPATIENT)
Dept: LAB | Facility: HOSPITAL | Age: 61
End: 2021-12-29
Payer: COMMERCIAL

## 2021-12-29 DIAGNOSIS — S06.5X1A TRAUMATIC SUBDURAL HEMORRHAGE WITH LOSS OF CONSCIOUSNESS OF 30 MINUTES OR LESS, INITIAL ENCOUNTER (HCC): ICD-10-CM

## 2021-12-29 DIAGNOSIS — T81.49XA INFECTION FOLLOWING A PROCEDURE, OTHER SURGICAL SITE, INITIAL ENCOUNTER: ICD-10-CM

## 2021-12-29 DIAGNOSIS — A41.9 SEPSIS, UNSPECIFIED ORGANISM (HCC): ICD-10-CM

## 2021-12-29 DIAGNOSIS — Z98.890 OTHER SPECIFIED POSTPROCEDURAL STATES: ICD-10-CM

## 2021-12-29 DIAGNOSIS — R13.19 OTHER DYSPHAGIA: ICD-10-CM

## 2021-12-29 DIAGNOSIS — G40.89 OTHER SEIZURES (HCC): ICD-10-CM

## 2021-12-29 LAB
BASOPHILS # BLD AUTO: 0.01 THOUSANDS/ΜL (ref 0–0.1)
BASOPHILS NFR BLD AUTO: 0 % (ref 0–1)
CREAT SERPL-MCNC: 0.61 MG/DL (ref 0.6–1.3)
CRP SERPL QL: 5.5 MG/L
EOSINOPHIL # BLD AUTO: 0.08 THOUSAND/ΜL (ref 0–0.61)
EOSINOPHIL NFR BLD AUTO: 2 % (ref 0–6)
ERYTHROCYTE [DISTWIDTH] IN BLOOD BY AUTOMATED COUNT: 12.9 % (ref 11.6–15.1)
ERYTHROCYTE [SEDIMENTATION RATE] IN BLOOD: 11 MM/HOUR (ref 0–19)
GFR SERPL CREATININE-BSD FRML MDRD: 108 ML/MIN/1.73SQ M
HCT VFR BLD AUTO: 40 % (ref 36.5–49.3)
HGB BLD-MCNC: 13.4 G/DL (ref 12–17)
IMM GRANULOCYTES # BLD AUTO: 0.01 THOUSAND/UL (ref 0–0.2)
IMM GRANULOCYTES NFR BLD AUTO: 0 % (ref 0–2)
LYMPHOCYTES # BLD AUTO: 1.29 THOUSANDS/ΜL (ref 0.6–4.47)
LYMPHOCYTES NFR BLD AUTO: 27 % (ref 14–44)
MCH RBC QN AUTO: 30.5 PG (ref 26.8–34.3)
MCHC RBC AUTO-ENTMCNC: 33.5 G/DL (ref 31.4–37.4)
MCV RBC AUTO: 91 FL (ref 82–98)
MONOCYTES # BLD AUTO: 0.54 THOUSAND/ΜL (ref 0.17–1.22)
MONOCYTES NFR BLD AUTO: 11 % (ref 4–12)
NEUTROPHILS # BLD AUTO: 2.94 THOUSANDS/ΜL (ref 1.85–7.62)
NEUTS SEG NFR BLD AUTO: 60 % (ref 43–75)
NRBC BLD AUTO-RTO: 0 /100 WBCS
PLATELET # BLD AUTO: 138 THOUSANDS/UL (ref 149–390)
PMV BLD AUTO: 11.5 FL (ref 8.9–12.7)
RBC # BLD AUTO: 4.4 MILLION/UL (ref 3.88–5.62)
WBC # BLD AUTO: 4.87 THOUSAND/UL (ref 4.31–10.16)

## 2021-12-29 PROCEDURE — 82565 ASSAY OF CREATININE: CPT | Performed by: NEUROLOGICAL SURGERY

## 2021-12-29 PROCEDURE — 85025 COMPLETE CBC W/AUTO DIFF WBC: CPT | Performed by: NEUROLOGICAL SURGERY

## 2021-12-29 PROCEDURE — 85652 RBC SED RATE AUTOMATED: CPT | Performed by: NEUROLOGICAL SURGERY

## 2021-12-29 PROCEDURE — 86140 C-REACTIVE PROTEIN: CPT | Performed by: NEUROLOGICAL SURGERY

## 2022-01-04 LAB
MYCOBACTERIUM SPEC CULT: NORMAL
RHODAMINE-AURAMINE STN SPEC: NORMAL

## 2022-01-10 ENCOUNTER — CONSULT (OUTPATIENT)
Dept: FAMILY MEDICINE CLINIC | Facility: CLINIC | Age: 62
End: 2022-01-10
Payer: COMMERCIAL

## 2022-01-10 VITALS
HEART RATE: 79 BPM | SYSTOLIC BLOOD PRESSURE: 158 MMHG | BODY MASS INDEX: 27.49 KG/M2 | WEIGHT: 192 LBS | HEIGHT: 70 IN | OXYGEN SATURATION: 99 % | DIASTOLIC BLOOD PRESSURE: 84 MMHG | TEMPERATURE: 97.9 F

## 2022-01-10 DIAGNOSIS — I10 PRIMARY HYPERTENSION: ICD-10-CM

## 2022-01-10 DIAGNOSIS — T84.7XXD INFECTION OF BONE FLAP, SUBSEQUENT ENCOUNTER: ICD-10-CM

## 2022-01-10 DIAGNOSIS — F11.90 NARCOTIC DRUG USE: ICD-10-CM

## 2022-01-10 DIAGNOSIS — F41.1 ANXIETY STATE: ICD-10-CM

## 2022-01-10 DIAGNOSIS — Z01.818 PREOPERATIVE EXAMINATION: Primary | ICD-10-CM

## 2022-01-10 DIAGNOSIS — Z98.890 STATUS POST CRANIECTOMY: ICD-10-CM

## 2022-01-10 PROCEDURE — 99243 OFF/OP CNSLTJ NEW/EST LOW 30: CPT | Performed by: NURSE PRACTITIONER

## 2022-01-10 RX ORDER — NALOXONE HYDROCHLORIDE 4 MG/.1ML
SPRAY NASAL
Qty: 1 EACH | Refills: 1 | Status: SHIPPED | OUTPATIENT
Start: 2022-01-10 | End: 2022-03-10 | Stop reason: HOSPADM

## 2022-01-10 RX ORDER — ALPRAZOLAM 0.5 MG/1
0.5 TABLET ORAL 3 TIMES DAILY PRN
Qty: 30 TABLET | Refills: 0 | Status: SHIPPED | OUTPATIENT
Start: 2022-01-10 | End: 2022-01-27 | Stop reason: ALTCHOICE

## 2022-01-10 NOTE — PROGRESS NOTES
Subjective:     Lenore Joyce is a 62 y o  male who presents to the office today for a preoperative consultation at the request of surgeon John Cannon MD who plans on performing Cranioplasty on January 18  This consultation is requested for the specific conditions prompting preoperative evaluation (i e  because of potential affect on operative risk): infection, bleeding, death, and hardware failure  Planned anesthesia: general and IV sedation  The patient has the following known anesthesia issues: none  Patients bleeding risk: no recent abnormal bleeding, no remote history of abnormal bleeding and no use of Ca-channel blockers  Patient does not have objections to receiving blood products if needed  The following portions of the patient's history were reviewed and updated as appropriate:   He has a past medical history of Anxiety, Dysphagia (4/16/2021), GERD (gastroesophageal reflux disease), Hematoma, subdural, with loss of consciousness, traumatic ([facility] [address] ), Hypertension, and Status post insertion of percutaneous endoscopic gastrostomy (PEG) tube ([facility] [address] ) (5/19/2021)  ,  does not have any pertinent problems on file  ,   has a past surgical history that includes Rotator cuff repair (Left); Hand surgery (Left); Brain hematoma evacuation (Right, 4/15/2021); PEG tube placement; PEG tube removal; Craniotomy (Right); pr replace skull plate/flap (Right, 6/99/0246); Brain hematoma evacuation (Right, 11/16/2021); and Brain hematoma evacuation (Right, 11/16/2021)  ,  family history includes Dementia in his father; Heart disease in his mother; Hypertension in his father  ,   reports that he has never smoked  He has never used smokeless tobacco  He reports previous alcohol use of about 3 0 standard drinks of alcohol per week  He reports previous drug use ,  has No Known Allergies     Current Outpatient Medications   Medication Sig Dispense Refill    acetaminophen (TYLENOL) 500 mg tablet Take 500 mg by mouth every 4 (four) hours as needed for mild pain      levETIRAcetam (KEPPRA) 750 mg tablet Take 1 tablet (750 mg total) by mouth every 12 (twelve) hours 180 tablet 3    lisinopril (ZESTRIL) 20 mg tablet TAKE 1 TABLET BY MOUTH EVERY DAY 90 tablet 1    Melatonin 5 MG TABS Take 1 tablet by mouth as needed        metoprolol tartrate (LOPRESSOR) 25 mg tablet TAKE 1 TABLET (25 MG TOTAL) BY MOUTH EVERY 12 (TWELVE) HOURS 180 tablet 1    omeprazole (PriLOSEC) 20 mg delayed release capsule Take 20 mg by mouth daily      ALPRAZolam (XANAX) 0 5 mg tablet Take 1 tablet (0 5 mg total) by mouth 3 (three) times a day as needed for anxiety 30 tablet 0    methocarbamol (ROBAXIN) 500 mg tablet Take 1 tablet (500 mg total) by mouth every 6 (six) hours for 7 days (Patient not taking: Reported on 12/8/2021 ) 28 tablet 0    naloxone (NARCAN) 4 mg/0 1 mL nasal spray Administer 1 spray into a nostril  If no response after 2-3 minutes, give another dose in the other nostril using a new spray  1 each 1    oxyCODONE (ROXICODONE) 5 immediate release tablet Take 1 tablet (5 mg total) by mouth every 8 (eight) hours as needed for severe pain (postoperative pain in skull incision) Max Daily Amount: 15 mg (Patient not taking: Reported on 12/27/2021 ) 21 tablet 0     No current facility-administered medications for this visit  Review of Systems  Review of Systems   Constitutional: Negative  HENT: Negative  Eyes: Negative  Respiratory: Negative  Cardiovascular: Negative  Gastrointestinal: Negative  Endocrine: Negative  Genitourinary: Negative  Musculoskeletal: Negative  Skin: Negative  Allergic/Immunologic: Negative  Neurological: Negative  Helmet 2/2 right hemicrani   Hematological: Negative  Psychiatric/Behavioral: Negative  Vitals:    01/10/22 1259   BP: 158/84   Pulse: 79   Temp: 97 9 °F (36 6 °C)   SpO2: 99%     Body mass index is 27 55 kg/m²      Objective:    Physical Exam  Vitals and nursing note reviewed  Constitutional:       Appearance: Normal appearance  He is well-developed  Interventions: Face mask in place  HENT:      Head: Normocephalic and atraumatic  Right Ear: External ear normal       Left Ear: External ear normal       Nose: Nose normal    Eyes:      Conjunctiva/sclera: Conjunctivae normal       Pupils: Pupils are equal, round, and reactive to light  Cardiovascular:      Rate and Rhythm: Normal rate and regular rhythm  Heart sounds: Normal heart sounds  Pulmonary:      Effort: Pulmonary effort is normal       Breath sounds: Normal breath sounds  Abdominal:      General: Bowel sounds are normal       Palpations: Abdomen is soft  Genitourinary:     Comments: Deferred  Musculoskeletal:         General: Normal range of motion  Cervical back: Normal range of motion and neck supple  Skin:     General: Skin is warm and dry  Capillary Refill: Capillary refill takes less than 2 seconds  Neurological:      Mental Status: He is alert and oriented to person, place, and time  Psychiatric:         Behavior: Behavior normal          Thought Content: Thought content normal          Judgment: Judgment normal            Predictors of intubation difficulty:   Morbid obesity? no   Anatomically abnormal facies? no   Prominent incisors? no   Receding mandible? no   Short, thick neck? no   Neck range of motion: normal   Mallampati score: I (soft palate, uvula, fauces, and tonsillar pillars visible)   Thyromental distance: < 6cm   Mouth openin 75cm   Dentition: No chipped, loose, or missing teeth      Cardiographics  ECG: normal sinus rhythm, no blocks or conduction defects, no ischemic changes  Echocardiogram: not done    Imaging  Chest x-ray: not done     Lab Review   Lab Requisition on 2021   Component Date Value    WBC 2021 4 87     RBC 2021 4 40     Hemoglobin 2021 13 4     Hematocrit 2021 40 0     MCV 2021 91     MCH 12/29/2021 30 5     MCHC 12/29/2021 33 5     RDW 12/29/2021 12 9     MPV 12/29/2021 11 5     Platelets 27/69/6401 138*    nRBC 12/29/2021 0     Neutrophils Relative 12/29/2021 60     Immat GRANS % 12/29/2021 0     Lymphocytes Relative 12/29/2021 27     Monocytes Relative 12/29/2021 11     Eosinophils Relative 12/29/2021 2     Basophils Relative 12/29/2021 0     Neutrophils Absolute 12/29/2021 2 94     Immature Grans Absolute 12/29/2021 0 01     Lymphocytes Absolute 12/29/2021 1 29     Monocytes Absolute 12/29/2021 0 54     Eosinophils Absolute 12/29/2021 0 08     Basophils Absolute 12/29/2021 0 01     CRP 12/29/2021 5 5*    Creatinine 12/29/2021 0 61     eGFR 12/29/2021 108     Sed Rate 12/29/2021 11    Lab Requisition on 12/21/2021   Component Date Value    WBC 12/21/2021 5 04     RBC 12/21/2021 4 31     Hemoglobin 12/21/2021 13 4     Hematocrit 12/21/2021 39 4     MCV 12/21/2021 91     MCH 12/21/2021 31 1     MCHC 12/21/2021 34 0     RDW 12/21/2021 12 9     MPV 12/21/2021 11 9     Platelets 86/08/4701 140*    nRBC 12/21/2021 0     Neutrophils Relative 12/21/2021 67     Immat GRANS % 12/21/2021 0     Lymphocytes Relative 12/21/2021 22     Monocytes Relative 12/21/2021 9     Eosinophils Relative 12/21/2021 2     Basophils Relative 12/21/2021 0     Neutrophils Absolute 12/21/2021 3 33     Immature Grans Absolute 12/21/2021 0 00     Lymphocytes Absolute 12/21/2021 1 13     Monocytes Absolute 12/21/2021 0 46     Eosinophils Absolute 12/21/2021 0 10     Basophils Absolute 12/21/2021 0 02     CRP 12/21/2021 3 1*    Creatinine 12/21/2021 0 60     eGFR 12/21/2021 109     Sed Rate 12/21/2021 15     Vancomycin Tr 12/21/2021 14 8    Lab Requisition on 12/14/2021   Component Date Value    WBC 12/14/2021 5 31     RBC 12/14/2021 4 29     Hemoglobin 12/14/2021 13 3     Hematocrit 12/14/2021 39 3     MCV 12/14/2021 92     MCH 12/14/2021 31 0     MCHC 12/14/2021 33 8     RDW 12/14/2021 13 1     MPV 12/14/2021 12 2     Platelets 22/86/5071 140*    nRBC 12/14/2021 0     Neutrophils Relative 12/14/2021 67     Immat GRANS % 12/14/2021 0     Lymphocytes Relative 12/14/2021 23     Monocytes Relative 12/14/2021 8     Eosinophils Relative 12/14/2021 2     Basophils Relative 12/14/2021 0     Neutrophils Absolute 12/14/2021 3 56     Immature Grans Absolute 12/14/2021 0 01     Lymphocytes Absolute 12/14/2021 1 20     Monocytes Absolute 12/14/2021 0 40     Eosinophils Absolute 12/14/2021 0 12     Basophils Absolute 12/14/2021 0 02     Creatinine 12/14/2021 0 54*    eGFR 12/14/2021 114     Vancomycin Tr 12/14/2021 15 4    Lab Requisition on 12/07/2021   Component Date Value    Vancomycin Tr 12/07/2021 16 4     WBC 12/07/2021 5 99     RBC 12/07/2021 4 09     Hemoglobin 12/07/2021 12 8     Hematocrit 12/07/2021 38 0     MCV 12/07/2021 93     MCH 12/07/2021 31 3     MCHC 12/07/2021 33 7     RDW 12/07/2021 13 2     MPV 12/07/2021 11 5     Platelets 71/59/7659 224     nRBC 12/07/2021 0     Neutrophils Relative 12/07/2021 66     Immat GRANS % 12/07/2021 0     Lymphocytes Relative 12/07/2021 21     Monocytes Relative 12/07/2021 9     Eosinophils Relative 12/07/2021 3     Basophils Relative 12/07/2021 1     Neutrophils Absolute 12/07/2021 3 98     Immature Grans Absolute 12/07/2021 0 01     Lymphocytes Absolute 12/07/2021 1 28     Monocytes Absolute 12/07/2021 0 53     Eosinophils Absolute 12/07/2021 0 16     Basophils Absolute 12/07/2021 0 03     Creatinine 12/07/2021 0 70     eGFR 12/07/2021 103     Sed Rate 12/07/2021 17     CRP 12/07/2021 3 9*   Lab Requisition on 12/03/2021   Component Date Value    Vancomycin Tr 12/03/2021 14 8     WBC 12/03/2021 5 77     RBC 12/03/2021 4 02     Hemoglobin 12/03/2021 12 6     Hematocrit 12/03/2021 37 8     MCV 12/03/2021 94     MCH 12/03/2021 31 3     MCHC 12/03/2021 33 3     RDW 12/03/2021 13 3     MPV 12/03/2021 11 2     Platelets 01/49/7549 205     nRBC 12/03/2021 0     Neutrophils Relative 12/03/2021 63     Immat GRANS % 12/03/2021 0     Lymphocytes Relative 12/03/2021 26     Monocytes Relative 12/03/2021 9     Eosinophils Relative 12/03/2021 2     Basophils Relative 12/03/2021 0     Neutrophils Absolute 12/03/2021 3 62     Immature Grans Absolute 12/03/2021 0 02     Lymphocytes Absolute 12/03/2021 1 49     Monocytes Absolute 12/03/2021 0 51     Eosinophils Absolute 12/03/2021 0 11     Basophils Absolute 12/03/2021 0 02     Creatinine 12/03/2021 0 63     eGFR 12/03/2021 107    No results displayed because visit has over 200 results  Pt will go for serology per neurology request      Assessment:    62 y o  male  with planned surgery as above  Known risk factors for perioperative complications: None    Difficulty with intubation is not anticipated  Cardiac Risk Estimation: low    Current medications which may produce withdrawal symptoms if withheld perioperatively: none     Plan:    1  Preoperative workup as follows ECG  2  Change in medication regimen before surgery: none, continue medication regimen including morning of surgery, with sip of water  3  Prophylaxis for cardiac events with perioperative beta-blockers: not indicated  4  Invasive hemodynamic monitoring perioperatively: not indicated  5  Deep vein thrombosis prophylaxis postoperatively:intermittent pneumatic compression boots  6  Surveillance for postoperative MI with ECG immediately postoperatively and on postoperative days 1 and 2 AND troponin levels 24 hours postoperatively and on day 4 or hospital discharge (whichever comes first): not indicated  [address] is cleared for surgical procedure

## 2022-01-11 DIAGNOSIS — Z11.59 SCREENING FOR VIRAL DISEASE: Primary | ICD-10-CM

## 2022-01-11 PROCEDURE — NC001 PR NO CHARGE: Performed by: NEUROLOGICAL SURGERY

## 2022-01-11 PROCEDURE — 93000 ELECTROCARDIOGRAM COMPLETE: CPT | Performed by: NURSE PRACTITIONER

## 2022-01-12 ENCOUNTER — APPOINTMENT (OUTPATIENT)
Dept: LAB | Facility: CLINIC | Age: 62
End: 2022-01-12
Payer: COMMERCIAL

## 2022-01-12 ENCOUNTER — CLINICAL SUPPORT (OUTPATIENT)
Dept: FAMILY MEDICINE CLINIC | Facility: CLINIC | Age: 62
End: 2022-01-12

## 2022-01-12 ENCOUNTER — LAB REQUISITION (OUTPATIENT)
Dept: LAB | Facility: HOSPITAL | Age: 62
End: 2022-01-12
Payer: COMMERCIAL

## 2022-01-12 DIAGNOSIS — Z01.818 PREOP TESTING: ICD-10-CM

## 2022-01-12 DIAGNOSIS — Z01.818 ENCOUNTER FOR OTHER PREPROCEDURAL EXAMINATION: ICD-10-CM

## 2022-01-12 DIAGNOSIS — Z13.0 SCREENING FOR DEFICIENCY ANEMIA: ICD-10-CM

## 2022-01-12 DIAGNOSIS — Z13.1 SCREENING FOR DIABETES MELLITUS: ICD-10-CM

## 2022-01-12 DIAGNOSIS — R56.9 SEIZURE (HCC): ICD-10-CM

## 2022-01-12 DIAGNOSIS — T85.738A: ICD-10-CM

## 2022-01-12 DIAGNOSIS — T84.7XXA: ICD-10-CM

## 2022-01-12 DIAGNOSIS — E55.9 VITAMIN D DEFICIENCY: ICD-10-CM

## 2022-01-12 DIAGNOSIS — Z11.4 SCREENING FOR HUMAN IMMUNODEFICIENCY VIRUS: ICD-10-CM

## 2022-01-12 DIAGNOSIS — Z13.220 SCREENING FOR HYPERLIPIDEMIA: ICD-10-CM

## 2022-01-12 DIAGNOSIS — T84.7XXD INFECTION OF BONE FLAP, SUBSEQUENT ENCOUNTER: ICD-10-CM

## 2022-01-12 DIAGNOSIS — Z11.59 NEED FOR HEPATITIS C SCREENING TEST: ICD-10-CM

## 2022-01-12 DIAGNOSIS — Z98.890 STATUS POST CRANIOTOMY: ICD-10-CM

## 2022-01-12 DIAGNOSIS — Z13.29 SCREENING FOR THYROID DISORDER: ICD-10-CM

## 2022-01-12 DIAGNOSIS — Z11.59 SCREENING FOR VIRAL DISEASE: ICD-10-CM

## 2022-01-12 DIAGNOSIS — Z98.890 STATUS POST CRANIECTOMY: ICD-10-CM

## 2022-01-12 LAB
25(OH)D3 SERPL-MCNC: 13.9 NG/ML (ref 30–100)
ALBUMIN SERPL BCP-MCNC: 3.8 G/DL (ref 3.5–5)
ALP SERPL-CCNC: 129 U/L (ref 46–116)
ALT SERPL W P-5'-P-CCNC: 56 U/L (ref 12–78)
ANION GAP SERPL CALCULATED.3IONS-SCNC: 4 MMOL/L (ref 4–13)
APTT PPP: 28 SECONDS (ref 23–37)
AST SERPL W P-5'-P-CCNC: 38 U/L (ref 5–45)
BASOPHILS # BLD AUTO: 0.03 THOUSANDS/ΜL (ref 0–0.1)
BASOPHILS NFR BLD AUTO: 1 % (ref 0–1)
BILIRUB SERPL-MCNC: 0.76 MG/DL (ref 0.2–1)
BILIRUB UR QL STRIP: NEGATIVE
BUN SERPL-MCNC: 12 MG/DL (ref 5–25)
CALCIUM SERPL-MCNC: 9.8 MG/DL (ref 8.3–10.1)
CHLORIDE SERPL-SCNC: 106 MMOL/L (ref 100–108)
CHOLEST SERPL-MCNC: 178 MG/DL
CLARITY UR: CLEAR
CO2 SERPL-SCNC: 28 MMOL/L (ref 21–32)
COLOR UR: YELLOW
CREAT SERPL-MCNC: 0.84 MG/DL (ref 0.6–1.3)
CRP SERPL QL: 5.1 MG/L
EOSINOPHIL # BLD AUTO: 0.1 THOUSAND/ΜL (ref 0–0.61)
EOSINOPHIL NFR BLD AUTO: 2 % (ref 0–6)
ERYTHROCYTE [DISTWIDTH] IN BLOOD BY AUTOMATED COUNT: 13.4 % (ref 11.6–15.1)
ERYTHROCYTE [SEDIMENTATION RATE] IN BLOOD: 16 MM/HOUR (ref 0–19)
GFR SERPL CREATININE-BSD FRML MDRD: 95 ML/MIN/1.73SQ M
GLUCOSE P FAST SERPL-MCNC: 102 MG/DL (ref 65–99)
GLUCOSE UR STRIP-MCNC: NEGATIVE MG/DL
HCT VFR BLD AUTO: 41.9 % (ref 36.5–49.3)
HCV AB SER QL: NORMAL
HDLC SERPL-MCNC: 51 MG/DL
HGB BLD-MCNC: 14 G/DL (ref 12–17)
HGB UR QL STRIP.AUTO: NEGATIVE
IMM GRANULOCYTES # BLD AUTO: 0.01 THOUSAND/UL (ref 0–0.2)
IMM GRANULOCYTES NFR BLD AUTO: 0 % (ref 0–2)
KETONES UR STRIP-MCNC: NEGATIVE MG/DL
LDLC SERPL CALC-MCNC: 95 MG/DL (ref 0–100)
LEUKOCYTE ESTERASE UR QL STRIP: NEGATIVE
LYMPHOCYTES # BLD AUTO: 1.26 THOUSANDS/ΜL (ref 0.6–4.47)
LYMPHOCYTES NFR BLD AUTO: 27 % (ref 14–44)
MCH RBC QN AUTO: 30.4 PG (ref 26.8–34.3)
MCHC RBC AUTO-ENTMCNC: 33.4 G/DL (ref 31.4–37.4)
MCV RBC AUTO: 91 FL (ref 82–98)
MONOCYTES # BLD AUTO: 0.49 THOUSAND/ΜL (ref 0.17–1.22)
MONOCYTES NFR BLD AUTO: 10 % (ref 4–12)
NEUTROPHILS # BLD AUTO: 2.83 THOUSANDS/ΜL (ref 1.85–7.62)
NEUTS SEG NFR BLD AUTO: 60 % (ref 43–75)
NITRITE UR QL STRIP: NEGATIVE
NONHDLC SERPL-MCNC: 127 MG/DL
NRBC BLD AUTO-RTO: 0 /100 WBCS
PH UR STRIP.AUTO: 6 [PH]
PLATELET # BLD AUTO: 127 THOUSANDS/UL (ref 149–390)
PMV BLD AUTO: 12.1 FL (ref 8.9–12.7)
POTASSIUM SERPL-SCNC: 4.2 MMOL/L (ref 3.5–5.3)
PROT SERPL-MCNC: 8.7 G/DL (ref 6.4–8.2)
PROT UR STRIP-MCNC: NEGATIVE MG/DL
RBC # BLD AUTO: 4.61 MILLION/UL (ref 3.88–5.62)
SODIUM SERPL-SCNC: 138 MMOL/L (ref 136–145)
SP GR UR STRIP.AUTO: 1.02 (ref 1–1.03)
TRIGL SERPL-MCNC: 158 MG/DL
TSH SERPL DL<=0.05 MIU/L-ACNC: 1.81 UIU/ML (ref 0.36–3.74)
UROBILINOGEN UR QL STRIP.AUTO: 0.2 E.U./DL
WBC # BLD AUTO: 4.72 THOUSAND/UL (ref 4.31–10.16)

## 2022-01-12 PROCEDURE — 80061 LIPID PANEL: CPT

## 2022-01-12 PROCEDURE — 36415 COLL VENOUS BLD VENIPUNCTURE: CPT

## 2022-01-12 PROCEDURE — 85730 THROMBOPLASTIN TIME PARTIAL: CPT

## 2022-01-12 PROCEDURE — U0003 INFECTIOUS AGENT DETECTION BY NUCLEIC ACID (DNA OR RNA); SEVERE ACUTE RESPIRATORY SYNDROME CORONAVIRUS 2 (SARS-COV-2) (CORONAVIRUS DISEASE [COVID-19]), AMPLIFIED PROBE TECHNIQUE, MAKING USE OF HIGH THROUGHPUT TECHNOLOGIES AS DESCRIBED BY CMS-2020-01-R: HCPCS | Performed by: NEUROLOGICAL SURGERY

## 2022-01-12 PROCEDURE — 86900 BLOOD TYPING SEROLOGIC ABO: CPT | Performed by: NEUROLOGICAL SURGERY

## 2022-01-12 PROCEDURE — 86803 HEPATITIS C AB TEST: CPT

## 2022-01-12 PROCEDURE — 87389 HIV-1 AG W/HIV-1&-2 AB AG IA: CPT

## 2022-01-12 PROCEDURE — 82306 VITAMIN D 25 HYDROXY: CPT

## 2022-01-12 PROCEDURE — U0005 INFEC AGEN DETEC AMPLI PROBE: HCPCS | Performed by: NEUROLOGICAL SURGERY

## 2022-01-12 PROCEDURE — 85025 COMPLETE CBC W/AUTO DIFF WBC: CPT

## 2022-01-12 PROCEDURE — 85652 RBC SED RATE AUTOMATED: CPT

## 2022-01-12 PROCEDURE — 86901 BLOOD TYPING SEROLOGIC RH(D): CPT | Performed by: NEUROLOGICAL SURGERY

## 2022-01-12 PROCEDURE — 86140 C-REACTIVE PROTEIN: CPT

## 2022-01-12 PROCEDURE — 80053 COMPREHEN METABOLIC PANEL: CPT

## 2022-01-12 PROCEDURE — 81003 URINALYSIS AUTO W/O SCOPE: CPT | Performed by: NEUROLOGICAL SURGERY

## 2022-01-12 PROCEDURE — 86850 RBC ANTIBODY SCREEN: CPT | Performed by: NEUROLOGICAL SURGERY

## 2022-01-12 PROCEDURE — 84443 ASSAY THYROID STIM HORMONE: CPT

## 2022-01-13 ENCOUNTER — PATIENT MESSAGE (OUTPATIENT)
Dept: FAMILY MEDICINE CLINIC | Facility: CLINIC | Age: 62
End: 2022-01-13

## 2022-01-13 LAB
ABO GROUP BLD: NORMAL
BLD GP AB SCN SERPL QL: NEGATIVE
HIV 1+2 AB+HIV1 P24 AG SERPL QL IA: NORMAL
RH BLD: POSITIVE
SARS-COV-2 RNA RESP QL NAA+PROBE: POSITIVE
SPECIMEN EXPIRATION DATE: NORMAL

## 2022-01-14 DIAGNOSIS — Z03.818 ENCOUNTER FOR OBSERVATION FOR SUSPECTED EXPOSURE TO OTHER BIOLOGICAL AGENTS RULED OUT: Primary | ICD-10-CM

## 2022-01-14 PROCEDURE — 87636 SARSCOV2 & INF A&B AMP PRB: CPT | Performed by: NURSE PRACTITIONER

## 2022-01-14 NOTE — PROGRESS NOTES
Pt thinks this is a false positive and is asking to be tested again so he can have his surgery - will place order in chart

## 2022-01-15 LAB
FLUAV RNA RESP QL NAA+PROBE: NEGATIVE
FLUBV RNA RESP QL NAA+PROBE: NEGATIVE
SARS-COV-2 RNA RESP QL NAA+PROBE: NEGATIVE

## 2022-01-27 ENCOUNTER — OFFICE VISIT (OUTPATIENT)
Dept: FAMILY MEDICINE CLINIC | Facility: CLINIC | Age: 62
End: 2022-01-27
Payer: COMMERCIAL

## 2022-01-27 VITALS
OXYGEN SATURATION: 99 % | RESPIRATION RATE: 18 BRPM | WEIGHT: 195 LBS | TEMPERATURE: 97.6 F | HEIGHT: 70 IN | DIASTOLIC BLOOD PRESSURE: 78 MMHG | HEART RATE: 79 BPM | BODY MASS INDEX: 27.92 KG/M2 | SYSTOLIC BLOOD PRESSURE: 146 MMHG

## 2022-01-27 DIAGNOSIS — F41.9 ANXIETY: Primary | ICD-10-CM

## 2022-01-27 PROCEDURE — 1036F TOBACCO NON-USER: CPT | Performed by: NURSE PRACTITIONER

## 2022-01-27 PROCEDURE — 99213 OFFICE O/P EST LOW 20 MIN: CPT | Performed by: NURSE PRACTITIONER

## 2022-01-27 PROCEDURE — 3077F SYST BP >= 140 MM HG: CPT | Performed by: NURSE PRACTITIONER

## 2022-01-27 PROCEDURE — 3008F BODY MASS INDEX DOCD: CPT | Performed by: NURSE PRACTITIONER

## 2022-01-27 PROCEDURE — 3078F DIAST BP <80 MM HG: CPT | Performed by: NURSE PRACTITIONER

## 2022-01-27 RX ORDER — LORAZEPAM 1 MG/1
1 TABLET ORAL 2 TIMES DAILY PRN
Qty: 30 TABLET | Refills: 0 | Status: SHIPPED | OUTPATIENT
Start: 2022-01-27 | End: 2022-03-10 | Stop reason: SDUPTHER

## 2022-01-27 NOTE — PROGRESS NOTES
Assessment/Plan:    Problem List Items Addressed This Visit     Anxiety - Primary    Relevant Medications    LORazepam (ATIVAN) 1 mg tablet           Diagnoses and all orders for this visit:    Anxiety  -     LORazepam (ATIVAN) 1 mg tablet; Take 1 tablet (1 mg total) by mouth 2 (two) times a day as needed for anxiety        No problem-specific Assessment & Plan notes found for this encounter  Subjective:      Patient ID: Dev English is a 62 y o  male  Pt presents with wife to discuss about pre-op Covid tests and changing BZD  Patient was seen by me January 10th for preoperative clearance for right cranioplasty  Patient did have a preop COVID test which resulted his being positive  As result patient's surgery has been delayed to March 8, 2022  Patient was retested for COVID which and being positive  Patient has not been exposed to any COVID positive individuals it has been minimizing his risk by avoiding public contacts and individuals with upper respiratory ailments  After today patient will stay at home and will not have interaction with anybody outside of his household  Patient is also presents to discuss changes benzodiazepine from Xanax 0 5 mg t i d  p r n  to Ativan 1 mg b i d  p r n  temporarily until the cranioplasty  Patient has been on Ativan in the past with good effectiveness  The following portions of the patient's history were reviewed and updated as appropriate:   He has a past medical history of Anxiety, Dysphagia (4/16/2021), GERD (gastroesophageal reflux disease), Hematoma, subdural, with loss of consciousness, traumatic ([facility] [address] ), Hypertension, and Status post insertion of percutaneous endoscopic gastrostomy (PEG) tube ([facility] [address] ) (5/19/2021)  ,  does not have any pertinent problems on file  ,   has a past surgical history that includes Rotator cuff repair (Left); Hand surgery (Left);  Brain hematoma evacuation (Right, 4/15/2021); PEG tube placement; PEG tube removal; Craniotomy (Right); pr replace skull plate/flap (Right, 4/74/6780); Brain hematoma evacuation (Right, 11/16/2021); and Brain hematoma evacuation (Right, 11/16/2021)  ,  family history includes Dementia in his father; Heart disease in his mother; Hypertension in his father  ,   reports that he has never smoked  He has never used smokeless tobacco  He reports previous alcohol use of about 3 0 standard drinks of alcohol per week  He reports previous drug use ,  has No Known Allergies     Current Outpatient Medications   Medication Sig Dispense Refill    acetaminophen (TYLENOL) 500 mg tablet Take 500 mg by mouth every 4 (four) hours as needed for mild pain      Cholecalciferol (Vitamin D3) 50 MCG (2000 UT) TABS Take 1 tablet (2,000 Units total) by mouth daily Take after Vit D 50,000 weekly is completed 90 tablet 3    ergocalciferol (VITAMIN D2) 50,000 units Take 1 capsule (50,000 Units total) by mouth once a week 12 capsule 1    levETIRAcetam (KEPPRA) 750 mg tablet Take 1 tablet (750 mg total) by mouth every 12 (twelve) hours 180 tablet 3    lisinopril (ZESTRIL) 20 mg tablet TAKE 1 TABLET BY MOUTH EVERY DAY 90 tablet 1    Melatonin 5 MG TABS Take 1 tablet by mouth as needed        metoprolol tartrate (LOPRESSOR) 25 mg tablet TAKE 1 TABLET (25 MG TOTAL) BY MOUTH EVERY 12 (TWELVE) HOURS 180 tablet 1    omeprazole (PriLOSEC) 20 mg delayed release capsule Take 20 mg by mouth daily      LORazepam (ATIVAN) 1 mg tablet Take 1 tablet (1 mg total) by mouth 2 (two) times a day as needed for anxiety 30 tablet 0    methocarbamol (ROBAXIN) 500 mg tablet Take 1 tablet (500 mg total) by mouth every 6 (six) hours for 7 days (Patient not taking: Reported on 12/8/2021 ) 28 tablet 0    naloxone (NARCAN) 4 mg/0 1 mL nasal spray Administer 1 spray into a nostril  If no response after 2-3 minutes, give another dose in the other nostril using a new spray   (Patient not taking: Reported on 1/27/2022 ) 1 each 1    oxyCODONE ([place]) 5 immediate release tablet Take 1 tablet (5 mg total) by mouth every 8 (eight) hours as needed for severe pain (postoperative pain in skull incision) Max Daily Amount: 15 mg (Patient not taking: Reported on 12/27/2021 ) 21 tablet 0     No current facility-administered medications for this visit  BMI Counseling: Body mass index is 27 98 kg/m²  The BMI is above normal  Nutrition recommendations include decreasing portion sizes, consuming healthier snacks, limiting drinks that contain sugar, moderation in carbohydrate intake and reducing intake of cholesterol  Exercise recommendations include exercising 3-5 times per week  No pharmacotherapy was ordered  Rationale for BMI follow-up plan is due to patient being overweight or obese  Review of Systems   Constitutional: Negative  HENT: Negative  Eyes: Negative  Respiratory: Negative  Cardiovascular: Negative  Gastrointestinal: Negative  Endocrine: Negative  Genitourinary: Negative  Musculoskeletal: Negative  Skin: Negative  Allergic/Immunologic: Negative  Neurological: Negative  Hematological: Negative  Psychiatric/Behavioral: Positive for sleep disturbance  The patient is nervous/anxious  Objective:  Vitals:    01/27/22 0935   BP: 146/78   Pulse: 79   Resp: 18   Temp: 97 6 °F (36 4 °C)   SpO2: 99%   Weight: 88 5 kg (195 lb)   Height: 5' 10" (1 778 m)     Body mass index is 27 98 kg/m²  Physical Exam  Vitals and nursing note reviewed  Constitutional:       Appearance: Normal appearance  He is well-developed  Interventions: Face mask in place  HENT:      Head: Normocephalic and atraumatic  Right Ear: Tympanic membrane, ear canal and external ear normal       Left Ear: Tympanic membrane, ear canal and external ear normal       Nose: Nose normal       Mouth/Throat:      Mouth: Mucous membranes are moist       Pharynx: Uvula midline     Eyes:      General: Lids are normal       Conjunctiva/sclera: Conjunctivae normal       Pupils: Pupils are equal, round, and reactive to light  Neck:      Thyroid: No thyroid mass  Vascular: No JVD  Trachea: Trachea and phonation normal    Cardiovascular:      Rate and Rhythm: Normal rate and regular rhythm  Pulses: Normal pulses  Heart sounds: Normal heart sounds, S1 normal and S2 normal  No murmur heard  No friction rub  No gallop  Pulmonary:      Effort: Pulmonary effort is normal       Breath sounds: Normal breath sounds  Abdominal:      General: Bowel sounds are normal       Palpations: Abdomen is soft  Tenderness: There is no abdominal tenderness  Genitourinary:     Comments: Deferred  Musculoskeletal:         General: Normal range of motion  Cervical back: Full passive range of motion without pain, normal range of motion and neck supple  Right lower leg: No edema  Left lower leg: No edema  Lymphadenopathy:      Head:      Right side of head: No submental, submandibular, tonsillar, preauricular, posterior auricular or occipital adenopathy  Left side of head: No submental, submandibular, tonsillar, preauricular, posterior auricular or occipital adenopathy  Cervical: No cervical adenopathy  Skin:     General: Skin is warm and dry  Capillary Refill: Capillary refill takes less than 2 seconds  Neurological:      General: No focal deficit present  Mental Status: He is alert and oriented to person, place, and time  Cranial Nerves: Cranial nerves are intact  Sensory: Sensation is intact  Motor: Motor function is intact  Coordination: Coordination is intact  Gait: Gait is intact  Psychiatric:         Attention and Perception: Attention and perception normal          Mood and Affect: Mood and affect normal          Speech: Speech normal          Behavior: Behavior normal  Behavior is cooperative  Thought Content:  Thought content normal          Cognition and Memory: Cognition normal          Judgment: Judgment normal

## 2022-01-31 DIAGNOSIS — L40.9 PSORIASIS: ICD-10-CM

## 2022-02-07 ENCOUNTER — TELEPHONE (OUTPATIENT)
Dept: FAMILY MEDICINE CLINIC | Facility: CLINIC | Age: 62
End: 2022-02-07

## 2022-02-07 NOTE — TELEPHONE ENCOUNTER
Patients wife called patient prescription Halobetasol propionate is $500 an alternative is halobetasol  0 05% cream which is similar and only $15   please call in new prescription

## 2022-02-11 ENCOUNTER — TELEPHONE (OUTPATIENT)
Dept: FAMILY MEDICINE CLINIC | Facility: CLINIC | Age: 62
End: 2022-02-11

## 2022-02-11 DIAGNOSIS — L40.9 PSORIASIS: ICD-10-CM

## 2022-02-11 NOTE — TELEPHONE ENCOUNTER
prescription was sent to wrong pharmacy needs to be resent to [address] I will resent in

## 2022-02-15 ENCOUNTER — ANESTHESIA EVENT (OUTPATIENT)
Dept: PERIOP | Facility: HOSPITAL | Age: 62
DRG: 983 | End: 2022-02-15
Payer: COMMERCIAL

## 2022-02-15 NOTE — PRE-PROCEDURE INSTRUCTIONS
Pre-Surgery Instructions:   Medication Instructions    acetaminophen (TYLENOL) 500 mg tablet Continue to take this medication on your normal schedule  Ok to take [employer]   Cholecalciferol (Vitamin D3) 50 MCG (2000 UT) TABS  stop taking 7 days prior to surgery   ergocalciferol (VITAMIN D2) 50,000 units  stop taking 7 days prior to surgery   halobetasol (ULTRAVATE) 0 05 % cream Continue to use as ordered but do not use the day before or the DOS   levETIRAcetam (KEPPRA) 750 mg tablet Continue to take this medication on your normal schedule  Ok to take [employer]   lisinopril (ZESTRIL) 20 mg tablet Continue this medication up to the evening before surgery/procedure, but do not take the morning of the day of surgery   LORazepam (ATIVAN) 1 mg tablet Continue to take this medication on your normal schedule  Ok to take [employer]   Melatonin 5 MG TABS  stop taking 7 days prior to surgery   metoprolol tartrate (LOPRESSOR) 25 mg tablet Continue to take this medication on your normal schedule  Ok to take [employer]   omeprazole (PriLOSEC) 20 mg delayed release capsule Continue to take this medication on your normal schedule  Ok to take [employer]  Covid screening negative as per patient  Reviewed with patient via phone all medication instructions  Advised not to take any NSAID's, Vitamins or Herbal products prior to the DOS  Acetaminophen products are ok to take  Reviewed showering instructions as given by surgical office  Instructed to call office with any questions or concerns  Instructed about NPO after midnight the night before DOS, except sips of water with allowed medications in AM on DOS  Sleep Medicine Referral offered but patient states, not interested at this time  Informed about call from [facility] with the time to arrive for the scheduled surgery  Patient verbalized understanding

## 2022-03-03 ENCOUNTER — TELEPHONE (OUTPATIENT)
Dept: FAMILY MEDICINE CLINIC | Facility: CLINIC | Age: 62
End: 2022-03-03

## 2022-03-03 NOTE — TELEPHONE ENCOUNTER
Has pt stayed at home since last seen in [place]? If so, he can be cleared for Sx   I had advised surgeon of plan at Pt last OV

## 2022-03-03 NOTE — TELEPHONE ENCOUNTER
Pt is sched for surgery again on 3/8/22- do you want to see him again or write a note he is still cleared?

## 2022-03-07 ENCOUNTER — DOCUMENTATION (OUTPATIENT)
Dept: NEUROSURGERY | Facility: CLINIC | Age: 62
End: 2022-03-07

## 2022-03-08 ENCOUNTER — ANESTHESIA (OUTPATIENT)
Dept: PERIOP | Facility: HOSPITAL | Age: 62
DRG: 983 | End: 2022-03-08
Payer: COMMERCIAL

## 2022-03-08 ENCOUNTER — HOSPITAL ENCOUNTER (INPATIENT)
Facility: HOSPITAL | Age: 62
LOS: 2 days | Discharge: HOME | DRG: 983 | End: 2022-03-10
Attending: NEUROLOGICAL SURGERY | Admitting: NEUROLOGICAL SURGERY
Payer: COMMERCIAL

## 2022-03-08 DIAGNOSIS — Z98.890 POST-OPERATIVE STATE: ICD-10-CM

## 2022-03-08 DIAGNOSIS — Z98.890 STATUS POST CRANIECTOMY: Primary | Chronic | ICD-10-CM

## 2022-03-08 PROBLEM — R56.9 SEIZURE (HCC): Chronic | Status: ACTIVE | Noted: 2021-08-25

## 2022-03-08 PROBLEM — T84.7XXA INFECTION OF BONE FLAP (HCC): Chronic | Status: ACTIVE | Noted: 2021-11-19

## 2022-03-08 PROBLEM — R11.2 PONV (POSTOPERATIVE NAUSEA AND VOMITING): Status: ACTIVE | Noted: 2022-03-08

## 2022-03-08 LAB
ABO GROUP BLD: NORMAL
BLD GP AB SCN SERPL QL: NEGATIVE
RH BLD: POSITIVE
SPECIMEN EXPIRATION DATE: NORMAL

## 2022-03-08 PROCEDURE — 0NR00JZ REPLACEMENT OF SKULL WITH SYNTHETIC SUBSTITUTE, OPEN APPROACH: ICD-10-PCS | Performed by: NEUROLOGICAL SURGERY

## 2022-03-08 PROCEDURE — 62143 RPL B1 FLP/PROSTC PLATE SKL: CPT | Performed by: NEUROLOGICAL SURGERY

## 2022-03-08 PROCEDURE — 99024 POSTOP FOLLOW-UP VISIT: CPT | Performed by: NEUROLOGICAL SURGERY

## 2022-03-08 PROCEDURE — 86900 BLOOD TYPING SEROLOGIC ABO: CPT | Performed by: ANESTHESIOLOGY

## 2022-03-08 PROCEDURE — 86901 BLOOD TYPING SEROLOGIC RH(D): CPT | Performed by: ANESTHESIOLOGY

## 2022-03-08 PROCEDURE — C1713 ANCHOR/SCREW BN/BN,TIS/BN: HCPCS | Performed by: NEUROLOGICAL SURGERY

## 2022-03-08 PROCEDURE — 86850 RBC ANTIBODY SCREEN: CPT | Performed by: ANESTHESIOLOGY

## 2022-03-08 DEVICE — PEEK CUSTOMIZED CRANIAL IMPLANT PRIO, L
Type: IMPLANTABLE DEVICE | Site: CRANIAL | Status: NON-FUNCTIONAL
Removed: 2022-07-19

## 2022-03-08 DEVICE — IMPLANTABLE DEVICE
Type: IMPLANTABLE DEVICE | Site: CRANIAL | Status: NON-FUNCTIONAL
Brand: THINFLAP SYSTEM
Removed: 2022-07-19

## 2022-03-08 RX ORDER — PROPOFOL 10 MG/ML
INJECTION, EMULSION INTRAVENOUS AS NEEDED
Status: DISCONTINUED | OUTPATIENT
Start: 2022-03-08 | End: 2022-03-08

## 2022-03-08 RX ORDER — DEXMEDETOMIDINE HYDROCHLORIDE 100 UG/ML
INJECTION, SOLUTION INTRAVENOUS AS NEEDED
Status: DISCONTINUED | OUTPATIENT
Start: 2022-03-08 | End: 2022-03-08

## 2022-03-08 RX ORDER — MIDAZOLAM HYDROCHLORIDE 2 MG/2ML
INJECTION, SOLUTION INTRAMUSCULAR; INTRAVENOUS AS NEEDED
Status: DISCONTINUED | OUTPATIENT
Start: 2022-03-08 | End: 2022-03-08

## 2022-03-08 RX ORDER — ALBUTEROL SULFATE 2.5 MG/3ML
2.5 SOLUTION RESPIRATORY (INHALATION) ONCE AS NEEDED
Status: DISCONTINUED | OUTPATIENT
Start: 2022-03-08 | End: 2022-03-08 | Stop reason: HOSPADM

## 2022-03-08 RX ORDER — HYDROMORPHONE HCL/PF 1 MG/ML
0.5 SYRINGE (ML) INJECTION
Status: DISCONTINUED | OUTPATIENT
Start: 2022-03-08 | End: 2022-03-08 | Stop reason: HOSPADM

## 2022-03-08 RX ORDER — ACETAMINOPHEN 325 MG/1
650 TABLET ORAL EVERY 4 HOURS PRN
Status: DISCONTINUED | OUTPATIENT
Start: 2022-03-08 | End: 2022-03-10 | Stop reason: HOSPADM

## 2022-03-08 RX ORDER — ONDANSETRON 2 MG/ML
4 INJECTION INTRAMUSCULAR; INTRAVENOUS ONCE AS NEEDED
Status: DISCONTINUED | OUTPATIENT
Start: 2022-03-08 | End: 2022-03-08 | Stop reason: HOSPADM

## 2022-03-08 RX ORDER — HYDROMORPHONE HCL/PF 1 MG/ML
0.5 SYRINGE (ML) INJECTION
Status: DISCONTINUED | OUTPATIENT
Start: 2022-03-08 | End: 2022-03-09

## 2022-03-08 RX ORDER — HEPARIN SODIUM 5000 [USP'U]/ML
5000 INJECTION, SOLUTION INTRAVENOUS; SUBCUTANEOUS EVERY 8 HOURS SCHEDULED
Status: DISCONTINUED | OUTPATIENT
Start: 2022-03-10 | End: 2022-03-10 | Stop reason: HOSPADM

## 2022-03-08 RX ORDER — SCOLOPAMINE TRANSDERMAL SYSTEM 1 MG/1
1 PATCH, EXTENDED RELEASE TRANSDERMAL ONCE
Status: DISCONTINUED | OUTPATIENT
Start: 2022-03-08 | End: 2022-03-10 | Stop reason: HOSPADM

## 2022-03-08 RX ORDER — HYDROMORPHONE HCL 110MG/55ML
PATIENT CONTROLLED ANALGESIA SYRINGE INTRAVENOUS AS NEEDED
Status: DISCONTINUED | OUTPATIENT
Start: 2022-03-08 | End: 2022-03-08

## 2022-03-08 RX ORDER — DEXAMETHASONE SODIUM PHOSPHATE 10 MG/ML
INJECTION, SOLUTION INTRAMUSCULAR; INTRAVENOUS AS NEEDED
Status: DISCONTINUED | OUTPATIENT
Start: 2022-03-08 | End: 2022-03-08

## 2022-03-08 RX ORDER — LEVETIRACETAM 750 MG/1
750 TABLET ORAL EVERY 12 HOURS SCHEDULED
Status: DISCONTINUED | OUTPATIENT
Start: 2022-03-08 | End: 2022-03-10 | Stop reason: HOSPADM

## 2022-03-08 RX ORDER — FENTANYL CITRATE 50 UG/ML
INJECTION, SOLUTION INTRAMUSCULAR; INTRAVENOUS AS NEEDED
Status: DISCONTINUED | OUTPATIENT
Start: 2022-03-08 | End: 2022-03-08

## 2022-03-08 RX ORDER — OXYCODONE HYDROCHLORIDE 5 MG/1
5 TABLET ORAL EVERY 4 HOURS PRN
Status: DISCONTINUED | OUTPATIENT
Start: 2022-03-08 | End: 2022-03-10 | Stop reason: HOSPADM

## 2022-03-08 RX ORDER — DIPHENHYDRAMINE HYDROCHLORIDE 50 MG/ML
12.5 INJECTION INTRAMUSCULAR; INTRAVENOUS ONCE AS NEEDED
Status: DISCONTINUED | OUTPATIENT
Start: 2022-03-08 | End: 2022-03-08 | Stop reason: HOSPADM

## 2022-03-08 RX ORDER — SODIUM CHLORIDE 9 MG/ML
75 INJECTION, SOLUTION INTRAVENOUS CONTINUOUS
Status: DISCONTINUED | OUTPATIENT
Start: 2022-03-08 | End: 2022-03-10

## 2022-03-08 RX ORDER — FENTANYL CITRATE/PF 50 MCG/ML
25 SYRINGE (ML) INJECTION
Status: DISCONTINUED | OUTPATIENT
Start: 2022-03-08 | End: 2022-03-08 | Stop reason: HOSPADM

## 2022-03-08 RX ORDER — CEFAZOLIN SODIUM 2 G/50ML
2000 SOLUTION INTRAVENOUS ONCE
Status: COMPLETED | OUTPATIENT
Start: 2022-03-08 | End: 2022-03-08

## 2022-03-08 RX ORDER — ROCURONIUM BROMIDE 10 MG/ML
INJECTION, SOLUTION INTRAVENOUS AS NEEDED
Status: DISCONTINUED | OUTPATIENT
Start: 2022-03-08 | End: 2022-03-08

## 2022-03-08 RX ORDER — EPHEDRINE SULFATE 50 MG/ML
INJECTION INTRAVENOUS AS NEEDED
Status: DISCONTINUED | OUTPATIENT
Start: 2022-03-08 | End: 2022-03-08

## 2022-03-08 RX ORDER — PROPOFOL 10 MG/ML
INJECTION, EMULSION INTRAVENOUS CONTINUOUS PRN
Status: DISCONTINUED | OUTPATIENT
Start: 2022-03-08 | End: 2022-03-08

## 2022-03-08 RX ORDER — GINSENG 100 MG
CAPSULE ORAL AS NEEDED
Status: DISCONTINUED | OUTPATIENT
Start: 2022-03-08 | End: 2022-03-08 | Stop reason: HOSPADM

## 2022-03-08 RX ORDER — CHLORHEXIDINE GLUCONATE 0.12 MG/ML
15 RINSE ORAL ONCE
Status: COMPLETED | OUTPATIENT
Start: 2022-03-08 | End: 2022-03-08

## 2022-03-08 RX ORDER — DOXYCYCLINE HYCLATE 100 MG/1
100 CAPSULE ORAL EVERY 12 HOURS SCHEDULED
Status: DISCONTINUED | OUTPATIENT
Start: 2022-03-10 | End: 2022-03-10 | Stop reason: HOSPADM

## 2022-03-08 RX ORDER — LIDOCAINE HYDROCHLORIDE 10 MG/ML
0.5 INJECTION, SOLUTION EPIDURAL; INFILTRATION; INTRACAUDAL; PERINEURAL ONCE AS NEEDED
Status: COMPLETED | OUTPATIENT
Start: 2022-03-08 | End: 2022-03-08

## 2022-03-08 RX ORDER — LIDOCAINE HYDROCHLORIDE 10 MG/ML
INJECTION, SOLUTION EPIDURAL; INFILTRATION; INTRACAUDAL; PERINEURAL AS NEEDED
Status: DISCONTINUED | OUTPATIENT
Start: 2022-03-08 | End: 2022-03-08

## 2022-03-08 RX ORDER — CEFAZOLIN SODIUM 2 G/50ML
2000 SOLUTION INTRAVENOUS EVERY 8 HOURS
Status: COMPLETED | OUTPATIENT
Start: 2022-03-08 | End: 2022-03-09

## 2022-03-08 RX ORDER — OXYCODONE HYDROCHLORIDE 10 MG/1
10 TABLET ORAL EVERY 4 HOURS PRN
Status: DISCONTINUED | OUTPATIENT
Start: 2022-03-08 | End: 2022-03-10 | Stop reason: HOSPADM

## 2022-03-08 RX ORDER — ONDANSETRON 2 MG/ML
4 INJECTION INTRAMUSCULAR; INTRAVENOUS EVERY 6 HOURS PRN
Status: DISCONTINUED | OUTPATIENT
Start: 2022-03-08 | End: 2022-03-10 | Stop reason: HOSPADM

## 2022-03-08 RX ORDER — ONDANSETRON 2 MG/ML
INJECTION INTRAMUSCULAR; INTRAVENOUS AS NEEDED
Status: DISCONTINUED | OUTPATIENT
Start: 2022-03-08 | End: 2022-03-08

## 2022-03-08 RX ORDER — SODIUM CHLORIDE 9 MG/ML
125 INJECTION, SOLUTION INTRAVENOUS CONTINUOUS
Status: DISCONTINUED | OUTPATIENT
Start: 2022-03-08 | End: 2022-03-10

## 2022-03-08 RX ADMIN — LIDOCAINE HYDROCHLORIDE 100 MG: 10 INJECTION, SOLUTION EPIDURAL; INFILTRATION; INTRACAUDAL; PERINEURAL at 12:33

## 2022-03-08 RX ADMIN — OXYCODONE HYDROCHLORIDE 10 MG: 10 TABLET ORAL at 23:11

## 2022-03-08 RX ADMIN — SCOLOPAMINE TRANSDERMAL SYSTEM 1 PATCH: 1 PATCH, EXTENDED RELEASE TRANSDERMAL at 12:19

## 2022-03-08 RX ADMIN — ROCURONIUM BROMIDE 30 MG: 50 INJECTION, SOLUTION INTRAVENOUS at 13:13

## 2022-03-08 RX ADMIN — FENTANYL CITRATE 50 MCG: 50 INJECTION INTRAMUSCULAR; INTRAVENOUS at 14:57

## 2022-03-08 RX ADMIN — OXYCODONE HYDROCHLORIDE 5 MG: 5 TABLET ORAL at 19:05

## 2022-03-08 RX ADMIN — ONDANSETRON 4 MG: 2 INJECTION INTRAMUSCULAR; INTRAVENOUS at 20:00

## 2022-03-08 RX ADMIN — HYDROMORPHONE HYDROCHLORIDE 0.5 MG: 1 INJECTION, SOLUTION INTRAMUSCULAR; INTRAVENOUS; SUBCUTANEOUS at 20:41

## 2022-03-08 RX ADMIN — FENTANYL CITRATE 50 MCG: 50 INJECTION INTRAMUSCULAR; INTRAVENOUS at 12:33

## 2022-03-08 RX ADMIN — HYDROMORPHONE HYDROCHLORIDE 0.5 MG: 1 INJECTION, SOLUTION INTRAMUSCULAR; INTRAVENOUS; SUBCUTANEOUS at 16:14

## 2022-03-08 RX ADMIN — HYDROMORPHONE HYDROCHLORIDE 1 MG: 2 INJECTION, SOLUTION INTRAMUSCULAR; INTRAVENOUS; SUBCUTANEOUS at 13:18

## 2022-03-08 RX ADMIN — PROPOFOL 40 MCG/KG/MIN: 10 INJECTION, EMULSION INTRAVENOUS at 13:08

## 2022-03-08 RX ADMIN — DEXMEDETOMIDINE HCL 8 MCG: 100 INJECTION INTRAVENOUS at 15:10

## 2022-03-08 RX ADMIN — DEXAMETHASONE SODIUM PHOSPHATE 10 MG: 10 INJECTION, SOLUTION INTRAMUSCULAR; INTRAVENOUS at 12:35

## 2022-03-08 RX ADMIN — CEFAZOLIN SODIUM 2000 MG: 2 SOLUTION INTRAVENOUS at 12:47

## 2022-03-08 RX ADMIN — SUGAMMADEX 200 MG: 100 INJECTION, SOLUTION INTRAVENOUS at 14:04

## 2022-03-08 RX ADMIN — LIDOCAINE HYDROCHLORIDE 0.5 ML: 10 INJECTION, SOLUTION EPIDURAL; INFILTRATION; INTRACAUDAL; PERINEURAL at 12:18

## 2022-03-08 RX ADMIN — FENTANYL CITRATE 50 MCG: 50 INJECTION INTRAMUSCULAR; INTRAVENOUS at 13:18

## 2022-03-08 RX ADMIN — Medication 25 MCG: at 16:09

## 2022-03-08 RX ADMIN — PROPOFOL 200 MG: 10 INJECTION, EMULSION INTRAVENOUS at 12:33

## 2022-03-08 RX ADMIN — ROCURONIUM BROMIDE 50 MG: 50 INJECTION, SOLUTION INTRAVENOUS at 12:35

## 2022-03-08 RX ADMIN — CHLORHEXIDINE GLUCONATE 15 ML: 1.2 SOLUTION ORAL at 11:23

## 2022-03-08 RX ADMIN — CEFAZOLIN SODIUM 2000 MG: 2 SOLUTION INTRAVENOUS at 20:41

## 2022-03-08 RX ADMIN — ONDANSETRON 4 MG: 2 INJECTION INTRAMUSCULAR; INTRAVENOUS at 14:01

## 2022-03-08 RX ADMIN — DEXMEDETOMIDINE HCL 12 MCG: 100 INJECTION INTRAVENOUS at 13:20

## 2022-03-08 RX ADMIN — PROPOFOL 100 MG: 10 INJECTION, EMULSION INTRAVENOUS at 12:35

## 2022-03-08 RX ADMIN — FENTANYL CITRATE 50 MCG: 50 INJECTION INTRAMUSCULAR; INTRAVENOUS at 14:41

## 2022-03-08 RX ADMIN — EPHEDRINE SULFATE 10 MG: 50 INJECTION INTRAVENOUS at 13:06

## 2022-03-08 RX ADMIN — MIDAZOLAM 2 MG: 1 INJECTION INTRAMUSCULAR; INTRAVENOUS at 12:24

## 2022-03-08 RX ADMIN — VANCOMYCIN HYDROCHLORIDE 1500 MG: 5 INJECTION, POWDER, LYOPHILIZED, FOR SOLUTION INTRAVENOUS at 12:18

## 2022-03-08 RX ADMIN — SODIUM CHLORIDE 125 ML/HR: 9 INJECTION, SOLUTION INTRAVENOUS at 12:18

## 2022-03-08 RX ADMIN — SODIUM CHLORIDE 75 ML/HR: 9 INJECTION, SOLUTION INTRAVENOUS at 16:15

## 2022-03-08 RX ADMIN — VANCOMYCIN HYDROCHLORIDE 1250 MG: 5 INJECTION, POWDER, LYOPHILIZED, FOR SOLUTION INTRAVENOUS at 23:12

## 2022-03-08 RX ADMIN — DEXMEDETOMIDINE HCL 12 MCG: 100 INJECTION INTRAVENOUS at 13:57

## 2022-03-08 RX ADMIN — LEVETIRACETAM 750 MG: 750 TABLET, FILM COATED ORAL at 20:42

## 2022-03-08 NOTE — H&P
Discussion Summary:   1  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, 04/15/2021  Status post cranioplasty 07/13/2021 infection necessitating craniectomy  Plan for cranioplasty today, prior infection was resistant to Ancef, cefazolin, Bactrim and clindamycin  It was susceptible vancomycin and tetracycline  We will likely discharge him on oral tetracycline after surgery      2  Seizures  On Keppra         Chief Complaint: craniectomy      HPI:   This is a 30-year-old gentleman who presented to the hospital after a transfer from rehab after sustaining a TBI and subdural hematoma in [place]   Fortunately his exam significantly deteriorated to where he would barely open eyes and minimally localized his bilateral uppers and lowers   He had significant bifrontal contusions as well as a right-sided subdural with significant amount of compression and mass effect with shift   As such she underwent a right-sided craniectomy   His brain was found to be quite contused at the time of surgery   The hematoma was evacuated with a craniectomy   Ultimately he was discharged to [facility] he made a significant recovery        Unfortunately his course has been complicated by post craniectomy/cranioplasty infection over 4 months after his surgery  He has now completed abx and is ready for cranioplasty  Review of symptoms negative       Past Medical History:   Diagnosis Date    Anxiety     Dysphagia 4/16/2021    GERD (gastroesophageal reflux disease)     Hematoma, subdural, with loss of consciousness, traumatic ([address] )     Hypertension     Status post insertion of percutaneous endoscopic gastrostomy (PEG) tube ([facility] [address] ) 5/19/2021     Past Surgical History:   Procedure Laterality Date    BRAIN HEMATOMA EVACUATION Right 4/15/2021    Procedure: Right CRANIOTOMY FOR SUBDURAL HEMATOMA;  Surgeon: Jose Siegel MD;  [facility];  Service: Neurosurgery    BRAIN HEMATOMA EVACUATION Right 11/16/2021    Procedure: CRANIOTOMY FOR EVACUATION OF EPIDURAL HEMATOMA;  Surgeon: Song Raymundo MD;  [facility];  Service: Neurosurgery    BRAIN HEMATOMA EVACUATION Right 11/16/2021    Procedure: Exploration and revision of right craniotomy incision with removal of cranial plate;  Surgeon: Song Raymundo MD;  [facility];  Service: Neurosurgery    CRANIOTOMY Right     HAND SURGERY Left     PEG TUBE PLACEMENT      PEG TUBE REMOVAL      TN REPLACE SKULL PLATE/FLAP Right 3/99/4092    Procedure: RIGHT AUTOLOGOUS CRANIOPLASTY;  Surgeon: Song Raymundo MD;  [facility];  Service: Neurosurgery    ROTATOR CUFF REPAIR Left        No Known Allergies  Temp:  [97 8 °F (36 6 °C)] 97 8 °F (36 6 °C)  HR:  [72] 72  Resp:  [16] 16  BP: (144)/(75) 144/75  aao3 fluent  perrl eomi tml   fs maew, no drift  Craniectomy soft  HR regular  Normal resp effort     abd soft

## 2022-03-08 NOTE — OP NOTE
OPERATIVE REPORT  PATIENT NAME: Pauly Painter    :  1960  MRN: [id number]  Pt Location: [facility] OR ROOM 09    SURGERY DATE: 3/8/2022    Surgeon(s) and Role:     * Tabby Jackson MD - Primary    Preop Diagnosis:  Infection of bone flap, subsequent encounter [T84  7XXD]  Seizure ([address] ) [R56 9]  Status post craniectomy [Z98 890]    Post-Op Diagnosis Codes:     * Infection of bone flap, subsequent encounter [T84  7XXD]     * Seizure ([address] ) [R56 9]     * Status post craniectomy [Z98 890]    Procedure(s) (LRB):  right allographic CRANIOPLASTY (Right)    Specimen(s):  * No specimens in log *    Estimated Blood Loss:   100 mL    Drains:  Closed/Suction Drain Right;Lateral Scalp Bulb 10 Fr  (Active)   Status To bulb suction 22 1357   Number of days: 0       Closed/Suction Drain Right Bulb 7 Fr  (Active)   Status To bulb suction 22 1404   Number of days: 0       Gastrostomy/Enterostomy Percutaneous endoscopic gastrostomy (PEG) (Active)   Number of days:        Anesthesia Type:   General    Operative Indications:  Infection of bone flap, subsequent encounter [T84  7XXD]  Seizure ([address] ) [R56 9]  Status post craniectomy [Z98 890]  This 32-year-old gentleman with history of alcoholism who suffered a fall and subsequent craniectomy with delayed cranioplasty  Unfortunately he had a delayed infection of his cranioplasty and underwent craniectomy and returns today for a repeat cranioplasty with allograft  His family understood the risks and benefits including bleeding, infection, stroke, paralysis seizure and death  Operative Findings:  Allograft thick cranioplasty placed  Significant soft tissue oozing  Complications:   None    Procedure and Technique:  After obtaining written informed consent the patient was brought to the operating room  General endotracheal anesthesia was induced  The head was then placed on a horseshoe with the head turned exposing the prior craniectomy    The hair was then clipped and the head scrubbed in the usual fashion  Patient was then prepped and draped in the usual sterile fashion  A surgical time-out was performed  Using a 10 blade the prior incision was opened  Bharath clips were placed along the large flap  The temporalis was then isolated and opened  The myocutaneous flap was reflected anteriorly  Using a periosteal and monopolar cautery the plane between the prior dural repair and the temporalis was elevated  The bony edges of the craniectomy were exposed and the temporalis reflected anteriorly  The allograft plate was then placed and secured with titanium plating system  The wound was then copiously irrigated with antibiotic irrigation  Once that was satisfied with hemostasis a 10 [place] and a 7 [place] flat PVC drain was tunneled under the temporalis muscle  Temporalis was then reapproximated using 2 0 Vicryl  The galea was closed using inverted 2 0 Vicryl  There was significant oozing and friable tissue from his prior surgeries  Ultimately, the skin was closed using a running 3 0 nylon  The drain stitch was secured with a 2 0 nylon  A sterile dressing and head wrap was placed  There were 2 uninterrupted and correct surgical counts  Surgical sign-out was performed  The patient was then woken from general anesthesia and found to be in baseline neurologic condition  Due to the multiple prior surgeries and oozing from soft tissue the complexity of the surgery was increased by 50% of its usual length       I was present for the entire procedure    Patient Disposition:  PACU       SIGNATURE: Ulysses Reed MD  DATE: March 8, 2022  TIME: 3:37 PM

## 2022-03-08 NOTE — ANESTHESIA PREPROCEDURE EVALUATION
Procedure:  right allographic CRANIOPLASTY (Right Head)         Relevant Problems   ANESTHESIA   (+) PONV (postoperative nausea and vomiting)      CARDIO   (+) Hypertension      NEURO/PSYCH   (+) Anxiety   (+) Seizure (HCC) (related to benzo withdrawal approx 5-6 years  last seizure summer of 2021)      Other   (+) Alcohol abuse   (+) Infection of bone flap (HCC)   (+) Status post craniectomy        Physical Exam    Airway    Mallampati score: III  TM Distance: <3 FB  Neck ROM: full     Dental   No notable dental hx     Cardiovascular      Pulmonary      Other Findings      Results for Derek Colon (MRN [id number]) as of 3/8/2022 09:13   Ref  Range 1/12/2022 09:47   Sodium Latest Ref Range: 136 - 145 mmol/L 138   Potassium Latest Ref Range: 3 5 - 5 3 mmol/L 4 2   Chloride Latest Ref Range: 100 - 108 mmol/L 106   CO2 Latest Ref Range: 21 - 32 mmol/L 28   Anion Gap Latest Ref Range: 4 - 13 mmol/L 4   BUN Latest Ref Range: 5 - 25 mg/dL 12   Creatinine Latest Ref Range: 0 60 - 1 30 mg/dL 0 84   GLUCOSE FASTING Latest Ref Range: 65 - 99 mg/dL 102 (H)   Calcium Latest Ref Range: 8 3 - 10 1 mg/dL 9 8   AST Latest Ref Range: 5 - 45 U/L 38   ALT Latest Ref Range: 12 - 78 U/L 56   Alkaline Phosphatase Latest Ref Range: 46 - 116 U/L 129 (H)   Total Protein Latest Ref Range: 6 4 - 8 2 g/dL 8 7 (H)   Albumin Latest Ref Range: 3 5 - 5 0 g/dL 3 8   TOTAL BILIRUBIN Latest Ref Range: 0 20 - 1 00 mg/dL 0 76   eGFR Latest Units: ml/min/1 73sq m 95       Results for Derek Colon (MRN [id number]) as of 3/8/2022 09:13   Ref   Range 1/12/2022 09:47   WBC Latest Ref Range: 4 31 - 10 16 Thousand/uL 4 72   Red Blood Cell Count Latest Ref Range: 3 88 - 5 62 Million/uL 4 61   Hemoglobin Latest Ref Range: 12 0 - 17 0 g/dL 14 0   HCT Latest Ref Range: 36 5 - 49 3 % 41 9   MCV Latest Ref Range: 82 - 98 fL 91   MCH Latest Ref Range: 26 8 - 34 3 pg 30 4   MCHC Latest Ref Range: 31 4 - 37 4 g/dL 33 4   RDW Latest Ref Range: 11 6 - 15 1 % 13 4   Platelet Count Latest Ref Range: 149 - 390 Thousands/uL 127 (L)   MPV Latest Ref Range: 8 9 - 12 7 fL 12 1   nRBC Latest Units: /100 WBCs 0     Results for Baylee Pruett (MRN [id number]) as of 3/8/2022 09:13   Ref  Range 1/12/2022 09:47   PTT Latest Ref Range: 23 - 37 seconds 28     COVID-19 positive (1/12/22)       EKG (1/2022): NSR  Anesthesia Plan  ASA Score- 3     Anesthesia Type- general with ASA Monitors  Additional Monitors:   Airway Plan: ETT  Comment: Npo after MN    Has been on keppra since summer 2021  Took beta-blockers and benzo this morning  Plan Factors-Exercise tolerance (METS): <4 METS  Chart reviewed  EKG reviewed  Existing labs reviewed  Patient summary reviewed  Patient is not a current smoker  Induction- intravenous  Postoperative Plan-   Planned trial extubation    Informed Consent- Anesthetic plan and risks discussed with patient  I personally reviewed this patient with the CRNA  Discussed and agreed on the Anesthesia Plan with the CRNA  Elkin Bran

## 2022-03-08 NOTE — ANESTHESIA POSTPROCEDURE EVALUATION
Post-Op Assessment Note    CV Status:  Stable  Pain Score: 0    Pain management: adequate     Mental Status:  Arousable and sleepy   Hydration Status:  Euvolemic   PONV Controlled:  Controlled   Airway Patency:  Patent      Post Op Vitals Reviewed: Yes      Staff: CRNA, Anesthesiologist         No complications documented      BP   148/77   Temp 97 2   Pulse 85   Resp 14   SpO2 100%

## 2022-03-09 ENCOUNTER — TELEPHONE (OUTPATIENT)
Dept: RADIOLOGY | Facility: HOSPITAL | Age: 62
End: 2022-03-09

## 2022-03-09 ENCOUNTER — APPOINTMENT (INPATIENT)
Dept: RADIOLOGY | Facility: HOSPITAL | Age: 62
DRG: 983 | End: 2022-03-09
Payer: COMMERCIAL

## 2022-03-09 LAB
ANION GAP SERPL CALCULATED.3IONS-SCNC: 4 MMOL/L (ref 4–13)
APTT PPP: 27 SECONDS (ref 23–37)
BUN SERPL-MCNC: 11 MG/DL (ref 5–25)
CALCIUM SERPL-MCNC: 9.2 MG/DL (ref 8.3–10.1)
CHLORIDE SERPL-SCNC: 110 MMOL/L (ref 100–108)
CO2 SERPL-SCNC: 23 MMOL/L (ref 21–32)
CREAT SERPL-MCNC: 0.98 MG/DL (ref 0.6–1.3)
ERYTHROCYTE [DISTWIDTH] IN BLOOD BY AUTOMATED COUNT: 14.2 % (ref 11.6–15.1)
GFR SERPL CREATININE-BSD FRML MDRD: 83 ML/MIN/1.73SQ M
GLUCOSE SERPL-MCNC: 168 MG/DL (ref 65–140)
HCT VFR BLD AUTO: 37.3 % (ref 36.5–49.3)
HGB BLD-MCNC: 12.9 G/DL (ref 12–17)
INR PPP: 1.15 (ref 0.84–1.19)
MCH RBC QN AUTO: 29.9 PG (ref 26.8–34.3)
MCHC RBC AUTO-ENTMCNC: 34.6 G/DL (ref 31.4–37.4)
MCV RBC AUTO: 86 FL (ref 82–98)
PLATELET # BLD AUTO: 144 THOUSANDS/UL (ref 149–390)
PMV BLD AUTO: 11.1 FL (ref 8.9–12.7)
POTASSIUM SERPL-SCNC: 5.1 MMOL/L (ref 3.5–5.3)
PROTHROMBIN TIME: 14.2 SECONDS (ref 11.6–14.5)
RBC # BLD AUTO: 4.32 MILLION/UL (ref 3.88–5.62)
SODIUM SERPL-SCNC: 137 MMOL/L (ref 136–145)
WBC # BLD AUTO: 8.51 THOUSAND/UL (ref 4.31–10.16)

## 2022-03-09 PROCEDURE — G1004 CDSM NDSC: HCPCS

## 2022-03-09 PROCEDURE — 80048 BASIC METABOLIC PNL TOTAL CA: CPT | Performed by: NEUROLOGICAL SURGERY

## 2022-03-09 PROCEDURE — 99024 POSTOP FOLLOW-UP VISIT: CPT | Performed by: NEUROLOGICAL SURGERY

## 2022-03-09 PROCEDURE — 97163 PT EVAL HIGH COMPLEX 45 MIN: CPT

## 2022-03-09 PROCEDURE — 97167 OT EVAL HIGH COMPLEX 60 MIN: CPT

## 2022-03-09 PROCEDURE — 85027 COMPLETE CBC AUTOMATED: CPT | Performed by: NEUROLOGICAL SURGERY

## 2022-03-09 PROCEDURE — 85730 THROMBOPLASTIN TIME PARTIAL: CPT | Performed by: NEUROLOGICAL SURGERY

## 2022-03-09 PROCEDURE — 97116 GAIT TRAINING THERAPY: CPT

## 2022-03-09 PROCEDURE — 70450 CT HEAD/BRAIN W/O DYE: CPT

## 2022-03-09 PROCEDURE — 85610 PROTHROMBIN TIME: CPT | Performed by: NEUROLOGICAL SURGERY

## 2022-03-09 RX ORDER — LORAZEPAM 1 MG/1
1 TABLET ORAL 2 TIMES DAILY PRN
Status: DISCONTINUED | OUTPATIENT
Start: 2022-03-09 | End: 2022-03-10 | Stop reason: HOSPADM

## 2022-03-09 RX ORDER — LISINOPRIL 20 MG/1
20 TABLET ORAL DAILY
Status: DISCONTINUED | OUTPATIENT
Start: 2022-03-09 | End: 2022-03-10 | Stop reason: HOSPADM

## 2022-03-09 RX ADMIN — LORAZEPAM 1 MG: 1 TABLET ORAL at 12:55

## 2022-03-09 RX ADMIN — SODIUM CHLORIDE 75 ML/HR: 9 INJECTION, SOLUTION INTRAVENOUS at 07:26

## 2022-03-09 RX ADMIN — LEVETIRACETAM 750 MG: 750 TABLET, FILM COATED ORAL at 20:48

## 2022-03-09 RX ADMIN — HYDROMORPHONE HYDROCHLORIDE 0.5 MG: 1 INJECTION, SOLUTION INTRAMUSCULAR; INTRAVENOUS; SUBCUTANEOUS at 00:37

## 2022-03-09 RX ADMIN — ONDANSETRON 4 MG: 2 INJECTION INTRAMUSCULAR; INTRAVENOUS at 08:57

## 2022-03-09 RX ADMIN — Medication 25 MG: at 08:56

## 2022-03-09 RX ADMIN — ONDANSETRON 4 MG: 2 INJECTION INTRAMUSCULAR; INTRAVENOUS at 02:51

## 2022-03-09 RX ADMIN — CEFAZOLIN SODIUM 2000 MG: 2 SOLUTION INTRAVENOUS at 11:44

## 2022-03-09 RX ADMIN — ACETAMINOPHEN 650 MG: 325 TABLET ORAL at 17:14

## 2022-03-09 RX ADMIN — OXYCODONE HYDROCHLORIDE 10 MG: 10 TABLET ORAL at 11:44

## 2022-03-09 RX ADMIN — LISINOPRIL 20 MG: 20 TABLET ORAL at 12:55

## 2022-03-09 RX ADMIN — VANCOMYCIN HYDROCHLORIDE 1250 MG: 5 INJECTION, POWDER, LYOPHILIZED, FOR SOLUTION INTRAVENOUS at 12:55

## 2022-03-09 RX ADMIN — LEVETIRACETAM 750 MG: 750 TABLET, FILM COATED ORAL at 08:57

## 2022-03-09 RX ADMIN — HYDROMORPHONE HYDROCHLORIDE 0.5 MG: 1 INJECTION, SOLUTION INTRAMUSCULAR; INTRAVENOUS; SUBCUTANEOUS at 05:02

## 2022-03-09 RX ADMIN — CEFAZOLIN SODIUM 2000 MG: 2 SOLUTION INTRAVENOUS at 05:02

## 2022-03-09 RX ADMIN — OXYCODONE HYDROCHLORIDE 10 MG: 10 TABLET ORAL at 07:20

## 2022-03-09 RX ADMIN — HYDROMORPHONE HYDROCHLORIDE 0.5 MG: 1 INJECTION, SOLUTION INTRAMUSCULAR; INTRAVENOUS; SUBCUTANEOUS at 09:06

## 2022-03-09 RX ADMIN — LORAZEPAM 1 MG: 1 TABLET ORAL at 20:48

## 2022-03-09 RX ADMIN — OXYCODONE HYDROCHLORIDE 10 MG: 10 TABLET ORAL at 03:13

## 2022-03-09 RX ADMIN — ONDANSETRON 4 MG: 2 INJECTION INTRAMUSCULAR; INTRAVENOUS at 15:20

## 2022-03-09 RX ADMIN — Medication 25 MG: at 20:47

## 2022-03-09 RX ADMIN — OXYCODONE HYDROCHLORIDE 10 MG: 10 TABLET ORAL at 15:47

## 2022-03-09 RX ADMIN — OXYCODONE HYDROCHLORIDE 10 MG: 10 TABLET ORAL at 20:48

## 2022-03-09 NOTE — PROGRESS NOTES
[facility]  Progress Note - Shaun Score 1960, 62 y o  male MRN: [id number]  Unit/Bed#: [facility] 584-79 Encounter: [id number]  Primary Care Provider: ANIA Castanon   Date and time admitted to hospital: 3/8/2022 10:38 AM    * Status post craniectomy  Assessment & Plan  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  Patient underwent exploration and revision of right craniotomy incision with removal of cranial plate on 60/64/7185  Unfortunately after surgery patient developed a left facial droop and left hand weakness  He underwent a emergent CT head which demonstrated a large epidural hematoma and patient required emergent craniotomy for evacuation of hematoma on 11/17/2021  · OR cultures grew Staph epidermidis, patient completed 6 week course of IV vancomycin  Imaging:    CT head wo 03/09/2022: Status post interval right-sided cranioplasty with expected postsurgical changes as described above  Stable colloid cyst without evidence for hydrocephalus  Stable bifrontal and right temporal encephalomalacia  Plan:  · Continue to monitor neurological checks  · STAT CT head with any neurological decline including drop in GCS of 2 points within 1 hour  · Drains:  · 1 R YANETH to FS with 5 mL output since OR, plan to remove drain this afternoon  · 1 R lateral scalp YANETH to FS with 80 mL output since OR, plan to remove this afternoon  · Pain well controlled on current regimen:  · Tylenol 650 mg q 4 hours p r n   · Oxycodone 5-10 mg q 4 hours p r n  For moderate or severe pain  · DC'd IV Dilaudid today  · Continue home dose of Keppra  · Continue postop IV vancomycin and Ancef  Patient will transition to doxycycline tomorrow x14 days postop  · Will continue IVFs, patient states he is not eating much yet  · Mobilize patient as tolerated  PT/OT eval  · Patient transition to med surge with q 4 neuro checks  · DVT PPX: SCDs and will start [facility] tomorrow  · Patient will likely be discharged tomorrow  Neurosurgery will continue to follow as primary, patient is not medically stable for discharge pending PT/OT eval,  call with any further questions or concerns  Seizure [facility])  Assessment & Plan  Continue home meds      Subjective/Objective     Chief Complaint: "This head dressing is too tight"    Subjective:  Patient currently complaining of 8/10 headaches, patient states his head dressing is too tight  Patient states his current pain regimen helps with his pain  He reports his vision is bad but this is not new and unchanged secondary to glaucoma  Patient states his belly feels un easy  He also reports he is very anxious  Patient states he is not eating much  He denies any dizziness, chest pain, shortness of breath, abdominal pain, nausea, vomiting, diarrhea, no problems with bowel or bladder, no new weakness or numbness/tingling  Objective:  Patient comfortably sitting out recliner, NAD      I/O       03/07 0701  03/08 0700 03/08 0701  03/09 0700 03/09 0701  03/10 0700    I V  (mL/kg)  1900 (22)     IV Piggyback  50     Total Intake(mL/kg)  1950 (22 6)     Urine (mL/kg/hr)  850     Drains  70 85    Blood  100     Total Output  1020 85    Net  +930 -85                 Invasive Devices  Report    Peripherally Inserted Central Catheter Line            PICC Line 99/82/22 Right Basilic 191 days          Peripheral Intravenous Line            Peripheral IV 03/08/22 Left Antecubital <1 day    Peripheral IV 03/08/22 Left Forearm <1 day          Drain            Gastrostomy/Enterostomy Percutaneous endoscopic gastrostomy (PEG) -- days    Closed/Suction Drain Right Bulb 7 Fr  <1 day Closed/Suction Drain Right;Lateral Scalp Bulb 10 Fr  <1 day                Physical Exam:  Vitals: Blood pressure 140/71, pulse (!) 109, temperature 97 8 °F (36 6 °C), resp  rate 18, height 5' 10" (1 778 m), weight 86 2 kg (190 lb), SpO2 96 %  ,Body mass index is 27 26 kg/m²  General appearance: alert, appears stated age, cooperative and no distress  Head: Normocephalic, right cranioplasty head dressing removed, dressing clean, dry, intact  2 YANETH drains to FS remain in place will likely remove them this afternoon  Eyes: EOMI, PERRL, conjugate gaze  Neck: supple, symmetrical, trachea midline   Lungs: non labored breathing  Heart: regular heart rate  Neurologic:   Mental status: Alert, oriented x3, thought content appropriate, speech is clear, following commands  Cranial nerves: grossly intact (Cranial nerves II-XII)  Sensory: normal to LT in all extremities x4, JPS and DST intact  Motor: moving all extremities without focal weakness, strength 5/5 throughout  Reflexes: 2+ and symmetric, no Davenport's or clonus appreciated  Coordination: finger to nose normal bilaterally, no drift bilaterally      Lab Results:  Results from last 7 days   Lab Units 03/09/22  0513   WBC Thousand/uL 8 51   HEMOGLOBIN g/dL 12 9   HEMATOCRIT % 37 3   PLATELETS Thousands/uL 144*     Results from last 7 days   Lab Units 03/09/22  0017   POTASSIUM mmol/L 5 1   CHLORIDE mmol/L 110*   CO2 mmol/L 23   BUN mg/dL 11   CREATININE mg/dL 0 98   CALCIUM mg/dL 9 2             Results from last 7 days   Lab Units 03/09/22  0513   INR  1 15   PTT seconds 27     No results found for: TROPONINT  ABG:  Lab Results   Component Value Date    PHART 7 362 11/17/2021    IRO2JCU 34 9 (L) 11/17/2021    PO2ART 141 3 (H) 11/17/2021    [id number] 19 4 (L) 11/17/2021    BEART -5 3 11/17/2021    SOURCE Line, Arterial 11/17/2021       Imaging Studies: I have personally reviewed pertinent reports     and I have personally reviewed pertinent films in PACS    CT head wo contrast    Result Date: 3/9/2022  Impression: Status post interval right-sided cranioplasty with expected postsurgical changes as described above  Stable colloid cyst without evidence for hydrocephalus  Stable bifrontal and right temporal encephalomalacia  Workstation performed: [id number]       EKG, Pathology, and Other Studies: I have personally reviewed pertinent reports        VTE Mechanical Prophylaxis: sequential compression device

## 2022-03-09 NOTE — TELEPHONE ENCOUNTER
2546 I called for this patient to come down  RN was in with another patient and is to call  when able

## 2022-03-09 NOTE — PLAN OF CARE
Problem: MOBILITY - ADULT  Goal: Maintain or return to baseline ADL function  Description: INTERVENTIONS:  -  Assess patient's ability to carry out ADLs; assess patient's baseline for ADL function and identify physical deficits which impact ability to perform ADLs (bathing, care of mouth/teeth, toileting, grooming, dressing, etc )  - Assess/evaluate cause of self-care deficits   - Assess range of motion  - Assess patient's mobility; develop plan if impaired  - Assess patient's need for assistive devices and provide as appropriate  - Encourage maximum independence but intervene and supervise when necessary  - Involve family in performance of ADLs  - Assess for home care needs following discharge   - Consider OT consult to assist with ADL evaluation and planning for discharge  - Provide patient education as appropriate  Outcome: Progressing  Goal: Maintains/Returns to pre admission functional level  Description: INTERVENTIONS:  - Perform BMAT or MOVE assessment daily    - Set and communicate daily mobility goal to care team and patient/family/caregiver  - Collaborate with rehabilitation services on mobility goals if consulted  - Perform Range of Motion  times a day  - Reposition patient every  hours    - Dangle patient  times a day  - Stand patient  times a day  - Ambulate patient  times a day  - Out of bed to chair  times a day   - Out of bed for meals  times a day  - Out of bed for toileting  - Record patient progress and toleration of activity level   Outcome: Progressing     Problem: Potential for Falls  Goal: Patient will remain free of falls  Description: INTERVENTIONS:  - Educate patient/family on patient safety including physical limitations  - Instruct patient to call for assistance with activity   - Consult OT/PT to assist with strengthening/mobility   - Keep Call bell within reach  - Keep bed low and locked with side rails adjusted as appropriate  - Keep care items and personal belongings within reach  - Initiate and maintain comfort rounds  - Make Fall Risk Sign visible to staff  - Offer Toileting every  Hours, in advance of need  - Initiate/Maintain alarm  - Obtain necessary fall risk management equipment:   - Apply yellow socks and bracelet for high fall risk patients  - Consider moving patient to room near nurses station  Outcome: Progressing     Problem: Potential for Falls  Goal: Patient will remain free of falls  Description: INTERVENTIONS:  - Educate patient/family on patient safety including physical limitations  - Instruct patient to call for assistance with activity   - Consult OT/PT to assist with strengthening/mobility   - Keep Call bell within reach  - Keep bed low and locked with side rails adjusted as appropriate  - Keep care items and personal belongings within reach  - Initiate and maintain comfort rounds  - Make Fall Risk Sign visible to staff  - Offer Toileting every  Hours, in advance of need  - Initiate/Maintain alarm  - Obtain necessary fall risk management equipment:   - Apply yellow socks and bracelet for high fall risk patients  - Consider moving patient to room near nurses station  Outcome: Progressing     Problem: PAIN - ADULT  Goal: Verbalizes/displays adequate comfort level or baseline comfort level  Description: Interventions:  - Encourage patient to monitor pain and request assistance  - Assess pain using appropriate pain scale  - Administer analgesics based on type and severity of pain and evaluate response  - Implement non-pharmacological measures as appropriate and evaluate response  - Consider cultural and social influences on pain and pain management  - Notify physician/advanced practitioner if interventions unsuccessful or patient reports new pain  Outcome: Progressing     Problem: INFECTION - ADULT  Goal: Absence or prevention of progression during hospitalization  Description: INTERVENTIONS:  - Assess and monitor for signs and symptoms of infection  - Monitor lab/diagnostic results  - Monitor all insertion sites, i e  indwelling lines, tubes, and drains  - Monitor endotracheal if appropriate and nasal secretions for changes in amount and color  - [place] appropriate cooling/warming therapies per order  - Administer medications as ordered  - Instruct and encourage patient and family to use good hand hygiene technique  - Identify and instruct in appropriate isolation precautions for identified infection/condition  Outcome: Progressing  Goal: Absence of fever/infection during neutropenic period  Description: INTERVENTIONS:  - Monitor WBC    Outcome: Progressing     Problem: SAFETY ADULT  Goal: Maintain or return to baseline ADL function  Description: INTERVENTIONS:  -  Assess patient's ability to carry out ADLs; assess patient's baseline for ADL function and identify physical deficits which impact ability to perform ADLs (bathing, care of mouth/teeth, toileting, grooming, dressing, etc )  - Assess/evaluate cause of self-care deficits   - Assess range of motion  - Assess patient's mobility; develop plan if impaired  - Assess patient's need for assistive devices and provide as appropriate  - Encourage maximum independence but intervene and supervise when necessary  - Involve family in performance of ADLs  - Assess for home care needs following discharge   - Consider OT consult to assist with ADL evaluation and planning for discharge  - Provide patient education as appropriate  Outcome: Progressing  Goal: Maintains/Returns to pre admission functional level  Description: INTERVENTIONS:  - Perform BMAT or MOVE assessment daily    - Set and communicate daily mobility goal to care team and patient/family/caregiver  - Collaborate with rehabilitation services on mobility goals if consulted  - Perform Range of Motion  times a day  - Reposition patient every  hours    - Dangle patient  times a day  - Stand patient  times a day  - Ambulate patient  times a day  - Out of bed to chair  times a day   - Out of bed for meals  times a day  - Out of bed for toileting  - Record patient progress and toleration of activity level   Outcome: Progressing  Goal: Patient will remain free of falls  Description: INTERVENTIONS:  - Educate patient/family on patient safety including physical limitations  - Instruct patient to call for assistance with activity   - Consult OT/PT to assist with strengthening/mobility   - Keep Call bell within reach  - Keep bed low and locked with side rails adjusted as appropriate  - Keep care items and personal belongings within reach  - Initiate and maintain comfort rounds  - Make Fall Risk Sign visible to staff  - Offer Toileting every  Hours, in advance of need  - Initiate/Maintainalarm  - Obtain necessary fall risk management equipment:   - Apply yellow socks and bracelet for high fall risk patients  - Consider moving patient to room near nurses station  Outcome: Progressing     Problem: DISCHARGE PLANNING  Goal: Discharge to home or other facility with appropriate resources  Description: INTERVENTIONS:  - Identify barriers to discharge w/patient and caregiver  - Arrange for needed discharge resources and transportation as appropriate  - Identify discharge learning needs (meds, wound care, etc )  - Arrange for interpretive services to assist at discharge as needed  - Refer to Case Management Department for coordinating discharge planning if the patient needs post-hospital services based on physician/advanced practitioner order or complex needs related to functional status, cognitive ability, or social support system  Outcome: Progressing     Problem: Knowledge Deficit  Goal: Patient/family/caregiver demonstrates understanding of disease process, treatment plan, medications, and discharge instructions  Description: Complete learning assessment and assess knowledge base    Interventions:  - Provide teaching at level of understanding  - Provide teaching via preferred learning methods  Outcome: Progressing     Problem: NEUROSENSORY - ADULT  Goal: Achieves stable or improved neurological status  Description: INTERVENTIONS  - Monitor and report changes in neurological status  - Monitor vital signs such as temperature, blood pressure, glucose, and any other labs ordered   - Initiate measures to prevent increased intracranial pressure  - Monitor for seizure activity and implement precautions if appropriate      Outcome: Progressing  Goal: Remains free of injury related to seizures activity  Description: INTERVENTIONS  - Maintain airway, patient safety  and administer oxygen as ordered  - Monitor patient for seizure activity, document and report duration and description of seizure to physician/advanced practitioner  - If seizure occurs,  ensure patient safety during seizure  - Reorient patient post seizure  - Seizure pads on all 4 side rails  - Instruct patient/family to notify RN of any seizure activity including if an aura is experienced  - Instruct patient/family to call for assistance with activity based on nursing assessment  - Administer anti-seizure medications if ordered    Outcome: Progressing  Goal: Achieves maximal functionality and self care  Description: INTERVENTIONS  - Monitor swallowing and airway patency with patient fatigue and changes in neurological status  - Encourage and assist patient to increase activity and self care     - Encourage visually impaired, hearing impaired and aphasic patients to use assistive/communication devices  Outcome: Progressing

## 2022-03-09 NOTE — PLAN OF CARE
Problem: MOBILITY - ADULT  Goal: Maintain or return to baseline ADL function  Description: INTERVENTIONS:  -  Assess patient's ability to carry out ADLs; assess patient's baseline for ADL function and identify physical deficits which impact ability to perform ADLs (bathing, care of mouth/teeth, toileting, grooming, dressing, etc )  - Assess/evaluate cause of self-care deficits   - Assess range of motion  - Assess patient's mobility; develop plan if impaired  - Assess patient's need for assistive devices and provide as appropriate  - Encourage maximum independence but intervene and supervise when necessary  - Involve family in performance of ADLs  - Assess for home care needs following discharge   - Consider OT consult to assist with ADL evaluation and planning for discharge  - Provide patient education as appropriate  Outcome: Progressing  Goal: Maintains/Returns to pre admission functional level  Description: INTERVENTIONS:  - Perform BMAT or MOVE assessment daily    - Set and communicate daily mobility goal to care team and patient/family/caregiver  - Collaborate with rehabilitation services on mobility goals if consulted  - Perform Range of Motion 4 times a day  - Reposition patient every 2 hours    - Dangle patient 4 times a day  - Stand patient 4 times a day  - Ambulate patient 4 times a day  - Out of bed to chair 4 times a day   - Out of bed for meals 4 times a day  - Out of bed for toileting  - Record patient progress and toleration of activity level   Outcome: Progressing     Problem: Potential for Falls  Goal: Patient will remain free of falls  Description: INTERVENTIONS:  - Educate patient/family on patient safety including physical limitations  - Instruct patient to call for assistance with activity   - Consult OT/PT to assist with strengthening/mobility   - Keep Call bell within reach  - Keep bed low and locked with side rails adjusted as appropriate  - Keep care items and personal belongings within reach  - Initiate and maintain comfort rounds  - Make Fall Risk Sign visible to staff  - Offer Toileting every 4 Hours, in advance of need  - Initiate/Maintain bed alarm  - Obtain necessary fall risk management equipment: bed alarm  - Apply yellow socks and bracelet for high fall risk patients  - Consider moving patient to room near nurses station  Outcome: Progressing     Problem: PAIN - ADULT  Goal: Verbalizes/displays adequate comfort level or baseline comfort level  Description: Interventions:  - Encourage patient to monitor pain and request assistance  - Assess pain using appropriate pain scale  - Administer analgesics based on type and severity of pain and evaluate response  - Implement non-pharmacological measures as appropriate and evaluate response  - Consider cultural and social influences on pain and pain management  - Notify physician/advanced practitioner if interventions unsuccessful or patient reports new pain  Outcome: Progressing     Problem: INFECTION - ADULT  Goal: Absence or prevention of progression during hospitalization  Description: INTERVENTIONS:  - Assess and monitor for signs and symptoms of infection  - Monitor lab/diagnostic results  - Monitor all insertion sites, i e  indwelling lines, tubes, and drains  - Monitor endotracheal if appropriate and nasal secretions for changes in amount and color  - [place] appropriate cooling/warming therapies per order  - Administer medications as ordered  - Instruct and encourage patient and family to use good hand hygiene technique  - Identify and instruct in appropriate isolation precautions for identified infection/condition  Outcome: Progressing  Goal: Absence of fever/infection during neutropenic period  Description: INTERVENTIONS:  - Monitor WBC    Outcome: Progressing     Problem: SAFETY ADULT  Goal: Maintain or return to baseline ADL function  Description: INTERVENTIONS:  -  Assess patient's ability to carry out ADLs; assess patient's baseline for ADL function and identify physical deficits which impact ability to perform ADLs (bathing, care of mouth/teeth, toileting, grooming, dressing, etc )  - Assess/evaluate cause of self-care deficits   - Assess range of motion  - Assess patient's mobility; develop plan if impaired  - Assess patient's need for assistive devices and provide as appropriate  - Encourage maximum independence but intervene and supervise when necessary  - Involve family in performance of ADLs  - Assess for home care needs following discharge   - Consider OT consult to assist with ADL evaluation and planning for discharge  - Provide patient education as appropriate  Outcome: Progressing  Goal: Maintains/Returns to pre admission functional level  Description: INTERVENTIONS:  - Perform BMAT or MOVE assessment daily    - Set and communicate daily mobility goal to care team and patient/family/caregiver  - Collaborate with rehabilitation services on mobility goals if consulted  - Perform Range of Motion 4 times a day  - Reposition patient every 2 hours    - Dangle patient 4 times a day  - Stand patient 4 times a day  - Ambulate patient 4 times a day  - Out of bed to chair 4 times a day   - Out of bed for meals 4 times a day  - Out of bed for toileting  - Record patient progress and toleration of activity level   Outcome: Progressing  Goal: Patient will remain free of falls  Description: INTERVENTIONS:  - Educate patient/family on patient safety including physical limitations  - Instruct patient to call for assistance with activity   - Consult OT/PT to assist with strengthening/mobility   - Keep Call bell within reach  - Keep bed low and locked with side rails adjusted as appropriate  - Keep care items and personal belongings within reach  - Initiate and maintain comfort rounds  - Make Fall Risk Sign visible to staff  - Offer Toileting every 4 Hours, in advance of need  - Initiate/Maintain bed alarm  - Obtain necessary fall risk management equipment: bed alarm  - Apply yellow socks and bracelet for high fall risk patients  - Consider moving patient to room near nurses station  Outcome: Progressing     Problem: DISCHARGE PLANNING  Goal: Discharge to home or other facility with appropriate resources  Description: INTERVENTIONS:  - Identify barriers to discharge w/patient and caregiver  - Arrange for needed discharge resources and transportation as appropriate  - Identify discharge learning needs (meds, wound care, etc )  - Arrange for interpretive services to assist at discharge as needed  - Refer to Case Management Department for coordinating discharge planning if the patient needs post-hospital services based on physician/advanced practitioner order or complex needs related to functional status, cognitive ability, or social support system  Outcome: Progressing     Problem: Knowledge Deficit  Goal: Patient/family/caregiver demonstrates understanding of disease process, treatment plan, medications, and discharge instructions  Description: Complete learning assessment and assess knowledge base    Interventions:  - Provide teaching at level of understanding  - Provide teaching via preferred learning methods  Outcome: Progressing     Problem: NEUROSENSORY - ADULT  Goal: Achieves stable or improved neurological status  Description: INTERVENTIONS  - Monitor and report changes in neurological status  - Monitor vital signs such as temperature, blood pressure, glucose, and any other labs ordered   - Initiate measures to prevent increased intracranial pressure  - Monitor for seizure activity and implement precautions if appropriate      Outcome: Progressing  Goal: Remains free of injury related to seizures activity  Description: INTERVENTIONS  - Maintain airway, patient safety  and administer oxygen as ordered  - Monitor patient for seizure activity, document and report duration and description of seizure to physician/advanced practitioner  - If seizure occurs, ensure patient safety during seizure  - Reorient patient post seizure  - Seizure pads on all 4 side rails  - Instruct patient/family to notify RN of any seizure activity including if an aura is experienced  - Instruct patient/family to call for assistance with activity based on nursing assessment  - Administer anti-seizure medications if ordered    Outcome: Progressing  Goal: Achieves maximal functionality and self care  Description: INTERVENTIONS  - Monitor swallowing and airway patency with patient fatigue and changes in neurological status  - Encourage and assist patient to increase activity and self care     - Encourage visually impaired, hearing impaired and aphasic patients to use assistive/communication devices  Outcome: Progressing

## 2022-03-09 NOTE — RESTORATIVE TECHNICIAN NOTE
Restorative Technician Note      Patient Name: Harshad Robbins     Note Type: Mobility  Patient Position Upon Consult: Bedside chair  Activity Performed: Ambulated; Dangled; Stood  Assistive Device: Other (Comment) (None)  Education Provided: Yes  Patient Position at End of Consult:  All needs within reach; Bed/Chair alarm activated; Supine      Hamlet CAMPBELL, Restorative Technician, [employer]

## 2022-03-09 NOTE — ASSESSMENT & PLAN NOTE
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  Patient underwent exploration and revision of right craniotomy incision with removal of cranial plate on 61/62/8157  Unfortunately after surgery patient developed a left facial droop and left hand weakness  He underwent a emergent CT head which demonstrated a large epidural hematoma and patient required emergent craniotomy for evacuation of hematoma on 11/17/2021  · OR cultures grew Staph epidermidis, patient completed 6 week course of IV vancomycin  Imaging:    CT head wo 03/09/2022: Status post interval right-sided cranioplasty with expected postsurgical changes as described above  Stable colloid cyst without evidence for hydrocephalus  Stable bifrontal and right temporal encephalomalacia  Plan:  · Continue to monitor neurological checks  · STAT CT head with any neurological decline including drop in GCS of 2 points within 1 hour  · Drains:  · 1 R YANETH to FS with 5 mL output since OR, plan to remove drain this afternoon  · 1 R lateral scalp YANETH to FS with 80 mL output since OR, plan to remove this afternoon  · Pain well controlled on current regimen:  · Tylenol 650 mg q 4 hours p r n   · Oxycodone 5-10 mg q 4 hours p r n  For moderate or severe pain  · DC'd IV Dilaudid today  · Continue home dose of Keppra  · Continue postop IV vancomycin and Ancef  Patient will transition to doxycycline tomorrow x14 days postop  · Will continue IVFs, patient states he is not eating much yet  · Mobilize patient as tolerated    PT/OT eval  · Patient transition to med surge with q 4 neuro checks  · DVT PPX: SCDs and will start [facility] tomorrow  · Patient will likely be discharged tomorrow  Neurosurgery will continue to follow as primary, patient is not medically stable for discharge pending PT/OT eval,  call with any further questions or concerns

## 2022-03-09 NOTE — QUICK NOTE
Removed both YANETH drains without difficulty, sutures tied down in place, patient tolerated well, incisions clean, dry, intact  Neurosurgery will continue follow as primary, call with any further questions or concerns  Patient will likely be discharged home tomorrow

## 2022-03-09 NOTE — PHYSICAL THERAPY NOTE
Physical Therapy Evaluation     Patient's Name: Jolly Vinson    Admitting Diagnosis  Infection of bone flap, subsequent encounter [T84  7XXD]  Seizure ([facility] ) [R56 9]  Status post craniectomy [Z98 890]    Problem List  Patient Active Problem List   Diagnosis    Hypertension    Anxiety    Acute encephalopathy    Alcohol abuse    Impaired mobility and activities of daily living    Status post craniectomy    Seizure ([address] )    Infection of bone flap ([facility] )    Postoperative pain    PICC (peripherally inserted central catheter) in place    PONV (postoperative nausea and vomiting)       Past Medical History  Past Medical History:   Diagnosis Date    Anxiety     Dysphagia 4/16/2021    GERD (gastroesophageal reflux disease)     Hematoma, subdural, with loss of consciousness, traumatic ([facility] )     Hypertension     Status [facility] 5/19/2021       Past Surgical History  Past Surgical History:   Procedure Laterality Date    BRAIN HEMATOMA EVACUATION Right 4/15/2021    Procedure: Right CRANIOTOMY FOR SUBDURAL HEMATOMA;  Surgeon: Reba Bernard MD;  [facility];  Service: Neurosurgery    BRAIN HEMATOMA EVACUATION Right 11/16/2021    Procedure: CRANIOTOMY FOR EVACUATION OF EPIDURAL HEMATOMA;  Surgeon: Reba Bernard MD;  [facility];  Service: Neurosurgery    BRAIN HEMATOMA EVACUATION Right 11/16/2021    Procedure: Exploration and revision of right craniotomy incision with removal of cranial plate;  Surgeon: Reba Bernard MD;  [facility];  Service: Neurosurgery    CRANIOTOMY Right     HAND SURGERY Left     PEG TUBE PLACEMENT      PEG TUBE REMOVAL      DC REPLACE SKULL PLATE/FLAP Right 9/96/7666    Procedure: RIGHT AUTOLOGOUS CRANIOPLASTY;  Surgeon: Reba Bernard MD;  [facility];  Service: Neurosurgery    DC REPLACE SKULL PLATE/FLAP Right 4/8/8036    Procedure: right allographic CRANIOPLASTY;  Surgeon: Paresh Moses MD;  [facility];  Service: Neurosurgery    ROTATOR CUFF REPAIR Left         03/09/22 0847   PT Last Visit   PT Visit Date 03/09/22   Note Type   Note type Evaluation   Pain Assessment   Pain Assessment Tool 0-10   Pain Score 8   Pain Location/Orientation Location: [facility] [place] [facility] Pain Intervention(s) Repositioned; Ambulation/increased activity; Emotional support   Restrictions/Precautions   Weight Bearing Precautions Per Order No   Other Precautions Chair Alarm; Bed Alarm;Multiple lines; Fall Risk;Pain;Telemetry   Home Living   Type of [address] Two level;Bed/bath upstairs;Stairs to enter with rails   Bathroom Shower/Tub Tub/shower unit   Bathroom Toilet Raised   Bathroom Equipment Shower chair   Home Equipment Walker;Cane   Additional Comments Pt resides in [facility] w/ 5 LIONEL and FF to bed/bath on 2nd floor  Pt reports ambulating w/out AD PTA   Prior Function   Level of [place] Independent with ADLs and functional mobility   Lives With Spouse   Receives Help From Family   ADL Assistance Independent   IADLs Independent   Falls in the last 6 months 1 to 4   Comments Pt reports spouse is home and supportive   General   Family/Caregiver Present No   Cognition   Overall Cognitive Status WFL   Arousal/Participation Alert   Orientation Level Oriented X4   Memory Within functional limits   Following Commands Follows all commands and directions without difficulty   Comments Pt pleasant and cooperative   Subjective   Subjective Pt reports his head wrap is too tight- RN aware   RLE Assessment   RLE Assessment WFL   LLE Assessment   LLE Assessment WFL   Coordination   Movements are Fluid and Coordinated 1   Bed Mobility   Supine to Sit 5  Supervision   Additional items HOB elevated; Increased time required   Sit to Supine Unable to assess   Additional Comments Pt lying supine upon PT arrival  Pt returned seated OOB at end of session w/ all needs within reach and chair alarm activated Transfers   Sit to Stand 4  Minimal assistance   Additional items Assist x 1; Increased time required;Verbal cues   Stand to Sit 4  Minimal assistance   Additional items Assist x 1; Increased time required;Verbal cues   Additional Comments Transfers w/ RW   Ambulation/Elevation   Gait pattern Excessively slow; Short stride; Inconsistent alex;Decreased foot clearance;Shuffling   Gait Assistance   (CGA)   Additional items Assist x 1;Verbal cues   Assistive Device Rolling walker   Distance 200ft   Stair Management Assistance Not tested   Balance   Static Sitting Fair +   Dynamic Sitting Fair   Static Standing Fair   Dynamic Standing Fair -   Ambulatory Poor +   Endurance Deficit   Endurance Deficit No   Activity Tolerance   Activity Tolerance Patient tolerated treatment well   Medical Staff Made Aware OT Moni; Co-evaluation performed w/ OT due to pt's multiple co-morbidities, clinically unstable presentation, and regression in functional impairments as compared to baseline  PT focused on transfers, mobility, and LE assessment   Nurse Made Aware yes   Assessment   Prognosis Good   Problem List Decreased strength;Decreased endurance; Impaired balance;Decreased mobility;Pain   Assessment Pt is 62 y o  male seen for PT evaluation s/p admit to [facility] on 3/8/2022 w/ Status post craniectomy  Pt is PO #1 cranioplasty  PT consulted to assess pt's functional mobility and d/c needs  Order placed for PT eval and tx, w/ up w/ A order  Comorbidities affecting pt's physical performance at time of assessment include: seizure, infection bone flap, HTN, acute encephalopathy, alcohol abuse  PTA, pt was independent w/ all functional mobility w/ no AD and lives w/ spouse in two level house w/ 5 LIONEL and FF to bed/bath on 2nd floor  Personal factors affecting pt at time of IE include: inaccessible home environment, lives in two story house, stairs to enter home and positive fall history   Please find objective findings from PT assessment regarding body systems outlined above with impairments and limitations including weakness, impaired balance, decreased endurance, gait deviations, pain, decreased activity tolerance and decreased functional mobility tolerance  Pt performed bed mobility at [facility], STS at [address] [place] [address], and ambulated 200ft w/ RW at [facility]  The following objective measures performed on IE also reveal limitations: AM-PAC 6-Clicks: 70/28  Pt's clinical presentation is currently unstable/unpredictable seen in pt's presentation of recent admission for s p cranioplasty requiring medical attention, recent decline in function as compared to baseline, multiple lines, fall risk, pain  Pt to benefit from continued PT tx to address deficits as defined above and maximize level of functional independent mobility and consistency  From PT/mobility standpoint, recommendation at time of d/c would be no rehabilitation needs pending progress in order to facilitate return to PLOF  Barriers to Discharge Inaccessible home environment   Goals   Patient Goals to return home   STG Expiration Date 03/23/22   Short Term Goal #1 1  Pt will demonstrate the ability to perform all aspects of bed mobility at Mod I in onder to maximize functional independence and decrease burden on caregivers  2 Pt will demonstrate the ability to perform all functional transfers at Mod I in order to maximize functional independence and decrease burden on caregivers  3 Pt will demonstrate the ability to ambulate at least 150ft with least restrictive device at Mod I in order to maximize functional independence  4 Pt will demonstrate the ability to negotiate 12 steps at Mod I in order to return to household/community mobility  5 Pt will demonstrate improved balance by one grade in order to decrease risk of falls   6 Pt will increase b/l LE strength by 1 grade in order to increase ease of functional mobility and transfers   PT Treatment Day 0   Plan   Treatment/Interventions Functional transfer training;LE strengthening/ROM; Elevations; Therapeutic exercise; Endurance training;Equipment eval/education;Patient/family training;Bed mobility;Gait training;Spoke to nursing   PT Frequency 3-5x/wk   Recommendation   PT Discharge Recommendation No rehabilitation needs   AM-PAC Basic Mobility Inpatient   Turning in Bed Without Bedrails 4   Lying on Back to Sitting on Edge of Flat Bed 4   Moving Bed to Chair 3   Standing Up From Chair 3   Walk in Room 3   Climb 3-5 Stairs 3   Basic Mobility Inpatient Raw Score 20   Basic Mobility Standardized Score 43 99   Highest Level Of Mobility   -HL Goal 6: Walk 10 steps or more   JH-HLM Highest Level of Mobility 7: Walk 25 feet or more   JH-HL Goal Achieved Yes   Modified [place] Scale   Modified [place] Scale 3   Additional Treatment Session   Start Time 0837   End Time 0847   Treatment Assessment Pt requesting to perform additional mobility  Pt trialed ambulating w/out RW and able to ambulate 200ft w/ no AD at [facility] progressing to supervision  Additional follow up consecutive session performed to initiate additional mobility w/out AD in order to maximize strength and to faciliate progression w/ functional mobility and maximize functional independence  Pt was able to complete therex program w/ rest breaks as needed  Recommend no rehab needs upon D/C pending progress and when medically cleared  Continue with 3-5x/week for LE therex, functional mobility training, endurance/activity tolerance, ambulation, and pt education per POC to maximize functional independence     The patient's AM-PAC Basic Mobility Inpatient Short Form Raw Score is 20  A Raw score of greater than 16 suggests the patient may benefit from discharge to home  Please also refer to the recommendation of the Physical Therapist for safe discharge planning    Theta Poplar, PT, DPT

## 2022-03-09 NOTE — UTILIZATION REVIEW
Inpatient Admission Authorization Request   NOTIFICATION OF INPATIENT ADMISSION/INPATIENT AUTHORIZATION REQUEST   SERVICING FACILITY:   [facility]  Address: [address] [place] [address], [address] [facility] [address]  Tax ID: [id number]  NPI: [id number]  Place of Service: Inpatient [address] [facility] Code: 24     ATTENDING PROVIDER:  Attending Name and NPI#: Misty Vasquez Md [[id number]]  Address: [address] [place] [address], [address] [place] [id number]  Phone: [phone]     UTILIZATION REVIEW CONTACT:  Isael Goyal Utilization   Network Utilization Review Department  Phone: [phone]  Fax: [phone]  Email: [email or web address]     PHYSICIAN ADVISORY SERVICES:  FOR [id number] REVIEW - MEDICAL NECESSITY DENIAL  Phone: [phone]  Fax: [phone]  Email: [email or web address]     TYPE OF REQUEST:  Inpatient Status     ADMISSION INFORMATION:  ADMISSION DATE/TIME: 3/8/22  3:32 PM  PATIENT DIAGNOSIS CODE/DESCRIPTION:  Infection of bone flap, subsequent encounter [T84  7XXD]  Seizure ([address] ) [R56 9]  Status post craniectomy [Z98 890]  DISCHARGE DATE/TIME: No discharge date for patient encounter  IMPORTANT INFORMATION:  Please contact the Isael Goyal directly with any questions or concerns regarding this request  Department voicemails are confidential     Send requests for admission clinical reviews, concurrent reviews, approvals, and administrative denials due to lack of clinical to fax [phone]

## 2022-03-09 NOTE — PLAN OF CARE
Problem: PHYSICAL THERAPY ADULT  Goal: Performs mobility at highest level of function for planned discharge setting  See evaluation for individualized goals  Description: Treatment/Interventions: Functional transfer training,LE strengthening/ROM,Elevations,Therapeutic exercise,Endurance training,Equipment eval/education,Patient/family training,Bed mobility,Gait training,Spoke to nursing          See flowsheet documentation for full assessment, interventions and recommendations  Note: Prognosis: Good  Problem List: Decreased strength,Decreased endurance,Impaired balance,Decreased mobility,Pain  Assessment: Pt is 62 y o  male seen for PT evaluation s/p admit to [facility] on 3/8/2022 w/ Status post craniectomy  Pt is PO #1 cranioplasty  PT consulted to assess pt's functional mobility and d/c needs  Order placed for PT eval and tx, w/ up w/ A order  Comorbidities affecting pt's physical performance at time of assessment include: seizure, infection bone flap, HTN, acute encephalopathy, alcohol abuse  PTA, pt was independent w/ all functional mobility w/ no AD and lives w/ spouse in two level house w/ 5 LIONEL and FF to bed/bath on 2nd floor  Personal factors affecting pt at time of IE include: inaccessible home environment, lives in two story house, stairs to enter home and positive fall history  Please find objective findings from PT assessment regarding body systems outlined above with impairments and limitations including weakness, impaired balance, decreased endurance, gait deviations, pain, decreased activity tolerance and decreased functional mobility tolerance  Pt performed bed mobility at [facility], STS at [address] [place] [address], and ambulated 200ft w/ RW at [facility]  The following objective measures performed on IE also reveal limitations: AM-PAC 6-Clicks: 38/08   Pt's clinical presentation is currently unstable/unpredictable seen in pt's presentation of recent admission for s p cranioplasty requiring medical attention, recent decline in function as compared to baseline, multiple lines, fall risk, pain  Pt to benefit from continued PT tx to address deficits as defined above and maximize level of functional independent mobility and consistency  From PT/mobility standpoint, recommendation at time of d/c would be no rehabilitation needs pending progress in order to facilitate return to PLOF  Barriers to Discharge: Inaccessible home environment        PT Discharge Recommendation: No rehabilitation needs          See flowsheet documentation for full assessment

## 2022-03-09 NOTE — PLAN OF CARE
Problem: OCCUPATIONAL THERAPY ADULT  Goal: Performs self-care activities at highest level of function for planned discharge setting  See evaluation for individualized goals  Description: Treatment Interventions: ADL retraining,Functional transfer training,Endurance training,Cognitive reorientation,Patient/family training,Equipment evaluation/education,Compensatory technique education,Energy conservation,Activityengagement        See flowsheet documentation for full assessment, interventions and recommendations  Note: Limitation: Decreased ADL status,Decreased endurance,Decreased self-care trans,Decreased high-level ADLs  Prognosis: Fair  Assessment: Pt is a 62 y o  male who was admitted to [facility] on 3/8/2022 with Status post craniectomy on 3/9, seizure-pt with history of TBI and subdural hematoma, 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, 04/15/2021  Status post cranioplasty 07/13/2021 infection necessitating craniectomy  Pt's problem list also includes PMH of  has a past medical history of Anxiety, Dysphagia (4/16/2021), GERD (gastroesophageal reflux disease), Hematoma, subdural, with loss of consciousness, traumatic ([facility] ), Hypertension, and Status post insertion of percutaneous endoscopic gastrostomy (PEG) tube ([facility] ) (5/19/2021)    At baseline pt was completing I with ADL's/IADL's, no AD with functional ambulation  Pt lives with supportive spouse in a 2  with 5 LIONEL and first floor set-up  Currently pt requires min a  for overall ADLS and min a  with RW functional mobility for functional mobility/transfers  Pt currently presents with impairments in the following categories -difficulty performing IADLS  activity tolerance   These impairments, as well as pt's fatigue  limit pt's ability to safely engage in all baseline areas of occupation, includingcommunity mobility, laundry , driving, house maintenance, medication management, meal prep, cleaning, social participation and leisure activities  The patient's raw score on the AM-PAC Daily Activity inpatient short form is 24, standardized score is 57 54, greater than 39 4  Patients at this level are likely to benefit from discharge to home  Please refer to the recommendation of the Occupational Therapist for safe discharge planning  From OT standpoint, recommend home with increased family support upon D/C  OT will continue to follow to address the below stated goals  OT Discharge Recommendation: No rehabilitation needs  OT - OK to Discharge:  Yes

## 2022-03-09 NOTE — OCCUPATIONAL THERAPY NOTE
Occupational Therapy Evaluation     Patient Name: Yuri Madsen  QPPIP'Q Date: 3/9/2022  Problem List  Principal Problem:    Status post craniectomy  Active Problems:    Alcohol abuse    Seizure ([facility] [address] )    Infection of bone flap (HCC)    PONV (postoperative nausea and vomiting)    Past Medical History  Past Medical History:   Diagnosis Date    Anxiety     Dysphagia 4/16/2021    GERD (gastroesophageal reflux disease)     Hematoma, subdural, with loss of consciousness, traumatic ([facility] [address] )     Hypertension     Status post insertion of percutaneous endoscopic gastrostomy (PEG) tube ([facility] [address] ) 5/19/2021     Past Surgical History  Past Surgical History:   Procedure Laterality Date    BRAIN HEMATOMA EVACUATION Right 4/15/2021    Procedure: Right CRANIOTOMY FOR SUBDURAL HEMATOMA;  Surgeon: Leon Bloom MD;  [facility];  Service: Neurosurgery    BRAIN HEMATOMA EVACUATION Right 11/16/2021    Procedure: CRANIOTOMY FOR EVACUATION OF EPIDURAL HEMATOMA;  Surgeon: Leon Bloom MD;  [facility];  Service: Neurosurgery    BRAIN HEMATOMA EVACUATION Right 11/16/2021    Procedure: Exploration and revision of right craniotomy incision with removal of cranial plate;  Surgeon: Leon Bloom MD;  [facility];  Service: Neurosurgery    CRANIOTOMY Right     HAND SURGERY Left     PEG TUBE PLACEMENT      PEG TUBE REMOVAL      DC REPLACE SKULL PLATE/FLAP Right 1/67/5238    Procedure: RIGHT AUTOLOGOUS CRANIOPLASTY;  Surgeon: Leon Bloom MD;  [facility];  Service: Neurosurgery    DC REPLACE SKULL PLATE/FLAP Right 4/7/3627    Procedure: right allographic CRANIOPLASTY;  Surgeon: Leon Bloom MD;  [facility];  Service: Neurosurgery    ROTATOR CUFF REPAIR Left       03/09/22 0816   OT Last Visit   OT Visit Date 03/09/22   Note Type   Note type Evaluation   Restrictions/Precautions   Weight Bearing Precautions Per Order No   Other Precautions Fall Risk;Telemetry;Multiple lines  (+2 YANETH drains to posterior head)   Pain Assessment   Pain Assessment Tool 0-10   Pain Score No Pain   Prior Function   Level of [place] Independent with ADLs and functional mobility   Lives With Spouse   Receives Help From Family   ADL Assistance Independent   IADLs Independent   Falls in the last 6 months 0   Vocational On disability   Lifestyle   Autonomy I with ADL's, shares homemaking tasks with spouse, no AD with mobility tasks, (-) drives due to visual deficits  Reciprocal Relationships supportive spouse   Service to Others on disability   Intrinsic Gratification staying active   Psychosocial   Psychosocial (WDL) WDL   ADL   Grooming Assistance 5  Supervision/Setup   UB Bathing Assistance 5  Supervision/Setup   LB Bathing Assistance 5  Supervision/Setup   UB Dressing Assistance 5  Supervision/Setup   LB Dressing Assistance 4 minimal assistance   LB Dressing Deficit Don/doff R sock; Don/doff L sock   Toileting Assistance  4 minimal assistance   Bed Mobility   Supine to Sit 5  Supervision   Additional items Assist x 1   Sit to Supine Unable to assess   Transfers   Sit to Stand 4 minimal assistance   Additional items Assist x 1   Stand to Sit 4 minimal assistance   Additional items Assist x 1   Additional Comments +RW   Functional Mobility   Functional Mobility 4 minimal assistance   Additional Comments Min a household distance funtional mobility with RW   Additional items Rolling walker   Balance   Static Sitting Good   Dynamic Sitting Fair +   Static Standing Fair   Dynamic Standing Fair -   Ambulatory Poor +   Activity Tolerance   Activity Tolerance Patient tolerated treatment well   Medical Staff Made Aware PT Charanjit Nicole due to the patient's co-morbidities, clinically unstable presentation, and present impairments which are a regression from the patient's baseline      Nurse Made Aware RN cleared pt for therapy    RUE Assessment   RUE Assessment WFL   LUE Assessment   LUE Assessment WFL   Hand Function   Gross Motor Coordination Functional   Fine Motor Coordination Functional   Vision-Basic Assessment   Visual History Cataracts   Patient Visual Report (pt reports "i have poor vision due to my cataracts, I cant don't drive, my wife does")   Vision - Complex Assessment   Acuity (unable to read white board, normal print)   Cognition   Arousal/Participation Alert; Cooperative   Attention Within functional limits   Orientation Level Oriented X4   Memory Within functional limits   Following Commands Follows multistep commands with increased time or repetition   Comments pt motivated for therapy, good historian, followed multi-step directives  Continue to assess   Assessment   Limitation Decreased ADL status; Decreased endurance;Decreased self-care trans;Decreased high-level ADLs   Prognosis Fair   Assessment Pt is a 62 y o  male who was [facility] on 3/8/2022 with Status post craniectomy on 3/9, seizure-pt with history of TBI and subdural hematoma, 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, 04/15/2021  Status post cranioplasty 07/13/2021 infection necessitating craniectomy  Pt's problem list also includes PMH of  has a past medical history of Anxiety, Dysphagia (4/16/2021), GERD (gastroesophageal reflux disease), Hematoma, subdural, with loss of consciousness, traumatic ([address] ), Hypertension, and Status post insertion of percutaneous endoscopic gastrostomy (PEG) tube ([facility] [address] ) (5/19/2021)    At baseline pt was completing I with ADL's/IADL's, no AD with functional ambulation  Pt lives with supportive spouse in a 2  with 5 [address]ION[address] and first floor set-up  Currently pt requires min a  for overall ADLS and min a  with RW functional mobility for functional mobility/transfers  Pt currently presents with impairments in the following categories -difficulty performing IADLS  activity tolerance   These impairments, as well as pt's fatigue  limit pt's ability to safely engage in all baseline areas of occupation, includingcommunity mobility, laundry , driving, house maintenance, medication management, meal prep, cleaning, social participation  and leisure activities  The patient's raw score on the AM-PAC Daily Activity inpatient short form is 24, standardized score is 57 54, greater than 39 4  Patients at this level are likely to benefit from discharge to home  Please refer to the recommendation of the Occupational Therapist for safe discharge planning  From OT standpoint, recommend home with increased family support upon D/C  OT will continue to follow to address the below stated goals  Goals   Patient Goals go home   LTG Time Frame 10-14   Plan   Treatment Interventions ADL retraining;Functional transfer training; Endurance training;Cognitive reorientation;Patient/family training;Equipment evaluation/education; Compensatory technique education; Energy conservation; Activityengagement   Goal Expiration Date 03/23/22   OT Frequency 3-5x/wk   Recommendation   OT Discharge Recommendation No rehabilitation needs   OT - OK to Discharge Yes   AM-PAC Daily Activity Inpatient   Lower Body Dressing 4   Bathing 4   Toileting 4   Upper Body Dressing 4   Grooming 4   Eating 4   Daily Activity Raw Score 24   Daily Activity Standardized Score (Calc for Raw Score >=11) 57 54   AM-PAC Applied Cognition Inpatient   Following a Speech/Presentation 3   Understanding Ordinary Conversation 4   Taking Medications 4   Remembering Where Things Are Placed or Put Away 4   Remembering List of 4-5 Errands 4   Taking Care of Complicated Tasks 4   Applied Cognition Raw Score 23   Applied Cognition Standardized Score 53 08       Occupational Therapy Goals:    *Mod I with bed mobility to engage in functional tasks    *Mod I Adl's after setup with use of AE PRN  *Mod I toileting and clothing management   *Mod I functional mobility and transfers to/from all surfaces with Fair + dynamic balance and safety for participation in dynamic adls and iadl tasks   *Demonstrate good carryover with safe use of RW during functional tasks   *Assess DME needs   *Increase activity tolerance to 25-30 minutes for participation in adls and enjoyable activities  *Pt to participate in further cognitive testing with good attention and participation to assist with safe d/c recommendations  *Mod I with Simulated IADL management task  *Pt will follow 100% simple one step verbal commands and be A/Ox4 consistently t/o use of external environmental cues w/ mod I  *Demonstrate good carryover of pt/family education and training with good tolerance for increased safety and independence with ADL's/ADl's     *Patient will demonstrate 100% carryover of energy conservation techniques t/o functional I/ADL/leisure tasks w/o cues s/p skilled education to increase endurance during functional tasks  Aroldo Jane MOT, OTR/L

## 2022-03-09 NOTE — TELEPHONE ENCOUNTER
Spoke with nightshift nurse, she will have day shift nurse call back to schedule, needs nurse to travel with patient none available now due to change of shift

## 2022-03-09 NOTE — UTILIZATION REVIEW
Initial Clinical Review    Elective inpatient surgical procedure  Age/Sex: 62 y o  male with history of alcoholism who suffered a fall and subsequent craniectomy with delayed cranioplasty  Unfortunately he had a delayed infection of his cranioplasty and underwent craniectomy and returns today for a repeat cranioplasty with allograft  His family understood the risks and benefits including bleeding, infection, stroke, paralysis seizure and death         Anesthesia Start Date/Time: 03/08/22 1224   Procedure: right allographic CRANIOPLASTY (Right Head)   Anesthesia type: general   Diagnosis:        Infection of bone flap, subsequent encounter [T84  7XXD]       Seizure [facility]) [R56 9]       Status post craniectomy [Z98 890]     Procedure(s) (LRB):  right allographic CRANIOPLASTY (Right)    Estimated Blood Loss:   100 mL     Drains:       Closed/Suction Drain Right;Lateral Scalp Bulb 10 Fr  (Active)   Status To bulb suction        Closed/Suction Drain Right Bulb 7 Fr  (Active)   Status To bulb suction      Operative Findings:  Allograft thick cranioplasty placed  Significant soft tissue oozing      Complications:   None        POD#1 Progress Note:     Post op imaging completed  Continue neuro checks, plan to remove YANETH#1, 5ml output,this am    YANETH #2, with 80 ml output, consider removal this afternoon  Pain controlled on oxycodone and iv dilaudid  Will dc iv dilaudid later today  Continue keppra and seizure precautions  Continue iv fluids 75/hr, iv vancomycin and ancef  Transition to po doxycycline 3-10  Obtain PT/OT evaluations    Treated with iv zofran x2  [id number]            Admission Orders: Date/Time/Statement:   Admission Orders (From admission, onward)     Ordered        03/08/22 1532  Inpatient Admission  Once                      Orders Placed This Encounter   Procedures    Inpatient Admission     Standing Status:   Standing     Number of Occurrences:   1     Order Specific Question:   Level of Care Answer:   Level 1 Stepdown [13]     Order Specific Question:   Estimated length of stay     Answer:   More than 2 Midnights     Order Specific Question:   Certification     Answer:   I certify that inpatient services are medically necessary for this patient for a duration of greater than two midnights  See H&P and MD Progress Notes for additional information about the patient's course of treatment       Vital Signs: /78   Pulse (!) 109 Comment: metoprolol given   Temp 97 8 °F (36 6 °C)   Resp 18   Ht 5' 10" (1 778 m)   Wt 86 2 kg (190 lb)   SpO2 96%   BMI 27 26 kg/m²       Date and Time Eye Opening Best Verbal Response Best Motor Response Chay Coma Scale Score   03/09/22 1200 4 5 6 15   03/09/22 1000 4 5 6 15   03/09/22 0800 4 5 6 15   03/09/22 0600 4 5 6 15   03/09/22 0400 4 5 6 15   03/09/22 0200 4 5 6 15   03/09/22 0000 4 5 6 15   03/08/22 2200 4 5 6 15   03/08/22 2000 4 5 6 15   03/08/22 1800 4 5 6 15   03/08/22 1615 4 5 6 15   03/08/22 1545 3 5 6 14     Date/Time Temp Pulse Resp BP MAP (mmHg) SpO2 O2 Flow Rate (L/min) O2 Device   03/09/22 1255 -- -- -- 154/78 -- -- -- --   03/09/22 0800 -- 109 Abnormal   18 140/71 99 -- -- None (Room air)   03/09/22 0720 97 8 °F (36 6 °C) 106 Abnormal  18 150/77 -- 96 % -- None (Room air)   03/09/22 0400 -- 72 -- 141/68 98 -- -- --   03/09/22 0100 -- 97 -- 128/65 -- -- -- --   03/08/22 2200 -- 69 -- 135/70 -- -- -- --   03/08/22 2100 98 3 °F (36 8 °C) -- -- 130/67 91 95 % -- --   03/08/22 2000 -- 86 -- 139/77 -- -- -- --   03/08/22 1900 -- 92 -- 140/73 -- -- -- --   03/08/22 1630 -- 66 15 155/81 109 100 % -- --   03/08/22 1615 97 8 °F (36 6 °C) 62 16 142/74 98 98 % 2 L/min Nasal cannula   03/08/22 1545 -- 74 16 159/83 115 100 % 2 L/min Nasal cannula   03/08/22 1530 -- 72 14 155/77 109 98 % -- --   03/08/22 1519 97 2 °F (36 2 °C) Abnormal  66 14 148/77 121 99 % 6 L/min Simple mask       Pertinent Labs/Diagnostic Test Results:     CT head wo contrast   Final (03/09 1033)      Status post interval right-sided cranioplasty with expected postsurgical changes as described above  Stable colloid cyst without evidence for hydrocephalus  Stable bifrontal and right temporal encephalomalacia                    Results from last 7 days   Lab Units 03/09/22  0513   WBC Thousand/uL 8 51   HEMOGLOBIN g/dL 12 9   HEMATOCRIT % 37 3   PLATELETS Thousands/uL 144*         Results from last 7 days   Lab Units 03/09/22  0017   SODIUM mmol/L 137   POTASSIUM mmol/L 5 1   CHLORIDE mmol/L 110*   CO2 mmol/L 23   ANION GAP mmol/L 4   BUN mg/dL 11   CREATININE mg/dL 0 98   EGFR ml/min/1 73sq m 83   CALCIUM mg/dL 9 2             Results from last 7 days   Lab Units 03/09/22  0017   GLUCOSE RANDOM mg/dL 168*       Results from last 7 days   Lab Units 03/09/22  0513   PROTIME seconds 14 2   INR  1 15   PTT seconds 27       Diet: surgical soft   Mobility: ambulatory  DVT Prophylaxis: sequential compression device    Medications/Pain Control:  3-8   iv dilaudid  1614 1233  Oxycodone 5 mg 1905    3-19  Iv dilaudid 0037, 0502, 0906  Oxycodone 10 cu1003,0720, 1144      Scheduled Medications:  [START ON 3/10/2022] doxycycline hyclate, 100 mg, Oral, Q12H [facility]  [START ON 3/10/2022] heparin (porcine), 5,000 Units, Subcutaneous, Q8H JOSEFINA  levETIRAcetam, 750 mg, Oral, Q12H JOSEFINA  lisinopril, 20 mg, Oral, Daily  metoprolol tartrate, 25 mg, Oral, Q12H [facility]  scopolamine, 1 patch, Transdermal, Once  vancomycin, 15 mg/kg, Intravenous, Q12H      Continuous IV Infusions:  sodium chloride, 125 mL/hr, Intravenous, Continuous  sodium chloride, 75 mL/hr, Intravenous, Continuous  sodium chloride, 75 mL/hr, Intravenous, Continuous      PRN Meds:  acetaminophen, 650 mg, Oral, Q4H PRN  LORazepam, 1 mg, Oral, BID PRN  ondansetron, 4 mg, Intravenous, Q6H PRN  oxyCODONE, 10 mg, Oral, Q4H PRN  oxyCODONE, 5 mg, Oral, Q4H PRN        Network Utilization Review Department  ATTENTION: Please call with any questions or concerns to [phone] and carefully listen to the prompts so that you are directed to the right person  All voicemails are confidential   [facility] all requests for admission clinical reviews, approved or denied determinations and any other requests to dedicated fax number below belonging to the campus where the patient is receiving treatment   List of dedicated fax numbers for the Facilities:  [address] DENIALS (Administrative/Medical Necessity) [phone]   [address] (Maternity/NICU/Pediatrics) [phone]   [address] [place] [address]  [address] [facility] [place] [address] [address] [place] [address] [address] [facility] [place] [address] [phone]

## 2022-03-10 ENCOUNTER — TRANSITIONAL CARE MANAGEMENT (OUTPATIENT)
Dept: FAMILY MEDICINE CLINIC | Facility: CLINIC | Age: 62
End: 2022-03-10

## 2022-03-10 VITALS
HEIGHT: 70 IN | HEART RATE: 64 BPM | OXYGEN SATURATION: 98 % | RESPIRATION RATE: 18 BRPM | DIASTOLIC BLOOD PRESSURE: 69 MMHG | WEIGHT: 190 LBS | BODY MASS INDEX: 27.2 KG/M2 | SYSTOLIC BLOOD PRESSURE: 128 MMHG | TEMPERATURE: 97.9 F

## 2022-03-10 DIAGNOSIS — F41.9 ANXIETY: ICD-10-CM

## 2022-03-10 PROCEDURE — 99024 POSTOP FOLLOW-UP VISIT: CPT | Performed by: NURSE PRACTITIONER

## 2022-03-10 PROCEDURE — 97116 GAIT TRAINING THERAPY: CPT

## 2022-03-10 RX ORDER — DOXYCYCLINE HYCLATE 100 MG/1
100 CAPSULE ORAL EVERY 12 HOURS SCHEDULED
Qty: 27 CAPSULE | Refills: 0 | Status: SHIPPED | OUTPATIENT
Start: 2022-03-10 | End: 2022-03-24

## 2022-03-10 RX ORDER — ACETAMINOPHEN 325 MG/1
650 TABLET ORAL EVERY 4 HOURS PRN
Refills: 0
Start: 2022-03-10 | End: 2022-07-22

## 2022-03-10 RX ORDER — LORAZEPAM 1 MG/1
1 TABLET ORAL 2 TIMES DAILY PRN
Qty: 30 TABLET | Refills: 0 | Status: SHIPPED | OUTPATIENT
Start: 2022-03-10 | End: 2022-04-21 | Stop reason: SDUPTHER

## 2022-03-10 RX ORDER — OXYCODONE HYDROCHLORIDE 5 MG/1
5-10 TABLET ORAL EVERY 4 HOURS PRN
Qty: 30 TABLET | Refills: 0 | Status: SHIPPED | OUTPATIENT
Start: 2022-03-10 | End: 2022-03-15 | Stop reason: SDUPTHER

## 2022-03-10 RX ADMIN — OXYCODONE HYDROCHLORIDE 10 MG: 10 TABLET ORAL at 10:25

## 2022-03-10 RX ADMIN — DOXYCYCLINE 100 MG: 100 CAPSULE ORAL at 08:30

## 2022-03-10 RX ADMIN — LEVETIRACETAM 750 MG: 750 TABLET, FILM COATED ORAL at 08:30

## 2022-03-10 RX ADMIN — Medication 25 MG: at 08:30

## 2022-03-10 RX ADMIN — OXYCODONE HYDROCHLORIDE 10 MG: 10 TABLET ORAL at 05:30

## 2022-03-10 RX ADMIN — HEPARIN SODIUM 5000 UNITS: 5000 INJECTION INTRAVENOUS; SUBCUTANEOUS at 05:30

## 2022-03-10 RX ADMIN — LISINOPRIL 20 MG: 20 TABLET ORAL at 08:30

## 2022-03-10 RX ADMIN — LORAZEPAM 1 MG: 1 TABLET ORAL at 08:30

## 2022-03-10 RX ADMIN — SODIUM CHLORIDE 75 ML/HR: 9 INJECTION, SOLUTION INTRAVENOUS at 00:09

## 2022-03-10 NOTE — DISCHARGE SUMMARY
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  Patient underwent exploration and revision of right craniotomy incision with removal of cranial plate on 03/24/9986  Unfortunately after surgery patient developed a left facial droop and left hand weakness  He underwent a emergent CT head which demonstrated a large epidural hematoma and patient required emergent craniotomy for evacuation of hematoma on 11/17/2021  · OR cultures grew Staph epidermidis, patient completed 6 week course of IV vancomycin  Imaging:    CT head wo 03/09/2022: Status post interval right-sided cranioplasty with expected postsurgical changes as described above  Stable colloid cyst without evidence for hydrocephalus  Stable bifrontal and right temporal encephalomalacia  Plan:  · Continue to monitor neurological checks  · STAT CT head with any neurological decline including drop in GCS of 2 points within 1 hour  · YANETH drains removed yesterday 03/09/2022  · Pain well controlled on current regimen:  · Tylenol 650 mg q 4 hours p r n   · Oxycodone 5-10 mg q 4 hours p r n  For moderate or severe pain  · Continue home dose of Keppra  · Continue postop IV vancomycin and Ancef  Patient transitioned to doxycycline today x14 days postop  · DC'd IVFs, patient tolerating regular diet and voiding without difficulty    · Mobilize patient as tolerated  · Spoke with patient in great detail about the importance of avoiding all alcohol at home  · PT/OT recommended home with no needs  · DVT PPX: [facility] and [facility]    Neurosurgery will continue to follow as primary, patient is medically cleared for discharge home today, patient will follow-up in 2 weeks for incision check and 6 weeks for postoperative visit with CT head, call with any further questions or concerns  Seizure [facility])  Assessment & Plan  Continue home meds      Medical Problems             Resolved Problems  Date Reviewed: 1/27/2022    None              Subjective/objective    Chief complaint:"Can go home today?"    Subjective:  Patient states he is ready to go home  He endorses incisional pain which at times causes a headache which he rates currently 8/10  Patient states his current pain regimen helps with his pain  He also endorses that he feels as though his right ear has water in it  He also endorses some right facial swelling  Patient also reported some nausea yesterday which has improved today he is tolerating a regular diet and voiding without difficulty  He states he had a bowel movement today  He denies any dizziness, blurry vision, chest pain, shortness of breath, abdominal pain, nausea, vomiting, diarrhea, no problems with bowel or bladder, no new weakness or numbness/tingling  Patient asked about prescription for Ativan  Spoke with patient about reaching out to his PCP for refill that is who prescribed it  Objective:  Patient comfortably sitting on edge of the bed, NAD  General appearance: alert, appears stated age, cooperative and no distress  Head: Normocephalic, right cranioplasty incision clean, dry, intact    Some a right temporal swelling  Eyes: EOMI, PERRL, conjugate gaze  Neck: supple, symmetrical, trachea midline   Lungs: non labored breathing  Heart: regular heart rate  Neurologic:   Mental status: Alert, oriented x3, thought content appropriate, speech is clear, following commands  Cranial nerves: grossly intact (Cranial nerves II-XII)  Sensory: normal to LT in all extremities x4, JPS and DST intact  Motor: moving all extremities without focal weakness, strength 5/5 throughout  Reflexes: 2+ and symmetric, no Davenport's or clonus appreciated  Coordination: finger to nose normal bilaterally, no drift bilaterally      Discharge Date: 3/10/22    Admitting Diagnosis: Infection of bone flap, subsequent encounter [T84  7XXD]  Seizure ([address] ) [R56 9]  Status post craniectomy [Z98 890]   Alcohol abuse  Postoperative nausea and vomiting    Discharge Diagnosis: 1  Infection of the bone flap  2  Seizure  3  Status post craniectomy  4  Alcohol abuse  5  Postoperative nausea vomiting  6  S/p right cranioplasty    Attending:  Dr Adan Hill Physician(s): N/a    Procedures Performed:  Status post right allographic cranioplasty    Radiology:CT head wo contrast    Result Date: 3/9/2022  Narrative: CT BRAIN - WITHOUT CONTRAST INDICATION:   Craniotomy, post-op cranioplasty  COMPARISON:  None  TECHNIQUE:  CT examination of the brain was performed  In addition to axial images, sagittal and coronal 2D reformatted images were created and submitted for interpretation  Radiation dose length product (DLP) for this visit:  873 4 mGy-cm   This examination, like all CT scans performed in the [facility], was performed utilizing techniques to minimize radiation dose exposure, including the use of iterative reconstruction and automated exposure control  IMAGE QUALITY:  Diagnostic  FINDINGS: PARENCHYMA:  Patient is status post interval right-sided cranioplasty  There is postoperative change with fluid, air and small amount of blood products adjacent to the cranioplasty flap  This measures up to 4 mm in thickness  There is a subgaleal drainage catheter noted   There is stable encephalomalacia and gliosis involving the bifrontal lobes as well as lateral aspect of the right temporal lobe  There is no midline shift  There is no CT evidence for a large acute vascular distribution infarct  There is a colloid cyst measuring 4 x 3 mm at the level of the foramen of Frias, unchanged  VENTRICLES AND EXTRA-AXIAL SPACES:  No hydrocephalus  VISUALIZED ORBITS AND PARANASAL SINUSES:  Unremarkable  CALVARIUM AND EXTRACRANIAL SOFT TISSUES:  Post surgical changes  Impression: Status post interval right-sided cranioplasty with expected postsurgical changes as described above  Stable colloid cyst without evidence for hydrocephalus  Stable bifrontal and right temporal encephalomalacia  Workstation performed: [address] Course: Jolly Vinson is a 61 y/o male who is status post 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 on 04/15/2021  He then underwent a cranioplasty on 07/13/2021  He then presented to the outpatient office with slight dehiscence of his surgical wound, CT scan demonstrated concerns for osteomyelitis of the bone flap  Patient underwent exploration and revision of right craniotomy incision with removal of cranial plate on 53/66/8986  Unfortunately after surgery patient developed a right facial droop and right hand weakness  He underwent a emergent CT head which demonstrated a large epidural hematoma and patient required emergent craniotomy for evacuation of hematoma on 11/17/2021  OR cultures grew Staph epidermidis, patient completed 6 week course of IV vancomycin  Patient underwent a elective right allographic cranioplasty under general anesthesia with minimal blood loss and no complications  Patient was kept in the PACU until stable and then moved to the floor  Patient received CT head postoperatively which demonstrated s/p interval right-sided cranioplasty with expected postsurgical changes as described above  Stable colloid cyst without evidence of hydrocephalus  Stable bifrontal and right temporal encephalomalacia    2 YANETH drains were placed perioperatively and removed yesterday 03/09/2022; patient tolerated well  PT and OT were consulted and recommend home with no needs  Patient was cleared medically for discharge home today 03/10/2022  Prior to discharge, postoperative instructions were discussed with patient  During that time, all questions and concerns were addressed  Patient will follow up outpatient in 2 weeks for an incision check and 6 weeks for postoperative visit with repeat CT head  Extensively spoke with patient and wife about patient needing to avoid any alcohol at home  Condition at Discharge: good     Discharge instructions/Information to patient and family:   See after visit summary for information provided to patient and family  Provisions for Follow-Up Care:  See after visit summary for information related to follow-up care and any pertinent home health orders  Disposition: Home        Planned Readmission: No    Discharge Statement   I spent 26 minutes discharging the patient  This time was spent on the day of discharge  I had direct contact with the patient on the day of discharge  Additional documentation is required if more than 30 minutes were spent on discharge  Discharge Medications:  See after visit summary for reconciled discharge medications provided to patient and family

## 2022-03-10 NOTE — ASSESSMENT & PLAN NOTE
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  Patient underwent exploration and revision of right craniotomy incision with removal of cranial plate on 41/54/8892  Unfortunately after surgery patient developed a left facial droop and left hand weakness  He underwent a emergent CT head which demonstrated a large epidural hematoma and patient required emergent craniotomy for evacuation of hematoma on 11/17/2021  · OR cultures grew Staph epidermidis, patient completed 6 week course of IV vancomycin  Imaging:    CT head wo 03/09/2022: Status post interval right-sided cranioplasty with expected postsurgical changes as described above  Stable colloid cyst without evidence for hydrocephalus  Stable bifrontal and right temporal encephalomalacia  Plan:  · Continue to monitor neurological checks  · STAT CT head with any neurological decline including drop in GCS of 2 points within 1 hour  · YANETH drains removed yesterday 03/09/2022  · Pain well controlled on current regimen:  · Tylenol 650 mg q 4 hours p r n   · Oxycodone 5-10 mg q 4 hours p r n  For moderate or severe pain  · Continue home dose of Keppra  · Continue postop IV vancomycin and Ancef  Patient transitioned to doxycycline today x14 days postop  · DC'd IVFs, patient tolerating regular diet and voiding without difficulty  · Mobilize patient as tolerated  · Spoke with patient in great detail about the importance of avoiding all alcohol at home    · PT/OT recommended home with no needs  · [facility]    Neurosurgery will continue to follow as primary, patient is medically cleared for discharge home today, patient will follow-up in 2 weeks for incision check and 6 weeks for postoperative visit with CT head, call with any further questions or concerns

## 2022-03-10 NOTE — PLAN OF CARE
Problem: MOBILITY - ADULT  Goal: Maintain or return to baseline ADL function  Description: INTERVENTIONS:  -  Assess patient's ability to carry out ADLs; assess patient's baseline for ADL function and identify physical deficits which impact ability to perform ADLs (bathing, care of mouth/teeth, toileting, grooming, dressing, etc )  - Assess/evaluate cause of self-care deficits   - Assess range of motion  - Assess patient's mobility; develop plan if impaired  - Assess patient's need for assistive devices and provide as appropriate  - Encourage maximum independence but intervene and supervise when necessary  - Involve family in performance of ADLs  - Assess for home care needs following discharge   - Consider OT consult to assist with ADL evaluation and planning for discharge  - Provide patient education as appropriate  Outcome: Progressing  Goal: Maintains/Returns to pre admission functional level  Description: INTERVENTIONS:  - Perform BMAT or MOVE assessment daily    - Set and communicate daily mobility goal to care team and patient/family/caregiver  - Collaborate with rehabilitation services on mobility goals if consulted  - Perform Range of Motion  times a day  - Reposition patient every  hours    - Dangle patient  times a day  - Stand patient  times a day  - Ambulate patient  times a day  - Out of bed to chair  times a day   - Out of bed for meals  times a day  - Out of bed for toileting  - Record patient progress and toleration of activity level   Outcome: Progressing     Problem: Potential for Falls  Goal: Patient will remain free of falls  Description: INTERVENTIONS:  - Educate patient/family on patient safety including physical limitations  - Instruct patient to call for assistance with activity   - Consult OT/PT to assist with strengthening/mobility   - Keep Call bell within reach  - Keep bed low and locked with side rails adjusted as appropriate  - Keep care items and personal belongings within reach  - Initiate and maintain comfort rounds  - Make Fall Risk Sign visible to staff  - Offer Toileting every  Hours, in advance of need  - Initiate/Maintain alarm  - Obtain necessary fall risk management equipment:   - Apply yellow socks and bracelet for high fall risk patients  - Consider moving patient to room near nurses station  Outcome: Progressing     Problem: PAIN - ADULT  Goal: Verbalizes/displays adequate comfort level or baseline comfort level  Description: Interventions:  - Encourage patient to monitor pain and request assistance  - Assess pain using appropriate pain scale  - Administer analgesics based on type and severity of pain and evaluate response  - Implement non-pharmacological measures as appropriate and evaluate response  - Consider cultural and social influences on pain and pain management  - Notify physician/advanced practitioner if interventions unsuccessful or patient reports new pain  Outcome: Progressing     Problem: INFECTION - ADULT  Goal: Absence or prevention of progression during hospitalization  Description: INTERVENTIONS:  - Assess and monitor for signs and symptoms of infection  - Monitor lab/diagnostic results  - Monitor all insertion sites, i e  indwelling lines, tubes, and drains  - Monitor endotracheal if appropriate and nasal secretions for changes in amount and color  - [place] appropriate cooling/warming therapies per order  - Administer medications as ordered  - Instruct and encourage patient and family to use good hand hygiene technique  - Identify and instruct in appropriate isolation precautions for identified infection/condition  Outcome: Progressing  Goal: Absence of fever/infection during neutropenic period  Description: INTERVENTIONS:  - Monitor WBC    Outcome: Progressing     Problem: SAFETY ADULT  Goal: Maintain or return to baseline ADL function  Description: INTERVENTIONS:  -  Assess patient's ability to carry out ADLs; assess patient's baseline for ADL function and identify physical deficits which impact ability to perform ADLs (bathing, care of mouth/teeth, toileting, grooming, dressing, etc )  - Assess/evaluate cause of self-care deficits   - Assess range of motion  - Assess patient's mobility; develop plan if impaired  - Assess patient's need for assistive devices and provide as appropriate  - Encourage maximum independence but intervene and supervise when necessary  - Involve family in performance of ADLs  - Assess for home care needs following discharge   - Consider OT consult to assist with ADL evaluation and planning for discharge  - Provide patient education as appropriate  Outcome: Progressing  Goal: Maintains/Returns to pre admission functional level  Description: INTERVENTIONS:  - Perform BMAT or MOVE assessment daily    - Set and communicate daily mobility goal to care team and patient/family/caregiver  - Collaborate with rehabilitation services on mobility goals if consulted  - Perform Range of Motion  times a day  - Reposition patient every  hours    - Dangle patient  times a day  - Stand patient  times a day  - Ambulate patient  times a day  - Out of bed to chair  times a day   - Out of bed for meals  times a day  - Out of bed for toileting  - Record patient progress and toleration of activity level   Outcome: Progressing  Goal: Patient will remain free of falls  Description: INTERVENTIONS:  - Educate patient/family on patient safety including physical limitations  - Instruct patient to call for assistance with activity   - Consult OT/PT to assist with strengthening/mobility   - Keep Call bell within reach  - Keep bed low and locked with side rails adjusted as appropriate  - Keep care items and personal belongings within reach  - Initiate and maintain comfort rounds  - Make Fall Risk Sign visible to staff  - Offer Toileting every  Hours, in advance of need  - Initiate/Maintain alarm  - Obtain necessary fall risk management equipment:  - Apply yellow socks and bracelet for high fall risk patients  - Consider moving patient to room near nurses station  Outcome: Progressing     Problem: DISCHARGE PLANNING  Goal: Discharge to home or other facility with appropriate resources  Description: INTERVENTIONS:  - Identify barriers to discharge w/patient and caregiver  - Arrange for needed discharge resources and transportation as appropriate  - Identify discharge learning needs (meds, wound care, etc )  - Arrange for interpretive services to assist at discharge as needed  - Refer to Case Management Department for coordinating discharge planning if the patient needs post-hospital services based on physician/advanced practitioner order or complex needs related to functional status, cognitive ability, or social support system  Outcome: Progressing     Problem: Knowledge Deficit  Goal: Patient/family/caregiver demonstrates understanding of disease process, treatment plan, medications, and discharge instructions  Description: Complete learning assessment and assess knowledge base    Interventions:  - Provide teaching at level of understanding  - Provide teaching via preferred learning methods  Outcome: Progressing     Problem: NEUROSENSORY - ADULT  Goal: Achieves stable or improved neurological status  Description: INTERVENTIONS  - Monitor and report changes in neurological status  - Monitor vital signs such as temperature, blood pressure, glucose, and any other labs ordered   - Initiate measures to prevent increased intracranial pressure  - Monitor for seizure activity and implement precautions if appropriate      Outcome: Progressing  Goal: Remains free of injury related to seizures activity  Description: INTERVENTIONS  - Maintain airway, patient safety  and administer oxygen as ordered  - Monitor patient for seizure activity, document and report duration and description of seizure to physician/advanced practitioner  - If seizure occurs,  ensure patient safety during seizure  - Reorient patient post seizure  - Seizure pads on all 4 side rails  - Instruct patient/family to notify RN of any seizure activity including if an aura is experienced  - Instruct patient/family to call for assistance with activity based on nursing assessment  - Administer anti-seizure medications if ordered    Outcome: Progressing  Goal: Achieves maximal functionality and self care  Description: INTERVENTIONS  - Monitor swallowing and airway patency with patient fatigue and changes in neurological status  - Encourage and assist patient to increase activity and self care  - Encourage visually impaired, hearing impaired and aphasic patients to use assistive/communication devices  Outcome: Progressing     Problem: Nutrition/Hydration-ADULT  Goal: Nutrient/Hydration intake appropriate for improving, restoring or maintaining nutritional needs  Description: Monitor and assess patient's nutrition/hydration status for malnutrition  Collaborate with interdisciplinary team and initiate plan and interventions as ordered  Monitor patient's weight and dietary intake as ordered or per policy  Utilize nutrition screening tool and intervene as necessary  Determine patient's food preferences and provide high-protein, high-caloric foods as appropriate       INTERVENTIONS:  - Monitor oral intake, urinary output, labs, and treatment plans  - Assess nutrition and hydration status and recommend course of action  - Evaluate amount of meals eaten  - Assist patient with eating if necessary   - Allow adequate time for meals  - Recommend/ encourage appropriate diets, oral nutritional supplements, and vitamin/mineral supplements  - Order, calculate, and assess calorie counts as needed  - Recommend, monitor, and adjust tube feedings and TPN/PPN based on assessed needs  - Assess need for intravenous fluids  - Provide specific nutrition/hydration education as appropriate  - Include patient/family/caregiver in decisions related to nutrition  Outcome: Progressing

## 2022-03-10 NOTE — PLAN OF CARE
Problem: MOBILITY - ADULT  Goal: Maintain or return to baseline ADL function  Description: INTERVENTIONS:  -  Assess patient's ability to carry out ADLs; assess patient's baseline for ADL function and identify physical deficits which impact ability to perform ADLs (bathing, care of mouth/teeth, toileting, grooming, dressing, etc )  - Assess/evaluate cause of self-care deficits   - Assess range of motion  - Assess patient's mobility; develop plan if impaired  - Assess patient's need for assistive devices and provide as appropriate  - Encourage maximum independence but intervene and supervise when necessary  - Involve family in performance of ADLs  - Assess for home care needs following discharge   - Consider OT consult to assist with ADL evaluation and planning for discharge  - Provide patient education as appropriate  Outcome: Progressing  Goal: Maintains/Returns to pre admission functional level  Description: INTERVENTIONS:  - Perform BMAT or MOVE assessment daily    - Set and communicate daily mobility goal to care team and patient/family/caregiver  - Collaborate with rehabilitation services on mobility goals if consulted  - Perform Range of Motion 4 times a day  - Reposition patient every 2 hours    - Dangle patient 4 times a day  - Stand patient 4 times a day  - Ambulate patient 4 times a day  - Out of bed to chair 4 times a day   - Out of bed for meals 4 times a day  - Out of bed for toileting  - Record patient progress and toleration of activity level   Outcome: Progressing     Problem: Potential for Falls  Goal: Patient will remain free of falls  Description: INTERVENTIONS:  - Educate patient/family on patient safety including physical limitations  - Instruct patient to call for assistance with activity   - Consult OT/PT to assist with strengthening/mobility   - Keep Call bell within reach  - Keep bed low and locked with side rails adjusted as appropriate  - Keep care items and personal belongings within reach  - Initiate and maintain comfort rounds  - Make Fall Risk Sign visible to staff  - Offer Toileting every 4 Hours, in advance of need  - Initiate/Maintain bed alarm  - Obtain necessary fall risk management equipment: bed alarm  - Apply yellow socks and bracelet for high fall risk patients  - Consider moving patient to room near nurses station  Outcome: Progressing     Problem: PAIN - ADULT  Goal: Verbalizes/displays adequate comfort level or baseline comfort level  Description: Interventions:  - Encourage patient to monitor pain and request assistance  - Assess pain using appropriate pain scale  - Administer analgesics based on type and severity of pain and evaluate response  - Implement non-pharmacological measures as appropriate and evaluate response  - Consider cultural and social influences on pain and pain management  - Notify physician/advanced practitioner if interventions unsuccessful or patient reports new pain  Outcome: Progressing     Problem: INFECTION - ADULT  Goal: Absence or prevention of progression during hospitalization  Description: INTERVENTIONS:  - Assess and monitor for signs and symptoms of infection  - Monitor lab/diagnostic results  - Monitor all insertion sites, i e  indwelling lines, tubes, and drains  - Monitor endotracheal if appropriate and nasal secretions for changes in amount and color  - [place] appropriate cooling/warming therapies per order  - Administer medications as ordered  - Instruct and encourage patient and family to use good hand hygiene technique  - Identify and instruct in appropriate isolation precautions for identified infection/condition  Outcome: Progressing  Goal: Absence of fever/infection during neutropenic period  Description: INTERVENTIONS:  - Monitor WBC    Outcome: Progressing     Problem: SAFETY ADULT  Goal: Maintain or return to baseline ADL function  Description: INTERVENTIONS:  -  Assess patient's ability to carry out ADLs; assess patient's baseline for ADL function and identify physical deficits which impact ability to perform ADLs (bathing, care of mouth/teeth, toileting, grooming, dressing, etc )  - Assess/evaluate cause of self-care deficits   - Assess range of motion  - Assess patient's mobility; develop plan if impaired  - Assess patient's need for assistive devices and provide as appropriate  - Encourage maximum independence but intervene and supervise when necessary  - Involve family in performance of ADLs  - Assess for home care needs following discharge   - Consider OT consult to assist with ADL evaluation and planning for discharge  - Provide patient education as appropriate  Outcome: Progressing  Goal: Maintains/Returns to pre admission functional level  Description: INTERVENTIONS:  - Perform BMAT or MOVE assessment daily    - Set and communicate daily mobility goal to care team and patient/family/caregiver  - Collaborate with rehabilitation services on mobility goals if consulted  - Perform Range of Motion 4 times a day  - Reposition patient every 2 hours    - Dangle patient 4 times a day  - Stand patient 4 times a day  - Ambulate patient 4 times a day  - Out of bed to chair 4 times a day   - Out of bed for meals 4 times a day  - Out of bed for toileting  - Record patient progress and toleration of activity level   Outcome: Progressing  Goal: Patient will remain free of falls  Description: INTERVENTIONS:  - Educate patient/family on patient safety including physical limitations  - Instruct patient to call for assistance with activity   - Consult OT/PT to assist with strengthening/mobility   - Keep Call bell within reach  - Keep bed low and locked with side rails adjusted as appropriate  - Keep care items and personal belongings within reach  - Initiate and maintain comfort rounds  - Make Fall Risk Sign visible to staff  - Offer Toileting every 4 Hours, in advance of need  - Initiate/Maintain bed alarm  - Obtain necessary fall risk management equipment: bed alarm  - Apply yellow socks and bracelet for high fall risk patients  - Consider moving patient to room near nurses station  Outcome: Progressing     Problem: DISCHARGE PLANNING  Goal: Discharge to home or other facility with appropriate resources  Description: INTERVENTIONS:  - Identify barriers to discharge w/patient and caregiver  - Arrange for needed discharge resources and transportation as appropriate  - Identify discharge learning needs (meds, wound care, etc )  - Arrange for interpretive services to assist at discharge as needed  - Refer to Case Management Department for coordinating discharge planning if the patient needs post-hospital services based on physician/advanced practitioner order or complex needs related to functional status, cognitive ability, or social support system  Outcome: Progressing     Problem: Knowledge Deficit  Goal: Patient/family/caregiver demonstrates understanding of disease process, treatment plan, medications, and discharge instructions  Description: Complete learning assessment and assess knowledge base    Interventions:  - Provide teaching at level of understanding  - Provide teaching via preferred learning methods  Outcome: Progressing     Problem: NEUROSENSORY - ADULT  Goal: Achieves stable or improved neurological status  Description: INTERVENTIONS  - Monitor and report changes in neurological status  - Monitor vital signs such as temperature, blood pressure, glucose, and any other labs ordered   - Initiate measures to prevent increased intracranial pressure  - Monitor for seizure activity and implement precautions if appropriate      Outcome: Progressing  Goal: Remains free of injury related to seizures activity  Description: INTERVENTIONS  - Maintain airway, patient safety  and administer oxygen as ordered  - Monitor patient for seizure activity, document and report duration and description of seizure to physician/advanced practitioner  - If seizure occurs, ensure patient safety during seizure  - Reorient patient post seizure  - Seizure pads on all 4 side rails  - Instruct patient/family to notify RN of any seizure activity including if an aura is experienced  - Instruct patient/family to call for assistance with activity based on nursing assessment  - Administer anti-seizure medications if ordered    Outcome: Progressing  Goal: Achieves maximal functionality and self care  Description: INTERVENTIONS  - Monitor swallowing and airway patency with patient fatigue and changes in neurological status  - Encourage and assist patient to increase activity and self care  - Encourage visually impaired, hearing impaired and aphasic patients to use assistive/communication devices  Outcome: Progressing     Problem: Nutrition/Hydration-ADULT  Goal: Nutrient/Hydration intake appropriate for improving, restoring or maintaining nutritional needs  Description: Monitor and assess patient's nutrition/hydration status for malnutrition  Collaborate with interdisciplinary team and initiate plan and interventions as ordered  Monitor patient's weight and dietary intake as ordered or per policy  Utilize nutrition screening tool and intervene as necessary  Determine patient's food preferences and provide high-protein, high-caloric foods as appropriate       INTERVENTIONS:  - Monitor oral intake, urinary output, labs, and treatment plans  - Assess nutrition and hydration status and recommend course of action  - Evaluate amount of meals eaten  - Assist patient with eating if necessary   - Allow adequate time for meals  - Recommend/ encourage appropriate diets, oral nutritional supplements, and vitamin/mineral supplements  - Order, calculate, and assess calorie counts as needed  - Recommend, monitor, and adjust tube feedings and TPN/PPN based on assessed needs  - Assess need for intravenous fluids  - Provide specific nutrition/hydration education as appropriate  - Include patient/family/caregiver in decisions related to nutrition  Outcome: Progressing

## 2022-03-10 NOTE — PLAN OF CARE
Problem: MOBILITY - ADULT  Goal: Maintain or return to baseline ADL function  Description: INTERVENTIONS:  -  Assess patient's ability to carry out ADLs; assess patient's baseline for ADL function and identify physical deficits which impact ability to perform ADLs (bathing, care of mouth/teeth, toileting, grooming, dressing, etc )  - Assess/evaluate cause of self-care deficits   - Assess range of motion  - Assess patient's mobility; develop plan if impaired  - Assess patient's need for assistive devices and provide as appropriate  - Encourage maximum independence but intervene and supervise when necessary  - Involve family in performance of ADLs  - Assess for home care needs following discharge   - Consider OT consult to assist with ADL evaluation and planning for discharge  - Provide patient education as appropriate  3/10/2022 1210 by Lauryn Urena  Outcome: Adequate for Discharge  3/10/2022 0731 by Lauryn Urena  Outcome: Progressing  Goal: Maintains/Returns to pre admission functional level  Description: INTERVENTIONS:  - Perform BMAT or MOVE assessment daily    - Set and communicate daily mobility goal to care team and patient/family/caregiver  - Collaborate with rehabilitation services on mobility goals if consulted  - Perform Range of Motion 4 times a day  - Reposition patient every 2 hours    - Dangle patient 4 times a day  - Stand patient 4 times a day  - Ambulate patient 4 times a day  - Out of bed to chair 4 times a day   - Out of bed for meals 4 times a day  - Out of bed for toileting  - Record patient progress and toleration of activity level   3/10/2022 1210 by Lauryn Urena  Outcome: Adequate for Discharge  3/10/2022 0731 by Lauryn Urena  Outcome: Progressing     Problem: Potential for Falls  Goal: Patient will remain free of falls  Description: INTERVENTIONS:  - Educate patient/family on patient safety including physical limitations  - Instruct patient to call for assistance with activity   - Consult OT/PT to assist with strengthening/mobility   - Keep Call bell within reach  - Keep bed low and locked with side rails adjusted as appropriate  - Keep care items and personal belongings within reach  - Initiate and maintain comfort rounds  - Make Fall Risk Sign visible to staff  - Offer Toileting every 4 Hours, in advance of need  - Initiate/Maintain bed alarm  - Obtain necessary fall risk management equipment: bed alarm  - Apply yellow socks and bracelet for high fall risk patients  - Consider moving patient to room near nurses station  3/10/2022 1210 by Meliton Homans  Outcome: Adequate for Discharge  3/10/2022 0731 by Meliton Homans  Outcome: Progressing     Problem: PAIN - ADULT  Goal: Verbalizes/displays adequate comfort level or baseline comfort level  Description: Interventions:  - Encourage patient to monitor pain and request assistance  - Assess pain using appropriate pain scale  - Administer analgesics based on type and severity of pain and evaluate response  - Implement non-pharmacological measures as appropriate and evaluate response  - Consider cultural and social influences on pain and pain management  - Notify physician/advanced practitioner if interventions unsuccessful or patient reports new pain  3/10/2022 1210 by Meliton Homans  Outcome: Adequate for Discharge  3/10/2022 0731 by Meliton Homans  Outcome: Progressing     Problem: INFECTION - ADULT  Goal: Absence or prevention of progression during hospitalization  Description: INTERVENTIONS:  - Assess and monitor for signs and symptoms of infection  - Monitor lab/diagnostic results  - Monitor all insertion sites, i e  indwelling lines, tubes, and drains  - Monitor endotracheal if appropriate and nasal secretions for changes in amount and color  - [place] appropriate cooling/warming therapies per order  - Administer medications as ordered  - Instruct and encourage patient and family to use good hand hygiene technique  - Identify and instruct in appropriate isolation precautions for identified infection/condition  3/10/2022 1210 by Marva Phan  Outcome: Adequate for Discharge  3/10/2022 0731 by Marva Phan  Outcome: Progressing  Goal: Absence of fever/infection during neutropenic period  Description: INTERVENTIONS:  - Monitor WBC    3/10/2022 1210 by Marva Phan  Outcome: Adequate for Discharge  3/10/2022 0731 by Marva Phan  Outcome: Progressing     Problem: SAFETY ADULT  Goal: Maintain or return to baseline ADL function  Description: INTERVENTIONS:  -  Assess patient's ability to carry out ADLs; assess patient's baseline for ADL function and identify physical deficits which impact ability to perform ADLs (bathing, care of mouth/teeth, toileting, grooming, dressing, etc )  - Assess/evaluate cause of self-care deficits   - Assess range of motion  - Assess patient's mobility; develop plan if impaired  - Assess patient's need for assistive devices and provide as appropriate  - Encourage maximum independence but intervene and supervise when necessary  - Involve family in performance of ADLs  - Assess for home care needs following discharge   - Consider OT consult to assist with ADL evaluation and planning for discharge  - Provide patient education as appropriate  3/10/2022 1210 by Marva Phan  Outcome: Adequate for Discharge  3/10/2022 0731 by Marva Phan  Outcome: Progressing  Goal: Maintains/Returns to pre admission functional level  Description: INTERVENTIONS:  - Perform BMAT or MOVE assessment daily    - Set and communicate daily mobility goal to care team and patient/family/caregiver  - Collaborate with rehabilitation services on mobility goals if consulted  - Perform Range of Motion 4 times a day  - Reposition patient every 2 hours    - Dangle patient 4 times a day  - Stand patient 4 times a day  - Ambulate patient 4 times a day  - Out of bed to chair 4 times a day   - Out of bed for meals 4 times a day  - Out of bed for toileting  - Record patient progress and toleration of activity level   3/10/2022 1210 by Fei Whitfield  Outcome: Adequate for Discharge  3/10/2022 0731 by Fei Whitfield  Outcome: Progressing  Goal: Patient will remain free of falls  Description: INTERVENTIONS:  - Educate patient/family on patient safety including physical limitations  - Instruct patient to call for assistance with activity   - Consult OT/PT to assist with strengthening/mobility   - Keep Call bell within reach  - Keep bed low and locked with side rails adjusted as appropriate  - Keep care items and personal belongings within reach  - Initiate and maintain comfort rounds  - Make Fall Risk Sign visible to staff  - Offer Toileting every 4 Hours, in advance of need  - Initiate/Maintain bed alarm  - Obtain necessary fall risk management equipment: bed alarm  - Apply yellow socks and bracelet for high fall risk patients  - Consider moving patient to room near nurses station  3/10/2022 1210 by Fei Whitfield  Outcome: Adequate for Discharge  3/10/2022 0731 by Fei Whitfield  Outcome: Progressing     Problem: DISCHARGE PLANNING  Goal: Discharge to home or other facility with appropriate resources  Description: INTERVENTIONS:  - Identify barriers to discharge w/patient and caregiver  - Arrange for needed discharge resources and transportation as appropriate  - Identify discharge learning needs (meds, wound care, etc )  - Arrange for interpretive services to assist at discharge as needed  - Refer to Case Management Department for coordinating discharge planning if the patient needs post-hospital services based on physician/advanced practitioner order or complex needs related to functional status, cognitive ability, or social support system  3/10/2022 1210 by Fei Whitfield  Outcome: Adequate for Discharge  3/10/2022 0731 by Fei Whitfield  Outcome: Progressing     Problem: Knowledge Deficit  Goal: Patient/family/caregiver demonstrates understanding of disease process, treatment plan, medications, and discharge instructions  Description: Complete learning assessment and assess knowledge base  Interventions:  - Provide teaching at level of understanding  - Provide teaching via preferred learning methods  3/10/2022 1210 by Fei Whitfield  Outcome: Adequate for Discharge  3/10/2022 0731 by Fei Whitfield  Outcome: Progressing     Problem: NEUROSENSORY - ADULT  Goal: Achieves stable or improved neurological status  Description: INTERVENTIONS  - Monitor and report changes in neurological status  - Monitor vital signs such as temperature, blood pressure, glucose, and any other labs ordered   - Initiate measures to prevent increased intracranial pressure  - Monitor for seizure activity and implement precautions if appropriate      3/10/2022 1210 by Fei Whitfield  Outcome: Adequate for Discharge  3/10/2022 0731 by Fei Whitfield  Outcome: Progressing  Goal: Remains free of injury related to seizures activity  Description: INTERVENTIONS  - Maintain airway, patient safety  and administer oxygen as ordered  - Monitor patient for seizure activity, document and report duration and description of seizure to physician/advanced practitioner  - If seizure occurs,  ensure patient safety during seizure  - Reorient patient post seizure  - Seizure pads on all 4 side rails  - Instruct patient/family to notify RN of any seizure activity including if an aura is experienced  - Instruct patient/family to call for assistance with activity based on nursing assessment  - Administer anti-seizure medications if ordered    3/10/2022 1210 by Fei Whitfield  Outcome: Adequate for Discharge  3/10/2022 0731 by Fei Whitfield  Outcome: Progressing  Goal: Achieves maximal functionality and self care  Description: INTERVENTIONS  - Monitor swallowing and airway patency with patient fatigue and changes in neurological status  - Encourage and assist patient to increase activity and self care     - Encourage visually impaired, hearing impaired and aphasic patients to use assistive/communication devices  3/10/2022 1210 by Jinny Roland  Outcome: Adequate for Discharge  3/10/2022 0731 by Jinny Roland  Outcome: Progressing     Problem: Nutrition/Hydration-ADULT  Goal: Nutrient/Hydration intake appropriate for improving, restoring or maintaining nutritional needs  Description: Monitor and assess patient's nutrition/hydration status for malnutrition  Collaborate with interdisciplinary team and initiate plan and interventions as ordered  Monitor patient's weight and dietary intake as ordered or per policy  Utilize nutrition screening tool and intervene as necessary  Determine patient's food preferences and provide high-protein, high-caloric foods as appropriate       INTERVENTIONS:  - Monitor oral intake, urinary output, labs, and treatment plans  - Assess nutrition and hydration status and recommend course of action  - Evaluate amount of meals eaten  - Assist patient with eating if necessary   - Allow adequate time for meals  - Recommend/ encourage appropriate diets, oral nutritional supplements, and vitamin/mineral supplements  - Order, calculate, and assess calorie counts as needed  - Recommend, monitor, and adjust tube feedings and TPN/PPN based on assessed needs  - Assess need for intravenous fluids  - Provide specific nutrition/hydration education as appropriate  - Include patient/family/caregiver in decisions related to nutrition  3/10/2022 1210 by Jinny Roland  Outcome: Adequate for Discharge  3/10/2022 [address] by Jinny Roland  Outcome: Progressing

## 2022-03-10 NOTE — RESTORATIVE TECHNICIAN NOTE
Restorative Technician Note      Patient Name: Elo Menezes     Note Type: Mobility  Patient Position Upon Consult: Seated edge of bed  Activity Performed: Ambulated; Dangled; Stood  Assistive Device: Other (Comment) (None)  Education Provided: Yes  Patient Position at End of Consult: Seated edge of bed;  All needs within reach; Bed/Chair alarm activated    Lexie CAMPBELL, Restorative Technician, [employer]

## 2022-03-10 NOTE — PHYSICAL THERAPY NOTE
PHYSICAL THERAPY NOTE          Patient Name: Judy Henry  CEZBI'B Date: 3/10/2022     03/10/22 0946   PT Last Visit   PT Visit Date 03/10/22   Note Type   Note Type Treatment   Pain Assessment   Pain Assessment Tool 0-10   Pain Score No Pain   Restrictions/Precautions   Weight Bearing Precautions Per Order No   Other Precautions Fall Risk;Multiple lines   General   Chart Reviewed Yes   Response to Previous Treatment Patient with no complaints from previous session  Family/Caregiver Present Yes  (wife )   Cognition   Overall Cognitive Status WFL   Arousal/Participation Cooperative   Attention Within functional limits   Orientation Level Oriented X4   Memory Within functional limits   Following Commands Follows all commands and directions without difficulty   Comments pleasant to work with    Subjective   Subjective pt eager to d/c home, requesting to speak with CM to arrange d/c ASAP   Bed Mobility   Supine to Sit Unable to assess   Sit to Supine Unable to assess   Additional Comments pt presented sitting EOB and returned upon conclusion   Transfers   Sit to Stand 5  Supervision   Stand to Sit 5  Supervision   Stand pivot 5  Supervision   Additional Comments no AD used   Ambulation/Elevation   Gait pattern Short stride; Inconsistent alex   Gait Assistance 5  Supervision   Additional items Verbal cues   Assistive Device None   Distance 200'   Stair Management Assistance 5  Supervision   Stair Management Technique One rail R;Step to pattern; Foreward   Number of Stairs 3  (limited by IV pole)   Ambulation/Elevation Additional Comments no LOB noted throughout   Balance   Static Sitting Good   Dynamic Sitting Fair +   Static Standing Fair   Dynamic Standing Fair -   Ambulatory Fair -   Endurance Deficit   Endurance Deficit No   Activity Tolerance   Activity Tolerance Patient tolerated treatment well   Nurse Made Aware pt cleared to see and mobilize per nursing   Assessment   Prognosis Good   Problem List Impaired balance   Assessment Pt agreeable to participate in PT session  Pt performed functional mobility as outlined above  Pt performed transfers, ambulated, and negotiated stairs at a level suitable to return home  He has a supportive wife at home who can assist as needed  Pt occasionally grabbing for handrail while ambulating  Pt did not appear to need AD for ambulation or balance  Upon asking pt if he needed HR for balance, he stated "No, I am not sure why I am touching it"  Educated pt on utilizing cane he has at home if he feels off balance or notices he is grabbing for furniture etc while ambulating  Pt agreeable  Pt and wife requesting to speak with CM to expedite d/c-CM made aware  Pt left seated EOB with wife present, call bell, phone, and all personal needs within reach  Pt has no further acute care PT needs 2* being S level for all mobility and having as supportive wife at home who can assist as needed  Will d/c PT orders at this time  Recommend he continues to mobilize with staff during hospital stay  D/C recommendations remain home with support of wife, no needs anticipated  Educated pt and wife on requesting OPPT at follow up MD appointments if he feels he needs it-agreeable  Barriers to Discharge None   Goals   Patient Goals to go home today   STG Expiration Date 03/23/22   PT Treatment Day 1   Plan   PT Frequency Other (Comment)  (d/c acute care PT)   Recommendation   PT Discharge Recommendation No rehabilitation needs  (home with support of wife)   Additional Comments pt denies any concerns regading his current functional mobility or ability to return home safely at this time     AM-PAC Basic Mobility Inpatient   Turning in Bed Without Bedrails 4   Lying on Back to Sitting on Edge of Flat Bed 4   Moving Bed to Chair 4   Standing Up From Chair 3   Walk in Room 3   Climb 3-5 Stairs 3   Basic Mobility Inpatient Raw Score 21   Basic Mobility Standardized Score 45 55   Highest Level Of Mobility   -HL Goal 6: Walk 10 steps or more   -[facility] Highest Level of Mobility 7: Walk 25 feet or more   -HLM Goal Achieved Yes   Black Heck, PT, DPT

## 2022-03-10 NOTE — PLAN OF CARE
Problem: PHYSICAL THERAPY ADULT  Goal: Performs mobility at highest level of function for planned discharge setting  See evaluation for individualized goals  Description: Treatment/Interventions: Functional transfer training,LE strengthening/ROM,Elevations,Therapeutic exercise,Endurance training,Equipment eval/education,Patient/family training,Bed mobility,Gait training,Spoke to nursing          See flowsheet documentation for full assessment, interventions and recommendations  Outcome: Adequate for Discharge  Note: Prognosis: Good  Problem List: Impaired balance  Assessment: Pt agreeable to participate in PT session  Pt performed functional mobility as outlined above  Pt performed transfers, ambulated, and negotiated stairs at a level suitable to return home  He has a supportive wife at home who can assist as needed  Pt occasionally grabbing for handrail while ambulating  Pt did not appear to need AD for ambulation or balance  Upon asking pt if he needed HR for balance, he stated "No, I am not sure why I am touching it"  Educated pt on utilizing cane he has at home if he feels off balance or notices he is grabbing for furniture etc while ambulating  Pt agreeable  Pt and wife requesting to speak with CM to expedite d/c-CM made aware  Pt left seated EOB with wife present, call bell, phone, and all personal needs within reach  Pt has no further acute care PT needs 2* being S level for all mobility and having as supportive wife at home who can assist as needed  Will d/c PT orders at this time  Recommend he continues to mobilize with staff during hospital stay  D/C recommendations remain home with support of wife, no needs anticipated  Educated pt and wife on requesting OPPT at follow up MD appointments if he feels he needs it-agreeable  Barriers to Discharge: None        PT Discharge Recommendation: No rehabilitation needs (home with support of wife)          See flowsheet documentation for full assessment

## 2022-03-10 NOTE — DISCHARGE INSTRUCTIONS
Discharge Instructions  Cranioplasty    Activity:  1  Do not lift, pushing or pulling more than 10 pounds for 2 weeks  2  Avoid bending, lifting and twisting for 2 weeks  3  Walk as tolerated  Recommend at least four short walks daily  4  No driving for at least 2 weeks or until cleared by Neurosurgery  5  Do not use a hair dryer, and avoid hair products such as mousse, oils, and gels  Do not brush your hair away from the incision since this will put strain on the suture line  6  Do not dye or perm hair for 6 weeks or until cleared by physician  7  Continue to change bed linens and pajamas more frequently  Wear clean clothes daily  Surgical incision care:  1  Keep dressings in place for 3 days  2  After 3 days, incisions may be left open to air, but should remain clean  3  Keep incisions dry for 3 days  4  May shower after 3 days using a baby shampoo including head incision  Rinse off shampoo and pat dry  Avoid, rubbing incision but gently massage hair  a  Continue to use clean towel and washcloth with each shower for 2 weeks post-op  5  Do not immerse the incisions in water for 6 weeks or until cleared  6  Do not apply any creams or ointments to the incision, unless otherwise instructed by [address] [place] [address]  7  Contact office if increasing redness, drainage, pain or swelling occurs around the incisions or if you develop a fever greater than 101F  8  Do not dye/perm hair or use any hair products until cleared by Neurosurgery  Postoperative medication:  1  [facility] will provide pain medication in the postoperative period  All prescriptions must come from a single provider  a  Take medications as prescribed  Call office with any questions/concerns  b  May use over the counter Tylenol  No NSAIDs (ie  Ibuprofen, Aleve, Advil, Naproxen, etc )  2  Please contact office for questions regarding dosage and modifications    3  No antiplatelet or anticoagulation medication until cleared by [facility], unless otherwise instructed  Please contact [facility] if you have any questions about the effects of any of your medications on blood clotting  4  Do not operate heavy machinery or vehicles while taking sedating medications  5  Use a bowel regimen while on opioids as they induce constipation  Ie  Senokot-S, Miralax, Colace, etc  Increase fiber and water intake  6  Do not drink any alcohol     ** Please notify the office if incision becomes red, swollen, tender, or has increased drainage, and temp>101  Return to the ER if you experience increased headache, difficulty walking, change in vision or speech, drowsiness, weakness, nausea/vomiting, or seizures  **    Please follow-up in 2 weeks for incision check and 6 weeks for postoperative visit with repeat CT head

## 2022-03-11 ENCOUNTER — TELEPHONE (OUTPATIENT)
Dept: NEUROSURGERY | Facility: CLINIC | Age: 62
End: 2022-03-11

## 2022-03-11 NOTE — UTILIZATION REVIEW
Notification of Discharge   This is a Notification of Discharge from our facility [address]  Please be advised that this patient has been discharge from our facility  Below you will find the admission and discharge date and time including the patients disposition  UTILIZATION REVIEW CONTACT:  Dariusz Yee  Utilization   Network Utilization Review Department  Phone: [phone] x carefully listen to the prompts  All voicemails are confidential   Email: [email or web address]     PHYSICIAN ADVISORY SERVICES:  FOR [id number] REVIEW - MEDICAL NECESSITY DENIAL  Phone: [phone]  Fax: [phone]  Email: [email or web address]     PRESENTATION DATE: 3/8/2022 10:38 AM  OBERVATION ADMISSION DATE:   INPATIENT ADMISSION DATE: 3/8/22  3:32 PM   DISCHARGE DATE: 3/10/2022 12:28 PM  DISPOSITION: Home/Self Care Home/Self Care      IMPORTANT INFORMATION:  Send all requests for admission clinical reviews, approved or denied determinations and any other requests to dedicated fax number below belonging to the campus where the patient is receiving treatment   List of dedicated fax numbers:  [address] DENIALS (Administrative/Medical Necessity) [phone]   1000  [facility] (Maternity/NICU/Pediatrics) [phone]   Florentino Joel [phone]   130 [facility] [phone]   [address] [place] [address] [phone]   2000 [facility] [address] [place] [address],[address] [phone]   [facility]  [phone]   [address] [place] [address], [address]  2401 [facility] 1000 [address] [facility] [phone]

## 2022-03-11 NOTE — TELEPHONE ENCOUNTER
Called patient to see how he is doing after surgery  Patient reports he is doing well overall and denies any incisional issues or fevers  Patient is able to ambulate around the house and complete ADLs  Educated the patient about the importance of preventing blood clots and provided measures how to prevent them  He does report having some swelling of his right on (incision side)  Has been applying ice and will continue to monitor for now  Patient denies any headaches, dizziness, nausea, vomiting, changes to his vision or mental status  Patient is aware to call 911 or present to the nearest ED with any neurological decline  Patient has moved his bowels since the surgery  Encouraged patient to take an over the counter stool softener, if he is taking narcotic pain medication  Encouraged fiber intake and fluids  Reviewed incision care with the patient  Advised that he may take a shower and gently wash the surgical site with soap and water or baby shampoo  Use clean wash cloth, towels, and clothing  Do not submerge in water until cleared by the surgeon  Do not apply any creams, ointments, or lotions to the site  Patient is aware to call the office if any redness, swelling, drainage, dehiscence of incision, or fever >100 F occurs  Patient is aware to call the office if any concerns or questions may arise  Reminded patient of his upcoming appointments with the date/time/location  Patient was appreciative for the call

## 2022-03-15 ENCOUNTER — DOCUMENTATION (OUTPATIENT)
Dept: NEUROSURGERY | Facility: CLINIC | Age: 62
End: 2022-03-15

## 2022-03-15 DIAGNOSIS — Z98.890 POST-OPERATIVE STATE: ICD-10-CM

## 2022-03-15 RX ORDER — OXYCODONE HYDROCHLORIDE 5 MG/1
5 TABLET ORAL EVERY 6 HOURS PRN
Qty: 30 TABLET | Refills: 0 | Status: SHIPPED | OUTPATIENT
Start: 2022-03-15 | End: 2022-04-21 | Stop reason: ALTCHOICE

## 2022-03-15 NOTE — TELEPHONE ENCOUNTER
Patient and his wife, Lobo Biggs, called the office requesting for a refill of the medication  Discussed with Abilio  She is willing to provide a refill of oxycodone 5 mg one tablet every 6 hours PRN with a supply of 30 tablets  Called patient and notified him of the new script with new dosing  Patient expressed understanding

## 2022-03-22 ENCOUNTER — CLINICAL SUPPORT (OUTPATIENT)
Dept: NEUROSURGERY | Facility: CLINIC | Age: 62
End: 2022-03-22

## 2022-03-22 VITALS — TEMPERATURE: 98.2 F | SYSTOLIC BLOOD PRESSURE: 140 MMHG | DIASTOLIC BLOOD PRESSURE: 68 MMHG

## 2022-03-22 DIAGNOSIS — Z98.890 POST-OPERATIVE STATE: Primary | ICD-10-CM

## 2022-03-22 PROCEDURE — 99024 POSTOP FOLLOW-UP VISIT: CPT

## 2022-03-22 NOTE — PROGRESS NOTES
Post-Op Visit- Neurosurgery    Reid Bettencorut 62 y o  male MRN: [id number]    Chief Complaint:  Patient presents post: right allographic CRANIOPLASTY - Right    History of Present Illness:  Patient presents for 2 week POV for incision check  Arrived accompanied by his wife and ambulated well without assistive device  Reports he is doing well overall and pain is minimal  Has not been experiencing any headaches or incisional issues        Current Outpatient Medications:     acetaminophen (TYLENOL) 325 mg tablet, Take 2 tablets (650 mg total) by mouth every 4 (four) hours as needed (mild pain), Disp: , Rfl: 0    Cholecalciferol (Vitamin D3) 50 MCG (2000 UT) TABS, Take 1 tablet (2,000 Units total) by mouth daily Take after Vit D 50,000 weekly is completed, Disp: 90 tablet, Rfl: 3    doxycycline hyclate (VIBRAMYCIN) 100 mg capsule, Take 1 capsule (100 mg total) by mouth every 12 (twelve) hours for 27 doses, Disp: 27 capsule, Rfl: 0    ergocalciferol (VITAMIN D2) 50,000 units, Take 1 capsule (50,000 Units total) by mouth once a week, Disp: 12 capsule, Rfl: 1    halobetasol (ULTRAVATE) 0 05 % cream, Apply topically 2 (two) times a day, Disp: 50 g, Rfl: 1    levETIRAcetam (KEPPRA) 750 mg tablet, Take 1 tablet (750 mg total) by mouth every 12 (twelve) hours, Disp: 180 tablet, Rfl: 3    lisinopril (ZESTRIL) 20 mg tablet, TAKE 1 TABLET BY MOUTH EVERY DAY, Disp: 90 tablet, Rfl: 1    LORazepam (ATIVAN) 1 mg tablet, Take 1 tablet (1 mg total) by mouth 2 (two) times a day as needed for anxiety, Disp: 30 tablet, Rfl: 0    Melatonin 5 MG TABS, Take 1 tablet by mouth as needed  , Disp: , Rfl:     metoprolol tartrate (LOPRESSOR) 25 mg tablet, TAKE 1 TABLET (25 MG TOTAL) BY MOUTH EVERY 12 (TWELVE) HOURS, Disp: 180 tablet, Rfl: 1    omeprazole (PriLOSEC) 20 mg delayed release capsule, Take 20 mg by mouth daily, Disp: , Rfl:     oxyCODONE (ROXICODONE) 5 immediate release tablet, Take 1 tablet (5 mg total) by mouth every 6 (six) hours as needed for moderate pain or severe pain Max Daily Amount: 20 mg, Disp: 30 tablet, Rfl: 0     No Known Allergies       Assessment:    Vitals:    03/22/22 1052   BP: 140/68   Temp: 98 2 °F (36 8 °C)       Wound Exam: Incision is clean, dry, and in tact  Some scabbing observed along the frontal region of incision  No dehiscence or drainage  See below:                 Procedure:  Staple/suture removal    Procedure Note: Cleansed site with CHG swab before running nylon sutures and 2 prolene drain sutures were removed  Cleansed area with peroxide and left TRAVIS  Patient Status: the patient tolerated the procedure well  Complications: None  Discussion/Summary  Doing well postoperatively  Reviewed incision care with patient including daily observation for s/s infection including: increased erythema, edema, drainage, dehiscence of incision or fever >101  Should these be observed, he understands that he is to call and/or return immediately for reassessment  Advised patient to continue cleansing area with mild soap and water and pat dry  Not to apply any lotions, creams, or ointments, & not to submerge in any water for 4  more weeks  He is to maintain activity restrictions until cleared by the surgeon  Activity levels were also reviewed with the patient in detail, he is to lift no greater than 10 pounds and ambulation is encouraged as tolerated  Verified date/time/location of upcoming POV and reminded him to complete CT prior to his next appointment  He is to call the office with any further questions or concerns, or if any incisional issues or fevers would arise

## 2022-04-15 ENCOUNTER — HOSPITAL ENCOUNTER (OUTPATIENT)
Dept: CT IMAGING | Facility: HOSPITAL | Age: 62
Discharge: HOME | End: 2022-04-15
Payer: COMMERCIAL

## 2022-04-15 DIAGNOSIS — Z98.890 POST-OPERATIVE STATE: ICD-10-CM

## 2022-04-15 PROCEDURE — 70450 CT HEAD/BRAIN W/O DYE: CPT

## 2022-04-19 ENCOUNTER — TELEPHONE (OUTPATIENT)
Dept: FAMILY MEDICINE CLINIC | Facility: CLINIC | Age: 62
End: 2022-04-19

## 2022-04-20 ENCOUNTER — OFFICE VISIT (OUTPATIENT)
Dept: NEUROSURGERY | Facility: CLINIC | Age: 62
End: 2022-04-20

## 2022-04-20 VITALS
SYSTOLIC BLOOD PRESSURE: 162 MMHG | HEART RATE: 76 BPM | TEMPERATURE: 98.4 F | BODY MASS INDEX: 28.77 KG/M2 | HEIGHT: 70 IN | WEIGHT: 201 LBS | DIASTOLIC BLOOD PRESSURE: 84 MMHG

## 2022-04-20 DIAGNOSIS — T85.738D INFECTION AND INFLAMMATORY REACTION DUE TO NERVOUS SYSTEM DEVICE, IMPLANT, AND GRAFT, SUBSEQUENT ENCOUNTER: ICD-10-CM

## 2022-04-20 DIAGNOSIS — T84.7XXD INFECTION OF BONE FLAP, SUBSEQUENT ENCOUNTER: ICD-10-CM

## 2022-04-20 DIAGNOSIS — T84.7XXA: ICD-10-CM

## 2022-04-20 DIAGNOSIS — Z98.890 HISTORY OF CRANIOPLASTY: Primary | ICD-10-CM

## 2022-04-20 PROCEDURE — 99024 POSTOP FOLLOW-UP VISIT: CPT | Performed by: NEUROLOGICAL SURGERY

## 2022-04-20 RX ORDER — DOXYCYCLINE 100 MG/1
100 CAPSULE ORAL 2 TIMES DAILY
Qty: 20 CAPSULE | Refills: 0 | Status: SHIPPED | OUTPATIENT
Start: 2022-04-20 | End: 2022-05-06 | Stop reason: SDUPTHER

## 2022-04-20 NOTE — PROGRESS NOTES
Patient Id: Samanta De Leon is a 62 y o  male        Handedness: Right      Assessment/Plan:    Diagnoses and all orders for this visit:    History of cranioplasty  -     doxycycline monohydrate (MONODOX) 100 mg capsule; Take 1 capsule (100 mg total) by mouth 2 (two) times a day for 10 days    [facility]fection of bone flap, [facility]    Infection of bone flap, subsequent encounter  -     doxycycline monohydrate (MONODOX) 100 mg capsule; Take 1 capsule (100 mg total) by mouth 2 (two) times a day for 10 days    Infection and inflammatory reaction due to nervous system device, implant, and graft, subsequent encounter  -     doxycycline monohydrate (MONODOX) 100 mg capsule; Take 1 capsule (100 mg total) by mouth 2 (two) times a day for 10 days        Discussion Summary:   1  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, 04/15/2021  Status post cranioplasty 07/13/2021 infection necessitating craniectomy  Synthetic cranioplasty 3/8/22  Now 6 weeks status post repeat cranioplasty  His wound continues to heal well  There was a subgaleal stitch that was removed  No drainage  There are 2 small opening was which we will watch closely  I would like to put him back on 10 more days of doxycycline  I also see him back in 4 weeks for a wound check        2  Seizures  On Keppra      3  Alcoholism   He continues to drink  We once again discussed the importance of alcohol cessation in the context of his prior brain injury, seizures, and his upcoming surgery  He understands that ongoing alcohol usage can impair wound healing        Chief Complaint: Post-op        HPI:   This is a 60-year-old gentleman who presented to the hospital after a transfer from rehab after sustaining a TBI and subdural hematoma in [place]   Fortunately his exam significantly deteriorated to where he would barely open eyes and minimally localized his bilateral uppers and lowers   He had significant bifrontal contusions as well as a right-sided subdural with significant amount of compression and mass effect with shift   As such she underwent a right-sided craniectomy   His brain was found to be quite contused at the time of surgery   The hematoma was evacuated with a craniectomy   Ultimately he was discharged to [address] [place] [address] he made a significant recovery        Unfortunately his course has been complicated by post craniectomy/cranioplasty infection over 4 months after his surgery  He is now 6 weeks status post synthetic allograft cranioplasty  He has had no issues  No seizures  He continues to drink alcohol  Denies any drainage from his incision  Review of Systems   Constitutional: Negative  HENT: Negative  Eyes: Positive for visual disturbance (cataract)  Respiratory: Negative  Cardiovascular: Negative  Gastrointestinal: Negative  Endocrine: Negative  Genitourinary: Negative  Musculoskeletal: Negative  Allergic/Immunologic: Negative  Neurological: Negative  Psychiatric/Behavioral: Negative  Physical Exam  Vitals:    04/20/22 1028   BP: 162/84   Pulse: 76   Temp: 98 4 °F (36 9 °C)   He is well appearing  Affect is appropriate  His BMI is Body mass index is 28 84 kg/m²  [facility] He is awake alert and oriented  Hearing and vision are grossly intact  His pupils are equal round reactive to light  His extraocular movements are intact  His face is symmetric  Tongue is midline  Facial sensation is intact and symmetric throughout  Shoulder shrug is 5/5  There is no drift or dysmetria  He has full strength in his bilateral upper and lower extremities  Incision healing well  One a subgaleal stitch removed      The following portions of the patient's history were reviewed and updated as appropriate: allergies, current medications, past family history, past medical history, past social history, past surgical history and problem list     Active Ambulatory Problems     Diagnosis Date Noted    Hypertension 03/03/2020    Anxiety 03/05/2021    Acute encephalopathy 04/15/2021    Alcohol abuse 04/18/2021    Impaired mobility and activities of daily living 04/19/2021    Status post craniectomy 07/13/2021    Seizure ([facility] ) 08/25/2021    Infection of bone flap ([facility] ) 11/19/2021    Postoperative pain 12/01/2021    PICC (peripherally inserted central catheter) in place 12/08/2021    PONV (postoperative nausea and vomiting) 03/08/2022     Resolved Ambulatory Problems     Diagnosis Date Noted    SDH (subdural hematoma) ([facility] ) 04/15/2021    Hyperchloremia 04/15/2021    Acute respiratory failure ([facility] ) 04/15/2021    Dysphagia 04/16/2021    Status post insertion of percutaneous endoscopic gastrostomy (PEG) tube ([facility] ) 05/19/2021     Past Medical History:   Diagnosis Date    GERD (gastroesophageal reflux disease)     Hematoma, subdural, with loss of consciousness, traumatic ([facility] )        Past Surgical History:   Procedure Laterality Date    BRAIN HEMATOMA EVACUATION Right 4/15/2021    Procedure: Right CRANIOTOMY FOR SUBDURAL HEMATOMA;  Surgeon: Pierre Mccormick MD;  [facility];  Service: Neurosurgery    BRAIN HEMATOMA EVACUATION Right 11/16/2021    Procedure: CRANIOTOMY FOR EVACUATION OF EPIDURAL HEMATOMA;  Surgeon: Pierre Mccormick MD;  [facility];  Service: Neurosurgery    BRAIN HEMATOMA EVACUATION Right 11/16/2021    Procedure: Exploration and revision of right craniotomy incision with removal of cranial plate;  Surgeon: Pierre Mccormick MD;  [facility];  Service: Neurosurgery    CRANIOTOMY Right     HAND SURGERY Left     PEG TUBE PLACEMENT      PEG TUBE REMOVAL      KY REPLACE SKULL PLATE/FLAP Right 7/99/5247    Procedure: RIGHT AUTOLOGOUS CRANIOPLASTY;  Surgeon: Pierre Mccormick MD;  [facility];  Service: Neurosurgery    KY REPLACE SKULL PLATE/FLAP Right 7/5/7210    Procedure: right allographic CRANIOPLASTY;  Surgeon: Miki Vyas MD;  [facility];  Service: Neurosurgery    ROTATOR CUFF REPAIR Left          Current Outpatient Medications:     acetaminophen (TYLENOL) 325 mg tablet, Take 2 tablets (650 mg total) by mouth every 4 (four) hours as needed (mild pain), Disp: , Rfl: 0    levETIRAcetam (KEPPRA) 750 mg tablet, Take 1 tablet (750 mg total) by mouth every 12 (twelve) hours, Disp: 180 tablet, Rfl: 3    lisinopril (ZESTRIL) 20 mg tablet, TAKE 1 TABLET BY MOUTH EVERY DAY, Disp: 90 tablet, Rfl: 1    LORazepam (ATIVAN) 1 mg tablet, Take 1 tablet (1 mg total) by mouth 2 (two) times a day as needed for anxiety, Disp: 30 tablet, Rfl: 0    Melatonin 5 MG TABS, Take 1 tablet by mouth as needed  , Disp: , Rfl:     metoprolol tartrate (LOPRESSOR) 25 mg tablet, TAKE 1 TABLET (25 MG TOTAL) BY MOUTH EVERY 12 (TWELVE) HOURS, Disp: 180 tablet, Rfl: 1    omeprazole (PriLOSEC) 20 mg delayed release capsule, Take 20 mg by mouth daily, Disp: , Rfl:     Cholecalciferol (Vitamin D3) 50 MCG (2000 UT) TABS, Take 1 tablet (2,000 Units total) by mouth daily Take after Vit D 50,000 weekly is completed (Patient not taking: Reported on 4/20/2022 ), Disp: 90 tablet, Rfl: 3    doxycycline monohydrate (MONODOX) 100 mg capsule, Take 1 capsule (100 mg total) by mouth 2 (two) times a day for 10 days, Disp: 20 capsule, Rfl: 0    ergocalciferol (VITAMIN D2) 50,000 units, Take 1 capsule (50,000 Units total) by mouth once a week (Patient not taking: Reported on 4/20/2022 ), Disp: 12 capsule, Rfl: 1    halobetasol (ULTRAVATE) 0 05 % cream, Apply topically 2 (two) times a day, Disp: 50 g, Rfl: 1    oxyCODONE (ROXICODONE) 5 immediate release tablet, Take 1 tablet (5 mg total) by mouth every 6 (six) hours as needed for moderate pain or severe pain Max Daily Amount: 20 mg (Patient not taking: Reported on 4/20/2022 ), Disp: 30 tablet, Rfl: 0    Results/Data: We reviewed his most recent imaging

## 2022-04-21 ENCOUNTER — OFFICE VISIT (OUTPATIENT)
Dept: FAMILY MEDICINE CLINIC | Facility: CLINIC | Age: 62
End: 2022-04-21
Payer: COMMERCIAL

## 2022-04-21 VITALS
RESPIRATION RATE: 18 BRPM | DIASTOLIC BLOOD PRESSURE: 72 MMHG | BODY MASS INDEX: 28.63 KG/M2 | TEMPERATURE: 98.1 F | SYSTOLIC BLOOD PRESSURE: 122 MMHG | WEIGHT: 200 LBS | HEART RATE: 75 BPM | HEIGHT: 70 IN | OXYGEN SATURATION: 95 %

## 2022-04-21 DIAGNOSIS — I10 ESSENTIAL HYPERTENSION: ICD-10-CM

## 2022-04-21 DIAGNOSIS — I10 PRIMARY HYPERTENSION: Primary | ICD-10-CM

## 2022-04-21 DIAGNOSIS — F41.9 ANXIETY: ICD-10-CM

## 2022-04-21 DIAGNOSIS — K05.30 PERIODONTITIS: ICD-10-CM

## 2022-04-21 PROBLEM — Z45.2 PICC (PERIPHERALLY INSERTED CENTRAL CATHETER) IN PLACE: Status: RESOLVED | Noted: 2021-12-08 | Resolved: 2022-04-21

## 2022-04-21 PROCEDURE — 99214 OFFICE O/P EST MOD 30 MIN: CPT | Performed by: NURSE PRACTITIONER

## 2022-04-21 PROCEDURE — 1036F TOBACCO NON-USER: CPT | Performed by: NURSE PRACTITIONER

## 2022-04-21 PROCEDURE — 3074F SYST BP LT 130 MM HG: CPT | Performed by: NURSE PRACTITIONER

## 2022-04-21 PROCEDURE — 3078F DIAST BP <80 MM HG: CPT | Performed by: NURSE PRACTITIONER

## 2022-04-21 PROCEDURE — 3008F BODY MASS INDEX DOCD: CPT | Performed by: NURSE PRACTITIONER

## 2022-04-21 RX ORDER — LISINOPRIL 20 MG/1
20 TABLET ORAL DAILY
Qty: 90 TABLET | Refills: 1 | Status: SHIPPED | OUTPATIENT
Start: 2022-04-21

## 2022-04-21 RX ORDER — LORAZEPAM 1 MG/1
1 TABLET ORAL 2 TIMES DAILY PRN
Qty: 30 TABLET | Refills: 0 | Status: SHIPPED | OUTPATIENT
Start: 2022-04-21 | End: 2022-06-10 | Stop reason: SDUPTHER

## 2022-04-21 RX ORDER — CHLORHEXIDINE GLUCONATE 0.12 MG/ML
15 RINSE ORAL 2 TIMES DAILY
Qty: 120 ML | Refills: 1 | Status: SHIPPED | OUTPATIENT
Start: 2022-04-21 | End: 2022-07-22

## 2022-04-21 NOTE — PROGRESS NOTES
Assessment/Plan:    Problem List Items Addressed This Visit     Essential hypertension - Primary    Relevant Medications    lisinopril (ZESTRIL) 20 mg tablet    metoprolol tartrate (LOPRESSOR) 25 mg tablet    Anxiety    Relevant Medications    LORazepam (ATIVAN) 1 mg tablet      Other Visit Diagnoses     Periodontitis        Relevant Medications    chlorhexidine (PERIDEX) 0 12 % solution           Diagnoses and all orders for this visit:    Primary hypertension    Essential hypertension  -     lisinopril (ZESTRIL) 20 mg tablet; Take 1 tablet (20 mg total) by mouth daily  -     metoprolol tartrate (LOPRESSOR) 25 mg tablet; Take 1 tablet (25 mg total) by mouth every 12 (twelve) hours    Anxiety  -     LORazepam (ATIVAN) 1 mg tablet; Take 1 tablet (1 mg total) by mouth 2 (two) times a day as needed for anxiety    Periodontitis  -     chlorhexidine (PERIDEX) 0 12 % solution; Apply 15 mL to the mouth or throat 2 (two) times a day        No problem-specific Assessment & Plan notes found for this encounter  Subjective:      Patient ID: Mac Casey is a 62 y o  male  Pt with PMHX s/p craniectomy and 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, seizures, GERD, and HTN present for routine visit  Pt was seen by Neuro surgeon yesterday and had a few residual sutures removed from craniectomy surgical incision  Since Pt had the Hx of infected bone flap, he was Rx doxycyline for prophylaxis  Pt will need Pre-Op visit for  Cataract extractions scheduled 5/11 and 5/25  Otherwise Pt doing well overall, offers no acute complaints today  Hypertension  This is a chronic problem  The problem is controlled  Associated symptoms include anxiety  There are no associated agents to hypertension  Past treatments include ACE inhibitors and beta blockers  The current treatment provides significant improvement  There are no compliance problems  Heartburn  This is a chronic problem   He has tried a PPI for the symptoms  The treatment provided significant relief  Anxiety  Presents for follow-up visit  Symptoms include muscle tension, nervous/anxious behavior and panic  Symptoms occur occasionally  The severity of symptoms is moderate  The quality of sleep is fair  Nighttime awakenings: occasional      Compliance with medications is %  The following portions of the patient's history were reviewed and updated as appropriate:   He has a past medical history of Anxiety, Dysphagia (4/16/2021), GERD (gastroesophageal reflux disease), Hematoma, subdural, with loss of consciousness, traumatic ([facility] ), Hypertension, and Status post insertion of percutaneous endoscopic gastrostomy (PEG) tube ([facility] ) (5/19/2021)  ,  does not have any pertinent problems on file  ,   has a past surgical history that includes Rotator cuff repair (Left); Hand surgery (Left); Brain hematoma evacuation (Right, 4/15/2021); PEG tube placement; PEG tube removal; Craniotomy (Right); pr replace skull plate/flap (Right, 8/97/6393); Brain hematoma evacuation (Right, 11/16/2021); Brain hematoma evacuation (Right, 11/16/2021); and pr replace skull plate/flap (Right, 4/0/3973)  ,  family history includes Dementia in his father; Heart disease in his mother; Hypertension in his father  ,   reports that he has never smoked  He has never used smokeless tobacco  He reports previous alcohol use of about 3 0 standard drinks of alcohol per week  He reports previous drug use ,  has No Known Allergies     Current Outpatient Medications   Medication Sig Dispense Refill    acetaminophen (TYLENOL) 325 mg tablet Take 2 tablets (650 mg total) by mouth every 4 (four) hours as needed (mild pain)  0    doxycycline monohydrate (MONODOX) 100 mg capsule Take 1 capsule (100 mg total) by mouth 2 (two) times a day for 10 days 20 capsule 0    halobetasol (ULTRAVATE) 0 05 % cream Apply topically 2 (two) times a day 50 g 1    levETIRAcetam (KEPPRA) 750 mg tablet Take 1 tablet (750 mg total) by mouth every 12 (twelve) hours 180 tablet 3    lisinopril (ZESTRIL) 20 mg tablet Take 1 tablet (20 mg total) by mouth daily 90 tablet 1    LORazepam (ATIVAN) 1 mg tablet Take 1 tablet (1 mg total) by mouth 2 (two) times a day as needed for anxiety 30 tablet 0    Melatonin 5 MG TABS Take 1 tablet by mouth as needed        metoprolol tartrate (LOPRESSOR) 25 mg tablet Take 1 tablet (25 mg total) by mouth every 12 (twelve) hours 180 tablet 1    omeprazole (PriLOSEC) 20 mg delayed release capsule Take 20 mg by mouth daily      chlorhexidine (PERIDEX) 0 12 % solution Apply 15 mL to the mouth or throat 2 (two) times a day 120 mL 1    Cholecalciferol (Vitamin D3) 50 MCG (2000 UT) TABS Take 1 tablet (2,000 Units total) by mouth daily Take after Vit D 50,000 weekly is completed (Patient not taking: Reported on 4/20/2022 ) 90 tablet 3     No current facility-administered medications for this visit  Review of Systems   Constitutional: Negative  HENT: Negative  Eyes: Negative  Respiratory: Negative  Cardiovascular: Negative  Gastrointestinal: Negative  Endocrine: Negative  Genitourinary: Negative  Musculoskeletal: Negative  Skin: Negative  Allergic/Immunologic: Negative  Neurological: Negative  Hematological: Negative  Psychiatric/Behavioral: The patient is nervous/anxious  Objective:  Vitals:    04/21/22 1022   BP: 122/72   Pulse: 75   Resp: 18   Temp: 98 1 °F (36 7 °C)   SpO2: 95%   Weight: 90 7 kg (200 lb)   Height: 5' 10" (1 778 m)     Body mass index is 28 7 kg/m²  Physical Exam  Vitals and nursing note reviewed  Constitutional:       Appearance: Normal appearance  He is well-developed  Interventions: Face mask in place  HENT:      Head: Normocephalic and atraumatic          Right Ear: Tympanic membrane, ear canal and external ear normal       Left Ear: Tympanic membrane, ear canal and external ear normal       Nose: Nose normal  Mouth/Throat:      Mouth: Mucous membranes are moist       Pharynx: Uvula midline  Eyes:      General: Lids are normal       Conjunctiva/sclera: Conjunctivae normal       Pupils: Pupils are equal, round, and reactive to light  Neck:      Thyroid: No thyroid mass  Vascular: No JVD  Trachea: Trachea and phonation normal    Cardiovascular:      Rate and Rhythm: Normal rate and regular rhythm  Pulses: Normal pulses  Heart sounds: Normal heart sounds, S1 normal and S2 normal  No murmur heard  No friction rub  No gallop  Pulmonary:      Effort: Pulmonary effort is normal       Breath sounds: Normal breath sounds  Abdominal:      General: Bowel sounds are normal       Palpations: Abdomen is soft  Tenderness: There is no abdominal tenderness  Genitourinary:     Comments: Deferred  Musculoskeletal:         General: Normal range of motion  Cervical back: Full passive range of motion without pain, normal range of motion and neck supple  Right lower leg: No edema  Left lower leg: No edema  Lymphadenopathy:      Head:      Right side of head: No submental, submandibular, tonsillar, preauricular, posterior auricular or occipital adenopathy  Left side of head: No submental, submandibular, tonsillar, preauricular, posterior auricular or occipital adenopathy  Cervical: No cervical adenopathy  Skin:     General: Skin is warm and dry  Capillary Refill: Capillary refill takes less than 2 seconds  Neurological:      General: No focal deficit present  Mental Status: He is alert and oriented to person, place, and time  Cranial Nerves: Cranial nerves are intact  Sensory: Sensation is intact  Motor: Motor function is intact  Coordination: Coordination is intact  Gait: Gait is intact     Psychiatric:         Attention and Perception: Attention and perception normal          Mood and Affect: Mood and affect normal          Speech: Speech normal          Behavior: Behavior normal  Behavior is cooperative  Thought Content:  Thought content normal          Cognition and Memory: Cognition normal          Judgment: Judgment normal

## 2022-05-01 NOTE — PLAN OF CARE
Problem: Prexisting or High Potential for Compromised Skin Integrity  Goal: Skin integrity is maintained or improved  Description: INTERVENTIONS:  - Identify patients at risk for skin breakdown  - Assess and monitor skin integrity  - Assess and monitor nutrition and hydration status  - Monitor labs   - Assess for incontinence   - Turn and reposition patient  - Assist with mobility/ambulation  - Relieve pressure over bony prominences  - Avoid friction and shearing  - Provide appropriate hygiene as needed including keeping skin clean and dry  - Evaluate need for skin moisturizer/barrier cream  - Collaborate with interdisciplinary team   - Patient/family teaching  - Consider wound care consult   Outcome: Progressing     Problem: MOBILITY - ADULT  Goal: Maintain or return to baseline ADL function  Description: INTERVENTIONS:  -  Assess patient's ability to carry out ADLs; assess patient's baseline for ADL function and identify physical deficits which impact ability to perform ADLs (bathing, care of mouth/teeth, toileting, grooming, dressing, etc )  - Assess/evaluate cause of self-care deficits   - Assess range of motion  - Assess patient's mobility; develop plan if impaired  - Assess patient's need for assistive devices and provide as appropriate  - Encourage maximum independence but intervene and supervise when necessary  - Involve family in performance of ADLs  - Assess for home care needs following discharge   - Consider OT consult to assist with ADL evaluation and planning for discharge  - Provide patient education as appropriate  Outcome: Progressing  Goal: Maintains/Returns to pre admission functional level  Description: INTERVENTIONS:  - Perform BMAT or MOVE assessment daily    - Set and communicate daily mobility goal to care team and patient/family/caregiver     - Collaborate with rehabilitation services on mobility goals if consulted  - Out of bed for toileting  - Record patient progress and toleration of activity level   Outcome: Progressing     Problem: PAIN - ADULT  Goal: Verbalizes/displays adequate comfort level or baseline comfort level  Description: Interventions:  - Encourage patient to monitor pain and request assistance  - Assess pain using appropriate pain scale  - Administer analgesics based on type and severity of pain and evaluate response  - Implement non-pharmacological measures as appropriate and evaluate response  - Consider cultural and social influences on pain and pain management  - Notify physician/advanced practitioner if interventions unsuccessful or patient reports new pain  Outcome: Progressing     Problem: INFECTION - ADULT  Goal: Absence or prevention of progression during hospitalization  Description: INTERVENTIONS:  - Assess and monitor for signs and symptoms of infection  - Monitor lab/diagnostic results  - Monitor all insertion sites, i e  indwelling lines, tubes, and drains  - Monitor endotracheal if appropriate and nasal secretions for changes in amount and color  - [place] appropriate cooling/warming therapies per order  - Administer medications as ordered  - Instruct and encourage patient and family to use good hand hygiene technique  - Identify and instruct in appropriate isolation precautions for identified infection/condition  Outcome: Progressing     Problem: SAFETY ADULT  Goal: Maintain or return to baseline ADL function  Description: INTERVENTIONS:  -  Assess patient's ability to carry out ADLs; assess patient's baseline for ADL function and identify physical deficits which impact ability to perform ADLs (bathing, care of mouth/teeth, toileting, grooming, dressing, etc )  - Assess/evaluate cause of self-care deficits   - Assess range of motion  - Assess patient's mobility; develop plan if impaired  - Assess patient's need for assistive devices and provide as appropriate  - Encourage maximum independence but intervene and supervise when necessary  - Involve family in performance of ADLs  - Assess for home care needs following discharge   - Consider OT consult to assist with ADL evaluation and planning for discharge  - Provide patient education as appropriate  Outcome: Progressing  Goal: Maintains/Returns to pre admission functional level  Description: INTERVENTIONS:  - Perform BMAT or MOVE assessment daily    - Set and communicate daily mobility goal to care team and patient/family/caregiver     - Collaborate with rehabilitation services on mobility goals if consulted  - Out of bed for toileting  - Record patient progress and toleration of activity level   Outcome: Progressing  Goal: Patient will remain free of falls  Description: INTERVENTIONS:  - Educate patient/family on patient safety including physical limitations  - Instruct patient to call for assistance with activity   - Consult OT/PT to assist with strengthening/mobility   - Keep Call bell within reach  - Keep bed low and locked with side rails adjusted as appropriate  - Keep care items and personal belongings within reach  - Initiate and maintain comfort rounds  - Make Fall Risk Sign visible to staff  - Obtain necessary fall risk management equipment  - Apply yellow socks and bracelet for high fall risk patients  - Consider moving patient to room near nurses station  Outcome: Progressing     Problem: DISCHARGE PLANNING  Goal: Discharge to home or other facility with appropriate resources  Description: INTERVENTIONS:  - Identify barriers to discharge w/patient and caregiver  - Arrange for needed discharge resources and transportation as appropriate  - Identify discharge learning needs (meds, wound care, etc )  - Arrange for interpretive services to assist at discharge as needed  - Refer to Case Management Department for coordinating discharge planning if the patient needs post-hospital services based on physician/advanced practitioner order or complex needs related to functional status, cognitive ability, or social support system  Outcome: Progressing     Problem: Knowledge Deficit  Goal: Patient/family/caregiver demonstrates understanding of disease process, treatment plan, medications, and discharge instructions  Description: Complete learning assessment and assess knowledge base  Interventions:  - Provide teaching at level of understanding  - Provide teaching via preferred learning methods  Outcome: Progressing     Problem: Nutrition/Hydration-ADULT  Goal: Nutrient/Hydration intake appropriate for improving, restoring or maintaining nutritional needs  Description: Monitor and assess patient's nutrition/hydration status for malnutrition  Collaborate with interdisciplinary team and initiate plan and interventions as ordered  Monitor patient's weight and dietary intake as ordered or per policy  Utilize nutrition screening tool and intervene as necessary  Determine patient's food preferences and provide high-protein, high-caloric foods as appropriate       INTERVENTIONS:  - Monitor oral intake, urinary output, labs, and treatment plans  - Assess nutrition and hydration status and recommend course of action  - Evaluate amount of meals eaten  - Assist patient with eating if necessary   - Allow adequate time for meals  - Recommend/ encourage appropriate diets, oral nutritional supplements, and vitamin/mineral supplements  - Order, calculate, and assess calorie counts as needed  - Recommend, monitor, and adjust tube feedings and TPN/PPN based on assessed needs  - Assess need for intravenous fluids  - Provide specific nutrition/hydration education as appropriate  - Include patient/family/caregiver in decisions related to nutrition  Outcome: Progressing     Problem: CARDIOVASCULAR - ADULT  Goal: Maintains optimal cardiac output and hemodynamic stability  Description: INTERVENTIONS:  - Monitor I/O, vital signs and rhythm  - Monitor for S/S and trends of decreased cardiac output  - Administer and titrate ordered vasoactive medications to optimize hemodynamic stability  - Assess quality of pulses, skin color and temperature  - Assess for signs of decreased coronary artery perfusion  - Instruct patient to report change in severity of symptoms  Outcome: Progressing     Problem: RESPIRATORY - ADULT  Goal: Achieves optimal ventilation and oxygenation  Description: INTERVENTIONS:  - Assess for changes in respiratory status  - Assess for changes in mentation and behavior  - Position to facilitate oxygenation and minimize respiratory effort  - Oxygen administered by appropriate delivery if ordered  - Initiate smoking cessation education as indicated  - Encourage broncho-pulmonary hygiene including cough, deep breathe, Incentive Spirometry  - Assess the need for suctioning and aspirate as needed  - Assess and instruct to report SOB or any respiratory difficulty  - Respiratory Therapy support as indicated  Outcome: Progressing     Problem: GASTROINTESTINAL - ADULT  Goal: Minimal or absence of nausea and/or vomiting  Description: INTERVENTIONS:  - Administer IV fluids if ordered to ensure adequate hydration  - Maintain NPO status until nausea and vomiting are resolved  - Nasogastric tube if ordered  - Administer ordered antiemetic medications as needed  - Provide nonpharmacologic comfort measures as appropriate  - Advance diet as tolerated, if ordered  - Consider nutrition services referral to assist patient with adequate nutrition and appropriate food choices  Outcome: Progressing     Problem: METABOLIC, FLUID AND ELECTROLYTES - ADULT  Goal: Electrolytes maintained within normal limits  Description: INTERVENTIONS:  - Monitor labs and assess patient for signs and symptoms of electrolyte imbalances  - Administer electrolyte replacement as ordered  - Monitor response to electrolyte replacements, including repeat lab results as appropriate  - Instruct patient on fluid and nutrition as appropriate  Outcome: Progressing     Problem: SKIN/TISSUE INTEGRITY - ADULT  Goal: Skin Integrity remains intact(Skin Breakdown Prevention)  Description: Assess:  -Inspect skin when repositioning, toileting, and assisting with ADLS  -Assess extremities for adequate circulation and sensation     Bed Management:  -Have minimal linens on bed & keep smooth, unwrinkled  -Change linens as needed when moist or perspiring      Toileting:  -Offer bedside commode  -Assess for incontinence  -Use incontinent care products after each incontinent episode      Skin Care:  -Avoid use of baby powder, tape, friction and shearing, hot water or constrictive clothing  -Relieve pressure over bony prominences  -Do not massage red bony areas    Next Steps:  -Teach patient strategies to minimize risks  -Consider consults to  interdisciplinary teams  Outcome: Progressing     Problem: MUSCULOSKELETAL - ADULT  Goal: Maintain or return mobility to safest level of function  Description: INTERVENTIONS:  - Assess patient's ability to carry out ADLs; assess patient's baseline for ADL function and identify physical deficits which impact ability to perform ADLs (bathing, care of mouth/teeth, toileting, grooming, dressing, etc )  - Assess/evaluate cause of self-care deficits   - Assess range of motion  - Assess patient's mobility  - Assess patient's need for assistive devices and provide as appropriate  - Encourage maximum independence but intervene and supervise when necessary  - Involve family in performance of ADLs  - Assess for home care needs following discharge   - Consider OT consult to assist with ADL evaluation and planning for discharge  - Provide patient education as appropriate  Outcome: Progressing     Problem: HEMATOLOGIC - ADULT  Goal: Maintains hematologic stability  Description: INTERVENTIONS  - Assess for signs and symptoms of bleeding or hemorrhage  - Monitor labs  - Administer supportive blood products/factors as ordered and appropriate  Outcome: Progressing ambulatory

## 2022-05-02 ENCOUNTER — CONSULT (OUTPATIENT)
Dept: FAMILY MEDICINE CLINIC | Facility: CLINIC | Age: 62
End: 2022-05-02
Payer: COMMERCIAL

## 2022-05-02 VITALS
TEMPERATURE: 98.7 F | HEART RATE: 82 BPM | OXYGEN SATURATION: 99 % | SYSTOLIC BLOOD PRESSURE: 162 MMHG | HEIGHT: 70 IN | WEIGHT: 200 LBS | DIASTOLIC BLOOD PRESSURE: 80 MMHG | BODY MASS INDEX: 28.63 KG/M2

## 2022-05-02 DIAGNOSIS — H25.9 AGE-RELATED CATARACT OF BOTH EYES, UNSPECIFIED AGE-RELATED CATARACT TYPE: ICD-10-CM

## 2022-05-02 DIAGNOSIS — Z01.818 PRE-OP EXAMINATION: Primary | ICD-10-CM

## 2022-05-02 PROCEDURE — 3077F SYST BP >= 140 MM HG: CPT | Performed by: NURSE PRACTITIONER

## 2022-05-02 PROCEDURE — 99243 OFF/OP CNSLTJ NEW/EST LOW 30: CPT | Performed by: NURSE PRACTITIONER

## 2022-05-02 PROCEDURE — 1036F TOBACCO NON-USER: CPT | Performed by: NURSE PRACTITIONER

## 2022-05-02 PROCEDURE — 3079F DIAST BP 80-89 MM HG: CPT | Performed by: NURSE PRACTITIONER

## 2022-05-02 NOTE — PROGRESS NOTES
Subjective:     Neli Leslie is a 62 y o  male who presents to the office today for a preoperative consultation at the request of surgeon Dilcia Wright MD who plans on performing cataract extraction on May 11 OD and May 25 OS  This consultation is requested for the specific conditions prompting preoperative evaluation (i e  because of potential affect on operative risk): infection, bleeding, altered vision  Planned anesthesia: IV sedation  The patient has the following known anesthesia issues: none  Patients bleeding risk: no recent abnormal bleeding, no remote history of abnormal bleeding and no use of Ca-channel blockers  Patient does not have objections to receiving blood products if needed  The following portions of the patient's history were reviewed and updated as appropriate:   He has a past medical history of Anxiety, Dysphagia (4/16/2021), GERD (gastroesophageal reflux disease), Hematoma, subdural, with loss of consciousness, traumatic ([facility] [address] ), Hypertension, and Status post insertion of percutaneous endoscopic gastrostomy (PEG) tube ([facility] [address] ) (5/19/2021)  ,  does not have any pertinent problems on file  ,   has a past surgical history that includes Rotator cuff repair (Left); Hand surgery (Left); Brain hematoma evacuation (Right, 4/15/2021); PEG tube placement; PEG tube removal; Craniotomy (Right); pr replace skull plate/flap (Right, 3/84/4146); Brain hematoma evacuation (Right, 11/16/2021); Brain hematoma evacuation (Right, 11/16/2021); and pr replace skull plate/flap (Right, 6/9/5765)  ,  family history includes Dementia in his father; Heart disease in his mother; Hypertension in his father  ,   reports that he has never smoked  He has never used smokeless tobacco  He reports previous alcohol use of about 3 0 standard drinks of alcohol per week  He reports previous drug use ,  has No Known Allergies     Current Outpatient Medications   Medication Sig Dispense Refill    acetaminophen (TYLENOL) 325 mg tablet Take 2 tablets (650 mg total) by mouth every 4 (four) hours as needed (mild pain)  0    chlorhexidine (PERIDEX) 0 12 % solution Apply 15 mL to the mouth or throat 2 (two) times a day 120 mL 1    halobetasol (ULTRAVATE) 0 05 % cream Apply topically 2 (two) times a day 50 g 1    levETIRAcetam (KEPPRA) 750 mg tablet Take 1 tablet (750 mg total) by mouth every 12 (twelve) hours 180 tablet 3    lisinopril (ZESTRIL) 20 mg tablet Take 1 tablet (20 mg total) by mouth daily 90 tablet 1    LORazepam (ATIVAN) 1 mg tablet Take 1 tablet (1 mg total) by mouth 2 (two) times a day as needed for anxiety 30 tablet 0    Melatonin 5 MG TABS Take 1 tablet by mouth as needed        metoprolol tartrate (LOPRESSOR) 25 mg tablet Take 1 tablet (25 mg total) by mouth every 12 (twelve) hours 180 tablet 1    omeprazole (PriLOSEC) 20 mg delayed release capsule Take 20 mg by mouth daily      Cholecalciferol (Vitamin D3) 50 MCG (2000 UT) TABS Take 1 tablet (2,000 Units total) by mouth daily Take after Vit D 50,000 weekly is completed (Patient not taking: Reported on 4/20/2022 ) 90 tablet 3     No current facility-administered medications for this visit  Review of Systems  Review of Systems   Eyes: Positive for visual disturbance  All other systems reviewed and are negative  Vitals:    05/02/22 1357   BP: 162/80   Pulse: 82   Temp: 98 7 °F (37 1 °C)   SpO2: 99%     Body mass index is 28 7 kg/m²  Objective:    Physical Exam  Vitals and nursing note reviewed  Constitutional:       Appearance: Normal appearance  He is well-developed  Interventions: Face mask in place  HENT:      Head: Normocephalic and atraumatic  Right Ear: External ear normal       Left Ear: External ear normal       Nose: Nose normal    Eyes:      Conjunctiva/sclera: Conjunctivae normal       Pupils: Pupils are equal, round, and reactive to light  Cardiovascular:      Rate and Rhythm: Normal rate and regular rhythm        Heart sounds: Normal heart sounds  Pulmonary:      Effort: Pulmonary effort is normal       Breath sounds: Normal breath sounds  Abdominal:      General: Bowel sounds are normal       Palpations: Abdomen is soft  Genitourinary:     Comments: Deferred  Musculoskeletal:         General: Normal range of motion  Cervical back: Normal range of motion and neck supple  Skin:     General: Skin is warm and dry  Capillary Refill: Capillary refill takes less than 2 seconds  Neurological:      Mental Status: He is alert and oriented to person, place, and time  Psychiatric:         Behavior: Behavior normal          Thought Content: Thought content normal          Judgment: Judgment normal            Predictors of intubation difficulty:   Morbid obesity? no   Anatomically abnormal facies? no   Prominent incisors? no   Receding mandible? no   Short, thick neck? no   Neck range of motion: normal   Mallampati score:  I (soft palate, uvula, fauces, and tonsillar pillars visible)   Thyromental distance: < 6cm      Cardiographics  ECG: normal sinus rhythm, no blocks or conduction defects, no ischemic changes  Echocardiogram: not done    Imaging  Chest x-ray: not done     Lab Review   Admission on 03/08/2022, Discharged on 03/10/2022   Component Date Value    ABO Grouping 03/08/2022 O     Rh Factor 03/08/2022 Positive     Antibody Screen 03/08/2022 Negative     Specimen Expiration Date 03/08/2022 [id number]     Sodium 03/09/2022 137     Potassium 03/09/2022 5 1     Chloride 03/09/2022 110*    CO2 03/09/2022 23     ANION GAP 03/09/2022 4     BUN 03/09/2022 11     Creatinine 03/09/2022 0 98     Glucose 03/09/2022 168*    Calcium 03/09/2022 9 2     eGFR 03/09/2022 83     WBC 03/09/2022 8 51     RBC 03/09/2022 4 32     Hemoglobin 03/09/2022 12 9     Hematocrit 03/09/2022 37 3     MCV 03/09/2022 86     MCH 03/09/2022 29 9     MCHC 03/09/2022 34 6     RDW 03/09/2022 14 2     Platelets 88/96/5357 144*    MPV 03/09/2022 11 1     Protime 03/09/2022 14 2     INR 03/09/2022 1 15     PTT 03/09/2022 27         Assessment:    62 y o  male  with planned surgery as above  Known risk factors for perioperative complications: None        Cardiac Risk Estimation: low    Current medications which may produce withdrawal symptoms if withheld perioperatively: none     Plan:    1  Preoperative workup as follows ECG  2  Change in medication regimen before surgery: none, continue medication regimen including morning of surgery, with sip of water  3  Prophylaxis for cardiac events with perioperative beta-blockers: not indicated  4  Invasive hemodynamic monitoring perioperatively: not indicated  5  Deep vein thrombosis prophylaxis postoperatively:regimen to be chosen by surgical team   6  Surveillance for postoperative MI with ECG immediately postoperatively and on postoperative days 1 and 2 AND troponin levels 24 hours postoperatively and on day 4 or hospital discharge (whichever comes first): not indicated  7  [place] surgical risk is low and is cleared for surgical procedure

## 2022-05-02 NOTE — PATIENT INSTRUCTIONS
Cataract Extraction   WHAT YOU NEED TO KNOW:   Cataract extraction is a procedure to remove a cloudy lens from your eye  An artificial lens called an intraocular lens (IOL) is put in its place  This will improve your vision  HOW TO PREPARE:   The week before your procedure:   · Write down the correct date, time, and location of your procedure  · Arrange a ride home  Ask a family member or friend to drive you home after your surgery or procedure  Do not drive yourself home  · Ask your caregiver if you need to stop using aspirin or any other prescribed or over-the-counter medicine before your procedure or surgery  · Bring your medicine bottles or a list of your medicines when you see your caregiver  Tell your caregiver if you are allergic to any medicine  Tell your caregiver if you use any herbs, food supplements, or over-the-counter medicine  · Your healthcare provider will measure your eye's length and how much it curves  He will also check your vision  This helps him choose the best IOL to put in your eye when the cataract is removed  Talk to your healthcare provider about these or other tests you may need  Write down the date, time, and location for each test     · Your healthcare provider may give you antibiotic eyedrops to use for a few days before your procedure to prevent infection  The night before your procedure:  Ask caregivers about directions for eating and drinking  The day of your procedure:   · You or a close family member will be asked to sign a legal document called a consent form  It gives caregivers permission to do the procedure or surgery  It also explains the problems that may happen, and your choices  Make sure all your questions are answered before you sign this form  · Caregivers may insert an intravenous tube (IV) into your vein  A vein in the arm is usually chosen  Through the IV tube, you may be given liquids and medicine       · An anesthesiologist will talk to you before your surgery  You may need medicine to keep you asleep or numb an area of your body during surgery  Tell caregivers if you or anyone in your family has had a problem with anesthesia in the past     · Your healthcare provider may put drops in your eye to dilate your pupil (make it bigger)  This makes it easier for healthcare providers to put in the IOL without damaging your eye  WHAT WILL HAPPEN:   What will happen:  After your pupil is fully dilated, a tool will hold your eye open to prevent blinking  A small incision will be made in your cornea (clear covering over your iris)  A tiny instrument will be placed next to the cloudy lens  This instrument uses sound waves to break the lens into small pieces that are suctioned out  The IOL will then be placed in this area  The incision will be closed  Stitches may be used, depending on the size of the incision  A protective eye shield may then be placed over your eye  After your procedure: You will be taken to a room to rest and recover  Healthcare providers will monitor you closely for any problems  Do not get out of bed until your healthcare provider says it is okay  When your healthcare provider sees that you are okay, you will be allowed to go home  CONTACT YOUR HEALTHCARE PROVIDER IF:   · You cannot make it to your procedure  · You have a fever  · You get a cold or the flu  · You have questions or concerns about your procedure  RISKS:   You may develop an eye infection and bleeding inside the eye  Your retina may swell, or a piece may break off  This is called detachment  You may go blind  You may develop glaucoma (increased pressure inside your eye)  You may have swelling in and damage to your cornea  Sometimes the area where the IOL is placed also clouds up  This may happen months or even years after the procedure  Your cataract will continue to get more cloudy if it is not removed  It may cause you to go blind     CARE AGREEMENT:   You have the right to help plan your care  Learn about your health condition and how it may be treated  Discuss treatment options with your caregivers to decide what care you want to receive  You always have the right to refuse treatment  © 2017 [employer] [employer] Information is for End User's use only and may not be sold, redistributed or otherwise used for commercial purposes  All illustrations and images included in CareNotes® are the copyrighted property of [employer]  or [employer]  The above information is an  only  It is not intended as medical advice for individual conditions or treatments  Talk to your doctor, nurse or pharmacist before following any medical regimen to see if it is safe and effective for you

## 2022-05-06 DIAGNOSIS — T85.738D INFECTION AND INFLAMMATORY REACTION DUE TO NERVOUS SYSTEM DEVICE, IMPLANT, AND GRAFT, SUBSEQUENT ENCOUNTER: ICD-10-CM

## 2022-05-06 DIAGNOSIS — Z98.890 HISTORY OF CRANIOPLASTY: ICD-10-CM

## 2022-05-06 DIAGNOSIS — T84.7XXD INFECTION OF BONE FLAP, SUBSEQUENT ENCOUNTER: ICD-10-CM

## 2022-05-06 RX ORDER — DOXYCYCLINE 100 MG/1
100 CAPSULE ORAL 2 TIMES DAILY
Qty: 28 CAPSULE | Refills: 0 | Status: SHIPPED | OUTPATIENT
Start: 2022-05-06 | End: 2022-05-20

## 2022-05-06 NOTE — TELEPHONE ENCOUNTER
Received a call from Danni reporting that over the last few days she has noticed after Dale showers there is a small open area with skant leakage when dabbed  She states that rest of the incision is in good condition but there is one questionable area  Requested she send me an email with picture attached  Discussed with Dr Dimitris Moore  Patient will be started back on doxycycline for another 2 weeks  This will be eRx to confirmed pharmacy  He will return on 5/18 as planned with Dr Dimitris Moore for reassessment  Patient admits to drinking a few beers every few days  He does not feel this has contributed to his poor wound healing       Advised to call the office if it opens more or be develops purulent drainage, increased, redness, edema, etc

## 2022-05-18 ENCOUNTER — OFFICE VISIT (OUTPATIENT)
Dept: NEUROSURGERY | Facility: CLINIC | Age: 62
End: 2022-05-18

## 2022-05-18 VITALS
DIASTOLIC BLOOD PRESSURE: 78 MMHG | WEIGHT: 201 LBS | BODY MASS INDEX: 28.77 KG/M2 | OXYGEN SATURATION: 98 % | SYSTOLIC BLOOD PRESSURE: 140 MMHG | HEIGHT: 70 IN | HEART RATE: 81 BPM

## 2022-05-18 DIAGNOSIS — F10.10 ALCOHOL ABUSE: Primary | ICD-10-CM

## 2022-05-18 DIAGNOSIS — Z98.890 STATUS POST CRANIECTOMY: Chronic | ICD-10-CM

## 2022-05-18 PROCEDURE — 3008F BODY MASS INDEX DOCD: CPT | Performed by: NURSE PRACTITIONER

## 2022-05-18 PROCEDURE — 99024 POSTOP FOLLOW-UP VISIT: CPT | Performed by: NEUROLOGICAL SURGERY

## 2022-05-18 NOTE — PROGRESS NOTES
Patient Id: Vi Ramos is a 62 y o  male        Handedness: Right / Left     Assessment/Plan:    Diagnoses and all orders for this visit:    Alcohol abuse    Status post craniectomy        Discussion Summary:   1  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, 04/15/2021  Status post cranioplasty 07/13/2021 infection necessitating craniectomy  Synthetic cranioplasty 3/8/22  Now approximately 10 weeks status post cranioplasty  Wound continues to heal   No significant drainage or dehiscence  We will see him back in 2 weeks for a subsequent wound check        2  Seizures    On Keppra        Chief Complaint: Follow-up (History of cranioplasty )      HPI:   This is a 57-year-old gentleman who presented to the hospital after a transfer from rehab after sustaining a TBI and subdural hematoma in [place]   Fortunately his exam significantly deteriorated to where he would barely open eyes and minimally localized his bilateral uppers and lowers   He had significant bifrontal contusions as well as a right-sided subdural with significant amount of compression and mass effect with shift   As such she underwent a right-sided craniectomy   His brain was found to be quite contused at the time of surgery   The hematoma was evacuated with a craniectomy   Ultimately he was discharged to [address] [place] [address] he made a significant recovery        Unfortunately his course has been complicated by post craniectomy/cranioplasty infection over 4 months after his surgery        He is now approximately 2 months status post his synthetic allograft cranioplasty  His wound continues to heal   There is small area of scabbing without any significant drainage  Denies any erythema or fevers  Review of systems obtained by the MA reviewed and updated below  Review of Systems   Constitutional: Negative  HENT: Negative  Eyes: Negative  Respiratory: Negative  Cardiovascular: Negative  Gastrointestinal: Negative  Endocrine: Negative  Genitourinary: Negative  Musculoskeletal: Negative  Skin: Negative  Allergic/Immunologic: Negative  Neurological: Negative  Hematological: Negative  Psychiatric/Behavioral: Negative  Physical Exam  Vitals:    05/18/22 1414   BP: 140/78   Pulse: 81   SpO2: 98%   Wound healing well  Small area of scabbing without esme dehiscence  No purulence      The following portions of the patient's history were reviewed and updated as appropriate: allergies, current medications, past family history, past medical history, past social history, past surgical history and problem list     Active Ambulatory Problems     Diagnosis Date Noted    Essential hypertension 03/03/2020    Anxiety 03/05/2021    Acute encephalopathy 04/15/2021    Alcohol abuse 04/18/2021    Status post craniectomy 07/13/2021    Seizure ([facility] ) 08/25/2021    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     Resolved Ambulatory Problems     Diagnosis Date Noted    SDH (subdural hematoma) ([facility] ) 04/15/2021    Hyperchloremia 04/15/2021    Acute respiratory failure ([facility] ) 04/15/2021    Dysphagia 04/16/2021    Status post insertion of percutaneous endoscopic gastrostomy (PEG) tube ([facility] ) 05/19/2021    PICC (peripherally inserted central catheter) in place 12/08/2021     Past Medical History:   Diagnosis Date    Cataracts, bilateral     GERD (gastroesophageal reflux disease)     Hematoma, subdural, with loss of consciousness, traumatic ([address] )     Hypertension        Past Surgical History:   Procedure Laterality Date    BRAIN HEMATOMA EVACUATION Right 4/15/2021    Procedure: Right CRANIOTOMY FOR SUBDURAL HEMATOMA;  Surgeon: Thai Miguel MD;  [facility];  Service: Neurosurgery    BRAIN HEMATOMA EVACUATION Right 11/16/2021    Procedure: CRANIOTOMY FOR EVACUATION OF EPIDURAL HEMATOMA;  Surgeon: Thai Miguel MD;  [facility]; Service: Neurosurgery    BRAIN HEMATOMA EVACUATION Right 11/16/2021    Procedure: Exploration and revision of right craniotomy incision with removal of cranial plate;  Surgeon: Jocelyn Keller MD;  [facility];  Service: Neurosurgery    CRANIOTOMY Right     HAND SURGERY Left     PEG TUBE PLACEMENT      PEG TUBE REMOVAL      NH REPLACE SKULL PLATE/FLAP Right 9/30/5132    Procedure: RIGHT AUTOLOGOUS CRANIOPLASTY;  Surgeon: Jocelyn Keller MD;  [facility];  Service: Neurosurgery    NH REPLACE SKULL PLATE/FLAP Right 6/7/9482    Procedure: right allographic CRANIOPLASTY;  Surgeon: Jocelyn Keller MD;  [facility];  Service: Neurosurgery    ROTATOR CUFF REPAIR Left          Current Outpatient Medications:     acetaminophen (TYLENOL) 325 mg tablet, Take 2 tablets (650 mg total) by mouth every 4 (four) hours as needed (mild pain), Disp: , Rfl: 0    chlorhexidine (PERIDEX) 0 12 % solution, Apply 15 mL to the mouth or throat 2 (two) times a day, Disp: 120 mL, Rfl: 1    Cholecalciferol (Vitamin D3) 50 MCG (2000 UT) TABS, Take 1 tablet (2,000 Units total) by mouth daily Take after Vit D 50,000 weekly is completed, Disp: 90 tablet, Rfl: 3    doxycycline monohydrate (MONODOX) 100 mg capsule, Take 1 capsule (100 mg total) by mouth 2 (two) times a day for 14 days, Disp: 28 capsule, Rfl: 0    halobetasol (ULTRAVATE) 0 05 % cream, Apply topically 2 (two) times a day, Disp: 50 g, Rfl: 1    levETIRAcetam (KEPPRA) 750 mg tablet, Take 1 tablet (750 mg total) by mouth every 12 (twelve) hours, Disp: 180 tablet, Rfl: 3    lisinopril (ZESTRIL) 20 mg tablet, Take 1 tablet (20 mg total) by mouth daily, Disp: 90 tablet, Rfl: 1    LORazepam (ATIVAN) 1 mg tablet, Take 1 tablet (1 mg total) by mouth 2 (two) times a day as needed for anxiety, Disp: 30 tablet, Rfl: 0    Melatonin 5 MG TABS, Take 1 tablet by mouth as needed  , Disp: , Rfl:     metoprolol tartrate (LOPRESSOR) 25 mg tablet, Take 1 tablet (25 mg total) by mouth every 12 (twelve) hours, Disp: 180 tablet, Rfl: 1    omeprazole (PriLOSEC) 20 mg delayed release capsule, Take 20 mg by mouth daily, Disp: , Rfl:     Results/Data:  No imaging

## 2022-06-01 ENCOUNTER — CLINICAL SUPPORT (OUTPATIENT)
Dept: NEUROSURGERY | Facility: CLINIC | Age: 62
End: 2022-06-01

## 2022-06-01 VITALS — DIASTOLIC BLOOD PRESSURE: 78 MMHG | SYSTOLIC BLOOD PRESSURE: 150 MMHG | TEMPERATURE: 98.7 F

## 2022-06-01 DIAGNOSIS — Z98.890 POST-OPERATIVE STATE: Primary | ICD-10-CM

## 2022-06-01 PROCEDURE — 99024 POSTOP FOLLOW-UP VISIT: CPT

## 2022-06-01 RX ORDER — BROMFENAC SODIUM 0.7 MG/ML
SOLUTION/ DROPS OPHTHALMIC DAILY
Status: ON HOLD | COMMUNITY
End: 2022-07-19 | Stop reason: ALTCHOICE

## 2022-06-01 NOTE — PROGRESS NOTES
Post-Op Visit- Neurosurgery    Dina Morillo 62 y o  male MRN: [id number]    Chief Complaint:  Patient presents post: right allographic CRANIOPLASTY - Right    History of Present Illness:  Patient presents for repeat incision check after presenting for his 6 week post operative visit with Dr Laura Cummings and noted to have two small open areas  He was asked to follow-up a month later  During that appt was found to still have a small scabbed area  Was brought back for a recheck 2 weeks later  He ambulates well with cane  Reports pain is 0/10  Denies drainage, fevers, swelling, etc        Assessment:   Vitals:    06/01/22 1040   BP: 150/78   Temp: 98 7 °F (37 1 °C)       BP      Temp      Pulse     Resp      SpO2           Wound Exam: Small scab observed, without dehiscence, edema, erythema  See below:           Procedure:  Surgical site assessment  Complications: None  Discussion/Summary:  Consulted with Dr Laura Cummings regarding the status of the incision  Determined we will have Rufino Caul return in 1 month for recheck to ensure scabbing resolves  He is to monitor for s/s of infection  Assisted to schedule return visit in 1 month  He is to call the office with any further questions or concerns, or if any incisional issues or fevers would arise

## 2022-06-09 ENCOUNTER — TELEPHONE (OUTPATIENT)
Dept: NEUROSURGERY | Facility: CLINIC | Age: 62
End: 2022-06-09

## 2022-06-09 NOTE — TELEPHONE ENCOUNTER
[facility] photo of Marcus Rodriguez cranial incision  See below:        Scabbed area still observed  Will route to Dr Jairo Vitale  Directed Faby to continue to monitor and contact if incision opens or drains  Fup planned 7/6

## 2022-06-10 DIAGNOSIS — F41.9 ANXIETY: ICD-10-CM

## 2022-06-13 RX ORDER — LORAZEPAM 1 MG/1
1 TABLET ORAL 2 TIMES DAILY PRN
Qty: 30 TABLET | Refills: 0 | OUTPATIENT
Start: 2022-06-13

## 2022-06-16 ENCOUNTER — TELEPHONE (OUTPATIENT)
Dept: NEUROSURGERY | Facility: CLINIC | Age: 62
End: 2022-06-16

## 2022-06-16 NOTE — TELEPHONE ENCOUNTER
Received update picture via email from Danni  See below: The scab appears to have fallen off  Small area of question unsure if opening with drainage or dark spot  Will confirm with Danni

## 2022-06-16 NOTE — TELEPHONE ENCOUNTER
Attempted to call Erika Rincon to request the return to office next week instead of waiting until 7/6  Left her a detailed message and requested she call back or reply to email so was can arrange this

## 2022-06-16 NOTE — TELEPHONE ENCOUNTER
Received the following response to my inquiry about the status of [facility] surgical site:    Osvaldo Guadalupe,      There's a very small area that had some leakage after Frank [place] showered this morning  I believe this is the same spot where the scab fell off  Vero Munoz      Will route to Dr Bey

## 2022-06-22 ENCOUNTER — CLINICAL SUPPORT (OUTPATIENT)
Dept: NEUROSURGERY | Facility: CLINIC | Age: 62
End: 2022-06-22

## 2022-06-22 ENCOUNTER — HOSPITAL ENCOUNTER (OUTPATIENT)
Dept: RADIOLOGY | Facility: HOSPITAL | Age: 62
Discharge: HOME | End: 2022-06-22
Attending: NEUROLOGICAL SURGERY
Payer: COMMERCIAL

## 2022-06-22 ENCOUNTER — APPOINTMENT (OUTPATIENT)
Dept: LAB | Facility: HOSPITAL | Age: 62
End: 2022-06-22
Attending: NEUROLOGICAL SURGERY
Payer: COMMERCIAL

## 2022-06-22 VITALS — DIASTOLIC BLOOD PRESSURE: 70 MMHG | SYSTOLIC BLOOD PRESSURE: 160 MMHG | TEMPERATURE: 97.9 F

## 2022-06-22 DIAGNOSIS — Z98.890 POST-OPERATIVE STATE: Primary | ICD-10-CM

## 2022-06-22 DIAGNOSIS — R51.9 PAIN OF SCALP: ICD-10-CM

## 2022-06-22 DIAGNOSIS — Z98.890 HISTORY OF CRANIOPLASTY: ICD-10-CM

## 2022-06-22 DIAGNOSIS — Z91.89 AT RISK FOR SURGICAL SITE INFECTION: ICD-10-CM

## 2022-06-22 DIAGNOSIS — Z98.890 POST-OPERATIVE STATE: ICD-10-CM

## 2022-06-22 DIAGNOSIS — Z98.890 STATUS POST CRANIECTOMY: ICD-10-CM

## 2022-06-22 DIAGNOSIS — T81.41XA SUPERFICIAL INCISIONAL INFECTION OF SURGICAL SITE: Primary | ICD-10-CM

## 2022-06-22 DIAGNOSIS — T81.41XA SUPERFICIAL INCISIONAL INFECTION OF SURGICAL SITE: ICD-10-CM

## 2022-06-22 LAB
BASOPHILS # BLD AUTO: 0.02 THOUSANDS/ΜL (ref 0–0.1)
BASOPHILS NFR BLD AUTO: 0 % (ref 0–1)
CRP SERPL QL: 7.6 MG/L
EOSINOPHIL # BLD AUTO: 0.05 THOUSAND/ΜL (ref 0–0.61)
EOSINOPHIL NFR BLD AUTO: 1 % (ref 0–6)
ERYTHROCYTE [DISTWIDTH] IN BLOOD BY AUTOMATED COUNT: 14.8 % (ref 11.6–15.1)
ERYTHROCYTE [SEDIMENTATION RATE] IN BLOOD: 28 MM/HOUR (ref 0–19)
HCT VFR BLD AUTO: 42.7 % (ref 36.5–49.3)
HGB BLD-MCNC: 13.5 G/DL (ref 12–17)
IMM GRANULOCYTES # BLD AUTO: 0.01 THOUSAND/UL (ref 0–0.2)
IMM GRANULOCYTES NFR BLD AUTO: 0 % (ref 0–2)
LYMPHOCYTES # BLD AUTO: 1.11 THOUSANDS/ΜL (ref 0.6–4.47)
LYMPHOCYTES NFR BLD AUTO: 22 % (ref 14–44)
MCH RBC QN AUTO: 27.4 PG (ref 26.8–34.3)
MCHC RBC AUTO-ENTMCNC: 31.6 G/DL (ref 31.4–37.4)
MCV RBC AUTO: 87 FL (ref 82–98)
MONOCYTES # BLD AUTO: 0.5 THOUSAND/ΜL (ref 0.17–1.22)
MONOCYTES NFR BLD AUTO: 10 % (ref 4–12)
NEUTROPHILS # BLD AUTO: 3.34 THOUSANDS/ΜL (ref 1.85–7.62)
NEUTS SEG NFR BLD AUTO: 67 % (ref 43–75)
NRBC BLD AUTO-RTO: 0 /100 WBCS
PLATELET # BLD AUTO: 147 THOUSANDS/UL (ref 149–390)
PMV BLD AUTO: 11.3 FL (ref 8.9–12.7)
RBC # BLD AUTO: 4.93 MILLION/UL (ref 3.88–5.62)
WBC # BLD AUTO: 5.03 THOUSAND/UL (ref 4.31–10.16)

## 2022-06-22 PROCEDURE — 36415 COLL VENOUS BLD VENIPUNCTURE: CPT

## 2022-06-22 PROCEDURE — 99024 POSTOP FOLLOW-UP VISIT: CPT

## 2022-06-22 PROCEDURE — 85652 RBC SED RATE AUTOMATED: CPT

## 2022-06-22 PROCEDURE — G1004 CDSM NDSC: HCPCS

## 2022-06-22 PROCEDURE — 85025 COMPLETE CBC W/AUTO DIFF WBC: CPT

## 2022-06-22 PROCEDURE — 86140 C-REACTIVE PROTEIN: CPT

## 2022-06-22 PROCEDURE — 70450 CT HEAD/BRAIN W/O DYE: CPT

## 2022-06-22 RX ORDER — OXYCODONE HYDROCHLORIDE 10 MG/1
10 TABLET ORAL EVERY 8 HOURS PRN
Qty: 3 TABLET | Refills: 0 | Status: SHIPPED | OUTPATIENT
Start: 2022-06-22 | End: 2022-07-11

## 2022-06-22 RX ORDER — DOXYCYCLINE HYCLATE 100 MG/1
100 CAPSULE ORAL EVERY 12 HOURS SCHEDULED
Qty: 84 CAPSULE | Refills: 0 | Status: SHIPPED | OUTPATIENT
Start: 2022-06-22 | End: 2022-07-22

## 2022-06-22 NOTE — PROGRESS NOTES
Post-Op Visit- Neurosurgery    Himanshu Cummins 62 y o  male MRN: [id number]    Chief Complaint:  Patient presents post: right allographic CRANIOPLASTY - Right    History of Present Illness:  Patient presents for incision check after a picture of surgical site was received from his wife Merrill Pozo revealing small open area with reported "leakage "  Kd Salamancar arrived accompanied by Merrill Pozo and ambulated well without assistive device  He denies headaches, fevers, purulent drainage  He states that every morning when he washes his hair he dabs the top of his head and has a small amount of redness drainage on tissue  He is approximately 3 month s/p cranioplasty  Assessment:   Vitals:    06/22/22 0956   BP: 160/70   Temp: 97 9 °F (36 6 °C)       Wound Exam: 2-3 small open areas observe mid cranial incision  See below:           Procedure:  Surgical site assessment  Discussion/Summary:  Dr Jose Cole met with the patient personally  Preformed in room debridement and suturing  He is to resume doxycycline for 6 weeks, and return for recheck in 2, 4, and 6 weeks for reassessment  Ordered STAT CT head for baseline  Is to have ESR, CRP, CBC, drawn following our visit today and have this lab work repeated in 2 weeks prior to his next follow-up  He is to wait 2-3 days to wash hair and should avoid submerging for the duration

## 2022-07-06 ENCOUNTER — APPOINTMENT (OUTPATIENT)
Dept: LAB | Facility: HOSPITAL | Age: 62
End: 2022-07-06
Attending: NEUROLOGICAL SURGERY
Payer: COMMERCIAL

## 2022-07-06 ENCOUNTER — CLINICAL SUPPORT (OUTPATIENT)
Dept: NEUROSURGERY | Facility: CLINIC | Age: 62
End: 2022-07-06

## 2022-07-06 VITALS — DIASTOLIC BLOOD PRESSURE: 66 MMHG | SYSTOLIC BLOOD PRESSURE: 150 MMHG | TEMPERATURE: 98.6 F

## 2022-07-06 DIAGNOSIS — Z98.890 POST-OPERATIVE STATE: ICD-10-CM

## 2022-07-06 DIAGNOSIS — Z98.890 HISTORY OF CRANIOPLASTY: ICD-10-CM

## 2022-07-06 DIAGNOSIS — T81.41XA SUPERFICIAL INCISIONAL INFECTION OF SURGICAL SITE: Primary | ICD-10-CM

## 2022-07-06 DIAGNOSIS — Z98.890 STATUS POST CRANIECTOMY: ICD-10-CM

## 2022-07-06 DIAGNOSIS — Z91.89 AT RISK FOR SURGICAL SITE INFECTION: ICD-10-CM

## 2022-07-06 LAB
BASOPHILS # BLD AUTO: 0.02 THOUSANDS/ΜL (ref 0–0.1)
BASOPHILS NFR BLD AUTO: 0 % (ref 0–1)
CRP SERPL QL: 6.8 MG/L
EOSINOPHIL # BLD AUTO: 0.04 THOUSAND/ΜL (ref 0–0.61)
EOSINOPHIL NFR BLD AUTO: 1 % (ref 0–6)
ERYTHROCYTE [DISTWIDTH] IN BLOOD BY AUTOMATED COUNT: 15.4 % (ref 11.6–15.1)
ERYTHROCYTE [SEDIMENTATION RATE] IN BLOOD: 28 MM/HOUR (ref 0–19)
HCT VFR BLD AUTO: 42.8 % (ref 36.5–49.3)
HGB BLD-MCNC: 13.9 G/DL (ref 12–17)
IMM GRANULOCYTES # BLD AUTO: 0.02 THOUSAND/UL (ref 0–0.2)
IMM GRANULOCYTES NFR BLD AUTO: 0 % (ref 0–2)
LYMPHOCYTES # BLD AUTO: 1.23 THOUSANDS/ΜL (ref 0.6–4.47)
LYMPHOCYTES NFR BLD AUTO: 20 % (ref 14–44)
MCH RBC QN AUTO: 27.5 PG (ref 26.8–34.3)
MCHC RBC AUTO-ENTMCNC: 32.5 G/DL (ref 31.4–37.4)
MCV RBC AUTO: 85 FL (ref 82–98)
MONOCYTES # BLD AUTO: 0.45 THOUSAND/ΜL (ref 0.17–1.22)
MONOCYTES NFR BLD AUTO: 7 % (ref 4–12)
NEUTROPHILS # BLD AUTO: 4.4 THOUSANDS/ΜL (ref 1.85–7.62)
NEUTS SEG NFR BLD AUTO: 72 % (ref 43–75)
NRBC BLD AUTO-RTO: 0 /100 WBCS
PLATELET # BLD AUTO: 147 THOUSANDS/UL (ref 149–390)
PMV BLD AUTO: 11.9 FL (ref 8.9–12.7)
RBC # BLD AUTO: 5.06 MILLION/UL (ref 3.88–5.62)
WBC # BLD AUTO: 6.16 THOUSAND/UL (ref 4.31–10.16)

## 2022-07-06 PROCEDURE — 85025 COMPLETE CBC W/AUTO DIFF WBC: CPT

## 2022-07-06 PROCEDURE — 36415 COLL VENOUS BLD VENIPUNCTURE: CPT

## 2022-07-06 PROCEDURE — 85652 RBC SED RATE AUTOMATED: CPT

## 2022-07-06 PROCEDURE — 99024 POSTOP FOLLOW-UP VISIT: CPT

## 2022-07-06 PROCEDURE — 86140 C-REACTIVE PROTEIN: CPT

## 2022-07-06 NOTE — PROGRESS NOTES
Post-Op Visit- Neurosurgery    Yuri Madsen 62 y o  male MRN: [id number]    Chief Complaint:  Patient presents post: right allographic CRANIOPLASTY - Right    History of Present Illness:  Patient presents for recheck of non-healing cranial incision  He arrived accompanied by his wife and ambulated well without assistive device  Reports pain is 0/10  Has not had any active drainage  Continues on doxycycline as prescribed  Denies fevers  Assessment:   Vitals:    07/06/22 0959   BP: 150/66   Temp: 98 6 °F (37 °C)       Wound Exam: Suturing still in tact, scabbing observed  See below:         Procedure:  Surgical site assessment  Complications: None  Discussion/Summary:  Dr Donna Ramsey met with Ary Humphrey and assessed  Since no active drainage will continue as planned with follow-up in 2 weeks with repeat CRP, sed rate, and CBC  He is to go to the lab immediately following our appt for lab draw as well

## 2022-07-11 ENCOUNTER — TELEPHONE (OUTPATIENT)
Dept: NEUROSURGERY | Facility: CLINIC | Age: 62
End: 2022-07-11

## 2022-07-11 ENCOUNTER — OFFICE VISIT (OUTPATIENT)
Dept: FAMILY MEDICINE CLINIC | Facility: CLINIC | Age: 62
End: 2022-07-11
Payer: COMMERCIAL

## 2022-07-11 VITALS
HEART RATE: 80 BPM | HEIGHT: 70 IN | SYSTOLIC BLOOD PRESSURE: 140 MMHG | OXYGEN SATURATION: 100 % | TEMPERATURE: 97.5 F | DIASTOLIC BLOOD PRESSURE: 78 MMHG | BODY MASS INDEX: 28.6 KG/M2 | WEIGHT: 199.8 LBS

## 2022-07-11 DIAGNOSIS — I10 ESSENTIAL HYPERTENSION: ICD-10-CM

## 2022-07-11 DIAGNOSIS — R73.09 ELEVATED GLUCOSE: Primary | ICD-10-CM

## 2022-07-11 DIAGNOSIS — Z12.5 SCREENING FOR MALIGNANT NEOPLASM OF PROSTATE: ICD-10-CM

## 2022-07-11 DIAGNOSIS — Z13.31 DEPRESSION SCREENING NEGATIVE: ICD-10-CM

## 2022-07-11 DIAGNOSIS — F41.9 ANXIETY: ICD-10-CM

## 2022-07-11 DIAGNOSIS — E78.1 HYPERTRIGLYCERIDEMIA: ICD-10-CM

## 2022-07-11 DIAGNOSIS — Z98.890 STATUS POST CRANIECTOMY: Chronic | ICD-10-CM

## 2022-07-11 PROCEDURE — 99213 OFFICE O/P EST LOW 20 MIN: CPT | Performed by: NURSE PRACTITIONER

## 2022-07-11 PROCEDURE — 3725F SCREEN DEPRESSION PERFORMED: CPT | Performed by: NURSE PRACTITIONER

## 2022-07-11 RX ORDER — LORAZEPAM 1 MG/1
1 TABLET ORAL 3 TIMES DAILY PRN
Qty: 30 TABLET | Refills: 0 | Status: SHIPPED | OUTPATIENT
Start: 2022-07-11 | End: 2022-07-27 | Stop reason: SDUPTHER

## 2022-07-11 NOTE — PROGRESS NOTES
Assessment/Plan:    Problem List Items Addressed This Visit     Status post craniectomy (Chronic)    Essential hypertension    Anxiety    Relevant Medications    LORazepam (ATIVAN) 1 mg tablet      Other Visit Diagnoses     Elevated glucose    -  Primary    Relevant Orders    Comprehensive metabolic panel    Hypertriglyceridemia        Relevant Orders    Lipid Panel with Direct LDL reflex    Screening for malignant neoplasm of prostate        Relevant Orders    PSA, Total Screen    Depression screening negative               Diagnoses and all orders for this visit:    Elevated glucose  -     Comprehensive metabolic panel; Future    Hypertriglyceridemia  -     Lipid Panel with Direct LDL reflex; Future    Screening for malignant neoplasm of prostate  -     PSA, Total Screen; Future    Anxiety  -     LORazepam (ATIVAN) 1 mg tablet; Take 1 tablet (1 mg total) by mouth 3 (three) times a day as needed for anxiety    Essential hypertension    Status post craniectomy    Depression screening negative      No problem-specific Assessment & Plan notes found for this encounter  Subjective:      Patient ID: Yuri Madsen is a 62 y o  male  Pt presents with c/o increased anxiety  He was seen by Neuro Sx 2/2 c/o leaking head wound  Wife, present with Pt, states that Neuro Sx I&D area, sutured and stated PO doxycyline  Wife did inquire about Cx of area since Pt does have Hx of infected cranioplasty  Do not note order fos same or pending lab  Pt reports when he bends forward that he does note clear fluid from area  Denies malodorous exudate, fever, warmth of Sx site, or redness of site  Pt does report increase anxiety 2/2 recent events as he was told that there is likelihood that cranioplasty will need to be done and Pt would not be a candidate for another cranioplasty  Anxiety  Presents for follow-up visit  Symptoms include insomnia and nervous/anxious behavior  Symptoms occur constantly   The severity of symptoms is moderate  The quality of sleep is fair  Nighttime awakenings: occasional      Compliance with medications is %  The following portions of the patient's history were reviewed and updated as appropriate:   He has a past medical history of Anxiety, Cataracts, bilateral, Dysphagia (04/16/2021), GERD (gastroesophageal reflux disease), Hematoma, subdural, with loss of consciousness, traumatic ([facility] ), Hypertension, and Status post insertion of percutaneous endoscopic gastrostomy (PEG) tube ([facility] ) (05/19/2021)  ,  does not have any pertinent problems on file  ,   has a past surgical history that includes Rotator cuff repair (Left); Hand surgery (Left); Brain hematoma evacuation (Right, 4/15/2021); PEG tube placement; PEG tube removal; Craniotomy (Right); pr replace skull plate/flap (Right, 8/93/5265); Brain hematoma evacuation (Right, 11/16/2021); Brain hematoma evacuation (Right, 11/16/2021); and pr replace skull plate/flap (Right, 7/1/0976)  ,  family history includes Dementia in his father; Heart disease in his mother; Hypertension in his father  ,   reports that he has never smoked  He has never used smokeless tobacco  He reports previous alcohol use of about 3 0 standard drinks of alcohol per week  He reports previous drug use ,  has No Known Allergies     Current Outpatient Medications   Medication Sig Dispense Refill    chlorhexidine (PERIDEX) 0 12 % solution Apply 15 mL to the mouth or throat 2 (two) times a day 120 mL 1    doxycycline hyclate (VIBRAMYCIN) 100 mg capsule Take 1 capsule (100 mg total) by mouth every 12 (twelve) hours 84 capsule 0    halobetasol (ULTRAVATE) 0 05 % cream Apply topically 2 (two) times a day 50 g 1    levETIRAcetam (KEPPRA) 750 mg tablet Take 1 tablet (750 mg total) by mouth every 12 (twelve) hours 180 tablet 3    lisinopril (ZESTRIL) 20 mg tablet Take 1 tablet (20 mg total) by mouth daily 90 tablet 1    LORazepam (ATIVAN) 1 mg tablet Take 1 tablet (1 mg total) by mouth 3 (three) times a day as needed for anxiety 30 tablet 0    Melatonin 5 MG TABS Take 1 tablet by mouth as needed        metoprolol tartrate (LOPRESSOR) 25 mg tablet Take 1 tablet (25 mg total) by mouth every 12 (twelve) hours 180 tablet 1    mupirocin (BACTROBAN) 2 % ointment Apply topically daily 22 g 0    omeprazole (PriLOSEC) 20 mg delayed release capsule Take 20 mg by mouth daily      acetaminophen (TYLENOL) 325 mg tablet Take 2 tablets (650 mg total) by mouth every 4 (four) hours as needed (mild pain) (Patient not taking: Reported on 7/11/2022)  0    bromfenac sodium (Prolensa) 0 07 % SOLN Apply to eye in the morning      Cholecalciferol (Vitamin D3) 50 MCG (2000 UT) TABS Take 1 tablet (2,000 Units total) by mouth daily (Patient not taking: Reported on 7/11/2022) 90 tablet 3     No current facility-administered medications for this visit  Depression Screening and Follow-up Plan: Patient was screened for depression during today's encounter  They screened negative with a PHQ-2 score of 0  Review of Systems   Skin: Positive for wound  Psychiatric/Behavioral: Positive for sleep disturbance  The patient is nervous/anxious and has insomnia  All other systems reviewed and are negative  Objective:  Vitals:    07/11/22 0956   BP: 140/78   BP Location: Left arm   Patient Position: Sitting   Cuff Size: Standard   Pulse: 80   Temp: 97 5 °F (36 4 °C)   TempSrc: Tympanic   SpO2: 100%   Weight: 90 6 kg (199 lb 12 8 oz)   Height: 5' 10" (1 778 m)     Body mass index is 28 67 kg/m²  Physical Exam  Vitals and nursing note reviewed  Constitutional:       Appearance: Normal appearance  He is well-developed  Interventions: Face mask in place  HENT:      Head: Normocephalic and atraumatic        Right Ear: Tympanic membrane, ear canal and external ear normal       Left Ear: Tympanic membrane, ear canal and external ear normal       Nose: Nose normal       Mouth/Throat:      Mouth: Mucous membranes are moist       Pharynx: Uvula midline  Eyes:      General: Lids are normal       Conjunctiva/sclera: Conjunctivae normal       Pupils: Pupils are equal, round, and reactive to light  Neck:      Thyroid: No thyroid mass  Vascular: No JVD  Trachea: Trachea and phonation normal    Cardiovascular:      Rate and Rhythm: Normal rate and regular rhythm  Pulses: Normal pulses  Heart sounds: Normal heart sounds, S1 normal and S2 normal  No murmur heard  No friction rub  No gallop  Pulmonary:      Effort: Pulmonary effort is normal       Breath sounds: Normal breath sounds  Abdominal:      General: Bowel sounds are normal       Palpations: Abdomen is soft  Tenderness: There is no abdominal tenderness  Genitourinary:     Comments: Deferred  Musculoskeletal:         General: Normal range of motion  Cervical back: Full passive range of motion without pain, normal range of motion and neck supple  Right lower leg: No edema  Left lower leg: No edema  Lymphadenopathy:      Head:      Right side of head: No submental, submandibular, tonsillar, preauricular, posterior auricular or occipital adenopathy  Left side of head: No submental, submandibular, tonsillar, preauricular, posterior auricular or occipital adenopathy  Cervical: No cervical adenopathy  Skin:     General: Skin is warm and dry  Capillary Refill: Capillary refill takes less than 2 seconds  Neurological:      General: No focal deficit present  Mental Status: He is alert and oriented to person, place, and time  Cranial Nerves: Cranial nerves are intact  Sensory: Sensation is intact  Motor: Motor function is intact  Coordination: Coordination is intact  Gait: Gait is intact     Psychiatric:         Attention and Perception: Attention and perception normal          Mood and Affect: Mood and affect normal          Speech: Speech normal  Behavior: Behavior normal  Behavior is cooperative  Thought Content:  Thought content normal          Cognition and Memory: Cognition normal          Judgment: Judgment normal

## 2022-07-11 NOTE — TELEPHONE ENCOUNTER
Received a call back from Vearl Sessions and Oliver Solders  Vearl Sessions has refused to come the ER as he does not feel unwell and states that his incision does not always look like the picture received earlier today  Advised that it is Dr Michael Bautista recommendation that he present to the ED to prevent further evolution of infection  Explained that despite abx he has begun to drain what appears to be purulent drainage and there is concern for infection that could quickly progress  Advised that it is in his best interest to be assessed more urgently  He prefers to keep appt as planned on Wednesday and be assessed in office before deciding what to do

## 2022-07-11 NOTE — TELEPHONE ENCOUNTER
Received a call from [facility] reporting that this past Friday she and Jone Ortega noticed clear drainage leaking from his incision  She states that it leaks out in a stream enough to drip on to his shirt  Requested an updated picture be sent to me directly  Also spoke with Dr Pelon Richter and scheduled nurse visit for incision check this Wednesday to come in for visual assessment

## 2022-07-11 NOTE — TELEPHONE ENCOUNTER
Sherry Arellano after receiving most recent photo of incision with drainage  Reviewed with Dr Pancho Nicole who wants Bren Boston to come to the ED for further assessment  Advised of this recommendation, she was not home with Bren Boston and so will call back when she gets there so I can speak directly with Bren Boston

## 2022-07-13 ENCOUNTER — OFFICE VISIT (OUTPATIENT)
Dept: NEUROSURGERY | Facility: CLINIC | Age: 62
End: 2022-07-13
Payer: COMMERCIAL

## 2022-07-13 ENCOUNTER — APPOINTMENT (OUTPATIENT)
Dept: LAB | Facility: HOSPITAL | Age: 62
End: 2022-07-13
Attending: NEUROLOGICAL SURGERY
Payer: COMMERCIAL

## 2022-07-13 VITALS — DIASTOLIC BLOOD PRESSURE: 76 MMHG | SYSTOLIC BLOOD PRESSURE: 142 MMHG | TEMPERATURE: 97.7 F

## 2022-07-13 DIAGNOSIS — T84.7XXD INFECTION OF BONE FLAP, SUBSEQUENT ENCOUNTER: ICD-10-CM

## 2022-07-13 DIAGNOSIS — G93.40 ACUTE ENCEPHALOPATHY: ICD-10-CM

## 2022-07-13 DIAGNOSIS — Z98.890 POST-OPERATIVE STATE: ICD-10-CM

## 2022-07-13 DIAGNOSIS — Z91.89 AT RISK FOR SURGICAL SITE INFECTION: ICD-10-CM

## 2022-07-13 DIAGNOSIS — Z98.890 STATUS POST CRANIECTOMY: ICD-10-CM

## 2022-07-13 DIAGNOSIS — Z98.890 STATUS POST CRANIECTOMY: Primary | ICD-10-CM

## 2022-07-13 DIAGNOSIS — T81.41XA SUPERFICIAL INCISIONAL INFECTION OF SURGICAL SITE: Primary | ICD-10-CM

## 2022-07-13 DIAGNOSIS — Z98.890 HISTORY OF CRANIOPLASTY: ICD-10-CM

## 2022-07-13 DIAGNOSIS — Z79.01 LONG TERM (CURRENT) USE OF ANTICOAGULANTS: ICD-10-CM

## 2022-07-13 DIAGNOSIS — Z01.810 PRE-OPERATIVE CARDIOVASCULAR EXAMINATION, HIGH RISK SURGERY: ICD-10-CM

## 2022-07-13 DIAGNOSIS — Z98.890 STATUS POST CRANIECTOMY: Chronic | ICD-10-CM

## 2022-07-13 LAB
ATRIAL RATE: 85 BPM
P AXIS: 28 DEGREES
PR INTERVAL: 172 MS
QRS AXIS: 2 DEGREES
QRSD INTERVAL: 80 MS
QT INTERVAL: 348 MS
QTC INTERVAL: 414 MS
T WAVE AXIS: -1 DEGREES
VENTRICULAR RATE: 85 BPM

## 2022-07-13 PROCEDURE — 3078F DIAST BP <80 MM HG: CPT | Performed by: NEUROLOGICAL SURGERY

## 2022-07-13 PROCEDURE — 93005 ELECTROCARDIOGRAM TRACING: CPT

## 2022-07-13 PROCEDURE — 3077F SYST BP >= 140 MM HG: CPT | Performed by: NEUROLOGICAL SURGERY

## 2022-07-13 PROCEDURE — 93010 ELECTROCARDIOGRAM REPORT: CPT | Performed by: INTERNAL MEDICINE

## 2022-07-13 PROCEDURE — 99215 OFFICE O/P EST HI 40 MIN: CPT | Performed by: NEUROLOGICAL SURGERY

## 2022-07-13 RX ORDER — CLINDAMYCIN HYDROCHLORIDE 300 MG/1
300 CAPSULE ORAL 4 TIMES DAILY
Qty: 40 CAPSULE | Refills: 0 | Status: CANCELLED | OUTPATIENT
Start: 2022-07-13 | End: 2022-07-23

## 2022-07-13 RX ORDER — CHLORHEXIDINE GLUCONATE 0.12 MG/ML
15 RINSE ORAL ONCE
Status: CANCELLED | OUTPATIENT
Start: 2022-07-13 | End: 2022-07-13

## 2022-07-13 NOTE — H&P (VIEW-ONLY)
Patient Id: Harshad Robbins is a 62 y o  male        Handedness: Right     Assessment/Plan:    Diagnoses and all orders for this visit:    Superficial incisional infection of surgical site  -     Case request operating room: Right reopening of incision and debriedment of wound with possible craniectomy; Standing  -     PAT Covid Screening; Future  -     Case request operating room: Right reopening of incision and debriedment of wound with possible craniectomy    Post-operative state  -     Case request operating room: Right reopening of incision and debriedment of wound with possible craniectomy; Standing  -     PAT Covid Screening; Future  -     Case request operating room: Right reopening of incision and debriedment of wound with possible craniectomy    History of cranioplasty  -     Case request operating room: Right reopening of incision and debriedment of wound with possible craniectomy; Standing  -     PAT Covid Screening; Future  -     Case request operating room: Right reopening of incision and debriedment of wound with possible craniectomy    At risk for surgical site infection  -     Case request operating room: Right reopening of incision and debriedment of wound with possible craniectomy; Standing  -     PAT Covid Screening; Future  -     Case request operating room: Right reopening of incision and debriedment of wound with possible craniectomy    Acute encephalopathy  -     PAT Covid Screening; Future    Status post craniectomy  -     Case request operating room: Right reopening of incision and debriedment of wound with possible craniectomy; Standing  -     PAT Covid Screening;  Future  -     Case request operating room: Right reopening of incision and debriedment of wound with possible craniectomy    Other orders  -     Diet NPO; Sips with meds; Standing  -     Nursing Communication [address] [place] [address] Interventions Implemented; Standing  -     Nursing Communication [facility] bath, have staff wash entire body (neck down) per pre-op bathing protocol  Routine, evening prior to, and day of surgery ; Standing  -     Nursing Communication Swab both nares with Povidone-Iodine solution, EXCLUDE if patient has shellfish/Iodine allergy  Routine, day of surgery, on call to OR; Standing  -     chlorhexidine (PERIDEX) 0 12 % oral rinse 15 mL  -     Void on call to OR; Standing  -     Insert peripheral IV; Standing        Discussion Summary:   1  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, 04/15/2021  Status post cranioplasty 07/13/2021 infection necessitating craniectomy   Synthetic cranioplasty 3/8/22  He is now approximately 4 months status post his repeat cranioplasty  Unfortunately his inferior portion of the incision has had some dehiscence as well as purulent drainage  I attempted local debridement, re-closure, and oral antibiotics with doxycycline (vancomycin and tetracycline were the only sensitive antibiotics to his previous infection) with little effect  In fact his ESR and CRP have been unchanged  I had an extensive conversation with him and his wife that he has a synthetic implant that is likely contaminated and infected  There is no vascular supply to this and it may become colonized  Should this occur he may have worsening infection with surrounding osteomyelitis or even develop an epidural abscess or underlying brain abscess  As such the correct thing to do in this case would be to remove his cranioplasty, do a washout, and attempt a repeat cranioplasty in delayed fashion  He is quite adamantly against this  I called Dr Akanksha Patel who helps care for him after his last infection and we discussed the case in detail  Aside from complete removal of the allograft we discussed the possibility of a reopening of the incision aggressive washout, local debridement, and cultures    Dependent on this we can put him on a course of IV antibiotics with possible long-term oral suppressants if necessary  I counseled him that this may not work definitively  He may require a 4th surgery for craniectomy  That said he is amenable to proceeding with this as a 1st step  He understands the risks and benefits specifically that of non efficacy, worsening infection, stroke, paralysis seizure and death  He signed consent today      I spent 40 minutes with the patient greater than 50 percent of which was spent in counseling  Chief Complaint: Post-op    HPI:   This is a 57-year-old gentleman who presented to the hospital after a transfer from rehab after sustaining a TBI and subdural hematoma in [place]   Fortunately his exam significantly deteriorated to where he would barely open eyes and minimally localized his bilateral uppers and lowers   He had significant bifrontal contusions as well as a right-sided subdural with significant amount of compression and mass effect with shift   As such she underwent a right-sided craniectomy   His brain was found to be quite contused at the time of surgery   The hematoma was evacuated with a craniectomy   Ultimately he was discharged to [facility] he made a significant recovery        Unfortunately his course has been complicated by post craniectomy/cranioplasty infection over 4 months after his surgery        Review of systems obtained by the MA reviewed and updated below  Review of Systems    Physical Exam  Vitals:    07/13/22 1002   BP: 142/76   Temp: 97 7 °F (36 5 °C)   He is well appearing  Affect is appropriate  His BMI is There is no height or weight on file to calculate BMI  [facility] He is awake alert and oriented  Hearing and vision are grossly intact  His pupils are equal round reactive to light  His extraocular movements are intact  His face is symmetric  Tongue is midline  Facial sensation is intact and symmetric throughout  Shoulder shrug is 5/5  There is no drift or dysmetria      He has full strength in his bilateral upper and lower extremities  He has normal muscle tone muscle bulk  His biceps reflexes and patellar reflexes are 2+ and symmetric  Berry sign negative bilaterally  Sensation intact to light touch and pinprick throughout  His gait is normal     Abdomen is soft and nondistended  His heart rate is regular  Normal respiratory effort  Incision is draining with a clear fistula    I can visualize underlying cranioplasty/bone    The following portions of the patient's history were reviewed and updated as appropriate: allergies, current medications, past family history, past medical history, past social history, past surgical history and problem list     Active Ambulatory Problems     Diagnosis Date Noted    Essential hypertension 03/03/2020    Anxiety 03/05/2021    Acute encephalopathy 04/15/2021    Alcohol abuse 04/18/2021    Status post craniectomy 07/13/2021    Seizure ([address] ) 08/25/2021    Infection of [address] flap [facility] [address] ) 11/19/2021     Resolved Ambulatory Problems     Diagnosis Date Noted    SDH (subdural hematoma) ([facility] [address] ) 04/15/2021    Hyperchloremia 04/15/2021    Acute respiratory failure ([address] ) 04/15/2021    Dysphagia 04/16/2021    Status post insertion of percutaneous endoscopic gastrostomy (PEG) tube ([address] ) 05/19/2021    PICC (peripherally inserted central catheter) in place 12/08/2021     Past Medical History:   Diagnosis Date    Cataracts, bilateral     GERD (gastroesophageal reflux disease)     Hematoma, subdural, with loss of consciousness, traumatic ([address] )     Hypertension        Past Surgical History:   Procedure Laterality Date    BRAIN HEMATOMA EVACUATION Right 4/15/2021    Procedure: Right CRANIOTOMY FOR SUBDURAL HEMATOMA;  Surgeon: Dexter Lantigua MD;  [facility];  Service: Neurosurgery    BRAIN HEMATOMA EVACUATION Right 11/16/2021    Procedure: CRANIOTOMY FOR EVACUATION OF EPIDURAL HEMATOMA;  Surgeon: Dexter Lantigua MD;  [facility];  Service: Neurosurgery  BRAIN HEMATOMA EVACUATION Right 11/16/2021    Procedure: Exploration and revision of right craniotomy incision with removal of cranial plate;  Surgeon: Mariel Russo MD;  [facility];  Service: Neurosurgery    CRANIOTOMY Right     HAND SURGERY Left     PEG TUBE PLACEMENT      PEG TUBE REMOVAL      MI REPLACE SKULL PLATE/FLAP Right 3/51/6819    Procedure: RIGHT AUTOLOGOUS CRANIOPLASTY;  Surgeon: Mariel Russo MD;  [facility];  Service: Neurosurgery    MI REPLACE SKULL PLATE/FLAP Right 2/5/5844    Procedure: right allographic CRANIOPLASTY;  Surgeon: Mariel Russo MD;  [facility];  Service: Neurosurgery    ROTATOR CUFF REPAIR Left          Current Outpatient Medications:     acetaminophen (TYLENOL) 325 mg tablet, Take 2 tablets (650 mg total) by mouth every 4 (four) hours as needed (mild pain) (Patient not taking: Reported on 7/11/2022), Disp: , Rfl: 0    bromfenac sodium (Prolensa) 0 07 % SOLN, Apply to eye in the morning, Disp: , Rfl:     chlorhexidine (PERIDEX) 0 12 % solution, Apply 15 mL to the mouth or throat 2 (two) times a day, Disp: 120 mL, Rfl: 1    Cholecalciferol (Vitamin D3) 50 MCG (2000 UT) TABS, Take 1 tablet (2,000 Units total) by mouth daily (Patient not taking: Reported on 7/11/2022), Disp: 90 tablet, Rfl: 3    doxycycline hyclate (VIBRAMYCIN) 100 mg capsule, Take 1 capsule (100 mg total) by mouth every 12 (twelve) hours, Disp: 84 capsule, Rfl: 0    halobetasol (ULTRAVATE) 0 05 % cream, Apply topically 2 (two) times a day, Disp: 50 g, Rfl: 1    levETIRAcetam (KEPPRA) 750 mg tablet, Take 1 tablet (750 mg total) by mouth every 12 (twelve) hours, Disp: 180 tablet, Rfl: 3    lisinopril (ZESTRIL) 20 mg tablet, Take 1 tablet (20 mg total) by mouth daily, Disp: 90 tablet, Rfl: 1    LORazepam (ATIVAN) 1 mg tablet, Take 1 tablet (1 mg total) by mouth 3 (three) times a day as needed for anxiety, Disp: 30 tablet, Rfl: 0    Melatonin 5 MG TABS, Take 1 tablet by mouth as needed  , Disp: , Rfl:     metoprolol tartrate (LOPRESSOR) 25 mg tablet, Take 1 tablet (25 mg total) by mouth every 12 (twelve) hours, Disp: 180 tablet, Rfl: 1    mupirocin (BACTROBAN) 2 % ointment, Apply topically daily, Disp: 22 g, Rfl: 0    omeprazole (PriLOSEC) 20 mg delayed release capsule, Take 20 mg by mouth daily, Disp: , Rfl:     Results/Data:  No new imaging

## 2022-07-13 NOTE — PROGRESS NOTES
Patient Id: Juan José Motley is a 62 y o  male        Handedness: Right     Assessment/Plan:    Diagnoses and all orders for this visit:    Superficial incisional infection of surgical site  -     Case request operating room: Right reopening of incision and debriedment of wound with possible craniectomy; Standing  -     PAT Covid Screening; Future  -     Case request operating room: Right reopening of incision and debriedment of wound with possible craniectomy    Post-operative state  -     Case request operating room: Right reopening of incision and debriedment of wound with possible craniectomy; Standing  -     PAT Covid Screening; Future  -     Case request operating room: Right reopening of incision and debriedment of wound with possible craniectomy    History of cranioplasty  -     Case request operating room: Right reopening of incision and debriedment of wound with possible craniectomy; Standing  -     PAT Covid Screening; Future  -     Case request operating room: Right reopening of incision and debriedment of wound with possible craniectomy    At risk for surgical site infection  -     Case request operating room: Right reopening of incision and debriedment of wound with possible craniectomy; Standing  -     PAT Covid Screening; Future  -     Case request operating room: Right reopening of incision and debriedment of wound with possible craniectomy    Acute encephalopathy  -     PAT Covid Screening; Future    Status post craniectomy  -     Case request operating room: Right reopening of incision and debriedment of wound with possible craniectomy; Standing  -     PAT Covid Screening;  Future  -     Case request operating room: Right reopening of incision and debriedment of wound with possible craniectomy    Other orders  -     Diet NPO; Sips with meds; Standing  -     Nursing Communication [address] [place] [address] Interventions Implemented; Standing  -     Nursing Communication [facility] bath, have staff wash entire body (neck down) per pre-op bathing protocol  Routine, evening prior to, and day of surgery ; Standing  -     Nursing Communication Swab both nares with Povidone-Iodine solution, EXCLUDE if patient has shellfish/Iodine allergy  Routine, day of surgery, on call to OR; Standing  -     chlorhexidine (PERIDEX) 0 12 % oral rinse 15 mL  -     Void on call to OR; Standing  -     Insert peripheral IV; Standing        Discussion Summary:   1  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, 04/15/2021  Status post cranioplasty 07/13/2021 infection necessitating craniectomy   Synthetic cranioplasty 3/8/22  He is now approximately 4 months status post his repeat cranioplasty  Unfortunately his inferior portion of the incision has had some dehiscence as well as purulent drainage  I attempted local debridement, re-closure, and oral antibiotics with doxycycline (vancomycin and tetracycline were the only sensitive antibiotics to his previous infection) with little effect  In fact his ESR and CRP have been unchanged  I had an extensive conversation with him and his wife that he has a synthetic implant that is likely contaminated and infected  There is no vascular supply to this and it may become colonized  Should this occur he may have worsening infection with surrounding osteomyelitis or even develop an epidural abscess or underlying brain abscess  As such the correct thing to do in this case would be to remove his cranioplasty, do a washout, and attempt a repeat cranioplasty in delayed fashion  He is quite adamantly against this  I called Dr Luis Cee who helps care for him after his last infection and we discussed the case in detail  Aside from complete removal of the allograft we discussed the possibility of a reopening of the incision aggressive washout, local debridement, and cultures    Dependent on this we can put him on a course of IV antibiotics with possible long-term oral suppressants if necessary  I counseled him that this may not work definitively  He may require a 4th surgery for craniectomy  That said he is amenable to proceeding with this as a 1st step  He understands the risks and benefits specifically that of non efficacy, worsening infection, stroke, paralysis seizure and death  He signed consent today      I spent 40 minutes with the patient greater than 50 percent of which was spent in counseling  Chief Complaint: Post-op    HPI:   This is a 57-year-old gentleman who presented to the hospital after a transfer from rehab after sustaining a TBI and subdural hematoma in [place]   Fortunately his exam significantly deteriorated to where he would barely open eyes and minimally localized his bilateral uppers and lowers   He had significant bifrontal contusions as well as a right-sided subdural with significant amount of compression and mass effect with shift   As such she underwent a right-sided craniectomy   His brain was found to be quite contused at the time of surgery   The hematoma was evacuated with a craniectomy   Ultimately he was discharged to [facility] he made a significant recovery        Unfortunately his course has been complicated by post craniectomy/cranioplasty infection over 4 months after his surgery        Review of systems obtained by the MA reviewed and updated below  Review of Systems    Physical Exam  Vitals:    07/13/22 1002   BP: 142/76   Temp: 97 7 °F (36 5 °C)   He is well appearing  Affect is appropriate  His BMI is There is no height or weight on file to calculate BMI  [facility] He is awake alert and oriented  Hearing and vision are grossly intact  His pupils are equal round reactive to light  His extraocular movements are intact  His face is symmetric  Tongue is midline  Facial sensation is intact and symmetric throughout  Shoulder shrug is 5/5  There is no drift or dysmetria      He has full strength in his bilateral upper and lower extremities  He has normal muscle tone muscle bulk  His biceps reflexes and patellar reflexes are 2+ and symmetric  Beryr sign negative bilaterally  Sensation intact to light touch and pinprick throughout  His gait is normal     Abdomen is soft and nondistended  His heart rate is regular  Normal respiratory effort  Incision is draining with a clear fistula    I can visualize underlying cranioplasty/bone    The following portions of the patient's history were reviewed and updated as appropriate: allergies, current medications, past family history, past medical history, past social history, past surgical history and problem list     Active Ambulatory Problems     Diagnosis Date Noted    Essential hypertension 03/03/2020    Anxiety 03/05/2021    Acute encephalopathy 04/15/2021    Alcohol abuse 04/18/2021    Status post craniectomy 07/13/2021    Seizure ([address] ) 08/25/2021    Infection of bone flap ([facility] [address] ) 11/19/2021     Resolved Ambulatory Problems     Diagnosis Date Noted    SDH (subdural hematoma) ([facility] [address] ) 04/15/2021    Hyperchloremia 04/15/2021    Acute respiratory failure ([address] ) 04/15/2021    Dysphagia 04/16/2021    Status post insertion of percutaneous endoscopic gastrostomy (PEG) tube ([address] ) 05/19/2021    PICC (peripherally inserted central catheter) in place 12/08/2021     Past Medical History:   Diagnosis Date    Cataracts, bilateral     GERD (gastroesophageal reflux disease)     Hematoma, subdural, with loss of consciousness, traumatic ([address] )     Hypertension        Past Surgical History:   Procedure Laterality Date    BRAIN HEMATOMA EVACUATION Right 4/15/2021    Procedure: Right CRANIOTOMY FOR SUBDURAL HEMATOMA;  Surgeon: Ken Hickman MD;  [facility];  Service: Neurosurgery    BRAIN HEMATOMA EVACUATION Right 11/16/2021    Procedure: CRANIOTOMY FOR EVACUATION OF EPIDURAL HEMATOMA;  Surgeon: Ken Hickman MD;  [facility];  Service: Neurosurgery  BRAIN HEMATOMA EVACUATION Right 11/16/2021    Procedure: Exploration and revision of right craniotomy incision with removal of cranial plate;  Surgeon: Asuncion Soliz MD;  [facility];  Service: Neurosurgery    CRANIOTOMY Right     HAND SURGERY Left     PEG TUBE PLACEMENT      PEG TUBE REMOVAL      VT REPLACE SKULL PLATE/FLAP Right 4/91/2475    Procedure: RIGHT AUTOLOGOUS CRANIOPLASTY;  Surgeon: Asuncion Soliz MD;  [facility];  Service: Neurosurgery    VT REPLACE SKULL PLATE/FLAP Right 3/4/1160    Procedure: right allographic CRANIOPLASTY;  Surgeon: Asuncion Soliz MD;  [facility];  Service: Neurosurgery    ROTATOR CUFF REPAIR Left          Current Outpatient Medications:     acetaminophen (TYLENOL) 325 mg tablet, Take 2 tablets (650 mg total) by mouth every 4 (four) hours as needed (mild pain) (Patient not taking: Reported on 7/11/2022), Disp: , Rfl: 0    bromfenac sodium (Prolensa) 0 07 % SOLN, Apply to eye in the morning, Disp: , Rfl:     chlorhexidine (PERIDEX) 0 12 % solution, Apply 15 mL to the mouth or throat 2 (two) times a day, Disp: 120 mL, Rfl: 1    Cholecalciferol (Vitamin D3) 50 MCG (2000 UT) TABS, Take 1 tablet (2,000 Units total) by mouth daily (Patient not taking: Reported on 7/11/2022), Disp: 90 tablet, Rfl: 3    doxycycline hyclate (VIBRAMYCIN) 100 mg capsule, Take 1 capsule (100 mg total) by mouth every 12 (twelve) hours, Disp: 84 capsule, Rfl: 0    halobetasol (ULTRAVATE) 0 05 % cream, Apply topically 2 (two) times a day, Disp: 50 g, Rfl: 1    levETIRAcetam (KEPPRA) 750 mg tablet, Take 1 tablet (750 mg total) by mouth every 12 (twelve) hours, Disp: 180 tablet, Rfl: 3    lisinopril (ZESTRIL) 20 mg tablet, Take 1 tablet (20 mg total) by mouth daily, Disp: 90 tablet, Rfl: 1    LORazepam (ATIVAN) 1 mg tablet, Take 1 tablet (1 mg total) by mouth 3 (three) times a day as needed for anxiety, Disp: 30 tablet, Rfl: 0    Melatonin 5 MG TABS, Take 1 tablet by mouth as needed  , Disp: , Rfl:     metoprolol tartrate (LOPRESSOR) 25 mg tablet, Take 1 tablet (25 mg total) by mouth every 12 (twelve) hours, Disp: 180 tablet, Rfl: 1    mupirocin (BACTROBAN) 2 % ointment, Apply topically daily, Disp: 22 g, Rfl: 0    omeprazole (PriLOSEC) 20 mg delayed release capsule, Take 20 mg by mouth daily, Disp: , Rfl:     Results/Data:  No new imaging

## 2022-07-15 ENCOUNTER — LAB REQUISITION (OUTPATIENT)
Dept: LAB | Facility: HOSPITAL | Age: 62
End: 2022-07-15
Payer: COMMERCIAL

## 2022-07-15 ENCOUNTER — APPOINTMENT (OUTPATIENT)
Dept: LAB | Facility: CLINIC | Age: 62
End: 2022-07-15
Payer: COMMERCIAL

## 2022-07-15 DIAGNOSIS — Z98.890 STATUS POST CRANIECTOMY: ICD-10-CM

## 2022-07-15 DIAGNOSIS — Z01.818 ENCOUNTER FOR OTHER PREPROCEDURAL EXAMINATION: ICD-10-CM

## 2022-07-15 DIAGNOSIS — Z98.890 HISTORY OF CRANIOPLASTY: ICD-10-CM

## 2022-07-15 DIAGNOSIS — Z91.89 AT RISK FOR SURGICAL SITE INFECTION: ICD-10-CM

## 2022-07-15 DIAGNOSIS — G93.40 ACUTE ENCEPHALOPATHY: ICD-10-CM

## 2022-07-15 DIAGNOSIS — T81.41XA SUPERFICIAL INCISIONAL INFECTION OF SURGICAL SITE: ICD-10-CM

## 2022-07-15 DIAGNOSIS — Z98.890 POST-OPERATIVE STATE: ICD-10-CM

## 2022-07-15 DIAGNOSIS — Z98.890 STATUS POST CRANIECTOMY: Chronic | ICD-10-CM

## 2022-07-15 DIAGNOSIS — Z01.818 PREOP TESTING: ICD-10-CM

## 2022-07-15 DIAGNOSIS — T84.7XXD INFECTION OF BONE FLAP, SUBSEQUENT ENCOUNTER: ICD-10-CM

## 2022-07-15 LAB
ABO GROUP BLD: NORMAL
ALBUMIN SERPL BCP-MCNC: 3.6 G/DL (ref 3.5–5)
ALP SERPL-CCNC: 138 U/L (ref 46–116)
ALT SERPL W P-5'-P-CCNC: 87 U/L (ref 12–78)
AMORPH URATE CRY URNS QL MICRO: ABNORMAL
ANION GAP SERPL CALCULATED.3IONS-SCNC: 6 MMOL/L (ref 4–13)
APTT PPP: 29 SECONDS (ref 23–37)
AST SERPL W P-5'-P-CCNC: 57 U/L (ref 5–45)
BACTERIA UR QL AUTO: ABNORMAL /HPF
BASOPHILS # BLD AUTO: 0.03 THOUSANDS/ÂΜL (ref 0–0.1)
BASOPHILS NFR BLD AUTO: 0 % (ref 0–1)
BILIRUB SERPL-MCNC: 0.65 MG/DL (ref 0.2–1)
BILIRUB UR QL STRIP: NEGATIVE
BLD GP AB SCN SERPL QL: NEGATIVE
BUN SERPL-MCNC: 11 MG/DL (ref 5–25)
CALCIUM SERPL-MCNC: 9.3 MG/DL (ref 8.3–10.1)
CAOX CRY URNS QL MICRO: ABNORMAL /HPF
CHLORIDE SERPL-SCNC: 107 MMOL/L (ref 100–108)
CLARITY UR: ABNORMAL
CO2 SERPL-SCNC: 27 MMOL/L (ref 21–32)
COLOR UR: YELLOW
CREAT SERPL-MCNC: 0.82 MG/DL (ref 0.6–1.3)
CRP SERPL QL: 3.9 MG/L
EOSINOPHIL # BLD AUTO: 0.07 THOUSAND/ÂΜL (ref 0–0.61)
EOSINOPHIL NFR BLD AUTO: 1 % (ref 0–6)
ERYTHROCYTE [DISTWIDTH] IN BLOOD BY AUTOMATED COUNT: 15.9 % (ref 11.6–15.1)
ERYTHROCYTE [SEDIMENTATION RATE] IN BLOOD: 27 MM/HOUR (ref 0–19)
EST. AVERAGE GLUCOSE BLD GHB EST-MCNC: 111 MG/DL
GFR SERPL CREATININE-BSD FRML MDRD: 96 ML/MIN/1.73SQ M
GLUCOSE P FAST SERPL-MCNC: 112 MG/DL (ref 65–99)
GLUCOSE UR STRIP-MCNC: NEGATIVE MG/DL
HBA1C MFR BLD: 5.5 %
HCT VFR BLD AUTO: 46 % (ref 36.5–49.3)
HGB BLD-MCNC: 14.5 G/DL (ref 12–17)
HGB UR QL STRIP.AUTO: NEGATIVE
IMM GRANULOCYTES # BLD AUTO: 0.02 THOUSAND/UL (ref 0–0.2)
IMM GRANULOCYTES NFR BLD AUTO: 0 % (ref 0–2)
INR PPP: 1.07 (ref 0.84–1.19)
KETONES UR STRIP-MCNC: NEGATIVE MG/DL
LEUKOCYTE ESTERASE UR QL STRIP: NEGATIVE
LYMPHOCYTES # BLD AUTO: 1.51 THOUSANDS/ÂΜL (ref 0.6–4.47)
LYMPHOCYTES NFR BLD AUTO: 21 % (ref 14–44)
MCH RBC QN AUTO: 27.8 PG (ref 26.8–34.3)
MCHC RBC AUTO-ENTMCNC: 31.5 G/DL (ref 31.4–37.4)
MCV RBC AUTO: 88 FL (ref 82–98)
MONOCYTES # BLD AUTO: 0.6 THOUSAND/ÂΜL (ref 0.17–1.22)
MONOCYTES NFR BLD AUTO: 8 % (ref 4–12)
MUCOUS THREADS UR QL AUTO: ABNORMAL
NEUTROPHILS # BLD AUTO: 4.98 THOUSANDS/ÂΜL (ref 1.85–7.62)
NEUTS SEG NFR BLD AUTO: 70 % (ref 43–75)
NITRITE UR QL STRIP: NEGATIVE
NON-SQ EPI CELLS URNS QL MICRO: ABNORMAL /HPF
NRBC BLD AUTO-RTO: 0 /100 WBCS
PH UR STRIP.AUTO: 5.5 [PH]
PLATELET # BLD AUTO: 187 THOUSANDS/UL (ref 149–390)
PMV BLD AUTO: 13.5 FL (ref 8.9–12.7)
POTASSIUM SERPL-SCNC: 3.8 MMOL/L (ref 3.5–5.3)
PROT SERPL-MCNC: 8.8 G/DL (ref 6.4–8.2)
PROT UR STRIP-MCNC: ABNORMAL MG/DL
PROTHROMBIN TIME: 13.5 SECONDS (ref 11.6–14.5)
RBC # BLD AUTO: 5.22 MILLION/UL (ref 3.88–5.62)
RBC #/AREA URNS AUTO: ABNORMAL /HPF
RH BLD: POSITIVE
SODIUM SERPL-SCNC: 140 MMOL/L (ref 136–145)
SP GR UR STRIP.AUTO: 1.02 (ref 1–1.03)
SPECIMEN EXPIRATION DATE: NORMAL
UROBILINOGEN UR STRIP-ACNC: <2 MG/DL
WBC # BLD AUTO: 7.21 THOUSAND/UL (ref 4.31–10.16)
WBC #/AREA URNS AUTO: ABNORMAL /HPF

## 2022-07-15 PROCEDURE — 83036 HEMOGLOBIN GLYCOSYLATED A1C: CPT

## 2022-07-15 PROCEDURE — 86850 RBC ANTIBODY SCREEN: CPT | Performed by: NEUROLOGICAL SURGERY

## 2022-07-15 PROCEDURE — 85025 COMPLETE CBC W/AUTO DIFF WBC: CPT

## 2022-07-15 PROCEDURE — U0005 INFEC AGEN DETEC AMPLI PROBE: HCPCS

## 2022-07-15 PROCEDURE — 81001 URINALYSIS AUTO W/SCOPE: CPT

## 2022-07-15 PROCEDURE — 86140 C-REACTIVE PROTEIN: CPT

## 2022-07-15 PROCEDURE — U0003 INFECTIOUS AGENT DETECTION BY NUCLEIC ACID (DNA OR RNA); SEVERE ACUTE RESPIRATORY SYNDROME CORONAVIRUS 2 (SARS-COV-2) (CORONAVIRUS DISEASE [COVID-19]), AMPLIFIED PROBE TECHNIQUE, MAKING USE OF HIGH THROUGHPUT TECHNOLOGIES AS DESCRIBED BY CMS-2020-01-R: HCPCS

## 2022-07-15 PROCEDURE — 85652 RBC SED RATE AUTOMATED: CPT

## 2022-07-15 PROCEDURE — 85610 PROTHROMBIN TIME: CPT

## 2022-07-15 PROCEDURE — 86901 BLOOD TYPING SEROLOGIC RH(D): CPT | Performed by: NEUROLOGICAL SURGERY

## 2022-07-15 PROCEDURE — 86900 BLOOD TYPING SEROLOGIC ABO: CPT | Performed by: NEUROLOGICAL SURGERY

## 2022-07-15 PROCEDURE — 80053 COMPREHEN METABOLIC PANEL: CPT

## 2022-07-15 PROCEDURE — 85730 THROMBOPLASTIN TIME PARTIAL: CPT

## 2022-07-15 PROCEDURE — 36415 COLL VENOUS BLD VENIPUNCTURE: CPT

## 2022-07-15 NOTE — PRE-PROCEDURE INSTRUCTIONS
Pre-Surgery Instructions:   Medication Instructions    halobetasol (ULTRAVATE) 0 05 % cream Hold day of surgery   levETIRAcetam (KEPPRA) 750 mg tablet Take day of surgery   lisinopril (ZESTRIL) 20 mg tablet Hold day of surgery   LORazepam (ATIVAN) 1 mg tablet Uses PRN- OK to take day of surgery    metoprolol tartrate (LOPRESSOR) 25 mg tablet Take day of surgery   omeprazole (PriLOSEC) 20 mg delayed release capsule Hold day of surgery  Covid screening negative as per patient  Reviewed [facility]'s masking policy  Reviewed showering and medication instructions  Take medications with a small sip of water morning of surgery  Patient verbalized understanding  Advised NPO after MN and ASC will call with scheduled surgical time

## 2022-07-16 LAB — SARS-COV-2 RNA RESP QL NAA+PROBE: NEGATIVE

## 2022-07-18 ENCOUNTER — ANESTHESIA EVENT (OUTPATIENT)
Dept: PERIOP | Facility: HOSPITAL | Age: 62
DRG: 858 | End: 2022-07-18
Payer: COMMERCIAL

## 2022-07-19 ENCOUNTER — HOSPITAL ENCOUNTER (INPATIENT)
Facility: HOSPITAL | Age: 62
LOS: 3 days | Discharge: HOME | DRG: 858 | End: 2022-07-22
Attending: NEUROLOGICAL SURGERY | Admitting: NEUROLOGICAL SURGERY
Payer: COMMERCIAL

## 2022-07-19 ENCOUNTER — APPOINTMENT (INPATIENT)
Dept: RADIOLOGY | Facility: HOSPITAL | Age: 62
DRG: 858 | End: 2022-07-19
Payer: COMMERCIAL

## 2022-07-19 ENCOUNTER — ANESTHESIA (OUTPATIENT)
Dept: PERIOP | Facility: HOSPITAL | Age: 62
DRG: 858 | End: 2022-07-19
Payer: COMMERCIAL

## 2022-07-19 DIAGNOSIS — I10 ESSENTIAL HYPERTENSION: ICD-10-CM

## 2022-07-19 DIAGNOSIS — Z98.890 STATUS POST CRANIECTOMY: ICD-10-CM

## 2022-07-19 DIAGNOSIS — Z91.89 AT RISK FOR SURGICAL SITE INFECTION: ICD-10-CM

## 2022-07-19 DIAGNOSIS — Z98.890 HISTORY OF CRANIOPLASTY: ICD-10-CM

## 2022-07-19 DIAGNOSIS — T84.7XXA: Primary | Chronic | ICD-10-CM

## 2022-07-19 DIAGNOSIS — F10.10 ALCOHOL ABUSE: ICD-10-CM

## 2022-07-19 DIAGNOSIS — T84.7XXA: ICD-10-CM

## 2022-07-19 DIAGNOSIS — Z98.890 POST-OPERATIVE STATE: ICD-10-CM

## 2022-07-19 DIAGNOSIS — T81.41XA SUPERFICIAL INCISIONAL INFECTION OF SURGICAL SITE: ICD-10-CM

## 2022-07-19 LAB — GLUCOSE SERPL-MCNC: 153 MG/DL (ref 65–140)

## 2022-07-19 PROCEDURE — 82948 REAGENT STRIP/BLOOD GLUCOSE: CPT

## 2022-07-19 PROCEDURE — 009300Z DRAINAGE OF INTRACRANIAL EPIDURAL SPACE WITH DRAINAGE DEVICE, OPEN APPROACH: ICD-10-PCS | Performed by: NEUROLOGICAL SURGERY

## 2022-07-19 PROCEDURE — 0NB00ZZ EXCISION OF SKULL, OPEN APPROACH: ICD-10-PCS | Performed by: NEUROLOGICAL SURGERY

## 2022-07-19 PROCEDURE — 87102 FUNGUS ISOLATION CULTURE: CPT | Performed by: NEUROLOGICAL SURGERY

## 2022-07-19 PROCEDURE — 87206 SMEAR FLUORESCENT/ACID STAI: CPT | Performed by: NEUROLOGICAL SURGERY

## 2022-07-19 PROCEDURE — 61320 CRNEC/CRNOT DRG ICR ABS STTL: CPT | Performed by: PHYSICIAN ASSISTANT

## 2022-07-19 PROCEDURE — 88300 SURGICAL PATH GROSS: CPT | Performed by: PATHOLOGY

## 2022-07-19 PROCEDURE — 87075 CULTR BACTERIA EXCEPT BLOOD: CPT | Performed by: NEUROLOGICAL SURGERY

## 2022-07-19 PROCEDURE — 00B20ZZ EXCISION OF DURA MATER, OPEN APPROACH: ICD-10-PCS | Performed by: NEUROLOGICAL SURGERY

## 2022-07-19 PROCEDURE — 61320 CRNEC/CRNOT DRG ICR ABS STTL: CPT | Performed by: NEUROLOGICAL SURGERY

## 2022-07-19 PROCEDURE — 87205 SMEAR GRAM STAIN: CPT | Performed by: NEUROLOGICAL SURGERY

## 2022-07-19 PROCEDURE — 99223 1ST HOSP IP/OBS HIGH 75: CPT | Performed by: EMERGENCY MEDICINE

## 2022-07-19 PROCEDURE — 70450 CT HEAD/BRAIN W/O DYE: CPT

## 2022-07-19 PROCEDURE — 87070 CULTURE OTHR SPECIMN AEROBIC: CPT | Performed by: NEUROLOGICAL SURGERY

## 2022-07-19 PROCEDURE — 87116 MYCOBACTERIA CULTURE: CPT | Performed by: NEUROLOGICAL SURGERY

## 2022-07-19 PROCEDURE — 87075 CULTR BACTERIA EXCEPT BLOOD: CPT | Performed by: PHYSICIAN ASSISTANT

## 2022-07-19 PROCEDURE — G1004 CDSM NDSC: HCPCS

## 2022-07-19 RX ORDER — ACETAMINOPHEN 325 MG/1
975 TABLET ORAL EVERY 8 HOURS SCHEDULED
Status: DISCONTINUED | OUTPATIENT
Start: 2022-07-19 | End: 2022-07-22 | Stop reason: HOSPADM

## 2022-07-19 RX ORDER — LIDOCAINE HYDROCHLORIDE 10 MG/ML
INJECTION, SOLUTION EPIDURAL; INFILTRATION; INTRACAUDAL; PERINEURAL AS NEEDED
Status: DISCONTINUED | OUTPATIENT
Start: 2022-07-19 | End: 2022-07-19

## 2022-07-19 RX ORDER — OXYCODONE HYDROCHLORIDE 10 MG/1
10 TABLET ORAL EVERY 4 HOURS PRN
Status: DISCONTINUED | OUTPATIENT
Start: 2022-07-19 | End: 2022-07-22 | Stop reason: HOSPADM

## 2022-07-19 RX ORDER — SODIUM CHLORIDE 9 MG/ML
20 INJECTION, SOLUTION INTRAVENOUS CONTINUOUS
Status: DISCONTINUED | OUTPATIENT
Start: 2022-07-19 | End: 2022-07-19

## 2022-07-19 RX ORDER — ONDANSETRON 2 MG/ML
4 INJECTION INTRAMUSCULAR; INTRAVENOUS ONCE AS NEEDED
Status: COMPLETED | OUTPATIENT
Start: 2022-07-19 | End: 2022-07-19

## 2022-07-19 RX ORDER — HYDROMORPHONE HCL/PF 1 MG/ML
0.5 SYRINGE (ML) INJECTION
Status: DISCONTINUED | OUTPATIENT
Start: 2022-07-19 | End: 2022-07-20

## 2022-07-19 RX ORDER — HYDROMORPHONE HCL IN WATER/PF 6 MG/30 ML
0.2 PATIENT CONTROLLED ANALGESIA SYRINGE INTRAVENOUS
Status: DISCONTINUED | OUTPATIENT
Start: 2022-07-19 | End: 2022-07-19

## 2022-07-19 RX ORDER — MIDAZOLAM HYDROCHLORIDE 2 MG/2ML
INJECTION, SOLUTION INTRAMUSCULAR; INTRAVENOUS AS NEEDED
Status: DISCONTINUED | OUTPATIENT
Start: 2022-07-19 | End: 2022-07-19

## 2022-07-19 RX ORDER — FENTANYL CITRATE 50 UG/ML
INJECTION, SOLUTION INTRAMUSCULAR; INTRAVENOUS AS NEEDED
Status: DISCONTINUED | OUTPATIENT
Start: 2022-07-19 | End: 2022-07-19

## 2022-07-19 RX ORDER — HYDROMORPHONE HCL 110MG/55ML
PATIENT CONTROLLED ANALGESIA SYRINGE INTRAVENOUS AS NEEDED
Status: DISCONTINUED | OUTPATIENT
Start: 2022-07-19 | End: 2022-07-19

## 2022-07-19 RX ORDER — ROCURONIUM BROMIDE 10 MG/ML
INJECTION, SOLUTION INTRAVENOUS AS NEEDED
Status: DISCONTINUED | OUTPATIENT
Start: 2022-07-19 | End: 2022-07-19

## 2022-07-19 RX ORDER — VANCOMYCIN HYDROCHLORIDE 1 G/20ML
INJECTION, POWDER, LYOPHILIZED, FOR SOLUTION INTRAVENOUS AS NEEDED
Status: DISCONTINUED | OUTPATIENT
Start: 2022-07-19 | End: 2022-07-19

## 2022-07-19 RX ORDER — HYDROMORPHONE HCL IN WATER/PF 6 MG/30 ML
0.2 PATIENT CONTROLLED ANALGESIA SYRINGE INTRAVENOUS
Status: DISCONTINUED | OUTPATIENT
Start: 2022-07-19 | End: 2022-07-19 | Stop reason: HOSPADM

## 2022-07-19 RX ORDER — LEVETIRACETAM 750 MG/1
750 TABLET ORAL EVERY 12 HOURS SCHEDULED
Status: DISCONTINUED | OUTPATIENT
Start: 2022-07-19 | End: 2022-07-22 | Stop reason: HOSPADM

## 2022-07-19 RX ORDER — CEFAZOLIN SODIUM 1 G/3ML
INJECTION, POWDER, FOR SOLUTION INTRAMUSCULAR; INTRAVENOUS AS NEEDED
Status: DISCONTINUED | OUTPATIENT
Start: 2022-07-19 | End: 2022-07-19

## 2022-07-19 RX ORDER — ONDANSETRON 2 MG/ML
4 INJECTION INTRAMUSCULAR; INTRAVENOUS EVERY 6 HOURS PRN
Status: DISCONTINUED | OUTPATIENT
Start: 2022-07-19 | End: 2022-07-22 | Stop reason: HOSPADM

## 2022-07-19 RX ORDER — ONDANSETRON 2 MG/ML
INJECTION INTRAMUSCULAR; INTRAVENOUS AS NEEDED
Status: DISCONTINUED | OUTPATIENT
Start: 2022-07-19 | End: 2022-07-19

## 2022-07-19 RX ORDER — PANTOPRAZOLE SODIUM 40 MG/1
40 TABLET, DELAYED RELEASE ORAL
Status: DISCONTINUED | OUTPATIENT
Start: 2022-07-20 | End: 2022-07-22 | Stop reason: HOSPADM

## 2022-07-19 RX ORDER — OXYCODONE HYDROCHLORIDE 5 MG/1
5 TABLET ORAL EVERY 4 HOURS PRN
Status: DISCONTINUED | OUTPATIENT
Start: 2022-07-19 | End: 2022-07-22 | Stop reason: HOSPADM

## 2022-07-19 RX ORDER — GINSENG 100 MG
CAPSULE ORAL AS NEEDED
Status: DISCONTINUED | OUTPATIENT
Start: 2022-07-19 | End: 2022-07-19 | Stop reason: HOSPADM

## 2022-07-19 RX ORDER — LORAZEPAM 1 MG/1
1 TABLET ORAL
Status: DISCONTINUED | OUTPATIENT
Start: 2022-07-19 | End: 2022-07-20

## 2022-07-19 RX ORDER — HYDRALAZINE HYDROCHLORIDE 20 MG/ML
10 INJECTION INTRAMUSCULAR; INTRAVENOUS EVERY 4 HOURS PRN
Status: DISCONTINUED | OUTPATIENT
Start: 2022-07-19 | End: 2022-07-22 | Stop reason: HOSPADM

## 2022-07-19 RX ORDER — DOCUSATE SODIUM 100 MG/1
100 CAPSULE, LIQUID FILLED ORAL 2 TIMES DAILY
Status: DISCONTINUED | OUTPATIENT
Start: 2022-07-19 | End: 2022-07-22 | Stop reason: HOSPADM

## 2022-07-19 RX ORDER — CHLORHEXIDINE GLUCONATE 0.12 MG/ML
15 RINSE ORAL ONCE
Status: COMPLETED | OUTPATIENT
Start: 2022-07-19 | End: 2022-07-19

## 2022-07-19 RX ORDER — FENTANYL CITRATE/PF 50 MCG/ML
25 SYRINGE (ML) INJECTION
Status: DISCONTINUED | OUTPATIENT
Start: 2022-07-19 | End: 2022-07-19 | Stop reason: HOSPADM

## 2022-07-19 RX ORDER — MAGNESIUM HYDROXIDE 1200 MG/15ML
LIQUID ORAL AS NEEDED
Status: DISCONTINUED | OUTPATIENT
Start: 2022-07-19 | End: 2022-07-19 | Stop reason: HOSPADM

## 2022-07-19 RX ORDER — METHOCARBAMOL 500 MG/1
500 TABLET, FILM COATED ORAL EVERY 6 HOURS PRN
Status: DISCONTINUED | OUTPATIENT
Start: 2022-07-19 | End: 2022-07-21

## 2022-07-19 RX ORDER — SODIUM CHLORIDE 9 MG/ML
75 INJECTION, SOLUTION INTRAVENOUS CONTINUOUS
Status: DISCONTINUED | OUTPATIENT
Start: 2022-07-19 | End: 2022-07-20

## 2022-07-19 RX ORDER — DEXAMETHASONE SODIUM PHOSPHATE 10 MG/ML
INJECTION, SOLUTION INTRAMUSCULAR; INTRAVENOUS AS NEEDED
Status: DISCONTINUED | OUTPATIENT
Start: 2022-07-19 | End: 2022-07-19

## 2022-07-19 RX ORDER — SENNOSIDES 8.6 MG
1 TABLET ORAL DAILY
Status: DISCONTINUED | OUTPATIENT
Start: 2022-07-20 | End: 2022-07-22 | Stop reason: HOSPADM

## 2022-07-19 RX ORDER — LABETALOL HYDROCHLORIDE 5 MG/ML
10 INJECTION, SOLUTION INTRAVENOUS EVERY 4 HOURS PRN
Status: DISCONTINUED | OUTPATIENT
Start: 2022-07-19 | End: 2022-07-22 | Stop reason: HOSPADM

## 2022-07-19 RX ORDER — LISINOPRIL 20 MG/1
20 TABLET ORAL DAILY
Status: DISCONTINUED | OUTPATIENT
Start: 2022-07-20 | End: 2022-07-22 | Stop reason: HOSPADM

## 2022-07-19 RX ORDER — SCOLOPAMINE TRANSDERMAL SYSTEM 1 MG/1
1 PATCH, EXTENDED RELEASE TRANSDERMAL ONCE
Status: COMPLETED | OUTPATIENT
Start: 2022-07-19 | End: 2022-07-19

## 2022-07-19 RX ORDER — PROPOFOL 10 MG/ML
INJECTION, EMULSION INTRAVENOUS AS NEEDED
Status: DISCONTINUED | OUTPATIENT
Start: 2022-07-19 | End: 2022-07-19

## 2022-07-19 RX ORDER — LABETALOL HYDROCHLORIDE 5 MG/ML
10 INJECTION, SOLUTION INTRAVENOUS EVERY 6 HOURS PRN
Status: DISCONTINUED | OUTPATIENT
Start: 2022-07-19 | End: 2022-07-19

## 2022-07-19 RX ADMIN — Medication 25 MCG: at 15:52

## 2022-07-19 RX ADMIN — FENTANYL CITRATE 50 MCG: 50 INJECTION INTRAMUSCULAR; INTRAVENOUS at 13:08

## 2022-07-19 RX ADMIN — DEXAMETHASONE SODIUM PHOSPHATE 10 MG: 10 INJECTION, SOLUTION INTRAMUSCULAR; INTRAVENOUS at 12:35

## 2022-07-19 RX ADMIN — CHLORHEXIDINE GLUCONATE 15 ML: 1.2 SOLUTION ORAL at 11:03

## 2022-07-19 RX ADMIN — SCOPALAMINE 1 PATCH: 1 PATCH, EXTENDED RELEASE TRANSDERMAL at 12:02

## 2022-07-19 RX ADMIN — HYDROMORPHONE HYDROCHLORIDE 0.5 MG: 1 INJECTION, SOLUTION INTRAMUSCULAR; INTRAVENOUS; SUBCUTANEOUS at 21:33

## 2022-07-19 RX ADMIN — LABETALOL HYDROCHLORIDE 10 MG: 5 INJECTION, SOLUTION INTRAVENOUS at 16:24

## 2022-07-19 RX ADMIN — ONDANSETRON 4 MG: 2 INJECTION INTRAMUSCULAR; INTRAVENOUS at 12:35

## 2022-07-19 RX ADMIN — SODIUM CHLORIDE: 0.9 INJECTION, SOLUTION INTRAVENOUS at 13:36

## 2022-07-19 RX ADMIN — ROCURONIUM BROMIDE 50 MG: 50 INJECTION INTRAVENOUS at 12:17

## 2022-07-19 RX ADMIN — HYDROMORPHONE HYDROCHLORIDE 1 MG: 2 INJECTION, SOLUTION INTRAMUSCULAR; INTRAVENOUS; SUBCUTANEOUS at 14:14

## 2022-07-19 RX ADMIN — FENTANYL CITRATE 100 MCG: 50 INJECTION INTRAMUSCULAR; INTRAVENOUS at 12:17

## 2022-07-19 RX ADMIN — OXYCODONE HYDROCHLORIDE 10 MG: 10 TABLET ORAL at 23:47

## 2022-07-19 RX ADMIN — SODIUM CHLORIDE 75 ML/HR: 0.9 INJECTION, SOLUTION INTRAVENOUS at 19:37

## 2022-07-19 RX ADMIN — SUGAMMADEX 172 MG: 100 INJECTION, SOLUTION INTRAVENOUS at 15:08

## 2022-07-19 RX ADMIN — ROCURONIUM BROMIDE 15 MG: 50 INJECTION INTRAVENOUS at 13:03

## 2022-07-19 RX ADMIN — CEFAZOLIN 2000 MG: 1 INJECTION, POWDER, FOR SOLUTION INTRAMUSCULAR; INTRAVENOUS at 13:18

## 2022-07-19 RX ADMIN — Medication 25 MCG: at 16:01

## 2022-07-19 RX ADMIN — ONDANSETRON 4 MG: 2 INJECTION INTRAMUSCULAR; INTRAVENOUS at 18:30

## 2022-07-19 RX ADMIN — FENTANYL CITRATE 50 MCG: 50 INJECTION INTRAMUSCULAR; INTRAVENOUS at 13:20

## 2022-07-19 RX ADMIN — HYDROMORPHONE HYDROCHLORIDE 0.2 MG: 0.2 INJECTION, SOLUTION INTRAMUSCULAR; INTRAVENOUS; SUBCUTANEOUS at 16:23

## 2022-07-19 RX ADMIN — VANCOMYCIN HYDROCHLORIDE 1.5 G: 1 INJECTION, POWDER, LYOPHILIZED, FOR SOLUTION INTRAVENOUS at 13:17

## 2022-07-19 RX ADMIN — HYDROMORPHONE HYDROCHLORIDE 0.2 MG: 0.2 INJECTION, SOLUTION INTRAMUSCULAR; INTRAVENOUS; SUBCUTANEOUS at 17:10

## 2022-07-19 RX ADMIN — LEVETIRACETAM 750 MG: 750 TABLET, FILM COATED ORAL at 20:06

## 2022-07-19 RX ADMIN — SODIUM CHLORIDE 20 ML/HR: 0.9 INJECTION, SOLUTION INTRAVENOUS at 16:33

## 2022-07-19 RX ADMIN — LIDOCAINE HYDROCHLORIDE 50 MG: 10 INJECTION, SOLUTION EPIDURAL; INFILTRATION; INTRACAUDAL; PERINEURAL at 12:17

## 2022-07-19 RX ADMIN — METOPROLOL TARTRATE 25 MG: 25 TABLET, FILM COATED ORAL at 20:06

## 2022-07-19 RX ADMIN — CEFEPIME HYDROCHLORIDE 2000 MG: 2 INJECTION, POWDER, FOR SOLUTION INTRAVENOUS at 17:02

## 2022-07-19 RX ADMIN — ACETAMINOPHEN 975 MG: 325 TABLET ORAL at 23:47

## 2022-07-19 RX ADMIN — MIDAZOLAM 2 MG: 1 INJECTION INTRAMUSCULAR; INTRAVENOUS at 12:04

## 2022-07-19 RX ADMIN — OXYCODONE HYDROCHLORIDE 10 MG: 10 TABLET ORAL at 19:25

## 2022-07-19 RX ADMIN — Medication 25 MCG: at 15:49

## 2022-07-19 RX ADMIN — Medication 25 MCG: at 16:12

## 2022-07-19 RX ADMIN — HYDROMORPHONE HYDROCHLORIDE 0.2 MG: 0.2 INJECTION, SOLUTION INTRAMUSCULAR; INTRAVENOUS; SUBCUTANEOUS at 16:45

## 2022-07-19 RX ADMIN — SODIUM CHLORIDE 20 ML/HR: 0.9 INJECTION, SOLUTION INTRAVENOUS at 11:32

## 2022-07-19 RX ADMIN — VANCOMYCIN HYDROCHLORIDE 1750 MG: 10 INJECTION, POWDER, LYOPHILIZED, FOR SOLUTION INTRAVENOUS at 20:06

## 2022-07-19 RX ADMIN — LORAZEPAM 1 MG: 1 TABLET ORAL at 20:39

## 2022-07-19 RX ADMIN — PROPOFOL 250 MG: 10 INJECTION, EMULSION INTRAVENOUS at 12:17

## 2022-07-19 RX ADMIN — HYDROMORPHONE HYDROCHLORIDE 0.2 MG: 0.2 INJECTION, SOLUTION INTRAMUSCULAR; INTRAVENOUS; SUBCUTANEOUS at 18:22

## 2022-07-19 NOTE — ANESTHESIA POSTPROCEDURE EVALUATION
Post-Op Assessment Note    CV Status:  Stable  Pain Score: 0    Pain management: adequate     Mental Status:  Alert and awake   Hydration Status:  Euvolemic   PONV Controlled:  Controlled   Airway Patency:  Patent      Post Op Vitals Reviewed: Yes      Staff: Anesthesiologist, CRNA         No complications documented      BP   157/76   Temp   96 3   Pulse  89   Resp   18   SpO2   100

## 2022-07-19 NOTE — CONSULTS
[address] 62 y o  male MRN: [id number]  Unit/Bed#: [facility] Encounter: [id number]     Physician Requesting Consult: Sun Robison MD  Consults     Reason for Consultation / Chief Complaint:  Status post repeat craniectomy  History of Present Illness:  Juan José Motley is a 62 y o  male who presents s/p repeat craniectomy  Patient suffered a traumatic brain injury in April 2021, he then underwent a right-sided cranioplasty in July of 2021  Subsequently, he underwent a synthetic cranioplasty in March 5382, which was complicated by post op infection of the surgical site  He received 6 weeks of antibiotic post op and discharged to rehab  Surgical site continued to have purulent discharge, patient underwent local debridement and additional course of oral antibiotic  Patient  and neurosurgery decided to go forward with the 3rd craniectomy with removal of the allograft that likely to be the source of infection in addition of wash out and debridement  Pt is now s/p 3rd craniectomy with double drain, and will be admitted to ICU for continued management  History obtained from chart review and the patient       Past Medical History:  Past Medical History:   Diagnosis Date    Anxiety     Cataracts, bilateral     Dysphagia 04/16/2021    GERD (gastroesophageal reflux disease)     Hematoma, subdural, with loss of consciousness, traumatic ([address] )     Hypertension     Status post insertion of percutaneous endoscopic gastrostomy (PEG) tube ([address] ) 05/19/2021        Past Surgical History:  Past Surgical History:   Procedure Laterality Date    BRAIN HEMATOMA EVACUATION Right 4/15/2021    Procedure: Right CRANIOTOMY FOR SUBDURAL HEMATOMA;  Surgeon: Sun Robison MD;  [facility];  Service: Neurosurgery    BRAIN HEMATOMA EVACUATION Right 11/16/2021    Procedure: CRANIOTOMY FOR EVACUATION OF EPIDURAL HEMATOMA;  Surgeon: Sun Robison MD;  [facility]; Service: Neurosurgery    BRAIN HEMATOMA EVACUATION Right 11/16/2021    Procedure: Exploration and revision of right craniotomy incision with removal of cranial plate;  Surgeon: Hortencia Garcia MD;  [facility];  Service: Neurosurgery    CRANIOTOMY Right     HAND SURGERY Left     PEG TUBE PLACEMENT      PEG TUBE REMOVAL      MA REPLACE SKULL PLATE/FLAP Right 5/19/7668    Procedure: RIGHT AUTOLOGOUS CRANIOPLASTY;  Surgeon: Hortencia Garcia MD;  [facility];  Service: Neurosurgery    MA REPLACE SKULL PLATE/FLAP Right 3/3/2845    Procedure: right allographic CRANIOPLASTY;  Surgeon: Hortencia Garcia MD;  [facility];  Service: Neurosurgery    ROTATOR CUFF REPAIR Left         Past Family History:  Family History   Problem Relation Age of Onset    Heart disease Mother     Hypertension Father     Dementia Father         Social History:  E-Cigarette/Vaping    E-Cigarette Use Never User      E-Cigarette/Vaping Substances    Nicotine No     THC No     CBD No     Flavoring No     Other No     Unknown No      Social History     Tobacco Use   Smoking Status Never Smoker   Smokeless Tobacco Never Used     Social History     Substance and Sexual Activity   Alcohol Use Not Currently    Alcohol/week: 14 0 standard drinks    Types: 14 Cans of beer per week     Social History     Substance and Sexual Activity   Drug Use Not Currently     Home Medications:   Prior to Admission medications    Medication Sig Start Date End Date Taking?  Authorizing Provider   acetaminophen (TYLENOL) 325 mg tablet Take 2 tablets (650 mg total) by mouth every 4 (four) hours as needed (mild pain) 3/10/22  Yes Lizbeth Horowitz Check-ANIA Martinez   chlorhexidine (PERIDEX) 0 12 % solution Apply 15 mL to the mouth or throat 2 (two) times a day 4/21/22  Yes ANIA Braga   halobetasol (ULTRAVATE) 0 05 % cream Apply topically 2 (two) times a day 2/11/22  Yes ANIA Braga   levETIRAcetam (KEPPRA) 750 mg tablet Take 1 tablet (750 mg total) by mouth every 12 (twelve) hours 9/3/21  Yes Katerin Rendon MD   lisinopril (ZESTRIL) 20 mg tablet Take 1 tablet (20 mg total) by mouth daily 4/21/22  Yes ANIA Echeverria   LORazepam (ATIVAN) 1 mg tablet Take 1 tablet (1 mg total) by mouth 3 (three) times a day as needed for anxiety 7/11/22  Yes ANIA Echeverria   metoprolol tartrate (LOPRESSOR) 25 mg tablet Take 1 tablet (25 mg total) by mouth every 12 (twelve) hours 4/21/22  Yes ANIA Echeverria   mupirocin Beau Spinner) 2 % ointment Apply topically daily 6/22/22  Yes Kathie Hernandez MD   omeprazole (PriLOSEC) 20 mg delayed release capsule Take 20 mg by mouth daily   Yes Historical Provider, MD   doxycycline hyclate (VIBRAMYCIN) 100 mg capsule Take 1 capsule (100 mg total) by mouth every 12 (twelve) hours 6/22/22 8/3/22  Kathie Hernandez MD   bromfenac sodium (Prolensa) 0 07 % SOLN Apply to eye in the morning  Patient not taking: Reported on 7/15/2022  7/19/22  Historical Provider, MD   Cholecalciferol (Vitamin D3) 50 MCG (2000 UT) TABS Take 1 tablet (2,000 Units total) by mouth daily  Patient not taking: Reported on 7/11/2022 6/6/22 7/19/22  ANIA Echeverria   Melatonin 5 MG TABS Take 1 tablet by mouth as needed    7/19/22  Historical Provider, MD        Inpatient Medications:  Scheduled Meds:  Current Facility-Administered Medications   Medication Dose Route Frequency Provider Last Rate    acetaminophen  975 mg Oral Q8H [address] Aggie Edwards PA-C      cefepime  2,000 mg Intravenous Q12H Frida Edwards PA-C 2,000 mg (07/19/22 1702)    fentaNYL  25 mcg Intravenous Q3 min PRN Niels Clarke CRNA      HYDROmorphone  0 5 mg Intravenous Q1H PRN Isaura Avendaño PA-C      HYDROmorphone  0 2 mg Intravenous Q5 Min PRN Niels Clarke CRNA      HYDROmorphone  0 2 mg Intravenous Q10 Min PRN Aicha Antonio MD      labetalol  10 mg Intravenous Q6H PRN Isaura Avendaño PA-C      levETIRAcetam  750 mg Oral Q12H [address] Isaura Avendaño, PA-NICKOLAS      lisinopril  20 mg Oral Daily [facility], PA-NICKOLAS      methocarbamol  500 mg Oral Q6H PRN [facility], PA-NICKOLAS      metoprolol tartrate  25 mg Oral Q12H [address] [facility], PA-NICKOLAS      ondansetron  4 mg Intravenous Once PRN Wonda Starch, CRNA      oxyCODONE  10 mg Oral Q4H PRN [facility], PA-NICKOLAS      oxyCODONE  5 mg Oral Q4H PRN [facility], [place]      [START ON 7/20/2022] pantoprazole  40 mg Oral Early Morning [facility], [place]      sodium chloride  20 mL/hr Intravenous Continuous Bowen Grider MD 20 mL/hr (07/19/22 1633)    vancomycin  15 mg/kg Intravenous Q12H Kayceechema Edwards PA-C       Continuous Infusions:sodium chloride, 20 mL/hr, Last Rate: 20 mL/hr (07/19/22 1633)      PRN Meds:  fentaNYL, 25 mcg, Q3 min PRN  HYDROmorphone, 0 5 mg, Q1H PRN  HYDROmorphone, 0 2 mg, Q5 Min PRN  HYDROmorphone, 0 2 mg, Q10 Min PRN  labetalol, 10 mg, Q6H PRN  methocarbamol, 500 mg, Q6H PRN  ondansetron, 4 mg, Once PRN  oxyCODONE, 10 mg, Q4H PRN  oxyCODONE, 5 mg, Q4H PRN         Allergies:  No Known Allergies     ROS:   Review of Systems   Constitutional: Positive for fatigue  Negative for diaphoresis and fever  HENT: Negative for congestion  Eyes: Negative for photophobia and visual disturbance  Respiratory: Negative for apnea, choking, chest tightness and shortness of breath  Cardiovascular: Negative for chest pain and leg swelling  Genitourinary: Negative for difficulty urinating  Musculoskeletal: Negative for arthralgias and back pain  Skin: Negative for color change and pallor  Neurological: Negative for tremors, syncope, speech difficulty, light-headedness and numbness  Hematological: Negative for adenopathy  Psychiatric/Behavioral: Negative for agitation and behavioral problems          Vitals:  Vitals:    07/19/22 1600 07/19/22 1615 07/19/22 1630 07/19/22 1645   BP: 148/76 (!) 174/80 151/70 151/78   Pulse: 74 96 72 72   Resp: 12 18 12 13   Temp: 98 1 °F (36 7 °C) 98 9 °F (37 2 °C) 98 8 °F (37 1 °C)   TempSrc:       SpO2: 98% 99% 99% 99%   Weight:       Height:         Temperature:   Temp (24hrs), Av °F (36 7 °C), Min:96 3 °F (35 7 °C), Max:98 9 °F (37 2 °C)    Current Temperature: 98 8 °F (37 1 °C)     Weights:   IBW (Ideal Body Weight): 73 kg  Body mass index is 27 26 kg/m²  Hemodynamic Monitoring:  N/A     Non-Invasive/Invasive Ventilation Settings:  Respiratory  Report   Lab Data (Last 4 hours)    None         O2/Vent Data (Last 4 hours)    None              No results found for: PHART, FBY7STL, PO2ART, TBW3GBH, U5KGNHVX, BEART, SOURCE  SpO2: SpO2: 98 %     Physical Exam:  Physical Exam  Constitutional:       Appearance: He is ill-appearing  He is not toxic-appearing  HENT:      Head:      Comments: Right temporal malformation s/p craniectomy, TBI      Right Ear: External ear normal       Left Ear: External ear normal       Nose: No congestion or rhinorrhea  Mouth/Throat:      Pharynx: No oropharyngeal exudate or posterior oropharyngeal erythema  Eyes:      General: No scleral icterus  Left eye: No discharge  Conjunctiva/sclera: Conjunctivae normal    Cardiovascular:      Rate and Rhythm: Regular rhythm  Heart sounds: No murmur heard  Pulmonary:      Effort: No respiratory distress  Breath sounds: Normal breath sounds  No stridor  No wheezing  Abdominal:      General: Abdomen is flat  There is no distension  Palpations: Abdomen is soft  Tenderness: There is no guarding  Musculoskeletal:         General: No swelling, tenderness, deformity or signs of injury  Cervical back: No rigidity or tenderness  Skin:     General: Skin is warm  Capillary Refill: Capillary refill takes less than 2 seconds  Coloration: Skin is not jaundiced  Findings: No bruising  Neurological:      General: No focal deficit present  Cranial Nerves: No cranial nerve deficit        Comments: Aox4, strenght and sensory intact b/l upper and lower extremity  No cerebellar d/f    Psychiatric:         Mood and Affect: Mood normal           Labs:  Results from last 7 days   Lab Units 07/15/22  0955   WBC Thousand/uL 7 21   HEMOGLOBIN g/dL 14 5   HEMATOCRIT % 46 0   PLATELETS Thousands/uL 187   NEUTROS PCT % 70   MONOS PCT % 8      Results from last 7 days   Lab Units 07/15/22  0955   SODIUM mmol/L 140   POTASSIUM mmol/L 3 8   CHLORIDE mmol/L 107   CO2 mmol/L 27   BUN mg/dL 11   CREATININE mg/dL 0 82   CALCIUM mg/dL 9 3   ALK PHOS U/L 138*   ALT U/L 87*   AST U/L 57*              Results from last 7 days   Lab Units 07/15/22  0955   INR  1 07   PTT seconds 29         No results found for: TROPONINI     Micro:  Lab Results   Component Value Date    BLOODCX No Growth After 5 Days  04/15/2021    URINECX 40,000-49,000 cfu/ml Enterococcus faecalis (A) 04/15/2021       Assessment:    Infection of bone flap   S/p craniectomy  Seizure   Essential HTN   Alcohol abuse  Anxiety                     Neuro: POD0 craniectomy, Neuro check QH, Continue PTA Keppra, Post op wound dressing per primary team, continue treatment for flap bone infection with Vanco and Cefepime  Analgesics: [address] Tylenol 975, PRN: Fentanyl, Dilaudid, Robaxin  Follow up Post op: [place] in AM per neurosurgery    Continue with seizure and craniectomy precaution               CV: Essential HTN: Continue with PTA lisinopril and Lopressor, SBP goal <160, PRN: Labetalol, Hydralazine                 Lung: No acute problem                  GI: No acute problem, starting regular diet with bowel regimen in place                  FEN: Continue with NS 75 cc/hr                 : Bai catheter in place for PODO, will remove tomorrow                  ID: Bone Flap/ Allograft infection, currently being treated Vanco and Cefepime  ID consulted  Will likely need PICC line  Follow: culture study               Heme: No acute problem                    Endo: No acute problem                 Msk/Skin: Continue with local wound care                  Disposition: Monitoring neurological exam and pain control, continue with ICU supportive care      VTE Pharmacologic Prophylaxis: Reason for no pharmacologic prophylaxis s/p craniectomy   VTE Mechanical Prophylaxis: sequential compression device and foot pump applied     Invasive lines and devices:   Invasive Devices  Report    Peripheral Intravenous Line  Duration           Peripheral IV 07/19/22 Left Antecubital <1 day          Drain  Duration           Closed/Suction Drain Right Head Bulb <1 day    Closed/Suction Drain Right Head Bulb 10 Fr  <1 day                 Code Status: Prior  POA:    POLST:            Oneda Life, DO

## 2022-07-19 NOTE — PERIOPERATIVE NURSING NOTE
Pt taken to CT scan at 1845, transferred to ICU room 14 after ct scan, report given to Charlie Le RN at bedside, pt's wife updated

## 2022-07-19 NOTE — OP NOTE
OPERATIVE REPORT  PATIENT NAME: Sol Velarde    :  1960  MRN: [id number]  Pt Location: [facility] OR ROOM 09    SURGERY DATE: 2022    Surgeon(s) and Role:     * Sol Sommers MD - Primary     * Carmen Negron PA-C - Assisting    Preop Diagnosis:  Superficial incisional infection of surgical site [T81 41XA]  Post-operative state [Z98 890]  History of cranioplasty [Z98 890]  At risk for surgical site infection [Z91 89]  Status post craniectomy [Z98 890]    Post-Op Diagnosis Codes:     * Superficial incisional infection of surgical site [T81 41XA]     * Post-operative state [Z98 890]     * History of cranioplasty [Z98 890]     * At risk for surgical site infection [Z91 89]     * Status post craniectomy [Z98 890]    Procedure(s) (LRB):  Right re-opening of craniotomy incision, and debridement with craniectomy (Right)    Specimen(s):  ID Type Source Tests Collected by Time Destination   1 : Synthethic right cranioplasty allograft with plates and screws Other Surgical Hardware/Implant TISSUE EXAM Sol Sommers MD 2022 1328    A : Subgaleal Space 1  Tissue Head ANAEROBIC CULTURE AND GRAM STAIN, FUNGAL CULTURE, STAT GRAM STAIN, CULTURE, TISSUE AND GRAM STAIN, AFB CULTURE WITH STAIN Sol Sommers MD 2022 1316    B : Subgaleal Space 2 Tissue Head ANAEROBIC CULTURE AND GRAM STAIN, FUNGAL CULTURE, STAT GRAM STAIN, CULTURE, TISSUE AND GRAM STAIN, AFB CULTURE WITH STAIN Sol Sommers MD 2022 1321    C : Epidural Space Tissue Head ANAEROBIC CULTURE AND GRAM STAIN (Canceled), FUNGAL CULTURE, STAT GRAM STAIN (Canceled), CULTURE, TISSUE AND GRAM STAIN, AFB CULTURE WITH STAIN Sol Sommers MD 2022 1321        Estimated Blood Loss:   Minimal    Drains:  Closed/Suction Drain Right Head Bulb (Active)   Site Description Unable to view 22 164   Dressing Status Clean;Dry; Intact 22   Drainage Appearance Bloody 22 164   Status To bulb suction 22 1645   Number of days: 0 Closed/Suction Drain Right Head Bulb 10 Fr  (Active)   Site Description Unable to view 07/19/22 1645   Dressing Status Clean;Dry; Intact 07/19/22 1645   Drainage Appearance Bloody 07/19/22 1645   Status To bulb suction 07/19/22 1645   Number of days: 0       Anesthesia Type:   General    Operative Indications:  Superficial incisional infection of surgical site [T81 41XA]  Post-operative state [Z98 890]  History of cranioplasty [Z98 890]  At risk for surgical site infection [Z91 89]  Status post craniectomy [Z35187]  56-year-old gentleman who initially presented to the hospital in April 2021 requiring a craniectomy for subdural hematoma and traumatic brain injury  Ultimately he underwent delayed autologous cranioplasty which was complicated by infection and then subsequent delayed allograft cranioplasty on 03/08/2022 after this infection  He had been doing very well until this past month when he developed drainage out of a portion of his incision  This was concerning for underlying infection and we discussed the options of craniectomy versus washout  He is quite opposed to the option of craniectomy due to the need for repeat delayed cranioplasty in his quite adamant about proceeding with just a washout  After discussing this with Infectious Disease that was the plan  He did however understand that if there was esme purulence that contaminate the entirety of the bone flap me needs to be removed  He also understood the other risks including bleeding, infection, stroke, paralysis and death  Operative Findings:  Purulence immediately under the cranioplasty in the epidural space surrounding the entire posterior aspect/inner table of the cranioplasty    Complications:   None    Procedure and Technique:  After obtaining written informed consent patient was brought to the operating room  The anesthesia was induced  His head was completely clipped and turned to the left exposing his prior craniectomy  There was a small portion that was immediately exposed with purulent drainage  His head was then prepped with alcohol, chlorhexidine, and ChloraPrep in the usual sterile fashion  His head was then draped in the usual fashion  Next a surgical time-out was performed  A 10 blade was used to open the prior incision  Care was taken to maintain hemostasis throughout the duration of the opening and undermined the underlying scalp  Along the area of fistula and drainage in the area was ellipsed out with adequate vascularity  A 1st swab was then performed in this area  I then continued the opening to completely reflect the myocutaneous flap  A 2nd superficial swab was then sent of the entirety of the cranioplasty  At this juncture Ancef and vancomycin were administered  After irrigating everything with antibiotic irrigation the bone flap was removed  Immediately upon doing this esme purulence was seen in the epidural space  The bone flap was placed in a Betadine bath  In the meantime cultures were sent of the epidural space and it was carefully debrided  At this juncture had significant concern and hesitancy about returning the bone flap after washing it out  As such I left the patient under the care of a physician assistant in operative team and went and spoke to the patient's wife  We discussed the options of washing this out versus removing the bone flap in its entirety  It is my opinion that removing the flap is the only reasonable option  Washing out with likely lead to recurrent infection in a short period of time that would not be fully treated by antibiotics  Furthermore it is my feeling that there is a chance that the allografic flap may be colonized  She agreed with removing the bone flap  I returned to the operating room and the bone flap was removed and sent to pathology    I carefully debrided the epidural space and copiously irrigated with Betadine irrigation and antibiotic irrigation containing vancomycin and bacitracin  After doing this 2 subgaleal drains were placed  There was significant oozing of blood along the scalp which was difficult to control  This took a significant amount of time  Furthermore his skin was friable from multiple closures  As such it had to be carefully closed with inverted 2-0 Vicryl as well as running nylon and running vertical mattress stitches  This allowed for a tension-free closure especially if the area that was previously dehisced and opened  Due to the complex redo nature of this closure it took at least 50% more than a traditional case  Ultimately as able to close the skin edges  The 2 drains were secured in place with 2-0 Prolene  There are 2 uninterrupted correct surgical counseling  The patient was awoken from general anesthetic found to be at his baseline neurologic condition  Transferred to the PACU  There was no qualified resident aid in this case  As such, due to its complex nature Keith Skinner was present and assisted for the duration of the case including exposure washout, craniectomy,, and closure        I was present for the entire procedure    Patient Disposition:  PACU       SIGNATURE: Carmen Michel MD  DATE: July 19, 2022  TIME: 5:06 PM

## 2022-07-19 NOTE — ANESTHESIA PREPROCEDURE EVALUATION
Procedure:  Right Re-Opening of incision and debridement with possible craniectomy (Right Head)    Relevant Problems   CARDIO   (+) Essential hypertension      NEURO/PSYCH   (+) Anxiety   (+) Seizure (HCC)      Lab Results   Component Value Date    SODIUM 140 07/15/2022    K 3 8 07/15/2022     07/15/2022    CO2 27 07/15/2022    AGAP 6 07/15/2022    BUN 11 07/15/2022    CREATININE 0 82 07/15/2022    GLUC 168 (H) 03/09/2022    GLUF 112 (H) 07/15/2022    CALCIUM 9 3 07/15/2022    AST 57 (H) 07/15/2022    ALT 87 (H) 07/15/2022    ALKPHOS 138 (H) 07/15/2022    TP 8 8 (H) 07/15/2022    TBILI 0 65 07/15/2022    EGFR 96 07/15/2022       Lab Results   Component Value Date    WBC 7 21 07/15/2022    HGB 14 5 07/15/2022    HCT 46 0 07/15/2022    MCV 88 07/15/2022     07/15/2022              Anesthesia Plan  ASA Score- 3     Anesthesia Type- general with ASA Monitors  Additional Monitors:   Airway Plan: ETT  Plan Factors-Exercise tolerance (METS): >4 METS  Chart reviewed  EKG reviewed  Imaging results reviewed  Existing labs reviewed  Patient summary reviewed  Induction- intravenous  Postoperative Plan- Plan for postoperative opioid use   Planned trial extubation    Informed Consent-

## 2022-07-19 NOTE — PROGRESS NOTES
Vancomycin Assessment    Jersey Patel is a 62 y o  male who is initiating IV vancomycin therapy  Relevant clinical data and objective history reviewed:  Creatinine   Date Value Ref Range Status   07/15/2022 0 82 0 60 - 1 30 mg/dL Final     Comment:     Standardized to IDMS reference method   03/09/2022 0 98 0 60 - 1 30 mg/dL Final     Comment:     Standardized to IDMS reference method   01/12/2022 0 84 0 60 - 1 30 mg/dL Final     Comment:     Standardized to IDMS reference method     /80   Pulse 92   Temp 98 °F (36 7 °C)   Resp 15   Ht 5' 10" (1 778 m)   Wt 86 2 kg (190 lb)   SpO2 97%   BMI 27 26 kg/m²   I/O last 3 completed shifts: In: 1800 [I V :1800]  Out: 370 [Urine:145; Drains:25; Blood:200]  Lab Results   Component Value Date/Time    BUN 11 07/15/2022 09:55 AM    WBC 7 21 07/15/2022 09:55 AM    HGB 14 5 07/15/2022 09:55 AM    HCT 46 0 07/15/2022 09:55 AM    MCV 88 07/15/2022 09:55 AM     07/15/2022 09:55 AM     Temp Readings from Last 3 Encounters:   07/19/22 98 °F (36 7 °C)   07/13/22 97 7 °F (36 5 °C) (Tympanic)   07/11/22 97 5 °F (36 4 °C) (Tympanic)     Vancomycin Days of Therapy: 1    Assessment/Plan  The patient is currently on vancomycin utilizing scheduled dosing based on actual body weight  Baseline risks associated with therapy include: concomitant nephrotoxic medications  The patient is started on vancomycin 1250 mg IV q12h and after clinical evaluation will be changed to 1750 mg IV q12h  Pharmacy will also follow closely for s/sx of nephrotoxicity, infusion reactions, and appropriateness of therapy  BMP and CBC will be ordered per protocol  Plan for trough as patient approaches steady state, 30 min before vanco dose due at 0800 on 7-21-22  Due to infection severity, will target a trough of 15-20 (appropriate for most indications)   Pharmacy will continue to follow the patients culture results and clinical progress daily      Sonya Sam, Pharmacist

## 2022-07-20 ENCOUNTER — TELEPHONE (OUTPATIENT)
Dept: NEUROSURGERY | Facility: CLINIC | Age: 62
End: 2022-07-20

## 2022-07-20 ENCOUNTER — APPOINTMENT (INPATIENT)
Dept: RADIOLOGY | Facility: HOSPITAL | Age: 62
DRG: 858 | End: 2022-07-20
Payer: COMMERCIAL

## 2022-07-20 LAB
ANION GAP SERPL CALCULATED.3IONS-SCNC: 9 MMOL/L (ref 4–13)
APTT PPP: 27 SECONDS (ref 23–37)
BUN SERPL-MCNC: 7 MG/DL (ref 5–25)
CALCIUM SERPL-MCNC: 6.3 MG/DL (ref 8.3–10.1)
CHLORIDE SERPL-SCNC: 117 MMOL/L (ref 96–108)
CO2 SERPL-SCNC: 19 MMOL/L (ref 21–32)
CREAT SERPL-MCNC: 0.49 MG/DL (ref 0.6–1.3)
ERYTHROCYTE [DISTWIDTH] IN BLOOD BY AUTOMATED COUNT: 15.8 % (ref 11.6–15.1)
GFR SERPL CREATININE-BSD FRML MDRD: 118 ML/MIN/1.73SQ M
GLUCOSE SERPL-MCNC: 140 MG/DL (ref 65–140)
HCT VFR BLD AUTO: 32.1 % (ref 36.5–49.3)
HGB BLD-MCNC: 10.4 G/DL (ref 12–17)
INR PPP: 1.29 (ref 0.84–1.19)
MCH RBC QN AUTO: 28.5 PG (ref 26.8–34.3)
MCHC RBC AUTO-ENTMCNC: 32.4 G/DL (ref 31.4–37.4)
MCV RBC AUTO: 88 FL (ref 82–98)
PLATELET # BLD AUTO: 152 THOUSANDS/UL (ref 149–390)
PMV BLD AUTO: 11.3 FL (ref 8.9–12.7)
POTASSIUM SERPL-SCNC: 3.1 MMOL/L (ref 3.5–5.3)
PROTHROMBIN TIME: 16.3 SECONDS (ref 11.6–14.5)
RBC # BLD AUTO: 3.65 MILLION/UL (ref 3.88–5.62)
SODIUM SERPL-SCNC: 145 MMOL/L (ref 135–147)
WBC # BLD AUTO: 10.35 THOUSAND/UL (ref 4.31–10.16)

## 2022-07-20 PROCEDURE — NC001 PR NO CHARGE: Performed by: EMERGENCY MEDICINE

## 2022-07-20 PROCEDURE — 05HB33Z INSERTION OF INFUSION DEVICE INTO RIGHT BASILIC VEIN, PERCUTANEOUS APPROACH: ICD-10-PCS

## 2022-07-20 PROCEDURE — 99233 SBSQ HOSP IP/OBS HIGH 50: CPT | Performed by: EMERGENCY MEDICINE

## 2022-07-20 PROCEDURE — 85610 PROTHROMBIN TIME: CPT | Performed by: PHYSICIAN ASSISTANT

## 2022-07-20 PROCEDURE — 97163 PT EVAL HIGH COMPLEX 45 MIN: CPT

## 2022-07-20 PROCEDURE — 70450 CT HEAD/BRAIN W/O DYE: CPT

## 2022-07-20 PROCEDURE — 97166 OT EVAL MOD COMPLEX 45 MIN: CPT

## 2022-07-20 PROCEDURE — C1751 CATH, INF, PER/CENT/MIDLINE: HCPCS

## 2022-07-20 PROCEDURE — 99255 IP/OBS CONSLTJ NEW/EST HI 80: CPT | Performed by: INTERNAL MEDICINE

## 2022-07-20 PROCEDURE — 85027 COMPLETE CBC AUTOMATED: CPT | Performed by: PHYSICIAN ASSISTANT

## 2022-07-20 PROCEDURE — 99024 POSTOP FOLLOW-UP VISIT: CPT | Performed by: PHYSICIAN ASSISTANT

## 2022-07-20 PROCEDURE — 36569 INSJ PICC 5 YR+ W/O IMAGING: CPT

## 2022-07-20 PROCEDURE — G1004 CDSM NDSC: HCPCS

## 2022-07-20 PROCEDURE — 80048 BASIC METABOLIC PNL TOTAL CA: CPT | Performed by: PHYSICIAN ASSISTANT

## 2022-07-20 PROCEDURE — 85730 THROMBOPLASTIN TIME PARTIAL: CPT | Performed by: PHYSICIAN ASSISTANT

## 2022-07-20 RX ORDER — LORAZEPAM 1 MG/1
1 TABLET ORAL 3 TIMES DAILY PRN
Status: DISCONTINUED | OUTPATIENT
Start: 2022-07-20 | End: 2022-07-22 | Stop reason: HOSPADM

## 2022-07-20 RX ORDER — POTASSIUM CHLORIDE 20 MEQ/1
80 TABLET, EXTENDED RELEASE ORAL ONCE
Status: COMPLETED | OUTPATIENT
Start: 2022-07-20 | End: 2022-07-20

## 2022-07-20 RX ORDER — HYDROMORPHONE HCL/PF 1 MG/ML
0.5 SYRINGE (ML) INJECTION EVERY 4 HOURS PRN
Status: DISCONTINUED | OUTPATIENT
Start: 2022-07-20 | End: 2022-07-21

## 2022-07-20 RX ADMIN — HYDROMORPHONE HYDROCHLORIDE 0.5 MG: 1 INJECTION, SOLUTION INTRAMUSCULAR; INTRAVENOUS; SUBCUTANEOUS at 06:06

## 2022-07-20 RX ADMIN — ONDANSETRON 4 MG: 2 INJECTION INTRAMUSCULAR; INTRAVENOUS at 08:43

## 2022-07-20 RX ADMIN — LEVETIRACETAM 750 MG: 750 TABLET, FILM COATED ORAL at 09:04

## 2022-07-20 RX ADMIN — LORAZEPAM 1 MG: 1 TABLET ORAL at 15:30

## 2022-07-20 RX ADMIN — VANCOMYCIN HYDROCHLORIDE 1750 MG: 10 INJECTION, POWDER, LYOPHILIZED, FOR SOLUTION INTRAVENOUS at 09:00

## 2022-07-20 RX ADMIN — OXYCODONE HYDROCHLORIDE 10 MG: 10 TABLET ORAL at 07:28

## 2022-07-20 RX ADMIN — HYDROMORPHONE HYDROCHLORIDE 0.5 MG: 1 INJECTION, SOLUTION INTRAMUSCULAR; INTRAVENOUS; SUBCUTANEOUS at 21:42

## 2022-07-20 RX ADMIN — LEVETIRACETAM 750 MG: 750 TABLET, FILM COATED ORAL at 20:41

## 2022-07-20 RX ADMIN — METHOCARBAMOL TABLETS 500 MG: 500 TABLET, COATED ORAL at 07:28

## 2022-07-20 RX ADMIN — METHOCARBAMOL TABLETS 500 MG: 500 TABLET, COATED ORAL at 13:18

## 2022-07-20 RX ADMIN — ACETAMINOPHEN 975 MG: 325 TABLET ORAL at 21:10

## 2022-07-20 RX ADMIN — METOPROLOL TARTRATE 25 MG: 25 TABLET, FILM COATED ORAL at 20:41

## 2022-07-20 RX ADMIN — METOPROLOL TARTRATE 25 MG: 25 TABLET, FILM COATED ORAL at 09:04

## 2022-07-20 RX ADMIN — HYDROMORPHONE HYDROCHLORIDE 0.5 MG: 1 INJECTION, SOLUTION INTRAMUSCULAR; INTRAVENOUS; SUBCUTANEOUS at 08:43

## 2022-07-20 RX ADMIN — ACETAMINOPHEN 975 MG: 325 TABLET ORAL at 06:06

## 2022-07-20 RX ADMIN — OXYCODONE HYDROCHLORIDE 10 MG: 10 TABLET ORAL at 15:29

## 2022-07-20 RX ADMIN — VANCOMYCIN HYDROCHLORIDE 1750 MG: 10 INJECTION, POWDER, LYOPHILIZED, FOR SOLUTION INTRAVENOUS at 19:30

## 2022-07-20 RX ADMIN — OXYCODONE HYDROCHLORIDE 10 MG: 10 TABLET ORAL at 04:00

## 2022-07-20 RX ADMIN — LORAZEPAM 1 MG: 1 TABLET ORAL at 20:40

## 2022-07-20 RX ADMIN — PANTOPRAZOLE SODIUM 40 MG: 40 TABLET, DELAYED RELEASE ORAL at 06:06

## 2022-07-20 RX ADMIN — OXYCODONE HYDROCHLORIDE 10 MG: 10 TABLET ORAL at 19:28

## 2022-07-20 RX ADMIN — LORAZEPAM 1 MG: 1 TABLET ORAL at 07:28

## 2022-07-20 RX ADMIN — HYDROMORPHONE HYDROCHLORIDE 0.5 MG: 1 INJECTION, SOLUTION INTRAMUSCULAR; INTRAVENOUS; SUBCUTANEOUS at 01:54

## 2022-07-20 RX ADMIN — METHOCARBAMOL TABLETS 500 MG: 500 TABLET, COATED ORAL at 19:28

## 2022-07-20 RX ADMIN — OXYCODONE HYDROCHLORIDE 10 MG: 10 TABLET ORAL at 11:57

## 2022-07-20 RX ADMIN — LISINOPRIL 20 MG: 20 TABLET ORAL at 09:03

## 2022-07-20 RX ADMIN — CEFEPIME HYDROCHLORIDE 2000 MG: 2 INJECTION, POWDER, FOR SOLUTION INTRAVENOUS at 04:41

## 2022-07-20 RX ADMIN — HYDROMORPHONE HYDROCHLORIDE 0.5 MG: 1 INJECTION, SOLUTION INTRAMUSCULAR; INTRAVENOUS; SUBCUTANEOUS at 16:41

## 2022-07-20 RX ADMIN — POTASSIUM CHLORIDE 80 MEQ: 1500 TABLET, EXTENDED RELEASE ORAL at 09:03

## 2022-07-20 NOTE — PLAN OF CARE
Problem: MOBILITY - ADULT  Goal: Maintain or return to baseline ADL function  Description: INTERVENTIONS:  -  Assess patient's ability to carry out ADLs; assess patient's baseline for ADL function and identify physical deficits which impact ability to perform ADLs (bathing, care of mouth/teeth, toileting, grooming, dressing, etc )  - Assess/evaluate cause of self-care deficits   - Assess range of motion  - Assess patient's mobility; develop plan if impaired  - Assess patient's need for assistive devices and provide as appropriate  - Encourage maximum independence but intervene and supervise when necessary  - Involve family in performance of ADLs  - Assess for home care needs following discharge   - Consider OT consult to assist with ADL evaluation and planning for discharge  - Provide patient education as appropriate  Outcome: Progressing  Goal: Maintains/Returns to pre admission functional level  Description: INTERVENTIONS:  - Perform BMAT or MOVE assessment daily    - Set and communicate daily mobility goal to care team and patient/family/caregiver  - Collaborate with rehabilitation services on mobility goals if consulted  - Perform Range of Motion 2 times a day  - Reposition patient every 2 hours    - Dangle patient 2 times a day  - Stand patient 2 times a day  - Ambulate patient 2 times a day  - Out of bed to chair 2 times a day   - Out of bed for meals 2 times a day  - Out of bed for toileting  - Record patient progress and toleration of activity level   Outcome: Progressing     Problem: PAIN - ADULT  Goal: Verbalizes/displays adequate comfort level or baseline comfort level  Description: Interventions:  - Encourage patient to monitor pain and request assistance  - Assess pain using appropriate pain scale  - Administer analgesics based on type and severity of pain and evaluate response  - Implement non-pharmacological measures as appropriate and evaluate response  - Consider cultural and social influences on pain and pain management  - Notify physician/advanced practitioner if interventions unsuccessful or patient reports new pain  Outcome: Progressing     Problem: INFECTION - ADULT  Goal: Absence or prevention of progression during hospitalization  Description: INTERVENTIONS:  - Assess and monitor for signs and symptoms of infection  - Monitor lab/diagnostic results  - Monitor all insertion sites, i e  indwelling lines, tubes, and drains  - Monitor endotracheal if appropriate and nasal secretions for changes in amount and color  - [place] appropriate cooling/warming therapies per order  - Administer medications as ordered  - Instruct and encourage patient and family to use good hand hygiene technique  - Identify and instruct in appropriate isolation precautions for identified infection/condition  Outcome: Progressing  Goal: Absence of fever/infection during neutropenic period  Description: INTERVENTIONS:  - Monitor WBC    Outcome: Progressing     Problem: SAFETY ADULT  Goal: Maintain or return to baseline ADL function  Description: INTERVENTIONS:  -  Assess patient's ability to carry out ADLs; assess patient's baseline for ADL function and identify physical deficits which impact ability to perform ADLs (bathing, care of mouth/teeth, toileting, grooming, dressing, etc )  - Assess/evaluate cause of self-care deficits   - Assess range of motion  - Assess patient's mobility; develop plan if impaired  - Assess patient's need for assistive devices and provide as appropriate  - Encourage maximum independence but intervene and supervise when necessary  - Involve family in performance of ADLs  - Assess for home care needs following discharge   - Consider OT consult to assist with ADL evaluation and planning for discharge  - Provide patient education as appropriate  Outcome: Progressing  Goal: Maintains/Returns to pre admission functional level  Description: INTERVENTIONS:  - Perform BMAT or MOVE assessment daily    - Set and communicate daily mobility goal to care team and patient/family/caregiver  - Collaborate with rehabilitation services on mobility goals if consulted  - Perform Range of Motion 2 times a day  - Reposition patient every 2 hours    - Dangle patient 2 times a day  - Stand patient 2 times a day  - Ambulate patient 2 times a day  - Out of bed to chair 2 times a day   - Out of bed for meals 2 times a day  - Out of bed for toileting  - Record patient progress and toleration of activity level   Outcome: Progressing  Goal: Patient will remain free of falls  Description: INTERVENTIONS:  -  Assess patient's ability to carry out ADLs; assess patient's baseline for ADL function and identify physical deficits which impact ability to perform ADLs (bathing, care of mouth/teeth, toileting, grooming, dressing, etc )  - Assess/evaluate cause of self-care deficits   - Assess range of motion  - Assess patient's mobility; develop plan if impaired  - Assess patient's need for assistive devices and provide as appropriate  - Encourage maximum independence but intervene and supervise when necessary  - Involve family in performance of ADLs  - Assess for home care needs following discharge   - Consider OT consult to assist with ADL evaluation and planning for discharge  - Provide patient education as appropriate  Outcome: Progressing     Problem: DISCHARGE PLANNING  Goal: Discharge to home or other facility with appropriate resources  Description: INTERVENTIONS:  - Identify barriers to discharge w/patient and caregiver  - Arrange for needed discharge resources and transportation as appropriate  - Identify discharge learning needs (meds, wound care, etc )  - Arrange for interpretive services to assist at discharge as needed  - Refer to Case Management Department for coordinating discharge planning if the patient needs post-hospital services based on physician/advanced practitioner order or complex needs related to functional status, cognitive ability, or social support system  Outcome: Progressing     Problem: Knowledge Deficit  Goal: Patient/family/caregiver demonstrates understanding of disease process, treatment plan, medications, and discharge instructions  Description: Complete learning assessment and assess knowledge base    Interventions:  - Provide teaching at level of understanding  - Provide teaching via preferred learning methods  Outcome: Progressing

## 2022-07-20 NOTE — CASE MANAGEMENT
Case Management Assessment & Discharge Planning Note    Patient name Cayden Cordova  Location ICU 14/ICU 14 MRN [id number]  : 1960 Date 2022       Current Admission Date: 2022  Current Admission Diagnosis:Infection of bone flap [facility])   Patient Active Problem List    Diagnosis Date Noted    Infection of bone flap ([facility] [address] ) 2021    Seizure ([facility] [address] ) 2021    Status post craniectomy 2021    Alcohol abuse 2021    Acute encephalopathy 04/15/2021    Anxiety 2021    Essential hypertension 2020      LOS (days): 1  Geometric Mean LOS (GMLOS) (days):   Days to GMLOS:     OBJECTIVE:    Risk of Unplanned Readmission Score: 12 5         Current admission status: Inpatient       Preferred Pharmacy:    [address]  Phone: [phone] Fax: [phone]    Primary Care Provider: ANIA Almanzar    Primary Insurance: Bailey Mccray  Secondary Insurance:     ASSESSMENT:  Derick 26 Proxies    There are no active Health Care Proxies on file  Readmission Root Cause  30 Day Readmission: No    Patient Information  Admitted from[de-identified] Home  Mental Status: Alert  During Assessment patient was accompanied by: Spouse  Assessment information provided by[de-identified] Patient, Spouse  Primary Caregiver: Self  Support Systems: Spouse/significant other  [place] of Residence: [address] [place] [address] Avenue do you live in?: [facility] entry access options   Select all that apply : Stairs  Number of steps to enter home : 4  Do the steps have railings?: No  Type of Current Residence: [address] [place] [address] home  Upon entering residence, is there a bedroom on the main floor (no further steps)?: Yes  Upon entering residence, is there a bathroom on the main floor (no further steps)?: Yes  In the last 12 months, was there a time when you were not able to pay the mortgage or rent on time?: No  In the last 12 months, how many places have you lived?: 1  In the last 12 months, was there a time when you did not have a steady place to sleep or slept in a shelter (including now)?: No  Homeless/housing insecurity resource given?: N/A  Living Arrangements: Lives w/ Spouse/significant other    Activities of Daily Living Prior to Admission  Functional Status: Independent  Completes ADLs independently?: Yes  Ambulates independently?: Yes  Does patient use assisted devices?: No  Does patient currently own DME?: Yes  What DME does the patient currently own?: [employer]  Does patient have a history of Outpatient Therapy (PT/OT)?: No  Does the patient have a history of Short-Term Rehab?: Yes ([facility])  Does patient have a history of HHC?: No  Does patient currently have [employer] ?: No         Patient Information Continued  Income Source: Pension/correction  Does patient have prescription coverage?: Yes  Within the past 12 months, you worried that your food would run out before you got the money to buy more : Never true  Within the past 12 months, the food you bought just didn't last and you didn't have money to get more : Never true  Food insecurity resource given?: N/A  Does patient receive dialysis treatments?: No  Does patient have a history of substance abuse?: No         Means of Transportation  Means of Transport to Appts[de-identified] Drives Self  In the past 12 months, has lack of transportation kept you from medical appointments or from getting medications?: No  In the past 12 months, has lack of transportation kept you from meetings, work, or from getting things needed for daily living?: No  Was application for public transport provided?: N/A        DISCHARGE DETAILS:    Discharge planning discussed with[de-identified] Patient and wife  Freedom of Choice: Yes  Comments - Freedom of Choice: FOC discussed  CM contacted family/caregiver?: Yes             Contacts  Patient Contacts: Faby  Relationship to Patient[de-identified] Family  Reason/Outcome: Continuity of Care, Emergency Contact    Patient/caregiver received discharge checklist   Content reviewed  Patient/caregiver encouraged to participate in discharge plan of care prior to discharge home  CM reviewed d/c planning process including the following: identifying help at home, patient preference for d/c planning needs, Discharge Lounge, [facility] Meds to Bed program, availability of treatment team to discuss questions or concerns patient and/or family may have regarding understanding medications and recognizing signs and symptoms once discharged  CM also encouraged patient to follow up with all recommended appointments after discharge  Patient advised of importance for patient and family to participate in managing patients medical well being  CM met with pt and wife and explained role of CM  Pt resides with wife in their 2 SH; 4 LIONEL; bedroom and full bathroom on 2nd fl; 1/2 bath on main floor  Second home is one level  Pt has a history of IP rehab at [facility] 5/2020 following a TBI  Pt owns a cane; does not own/use other DME  Pt is retired and reported that he can resume driving now that he has had eye surgery  COVID vaccination not received

## 2022-07-20 NOTE — PROGRESS NOTES
Vancomycin IV Pharmacy-to-Dose Consultation    Juan Vance is a 62 y o  male who is currently receiving vancomycin IV with management by the Pharmacy Consult service  Assessment/Plan:    The patient's chart was reviewed  Renal function is stable  There are no signs or symptoms of nephrotoxicity and/or infusion reactions documented  Based on today's assessment, will continue current vancomycin (Day # 2) dosing of 1750 mg (20 mg/kg) IV q12h, with a plan for trough to be drawn 7/21 at approximately 0730  We will continue to follow the patient's culture results and clinical progress daily      Thank you,  Oral Sharma, PharmD, [place] [address] Pharmacist  [phone]

## 2022-07-20 NOTE — PROGRESS NOTES
[facility]  Progress Note - Bernardo Alford 1960, 62 y o  male MRN: [id number]  Unit/Bed#: ICU 14 Encounter: [id number]  Primary Care Provider: ANIA Clinton   Date and time admitted to hospital: 7/19/2022 10:15 AM    * Infection of bone flap [facility])  Assessment & Plan  POD#1 Right re-opening of craniotomy incision, and debridement with craniectomy (Dr Miguel Taylor, 7/19/2022)    Imaging:   CT head wo contrast 7/20/2022:  Stable postoperative changes, status post craniectomy, washout and placement of subgaleal drainage catheters, postoperative day #1  Anticipated postoperative changes  Cerebral atrophy with chronic small vessel ischemic white matter disease  No acute intracranial abnormality  Stable posttraumatic changes in the frontal lobes and temporal lobes bilaterally      Plan:   Continue to monitor neurological exam   Helmet when OOB   Drain care   o 7 french - 55cc since surgery, remains in at [facility]  o 10 french drain - 10cc since surgery, removed today and sutured closed with stay suture   Pain control - has incisional pain  o Tylenol 975 mg q 8 hours scheduled  o Robaxin 500 mg q 6 hours PRN as needed for muscle spasm  o IV dilaudid 0 5 mg Q 1 hours as needed for breakthrough pain, will decrease frequency today to q 4  o Oxycodone 5 mg and 10 mg q 4 hours as needed for moderate and severe pain, respectively   Bowel regimen   o Continue Colace, senna   Labs / vitals  o K 3 1, K-dur ordered, will repeat BMP tomorrow  o INR slightly elevated at 1 29, goal < 1 4  o CBC low this morning, redraw pending as likely draw error, will likely need PICC line placement for this  o Remains afebrile  o Intraoperative cultures pending - subgaleal 1, subgaleal 2 and epidural  - Thus far no growth noted   ID consult pending, for now has been placed on vancomycin and cefepime   Will discontinue IV fluids as patient is tolerating PO well   Home meds reordered   DVT ppx:  SCDs, hold pharm dvt tomorrow, will start tomorrow likely after second drain is removed   Mobilize as tolerated with assistance, PT / OT evaluation with helmet in place    Neurosurgery will follow as primary team  Patient is not yet stable for discharge  Please call with questions or concerns  Status post craniectomy  Assessment & Plan  · 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 on 04/15/2021  · Status post cranioplasty 07/13/2021 with subsequent infection necessitating craniectomy  · Status post synthetic cranioplasty on 03/08/2022  · Presents now with concern for recurrent infection      Subjective/Objective   Chief Complaint: "I have head pain"    Subjective:  Complains mostly of incisional pain  Did not want breakfast but is drinking fine and plans to eat for lunch  Helmets x 2 in room  Wife in room  No other new or worsening complaints  Nursing rounds completed with Melvin Hernadez - plan to premedicate for drain removal this morning  Objective:  Sitting up in bed, NAD    I/O       07/18 0701 07/19 0700 07/19 0701 07/20 0700 07/20 0701 07/21 0700    P  O   1200     I V  (mL/kg)  2778 8 (32 2)     IV Piggyback  550     Total Intake(mL/kg)  4528 8 (52 5)     Urine (mL/kg/hr)  1195 150 (0 6)    Drains  65     Blood  200     Total Output  1460 150    Net  +3068 8 -150                 Invasive Devices  Report    Peripheral Intravenous Line  Duration           Peripheral IV 07/19/22 Dorsal (posterior); Left Forearm <1 day    Peripheral IV 07/19/22 Left Antecubital <1 day          Drain  Duration           Closed/Suction Drain Right Head Bulb <1 day    Closed/Suction Drain Right Head Bulb 10 Fr  <1 day                Physical Exam:  Vitals: Blood pressure 144/70, pulse 66, temperature 98 1 °F (36 7 °C), temperature source Oral, resp  rate 21, height 5' 10" (1 778 m), weight 86 2 kg (190 lb), SpO2 95 %  ,Body mass index is 27 26 kg/m²        General appearance: alert, appears stated age, cooperative and no distress  Head: dressing over incision intact, YANETH drain x 2 in place with minimal bloody output noted - 10 [place] drain removed and closed with stay suture, flap sunken and soft  Eyes: EOMI  Neck: supple, symmetrical, trachea midline   Lungs: non labored breathing  Heart: regular heart rate  Neurologic:   Mental status: Alert, oriented x3, follows commands, thought content appropriate  Cranial nerves: grossly intact (Cranial nerves II-XII)  Motor: moving all extremities without focal weakness  Coordination: finger to nose normal bilaterally, no drift bilaterally      Lab Results:  Results from last 7 days   Lab Units 07/15/22  0955   WBC Thousand/uL 7 21   HEMOGLOBIN g/dL 14 5   HEMATOCRIT % 46 0   PLATELETS Thousands/uL 187   NEUTROS PCT % 70   MONOS PCT % 8     Results from last 7 days   Lab Units 07/20/22  0617 07/15/22  0955   POTASSIUM mmol/L 3 1* 3 8   CHLORIDE mmol/L 117* 107   CO2 mmol/L 19* 27   BUN mg/dL 7 11   CREATININE mg/dL 0 49* 0 82   CALCIUM mg/dL 6 3* 9 3   ALK PHOS U/L  --  138*   ALT U/L  --  87*   AST U/L  --  57*             Results from last 7 days   Lab Units 07/20/22  0617 07/15/22  0955   INR  1 29* 1 07   PTT seconds 27 29     No results found for: TROPONINT  ABG:  Lab Results   Component Value Date    PHART 7 362 11/17/2021    IDM4WHE 34 9 (L) 11/17/2021    PO2ART 141 3 (H) 11/17/2021    TKY4NIA 19 4 (L) 11/17/2021    BEART -5 3 11/17/2021    SOURCE Line, Arterial 11/17/2021       Imaging Studies: I have personally reviewed pertinent reports  and I have personally reviewed pertinent films in PACS    CT head wo contrast    Result Date: 7/20/2022  Impression: 1  Stable postoperative changes, status post craniectomy, washout and placement of subgaleal drainage catheters, postoperative day #1  Anticipated postoperative changes  2   Cerebral atrophy with chronic small vessel ischemic white matter disease  No acute intracranial abnormality    3   Stable posttraumatic changes in the frontal lobes and temporal lobes bilaterally  4   Stable colloid cyst  Workstation performed: [id number]     CT head wo contrast    Result Date: 7/19/2022  Impression: Right frontotemporoparietal craniectomy changes with right MCA distribution encephalomalacia  Status post reopening of right craniotomy and washout  No evidence of acute intracranial hemorrhage  No evidence of extra-axial collection  5 mm colloid cyst within the foramen of [facility] is similar to the prior exam  Workstation performed: [id number]       EKG, Pathology, and Other Studies: I have personally reviewed pertinent reports        VTE Pharmacologic Prophylaxis: Reason for no pharmacologic prophylaxis - drain removal    VTE Mechanical Prophylaxis: sequential compression device

## 2022-07-20 NOTE — UTILIZATION REVIEW
Initial Clinical Review    Elective IP surgical procedure  Age/Sex: 62 y o  male  Surgery Date: 7/19/22  Procedure: Right re-opening of craniotomy incision, and debridement with craniectomy (Right)  Anesthesia: General  Operative Findings: Purulence immediately under the cranioplasty in the epidural space surrounding the entire posterior aspect/inner table of the cranioplasty    POD#1 Progress Note: Reports mostly incisional pain  On exam, dressing intact, drains x2 in place w/ minimal bloody output, flap sunken and soft, no focal neuro deficits  CT post op stable  Plan: Helmet OOB, q1h neuro checks, maintain 7 french drain, 10 french drain removed and sutured close, analgesics, bowel regimen, Trend labs, replete electrolytes as needed, follow intraop cultures, IV ABX, discontinue IVF as pt tolerating PO, continue home meds, hold pharm DVT ppx until other drain is removed likely tomorrow, PT/OT evals  DW  I&O  Craniectomy and seizure precautions  Admission Orders: Date/Time/Statement:   Admission Orders (From admission, onward)     Ordered        07/19/22 1543  Inpatient Admission  Once                      Orders Placed This Encounter   Procedures    Inpatient Admission     Standing Status:   Standing     Number of Occurrences:   1     Order Specific Question:   Level of Care     Answer:   Critical Care [15]     Order Specific Question:   Estimated length of stay     Answer:   Inpatient Only Surgery     Vital Signs: /77   Pulse 68   Temp 98 1 °F (36 7 °C) (Oral)   Resp 22   Ht 5' 10" (1 778 m)   Wt 86 2 kg (190 lb)   SpO2 96%   BMI 27 26 kg/m²     Pertinent Labs/Diagnostic Test Results:   CT head wo contrast   Final Result by Sushil Zee DO (07/20 0517)   1  Stable postoperative changes, status post craniectomy, washout and placement of subgaleal drainage catheters, postoperative day #1  Anticipated postoperative changes        2   Cerebral atrophy with chronic small vessel ischemic white matter disease  No acute intracranial abnormality  3   Stable posttraumatic changes in the frontal lobes and temporal lobes bilaterally  4   Stable colloid cyst                   Workstation performed: [id number]         CT head wo contrast   Final Result by Trey Martinez MD (07/19 2006)      Right frontotemporoparietal craniectomy changes with right MCA distribution encephalomalacia  Status post reopening of right craniotomy and washout  No evidence of acute intracranial hemorrhage  No evidence of extra-axial collection        5 mm colloid cyst within the foramen of [facility] is similar to the prior exam       Workstation performed: [id number]           Results from last 7 days   Lab Units 07/15/22  1301   SARS-COV-2  Negative     Results from last 7 days   Lab Units 07/15/22  0955   WBC Thousand/uL 7 21   HEMOGLOBIN g/dL 14 5   HEMATOCRIT % 46 0   PLATELETS Thousands/uL 187   NEUTROS ABS Thousands/µL 4 98         Results from last 7 days   Lab Units 07/20/22  0617 07/15/22  0955   SODIUM mmol/L 145 140   POTASSIUM mmol/L 3 1* 3 8   CHLORIDE mmol/L 117* 107   CO2 mmol/L 19* 27   ANION GAP mmol/L 9 6   BUN mg/dL 7 11   CREATININE mg/dL 0 49* 0 82   EGFR ml/min/1 73sq m 118 96   CALCIUM mg/dL 6 3* 9 3     Results from last 7 days   Lab Units 07/15/22  0955   AST U/L 57*   ALT U/L 87*   ALK PHOS U/L 138*   TOTAL PROTEIN g/dL 8 8*   ALBUMIN g/dL 3 6   TOTAL BILIRUBIN mg/dL 0 65     Results from last 7 days   Lab Units 07/19/22  1542   POC GLUCOSE mg/dl 153*     Results from last 7 days   Lab Units 07/20/22  0617   GLUCOSE RANDOM mg/dL 140         Results from last 7 days   Lab Units 07/15/22  0955   HEMOGLOBIN A1C % 5 5   EAG mg/dl 111     Results from last 7 days   Lab Units 07/20/22  0617 07/15/22  0955   PROTIME seconds 16 3* 13 5   INR  1 29* 1 07   PTT seconds 27 29           Results from last 7 days   Lab Units 07/15/22  0955   CRP mg/L 3 9*   SED RATE mm/hour 27*       Results from last 7 days   Lab Units 07/15/22  0955   CLARITY UA  Extra Turbid   COLOR UA  Yellow   SPEC GRAV UA  1 020   PH UA  5 5   GLUCOSE UA mg/dl Negative   KETONES UA mg/dl Negative   BLOOD UA  Negative   PROTEIN UA mg/dl Trace*   NITRITE UA  Negative   BILIRUBIN UA  Negative   UROBILINOGEN UA (BE) mg/dl <2 0   LEUKOCYTES UA  Negative   WBC UA /hpf 1-2   RBC UA /hpf 1-2   BACTERIA UA /hpf Occasional   EPITHELIAL CELLS WET PREP /hpf None Seen   MUCUS THREADS  Occasional*     Results from last 7 days   Lab Units 07/19/22  1321 07/19/22  1316   GRAM STAIN RESULT  2+ Polys  No bacteria seen  No Polys or Bacteria seen No Polys or Bacteria seen       Diet: Regular Diet  Mobility: ambulate qshift w/ Helmet OOB  PT/OT evals  DVT Prophylaxis: SCDs  Medications/Pain Control:   Scheduled Medications:  acetaminophen, 975 mg, Oral, Q8H JOSEFINA  cefepime, 2,000 mg, Intravenous, Q12H  docusate sodium, 100 mg, Oral, BID  levETIRAcetam, 750 mg, Oral, Q12H JOSEFINA  lisinopril, 20 mg, Oral, Daily  metoprolol tartrate, 25 mg, Oral, Q12H JOSEFINA  pantoprazole, 40 mg, Oral, Early Morning  senna, 1 tablet, Oral, Daily  vancomycin, 20 mg/kg, Intravenous, Q12H    Continuous IV Infusions:  sodium chloride 0 9 %, 75 mL/hr, Intravenous, Continuous; Start: 07/19/22 1800 End: 07/20/22 0925     PRN Meds:  hydrALAZINE, 10 mg, Intravenous, Q4H PRN  HYDROmorphone, 0 5 mg, Intravenous, Q4H PRN; 7/19 x1, 7/20 x3  labetalol, 10 mg, Intravenous, Q4H PRN; 7/19 x1  LORazepam, 1 mg, Oral, TID PRN; 7/19 x1, 7/20 x1  methocarbamol, 500 mg, Oral, Q6H PRN; 7/20 x1  ondansetron, 4 mg, Intravenous, Q6H PRN; 7/20 x2  oxyCODONE, 10 mg, Oral, Q4H PRN' 7/19 x2, 7/20 x3  oxyCODONE, 5 mg, Oral, Q4H PRN        Network Utilization Review Department  ATTENTION: Please call with any questions or concerns to [phone] and carefully listen to the prompts so that you are directed to the right person   All voicemails are confidential   Satish Panda all requests for admission clinical reviews, approved or denied determinations and any other requests to dedicated fax number below belonging to the campus where the patient is receiving treatment   List of dedicated fax numbers for the Facilities:  [address] [place] [address] DENIALS (Administrative/Medical Necessity) [phone]   [address]  [address] (Maternity/NICU/Pediatrics) [phone]   [address] [place] [address]  [address] [facility] [place] [address] [id number] [place] [address] [address] [place] [address] [phone]

## 2022-07-20 NOTE — PROGRESS NOTES
Progress Note- Critical Care  Judy Henry 62 y o  male MRN: [id number]  Unit/Bed#: ICU 14 Encounter: [id number]          History of Present Illness:  Judy Henry is a 62 y o  male who presents s/p repeat craniectomy  Patient suffered a traumatic brain injury in April 2021, he then underwent a right-sided cranioplasty in July of 2021  Subsequently, he underwent a synthetic cranioplasty in March 2876, which was complicated by post op infection of the surgical site  He received 6 weeks of antibiotic post op and discharged to rehab  Surgical site continued to have purulent discharge, patient underwent local debridement and additional course of oral antibiotic  Patient  and neurosurgery decided to go forward with the 3rd craniectomy with removal of the allograft that likely to be the source of infection in addition of wash out and debridement  Pt is now s/p 3rd craniectomy with double drain, and will be admitted to ICU for continued management  Subjective/OVN:  - Patient received PRN pain medication on schedule last night  Incisional pain kept him from sleeping  No other concern  Drain output 30 cc on 1 and 10 cc on the other, SS fluid  PRN Meds:  hydrALAZINE, 10 mg, Q4H PRN  HYDROmorphone, 0 5 mg, Q1H PRN  labetalol, 10 mg, Q4H PRN  LORazepam, 1 mg, TID PRN  methocarbamol, 500 mg, Q6H PRN  ondansetron, 4 mg, Q6H PRN  oxyCODONE, 10 mg, Q4H PRN  oxyCODONE, 5 mg, Q4H PRN         Allergies:  No Known Allergies     ROS:   Review of Systems   Constitutional: Positive for fatigue  Negative for diaphoresis and fever  HENT: Negative for congestion, ear discharge, ear pain, nosebleeds, sinus pain, tinnitus, trouble swallowing and voice change  Eyes: Negative for photophobia and visual disturbance  Respiratory: Negative for apnea, cough, choking, chest tightness, shortness of breath, wheezing and stridor  Cardiovascular: Negative for chest pain and leg swelling     Gastrointestinal: Negative for rectal pain and vomiting  Genitourinary: Negative for difficulty urinating  Musculoskeletal: Negative for arthralgias and back pain  Skin: Negative for color change and pallor  Neurological: Positive for weakness  Negative for tremors, syncope, speech difficulty and numbness  Hematological: Negative for adenopathy  Psychiatric/Behavioral: Negative for agitation and behavioral problems  Vitals:  Vitals:    22 0415 22 0515 22 0615 22 0650   BP: 149/68 129/72 (!) 176/89 137/69   Pulse: 68 60 74 70   Resp:   15 17   Temp:       TempSrc:       SpO2: 94% 93% 98% 96%   Weight:       Height:         Temperature:   Temp (24hrs), Av 1 °F (36 7 °C), Min:96 3 °F (35 7 °C), Max:98 9 °F (37 2 °C)    Current Temperature: 98 3 °F (36 8 °C)     Weights:   IBW (Ideal Body Weight): 73 kg  Body mass index is 27 26 kg/m²  Hemodynamic Monitoring:  N/A     Non-Invasive/Invasive Ventilation Settings:  Respiratory  Report   Lab Data (Last 4 hours)    None         O2/Vent Data (Last 4 hours)    None              No results found for: PHART, YHV0MYN, PO2ART, SEF4SSL, R7UEPRHA, BEART, SOURCE  SpO2: SpO2: 96 %     Physical Exam:  Physical Exam  Constitutional:       General: He is not in acute distress  Appearance: He is ill-appearing  He is not toxic-appearing  HENT:      Head:      Comments: Right temporal malformation s/p craniectomy, TBI        Right Ear: External ear normal       Left Ear: External ear normal       Nose: No congestion or rhinorrhea  Mouth/Throat:      Mouth: Mucous membranes are moist       Pharynx: No oropharyngeal exudate or posterior oropharyngeal erythema  Eyes:      General: No scleral icterus  Left eye: No discharge  Conjunctiva/sclera: Conjunctivae normal    Cardiovascular:      Rate and Rhythm: Normal rate and regular rhythm  Heart sounds: No murmur heard  Pulmonary:      Effort: No respiratory distress        Breath sounds: Normal breath sounds  No stridor  No wheezing  Abdominal:      General: Abdomen is flat  Palpations: Abdomen is soft  Tenderness: There is no guarding  Musculoskeletal:         General: No swelling, tenderness or signs of injury  Cervical back: No rigidity or tenderness  Right lower leg: No edema  Left lower leg: No edema  Skin:     General: Skin is warm  Capillary Refill: Capillary refill takes less than 2 seconds  Coloration: Skin is not jaundiced or pale  Findings: No bruising, erythema or rash  Neurological:      General: No focal deficit present  Mental Status: He is oriented to person, place, and time  Mental status is at baseline  Cranial Nerves: No cranial nerve deficit  Motor: No weakness  Comments: Neurological exam unchanges  5/5 Strenght, sensory and reflex all intact both upper and lower extremity    Psychiatric:         Mood and Affect: Mood normal           Labs:  Results from last 7 days   Lab Units 07/15/22  0955   WBC Thousand/uL 7 21   HEMOGLOBIN g/dL 14 5   HEMATOCRIT % 46 0   PLATELETS Thousands/uL 187   NEUTROS PCT % 70   MONOS PCT % 8      Results from last 7 days   Lab Units 07/20/22  0617 07/15/22  0955   SODIUM mmol/L 145 140   POTASSIUM mmol/L 3 1* 3 8   CHLORIDE mmol/L 117* 107   CO2 mmol/L 19* 27   BUN mg/dL 7 11   CREATININE mg/dL 0 49* 0 82   CALCIUM mg/dL 6 3* 9 3   ALK PHOS U/L  --  138*   ALT U/L  --  87*   AST U/L  --  57*              Results from last 7 days   Lab Units 07/20/22  0617 07/15/22  0955   INR  1 29* 1 07   PTT seconds 27 29         No results found for: TROPONINI     Micro:  Lab Results   Component Value Date    BLOODCX No Growth After 5 Days   04/15/2021    URINECX 40,000-49,000 cfu/ml Enterococcus faecalis (A) 04/15/2021       Assessment:   Infection of bone flap   S/p craniectomy  Seizure   Essential HTN   Alcohol abuse  Anxiety                     Neuro:  craniectomy, Neuro check QH, Continue PTA Keppra, Post op wound dressing per primary team, continue treatment for flap bone infection with Vanco and Cefepime  Analgesics: [address] Tylenol 975, PRN: Fentanyl, Dilaudid, Robaxin  Post op CTH and 7/20 CTH: stable post op changes, no acute finding    Continue with seizure and craniectomy precaution               CV: Essential HTN: Continue with PTA lisinopril and Lopressor, SBP goal <160, PRN: Labetalol, Hydralazine                  Lung: No acute problem                  GI: No acute problem, Continue regular diet with bowel regimen in place                  FEN: Continue with NS 75 cc/hr                 : No catheter, monitor UOP, Hypokalemic today, corrected with 80 mEq                 ID: Bone Flap/ Allograft infection, continue Vanco and Cefepime  ID consulted  Will likely need PICC line  Follow: culture study               Heme: Initial hgb 6 1 this AM, likely hemodilution, will wait for redraw result  Endo: No acute problem                 Msk/Skin: Continue with local wound care, PT, OT                  Disposition: Monitoring neurological exam and pain control, continue with ICU supportive care      VTE Pharmacologic Prophylaxis: Reason for no pharmacologic prophylaxis s/p craniectomy   VTE Mechanical Prophylaxis: sequential compression device and foot pump applied     Invasive lines and devices: Invasive Devices  Report    Peripheral Intravenous Line  Duration           Peripheral IV 07/19/22 Dorsal (posterior); Left Forearm <1 day    Peripheral IV 07/19/22 Left Antecubital <1 day          Drain  Duration           Closed/Suction Drain Right Head Bulb <1 day    Closed/Suction Drain Right Head Bulb 10 Fr  <1 day                 Code Status: Prior  POA:    POLST:            Aura Man, DO

## 2022-07-20 NOTE — PLAN OF CARE
Problem: PHYSICAL THERAPY ADULT  Goal: Performs mobility at highest level of function for planned discharge setting  See evaluation for individualized goals  Description: Treatment/Interventions: Functional transfer training, LE strengthening/ROM, Elevations, Therapeutic exercise, Endurance training, Equipment eval/education, Bed mobility, Gait training, OT          See flowsheet documentation for full assessment, interventions and recommendations  Note: Prognosis: Good  Problem List: Decreased endurance, Impaired balance, Decreased mobility, Decreased safety awareness, Pain  Assessment: Pt is a 61 y/o male admitted to [address] [place] [address] on 7/19/2022 for a R re-opening of craniotomy and debridement with craniectomy for recurrent infection  Pt s/p R craniectomy for subdural and TBI on 4/15/21, s/p cranioplasty on 7/13/21 with subsequent infection necessitating craniectomy, and s/p post synthetic cranioplasty on 3/08/22  Pt's Dx and personal factors are infection of bone flap, s/p craniectomy, seizure, HTN, alcohol abuse, and anxiety  Pt lives with wife in a 2 SH with 4 LIONEL and 1 flight down to basement where the laundry is  Pt has bed/bath in 1st floor  Pt is home and can support pt  Pt was independent with ADL/IADLs and mobility  Pt owns walker and cane  Pt is retired  During the examination, pt demonstrated good strength of BLE, fair standing balance and tolerance, minimal gait abnormalities but did demo intermittent unsteadiness during ambulation, poor safety awareness, poor judgement, impulsivity, fall risk, and fair control during stair management which limits pt to perform ADLs independently and increases risks for falls  Pt educated on HEP and to perform them t/o the day  Pt will benefit with skilled PT interventions while in the hospital to address the impairments stated above  Pt is recommended to home with OPPT upon D/C    At the end of the session, pt was OOB in chair with call bell within reach  Pt and wife asked permission if they can walk out in hallways, pt encouraged to ask nurse before attempting ambulation  Barriers to Discharge: Inaccessible home environment     PT Discharge Recommendation: Post acute rehabilitation services    See flowsheet documentation for full assessment

## 2022-07-20 NOTE — PROGRESS NOTES
I have personally seen and examined patient and reviewed all data with resident  Agree with note, assessment and plan  Critical care time 0 min   Please refer to attending comments below  Critical care time does not include procedures, family meeting or teaching  Superficial incisional infection site with right reopening of craniotomy incision and debridement with craniectomy status post Subdural hematoma/TBI with right craniectomy 04/15/2021 status post cranioplasty 07/13/2021 infection necessitating craniectomy  Synthetic cranioplasty 03/08/2022  Seizures-continue Keppra  Hypertension-continue lisinopril and metoprolol as well as p r n  Medications  GERD  Dysphagia  Anxiety  History of regular alcohol use/dependence   Hypokalemia- replete and recheck  Hypocalcemia- replete and recheck   Metabolic acidosis non gapped- due to NSS    Exam:   Resting in bedside chair, AAO x3, MCKEON, follows commands, normal speech    Goal systolic blood pressure   <160     IO +3 0L  UO 1 19L  Right YANETH drain 10 Kyrgyz -15 mL  Right YANETH drain -50 mL    Infectious workup:  07/19/2022 tissue culture and Gram stain-pending  07/19/2022 fungal culture-pending  07/19/2022 tissue culture and Gram stain-pending  07/19/2022 AFB stain tissue-pending    Antibiotic regimen:  Cefepime 2 g IV q 12 hours    Antiepileptic regimen  Keppra 750 mg p o  Q 12 hour    Hypertension management:  Lisinopril 20 mg p o  Q  9:00 a m  Metoprolol 25 mg p o  Q 12 hour  Hydralazine 10 mg IV q 4 hours p r n  Labetalol 10 mg IV q 4 hours p r n       07/20/2022 CT brain- stable postoperative changes  Cerebral atrophy  Chronic small vessel ischemic white matter disease  Status post traumatic changes in frontal lobes and temporal lobes bilateral, stable colloid cyst    Continue with neurologic exam is q 2 hours during the day q 4 hours HS  Plan to reimage if patient has new focality to exam or decrease in GCS by greater than 2 points    Continue on and tight epileptic regimen  CT performed this morning with stable postoperative changes  Continue to monitor drain output  Maintain systolic blood pressure within goal   Continue home medications  Maintain on antibiotics  Infectious Disease consultation      PT/OT evaluation, patient may get out of bed with therapy and assess    Update    Discontinue NSS    PICC line placed    CBC pending     Helmet with mobilization

## 2022-07-20 NOTE — OCCUPATIONAL THERAPY NOTE
Occupational Therapy Evaluation     Patient Name: Margareth Hope  FFVFL'Z Date: 7/20/2022  Problem List  Principal Problem:    Infection of bone flap [facility])  Active Problems:    Status post craniectomy    Past Medical History  Past Medical History:   Diagnosis Date    Anxiety     Cataracts, bilateral     Dysphagia 04/16/2021    GERD (gastroesophageal reflux disease)     Hematoma, subdural, with loss of consciousness, traumatic ([facility] [address] )     Hypertension     Status post insertion of percutaneous endoscopic gastrostomy (PEG) tube ([facility] [address] ) 05/19/2021     Past Surgical History  Past Surgical History:   Procedure Laterality Date    BRAIN HEMATOMA EVACUATION Right 4/15/2021    Procedure: Right CRANIOTOMY FOR SUBDURAL HEMATOMA;  Surgeon: Brandy Ryan MD;  [facility];  Service: Neurosurgery    BRAIN HEMATOMA EVACUATION Right 11/16/2021    Procedure: CRANIOTOMY FOR EVACUATION OF EPIDURAL HEMATOMA;  Surgeon: Brandy Ryan MD;  [facility];  Service: Neurosurgery    BRAIN HEMATOMA EVACUATION Right 11/16/2021    Procedure: Exploration and revision of right craniotomy incision with removal of cranial plate;  Surgeon: Brandy Ryan MD;  [facility];  Service: Neurosurgery    CRANIOPLASTY Right 7/19/2022    Procedure: Right re-opening of craniotomy incision, and debridement with craniectomy;  Surgeon: Brandy Ryan MD;  [facility];  Service: Neurosurgery    CRANIOTOMY Right     HAND SURGERY Left     PEG TUBE PLACEMENT      PEG TUBE REMOVAL      CT REPLACE SKULL PLATE/FLAP Right 6/17/3329    Procedure: RIGHT AUTOLOGOUS CRANIOPLASTY;  Surgeon: Brandy Ryan MD;  [facility];  Service: Neurosurgery    CT REPLACE SKULL PLATE/FLAP Right 5/9/5861    Procedure: right allographic CRANIOPLASTY;  Surgeon: Brandy Ryan MD;  [facility];  Service: Neurosurgery    ROTATOR CUFF REPAIR Left              07/20/22 1415   OT Last Visit   OT Visit Date 07/20/22   Note Type   Note type Evaluation   Restrictions/Precautions   Weight Bearing Precautions Per Order No   Braces or Orthoses (S)  Craniectomy Helmet   Other Precautions Impulsive;Cognitive;Multiple lines;Telemetry; Fall Risk;Pain  (YANETH drain)   Pain Assessment   Pain Assessment Tool 0-10   Pain Score No Pain   Home Living   Type of Home House   Home Layout Two level;Performs ADLs on one level; Able to live on main level with bedroom/bathroom; Laundry in basement  (5 LIONEL front)   Bathroom Shower/Tub Walk-in shower   Bathroom Toilet Raised   Bathroom Equipment Shower chair;Commode;Grab bars in [address]  (unused)   Additional Comments Pt reports living in 2 SH, 5 LIONEL front, 1st floor setup w/ full bath/bed   Prior Function   Level of [place] Independent with ADLs and functional mobility   Lives With Spouse   Receives Help From Family   ADL Assistance Independent   IADLs Independent   Falls in the last 6 months 1 to 4  (1)   Vocational Retired   Comments Drove again after his recent cataract surgery; only drives minimally and not usually on highways   Lifestyle   Autonomy I w/ ADLS/IADLS, transfers and functional mobility PTA   Reciprocal Relationships Pt lives w/ his spouse who is retired   Service to [employer] Retired    Intrinsic Gratification Enjoys going fishing/boating   Psychosocial   Psychosocial (WDL) WDL   Length of Time/Family Visitation   (spouse)   ADL   Eating Assistance 5  Supervision/Setup   Grooming Assistance 5  [address] [facility] 5  Supervision/Setup   LB [address] 4  [address] 5  Supervision/Setup    [facility] 4  [address]  4  [address] [facility] 4  Minimal Assistance   Functional Deficit Impulsive;Verbal cueing   Bed Mobility   Supine to Sit 5  Supervision   Additional items HOB elevated;Verbal cues; Impulsive   Sit to Supine Unable to assess   Additional Comments Pt sat EOB w/ Fair sitting balance/trunk control; Pt attempting to sit EOB w/ side rails up  VC for safety and pacing  Transfers   Sit to Stand 5  Supervision   Additional items Verbal cues; Impulsive   Stand to Sit 5  Supervision   Additional items Impulsive;Verbal cues   Additional Comments no AD/DME used; VC for safey 2/2 impulsivity   Functional Mobility   Functional Mobility 4  Minimal assistance   Additional Comments Pt ambulated short household distance w/ Min A x1 for steadying balance; VC for safety 2/2 impulsivity  Easily distracted when in hallways; needs VC for re-direction to task   Additional items Hand hold assistance   Balance   Static Sitting Good   Dynamic Sitting Fair +   Static Standing Fair   Dynamic Standing Fair -   Ambulatory Poor +   Activity Tolerance   Activity Tolerance Patient tolerated treatment well   Medical Staff Made Aware PT   Nurse Made Aware yes, Zina   RUE Assessment   RUE Assessment WFL   LUE Assessment   LUE Assessment WFL   Hand Function   Gross Motor Coordination Functional   Fine Motor Coordination Functional   Sensation   Light Touch No apparent deficits   Proprioception   Proprioception No apparent deficits   Vision-Basic Assessment   Current Vision No visual deficits   Visual History Cataracts; Corrective eye surgery   Vision - Complex Assessment   Ocular Range of Motion WFL   Cognition   Overall Cognitive Status Impaired   Arousal/Participation Responsive; Cooperative   Attention Attends with cues to redirect   Orientation Level Oriented X4   Memory Decreased recall of precautions   Following Commands Follows one step commands without difficulty   Comments Pt is pleasant and cooperative; overall displays impulsivity and decreased safety awareness  Needs frequent re-direction to task and safety cues during functional activities   Assessment   Limitation Decreased Safe judgement during ADL;Decreased endurance;Decreased high-level ADLs; Decreased self-care trans;Decreased ADL status   Prognosis Fair   Assessment Pt is a 61 y/o male seen for OT eval s/p adm to [facility] w/ 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  Pt has hx of TBI s/p cranioplasty 2021 and 2nd craniectomy 2022 w/ post-op infection  Pt is now s/p 3rd craniectomy "Right re-opening of craniotomy incision, and debridement with craniectomy (Right)" performed on   Pt  has a past medical history of Anxiety, Cataracts, bilateral, Dysphagia (2021), GERD (gastroesophageal reflux disease), Hematoma, subdural, with loss of consciousness, traumatic ([facility] [address] ), Hypertension, and Status post insertion of percutaneous endoscopic gastrostomy (PEG) tube ([facility] [address] ) (2021)  Pt with active OT orders and up with assistance  orders  Pt lives with his spouse in 2 , 5 LIONEL, 1st floor setup  Pt was I w/ ADLS and IADLS, drives minimally, & required no use of DME PTA  Pt is currently demonstrating the following occupational deficits: S UB ADLS, CGA LB ADLS, S bed mobility and transfers and CGA functional mobility w/ no DME  These deficits that are impacting pt's baseline areas of occupation are a result of the following impairments: endurance, activity tolerance, functional mobility, forward functional reach, balance, decreased I w/ ADLS/IADLS, cognitive impairments, decreased safety awareness and decreased insight into deficits  The following Occupational Performance Areas to address include: bathing/shower, toilet hygiene, dressing, medication management, socialization, health maintenance, functional mobility, community mobility and clothing management  Recommend OP OT for cognition/fitness to drive testing upon D/C, when medically stable   Pt to continue to benefit from acute immediate OT services to address the following goals 3-5x/week to  w/in 7-10 days:   Goals   Patient Goals to go home   LTG Time Frame 7-10   Long Term Goal #1 see below listed goals   Plan   Treatment Interventions ADL retraining;Functional transfer training;Cognitive reorientation;Continued evaluation   Goal Expiration Date 07/30/22   OT Frequency 3-5x/wk   Recommendation   OT Discharge Recommendation Home with outpatient rehabilitation  (for cognition and fitness to drive testing)   Additional Comments  The patient's raw score on the AM-PAC Daily Activity inpatient short form is 21, standardized score is 44 27, greater than 39 4  Patients at this level are likely to benefit from discharge to home  Please refer to the recommendation of the Occupational Therapist for safe discharge planning   Additional Comments 2 Pt seen as a co-evaluation due to the patient's co-morbidities, clinically unstable presentation, and present impairments which are a regression from the patient's baseline     [facility] Daily Activity Inpatient   Lower Body Dressing 3   Bathing 3   Toileting 3   Upper Body Dressing 4   Grooming 4   Eating 4   Daily Activity Raw Score 21   Daily Activity Standardized Score (Calc for Raw Score >=11) 44 27   AM-PAC Applied Cognition Inpatient   Following a Speech/Presentation 3   Understanding Ordinary Conversation 4   Taking Medications 4   Remembering Where Things Are Placed or Put Away 3   Remembering List of 4-5 Errands 3   Taking Care of Complicated Tasks 3   Applied Cognition Raw Score 20   Applied Cognition Standardized Score 41 76      GOALS    1) Pt will improve activity tolerance to G for 30 min txment sessions for increase engagement in functional tasks  2) Pt will complete UB/LB dressing/self care w/ mod I using adaptive device and DME as needed  3) Pt will complete bathing w/ Mod I w/ use of AE and DME as needed  4) Pt will complete toileting w/ mod I w/ G hygiene/thoroughness using DME as needed  5) Pt will improve functional transfers to Mod I on/off all surfaces using DME as needed w/ G balance/safety   6) Pt will improve functional mobility during ADL/IADL/leisure tasks to Mod I using DME as needed w/ G balance/safety   7) Pt will participate in simulated IADL management task to increase independence to Mod I w/ G safety and endurance  8) Pt will be attentive 100% of the time during ongoing formal cognitive assessment (MOCA) w/ G participation to assist w/ safe d/c planning/recommendations  9) Pt will demonstrate G carryover of pt/caregiver education and training as appropriate w/o cues w/ good tolerance to increase safety during functional tasks  10) Pt will demonstrate 100% carryover of energy conservation techniques t/o functional I/ADL/leisure tasks w/o cues s/p skilled education to increase endurance during functional tasks     Benson Thomason MS, OTR/L

## 2022-07-20 NOTE — ASSESSMENT & PLAN NOTE
POD#1 Right re-opening of craniotomy incision, and debridement with craniectomy (Dr Jeaneth Hernandez, 7/19/2022)    Imaging:   CT head wo contrast 7/20/2022:  Stable postoperative changes, status post craniectomy, washout and placement of subgaleal drainage catheters, postoperative day #1  Anticipated postoperative changes  Cerebral atrophy with chronic small vessel ischemic white matter disease  No acute intracranial abnormality  Stable posttraumatic changes in the frontal lobes and temporal lobes bilaterally      Plan:   Continue to monitor neurological exam   Helmet when OOB   Drain care   o 7 french - 55cc since surgery, remains in at [facility]  o 10 french drain - 10cc since surgery, removed today and sutured closed with stay suture   Pain control - has incisional pain  o Tylenol 975 mg q 8 hours scheduled  o Robaxin 500 mg q 6 hours PRN as needed for muscle spasm  o IV dilaudid 0 5 mg Q 1 hours as needed for breakthrough pain, will decrease frequency today to q 4  o Oxycodone 5 mg and 10 mg q 4 hours as needed for moderate and severe pain, respectively   Bowel regimen   o Continue Colace, senna   Labs / vitals  o K 3 1, K-dur ordered, will repeat BMP tomorrow  o INR slightly elevated at 1 29, goal < 1 4  o CBC low this morning, redraw pending as likely draw error, will likely need PICC line placement for this  o Remains afebrile  o Intraoperative cultures pending - subgaleal 1, subgaleal 2 and epidural  - Thus far no growth noted   ID consult pending, for now has been placed on vancomycin and cefepime   Will discontinue IV fluids as patient is tolerating PO well   Home meds reordered  o keppra per home regimen   DVT ppx:  SCDs, hold pharm dvt tomorrow, will start tomorrow likely after second drain is removed   Mobilize as tolerated with assistance, PT / OT evaluation with helmet in place   Plan to transfer out of the unit today    Neurosurgery will follow as primary team  Patient is not yet stable for discharge  Please call with questions or concerns

## 2022-07-20 NOTE — UTILIZATION REVIEW
Inpatient Admission Authorization Request   NOTIFICATION OF INPATIENT ADMISSION/INPATIENT AUTHORIZATION REQUEST   SERVICING FACILITY:   [facility]  Address: [address] [place] [address], [address] [place] [address]  Tax ID: [id number]  NPI: [id number]  Place of Service: Inpatient [address] [facility] Code: 24     ATTENDING PROVIDER:  Attending Name and NPI#: Maliha Alvarez Md [[id number]]  Address: [address] [place] [address], [address] [place] [id number]  Phone: [phone]     UTILIZATION REVIEW CONTACT:  Estefania Tyler Utilization   Network Utilization Review Department  Phone: [phone]  Fax: [phone]  Email: [email or web address]     PHYSICIAN ADVISORY SERVICES:  FOR [id number]-DM-RUPX REVIEW - MEDICAL NECESSITY DENIAL  Phone: [phone]  Fax: [phone]  Email: [email or web address]     TYPE OF REQUEST:  Inpatient Status     ADMISSION INFORMATION:  ADMISSION DATE/TIME: 7/19/22  3:43 PM  PATIENT DIAGNOSIS CODE/DESCRIPTION:  Superficial incisional infection of surgical site [T81 41XA]  Post-operative state [Z98 890]  History of cranioplasty [Z98 890]  At risk for surgical site infection [Z91 89]  Status post craniectomy [Z98 890]  DISCHARGE DATE/TIME: No discharge date for patient encounter  IMPORTANT INFORMATION:  Please contact Estefania Tyler directly with any questions or concerns regarding this request  Department voicemails are confidential     Send requests for admission clinical reviews, concurrent reviews, approvals, and administrative denials due to lack of clinical to fax [phone]

## 2022-07-20 NOTE — CONSULTS
Consultation - Infectious Disease   Jag Montaño 62 y o  male MRN: [id number]  Unit/Bed#: ICU 14 Encounter: [id number]      IMPRESSION & RECOMMENDATIONS:   Impression/Recommendations:  1   Recurrent cranioplasty infection   In the setting of #2   Initially had Staph epidermidis native bone flap in flexion November 2021 status post flap removal, 6 weeks IV vancomycin  Underwent allograft cranioplasty March 2022  Complicated by wound nonhealing  Now status post I&D, flap removal   Intraoperative findings notable for gross purulence under the cranioplasty in the epidural space surrounding the entire posterior aspect/inner table of the cranioplasty  OR cultures pending  ESR 27    Rec:  · Continue vancomycin for now  · Pharmacy consult for vancomycin dosing  · D/C cefepime  · Follow-up OR cultures and tailor antibiotics as indicated  · Anticipate 6 weeks total IV antibiotics  · May benefit from PO antibiotics for a period thereafter  · Check weekly CBC-diff, Cr, ESR, vanco trough while on IV antibiotics  · D/C planning for home IV antibiotics - will provide Rx to CM once cultures resulted  · Outpatient follow with ID 2 weeks after D/C  · D/C PICC after last dose IV antibiotics  · Postoperative care per Neurosurgery     2   History of SAH/SDH   Status post craniectomy 4/15/21, bone flap replacement 5/15/98   Complicated by #1     3   Seizure disorder   As complication of #2 in setting of #4   On Keppra      4   Alcohol abuse  The above impression and plan was discussed in detail with the patient, his wife at the bedside, and Dr Christiane Aaron  Antibiotics:  Vancomycin  Cefepime  POD #1    Thank you for this consultation  We will follow along with you  HISTORY OF PRESENT ILLNESS:  Reason for Consult: Cranioplasty infection    HPI: Jag Montaño is a 62 y o  male with a history of alcohol abuse who sustained a fall with SDH/SAH in April 2021 ultimately requiring right craniotomy    He underwent elective bone flap replacement in mid July 2021  In November 2021 he developed drainage from his cranioplasty site with imaging suggesting osteomyelitis  Was taken to the OR and underwent bone flap removal   Postoperative course after that procedure was complicated by epidural hematoma requiring reoperation and evacuation  Operative cultures grew staph epidermidis  He was treated with a 6 week course of IV vancomycin  In March 2022 he underwent allograft cranioplasty  He was seen postoperatively in mid April and was noted to be healing well  A subgaleal stitch was removed and he was placed on 10 days of doxycycline  He was seen again in mid May and noted to have a small area of scabbing  In mid June the scab unroofed and developed drainage  He was seen in the office and underwent superficial debridement with closure of this area and was placed again on doxycycline  He was seen in follow-up on 07/13/2022 no improvement  He was taken electively to the OR for planned I and D and graft salvage given the patient's reluctance to undergo flap removal   Intraoperative findings were notable for esme purulence and the flap necessitated removal   Operative cultures are pending  We are asked to comment on further evaluation and management  In performing this consult, I have reviewed prior admission and outpatient visit records in detail  REVIEW OF SYSTEMS:  Denies fevers, chills, sweats, nausea, vomiting, or diarrhea  Patient is eager to leave the hospital ASAP and go home  A complete system-based review of systems is otherwise negative      PAST MEDICAL HISTORY:  Past Medical History:   Diagnosis Date    Anxiety     Cataracts, bilateral     Dysphagia 04/16/2021    GERD (gastroesophageal reflux disease)     Hematoma, subdural, with loss of consciousness, traumatic ([address] )     Hypertension     Status post insertion of percutaneous endoscopic gastrostomy (PEG) tube ([address] ) 05/19/2021     Past Surgical History:   Procedure Laterality Date    BRAIN HEMATOMA EVACUATION Right 4/15/2021    Procedure: Right CRANIOTOMY FOR SUBDURAL HEMATOMA;  Surgeon: Dexter Lantigua MD;  [facility];  Service: Neurosurgery    BRAIN HEMATOMA EVACUATION Right 11/16/2021    Procedure: CRANIOTOMY FOR EVACUATION OF EPIDURAL HEMATOMA;  Surgeon: Dexter Lantigua MD;  [facility];  Service: Neurosurgery    BRAIN HEMATOMA EVACUATION Right 11/16/2021    Procedure: Exploration and revision of right craniotomy incision with removal of cranial plate;  Surgeon: Dexter Lantigua MD;  [facility];  Service: Neurosurgery    CRANIOPLASTY Right 7/19/2022    Procedure: Right re-opening of craniotomy incision, and debridement with craniectomy;  Surgeon: Dexter Lantigua MD;  [facility];  Service: Neurosurgery    CRANIOTOMY Right     HAND SURGERY Left     PEG TUBE PLACEMENT      PEG TUBE REMOVAL      OH REPLACE SKULL PLATE/FLAP Right 0/54/5021    Procedure: RIGHT AUTOLOGOUS CRANIOPLASTY;  Surgeon: Dexter Lantigua MD;  [facility];  Service: Neurosurgery    OH REPLACE SKULL PLATE/FLAP Right 1/6/0880    Procedure: right allographic CRANIOPLASTY;  Surgeon: Dexter Lantigua MD;  [facility];  Service: Neurosurgery    ROTATOR CUFF REPAIR Left        FAMILY HISTORY:  Non-contributory    SOCIAL HISTORY:  Social History     Substance and Sexual Activity   Alcohol Use Not Currently    Alcohol/week: 14 0 standard drinks    Types: 14 Cans of beer per week     Social History     Substance and Sexual Activity   Drug Use Not Currently     Social History     Tobacco Use   Smoking Status Never Smoker   Smokeless Tobacco Never Used       ALLERGIES:  No Known Allergies    MEDICATIONS:  All current active medications have been reviewed      PHYSICAL EXAM:  Vitals:  Temp:  [96 3 °F (35 7 °C)-98 9 °F (37 2 °C)] 98 1 °F (36 7 °C)  HR:  [28-96] 74  Resp:  [12-22] 22  BP: (129-176)/(62-89) 136/62  SpO2:  [93 %-100 %] 95 %  Temp (24hrs), Av 1 °F (36 7 °C), Min:96 3 °F (35 7 °C), Max:98 9 °F (37 2 °C)  Current: Temperature: 98 1 °F (36 7 °C)     Physical Exam:  General:  Well-nourished, well-developed, in no acute distress  Eyes:  Conjunctive clear with no hemorrhages or effusions  Oropharynx:  No ulcers, no lesions  Head: Craniotomy defect; OR dressings in place without strike through  Neck:  Supple, no lymphadenopathy  Lungs:  Normal respiratory excursion, no accessory muscle use  Cardiac:  Regular rate and rhythm, extremities well perfused  Abdomen:  Soft, non-tender, non-distended  Extremities:  No peripheral cyanosis, clubbing, or edema  Skin:  No rashes, no ulcers  Neurological:  Moves all four extremities spontaneously, sensation grossly intact    LABS, IMAGING, & OTHER STUDIES:  Lab Results:  I have personally reviewed pertinent labs  Results from last 7 days   Lab Units 22  0617 07/15/22  0955   POTASSIUM mmol/L 3 1* 3 8   CHLORIDE mmol/L 117* 107   CO2 mmol/L 19* 27   BUN mg/dL 7 11   CREATININE mg/dL 0 49* 0 82   EGFR ml/min/1 73sq m 118 96   CALCIUM mg/dL 6 3* 9 3   AST U/L  --  57*   ALT U/L  --  87*   ALK PHOS U/L  --  138*     Results from last 7 days   Lab Units 22  1228 07/15/22  0955   WBC Thousand/uL 10 35* 7 21   HEMOGLOBIN g/dL 10 4* 14 5   PLATELETS Thousands/uL 152 187     Results from last 7 days   Lab Units 22  1321 22  1316   GRAM STAIN RESULT  2+ Polys  No bacteria seen  No Polys or Bacteria seen No Polys or Bacteria seen       Imaging Studies:   I have personally reviewed pertinent imaging study reports and images in PACS  CT head reviewed personally status post craniotomy, no acute bony changes noted  EKG, Pathology, and Other Studies:   I have personally reviewed pertinent reports

## 2022-07-20 NOTE — PLAN OF CARE
Problem: OCCUPATIONAL THERAPY ADULT  Goal: Performs self-care activities at highest level of function for planned discharge setting  See evaluation for individualized goals  Description: Treatment Interventions: ADL retraining, Functional transfer training, Cognitive reorientation, Continued evaluation          See flowsheet documentation for full assessment, interventions and recommendations  Note: Limitation: Decreased Safe judgement during ADL, Decreased endurance, Decreased high-level ADLs, Decreased self-care trans, Decreased ADL status  Prognosis: Fair  Assessment: Pt is a 61 y/o male seen for OT eval s/p adm to [facility] w/ 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  Pt has hx of TBI s/p cranioplasty July 2021 and 2nd craniectomy March 2022 w/ post-op infection  Pt is now s/p 3rd craniectomy "Right re-opening of craniotomy incision, and debridement with craniectomy (Right)" performed on 7/19  Pt  has a past medical history of Anxiety, Cataracts, bilateral, Dysphagia (04/16/2021), GERD (gastroesophageal reflux disease), Hematoma, subdural, with loss of consciousness, traumatic ([facility] [address] ), Hypertension, and Status post insertion of percutaneous endoscopic gastrostomy (PEG) tube ([facility] [address] ) (05/19/2021)  Pt with active OT orders and up with assistance  orders  Pt lives with his spouse in 2 SH, 5 LIONEL, 1st floor setup  Pt was I w/ ADLS and IADLS, drives minimally, & required no use of DME PTA  Pt is currently demonstrating the following occupational deficits: S UB ADLS, CGA LB ADLS, S bed mobility and transfers and CGA functional mobility w/ no DME  These deficits that are impacting pt's baseline areas of occupation are a result of the following impairments: endurance, activity tolerance, functional mobility, forward functional reach, balance, decreased I w/ ADLS/IADLS, cognitive impairments, decreased safety awareness and decreased insight into deficits  The following Occupational Performance Areas to address include: bathing/shower, toilet hygiene, dressing, medication management, socialization, health maintenance, functional mobility, community mobility and clothing management  Recommend OP OT for cognition/fitness to drive testing upon D/C, when medically stable   Pt to continue to benefit from acute immediate OT services to address the following goals 3-5x/week to  w/in 7-10 days:     OT Discharge Recommendation: Home with outpatient rehabilitation (for cognition and fitness to drive testing)     Priya Worthington MS, OTR/L

## 2022-07-20 NOTE — PHYSICAL THERAPY NOTE
Physical Therapy Evaluation    Patient Name: Fidelia Ramirez    [id number] Date: 7/20/2022     Problem List  Principal Problem:    Infection of bone flap [facility])  Active Problems:    Status post craniectomy       Past Medical History  Past Medical History:   Diagnosis Date    Anxiety     Cataracts, bilateral     Dysphagia 04/16/2021    GERD (gastroesophageal reflux disease)     Hematoma, subdural, with loss of consciousness, traumatic ([facility] [address] )     Hypertension     Status post insertion of percutaneous endoscopic gastrostomy (PEG) tube ([facility] [address] ) 05/19/2021        Past Surgical History  Past Surgical History:   Procedure Laterality Date    BRAIN HEMATOMA EVACUATION Right 4/15/2021    Procedure: Right CRANIOTOMY FOR SUBDURAL HEMATOMA;  Surgeon: Gordon Cummins MD;  [facility];  Service: Neurosurgery    BRAIN HEMATOMA EVACUATION Right 11/16/2021    Procedure: CRANIOTOMY FOR EVACUATION OF EPIDURAL HEMATOMA;  Surgeon: Gordon Cummins MD;  [facility];  Service: Neurosurgery    BRAIN HEMATOMA EVACUATION Right 11/16/2021    Procedure: Exploration and revision of right craniotomy incision with removal of cranial plate;  Surgeon: Gordon Cummins MD;  [facility];  Service: Neurosurgery    CRANIOPLASTY Right 7/19/2022    Procedure: Right re-opening of craniotomy incision, and debridement with craniectomy;  Surgeon: Gordon Cummins MD;  [facility];  Service: Neurosurgery    CRANIOTOMY Right     HAND SURGERY Left     PEG TUBE PLACEMENT      PEG TUBE REMOVAL      PA REPLACE SKULL PLATE/FLAP Right 4/39/2124    Procedure: RIGHT AUTOLOGOUS CRANIOPLASTY;  Surgeon: Gordon Cummins MD;  [facility];  Service: Neurosurgery    PA REPLACE SKULL PLATE/FLAP Right 0/9/9236    Procedure: right allographic CRANIOPLASTY;  Surgeon: Gordon Cummins MD;  [facility];  Service: Neurosurgery    ROTATOR CUFF REPAIR Left            07/20/22 1417   PT Last Visit   PT Visit Date 07/20/22   Note Type   Note type Evaluation   Pain Assessment   Pain Assessment Tool 0-10   Pain Score No Pain   Restrictions/Precautions   Weight Bearing Precautions Per Order No   Braces or Orthoses (S)  Craniectomy Helmet   Other Precautions Multiple lines;Telemetry; Fall Risk;Impulsive  (cookie drain, craniectomy helmet)   Home Living   Type of [facility] Two level; Able to live on main level with bedroom/bathroom; Laundry in basement   Bathroom Shower/Tub Tub/shower unit   Bathroom Toilet Raised   Bathroom Equipment Shower chair   Home Equipment Walker;Cane   Additional Comments Pt reports living wife in a [facility] with 4STE  Pt has bed/bath on 1st   Pt's wife is home and supportive  Pt was independent with ADL/IADLs and mobility prior  Pt owns cane/walker  Pt is retired and drives   Prior Function   Level of [place] Independent with ADLs and functional mobility   Lives With Eber Help From Family   ADL Assistance Independent   IADLs Independent   Falls in the last 6 months 1 to 4  (1)   Vocational Retired   General   Family/Caregiver Present No   Cognition   Overall Cognitive Status WFL   Arousal/Participation Cooperative   Orientation Level Oriented X4   Memory Within functional limits   Following Commands Follows all commands and directions without difficulty   Subjective   Subjective Pt was supine in bed upon PT arrival   Pt denies any pain  Pt is impulsive at times and states he wants to walk around the hospital with his wife     RLE Assessment   RLE Assessment X   Strength RLE   R Hip Flexion 4+/5   R Hip ABduction 4+/5   R Knee Extension 5/5   R Ankle Dorsiflexion 5/5   LLE Assessment   LLE Assessment X   Strength LLE   L Hip Flexion 4+/5   L Hip ABduction 4+/5   L Knee Extension 5/5   L Ankle Dorsiflexion 5/5   Coordination   Heel to Shin Intact   Rapid Alternating Movements Intact   Light Touch   RLE Light Touch Grossly intact   LLE Light Touch Grossly intact   Bed Mobility   Supine to Sit 5  Supervision   Sit to Supine Unable to assess   Additional Comments Pt sat EOB w/ S level   Transfers   Sit to Stand 5  Supervision   Additional items Verbal cues; Impulsive   Stand to Sit 5  Supervision   Additional Comments No AD used; Pt impulsive initially   Ambulation/Elevation   Gait pattern Improper Weight shift;Narrow BONI; Inconsistent alex; Step through pattern   Gait Assistance   (CGA)   Additional items Assist x 1;Verbal cues   Assistive Device None   Distance 200ft + standing rest break + 10ft   Stair Management Assistance   (CGA)   Additional items Assist x 1;Verbal cues   Stair Management Technique One rail R;Two rails; Alternating pattern; Step to pattern   Number of Stairs 7   Ambulation/Elevation Additional Comments No AD used during ambulation; Pt had intermittent minor unsteadiness during ambulation  Pt demo'd step through with R handrail pattern when ascending up and step to pattern with both handrail when descending down stairs   Balance   Static Sitting Good   Dynamic Sitting Fair +   Static Standing Fair   Dynamic Standing Fair -   Ambulatory Poor +   Endurance Deficit   Endurance Deficit Yes   Activity Tolerance   Activity Tolerance Patient tolerated treatment well   Medical Staff Made Aware OT   Nurse Made Aware RN cleared pt for therapy   Assessment   Prognosis Good   Problem List Decreased endurance; Impaired balance;Decreased mobility; Decreased safety awareness;Pain   Assessment Pt is a 61 y/o male admitted to [address] [place] [address] on 7/19/2022 for a R re-opening of craniotomy and debridement with craniectomy for recurrent infection  Pt s/p R craniectomy for subdural and TBI on 4/15/21, s/p cranioplasty on 7/13/21 with subsequent infection necessitating craniectomy, and s/p post synthetic cranioplasty on 3/08/22  Pt's Dx and personal factors are infection of bone flap, s/p craniectomy, seizure, HTN, alcohol abuse, and anxiety    Pt lives with wife in a 2 SH with 4 LIONEL and 1 flight down to basement where the laundry is  Pt has bed/bath in 1st floor  Pt is home and can support pt  Pt was independent with ADL/IADLs and mobility  Pt owns walker and cane  Pt is retired  During the examination, pt demonstrated good strength of BLE, fair standing balance and tolerance, minimal gait abnormalities but did demo intermittent unsteadiness during ambulation, poor safety awareness, poor judgement, impulsivity, fall risk, and fair control during stair management which limits pt to perform ADLs independently and increases risks for falls  Pt educated on HEP and to perform them t/o the day  Pt will benefit with skilled PT interventions while in the hospital to address the impairments stated above  Pt is recommended to home with OPPT upon D/C  At the end of the session, pt was OOB in chair with call bell within reach  Pt and wife asked permission if they can walk out in hallways, pt encouraged to ask nurse before attempting ambulation  Barriers to Discharge Inaccessible home environment   Goals   STG Expiration Date 08/03/22   Short Term Goal #1 STG 1: Pt will perform supine <-> sit independently to demonstrate improved bed mobility and decrease any risk for skin breakdown  STG 2: Pt will perform sit <-> stand at independently to demonstrate improved transfer mobility so the pt can get in/out of bed safely  STG 3: Pt will ambulate 300ft independently to improve walking tolerance and endurance so pt can safely interact with their environment  STG 4: Pt will stand independently for 5 min while performing dynamic standing balance exercises without any LOB to improve standing balance and tolerance  STG 5: Pt will ascend/descend 12 steps independently with or without use of handrail to improve stair management so they can negotiate their stairs at home     PT Treatment Day 0   Plan   Treatment/Interventions Functional transfer training;LE strengthening/ROM; Elevations; Therapeutic exercise; Endurance training;Equipment eval/education; Bed mobility;Gait training;OT   PT Frequency 3-5x/wk   Recommendation   PT Discharge Recommendation Post acute rehabilitation services   AM-PAC Basic Mobility Inpatient   Turning in Bed Without Bedrails 4   Lying on Back to Sitting on Edge of Flat Bed 4   Moving Bed to Chair 3   Standing Up From Chair 3   Walk in Room 3   Climb 3-5 Stairs 3   Basic Mobility Inpatient Raw Score 20   Basic Mobility Standardized Score 43 99   Highest Level Of Mobility   JH-HLM Goal 6: Walk 10 steps or more   JH-HLM Achieved 7: Walk 25 feet or more     Vic, SPT

## 2022-07-20 NOTE — PROCEDURES
Insert PICC line    Date/Time: 7/20/2022 11:36 AM  Performed by: Evan Wilson RN  Authorized by: Vivian Martin DO     Patient location:  Bedside  Other Assisting Provider: Yes (comment) Jesse Shelley)    Consent:     Consent obtained:  Written (consent obtained by [phone])    Consent given by:  Patient  Universal protocol:     Procedure explained and questions answered to patient or proxy's satisfaction: yes      Relevant documents present and verified: yes      Test results available and properly labeled: yes      Radiology Images displayed and confirmed  If images not available, report reviewed: yes      Required blood products, implants, devices, and special equipment available: yes      Site/side marked: yes      Immediately prior to procedure, a time out was called: yes      Patient identity confirmed:  Verbally with patient and arm band  Pre-procedure details:     Hand hygiene: Hand hygiene performed prior to insertion      Sterile barrier technique: All elements of maximal sterile technique followed      Skin preparation:  ChloraPrep    Skin preparation agent: Skin preparation agent completely dried prior to procedure    Indications:     PICC line indications: long term antibiotics    Anesthesia (see MAR for exact dosages):      Anesthesia method:  Local infiltration    Local anesthetic:  Lidocaine 1% w/o epi (2ml)  Procedure details:     Location:  [place]    Vessel type: vein      Laterality:  Right    Approach: percutaneous technique used      Patient position:  Flat    Procedural supplies:  Double lumen    Catheter size:  5 Fr    Landmarks identified: yes      Ultrasound guidance: yes      Ultrasound image availability:  Not saved    Sterile ultrasound techniques: Sterile gel and sterile probe covers were used      Number of attempts:  1    Successful placement: yes      Vessel of catheter tip end:  Sherlock 3CG confirmed    Total catheter length (cm):  42    Catheter out on skin (cm):  0    Max flow rate:  999    Arm circumference:  29  Post-procedure details:     Post-procedure:  Securement device placed and dressing applied    Assessment:  Blood return through all ports and free fluid flow    Post-procedure complications: none      Patient tolerance of procedure:   Tolerated well, no immediate complications

## 2022-07-20 NOTE — ASSESSMENT & PLAN NOTE
· 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 on 04/15/2021  · Status post cranioplasty 07/13/2021 with subsequent infection necessitating craniectomy  · Status post synthetic cranioplasty on 03/08/2022  · Presents now with concern for recurrent infection

## 2022-07-21 ENCOUNTER — HOME HEALTH ADMISSION (OUTPATIENT)
Dept: HOME HEALTH SERVICES | Facility: HOME HEALTHCARE | Age: 62
End: 2022-07-21
Payer: COMMERCIAL

## 2022-07-21 LAB
ANION GAP SERPL CALCULATED.3IONS-SCNC: 7 MMOL/L (ref 4–13)
BASOPHILS # BLD AUTO: 0.02 THOUSANDS/ΜL (ref 0–0.1)
BASOPHILS NFR BLD AUTO: 0 % (ref 0–1)
BUN SERPL-MCNC: 14 MG/DL (ref 5–25)
CALCIUM SERPL-MCNC: 8.7 MG/DL (ref 8.3–10.1)
CHLORIDE SERPL-SCNC: 110 MMOL/L (ref 96–108)
CO2 SERPL-SCNC: 25 MMOL/L (ref 21–32)
CREAT SERPL-MCNC: 0.8 MG/DL (ref 0.6–1.3)
EOSINOPHIL # BLD AUTO: 0.03 THOUSAND/ΜL (ref 0–0.61)
EOSINOPHIL NFR BLD AUTO: 1 % (ref 0–6)
ERYTHROCYTE [DISTWIDTH] IN BLOOD BY AUTOMATED COUNT: 15.9 % (ref 11.6–15.1)
GFR SERPL CREATININE-BSD FRML MDRD: 97 ML/MIN/1.73SQ M
GLUCOSE SERPL-MCNC: 109 MG/DL (ref 65–140)
HCT VFR BLD AUTO: 31.8 % (ref 36.5–49.3)
HGB BLD-MCNC: 9.9 G/DL (ref 12–17)
IMM GRANULOCYTES # BLD AUTO: 0.02 THOUSAND/UL (ref 0–0.2)
IMM GRANULOCYTES NFR BLD AUTO: 0 % (ref 0–2)
LYMPHOCYTES # BLD AUTO: 1.34 THOUSANDS/ΜL (ref 0.6–4.47)
LYMPHOCYTES NFR BLD AUTO: 20 % (ref 14–44)
MCH RBC QN AUTO: 27.8 PG (ref 26.8–34.3)
MCHC RBC AUTO-ENTMCNC: 31.1 G/DL (ref 31.4–37.4)
MCV RBC AUTO: 89 FL (ref 82–98)
MONOCYTES # BLD AUTO: 0.51 THOUSAND/ΜL (ref 0.17–1.22)
MONOCYTES NFR BLD AUTO: 8 % (ref 4–12)
NEUTROPHILS # BLD AUTO: 4.72 THOUSANDS/ΜL (ref 1.85–7.62)
NEUTS SEG NFR BLD AUTO: 71 % (ref 43–75)
NRBC BLD AUTO-RTO: 0 /100 WBCS
PLATELET # BLD AUTO: 117 THOUSANDS/UL (ref 149–390)
PMV BLD AUTO: 11.5 FL (ref 8.9–12.7)
POTASSIUM SERPL-SCNC: 4 MMOL/L (ref 3.5–5.3)
RBC # BLD AUTO: 3.56 MILLION/UL (ref 3.88–5.62)
SODIUM SERPL-SCNC: 142 MMOL/L (ref 135–147)
VANCOMYCIN TROUGH SERPL-MCNC: 17.2 UG/ML (ref 10–20)
WBC # BLD AUTO: 6.64 THOUSAND/UL (ref 4.31–10.16)

## 2022-07-21 PROCEDURE — 99232 SBSQ HOSP IP/OBS MODERATE 35: CPT | Performed by: INTERNAL MEDICINE

## 2022-07-21 PROCEDURE — 80202 ASSAY OF VANCOMYCIN: CPT | Performed by: PHYSICIAN ASSISTANT

## 2022-07-21 PROCEDURE — 99024 POSTOP FOLLOW-UP VISIT: CPT | Performed by: PHYSICIAN ASSISTANT

## 2022-07-21 PROCEDURE — 97129 THER IVNTJ 1ST 15 MIN: CPT

## 2022-07-21 PROCEDURE — 97130 THER IVNTJ EA ADDL 15 MIN: CPT

## 2022-07-21 PROCEDURE — 85025 COMPLETE CBC W/AUTO DIFF WBC: CPT | Performed by: PHYSICIAN ASSISTANT

## 2022-07-21 PROCEDURE — 97535 SELF CARE MNGMENT TRAINING: CPT

## 2022-07-21 PROCEDURE — 80048 BASIC METABOLIC PNL TOTAL CA: CPT | Performed by: PHYSICIAN ASSISTANT

## 2022-07-21 RX ORDER — METHOCARBAMOL 750 MG/1
750 TABLET, FILM COATED ORAL EVERY 6 HOURS SCHEDULED
Status: DISCONTINUED | OUTPATIENT
Start: 2022-07-21 | End: 2022-07-22 | Stop reason: HOSPADM

## 2022-07-21 RX ORDER — HEPARIN SODIUM 5000 [USP'U]/ML
5000 INJECTION, SOLUTION INTRAVENOUS; SUBCUTANEOUS EVERY 8 HOURS SCHEDULED
Status: DISCONTINUED | OUTPATIENT
Start: 2022-07-21 | End: 2022-07-22 | Stop reason: HOSPADM

## 2022-07-21 RX ADMIN — METHOCARBAMOL TABLETS 750 MG: 750 TABLET, COATED ORAL at 23:46

## 2022-07-21 RX ADMIN — OXYCODONE HYDROCHLORIDE 10 MG: 10 TABLET ORAL at 06:50

## 2022-07-21 RX ADMIN — HYDROMORPHONE HYDROCHLORIDE 0.5 MG: 1 INJECTION, SOLUTION INTRAMUSCULAR; INTRAVENOUS; SUBCUTANEOUS at 08:11

## 2022-07-21 RX ADMIN — METOPROLOL TARTRATE 25 MG: 25 TABLET, FILM COATED ORAL at 08:05

## 2022-07-21 RX ADMIN — METHOCARBAMOL TABLETS 750 MG: 750 TABLET, COATED ORAL at 10:58

## 2022-07-21 RX ADMIN — ONDANSETRON 4 MG: 2 INJECTION INTRAMUSCULAR; INTRAVENOUS at 17:18

## 2022-07-21 RX ADMIN — ACETAMINOPHEN 975 MG: 325 TABLET ORAL at 06:34

## 2022-07-21 RX ADMIN — OXYCODONE HYDROCHLORIDE 10 MG: 10 TABLET ORAL at 16:00

## 2022-07-21 RX ADMIN — LISINOPRIL 20 MG: 20 TABLET ORAL at 08:05

## 2022-07-21 RX ADMIN — LEVETIRACETAM 750 MG: 750 TABLET, FILM COATED ORAL at 21:06

## 2022-07-21 RX ADMIN — OXYCODONE HYDROCHLORIDE 10 MG: 10 TABLET ORAL at 10:58

## 2022-07-21 RX ADMIN — LEVETIRACETAM 750 MG: 750 TABLET, FILM COATED ORAL at 08:05

## 2022-07-21 RX ADMIN — OXYCODONE HYDROCHLORIDE 10 MG: 10 TABLET ORAL at 20:15

## 2022-07-21 RX ADMIN — VANCOMYCIN HYDROCHLORIDE 1750 MG: 10 INJECTION, POWDER, LYOPHILIZED, FOR SOLUTION INTRAVENOUS at 08:05

## 2022-07-21 RX ADMIN — ACETAMINOPHEN 975 MG: 325 TABLET ORAL at 13:35

## 2022-07-21 RX ADMIN — LORAZEPAM 1 MG: 1 TABLET ORAL at 17:18

## 2022-07-21 RX ADMIN — VANCOMYCIN HYDROCHLORIDE 1750 MG: 10 INJECTION, POWDER, LYOPHILIZED, FOR SOLUTION INTRAVENOUS at 19:38

## 2022-07-21 RX ADMIN — METHOCARBAMOL TABLETS 750 MG: 750 TABLET, COATED ORAL at 17:19

## 2022-07-21 RX ADMIN — ACETAMINOPHEN 975 MG: 325 TABLET ORAL at 21:06

## 2022-07-21 RX ADMIN — METOPROLOL TARTRATE 25 MG: 25 TABLET, FILM COATED ORAL at 21:06

## 2022-07-21 RX ADMIN — ONDANSETRON 4 MG: 2 INJECTION INTRAMUSCULAR; INTRAVENOUS at 08:03

## 2022-07-21 RX ADMIN — HEPARIN SODIUM 5000 UNITS: 5000 INJECTION INTRAVENOUS; SUBCUTANEOUS at 21:06

## 2022-07-21 RX ADMIN — LORAZEPAM 1 MG: 1 TABLET ORAL at 06:35

## 2022-07-21 RX ADMIN — PANTOPRAZOLE SODIUM 40 MG: 40 TABLET, DELAYED RELEASE ORAL at 06:34

## 2022-07-21 NOTE — OCCUPATIONAL THERAPY NOTE
Occupational Therapy Progress Note     Patient Name: Joy Gorman  UXVGX'L Date: 7/21/2022  Problem List  Principal Problem:    Infection of bone flap ([address] )  Active Problems:    Status post craniectomy       07/21/22 1146   OT Last Visit   OT Visit Date 07/21/22   Note Type   Note Type Treatment   Restrictions/Precautions   Weight Bearing Precautions Per Order No   Braces or Orthoses (S)  Craniectomy Helmet   Other Precautions Fall Risk;Cognitive  (Craniectomy precautions)   Pain Assessment   Pain Assessment Tool 0-10   Pain Score No Pain   Patient's Stated Pain Goal No pain   Bed Mobility   Additional Comments Pt sitting EOB prior to treatment session   Cognition   Overall Cognitive Status [facility]   Arousal/Participation Alert; Cooperative   Attention Attends with cues to redirect   Orientation Level Oriented X4   Memory Within functional limits   Following Commands Follows all commands and directions without difficulty   Comments Pt was eager to begin assessment  Showed minor impulsivity  Pt required min verbal cues for redirection prior to assessment  Overall, pt completed and tolerated cognitive assessment well  Cognition Assessment Tools MOCA   Score 25   Activity Tolerance   Activity Tolerance Patient tolerated treatment well   Assessment   Assessment Pt participated in an OT tx session to complete a MoCA  Pt was sitting EOB prior to tx  Room was prepared to decrease distractions  Pt completed MoCA sitting EOB  At times pt was found to be disinterested in assessment  Overall, pt completed and tolerated assessment well  Pt scored      25/30 indicating Minimal cognitive impairment for age/education  Pt and caregiver were extensively educated on purpose of OUTPT OT services to address cognitive deficits including: lack of recall, safety awareness, and concentration, and to increase safety, independence in functional tasks, ADLs, IADLs, and community mobility   Pt and caregiver were educated on importance of OUPT OT services to address fitness of driving, and craniectomy precautions  All questions and concerns were addressed at this time  OT recommends pt returns home with increased support and OUTPT OT services to address cognitive deficits and fitness for driving  OT will continue to address the following goals listed on initial eval    Plan   Treatment Interventions Cognitive reorientation; ADL retraining;Functional transfer training; Activityengagement; Energy conservation; Compensatory technique education;Equipment evaluation/education;Patient/family training; Endurance training   Goal Expiration Date 08/04/22   OT Treatment Day 1   Recommendation   OT Discharge Recommendation Home with outpatient rehabilitation   AM-PAC Daily Activity Inpatient   Lower Body Dressing 3   Bathing 3   Toileting 3   Upper Body Dressing 4   Grooming 4   Eating 4   Daily Activity Raw Score 21   Daily Activity Standardized Score (Calc for Raw Score >=11) 44 27   AM-PAC Applied Cognition Inpatient   Following a Speech/Presentation 3   Understanding Ordinary Conversation 4   Taking Medications 4   Remembering Where Things Are Placed or Put Away 3   Remembering List of 4-5 Errands 3   Taking Care of Complicated Tasks 3   Applied Cognition Raw Score 20   Applied Cognition Standardized Score 41 76   Scores on MoCA as follows:    Visuospatial / Executive Function:    5/5    Naming:  3/3    Attention:   6/6    Language:  2/3- Pt was only able to recall 9 words that begin with the letter "F"    Abstraction: 1/2- Pt was unable to tell the similarity between a train and a bicycle  Delayed Recall:   2/5- Pt was only able to recall "face" and "red" with no cue  Pt was able to recall "velvet" after category cue  Pt was able to recall "Moravian" with a multiple choice cue  Pt was unable to recall "funmilayo" after a category cue and multiple choice cue       Orientation:    6/6

## 2022-07-21 NOTE — PROGRESS NOTES
[address] [facility]  Progress Note - Mercy Saver 1960, 62 y o  male MRN: [id number]  Unit/Bed#: [facility] 609-01 Encounter: [id number]  Primary Care Provider: ANIA Adams   Date and time admitted to hospital: 7/19/2022 10:15 AM    * Infection of bone flap [facility])  Assessment & Plan  POD#2 Right re-opening of craniotomy incision, and debridement with craniectomy (Dr Chilo Villavicencio, 7/19/2022)    Imaging:   CT head wo contrast 7/20/2022:  Stable postoperative changes, status post craniectomy, washout and placement of subgaleal drainage catheters, postoperative day #1  Anticipated postoperative changes  Cerebral atrophy with chronic small vessel ischemic white matter disease  No acute intracranial abnormality  Stable posttraumatic changes in the frontal lobes and temporal lobes bilaterally      Plan:   Continue to monitor neurological exam   Helmet when OOB   Drain care   o 7 french - 25cc/24 hours, removed today and sutured with stay suture  o 10 french drain - removed 7/20   Pain control - doing well on current regimen  o Tylenol 975 mg q 8 hours scheduled  o Will increase Robaxin to 750 mg q 6 hours scheduled   o IV dilaudid given this morning for drain removal, will discontinue after this   o Oxycodone 5 mg and 10 mg q 4 hours as needed for moderate and severe pain, respectively   Bowel regimen - small BM this morning  o Continue Colace, senna   Labs / vitals  o K 3 1 7/20, K-dur ordered yesterday, BMP pending for this morning  o INR slightly elevated at 1 29, goal < 1 4, no additional labs for this  o CBC 10 4 7/20, likely dilutional from sugery, CBC pending for this morning, goal > 7  o Remains afebrile  o Intraoperative cultures pending - subgaleal 1, subgaleal 2 and epidural  - Thus far no growth noted  - Of note, anaerobic sample for epidural space was exposed to air, will still run but may not yield culture   ID following  o Now on vancomycin only, cefepime discontinued  o PICC line in place  o Will need 6 weeks IV abx plus oral for a period after   Home meds reordered  o keppra per home regimen   DVT ppx:  SCDs, hold pharm dvt for now given drain removal, will start SQH later this evening   Mobilize as tolerated with assistance, PT recommending rehab and OT recommending outpatient therapies for fitness to drive, will have therapy reeval today as patient is not interested in rehab   Patient is eager to leave prior to cultures finalizing, will discuss with attending and ID regarding plan of care, at least would like patient to remain for one more day    Neurosurgery will follow as primary team  Patient is not yet stable for discharge, pending labs and cultures  Please call with questions or concerns  Status post craniectomy  Assessment & Plan  · 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 on 04/15/2021  · Status post cranioplasty 07/13/2021 with subsequent infection necessitating craniectomy  · Status post synthetic cranioplasty on 03/08/2022  · Presents now with concern for recurrent infection    Subjective/Objective   Chief Complaint: "I am anxious"    Subjective:  Patient is nervous for drain removal this morning  Pain well controlled  Urinating per baseline, small BM this morning  Eating and drinking well  Would like to leave prior to cultures finalizing if possible and if abx need to be changed then this can be done in the outpatient setting - will have to discuss this with attending and ID  Patient does not want rehab  Nursing rounds completed with Sravanthi Gavin - no overnight events, IV dilaudid given this morning prior to drain removal     Objective:  Sitting up in bed, NAD    I/O       07/19 0701 07/20 0700 07/20 0701 07/21 0700 07/21 0701 07/22 0700    P  O  1200      I V  (mL/kg) 2778 8 (32 2) 300 (3 5)     IV Piggyback 550 500     Total Intake(mL/kg) 4528 8 (52 5) 800 (9 3)     Urine (mL/kg/hr) 1195 400 (0 2)     Drains 65 25 7 5    Stool  0     Blood 200      Total Output 1460 425 7 5    Net +3068 8 +375 -7 5           Unmeasured Urine Occurrence  3 x     Unmeasured Stool Occurrence  1 x           Invasive Devices  Report    Peripherally Inserted Central Catheter Line  Duration           PICC Line 30/02/37 Right Basilic <1 day          Drain  Duration           Closed/Suction Drain Right Head Bulb 1 day    Closed/Suction Drain Right Head Bulb 10 Fr  1 day                Physical Exam:  Vitals: Blood pressure 131/71, pulse 73, temperature 97 9 °F (36 6 °C), resp  rate 16, height 5' 10" (1 778 m), weight 86 2 kg (190 lb 0 6 oz), SpO2 96 %  ,Body mass index is 27 27 kg/m²        General appearance: alert, appears stated age, cooperative and no distress  Head: incision intact with dried blood noted, YANETH drain removed and sutured closed with stay suture, flap sunken and soft  Eyes: EOMI  Neck: supple, symmetrical, trachea midline   Lungs: non labored breathing  Heart: regular heart rate  Neurologic:   Mental status: Alert, oriented x3, follows commands, thought content appropriate  Cranial nerves: grossly intact (Cranial nerves II-XII)  Motor: moving all extremities without focal weakness  Coordination: finger to nose normal bilaterally, no drift bilaterally      Lab Results:  Results from last 7 days   Lab Units 07/21/22  0819 07/20/22  1228 07/15/22  0955   WBC Thousand/uL 6 64 10 35* 7 21   HEMOGLOBIN g/dL 9 9* 10 4* 14 5   HEMATOCRIT % 31 8* 32 1* 46 0   PLATELETS Thousands/uL 117* 152 187   NEUTROS PCT % 71  --  70   MONOS PCT % 8  --  8     Results from last 7 days   Lab Units 07/20/22  0617 07/15/22  0955   POTASSIUM mmol/L 3 1* 3 8   CHLORIDE mmol/L 117* 107   CO2 mmol/L 19* 27   BUN mg/dL 7 11   CREATININE mg/dL 0 49* 0 82   CALCIUM mg/dL 6 3* 9 3   ALK PHOS U/L  --  138*   ALT U/L  --  87*   AST U/L  --  57*             Results from last 7 days   Lab Units 07/20/22  0617 07/15/22  0955   INR  1 29* 1 07   PTT seconds 27 29     No results found for: TROPONINT  ABG:  Lab Results   Component Value Date    PHART 7 362 11/17/2021    RNX1WFL 34 9 (L) 11/17/2021    PO2ART 141 3 (H) 11/17/2021    [id number] 19 4 (L) 11/17/2021    BEART -5 3 11/17/2021    SOURCE Line, Arterial 11/17/2021       Imaging Studies: I have personally reviewed pertinent reports  and I have personally reviewed pertinent films in PACS    CT head wo contrast    Result Date: 7/20/2022  Impression: 1  Stable postoperative changes, status post craniectomy, washout and placement of subgaleal drainage catheters, postoperative day #1  Anticipated postoperative changes  2   Cerebral atrophy with chronic small vessel ischemic white matter disease  No acute intracranial abnormality  3   Stable posttraumatic changes in the frontal lobes and temporal lobes bilaterally  4   Stable colloid cyst  Workstation performed: [id number]     CT head wo contrast    Result Date: 7/19/2022  Impression: Right frontotemporoparietal craniectomy changes with right MCA distribution encephalomalacia  Status post reopening of right craniotomy and washout  No evidence of acute intracranial hemorrhage  No evidence of extra-axial collection  5 mm colloid cyst within the foramen of [facility] is similar to the prior exam  Workstation performed: [id number]       EKG, Pathology, and Other Studies: I have personally reviewed pertinent reports        VTE Pharmacologic Prophylaxis: Reason for no pharmacologic prophylaxis - drain removal    VTE Mechanical Prophylaxis: sequential compression device

## 2022-07-21 NOTE — CASE MANAGEMENT
Case Management Discharge Planning Note    Patient name Grace Velazquez  Location [facility] 609/PPHP 327-00 MRN [id number]  : 1960 Date 2022       Current Admission Date: 2022  Current Admission Diagnosis:Infection of bone flap [facility])   Patient Active Problem List    Diagnosis Date Noted    Infection of bone flap ([facility] [address] ) 2021    Seizure ([facility] [address] ) 2021    Status post craniectomy 2021    Alcohol abuse 2021    Acute encephalopathy 04/15/2021    Anxiety 2021    Essential hypertension 2020      LOS (days): 2  Geometric Mean LOS (GMLOS) (days):   Days to GMLOS:     OBJECTIVE:  Risk of Unplanned Readmission Score: 12 8         Current admission status: Inpatient   Preferred Pharmacy:    [address]  Phone: [phone] Fax: [phone]    Primary Care Provider: ANIA Carter    Primary Insurance: [employer]  Secondary Insurance:     DISCHARGE DETAILS:    Discharge planning discussed with[de-identified] Patient  Freedom of Choice: Yes  Comments - Freedom of Choice: Discussed FOC  CM contacted family/caregiver?: Yes  Were Treatment Team discharge recommendations reviewed with patient/caregiver?: Yes  Did patient/caregiver verbalize understanding of patient care needs?: Yes  Were patient/caregiver advised of the risks associated with not following Treatment Team discharge recommendations?: Yes    Contacts  Patient Contacts: [facility]  Relationship to Patient[de-identified] Family  Contact Method: In Person  Reason/Outcome: Continuity of Care, Emergency Contact, Discharge Planning    Other Referral/Resources/Interventions Provided:  Interventions: HHC, Home Infusion  Referral Comments: Patient will need IV vanco at home, Agreeable to [facility] infusion, script and referral sent to [facility] pharmacy    Patient and wife interested in [facility] through [facility], as patient was on their service prior, referral sent to [employer] via [employer]

## 2022-07-21 NOTE — ASSESSMENT & PLAN NOTE
POD#2 Right re-opening of craniotomy incision, and debridement with craniectomy (Dr Giuseppe Valdivia, 7/19/2022)    Imaging:   CT head wo contrast 7/20/2022:  Stable postoperative changes, status post craniectomy, washout and placement of subgaleal drainage catheters, postoperative day #1  Anticipated postoperative changes  Cerebral atrophy with chronic small vessel ischemic white matter disease  No acute intracranial abnormality  Stable posttraumatic changes in the frontal lobes and temporal lobes bilaterally      Plan:   Continue to monitor neurological exam   Helmet when OOB   Drain care   o 7 french - 25cc/24 hours, removed today and sutured with stay suture  o 10 french drain - removed 7/20   Pain control - doing well on current regimen  o Tylenol 975 mg q 8 hours scheduled  o Will increase Robaxin to 750 mg q 6 hours scheduled   o IV dilaudid given this morning for drain removal, will discontinue after this   o Oxycodone 5 mg and 10 mg q 4 hours as needed for moderate and severe pain, respectively   Bowel regimen - small BM this morning  o Continue Colace, senna   Labs / vitals  o K 3 1 7/20, K-dur ordered yesterday, BMP pending for this morning  o INR slightly elevated at 1 29, goal < 1 4, no additional labs for this  o CBC 10 4 7/20, likely dilutional from sugery, CBC pending for this morning, goal > 7  o Remains afebrile  o Intraoperative cultures pending - subgaleal 1, subgaleal 2 and epidural  - Thus far no growth noted  - Of note, anaerobic sample for epidural space was exposed to air, will still run but may not yield culture   ID following  o Now on vancomycin only, cefepime discontinued  o PICC line in place  o Will need 6 weeks IV abx plus oral for a period after   Home meds reordered  o keppra per home regimen   DVT ppx:  SCDs, hold pharm dvt for now given drain removal, will start SQH later this evening   Mobilize as tolerated with assistance, PT recommending rehab and OT recommending outpatient therapies for fitness to drive, will have therapy reeval today as patient is not interested in rehab   Patient is eager to leave prior to cultures finalizing, will discuss with attending and ID regarding plan of care, at least would like patient to remain for one more day    Neurosurgery will follow as primary team  Patient is not yet stable for discharge, pending labs and cultures  Please call with questions or concerns

## 2022-07-21 NOTE — PLAN OF CARE
Problem: MOBILITY - ADULT  Goal: Maintain or return to baseline ADL function  Description: INTERVENTIONS:  -  Assess patient's ability to carry out ADLs; assess patient's baseline for ADL function and identify physical deficits which impact ability to perform ADLs (bathing, care of mouth/teeth, toileting, grooming, dressing, etc )  - Assess/evaluate cause of self-care deficits   - Assess range of motion  - Assess patient's mobility; develop plan if impaired  - Assess patient's need for assistive devices and provide as appropriate  - Encourage maximum independence but intervene and supervise when necessary  - Involve family in performance of ADLs  - Assess for home care needs following discharge   - Consider OT consult to assist with ADL evaluation and planning for discharge  - Provide patient education as appropriate  Outcome: Progressing  Goal: Maintains/Returns to pre admission functional level  Description: INTERVENTIONS:  - Perform BMAT or MOVE assessment daily    - Set and communicate daily mobility goal to care team and patient/family/caregiver  - Collaborate with rehabilitation services on mobility goals if consulted  - Perform Range of Motion 3 times a day  - Reposition patient every 2 hours    - Dangle patient 3 times a day  - Stand patient 3 times a day  - Ambulate patient 3 times a day  - Out of bed to chair 3 times a day   - Out of bed for meals 3 times a day  - Out of bed for toileting  - Record patient progress and toleration of activity level   Outcome: Progressing     Problem: PAIN - ADULT  Goal: Verbalizes/displays adequate comfort level or baseline comfort level  Description: Interventions:  - Encourage patient to monitor pain and request assistance  - Assess pain using appropriate pain scale  - Administer analgesics based on type and severity of pain and evaluate response  - Implement non-pharmacological measures as appropriate and evaluate response  - Consider cultural and social influences on pain and pain management  - Notify physician/advanced practitioner if interventions unsuccessful or patient reports new pain  Outcome: Progressing     Problem: INFECTION - ADULT  Goal: Absence or prevention of progression during hospitalization  Description: INTERVENTIONS:  - Assess and monitor for signs and symptoms of infection  - Monitor lab/diagnostic results  - Monitor all insertion sites, i e  indwelling lines, tubes, and drains  - Monitor endotracheal if appropriate and nasal secretions for changes in amount and color  - Lawn appropriate cooling/warming therapies per order  - Administer medications as ordered  - Instruct and encourage patient and family to use good hand hygiene technique  - Identify and instruct in appropriate isolation precautions for identified infection/condition  Outcome: Progressing  Goal: Absence of fever/infection during neutropenic period  Description: INTERVENTIONS:  - Monitor WBC    Outcome: Progressing     Problem: SAFETY ADULT  Goal: Maintain or return to baseline ADL function  Description: INTERVENTIONS:  -  Assess patient's ability to carry out ADLs; assess patient's baseline for ADL function and identify physical deficits which impact ability to perform ADLs (bathing, care of mouth/teeth, toileting, grooming, dressing, etc )  - Assess/evaluate cause of self-care deficits   - Assess range of motion  - Assess patient's mobility; develop plan if impaired  - Assess patient's need for assistive devices and provide as appropriate  - Encourage maximum independence but intervene and supervise when necessary  - Involve family in performance of ADLs  - Assess for home care needs following discharge   - Consider OT consult to assist with ADL evaluation and planning for discharge  - Provide patient education as appropriate  Outcome: Progressing  Goal: Maintains/Returns to pre admission functional level  Description: INTERVENTIONS:  - Perform BMAT or MOVE assessment daily    - Set and communicate daily mobility goal to care team and patient/family/caregiver  - Collaborate with rehabilitation services on mobility goals if consulted  - Perform Range of Motion 3 times a day  - Reposition patient every 2 hours    - Dangle patient 3 times a day  - Stand patient 3 times a day  - Ambulate patient 3 times a day  - Out of bed to chair 3 times a day   - Out of bed for meals 3 times a day  - Out of bed for toileting  - Record patient progress and toleration of activity level   Outcome: Progressing  Goal: Patient will remain free of falls  Description: INTERVENTIONS:  - Educate patient/family on patient safety including physical limitations  - Instruct patient to call for assistance with activity   - Consult OT/PT to assist with strengthening/mobility   - Keep Call bell within reach  - Keep bed low and locked with side rails adjusted as appropriate  - Keep care items and personal belongings within reach  - Initiate and maintain comfort rounds  - Make Fall Risk Sign visible to staff  - Offer Toileting every 2 Hours, in advance of need  - Initiate/Maintain alarm  - Obtain necessary fall risk management equipment:   - Apply yellow socks and bracelet for high fall risk patients  - Consider moving patient to room near nurses station  Outcome: Progressing     Problem: DISCHARGE PLANNING  Goal: Discharge to home or other facility with appropriate resources  Description: INTERVENTIONS:  - Identify barriers to discharge w/patient and caregiver  - Arrange for needed discharge resources and transportation as appropriate  - Identify discharge learning needs (meds, wound care, etc )  - Arrange for interpretive services to assist at discharge as needed  - Refer to Case Management Department for coordinating discharge planning if the patient needs post-hospital services based on physician/advanced practitioner order or complex needs related to functional status, cognitive ability, or social support system  Outcome: Progressing     Problem: Knowledge Deficit  Goal: Patient/family/caregiver demonstrates understanding of disease process, treatment plan, medications, and discharge instructions  Description: Complete learning assessment and assess knowledge base    Interventions:  - Provide teaching at level of understanding  - Provide teaching via preferred learning methods  Outcome: Progressing     Problem: Potential for Falls  Goal: Patient will remain free of falls  Description: INTERVENTIONS:  - Educate patient/family on patient safety including physical limitations  - Instruct patient to call for assistance with activity   - Consult OT/PT to assist with strengthening/mobility   - Keep Call bell within reach  - Keep bed low and locked with side rails adjusted as appropriate  - Keep care items and personal belongings within reach  - Initiate and maintain comfort rounds  - Make Fall Risk Sign visible to staff  - Offer Toileting every 2 Hours, in advance of need  - Initiate/Maintain alarm  - Obtain necessary fall risk management equipment:   - Apply yellow socks and bracelet for high fall risk patients  - Consider moving patient to room near nurses station  Outcome: Progressing

## 2022-07-21 NOTE — PHYSICAL THERAPY NOTE
Physical Therapy Evaluation    Patient Name: Merlin Better    TNGCE'FLY Date: 7/21/2022     Problem List  Principal Problem:    Infection of bone flap [facility])  Active Problems:    Status post craniectomy       Past Medical History  Past Medical History:   Diagnosis Date    Anxiety     Cataracts, bilateral     Dysphagia 04/16/2021    GERD (gastroesophageal reflux disease)     Hematoma, subdural, with loss of consciousness, traumatic ([facility] [address] )     Hypertension     Status post insertion of percutaneous endoscopic gastrostomy (PEG) tube ([facility] [address] ) 05/19/2021        Past Surgical History  Past Surgical History:   Procedure Laterality Date    BRAIN HEMATOMA EVACUATION Right 4/15/2021    Procedure: Right CRANIOTOMY FOR SUBDURAL HEMATOMA;  Surgeon: Bartolo Peacock MD;  [facility]: [facility];  Service: Neurosurgery    BRAIN HEMATOMA EVACUATION Right 11/16/2021    Procedure: CRANIOTOMY FOR EVACUATION OF EPIDURAL HEMATOMA;  Surgeon: Bartolo Peacock MD;  [facility];  Service: Neurosurgery    BRAIN HEMATOMA EVACUATION Right 11/16/2021    Procedure: Exploration and revision of right craniotomy incision with removal of cranial plate;  Surgeon: Bartolo Peacock MD;  [facility];  Service: Neurosurgery    CRANIOPLASTY Right 7/19/2022    Procedure: Right re-opening of craniotomy incision, and debridement with craniectomy;  Surgeon: Bartolo Peacock MD;  [facility];  Service: Neurosurgery    CRANIOTOMY Right     HAND SURGERY Left     PEG TUBE PLACEMENT      PEG TUBE REMOVAL      KY REPLACE SKULL PLATE/FLAP Right 4/42/8665    Procedure: RIGHT AUTOLOGOUS CRANIOPLASTY;  Surgeon: Bartolo Peacock MD;  [facility];  Service: Neurosurgery    KY REPLACE SKULL PLATE/FLAP Right 0/1/4361    Procedure: right allographic CRANIOPLASTY;  Surgeon: Bartolo Peacock MD;  [facility];  Service: Neurosurgery    ROTATOR CUFF REPAIR Left            07/20/22 1417   PT Last Visit   PT Visit Date 07/20/22   Note Type   Note type Evaluation   Pain Assessment   Pain Assessment Tool 0-10   Pain Score No Pain   Restrictions/Precautions   Weight Bearing Precautions Per Order No   Braces or Orthoses (S)  Craniectomy Helmet   Other Precautions Multiple lines;Telemetry; Fall Risk;Impulsive  (cookie drain, craniectomy helmet)   Home Living   Type of [facility] Two level; Able to live on main level with bedroom/bathroom; Laundry in basement   Bathroom Shower/Tub Tub/shower unit   Bathroom Toilet Raised   Bathroom Equipment Shower chair   Home Equipment Walker;Cane   Additional Comments Pt reports living wife in a [facility] with 4STE  Pt has bed/bath on 1st   Pt's wife is home and supportive  Pt was independent with ADL/IADLs and mobility prior  Pt owns cane/walker  Pt is retired and drives   Prior Function   Level of [place] Independent with ADLs and functional mobility   Lives With Eber Help From Family   ADL Assistance Independent   IADLs Independent   Falls in the last 6 months 1 to 4  (1)   Vocational Retired   General   Family/Caregiver Present No   Cognition   Overall Cognitive Status WFL   Arousal/Participation Cooperative   Orientation Level Oriented X4   Memory Within functional limits   Following Commands Follows all commands and directions without difficulty   Subjective   Subjective Pt was supine in bed upon PT arrival   Pt denies any pain  Pt is impulsive at times and states he wants to walk around the hospital with his wife     RLE Assessment   RLE Assessment X   Strength RLE   R Hip Flexion 4+/5   R Hip ABduction 4+/5   R Knee Extension 5/5   R Ankle Dorsiflexion 5/5   LLE Assessment   LLE Assessment X   Strength LLE   L Hip Flexion 4+/5   L Hip ABduction 4+/5   L Knee Extension 5/5   L Ankle Dorsiflexion 5/5   Coordination   Heel to Shin Intact   Rapid Alternating Movements Intact   Light Touch   RLE Light Touch Grossly intact   LLE Light Touch Grossly intact   Bed Mobility   Supine to Sit 5  Supervision   Sit to Supine Unable to assess   Additional Comments Pt sat EOB w/ S level   Transfers   Sit to Stand 5  Supervision   Additional items Verbal cues; Impulsive   Stand to Sit 5  Supervision   Additional Comments No AD used; Pt impulsive initially   Ambulation/Elevation   Gait pattern Improper Weight shift;Narrow BONI; Inconsistent alex; Step through pattern   Gait Assistance   (CGA)   Additional items Assist x 1;Verbal cues   Assistive Device None   Distance 200ft + standing rest break + 10ft   Stair Management Assistance   (CGA)   Additional items Assist x 1;Verbal cues   Stair Management Technique One rail R;Two rails; Alternating pattern; Step to pattern   Number of Stairs 7   Ambulation/Elevation Additional Comments No AD used during ambulation; Pt had intermittent minor unsteadiness during ambulation  Pt demo'd step through with R handrail pattern when ascending up and step to pattern with both handrail when descending down stairs   Balance   Static Sitting Good   Dynamic Sitting Fair +   Static Standing Fair   Dynamic Standing Fair -   Ambulatory Poor +   Endurance Deficit   Endurance Deficit Yes   Activity Tolerance   Activity Tolerance Patient tolerated treatment well   Medical Staff Made Aware OT   Nurse Made Aware RN cleared pt for therapy   Assessment   Prognosis Good   Problem List Decreased endurance; Impaired balance;Decreased mobility; Decreased safety awareness;Pain   Assessment Pt is a 61 y/o male admitted to [address] [place] [address] on 7/19/2022 for a R re-opening of craniotomy and debridement with craniectomy for recurrent infection  Pt s/p R craniectomy for subdural and TBI on 4/15/21, s/p cranioplasty on 7/13/21 with subsequent infection necessitating craniectomy, and s/p post synthetic cranioplasty on 3/08/22  Pt's Dx and personal factors are infection of bone flap, s/p craniectomy, seizure, HTN, alcohol abuse, and anxiety    Pt lives with wife in a 2 SH with 4 LIONEL and 1 flight down to basement where the laundry is  Pt has bed/bath in 1st floor  Pt is home and can support pt  Pt was independent with ADL/IADLs and mobility  Pt owns walker and cane  Pt is retired  During the examination, pt demonstrated good strength of BLE, fair standing balance and tolerance, minimal gait abnormalities but did demo intermittent unsteadiness during ambulation, poor safety awareness, poor judgement, impulsivity, fall risk, and fair control during stair management which limits pt to perform ADLs independently and increases risks for falls  Pt educated on HEP and to perform them t/o the day  Pt will benefit with skilled PT interventions while in the hospital to address the impairments stated above  Pt is recommended to home with OPPT upon D/C  At the end of the session, pt was OOB in chair with call bell within reach  Pt and wife asked permission if they can walk out in hallways, pt encouraged to ask nurse before attempting ambulation  Barriers to Discharge Inaccessible home environment   Goals   STG Expiration Date 08/03/22   Short Term Goal #1 STG 1: Pt will perform supine <-> sit independently to demonstrate improved bed mobility and decrease any risk for skin breakdown  STG 2: Pt will perform sit <-> stand at independently to demonstrate improved transfer mobility so the pt can get in/out of bed safely  STG 3: Pt will ambulate 300ft independently to improve walking tolerance and endurance so pt can safely interact with their environment  STG 4: Pt will stand independently for 5 min while performing dynamic standing balance exercises without any LOB to improve standing balance and tolerance  STG 5: Pt will ascend/descend 12 steps independently with or without use of handrail to improve stair management so they can negotiate their stairs at home     PT Treatment Day 0   Plan   Treatment/Interventions Functional transfer training;LE strengthening/ROM; Elevations; Therapeutic exercise; Endurance training;Equipment eval/education; Bed mobility;Gait training;OT   PT Frequency 3-5x/wk   Recommendation   PT Discharge Recommendation Home with outpatient rehabilitation   AM-PAC Basic Mobility Inpatient   Turning in Bed Without Bedrails 4   Lying on Back to Sitting on Edge of Flat Bed 4   Moving Bed to Chair 3   Standing Up From Chair 3   Walk in Room 3   Climb 3-5 Stairs 3   Basic Mobility Inpatient Raw Score 20   Basic Mobility Standardized Score 43 99   Highest Level Of Mobility   JH-HLM Goal 6: Walk 10 steps or more   JH-HLM Achieved 7: Walk 25 feet or more     Vic, SPT

## 2022-07-21 NOTE — PROGRESS NOTES
Progress Note - Infectious Disease   Maggie Duncan 62 y o  male MRN: [id number]  Unit/Bed#: [facility] 553-52 Encounter: [id number]      Impression/Recommendations:  1   Recurrent cranioplasty infection   In the setting of #2   Initially had Staph epidermidis native bone flap in flexion November 2021 status post flap removal, 6 weeks IV vancomycin  Underwent allograft cranioplasty March 2022  Complicated by wound nonhealing  Now status post I&D, flap removal   Intraoperative findings notable for gross purulence under the cranioplasty in the epidural space surrounding the entire posterior aspect/inner table of the cranioplasty  OR cultures pending  ESR 27    Rec:  · Continue vancomycin for 6 weeks total through 8/30/22  · Follow-up OR cultures and tailor antibiotics as indicated  · Check weekly CBC-diff, Cr, ESR, vanco trough while on IV antibiotics  · D/C planning for home IV antibiotics - Rx provided to CM  · Outpatient follow with ID 2 weeks after D/C - office notified  · D/C PICC after last dose IV antibiotics  · May benefit from PO antibiotics for a period after IV antibiotics complete  · Postoperative care per Neurosurgery     2   History of SAH/SDH   Status post craniectomy 4/15/21, bone flap replacement 2/20/40   Complicated by #1     3   Seizure disorder   As complication of #2 in setting of #4   On Keppra      4   Alcohol abuse      The above impression and plan was discussed in detail with the patient, his wife at the bedside, and Dr Malia Mcknight      Antibiotics:  Vancomycin  POD #2    Subjective:  Patient seen on AM rounds  Continues to ask when he can be discharged  Denies fevers, chills, sweats, nausea, vomiting, or diarrhea  24 Hour Events:  No documented fevers, chills, sweats, nausea, vomiting, or diarrhea      Objective:  Vitals:  Temp:  [97 9 °F (36 6 °C)-98 7 °F (37 1 °C)] 97 9 °F (36 6 °C)  HR:  [67-74] 73  Resp:  [16-22] 16  BP: (119-136)/(61-73) 131/71  SpO2:  [95 %-97 %] 96 %  Temp (24hrs), Av 4 °F (36 9 °C), Min:97 9 °F (36 6 °C), Max:98 7 °F (37 1 °C)  Current: Temperature: 97 9 °F (36 6 °C)    Physical Exam:   General:  No acute distress  Psychiatric:  Awake and alert  Pulmonary:  Normal respiratory excursion without accessory muscle use  Abdomen:  Soft, nontender  Extremities:  No edema  Skin:  No rashes    Lab Results:  I have personally reviewed pertinent labs  Results from last 7 days   Lab Units 22  0819 22  0617 07/15/22  0955   POTASSIUM mmol/L 4 0 3 1* 3 8   CHLORIDE mmol/L 110* 117* 107   CO2 mmol/L 25 19* 27   BUN mg/dL 14 7 11   CREATININE mg/dL 0 80 0 49* 0 82   EGFR ml/min/1 73sq m 97 118 96   CALCIUM mg/dL 8 7 6 3* 9 3   AST U/L  --   --  57*   ALT U/L  --   --  87*   ALK PHOS U/L  --   --  138*     Results from last 7 days   Lab Units 22  0819 22  1228 07/15/22  0955   WBC Thousand/uL 6 64 10 35* 7 21   HEMOGLOBIN g/dL 9 9* 10 4* 14 5   PLATELETS Thousands/uL 117* 152 187     Results from last 7 days   Lab Units 22  1321 22  1316   GRAM STAIN RESULT  No Polys or Bacteria seen  2+ Polys  No bacteria seen No Polys or Bacteria seen       Imaging Studies:   I have personally reviewed pertinent imaging study reports and images in PACS  EKG, Pathology, and Other Studies:   I have personally reviewed pertinent reports

## 2022-07-21 NOTE — PROGRESS NOTES
Vancomycin IV Pharmacy-to-Dose Consultation    Grace Velazquez is a 62 y o  male who is currently receiving Vancomycin IV with management by the Pharmacy Consult service  Assessment/Plan:  The patient was reviewed  Renal function is stable and no signs or symptoms of nephrotoxicity and/or infusion reactions were documented in the chart  Based on todays assessment, continue current vancomycin (day # 3) dosing of 1750mg q12, with a plan for trough to be drawn at [place] [address] on july25  Level today within target range    We will continue to follow the patients culture results and clinical progress daily      Pham Gonzales, Pharmacist

## 2022-07-21 NOTE — PLAN OF CARE
Problem: OCCUPATIONAL THERAPY ADULT  Goal: Performs self-care activities at highest level of function for planned discharge setting  See evaluation for individualized goals  Description: Treatment Interventions: ADL retraining, Functional transfer training, Cognitive reorientation, Continued evaluation          See flowsheet documentation for full assessment, interventions and recommendations  Outcome: Progressing  Note: Limitation: Decreased Safe judgement during ADL, Decreased endurance, Decreased high-level ADLs, Decreased self-care trans, Decreased ADL status  Prognosis: Fair  Assessment: Pt participated in an OT tx session to complete a MoCA  Pt was sitting EOB prior to tx  Room was prepared to decrease distractions  Pt completed MoCA sitting EOB  At times pt was found to be disinterested in assessment  Overall, pt completed and tolerated assessment well  Pt scored      25/30 indicating Minimal cognitive impairment for age/education  Pt and caregiver were extensively educated on purpose of OUTPT OT services to address cognitive deficits including: lack of recall, safety awareness, and concentration, and to increase safety, independence in functional tasks, ADLs, IADLs, and community mobility  Pt and caregiver were educated on importance of OUPT OT services to address fitness of driving, and craniectomy precautions  All questions and concerns were addressed at this time  OT recommends pt returns home with increased support and OUTPT OT services to address cognitive deficits and fitness for driving   OT will continue to address the following goals listed on initial eval      OT Discharge Recommendation: Home with outpatient rehabilitation

## 2022-07-22 VITALS
HEIGHT: 70 IN | RESPIRATION RATE: 18 BRPM | TEMPERATURE: 97.7 F | WEIGHT: 190.48 LBS | HEART RATE: 76 BPM | BODY MASS INDEX: 27.27 KG/M2 | OXYGEN SATURATION: 97 % | SYSTOLIC BLOOD PRESSURE: 134 MMHG | DIASTOLIC BLOOD PRESSURE: 70 MMHG

## 2022-07-22 LAB
BACTERIA SPEC ANAEROBE CULT: NO GROWTH
BACTERIA SPEC ANAEROBE CULT: NORMAL
BACTERIA TISS AEROBE CULT: NO GROWTH
BACTERIA TISS AEROBE CULT: NO GROWTH
BACTERIA TISS AEROBE CULT: NORMAL
BASOPHILS # BLD AUTO: 0.01 THOUSANDS/ΜL (ref 0–0.1)
BASOPHILS NFR BLD AUTO: 0 % (ref 0–1)
EOSINOPHIL # BLD AUTO: 0.08 THOUSAND/ΜL (ref 0–0.61)
EOSINOPHIL NFR BLD AUTO: 2 % (ref 0–6)
ERYTHROCYTE [DISTWIDTH] IN BLOOD BY AUTOMATED COUNT: 15.5 % (ref 11.6–15.1)
GRAM STN SPEC: NORMAL
HCT VFR BLD AUTO: 31.2 % (ref 36.5–49.3)
HGB BLD-MCNC: 9.7 G/DL (ref 12–17)
IMM GRANULOCYTES # BLD AUTO: 0.02 THOUSAND/UL (ref 0–0.2)
IMM GRANULOCYTES NFR BLD AUTO: 0 % (ref 0–2)
LYMPHOCYTES # BLD AUTO: 1.31 THOUSANDS/ΜL (ref 0.6–4.47)
LYMPHOCYTES NFR BLD AUTO: 25 % (ref 14–44)
MCH RBC QN AUTO: 27.5 PG (ref 26.8–34.3)
MCHC RBC AUTO-ENTMCNC: 31.1 G/DL (ref 31.4–37.4)
MCV RBC AUTO: 88 FL (ref 82–98)
MONOCYTES # BLD AUTO: 0.4 THOUSAND/ΜL (ref 0.17–1.22)
MONOCYTES NFR BLD AUTO: 8 % (ref 4–12)
NEUTROPHILS # BLD AUTO: 3.52 THOUSANDS/ΜL (ref 1.85–7.62)
NEUTS SEG NFR BLD AUTO: 65 % (ref 43–75)
NRBC BLD AUTO-RTO: 0 /100 WBCS
PLATELET # BLD AUTO: 109 THOUSANDS/UL (ref 149–390)
PMV BLD AUTO: 11.4 FL (ref 8.9–12.7)
RBC # BLD AUTO: 3.53 MILLION/UL (ref 3.88–5.62)
WBC # BLD AUTO: 5.34 THOUSAND/UL (ref 4.31–10.16)

## 2022-07-22 PROCEDURE — 99024 POSTOP FOLLOW-UP VISIT: CPT | Performed by: PHYSICIAN ASSISTANT

## 2022-07-22 PROCEDURE — 99232 SBSQ HOSP IP/OBS MODERATE 35: CPT | Performed by: INTERNAL MEDICINE

## 2022-07-22 PROCEDURE — 97116 GAIT TRAINING THERAPY: CPT

## 2022-07-22 PROCEDURE — 85025 COMPLETE CBC W/AUTO DIFF WBC: CPT | Performed by: PHYSICIAN ASSISTANT

## 2022-07-22 RX ORDER — OXYCODONE HYDROCHLORIDE 5 MG/1
5-10 TABLET ORAL EVERY 4 HOURS PRN
Qty: 30 TABLET | Refills: 0 | Status: SHIPPED | OUTPATIENT
Start: 2022-07-22 | End: 2022-08-01

## 2022-07-22 RX ORDER — ACETAMINOPHEN 325 MG/1
975 TABLET ORAL EVERY 8 HOURS SCHEDULED
Refills: 0
Start: 2022-07-22

## 2022-07-22 RX ORDER — DOCUSATE SODIUM 100 MG/1
100 CAPSULE, LIQUID FILLED ORAL 2 TIMES DAILY
Refills: 0
Start: 2022-07-22 | End: 2022-08-17 | Stop reason: ALTCHOICE

## 2022-07-22 RX ORDER — METHOCARBAMOL 750 MG/1
750 TABLET, FILM COATED ORAL EVERY 6 HOURS SCHEDULED
Qty: 30 TABLET | Refills: 0 | Status: SHIPPED | OUTPATIENT
Start: 2022-07-22 | End: 2022-10-27

## 2022-07-22 RX ADMIN — LEVETIRACETAM 750 MG: 750 TABLET, FILM COATED ORAL at 08:08

## 2022-07-22 RX ADMIN — HEPARIN SODIUM 5000 UNITS: 5000 INJECTION INTRAVENOUS; SUBCUTANEOUS at 06:16

## 2022-07-22 RX ADMIN — OXYCODONE HYDROCHLORIDE 10 MG: 10 TABLET ORAL at 16:18

## 2022-07-22 RX ADMIN — OXYCODONE HYDROCHLORIDE 10 MG: 10 TABLET ORAL at 11:25

## 2022-07-22 RX ADMIN — ONDANSETRON 4 MG: 2 INJECTION INTRAMUSCULAR; INTRAVENOUS at 07:59

## 2022-07-22 RX ADMIN — PANTOPRAZOLE SODIUM 40 MG: 40 TABLET, DELAYED RELEASE ORAL at 06:16

## 2022-07-22 RX ADMIN — ACETAMINOPHEN 975 MG: 325 TABLET ORAL at 06:14

## 2022-07-22 RX ADMIN — HEPARIN SODIUM 5000 UNITS: 5000 INJECTION INTRAVENOUS; SUBCUTANEOUS at 12:57

## 2022-07-22 RX ADMIN — LORAZEPAM 1 MG: 1 TABLET ORAL at 12:49

## 2022-07-22 RX ADMIN — LISINOPRIL 20 MG: 20 TABLET ORAL at 08:06

## 2022-07-22 RX ADMIN — OXYCODONE HYDROCHLORIDE 10 MG: 10 TABLET ORAL at 06:23

## 2022-07-22 RX ADMIN — METHOCARBAMOL TABLETS 750 MG: 750 TABLET, COATED ORAL at 17:05

## 2022-07-22 RX ADMIN — METHOCARBAMOL TABLETS 750 MG: 750 TABLET, COATED ORAL at 11:22

## 2022-07-22 RX ADMIN — LORAZEPAM 1 MG: 1 TABLET ORAL at 06:23

## 2022-07-22 RX ADMIN — VANCOMYCIN HYDROCHLORIDE 1750 MG: 10 INJECTION, POWDER, LYOPHILIZED, FOR SOLUTION INTRAVENOUS at 08:05

## 2022-07-22 RX ADMIN — METOPROLOL TARTRATE 25 MG: 25 TABLET, FILM COATED ORAL at 08:06

## 2022-07-22 RX ADMIN — VANCOMYCIN HYDROCHLORIDE 1750 MG: 10 INJECTION, POWDER, LYOPHILIZED, FOR SOLUTION INTRAVENOUS at 16:51

## 2022-07-22 RX ADMIN — LORAZEPAM 1 MG: 1 TABLET ORAL at 18:07

## 2022-07-22 RX ADMIN — ACETAMINOPHEN 975 MG: 325 TABLET ORAL at 12:57

## 2022-07-22 RX ADMIN — METHOCARBAMOL TABLETS 750 MG: 750 TABLET, COATED ORAL at 06:16

## 2022-07-22 RX ADMIN — OXYCODONE HYDROCHLORIDE 10 MG: 10 TABLET ORAL at 02:27

## 2022-07-22 NOTE — DISCHARGE SUMMARY
Discharge Summary - Neurosurgery   Elo Menezes 62 y o  male MRN: [id number]  Unit/Bed#: [facility] 544-81 Encounter: [id number]    Admission Date:   Admission Orders (From admission, onward)     Ordered        07/19/22 1543  Inpatient Admission  Once                         Discharge Date: 7/22/22    Admitting Diagnosis: Superficial incisional infection of surgical site [T81 41XA]  Post-operative state [Z98 890]  History of cranioplasty [Z98 890]  At risk for surgical site infection [Z91 89]  Status post craniectomy [Z98 890]    Discharge Diagnosis: same    Medical Problems             Resolved Problems  Date Reviewed: 7/11/2022   None                 Attending: Adan Hill Physician(s): TRACI COLE    Procedures Performed: Right re-opening of craniotomy incision, and debridement with craniectomy    Pathology: intraoperative cultures pending (subgaleal 1, subgaleal 2, and epidural)  NGTD upon hospital discharge    Hospital Course: Elo Menezes is a 63 y/o male who presented to the outpatient office for follow up of his TBI  Per Dr Andrade Campos last office note:  "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, 04/15/2021  Status post cranioplasty 07/13/2021 infection necessitating craniectomy   Synthetic cranioplasty 3/8/22  He is now approximately 4 months status post his repeat cranioplasty  Unfortunately his inferior portion of the incision has had some dehiscence as well as purulent drainage  I attempted local debridement, re-closure, and oral antibiotics with doxycycline (vancomycin and tetracycline were the only sensitive antibiotics to his previous infection) with little effect  In fact his ESR and CRP have been unchanged "    Patient underwent the above procedure under general anesthesia with minimal blood loss and no complications  3 intraoperative cultures were taken as above, cultures pending with NGTD upon hospital discharge   Patient was kept in the PACU until stable and then moved to the floor  Patient received CT head w/o postoperatively which revealed expected surgical changes  2 drains were placed perioperatively and removed on POD1 and POD2; patient tolerated well  PT and OT were consulted and recommend home with [facility] ([facility]NA for IV abx)  Patient has craniectomy helmet  Patient was cleared medically for discharge  Plan is to continue IV vancomycin through 8/30/22 under the direction of ID at home  PICC line was placed for this  Follow up with ID in 2 weeks, weekly labs ordered  Prior to discharge, postoperative instructions were discussed with patient  During that time, all questions and concerns were addressed  Patient will follow up outpatient in 2 weeks for an incision check  Condition at Discharge: good     Discharge instructions/Information to patient and family:   See after visit summary for information provided to patient and family  Provisions for Follow-Up Care:  See after visit summary for information related to follow-up care and any pertinent home health orders  Disposition: Home with VNA services for IV antibiotics        Planned Readmission: No    Discharge Statement   I spent 20 minutes discharging the patient  This time was spent on the day of discharge  I had direct contact with the patient on the day of discharge  Additional documentation is required if more than 30 minutes were spent on discharge  Discharge Medications:  See after visit summary for reconciled discharge medications provided to patient and family

## 2022-07-22 NOTE — CASE MANAGEMENT
Case Management Discharge Planning Note    Patient name Margareth Hope  [place] [address] [address] MRN [id number]  : 1960 Date 2022       Current Admission Date: 2022  Current Admission Diagnosis:Infection of bone flap [facility])   Patient Active Problem List    Diagnosis Date Noted    Infection of bone flap ([facility] [address] ) 2021    Seizure ([facility] [address] ) 2021    Status post craniectomy 2021    Alcohol abuse 2021    Acute encephalopathy 04/15/2021    Anxiety 2021    Essential hypertension 2020      LOS (days): 3  Geometric Mean LOS (GMLOS) (days):   Days to GMLOS:     OBJECTIVE:  Risk of Unplanned Readmission Score: 12 96         Current admission status: Inpatient   Preferred Pharmacy:    [address]  Phone: [phone] Fax: [phone]    Primary Care Provider: ANIA Wolff    Primary Insurance: Terry Mccauley  Secondary Insurance:     DISCHARGE DETAILS:    Discharge planning discussed with[de-identified] Patient  Freedom of Choice: Yes  Comments - Freedom of Choice: Discussed FOC  CM contacted family/caregiver?: Yes  Were Treatment Team discharge recommendations reviewed with patient/caregiver?: Yes  Did patient/caregiver verbalize understanding of patient care needs?: Yes  Were patient/caregiver advised of the risks associated with not following Treatment Team discharge recommendations?: Yes    [facility]         Is the patient interested in [facility] at discharge?: Yes  [address] requested[de-identified] [address] Name[de-identified] [facility] Provider[de-identified] Referring Provider (Dr Charo Yanez)  Home Health Services Needed[de-identified] Administration of IV, IM or SC Medications  Homebound Criteria Met[de-identified] Requires the Assistance of Another Person for Safe Ambulation or to Leave the Home, Uses an Assist Device (i e  cane, walker, etc)  Supporting Clincal Findings[de-identified] Limited Endurance, Fatigues Easliy in [employer]    Other Referral/Resources/Interventions Provided:  Interventions: Home Infusion, HHC  [facility], 100% covered, AILYN FONSECA able to accept for [facility] 7/23/22 at 0800  Patient and wife in agreement  Patient to be discharged home after 2000 dose today, which requesting to give to patient earlier than scheduled

## 2022-07-22 NOTE — ASSESSMENT & PLAN NOTE
POD#3 Right re-opening of craniotomy incision, and debridement with craniectomy (Dr Karyn Rivera, 7/19/2022)    Imaging:   CT head wo contrast 7/20/2022:  Stable postoperative changes, status post craniectomy, washout and placement of subgaleal drainage catheters, postoperative day #1  Anticipated postoperative changes  Cerebral atrophy with chronic small vessel ischemic white matter disease  No acute intracranial abnormality  Stable posttraumatic changes in the frontal lobes and temporal lobes bilaterally      Plan:   Continue to monitor neurological exam   Helmet when OOB   Drain care   o 7 french - removed 7/21  o 10 french drain - removed 7/20   Pain control - doing well on current regimen  o Tylenol 975 mg q 8 hours scheduled  o Robaxin to 750 mg q 6 hours scheduled   o Oxycodone 5 mg and 10 mg q 4 hours as needed for moderate and severe pain, respectively   Bowel regimen - small BM yesterday, continues to pass gas  o Continue Colace, senna   Labs / vitals  o K 4 0, no additional intervention for this  o INR slightly elevated at 1 29, goal < 1 4, no additional labs for this  o WBC WNL  o Hemoglobin dropping slowly, 9 7 this morning, likely dilutional from surgery, goal > 7, he is asymptomatic at this time, no further intervention for this  o Intraoperative cultures pending - subgaleal 1, subgaleal 2 and epidural  - Thus far no growth noted  - Of note, anaerobic sample for epidural space was exposed to air, will still run but may not yield culture   ID following  o Now on vancomycin only, plan to continue this through 8/30/2022, script as been given to CM, patient will get 5pm dose today prior to discharge with home health to be set for tomorrow morning at 8am  - As cultures finalize, abx will be altered as needed  o PICC line in place  o Will need 6 weeks IV abx plus oral for a period after potentially  o Plan for follow up with ID in 2 weeks; will also need weekly labs    Home meds reordered  o keppra per home regimen   DVT ppx:  SCDs, SQH   Mobilize as tolerated with assistance, PT / OT recommending outpatient therapies    Neurosurgery will follow as primary team  Plan to dc later this evening  Please call with questions or concerns

## 2022-07-22 NOTE — PROGRESS NOTES
[address] [facility]  Progress Note - Sean Ryan 1960, 62 y o  male MRN: [id number]  Unit/Bed#: [facility] 609-01 Encounter: [id number]  Primary Care Provider: ANIA Dasilva   Date and time admitted to hospital: 7/19/2022 10:15 AM    * Infection of bone flap [facility])  Assessment & Plan  POD#3 Right re-opening of craniotomy incision, and debridement with craniectomy (Dr Joaquim Kehr, 7/19/2022)    Imaging:   CT head wo contrast 7/20/2022:  Stable postoperative changes, status post craniectomy, washout and placement of subgaleal drainage catheters, postoperative day #1  Anticipated postoperative changes  Cerebral atrophy with chronic small vessel ischemic white matter disease  No acute intracranial abnormality  Stable posttraumatic changes in the frontal lobes and temporal lobes bilaterally      Plan:   Continue to monitor neurological exam   Helmet when OOB   Drain care   o 7 french - removed 7/21  o 10 french drain - removed 7/20   Pain control - doing well on current regimen  o Tylenol 975 mg q 8 hours scheduled  o Robaxin to 750 mg q 6 hours scheduled   o Oxycodone 5 mg and 10 mg q 4 hours as needed for moderate and severe pain, respectively   Bowel regimen - small BM yesterday, continues to pass gas  o Continue Colace, senna   Labs / vitals  o K 4 0, no additional intervention for this  o INR slightly elevated at 1 29, goal < 1 4, no additional labs for this  o WBC WNL  o Hemoglobin dropping slowly, 9 7 this morning, likely dilutional from surgery, goal > 7, he is asymptomatic at this time, no further intervention for this  o Intraoperative cultures pending - subgaleal 1, subgaleal 2 and epidural  - Thus far no growth noted  - Of note, anaerobic sample for epidural space was exposed to air, will still run but may not yield culture   ID following  o Now on vancomycin only, plan to continue this through 8/30/2022, script as been given to CM, patient will get 5pm dose today prior to discharge with home health to be set for tomorrow morning at 8am  - As cultures finalize, abx will be altered as needed  o PICC line in place  o Will need 6 weeks IV abx plus oral for a period after potentially  o Plan for follow up with ID in 2 weeks; will also need weekly labs    Home meds reordered  o keppra per home regimen   DVT ppx:  SCDs, SQH   Mobilize as tolerated with assistance, PT / OT recommending outpatient therapies    Neurosurgery will follow as primary team  Plan to dc later this evening  Please call with questions or concerns  Status post craniectomy  Assessment & Plan  · 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 on 04/15/2021  · Status post cranioplasty 07/13/2021 with subsequent infection necessitating craniectomy  · Status post synthetic cranioplasty on 03/08/2022  · Presents now with concern for recurrent infection      Subjective/Objective   Chief Complaint: "I am good "    Subjective:  Patient states he is doing well  Odilon Room to go home today  Urinating per baseline  Bowel movement yesterday, continues to pass gas  Eating and drinking well  No new or worsening symptoms  Nursing rounds completed with Zenia Currie - plan to have 5:00 p m  Dose of vancomycin and discharge afterwards  Objective:  Sitting in bed, NAD    I/O       07/20 0701 07/21 0700 07/21 0701 07/22 0700 07/22 0701  07/23 0700    P  O   720 480    I V  (mL/kg) 300 (3 5)      IV Piggyback 500 500     Total Intake(mL/kg) 800 (9 3) 1220 (14 1) 480 (5 6)    Urine (mL/kg/hr) 400 (0 2) 0 (0)     Drains 25 7 5     Stool 0      Blood       Total Output 425 7 5     Net +375 +1212 5 +480           Unmeasured Urine Occurrence 3 x 4 x     Unmeasured Stool Occurrence 1 x            Invasive Devices  Report    Peripherally Inserted Central Catheter Line  Duration           PICC Line 06/41/98 Right Basilic 1 day                Physical Exam:  Vitals: Blood pressure 134/70, pulse 76, temperature 97 7 °F (36 5 °C), resp  rate 18, height 5' 10" (1 778 m), weight 86 4 kg (190 lb 7 6 oz), SpO2 97 %  ,Body mass index is 27 33 kg/m²  General appearance: alert, appears stated age, cooperative and no distress  Head:  incision intact but dried blood products on top, flap sunken and soft  Eyes: EOMI  Neck: supple, symmetrical, trachea midline   Lungs: non labored breathing  Heart: regular heart rate  Neurologic:   Mental status: Alert, oriented x 3, follows commands, thought content appropriate  Cranial nerves: grossly intact (Cranial nerves II-XII)  Motor: moving all extremities without focal weakness  Coordination: finger to nose normal bilaterally, no drift bilaterally      Lab Results:  Results from last 7 days   Lab Units 07/22/22  0617 07/21/22  0819 07/20/22  1228 07/15/22  0955   WBC Thousand/uL 5 34 6 64 10 35* 7 21   HEMOGLOBIN g/dL 9 7* 9 9* 10 4* 14 5   HEMATOCRIT % 31 2* 31 8* 32 1* 46 0   PLATELETS Thousands/uL 109* 117* 152 187   NEUTROS PCT % 65 71  --  70   MONOS PCT % 8 8  --  8     Results from last 7 days   Lab Units 07/21/22  0819 07/20/22  0617 07/15/22  0955   POTASSIUM mmol/L 4 0 3 1* 3 8   CHLORIDE mmol/L 110* 117* 107   CO2 mmol/L 25 19* 27   BUN mg/dL 14 7 11   CREATININE mg/dL 0 80 0 49* 0 82   CALCIUM mg/dL 8 7 6 3* 9 3   ALK PHOS U/L  --   --  138*   ALT U/L  --   --  87*   AST U/L  --   --  57*             Results from last 7 days   Lab Units 07/20/22  0617 07/15/22  0955   INR  1 29* 1 07   PTT seconds 27 29     No results found for: TROPONINT  ABG:  Lab Results   Component Value Date    PHART 7 362 11/17/2021    HSV9MLI 34 9 (L) 11/17/2021    PO2ART 141 3 (H) 11/17/2021    GLZ5ILS 19 4 (L) 11/17/2021    BEART -5 3 11/17/2021    SOURCE Line, Arterial 11/17/2021       Imaging Studies: I have personally reviewed pertinent reports  and I have personally reviewed pertinent films in PACS    CT head wo contrast    Result Date: 7/20/2022  Impression: 1    Stable postoperative changes, status post craniectomy, washout and placement of subgaleal drainage catheters, postoperative day #1  Anticipated postoperative changes  2   Cerebral atrophy with chronic small vessel ischemic white matter disease  No acute intracranial abnormality  3   Stable posttraumatic changes in the frontal lobes and temporal lobes bilaterally  4   Stable colloid cyst  Workstation performed: [id number]     CT head wo contrast    Result Date: 7/19/2022  Impression: Right frontotemporoparietal craniectomy changes with right MCA distribution encephalomalacia  Status post reopening of right craniotomy and washout  No evidence of acute intracranial hemorrhage  No evidence of extra-axial collection  5 mm colloid cyst within the foramen of [facility] is similar to the prior exam  Workstation performed: [id number]       EKG, Pathology, and Other Studies: I have personally reviewed pertinent reports        VTE Pharmacologic Prophylaxis: Heparin    VTE Mechanical Prophylaxis: sequential compression device

## 2022-07-22 NOTE — PHYSICAL THERAPY NOTE
Physical Therapy Progress Note     07/22/22 1143   PT Last Visit   PT Visit Date 07/22/22   Note Type   Note Type Treatment   Pain Assessment   Pain Assessment Tool 0-10   Pain Score 8   Pain Location/Orientation Location: Head;Location: Incision   Restrictions/Precautions   Braces or Orthoses Craniectomy Helmet   Other Precautions Pain; Fall Risk   Subjective   Subjective Pt encountered seated EOB with RN & wife present, about to go for walk  Reports no new complaints & has controlled pain overall  Transfers   Sit to Stand 5  Supervision   Additional items Assist x 1   Stand to Sit 5  Supervision   Additional items Assist x 1   Ambulation/Elevation   Gait pattern Inconsistent alex;Decreased foot clearance; Improper Weight shift   Gait Assistance 5  Supervision   Additional items Assist x 1   Assistive Device None   Distance 400'   Stair Management Assistance 5  Supervision   Additional items Assist x 1   Stair Management Technique One rail L;Two rails; Foreward;Reciprocal   Number of Stairs 14   Balance   Static Sitting Good   Static Standing Fair   Ambulatory Poor +   Activity Tolerance   Activity Tolerance Patient tolerated treatment well   Assessment   Prognosis Good   Problem List Decreased endurance; Impaired balance;Decreased mobility; Decreased safety awareness;Pain   Assessment Pt performed all transfers & ambulatory tasks on levels & steps without need for assist   Did so without LOB or significant imbalance while performing dynamic head movements & postural changes  Pt progressed to reciprocal pattern on steps with unilateral & bilateral rails without incident  Pt educated on importance of mobility as well as avoiding straining activities to ensure safe healing during acute phase unitl surgeon clears for increased activity  Pt & spouse verbalized understanding & offer no furhter questions or concerns    Pt has demonstrated sufficient progress to d/c home when medically cleared & OPPT follow up when cleared to participate  Goals   Patient Goals to go home   STG Expiration Date 08/03/22   PT Treatment Day 1   Plan   Treatment/Interventions Functional transfer training;LE strengthening/ROM; Elevations; Therapeutic exercise; Endurance training;Patient/family training;Equipment eval/education;Gait training;Bed mobility   Progress Progressing toward goals   PT Frequency 3-5x/wk   Recommendation   PT Discharge Recommendation Home with outpatient rehabilitation   AM-PAC Basic Mobility Inpatient   Turning in Bed Without Bedrails 4   Lying on Back to Sitting on Edge of Flat Bed 4   Moving Bed to Chair 4   Standing Up From Chair 4   Walk in Room 4   Climb 3-5 Stairs 4   Basic Mobility Inpatient Raw Score 24   Basic Mobility Standardized Score 57 68   Highest Level Of Mobility   JH-HLM Goal 8: Walk 250 feet or more   JH-HLM Achieved 8: Walk 250 feet ot more     Guyann Faster, PTA    [facility] Basic Mobility Standardized Score of 40 78 suggests pt would benefit from post acute rehab

## 2022-07-22 NOTE — PROGRESS NOTES
Vancomycin IV Pharmacy-to-Dose Consultation    Bernardo Alford is a 62 y o  male who is currently receiving Vancomycin IV with management by the Pharmacy Consult service  Assessment/Plan:  The patient was reviewed  Renal function is stable and no signs or symptoms of nephrotoxicity and/or infusion reactions were documented in the chart  Based on todays assessment, continue current vancomycin (day # 4) dosing of 1750 mg IV q12h, with a plan for last dose today at 1700 prior to discharge  Will continue to monitor if patient remains inpatient  We will continue to follow the patients culture results and clinical progress daily      Jesusita Rojas, Pharmacist

## 2022-07-22 NOTE — PROGRESS NOTES
Progress Note - Infectious Disease   Reid Bettencourt 62 y o  male MRN: [id number]  Unit/Bed#: [facility] 524-27 Encounter: [id number]      Impression/Recommendations:  1   Recurrent cranioplasty infection   In the setting of #2   Initially had Staph epidermidis native bone flap in flexion 2021 status post flap removal, 6 weeks IV vancomycin   Underwent allograft cranioplasty 8081   Complicated by wound nonhealing   Now status post I&D, flap removal   Intraoperative findings notable for gross purulence under the cranioplasty in the epidural space surrounding the entire posterior aspect/inner table of the cranioplasty   OR cultures NGTD   ESR 27    Rec:  · Continue vancomycin for 6 weeks total through 22  · Follow-up OR cultures and tailor antibiotics as indicated  · Check weekly CBC-diff, Cr, ESR, vanco trough while on IV antibiotics  · D/C planning for home IV antibiotics - Rx provided to CM  · Outpatient follow with ID 2 weeks after D/C - office notified  · D/C PICC after last dose IV antibiotics  · May benefit from PO antibiotics for a period after IV antibiotics complete  · Postoperative care per Neurosurgery     2   History of SAH/SDH   Status post craniectomy 4/15/21, bone flap replacement    Complicated by #1     3   Seizure disorder   As complication of #2 in setting of #4   On Keppra      4   Alcohol abuse      The above impression and plan was discussed in detail with the patient and JESSICA Ortega with [facility]      Antibiotics:  Vancomycin  POD #3    Subjective:  Patient seen on AM rounds  Denies fevers, chills, sweats, nausea, vomiting, or diarrhea  Abram Setters to go home  24 Hour Events:  No documented fevers, chills, sweats, nausea, vomiting, or diarrhea      Objective:  Vitals:  Temp:  [96 1 °F (35 6 °C)-98 °F (36 7 °C)] 97 7 °F (36 5 °C)  HR:  [67-96] 76  Resp:  [18-19] 18  BP: (124-147)/(67-79) 134/70  SpO2:  [94 %-98 %] 97 %  Temp (24hrs), Av 3 °F (36 3 °C), Min:96 1 °F (35 6 °C), Max:98 °F (36 7 °C)  Current: Temperature: 97 7 °F (36 5 °C)    Physical Exam:   General:  No acute distress  Psychiatric:  Awake and alert  Pulmonary:  Normal respiratory excursion without accessory muscle use  Abdomen:  Soft, nontender  Extremities:  No edema  Skin:  No rashes    Lab Results:  I have personally reviewed pertinent labs  Results from last 7 days   Lab Units 07/21/22  0819 07/20/22  0617   POTASSIUM mmol/L 4 0 3 1*   CHLORIDE mmol/L 110* 117*   CO2 mmol/L 25 19*   BUN mg/dL 14 7   CREATININE mg/dL 0 80 0 49*   EGFR ml/min/1 73sq m 97 118   CALCIUM mg/dL 8 7 6 3*     Results from last 7 days   Lab Units 07/22/22  0617 07/21/22  0819 07/20/22  1228   WBC Thousand/uL 5 34 6 64 10 35*   HEMOGLOBIN g/dL 9 7* 9 9* 10 4*   PLATELETS Thousands/uL 109* 117* 152     Results from last 7 days   Lab Units 07/19/22  1321 07/19/22  1316   GRAM STAIN RESULT  No Polys or Bacteria seen  2+ Polys  No bacteria seen No Polys or Bacteria seen       Imaging Studies:   I have personally reviewed pertinent imaging study reports and images in PACS  EKG, Pathology, and Other Studies:   I have personally reviewed pertinent reports

## 2022-07-22 NOTE — PROGRESS NOTES
Vancomycin IV Pharmacy-to-Dose Consultation    Jun Benito is a 62 y o  male who is currently receiving Vancomycin IV with management by the Pharmacy Consult service  Assessment/Plan:  The patient was reviewed  Renal function is stable and no signs or symptoms of nephrotoxicity and/or infusion reactions were documented in the chart  Based on todays assessment, continue current vancomycin (day # 4) dosing of 1750 mg IV q12h, with a plan for last dose today at 1700 prior to discharge  Will continue to monitor if patient remains inpatient  We will continue to follow the patients culture results and clinical progress daily      Kimberly Nugent, Pharmacist

## 2022-07-22 NOTE — PLAN OF CARE
Problem: PHYSICAL THERAPY ADULT  Goal: Performs mobility at highest level of function for planned discharge setting  See evaluation for individualized goals  Description: Treatment/Interventions: Functional transfer training, LE strengthening/ROM, Elevations, Therapeutic exercise, Endurance training, Equipment eval/education, Bed mobility, Gait training, OT          See flowsheet documentation for full assessment, interventions and recommendations  Outcome: Progressing  Note: Prognosis: Good  Problem List: Decreased endurance, Impaired balance, Decreased mobility, Decreased safety awareness, Pain  Assessment: Pt performed all transfers & ambulatory tasks on levels & steps without need for assist   Did so without LOB or significant imbalance while performing dynamic head movements & postural changes  Pt progressed to reciprocal pattern on steps with unilateral & bilateral rails without incident  Pt educated on importance of mobility as well as avoiding straining activities to ensure safe healing during acute phase unitl surgeon clears for increased activity  Pt & spouse verbalized understanding & offer no furhter questions or concerns  Pt has demonstrated sufficient progress to d/c home when medically cleared & OPPT follow up when cleared to participate  Barriers to Discharge: Inaccessible home environment     PT Discharge Recommendation: Home with outpatient rehabilitation    See flowsheet documentation for full assessment

## 2022-07-22 NOTE — DISCHARGE INSTRUCTIONS
Discharge Instructions  Craniectomy    Activity:  Do not lift, pushing or pulling more than 10 pounds for 2 weeks  Avoid bending, lifting and twisting for 2 weeks  Walk as tolerated  Recommend at least four short walks daily  No driving for at least 2 weeks or until cleared by Neurosurgery  Do not use a hair dryer, and avoid hair products such as mousse, oils, and gels  Do not brush your hair away from the incision since this will put strain on the suture line  Do not dye or perm hair for 6 weeks or until cleared by physician  Continue to change bed linens and pajamas more frequently  Wear clean clothes daily  Wear helmet when out of bed and walking around    Surgical incision care:  Keep dressings in place for 3 days  After 3 days, incisions may be left open to air, but should remain clean  Keep incisions dry for 3 days  May shower after 3 days using a baby shampoo including head incision  Rinse off shampoo and pat dry  Avoid, rubbing incision but gently massage hair  Continue to use clean towel and washcloth with each shower for 2 weeks post-op  Do not immerse the incisions in water for 6 weeks or until cleared  Do not apply any creams or ointments to the incision, unless otherwise instructed by [address] [place] [address]  Contact office if increasing redness, drainage, pain or swelling occurs around the incisions or if you develop a fever greater than 101F  Do not dye/perm hair or use any hair products until cleared by Neurosurgery  Postoperative medication:  [facility] will provide pain medication in the postoperative period  All prescriptions must come from a single provider  Take medications as prescribed  Call office with any questions/concerns  May use over the counter Tylenol  No NSAIDs (ie  Ibuprofen, Aleve, Advil, Naproxen, etc )  Please contact office for questions regarding dosage and modifications    No antiplatelet or anticoagulation medication until cleared by [address], unless otherwise instructed  Please contact [facility] if you have any questions about the effects of any of your medications on blood clotting  Do not operate heavy machinery or vehicles while taking sedating medications  Use a bowel regimen while on opioids as they induce constipation  Ie  Senokot-S, Miralax, Colace, etc  Increase fiber and water intake  ** Please notify the office if incision becomes red, swollen, tender, or has increased drainage, and temp>101  Return to the ER if you experience increased headache, difficulty walking, change in vision or speech, drowsiness, weakness, nausea/vomiting, or seizures  **

## 2022-07-23 ENCOUNTER — HOME CARE VISIT (OUTPATIENT)
Dept: HOME HEALTH SERVICES | Facility: HOME HEALTHCARE | Age: 62
End: 2022-07-23
Payer: COMMERCIAL

## 2022-07-23 VITALS
DIASTOLIC BLOOD PRESSURE: 72 MMHG | HEART RATE: 80 BPM | RESPIRATION RATE: 18 BRPM | OXYGEN SATURATION: 98 % | TEMPERATURE: 98.3 F | SYSTOLIC BLOOD PRESSURE: 128 MMHG

## 2022-07-23 LAB — BACTERIA SPEC ANAEROBE CULT: NO GROWTH

## 2022-07-23 PROCEDURE — 400013 VN SOC

## 2022-07-23 PROCEDURE — G0299 HHS/HOSPICE OF RN EA 15 MIN: HCPCS

## 2022-07-23 NOTE — CASE COMMUNICATION
[facility] has admitted your patient to [facility] service with the following disciplines:      SN  This report is informational only, no responses is needed  Primary focus of home health care   Antibiotic admin teaching, PICC line care and maintance  Patient stated goals of care To heal from infection   Anticipated visit pattern and next visit date 1w6  Next visit due 7 27 for dressing change and weekly labs  See medication list -  meds in home differ from AVS NA  Significant clinical findings NA  Potential barriers to goal achievement NA  Other pertinent information NA    Thank you for allowing us to participate in the care of your patient

## 2022-07-25 ENCOUNTER — TRANSITIONAL CARE MANAGEMENT (OUTPATIENT)
Dept: FAMILY MEDICINE CLINIC | Facility: CLINIC | Age: 62
End: 2022-07-25

## 2022-07-25 DIAGNOSIS — T84.7XXD INFECTION OF BONE FLAP, SUBSEQUENT ENCOUNTER: Primary | ICD-10-CM

## 2022-07-25 NOTE — UTILIZATION REVIEW
Notification of Discharge   This is a Notification of Discharge from our facility [address]  Please be advised that this patient has been discharge from our facility  Below you will find the admission and discharge date and time including the patients disposition  UTILIZATION REVIEW CONTACT:  Amelia Gallegoser  Utilization   Network Utilization Review Department  Phone: [phone] x carefully listen to the prompts  All voicemails are confidential   Email: [email or web address]     PHYSICIAN ADVISORY SERVICES:  FOR [id number] REVIEW - MEDICAL NECESSITY DENIAL  Phone: [phone]  Fax: [phone]  Email: [email or web address]     PRESENTATION DATE: 7/19/2022 10:15 AM  OBERVATION ADMISSION DATE:   INPATIENT ADMISSION DATE: 7/19/22  3:43 PM   DISCHARGE DATE: 7/22/2022  8:36 PM  DISPOSITION: Home/Self Care Home/Self Care      IMPORTANT INFORMATION:  Send all requests for admission clinical reviews, approved or denied determinations and any other requests to dedicated fax number below belonging to the campus where the patient is receiving treatment   List of dedicated fax numbers:  [address] DENIALS (Administrative/Medical Necessity) [phone]   1000  [facility] (Maternity/NICU/Pediatrics) [phone]   Juan [facility] [phone]   [address] [facility] [phone]   [address] [place] [address] [phone]   [address] [phone]   [facility]  [phone]   [address] [place] [address], [facility]  24[address] [facility] [address] [facility] [phone]

## 2022-07-25 NOTE — TELEPHONE ENCOUNTER
Called patient to see how he is doing after surgery and spoke with both the patient and his wife  Patient reports he is doing well overall and denies any incisional issues or fevers  Patient is able to ambulate around the house and complete ADLs  Educated the patient about the importance of preventing blood clots and provided measures how to prevent them  Patient denies any headaches, dizziness, nausea, vomiting, changes to his vision or mental status  Patient is aware to call 911 or present to the nearest ED with any neurological decline  Patient has moved his bowels since the surgery  Encouraged patient to take an over the counter stool softener, if he is taking narcotic pain medication  Encouraged fiber intake and fluids  Reviewed incision care with the patient and wife  Advised that he may take a shower and gently wash the surgical site with soap and water or baby shampoo  Use clean wash cloth, towels, and clothing  Do not submerge in water until cleared by the surgeon  Do not apply any creams, ointments, or lotions to the site  Patient is aware to call the office if any redness, swelling, drainage, dehiscence of incision, or fever >100 F occurs  Patient/wife are aware to call the office if any concerns or questions may arise  Reminded patient of his upcoming appointments with the date/time/location  Patient was appreciative for the call

## 2022-07-27 ENCOUNTER — HOME CARE VISIT (OUTPATIENT)
Dept: HOME HEALTH SERVICES | Facility: HOME HEALTHCARE | Age: 62
End: 2022-07-27
Payer: COMMERCIAL

## 2022-07-27 DIAGNOSIS — F41.9 ANXIETY: ICD-10-CM

## 2022-07-27 RX ORDER — LORAZEPAM 1 MG/1
1 TABLET ORAL 3 TIMES DAILY PRN
Qty: 30 TABLET | Refills: 0 | Status: SHIPPED | OUTPATIENT
Start: 2022-07-27 | End: 2022-08-22 | Stop reason: SDUPTHER

## 2022-07-28 ENCOUNTER — LAB REQUISITION (OUTPATIENT)
Dept: LAB | Facility: HOSPITAL | Age: 62
End: 2022-07-28
Payer: COMMERCIAL

## 2022-07-28 ENCOUNTER — RA CDI HCC (OUTPATIENT)
Dept: OTHER | Facility: HOSPITAL | Age: 62
End: 2022-07-28

## 2022-07-28 ENCOUNTER — HOME CARE VISIT (OUTPATIENT)
Dept: HOME HEALTH SERVICES | Facility: HOME HEALTHCARE | Age: 62
End: 2022-07-28
Payer: COMMERCIAL

## 2022-07-28 DIAGNOSIS — T84.7XXD INFECTION AND INFLAMMATORY REACTION DUE TO OTHER INTERNAL ORTHOPEDIC PROSTHETIC DEVICES, IMPLANTS AND GRAFTS, SUBSEQUENT ENCOUNTER: ICD-10-CM

## 2022-07-28 LAB
BASOPHILS # BLD AUTO: 0.02 THOUSANDS/ΜL (ref 0–0.1)
BASOPHILS NFR BLD AUTO: 1 % (ref 0–1)
CREAT SERPL-MCNC: 0.69 MG/DL (ref 0.6–1.3)
EOSINOPHIL # BLD AUTO: 0.1 THOUSAND/ΜL (ref 0–0.61)
EOSINOPHIL NFR BLD AUTO: 2 % (ref 0–6)
ERYTHROCYTE [DISTWIDTH] IN BLOOD BY AUTOMATED COUNT: 15.2 % (ref 11.6–15.1)
ERYTHROCYTE [SEDIMENTATION RATE] IN BLOOD: 22 MM/HOUR (ref 0–19)
GFR SERPL CREATININE-BSD FRML MDRD: 103 ML/MIN/1.73SQ M
HCT VFR BLD AUTO: 33.4 % (ref 36.5–49.3)
HGB BLD-MCNC: 10.6 G/DL (ref 12–17)
IMM GRANULOCYTES # BLD AUTO: 0.01 THOUSAND/UL (ref 0–0.2)
IMM GRANULOCYTES NFR BLD AUTO: 0 % (ref 0–2)
LYMPHOCYTES # BLD AUTO: 1.01 THOUSANDS/ΜL (ref 0.6–4.47)
LYMPHOCYTES NFR BLD AUTO: 24 % (ref 14–44)
MCH RBC QN AUTO: 28 PG (ref 26.8–34.3)
MCHC RBC AUTO-ENTMCNC: 31.7 G/DL (ref 31.4–37.4)
MCV RBC AUTO: 88 FL (ref 82–98)
MONOCYTES # BLD AUTO: 0.57 THOUSAND/ΜL (ref 0.17–1.22)
MONOCYTES NFR BLD AUTO: 14 % (ref 4–12)
NEUTROPHILS # BLD AUTO: 2.52 THOUSANDS/ΜL (ref 1.85–7.62)
NEUTS SEG NFR BLD AUTO: 59 % (ref 43–75)
NRBC BLD AUTO-RTO: 0 /100 WBCS
PLATELET # BLD AUTO: 145 THOUSANDS/UL (ref 149–390)
PMV BLD AUTO: 12.7 FL (ref 8.9–12.7)
RBC # BLD AUTO: 3.78 MILLION/UL (ref 3.88–5.62)
VANCOMYCIN TROUGH SERPL-MCNC: 12.5 UG/ML (ref 10–20)
WBC # BLD AUTO: 4.23 THOUSAND/UL (ref 4.31–10.16)

## 2022-07-28 PROCEDURE — 85025 COMPLETE CBC W/AUTO DIFF WBC: CPT | Performed by: INTERNAL MEDICINE

## 2022-07-28 PROCEDURE — 80202 ASSAY OF VANCOMYCIN: CPT | Performed by: INTERNAL MEDICINE

## 2022-07-28 PROCEDURE — G0299 HHS/HOSPICE OF RN EA 15 MIN: HCPCS

## 2022-07-28 PROCEDURE — 85652 RBC SED RATE AUTOMATED: CPT | Performed by: INTERNAL MEDICINE

## 2022-07-28 PROCEDURE — 82565 ASSAY OF CREATININE: CPT | Performed by: INTERNAL MEDICINE

## 2022-07-28 NOTE — PROGRESS NOTES
[facility]  coding opportunities       Chart reviewed, no opportunity found: CHART REVIEWED, NO OPPORTUNITY FOUND        Patients Insurance        Commercial Insurance: [employer]

## 2022-08-01 VITALS
DIASTOLIC BLOOD PRESSURE: 70 MMHG | OXYGEN SATURATION: 98 % | HEART RATE: 76 BPM | SYSTOLIC BLOOD PRESSURE: 120 MMHG | TEMPERATURE: 98.3 F | RESPIRATION RATE: 16 BRPM

## 2022-08-01 PROCEDURE — G0180 MD CERTIFICATION HHA PATIENT: HCPCS | Performed by: INTERNAL MEDICINE

## 2022-08-02 ENCOUNTER — OFFICE VISIT (OUTPATIENT)
Dept: FAMILY MEDICINE CLINIC | Facility: CLINIC | Age: 62
End: 2022-08-02
Payer: COMMERCIAL

## 2022-08-02 VITALS
SYSTOLIC BLOOD PRESSURE: 122 MMHG | HEIGHT: 70 IN | TEMPERATURE: 97.5 F | OXYGEN SATURATION: 99 % | RESPIRATION RATE: 18 BRPM | DIASTOLIC BLOOD PRESSURE: 70 MMHG | WEIGHT: 195 LBS | HEART RATE: 73 BPM | BODY MASS INDEX: 27.92 KG/M2

## 2022-08-02 DIAGNOSIS — I10 ESSENTIAL HYPERTENSION: ICD-10-CM

## 2022-08-02 DIAGNOSIS — T84.7XXD INFECTION OF BONE FLAP, SUBSEQUENT ENCOUNTER: Chronic | ICD-10-CM

## 2022-08-02 DIAGNOSIS — Z76.89 ENCOUNTER FOR SUPPORT AND COORDINATION OF TRANSITION OF CARE: Primary | ICD-10-CM

## 2022-08-02 DIAGNOSIS — L70.0 CYSTIC ACNE: ICD-10-CM

## 2022-08-02 PROCEDURE — 1111F DSCHRG MED/CURRENT MED MERGE: CPT | Performed by: NURSE PRACTITIONER

## 2022-08-02 PROCEDURE — 99496 TRANSJ CARE MGMT HIGH F2F 7D: CPT | Performed by: NURSE PRACTITIONER

## 2022-08-02 RX ORDER — CLINDAMYCIN PHOSPHATE 10 MG/G
1 AEROSOL, FOAM TOPICAL 2 TIMES DAILY
Qty: 50 G | Refills: 0 | Status: SHIPPED | OUTPATIENT
Start: 2022-08-02 | End: 2022-10-21 | Stop reason: SDUPTHER

## 2022-08-02 NOTE — PROGRESS NOTES
Transition of Care  Follow-up After Hospitalization    Hannah Hernandez 62 y o  male   Date:  8/2/2022    TCM Call     Date and time call was made  7/25/2022  8:40 AM    Hospital care reviewed  Records reviewed    Patient was hospitialized at  [place] [address]  Infection of craniotomy plate     Date of Admission  07/19/22    Date of discharge  07/22/22    Diagnosis  Infection of bone flap    Disposition  Home    Were the patients medications reviewed and updated  Yes      TCM Call     Should patient be enrolled in anticoag monitoring? No    Scheduled for follow up? Yes    Did you obtain your prescribed medications  Yes    Do you need help managing your prescriptions or medications  No    Is transportation to your appointment needed  No    I have advised the patient to call PCP with any new or worsening symptoms  Gricelda Andrukaitis RMA    Living Arrangements  Spouse or Significiant other    Are you recieving any outpatient services  No    Are you recieving home care services  No    Are you using any community resources  No    Current waiver services  No    Have you fallen in the last 12 months  No    How many times  1    Interperter language line needed  No    Counseling  Patient    Counseling topics  Prognosis          Hospital records were reviewed  Medications upon discharge reviewed/updated  Medication Changes: Vanco IV  Imaging: CTH  Consults: ID  Follow up visits with other specialists: ID and Neurosurgery      Assessment and Plan:    Alicia Francisco was seen today for transition of care management      Diagnoses and all orders for this visit:    Encounter for support and coordination of transition of care    Cystic acne  -     Clindamycin Phosphate foam; Apply 1 Dose topically 2 (two) times a day    Essential hypertension    Infection of bone flap, subsequent encounter            Hannah Hernandez present for TCM visit s/p hospitalization for surgical cranioplasty graft removal 2/2 infection  Reviewed NeuroSx and ID notes  Cx from Sx reviewed, no growth detected to date  Pt is on Vanco IV therapy, Last trough w/in goal  Pt is neurologically intact but does report anxiety and increased depression 2/2 multiple complications  Ativan regimen is effective  Sutures at cranioplasty site is CDI w/o drainage  He will F/U with neurosurgery tomorrow and ID 8/5  ROS: Review of Systems   Constitutional: Negative  HENT: Negative  Eyes: Negative  Respiratory: Negative  Cardiovascular: Negative  Gastrointestinal: Negative  Endocrine: Negative  Genitourinary: Negative  Musculoskeletal: Negative  Skin: Positive for wound  Allergic/Immunologic: Negative  Neurological: Negative  Hematological: Negative  Psychiatric/Behavioral: Negative          Past Medical History:   Diagnosis Date    Anxiety     Cataracts, bilateral     Dysphagia 04/16/2021    GERD (gastroesophageal reflux disease)     Hematoma, subdural, with loss of consciousness, traumatic ([facility] )     Hypertension     Status post insertion of percutaneous endoscopic gastrostomy (PEG) tube ([facility] [address] ) 05/19/2021       Past Surgical History:   Procedure Laterality Date    BRAIN HEMATOMA EVACUATION Right 4/15/2021    Procedure: Right CRANIOTOMY FOR SUBDURAL HEMATOMA;  Surgeon: Elise Tran MD;  [facility];  Service: Neurosurgery    BRAIN HEMATOMA EVACUATION Right 11/16/2021    Procedure: CRANIOTOMY FOR EVACUATION OF EPIDURAL HEMATOMA;  Surgeon: Elise Tran MD;  [facility];  Service: Neurosurgery    BRAIN HEMATOMA EVACUATION Right 11/16/2021    Procedure: Exploration and revision of right craniotomy incision with removal of cranial plate;  Surgeon: Elise Tran MD;  [facility];  Service: Neurosurgery    CRANIOPLASTY Right 7/19/2022    Procedure: Right re-opening of craniotomy incision, and debridement with craniectomy;  Surgeon: Elise Tran MD;  [facility];  Service: Neurosurgery    CRANIOTOMY Right     HAND SURGERY Left     PEG TUBE PLACEMENT      PEG TUBE REMOVAL      NH REPLACE SKULL PLATE/FLAP Right 6/85/9062    Procedure: RIGHT AUTOLOGOUS CRANIOPLASTY;  Surgeon: Song Raymundo MD;  [facility];  Service: Neurosurgery    NH REPLACE SKULL PLATE/FLAP Right 5/0/1237    Procedure: right allographic CRANIOPLASTY;  Surgeon: Song Raymundo MD;  [facility];  Service: Neurosurgery    ROTATOR CUFF REPAIR Left        Social History     Socioeconomic History    Marital status: /Civil Union     Spouse name: None    Number of children: None    Years of education: None    Highest education level: None   Occupational History    Occupation: Retired   Tobacco Use    Smoking status: Never Smoker    Smokeless tobacco: Never Used   Vaping Use    Vaping Use: Never used   Substance and Sexual Activity    Alcohol use: Not Currently     Alcohol/week: 14 0 standard drinks     Types: 14 Cans of beer per week    Drug use: Not Currently    Sexual activity: Yes   Other Topics Concern    None   Social History Narrative    None     Social Determinants of Health     Financial Resource Strain: Not on file   Food Insecurity: No Food Insecurity    Worried About Running Out of Food in the Last Year: Never true    [place] of Food in the Last Year: Never true   Transportation Needs: No Transportation Needs    Lack of Transportation (Medical): No    Lack of Transportation (Non-Medical):  No   Physical Activity: Not on file   Stress: Not on file   Social Connections: Not on file   Intimate Partner Violence: Not on file   Housing Stability: Low Risk     Unable to Pay for Housing in the Last Year: No    Number of Places Lived in the Last Year: 1    Unstable Housing in the Last Year: No       Family History   Problem Relation Age of Onset    Heart disease Mother     Hypertension Father     Dementia Father        No Known Allergies      Current Outpatient Medications:     acetaminophen (TYLENOL) 325 mg tablet, Take 3 tablets (975 mg total) by mouth every 8 (eight) hours, Disp: , Rfl: 0    Clindamycin Phosphate foam, Apply 1 Dose topically 2 (two) times a day, Disp: 50 g, Rfl: 0    docusate sodium (COLACE) 100 mg capsule, Take 1 capsule (100 mg total) by mouth 2 (two) times a day, Disp: , Rfl: 0    halobetasol (ULTRAVATE) 0 05 % cream, Apply topically 2 (two) times a day, Disp: 50 g, Rfl: 1    levETIRAcetam (KEPPRA) 750 mg tablet, Take 1 tablet (750 mg total) by mouth every 12 (twelve) hours, Disp: 180 tablet, Rfl: 3    lisinopril (ZESTRIL) 20 mg tablet, Take 1 tablet (20 mg total) by mouth daily, Disp: 90 tablet, Rfl: 1    LORazepam (ATIVAN) 1 mg tablet, Take 1 tablet (1 mg total) by mouth 3 (three) times a day as needed for anxiety, Disp: 30 tablet, Rfl: 0    methocarbamol (ROBAXIN) 750 mg tablet, Take 1 tablet (750 mg total) by mouth every 6 (six) hours, Disp: 30 tablet, Rfl: 0    metoprolol tartrate (LOPRESSOR) 25 mg tablet, Take 1 tablet (25 mg total) by mouth every 12 (twelve) hours, Disp: 180 tablet, Rfl: 1    mupirocin (BACTROBAN) 2 % ointment, Apply topically daily, Disp: 22 g, Rfl: 0    omeprazole (PriLOSEC) 20 mg delayed release capsule, Take 20 mg by mouth daily, Disp: , Rfl:     Sodium Chloride Flush (SALINE FLUSH IV), Inject 10 mL into a catheter in a vein every 12 (twelve) hours  Indications: FLUSHING, Disp: , Rfl:     vancomycin (VANCOCIN), Inject 1,750 mg into a catheter in a vein every 12 (twelve) hours  Indications: Infection, Disp: , Rfl:       Physical Exam:  /70   Pulse 73   Temp 97 5 °F (36 4 °C)   Resp 18   Ht 5' 10" (1 778 m)   Wt 88 5 kg (195 lb)   SpO2 99%   BMI 27 98 kg/m²     Physical Exam  Vitals and nursing note reviewed  Constitutional:       Appearance: Normal appearance  He is well-developed  Interventions: Face mask in place  HENT:      Head: Normocephalic and atraumatic          Right Ear: Tympanic membrane, ear canal and external ear normal       Left Ear: Tympanic membrane, ear canal and external ear normal       Nose: Nose normal       Mouth/Throat:      Mouth: Mucous membranes are moist       Pharynx: Uvula midline  Eyes:      General: Lids are normal       Conjunctiva/sclera: Conjunctivae normal       Pupils: Pupils are equal, round, and reactive to light  Neck:      Thyroid: No thyroid mass  Vascular: No JVD  Trachea: Trachea and phonation normal    Cardiovascular:      Rate and Rhythm: Normal rate and regular rhythm  Pulses: Normal pulses  Heart sounds: Normal heart sounds, S1 normal and S2 normal  No murmur heard  No friction rub  No gallop  Pulmonary:      Effort: Pulmonary effort is normal       Breath sounds: Normal breath sounds  Abdominal:      General: Bowel sounds are normal       Palpations: Abdomen is soft  Tenderness: There is no abdominal tenderness  Genitourinary:     Comments: Deferred  Musculoskeletal:         General: Normal range of motion  Cervical back: Full passive range of motion without pain, normal range of motion and neck supple  Right lower leg: No edema  Left lower leg: No edema  Lymphadenopathy:      Head:      Right side of head: No submental, submandibular, tonsillar, preauricular, posterior auricular or occipital adenopathy  Left side of head: No submental, submandibular, tonsillar, preauricular, posterior auricular or occipital adenopathy  Cervical: No cervical adenopathy  Skin:     General: Skin is warm and dry  Capillary Refill: Capillary refill takes less than 2 seconds  Neurological:      General: No focal deficit present  Mental Status: He is alert and oriented to person, place, and time  Cranial Nerves: Cranial nerves are intact  Sensory: Sensation is intact  Motor: Motor function is intact  Coordination: Coordination is intact  Gait: Gait is intact     Psychiatric: Attention and Perception: Attention and perception normal          Mood and Affect: Mood and affect normal          Speech: Speech normal          Behavior: Behavior normal  Behavior is cooperative  Thought Content:  Thought content normal          Cognition and Memory: Cognition normal          Judgment: Judgment normal              Labs:  Lab Results   Component Value Date    WBC 4 23 (L) 07/28/2022    HGB 10 6 (L) 07/28/2022    HCT 33 4 (L) 07/28/2022    MCV 88 07/28/2022     (L) 07/28/2022     Lab Results   Component Value Date    K 4 0 07/21/2022     (H) 07/21/2022    CO2 25 07/21/2022    BUN 14 07/21/2022    CREATININE 0 69 07/28/2022    GLUCOSE 212 (H) 11/17/2021    GLUF 112 (H) 07/15/2022    CALCIUM 8 7 07/21/2022    CORRECTEDCA 8 7 11/17/2021    AST 57 (H) 07/15/2022    ALT 87 (H) 07/15/2022    ALKPHOS 138 (H) 07/15/2022    EGFR 103 07/28/2022

## 2022-08-03 ENCOUNTER — CLINICAL SUPPORT (OUTPATIENT)
Dept: NEUROSURGERY | Facility: CLINIC | Age: 62
End: 2022-08-03

## 2022-08-03 VITALS — DIASTOLIC BLOOD PRESSURE: 68 MMHG | SYSTOLIC BLOOD PRESSURE: 140 MMHG | TEMPERATURE: 97.9 F

## 2022-08-03 DIAGNOSIS — Z98.890 POST-OPERATIVE STATE: ICD-10-CM

## 2022-08-03 DIAGNOSIS — Z98.890 STATUS POST CRANIECTOMY: Primary | ICD-10-CM

## 2022-08-03 DIAGNOSIS — T84.7XXD INFECTION OF BONE FLAP, SUBSEQUENT ENCOUNTER: ICD-10-CM

## 2022-08-03 PROCEDURE — 99024 POSTOP FOLLOW-UP VISIT: CPT

## 2022-08-03 NOTE — PROGRESS NOTES
Post-Op Visit- Neurosurgery    Bernadette Rogel 62 y o  male MRN: [id number]    Chief Complaint:  Patient presents post: Right re-opening of craniotomy incision, and debridement with craniectomy - Right    History of Present Illness:  Patient presents for 2 week POV for incision check  Arrived accompanied by his wife Umair Suárez and ambulated well without assistive device  Noa Alejandre is wearing a bike helmet with a clean ABD placed inside to protect incision  He admits to not wearing it at home even when ambulating or showering  Reports pain is 0/10 and has only "itching" at the surgical site  He is overall feeling well today  Assessment:   Vitals:    08/03/22 1009   BP: 140/68   Temp: 97 9 °F (36 6 °C)       Wound Exam: Incision is clean, dry, and in tact, well approximated, without edema, erythema, or drainage  See below:             Procedure:  Surgical site assessment  Complications: None  Discussion/Summary:  Doing well postoperatively  Reviewed incision care with patient including daily observation for s/s infection including: increased erythema, edema, drainage, dehiscence of incision or fever >101  Should these be observed, he understands that he is to call and/or return immediately for reassessment  Advised patient to continue cleansing area with mild soap and water and pat dry  Not to apply any lotions, creams, or ointments, & not to submerge in any water for 4 more weeks  Dr Benna Lefort met with Noa Alejandre personally  Will leave nylon suturing for now and await next follow-up before removal      He is to maintain activity restrictions until cleared by the surgeon  Activity levels were also reviewed with the patient in detail, he is to lift no greater than 10 pounds and ambulation is encouraged as tolerated  Verified date/time/location of upcoming POV   He is to call the office with any further questions or concerns, or if any incisional issues or fevers would arise  Reminded Анна Anna that wearing his helmet at all times is important as his brain is vulnerable to injury  He stated and understanding

## 2022-08-03 NOTE — PATIENT INSTRUCTIONS
1  Wash incision gently in the shower  Avoid submerging in tub, hot tub, or pool  2  Leave incision open to air when ever possible, may cover loosely with gauze and paper tape for comfort  Do not seal incision under bandages  3  Do not apply any creams, ointments, or lotions directly to incision  4  Maintain activity restrictions of lifting, pushing, pulling no more than 5-10 pounds  5  Ambulate as tolerated  6  Return for follow-up visit on 8/17/2022   7  Complete any necessary imaging 2-3 days prior to upcoming appointment  [facility] Scheduling [phone])  8  Contact the office with any questions or concerns

## 2022-08-04 ENCOUNTER — TELEPHONE (OUTPATIENT)
Dept: INFECTIOUS DISEASES | Facility: CLINIC | Age: 62
End: 2022-08-04

## 2022-08-04 ENCOUNTER — LAB REQUISITION (OUTPATIENT)
Dept: LAB | Facility: HOSPITAL | Age: 62
End: 2022-08-04
Payer: COMMERCIAL

## 2022-08-04 ENCOUNTER — HOME CARE VISIT (OUTPATIENT)
Dept: HOME HEALTH SERVICES | Facility: HOME HEALTHCARE | Age: 62
End: 2022-08-04
Payer: COMMERCIAL

## 2022-08-04 DIAGNOSIS — T84.7XXA INFECTION AND INFLAMMATORY REACTION DUE TO OTHER INTERNAL ORTHOPEDIC PROSTHETIC DEVICES, IMPLANTS AND GRAFTS, INITIAL ENCOUNTER (HCC): ICD-10-CM

## 2022-08-04 LAB
BASOPHILS # BLD AUTO: 0.02 THOUSANDS/ΜL (ref 0–0.1)
BASOPHILS NFR BLD AUTO: 0 % (ref 0–1)
CREAT SERPL-MCNC: 0.6 MG/DL (ref 0.6–1.3)
EOSINOPHIL # BLD AUTO: 0.07 THOUSAND/ΜL (ref 0–0.61)
EOSINOPHIL NFR BLD AUTO: 1 % (ref 0–6)
ERYTHROCYTE [DISTWIDTH] IN BLOOD BY AUTOMATED COUNT: 15.3 % (ref 11.6–15.1)
ERYTHROCYTE [SEDIMENTATION RATE] IN BLOOD: 22 MM/HOUR (ref 0–19)
GFR SERPL CREATININE-BSD FRML MDRD: 109 ML/MIN/1.73SQ M
HCT VFR BLD AUTO: 34.1 % (ref 36.5–49.3)
HGB BLD-MCNC: 10.5 G/DL (ref 12–17)
IMM GRANULOCYTES # BLD AUTO: 0.02 THOUSAND/UL (ref 0–0.2)
IMM GRANULOCYTES NFR BLD AUTO: 0 % (ref 0–2)
LYMPHOCYTES # BLD AUTO: 1.18 THOUSANDS/ΜL (ref 0.6–4.47)
LYMPHOCYTES NFR BLD AUTO: 23 % (ref 14–44)
MCH RBC QN AUTO: 27.5 PG (ref 26.8–34.3)
MCHC RBC AUTO-ENTMCNC: 30.8 G/DL (ref 31.4–37.4)
MCV RBC AUTO: 89 FL (ref 82–98)
MONOCYTES # BLD AUTO: 0.39 THOUSAND/ΜL (ref 0.17–1.22)
MONOCYTES NFR BLD AUTO: 8 % (ref 4–12)
NEUTROPHILS # BLD AUTO: 3.36 THOUSANDS/ΜL (ref 1.85–7.62)
NEUTS SEG NFR BLD AUTO: 68 % (ref 43–75)
NRBC BLD AUTO-RTO: 0 /100 WBCS
PLATELET # BLD AUTO: 229 THOUSANDS/UL (ref 149–390)
PMV BLD AUTO: 12.2 FL (ref 8.9–12.7)
RBC # BLD AUTO: 3.82 MILLION/UL (ref 3.88–5.62)
VANCOMYCIN TROUGH SERPL-MCNC: 10.6 UG/ML (ref 10–20)
WBC # BLD AUTO: 5.04 THOUSAND/UL (ref 4.31–10.16)

## 2022-08-04 PROCEDURE — G0299 HHS/HOSPICE OF RN EA 15 MIN: HCPCS

## 2022-08-04 PROCEDURE — 85652 RBC SED RATE AUTOMATED: CPT | Performed by: INTERNAL MEDICINE

## 2022-08-04 PROCEDURE — 80202 ASSAY OF VANCOMYCIN: CPT | Performed by: INTERNAL MEDICINE

## 2022-08-04 PROCEDURE — 85025 COMPLETE CBC W/AUTO DIFF WBC: CPT | Performed by: INTERNAL MEDICINE

## 2022-08-04 PROCEDURE — 82565 ASSAY OF CREATININE: CPT | Performed by: INTERNAL MEDICINE

## 2022-08-04 NOTE — TELEPHONE ENCOUNTER
Received pt VT of 10 6 creat 0 6 currently on vanco 1750mg Q12  Dr Josey Mcqueen made aware  Per Dr Josey Mcqueen pt to be on Vanco 2g IV Q 12H  [facility] made aware  They will be able to get new shipment out for evening dose  Repeat trough on Monday

## 2022-08-05 ENCOUNTER — HOME CARE VISIT (OUTPATIENT)
Dept: HOME HEALTH SERVICES | Facility: HOME HEALTHCARE | Age: 62
End: 2022-08-05
Payer: COMMERCIAL

## 2022-08-05 ENCOUNTER — OFFICE VISIT (OUTPATIENT)
Dept: INFECTIOUS DISEASES | Facility: CLINIC | Age: 62
End: 2022-08-05
Payer: COMMERCIAL

## 2022-08-05 VITALS
DIASTOLIC BLOOD PRESSURE: 72 MMHG | TEMPERATURE: 98 F | RESPIRATION RATE: 15 BRPM | SYSTOLIC BLOOD PRESSURE: 134 MMHG | HEART RATE: 66 BPM | OXYGEN SATURATION: 99 % | BODY MASS INDEX: 28.49 KG/M2 | WEIGHT: 199 LBS | HEIGHT: 70 IN

## 2022-08-05 DIAGNOSIS — T84.7XXD INFECTION OF BONE FLAP, SUBSEQUENT ENCOUNTER: Primary | Chronic | ICD-10-CM

## 2022-08-05 PROCEDURE — 1111F DSCHRG MED/CURRENT MED MERGE: CPT | Performed by: INTERNAL MEDICINE

## 2022-08-05 PROCEDURE — 99214 OFFICE O/P EST MOD 30 MIN: CPT | Performed by: INTERNAL MEDICINE

## 2022-08-05 NOTE — PROGRESS NOTES
Follow-Up Note - Infectious Disease   Harshad Robbins 62 y o  male MRN: [id number]      Impression/Recommendations:  1   Recurrent cranioplasty infection   In the setting of #2   Initially had Staph epidermidis native bone flap infection November 2021 status post flap removal, 6 weeks IV vancomycin   Underwent allograft cranioplasty March 0242   Complicated by wound nonhealing   Now status post I&D, flap removal   Intraoperative findings notable for gross purulence under the cranioplasty in the epidural space surrounding the entire posterior aspect/inner table of the cranioplasty   OR cultures with mixed skin kenny from broth only   ESR 27  Tolerating antibiotics well  Rec:  · Continue vancomycin for 6 weeks total through 8/30/22  · Check weekly CBC-diff, Cr, ESR, vanco trough while on IV antibiotics  · D/C PICC after last dose IV antibiotics  · After IV antibiotics complete will transition to doxycycline 100 mg PO Q12 to continue for 6 additional weeks - this need to be ordered at next visit  · Ongoing follow-up with Neurosurgery  · RTO 2 weeks with Dr Leanne Correa  · RTO late September for follow-up with me     2   History of SAH/SDH   Status post craniectomy 4/15/21, bone flap replacement 7/70/05   Complicated by #1     3   Seizure disorder   As complication of #2 in setting of #4   On Keppra      4   Alcohol abuse      The above plan was discussed in detail with the patient and his wife who accompanied him  Antibiotics:  Vancomycin  POD #17    Subjective:  Patient is here for follow-up of IV antibiotics  Doing well  Wound seems to be healing well  No drainage or redness  Denies fevers, chills, or sweats  Denies nausea, vomiting, or diarrhea        The following portions of the patient's history were reviewed and updated as appropriate: allergies, current medications, past medical history, past social history, past surgical history and problem list     Objective:  Vitals:  /72   Pulse 66 Temp 98 °F (36 7 °C)   Resp 15   Ht 5' 10" (1 778 m)   Wt 90 3 kg (199 lb)   SpO2 99%   BMI 28 55 kg/m²     Physical Exam:   General:  No acute distress  Eyes:  Normal lids and conjunctivae  ENT:  Normal external ears and nose  Head:  Incision intact with sutures, scattered scabbed areas, no dehiscence or drainage  Neck:  Neck symmetric with midline trachea  Pulmonary:  Normal respiratory effort without accessory muscle use  Cardiovascular:  Regular rate, extremities well-perfused  Extremities:  RUE PICC intact  Gastrointestinal:  No tenderness or distention  Musculoskeletal:  No digital clubbing or cyanosis  Skin:  No visible rashes; No palpable nodules  Neurologic:  Sensation grossly intact to light touch  Psychiatric:  Alert and oriented; Normal mood    Lab Results:  I have personally reviewed pertinent labs  Results from last 7 days   Lab Units 08/04/22  0845   CREATININE mg/dL 0 60   EGFR ml/min/1 73sq m 109     Results from last 7 days   Lab Units 08/04/22  0845   WBC Thousand/uL 5 04   HEMOGLOBIN g/dL 10 5*   PLATELETS Thousands/uL 229     ESR 22    Imaging Studies:   I have personally reviewed pertinent imaging study reports and images in PACS  EKG, Pathology, and Other Studies:   I have personally reviewed pertinent reports

## 2022-08-05 NOTE — CASE COMMUNICATION
Per Isabel Joshi at [address] [place] [address] Infectious disease please draw the weekly labs for the patient on mondays going forward starting 08 08 as his vanco dose had to be adjusted recently

## 2022-08-08 ENCOUNTER — LAB REQUISITION (OUTPATIENT)
Dept: LAB | Facility: HOSPITAL | Age: 62
End: 2022-08-08
Payer: COMMERCIAL

## 2022-08-08 ENCOUNTER — TELEPHONE (OUTPATIENT)
Dept: INFECTIOUS DISEASES | Facility: CLINIC | Age: 62
End: 2022-08-08

## 2022-08-08 ENCOUNTER — HOME CARE VISIT (OUTPATIENT)
Dept: HOME HEALTH SERVICES | Facility: HOME HEALTHCARE | Age: 62
End: 2022-08-08
Payer: COMMERCIAL

## 2022-08-08 VITALS
DIASTOLIC BLOOD PRESSURE: 70 MMHG | SYSTOLIC BLOOD PRESSURE: 116 MMHG | OXYGEN SATURATION: 94 % | TEMPERATURE: 97.4 F | HEART RATE: 72 BPM | RESPIRATION RATE: 20 BRPM

## 2022-08-08 DIAGNOSIS — T84.7XXA INFECTION AND INFLAMMATORY REACTION DUE TO OTHER INTERNAL ORTHOPEDIC PROSTHETIC DEVICES, IMPLANTS AND GRAFTS, INITIAL ENCOUNTER (HCC): ICD-10-CM

## 2022-08-08 LAB
BASOPHILS # BLD AUTO: 0.02 THOUSANDS/ΜL (ref 0–0.1)
BASOPHILS NFR BLD AUTO: 0 % (ref 0–1)
CREAT SERPL-MCNC: 0.61 MG/DL (ref 0.6–1.3)
EOSINOPHIL # BLD AUTO: 0.07 THOUSAND/ΜL (ref 0–0.61)
EOSINOPHIL NFR BLD AUTO: 1 % (ref 0–6)
ERYTHROCYTE [DISTWIDTH] IN BLOOD BY AUTOMATED COUNT: 15.1 % (ref 11.6–15.1)
ERYTHROCYTE [SEDIMENTATION RATE] IN BLOOD: 24 MM/HOUR (ref 0–19)
GFR SERPL CREATININE-BSD FRML MDRD: 108 ML/MIN/1.73SQ M
HCT VFR BLD AUTO: 32.5 % (ref 36.5–49.3)
HGB BLD-MCNC: 10.1 G/DL (ref 12–17)
IMM GRANULOCYTES # BLD AUTO: 0.02 THOUSAND/UL (ref 0–0.2)
IMM GRANULOCYTES NFR BLD AUTO: 0 % (ref 0–2)
LYMPHOCYTES # BLD AUTO: 0.91 THOUSANDS/ΜL (ref 0.6–4.47)
LYMPHOCYTES NFR BLD AUTO: 18 % (ref 14–44)
MCH RBC QN AUTO: 26.9 PG (ref 26.8–34.3)
MCHC RBC AUTO-ENTMCNC: 31.1 G/DL (ref 31.4–37.4)
MCV RBC AUTO: 87 FL (ref 82–98)
MONOCYTES # BLD AUTO: 0.49 THOUSAND/ΜL (ref 0.17–1.22)
MONOCYTES NFR BLD AUTO: 10 % (ref 4–12)
NEUTROPHILS # BLD AUTO: 3.54 THOUSANDS/ΜL (ref 1.85–7.62)
NEUTS SEG NFR BLD AUTO: 71 % (ref 43–75)
NRBC BLD AUTO-RTO: 0 /100 WBCS
PLATELET # BLD AUTO: 185 THOUSANDS/UL (ref 149–390)
PMV BLD AUTO: 11.9 FL (ref 8.9–12.7)
RBC # BLD AUTO: 3.75 MILLION/UL (ref 3.88–5.62)
VANCOMYCIN TROUGH SERPL-MCNC: 12.6 UG/ML (ref 10–20)
WBC # BLD AUTO: 5.05 THOUSAND/UL (ref 4.31–10.16)

## 2022-08-08 PROCEDURE — 82565 ASSAY OF CREATININE: CPT | Performed by: INTERNAL MEDICINE

## 2022-08-08 PROCEDURE — 85025 COMPLETE CBC W/AUTO DIFF WBC: CPT | Performed by: INTERNAL MEDICINE

## 2022-08-08 PROCEDURE — 85652 RBC SED RATE AUTOMATED: CPT | Performed by: INTERNAL MEDICINE

## 2022-08-08 PROCEDURE — G0299 HHS/HOSPICE OF RN EA 15 MIN: HCPCS

## 2022-08-08 PROCEDURE — 80202 ASSAY OF VANCOMYCIN: CPT | Performed by: INTERNAL MEDICINE

## 2022-08-08 NOTE — TELEPHONE ENCOUNTER
Received pt trough of 12 6 today and creat of 0 61  Dr Arlene Fernandez made aware  Pt currently on Vanco 2g IV Q12  Per Dr Arlene Fernandez pt to now be on Vanco 1g IV q8H  Repeat trough Wed prior to 3pm dose  Called and notified [facility] of order above  Called [facility] to inquire if nurse can go out Wed prior to 3pm dose to redraw trough

## 2022-08-09 ENCOUNTER — HOME CARE VISIT (OUTPATIENT)
Dept: HOME HEALTH SERVICES | Facility: HOME HEALTHCARE | Age: 62
End: 2022-08-09
Payer: COMMERCIAL

## 2022-08-10 ENCOUNTER — HOME CARE VISIT (OUTPATIENT)
Dept: HOME HEALTH SERVICES | Facility: HOME HEALTHCARE | Age: 62
End: 2022-08-10
Payer: COMMERCIAL

## 2022-08-10 ENCOUNTER — LAB REQUISITION (OUTPATIENT)
Dept: LAB | Facility: HOSPITAL | Age: 62
End: 2022-08-10
Payer: COMMERCIAL

## 2022-08-10 VITALS
DIASTOLIC BLOOD PRESSURE: 84 MMHG | OXYGEN SATURATION: 99 % | SYSTOLIC BLOOD PRESSURE: 140 MMHG | RESPIRATION RATE: 16 BRPM | TEMPERATURE: 97.7 F | HEART RATE: 80 BPM

## 2022-08-10 VITALS
HEART RATE: 88 BPM | OXYGEN SATURATION: 98 % | TEMPERATURE: 98.6 F | SYSTOLIC BLOOD PRESSURE: 128 MMHG | RESPIRATION RATE: 20 BRPM | DIASTOLIC BLOOD PRESSURE: 80 MMHG

## 2022-08-10 DIAGNOSIS — T84.7XXA INFECTION AND INFLAMMATORY REACTION DUE TO OTHER INTERNAL ORTHOPEDIC PROSTHETIC DEVICES, IMPLANTS AND GRAFTS, INITIAL ENCOUNTER (HCC): ICD-10-CM

## 2022-08-10 LAB — VANCOMYCIN TROUGH SERPL-MCNC: 12.9 UG/ML (ref 10–20)

## 2022-08-10 PROCEDURE — G0299 HHS/HOSPICE OF RN EA 15 MIN: HCPCS

## 2022-08-10 PROCEDURE — 80202 ASSAY OF VANCOMYCIN: CPT | Performed by: INTERNAL MEDICINE

## 2022-08-11 ENCOUNTER — TELEPHONE (OUTPATIENT)
Dept: INFECTIOUS DISEASES | Facility: CLINIC | Age: 62
End: 2022-08-11

## 2022-08-15 ENCOUNTER — LAB REQUISITION (OUTPATIENT)
Dept: LAB | Facility: HOSPITAL | Age: 62
End: 2022-08-15
Payer: COMMERCIAL

## 2022-08-15 ENCOUNTER — HOME CARE VISIT (OUTPATIENT)
Dept: HOME HEALTH SERVICES | Facility: HOME HEALTHCARE | Age: 62
End: 2022-08-15
Payer: COMMERCIAL

## 2022-08-15 DIAGNOSIS — T84.7XXA INFECTION AND INFLAMMATORY REACTION DUE TO OTHER INTERNAL ORTHOPEDIC PROSTHETIC DEVICES, IMPLANTS AND GRAFTS, INITIAL ENCOUNTER (HCC): ICD-10-CM

## 2022-08-15 LAB
BASOPHILS # BLD AUTO: 0.01 THOUSANDS/ΜL (ref 0–0.1)
BASOPHILS NFR BLD AUTO: 0 % (ref 0–1)
CREAT SERPL-MCNC: 0.68 MG/DL (ref 0.6–1.3)
EOSINOPHIL # BLD AUTO: 0.08 THOUSAND/ΜL (ref 0–0.61)
EOSINOPHIL NFR BLD AUTO: 2 % (ref 0–6)
ERYTHROCYTE [DISTWIDTH] IN BLOOD BY AUTOMATED COUNT: 15.1 % (ref 11.6–15.1)
ERYTHROCYTE [SEDIMENTATION RATE] IN BLOOD: 13 MM/HOUR (ref 0–19)
GFR SERPL CREATININE-BSD FRML MDRD: 103 ML/MIN/1.73SQ M
HCT VFR BLD AUTO: 32.5 % (ref 36.5–49.3)
HGB BLD-MCNC: 9.9 G/DL (ref 12–17)
IMM GRANULOCYTES # BLD AUTO: 0.01 THOUSAND/UL (ref 0–0.2)
IMM GRANULOCYTES NFR BLD AUTO: 0 % (ref 0–2)
LYMPHOCYTES # BLD AUTO: 1.05 THOUSANDS/ΜL (ref 0.6–4.47)
LYMPHOCYTES NFR BLD AUTO: 25 % (ref 14–44)
MCH RBC QN AUTO: 26.2 PG (ref 26.8–34.3)
MCHC RBC AUTO-ENTMCNC: 30.5 G/DL (ref 31.4–37.4)
MCV RBC AUTO: 86 FL (ref 82–98)
MONOCYTES # BLD AUTO: 0.43 THOUSAND/ΜL (ref 0.17–1.22)
MONOCYTES NFR BLD AUTO: 10 % (ref 4–12)
NEUTROPHILS # BLD AUTO: 2.57 THOUSANDS/ΜL (ref 1.85–7.62)
NEUTS SEG NFR BLD AUTO: 63 % (ref 43–75)
NRBC BLD AUTO-RTO: 0 /100 WBCS
PLATELET # BLD AUTO: 140 THOUSANDS/UL (ref 149–390)
PMV BLD AUTO: 11.1 FL (ref 8.9–12.7)
RBC # BLD AUTO: 3.78 MILLION/UL (ref 3.88–5.62)
VANCOMYCIN TROUGH SERPL-MCNC: 14.5 UG/ML (ref 10–20)
WBC # BLD AUTO: 4.15 THOUSAND/UL (ref 4.31–10.16)

## 2022-08-15 PROCEDURE — 82565 ASSAY OF CREATININE: CPT | Performed by: INTERNAL MEDICINE

## 2022-08-15 PROCEDURE — G0299 HHS/HOSPICE OF RN EA 15 MIN: HCPCS

## 2022-08-15 PROCEDURE — 85652 RBC SED RATE AUTOMATED: CPT | Performed by: INTERNAL MEDICINE

## 2022-08-15 PROCEDURE — 85025 COMPLETE CBC W/AUTO DIFF WBC: CPT | Performed by: INTERNAL MEDICINE

## 2022-08-15 PROCEDURE — 80202 ASSAY OF VANCOMYCIN: CPT | Performed by: INTERNAL MEDICINE

## 2022-08-16 ENCOUNTER — TELEPHONE (OUTPATIENT)
Dept: INFECTIOUS DISEASES | Facility: CLINIC | Age: 62
End: 2022-08-16

## 2022-08-16 NOTE — TELEPHONE ENCOUNTER
Received pt VT of 14 5, pt is currently on Vanco 1250mg Q 8H, discussed with Dr Selina Ribeiro pt to remain on the same dose of Vancomycin  It was also reported that the pt is having issues with blood return on the PICC line and [facility] requesting order for cathflo  Ok per Dr Selina Ribeiro  Cathflo order sent to [facility]

## 2022-08-17 ENCOUNTER — CLINICAL SUPPORT (OUTPATIENT)
Dept: NEUROSURGERY | Facility: CLINIC | Age: 62
End: 2022-08-17

## 2022-08-17 ENCOUNTER — OFFICE VISIT (OUTPATIENT)
Dept: INFECTIOUS DISEASES | Facility: CLINIC | Age: 62
End: 2022-08-17
Payer: COMMERCIAL

## 2022-08-17 VITALS
RESPIRATION RATE: 18 BRPM | DIASTOLIC BLOOD PRESSURE: 70 MMHG | TEMPERATURE: 98 F | OXYGEN SATURATION: 98 % | BODY MASS INDEX: 28.52 KG/M2 | HEIGHT: 70 IN | SYSTOLIC BLOOD PRESSURE: 118 MMHG | WEIGHT: 199.2 LBS | HEART RATE: 76 BPM

## 2022-08-17 VITALS — SYSTOLIC BLOOD PRESSURE: 142 MMHG | DIASTOLIC BLOOD PRESSURE: 82 MMHG | TEMPERATURE: 98.7 F

## 2022-08-17 DIAGNOSIS — Z79.2 LONG TERM (CURRENT) USE OF ANTIBIOTICS: ICD-10-CM

## 2022-08-17 DIAGNOSIS — T84.7XXD INFECTION OF BONE FLAP, SUBSEQUENT ENCOUNTER: Primary | ICD-10-CM

## 2022-08-17 DIAGNOSIS — Z98.890 STATUS POST CRANIECTOMY: Chronic | ICD-10-CM

## 2022-08-17 DIAGNOSIS — Z98.890 STATUS POST CRANIOTOMY: ICD-10-CM

## 2022-08-17 DIAGNOSIS — F10.10 ALCOHOL ABUSE: ICD-10-CM

## 2022-08-17 DIAGNOSIS — D72.819 LEUKOPENIA, UNSPECIFIED TYPE: ICD-10-CM

## 2022-08-17 DIAGNOSIS — Z98.890 POST-OPERATIVE STATE: Primary | ICD-10-CM

## 2022-08-17 DIAGNOSIS — R56.9 SEIZURE (HCC): Chronic | ICD-10-CM

## 2022-08-17 PROCEDURE — 99214 OFFICE O/P EST MOD 30 MIN: CPT | Performed by: INTERNAL MEDICINE

## 2022-08-17 PROCEDURE — 1111F DSCHRG MED/CURRENT MED MERGE: CPT | Performed by: INTERNAL MEDICINE

## 2022-08-17 PROCEDURE — 99024 POSTOP FOLLOW-UP VISIT: CPT

## 2022-08-17 RX ORDER — DOXYCYCLINE 100 MG/1
100 CAPSULE ORAL 2 TIMES DAILY
Qty: 84 CAPSULE | Refills: 0 | Status: SHIPPED | OUTPATIENT
Start: 2022-08-30 | End: 2022-10-06 | Stop reason: SDUPTHER

## 2022-08-17 NOTE — PATIENT INSTRUCTIONS
Continue vancomycin for 6 weeks total through 8/30/22  Check weekly CBC-diff, Cr, ESR, vanco trough while on IV antibiotics     Will also check CBC-diff, Cr, ESR, vanco trough on 8/26 as pt has 2 physician appt on 8/29  D/C PICC after last dose IV antibiotics  After IV antibiotics complete, transition to doxycycline 100 mg PO Q12 to continue for 6 additional weeks  Ongoing follow-up with Neurosurgery  RTO in 2 weeks on 8/29 at 11:30 am

## 2022-08-17 NOTE — PROGRESS NOTES
Progress Note - Infectious Disease   Margareth Hope 62 y o  male MRN: [id number]   Encounter: [id number]    Impression/Plan:  1   Recurrent cranioplasty infection   In the setting of #2   Initially had Staph epidermidis native bone flap infection November 2021 status post flap removal, 6 weeks IV vancomycin   Underwent allograft cranioplasty March 1804   Complicated by wound nonhealing  Status post I&D, flap removal on 7/19/22   Intraoperative findings notable for gross purulence under the cranioplasty in the epidural space surrounding the entire posterior aspect/inner table of the cranioplasty   OR cultures with mixed skin kenny from broth only   ESR has normalized  Tolerating antibiotics well  · Continue vancomycin iv for 6 weeks total through 8/30/22; vanco trough of 14 5 is appropriate  · Check weekly CBC-diff, Cr, ESR, vanco trough while on IV antibiotics  · Will also check CBC-diff, Cr, ESR, vanco trough on 8/26 as pt has 2 physician appt on 8/29 and may be unable to return home in time for RN visit  · D/C PICC after last dose IV antibiotics  · After IV antibiotics complete, transition to doxycycline 100 mg PO Q12 to continue for 6 additional weeks  · Ongoing follow-up with Neurosurgery  · RTO in 2 weeks on 8/29 at 11:30 am    2   History of SAH/SDH   Status post craniectomy 4/15/21, bone flap replacement 4/25/84   Complicated by #1     3   Seizure disorder   As complication of #2 in setting of #4     On Keppra      4   Alcohol abuse  5   Leukopenia, thrombocytopenia  Could be due to vancomycin iv  Thrombocytopenia could be due to #4  · Repeat CBC next week      My recommendations were discussed with the patient and his wife in detail who verbalized understanding      Antibiotics: vanco iv    Current Outpatient Medications:     halobetasol (ULTRAVATE) 0 05 % cream, Apply topically 2 (two) times a day, Disp: 50 g, Rfl: 1    levETIRAcetam (KEPPRA) 750 mg tablet, Take 1 tablet (750 mg total) by mouth every 12 (twelve) hours, Disp: 180 tablet, Rfl: 3    lisinopril (ZESTRIL) 20 mg tablet, Take 1 tablet (20 mg total) by mouth daily, Disp: 90 tablet, Rfl: 1    LORazepam (ATIVAN) 1 mg tablet, Take 1 tablet (1 mg total) by mouth 3 (three) times a day as needed for anxiety, Disp: 30 tablet, Rfl: 0    methocarbamol (ROBAXIN) 750 mg tablet, Take 1 tablet (750 mg total) by mouth every 6 (six) hours (Patient taking differently: Take 750 mg by mouth every 6 (six) hours as needed), Disp: 30 tablet, Rfl: 0    metoprolol tartrate (LOPRESSOR) 25 mg tablet, Take 1 tablet (25 mg total) by mouth every 12 (twelve) hours, Disp: 180 tablet, Rfl: 1    omeprazole (PriLOSEC) 20 mg delayed release capsule, Take 20 mg by mouth daily, Disp: , Rfl:     Sodium Chloride Flush (SALINE FLUSH IV), Inject 10 mL into a catheter in a vein every 12 (twelve) hours  Indications: FLUSHING, Disp: , Rfl:     vancomycin (VANCOCIN), Inject 1,250 mg into a catheter in a vein every 8 (eight) hours Dose increased 8/5/22 based on trough, Disp: , Rfl:     acetaminophen (TYLENOL) 325 mg tablet, Take 3 tablets (975 mg total) by mouth every 8 (eight) hours (Patient not taking: No sig reported), Disp: , Rfl: 0    Clindamycin Phosphate foam, Apply 1 Dose topically 2 (two) times a day (Patient not taking: No sig reported), Disp: 50 g, Rfl: 0    docusate sodium (COLACE) 100 mg capsule, Take 1 capsule (100 mg total) by mouth 2 (two) times a day (Patient not taking: No sig reported), Disp: , Rfl: 0    mupirocin (BACTROBAN) 2 % ointment, Apply topically daily (Patient not taking: No sig reported), Disp: 22 g, Rfl: 0    Subjective:  Patient seen for follow-up regarding recurrent cranioplasty infection on IV vancomycin  Pt says he feels well  He is tolerating vancomycin iv  He reports localized irritation from the sutures from his cranial wound- sutures are to be removed this morning   Pt has no fever, chills, sweats; no nausea, vomiting, diarrhea; no cough, shortness of breath; no pain  Objective:  Vitals:  Vitals:    08/17/22 0903   BP: 118/70   BP Location: Left arm   Patient Position: Sitting   Cuff Size: Adult   Pulse: 76   Resp: 18   Temp: 98 °F (36 7 °C)   TempSrc: Temporal   SpO2: 98%   Weight: 90 4 kg (199 lb 3 2 oz)   Height: 5' 10" (1 778 m)   Physical Exam:   General Appearance:  Alert, interactive, nontoxic, no acute distress  Throat: Oral mucosa is moist   Lungs:   Clear to auscultation bilaterally; no wheezes, respirations unlabored   Heart:  S1, S2, RRR; no murmur   Abdomen:   Soft, non-tender, non-distended   Extremities: RUE PICC line intact  No distal leg edema b/l   Neurologic: AAO to person, surroundings, conversant, fluent speech   Skin: No rash or draining lesions   S/p RT cranial flap removal, midline suture wound is dry, no drainage or erythema with sutures intact     Labs, Imaging, & Other studies:   All pertinent labs, cultures and imaging reports were personally reviewed  Results from last 7 days   Lab Units 08/15/22  1510   WBC Thousand/uL 4 15*   HEMOGLOBIN g/dL 9 9*   PLATELETS Thousands/uL 140*     Results from last 7 days   Lab Units 08/15/22  1510   CREATININE mg/dL 0 68   EGFR ml/min/1 73sq m 103     8/15/22 vanco trough 14 5, ESR 13

## 2022-08-17 NOTE — PROGRESS NOTES
Post-Op Visit- Neurosurgery    Grace Velazquez 62 y o  male MRN: [id number]    Chief Complaint:  Patient presents post: Right re-opening of craniotomy incision, and debridement with craniectomy - Right    History of Present Illness:  Patient presents for 4 week POV for incision check accompanied by spouse and ambulating without an assistive device  Patient presented to the office wearing a bicycle helmet  Patient reports he is doing well overall and denies any incisional issues or fevers  he denies any new weakness, numbness or tingling since the surgery  Patient continuing with IV antibiotics with ID  Patient on IV vancomycin until 8/30 and will begin PO doxycycline on 8/31  Patient is scheduled to get updated bloodwork on 8/26 from ID and Dr Marlena Hoyos requested CRP also be added to complete  This RN placed the order for the labwork  Patient denies surgical pain at this time and rates their pain as a Pain Score: 0-No pain (irritation at surgical site)/10  Patient reported irritation at the surgical site but denies pain  Patient is currently taking robaxin with complete resolution of pain symptoms  Patient was seen and evaluated by Dr Marlena Hoyos who stated sutures may come out today  Patient to continue following with ID and follow up with Dr Marlena Hoyos in 2 weeks, 8/29  Assessment:   Vitals:    08/17/22 0951   BP: 142/82   Temp: 98 7 °F (37 1 °C)       Wound Exam: Incision well approximated  No erythema, edema or drainage present  Dandruff and slight scabbing noted at the area  Location: right sided craniotomy  See images below                       Procedure:  Staple/suture removal    Procedure Note: sutures were removed  Cleansed area with hydrogen peroxide  Patient Status: the patient tolerated the procedure well  DSD & helmet in place  Complications: None  Discussion/Summary:  Doing well postoperatively   Reviewed incision care with patient including daily observation for s/s infection including: increased erythema, edema, drainage, dehiscence of incision or fever >101  Should these be observed, he understands that he is to call and/or return immediately for reassessment  Advised patient to continue cleansing area with mild soap and water and pat dry  Not to apply any lotions, creams, or ointments, & not to submerge in any water for 2 more weeks  He is to maintain activity restrictions until cleared by the surgeon  Activity levels were also reviewed with the patient in detail, he is to lift no greater than 10 pounds and ambulation is encouraged as tolerated  Verified date/time/location of upcoming POV  He is to call the office with any further questions or concerns, or if any incisional issues or fevers would arise

## 2022-08-19 VITALS
DIASTOLIC BLOOD PRESSURE: 70 MMHG | SYSTOLIC BLOOD PRESSURE: 114 MMHG | TEMPERATURE: 98.6 F | RESPIRATION RATE: 20 BRPM | OXYGEN SATURATION: 98 % | HEART RATE: 80 BPM

## 2022-08-22 ENCOUNTER — LAB REQUISITION (OUTPATIENT)
Dept: LAB | Facility: HOSPITAL | Age: 62
End: 2022-08-22
Payer: COMMERCIAL

## 2022-08-22 ENCOUNTER — HOME CARE VISIT (OUTPATIENT)
Dept: HOME HEALTH SERVICES | Facility: HOME HEALTHCARE | Age: 62
End: 2022-08-22
Payer: COMMERCIAL

## 2022-08-22 DIAGNOSIS — F41.9 ANXIETY: ICD-10-CM

## 2022-08-22 DIAGNOSIS — T84.7XXA INFECTION AND INFLAMMATORY REACTION DUE TO OTHER INTERNAL ORTHOPEDIC PROSTHETIC DEVICES, IMPLANTS AND GRAFTS, INITIAL ENCOUNTER (HCC): ICD-10-CM

## 2022-08-22 LAB
BASOPHILS # BLD AUTO: 0.02 THOUSANDS/ΜL (ref 0–0.1)
BASOPHILS NFR BLD AUTO: 0 % (ref 0–1)
CREAT SERPL-MCNC: 0.63 MG/DL (ref 0.6–1.3)
EOSINOPHIL # BLD AUTO: 0.06 THOUSAND/ΜL (ref 0–0.61)
EOSINOPHIL NFR BLD AUTO: 1 % (ref 0–6)
ERYTHROCYTE [DISTWIDTH] IN BLOOD BY AUTOMATED COUNT: 15.2 % (ref 11.6–15.1)
ERYTHROCYTE [SEDIMENTATION RATE] IN BLOOD: 19 MM/HOUR (ref 0–19)
FUNGUS SPEC CULT: NORMAL
GFR SERPL CREATININE-BSD FRML MDRD: 107 ML/MIN/1.73SQ M
HCT VFR BLD AUTO: 33.6 % (ref 36.5–49.3)
HGB BLD-MCNC: 10.4 G/DL (ref 12–17)
IMM GRANULOCYTES # BLD AUTO: 0.01 THOUSAND/UL (ref 0–0.2)
IMM GRANULOCYTES NFR BLD AUTO: 0 % (ref 0–2)
LYMPHOCYTES # BLD AUTO: 1.28 THOUSANDS/ΜL (ref 0.6–4.47)
LYMPHOCYTES NFR BLD AUTO: 26 % (ref 14–44)
MCH RBC QN AUTO: 26.1 PG (ref 26.8–34.3)
MCHC RBC AUTO-ENTMCNC: 31 G/DL (ref 31.4–37.4)
MCV RBC AUTO: 84 FL (ref 82–98)
MONOCYTES # BLD AUTO: 0.55 THOUSAND/ΜL (ref 0.17–1.22)
MONOCYTES NFR BLD AUTO: 11 % (ref 4–12)
NEUTROPHILS # BLD AUTO: 3 THOUSANDS/ΜL (ref 1.85–7.62)
NEUTS SEG NFR BLD AUTO: 62 % (ref 43–75)
NRBC BLD AUTO-RTO: 0 /100 WBCS
PLATELET # BLD AUTO: 177 THOUSANDS/UL (ref 149–390)
PMV BLD AUTO: 12.2 FL (ref 8.9–12.7)
RBC # BLD AUTO: 3.98 MILLION/UL (ref 3.88–5.62)
VANCOMYCIN TROUGH SERPL-MCNC: 15.3 UG/ML (ref 10–20)
WBC # BLD AUTO: 4.92 THOUSAND/UL (ref 4.31–10.16)

## 2022-08-22 PROCEDURE — 400013 VN SOC

## 2022-08-22 PROCEDURE — 85025 COMPLETE CBC W/AUTO DIFF WBC: CPT | Performed by: INTERNAL MEDICINE

## 2022-08-22 PROCEDURE — 82565 ASSAY OF CREATININE: CPT | Performed by: INTERNAL MEDICINE

## 2022-08-22 PROCEDURE — 80202 ASSAY OF VANCOMYCIN: CPT | Performed by: INTERNAL MEDICINE

## 2022-08-22 PROCEDURE — G0299 HHS/HOSPICE OF RN EA 15 MIN: HCPCS

## 2022-08-22 PROCEDURE — 85652 RBC SED RATE AUTOMATED: CPT | Performed by: INTERNAL MEDICINE

## 2022-08-23 LAB
MYCOBACTERIUM SPEC CULT: NORMAL
RHODAMINE-AURAMINE STN SPEC: NORMAL

## 2022-08-23 RX ORDER — LORAZEPAM 1 MG/1
1 TABLET ORAL 3 TIMES DAILY PRN
Qty: 60 TABLET | Refills: 0 | Status: SHIPPED | OUTPATIENT
Start: 2022-08-23 | End: 2022-10-04 | Stop reason: SDUPTHER

## 2022-08-24 ENCOUNTER — HOSPITAL ENCOUNTER (OUTPATIENT)
Dept: CT IMAGING | Facility: HOSPITAL | Age: 62
Discharge: HOME | End: 2022-08-24
Payer: COMMERCIAL

## 2022-08-24 VITALS
RESPIRATION RATE: 16 BRPM | HEART RATE: 76 BPM | SYSTOLIC BLOOD PRESSURE: 136 MMHG | DIASTOLIC BLOOD PRESSURE: 72 MMHG | TEMPERATURE: 98.3 F | OXYGEN SATURATION: 99 %

## 2022-08-24 DIAGNOSIS — Z98.890 POST-OPERATIVE STATE: ICD-10-CM

## 2022-08-24 DIAGNOSIS — T84.7XXD INFECTION OF BONE FLAP, SUBSEQUENT ENCOUNTER: ICD-10-CM

## 2022-08-24 DIAGNOSIS — Z98.890 STATUS POST CRANIECTOMY: ICD-10-CM

## 2022-08-24 PROCEDURE — G1004 CDSM NDSC: HCPCS

## 2022-08-24 PROCEDURE — 70450 CT HEAD/BRAIN W/O DYE: CPT

## 2022-08-26 ENCOUNTER — LAB REQUISITION (OUTPATIENT)
Dept: LAB | Facility: HOSPITAL | Age: 62
End: 2022-08-26
Payer: COMMERCIAL

## 2022-08-26 ENCOUNTER — HOME CARE VISIT (OUTPATIENT)
Dept: HOME HEALTH SERVICES | Facility: HOME HEALTHCARE | Age: 62
End: 2022-08-26
Payer: COMMERCIAL

## 2022-08-26 DIAGNOSIS — T84.7XXA INFECTION AND INFLAMMATORY REACTION DUE TO OTHER INTERNAL ORTHOPEDIC PROSTHETIC DEVICES, IMPLANTS AND GRAFTS, INITIAL ENCOUNTER (HCC): ICD-10-CM

## 2022-08-26 DIAGNOSIS — Z98.890 OTHER SPECIFIED POSTPROCEDURAL STATES: ICD-10-CM

## 2022-08-26 LAB
BASOPHILS # BLD AUTO: 0.02 THOUSANDS/ΜL (ref 0–0.1)
BASOPHILS NFR BLD AUTO: 1 % (ref 0–1)
CREAT SERPL-MCNC: 0.64 MG/DL (ref 0.6–1.3)
CRP SERPL QL: 4.9 MG/L
EOSINOPHIL # BLD AUTO: 0.05 THOUSAND/ΜL (ref 0–0.61)
EOSINOPHIL NFR BLD AUTO: 1 % (ref 0–6)
ERYTHROCYTE [DISTWIDTH] IN BLOOD BY AUTOMATED COUNT: 15.4 % (ref 11.6–15.1)
ERYTHROCYTE [SEDIMENTATION RATE] IN BLOOD: 19 MM/HOUR (ref 0–19)
GFR SERPL CREATININE-BSD FRML MDRD: 106 ML/MIN/1.73SQ M
HCT VFR BLD AUTO: 33 % (ref 36.5–49.3)
HGB BLD-MCNC: 10 G/DL (ref 12–17)
IMM GRANULOCYTES # BLD AUTO: 0.01 THOUSAND/UL (ref 0–0.2)
IMM GRANULOCYTES NFR BLD AUTO: 0 % (ref 0–2)
LYMPHOCYTES # BLD AUTO: 1.29 THOUSANDS/ΜL (ref 0.6–4.47)
LYMPHOCYTES NFR BLD AUTO: 33 % (ref 14–44)
MCH RBC QN AUTO: 25.8 PG (ref 26.8–34.3)
MCHC RBC AUTO-ENTMCNC: 30.3 G/DL (ref 31.4–37.4)
MCV RBC AUTO: 85 FL (ref 82–98)
MONOCYTES # BLD AUTO: 0.37 THOUSAND/ΜL (ref 0.17–1.22)
MONOCYTES NFR BLD AUTO: 10 % (ref 4–12)
NEUTROPHILS # BLD AUTO: 2.16 THOUSANDS/ΜL (ref 1.85–7.62)
NEUTS SEG NFR BLD AUTO: 55 % (ref 43–75)
NRBC BLD AUTO-RTO: 0 /100 WBCS
PLATELET # BLD AUTO: 176 THOUSANDS/UL (ref 149–390)
PMV BLD AUTO: 11.4 FL (ref 8.9–12.7)
RBC # BLD AUTO: 3.87 MILLION/UL (ref 3.88–5.62)
VANCOMYCIN TROUGH SERPL-MCNC: 15.1 UG/ML (ref 10–20)
WBC # BLD AUTO: 3.9 THOUSAND/UL (ref 4.31–10.16)

## 2022-08-26 PROCEDURE — 86140 C-REACTIVE PROTEIN: CPT | Performed by: NURSE PRACTITIONER

## 2022-08-26 PROCEDURE — 85025 COMPLETE CBC W/AUTO DIFF WBC: CPT | Performed by: NURSE PRACTITIONER

## 2022-08-26 PROCEDURE — 80202 ASSAY OF VANCOMYCIN: CPT | Performed by: NURSE PRACTITIONER

## 2022-08-26 PROCEDURE — 85652 RBC SED RATE AUTOMATED: CPT | Performed by: NURSE PRACTITIONER

## 2022-08-26 PROCEDURE — 82565 ASSAY OF CREATININE: CPT | Performed by: NURSE PRACTITIONER

## 2022-08-26 PROCEDURE — G0299 HHS/HOSPICE OF RN EA 15 MIN: HCPCS

## 2022-08-29 ENCOUNTER — OFFICE VISIT (OUTPATIENT)
Dept: INFECTIOUS DISEASES | Facility: CLINIC | Age: 62
End: 2022-08-29
Payer: COMMERCIAL

## 2022-08-29 ENCOUNTER — OFFICE VISIT (OUTPATIENT)
Dept: NEUROSURGERY | Facility: CLINIC | Age: 62
End: 2022-08-29

## 2022-08-29 VITALS
SYSTOLIC BLOOD PRESSURE: 138 MMHG | HEART RATE: 69 BPM | RESPIRATION RATE: 18 BRPM | WEIGHT: 200 LBS | HEIGHT: 70 IN | TEMPERATURE: 98.7 F | BODY MASS INDEX: 28.63 KG/M2 | DIASTOLIC BLOOD PRESSURE: 62 MMHG | OXYGEN SATURATION: 99 %

## 2022-08-29 VITALS
OXYGEN SATURATION: 97 % | TEMPERATURE: 98.6 F | SYSTOLIC BLOOD PRESSURE: 138 MMHG | WEIGHT: 200 LBS | HEIGHT: 70 IN | BODY MASS INDEX: 28.63 KG/M2 | DIASTOLIC BLOOD PRESSURE: 65 MMHG | HEART RATE: 91 BPM

## 2022-08-29 VITALS
DIASTOLIC BLOOD PRESSURE: 72 MMHG | OXYGEN SATURATION: 99 % | RESPIRATION RATE: 20 BRPM | SYSTOLIC BLOOD PRESSURE: 128 MMHG | HEART RATE: 76 BPM | TEMPERATURE: 97.6 F

## 2022-08-29 DIAGNOSIS — Z79.2 LONG TERM (CURRENT) USE OF ANTIBIOTICS: ICD-10-CM

## 2022-08-29 DIAGNOSIS — Z98.890 STATUS POST CRANIECTOMY: Chronic | ICD-10-CM

## 2022-08-29 DIAGNOSIS — Z51.81 THERAPEUTIC DRUG MONITORING: ICD-10-CM

## 2022-08-29 DIAGNOSIS — F10.10 ALCOHOL ABUSE: ICD-10-CM

## 2022-08-29 DIAGNOSIS — T84.7XXD INFECTION OF BONE FLAP, SUBSEQUENT ENCOUNTER: Primary | ICD-10-CM

## 2022-08-29 DIAGNOSIS — R56.9 SEIZURE (HCC): Chronic | ICD-10-CM

## 2022-08-29 DIAGNOSIS — T84.7XXD INFECTION OF BONE FLAP, SUBSEQUENT ENCOUNTER: Primary | Chronic | ICD-10-CM

## 2022-08-29 PROCEDURE — 99024 POSTOP FOLLOW-UP VISIT: CPT | Performed by: NEUROLOGICAL SURGERY

## 2022-08-29 PROCEDURE — 99214 OFFICE O/P EST MOD 30 MIN: CPT | Performed by: INTERNAL MEDICINE

## 2022-08-29 NOTE — PROGRESS NOTES
Progress Note - Infectious Disease   Jun Benito 62 y o  male MRN: [id number]   Encounter: [id number]    Impression/Plan:  1   Recurrent cranioplasty infection   In the setting of #2   Initially had Staph epidermidis native bone flap infection November 2021- status post flap removal, 6 weeks IV vancomycin   Underwent allograft cranioplasty March 6269   Complicated by wound nonhealing  Status post I&D, flap removal on 7/19/22   Intraoperative findings notable for gross purulence under the cranioplasty in the epidural space surrounding the entire posterior aspect/inner table of the cranioplasty   OR cultures with mixed skin kenny from broth only   ESR has normalized  Pt is tolerating antibiotics well  ? Continue vancomycin iv for 6 weeks total through 8/30/22; vanco trough of 15 is appropriate  ? Check weekly CBC-diff, Creatinine, ESR, vanco trough while on IV antibiotics  ? D/C PICC line after last dose IV antibiotics  ? After IV antibiotics complete, transition to doxycycline 100 mg PO Q12 on 8/31 to continue for 6 additional weeks  Side effects of antibiotic therapy discussed with patient to include but not limited to nausea, vomiting, diarrhea, rash  Patient advised to avoid excessive sun exposure while on doxycycline  ? Ongoing follow-up with Neurosurgery service  ? Labs prior to next appt: CBC diff, BMP  ? RTO in 3 weeks, virtual visit is acceptable     2   History of SAH/SDH   Status post craniectomy 4/15/21, bone flap replacement 6/95/36   Complicated by #1      3   Seizure disorder   As complication of #2 in setting of #4  Stable  on Keppra      4   Alcohol abuse      5   Leukopenia, thrombocytopenia  Leukopenia could be due to vancomycin iv  Thrombocytopenia could be due to #4   ? Repeat CBC prior to next appt    My recommendations were discussed with the patient in detail who verbalized understanding      Antibiotics: vancomycin iv    Current Outpatient Medications:     halobetasol (ULTRAVATE) 0 05 % cream, Apply topically 2 (two) times a day, Disp: 50 g, Rfl: 1    levETIRAcetam (KEPPRA) 750 mg tablet, Take 1 tablet (750 mg total) by mouth every 12 (twelve) hours, Disp: 180 tablet, Rfl: 3    lisinopril (ZESTRIL) 20 mg tablet, Take 1 tablet (20 mg total) by mouth daily, Disp: 90 tablet, Rfl: 1    LORazepam (ATIVAN) 1 mg tablet, Take 1 tablet (1 mg total) by mouth 3 (three) times a day as needed for anxiety, Disp: 60 tablet, Rfl: 0    methocarbamol (ROBAXIN) 750 mg tablet, Take 1 tablet (750 mg total) by mouth every 6 (six) hours (Patient taking differently: Take 750 mg by mouth every 6 (six) hours as needed), Disp: 30 tablet, Rfl: 0    metoprolol tartrate (LOPRESSOR) 25 mg tablet, Take 1 tablet (25 mg total) by mouth every 12 (twelve) hours, Disp: 180 tablet, Rfl: 1    omeprazole (PriLOSEC) 20 mg delayed release capsule, Take 20 mg by mouth daily, Disp: , Rfl:     Sodium Chloride Flush (SALINE FLUSH IV), Inject 10 mL into a catheter in a vein every 12 (twelve) hours  Indications: FLUSHING, Disp: , Rfl:     vancomycin (VANCOCIN), Inject 1,250 mg into a catheter in a vein every 8 (eight) hours Dose increased 8/5/22 based on trough, Disp: , Rfl:     acetaminophen (TYLENOL) 325 mg tablet, Take 3 tablets (975 mg total) by mouth every 8 (eight) hours (Patient not taking: No sig reported), Disp: , Rfl: 0    Clindamycin Phosphate foam, Apply 1 Dose topically 2 (two) times a day (Patient not taking: No sig reported), Disp: 50 g, Rfl: 0    [START ON 8/30/2022] doxycycline monohydrate (MONODOX) 100 mg capsule, Take 1 capsule (100 mg total) by mouth 2 (two) times a day Pt to start doxycycline on 8/31 (Patient not taking: No sig reported), Disp: 84 capsule, Rfl: 0    Subjective:  Patient seen for follow-up regarding recurrent cranioplasty infection on IV vancomycin  Pt says he feels well  He is tolerating vancomycin iv  He his sutures from his cranial wound were removed 10 days ago   No drainage from scalp is wound  Pt has no fever, chills, sweats; no nausea, vomiting, diarrhea; no cough, shortness of breath  Objective:  Vitals:  Vitals:    08/29/22 1126   BP: 138/62   BP Location: Left arm   Patient Position: Sitting   Cuff Size: Adult   Pulse: 69   Resp: 18   Temp: 98 7 °F (37 1 °C)   TempSrc: Temporal   SpO2: 99%   Weight: 90 7 kg (200 lb)   Height: 5' 10" (1 778 m)     Physical Exam:   General Appearance:  Alert, interactive, nontoxic, no acute distress  Throat: Oral mucosa is moist    Lungs:   Clear to auscultation bilaterally; no wheezes, respirations unlabored   Heart:  S1, S2, RRR; no murmur   Abdomen:   Soft, non-tender, non-distended   Extremities: RUE PICC line  No distal leg edema b/l   Neurologic: AAO to person, surroundings, conversant, fluent speech   Skin:  S/p RT cranial flap removal, midline suture wound is dry, no drainage or erythema  No rash or draining lesions  Labs, Imaging, & Other studies:   All pertinent labs, cultures and imaging studies were personally reviewed  Results from last 7 days   Lab Units 08/26/22  1402 08/22/22  1415   WBC Thousand/uL 3 90* 4 92   HEMOGLOBIN g/dL 10 0* 10 4*   PLATELETS Thousands/uL 176 177     Results from last 7 days   Lab Units 08/26/22  1402   CREATININE mg/dL 0 64   EGFR ml/min/1 73sq m 106     Results from last 7 days   Lab Units 08/26/22  1402   CRP mg/L 4 9*     8/26/22:  Vancomycin trough 15 1, ESR 19    8/24/22 CT head wo contr: Right hemicraniectomy with interval removal of the previously noted subgaleal drain  Stable bifrontal and right temporal encephalomalacia, sequela of prior traumatic injury    Stable subcentimeter colloid cyst  Chronic microtraumatic ischemic changes

## 2022-08-29 NOTE — PROGRESS NOTES
Patient Id: Jun Benito is a 62 y o  male        Assessment/Plan:    Diagnoses and all orders for this visit:    Infection of bone flap, subsequent encounter    Status post craniectomy    Seizure [facility])        Discussion Summary:   1  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, 04/15/2021  Status post cranioplasty 07/13/2021 infection necessitating craniectomy   Synthetic cranioplasty 3/8/22  Repeat craniectomy 07/09/2022  Now approximately 6 weeks status post his last craniectomy  He is completing his course of IV vancomycin and scheduled to go back on a course of oral doxycycline for 6 weeks  He has a follow-up appointment scheduled with infectious disease in 3 weeks  I would like to see him immediately after that appointment to determine the timing of his surgery  He will require clearance by his Infectious Disease doctor  I also suspect that I will keep him on oral antibiotics for several weeks after  ESR improved  CRP continues to be mildly improved and possibly plateaued  Chief Complaint: Post-op (S/p cranioplasty )      HPI:   This is a 57-year-old gentleman who presented to the hospital after a transfer from rehab after sustaining a TBI and subdural hematoma in [place]   Fortunately his exam significantly deteriorated to where he would barely open eyes and minimally localized his bilateral uppers and lowers   He had significant bifrontal contusions as well as a right-sided subdural with significant amount of compression and mass effect with shift   As such she underwent a right-sided craniectomy   His brain was found to be quite contused at the time of surgery   The hematoma was evacuated with a craniectomy   Ultimately he was discharged to [address] [place] [address] he made a significant recovery        Despite his significant clinical improvement he has had multiple surgeries for infections secondary to his cranioplasties    He is now status post his 2nd craniectomy due to infection  He remains on oral antibiotics  His incision is healing very well  There was 1 tiny area of scab but no fevers chills or drainage  Review of systems obtained by the MA reviewed and updated below  Review of Systems   Constitutional: Negative  HENT: Negative  Eyes: Negative  Respiratory: Negative  Cardiovascular: Negative  Gastrointestinal: Negative  Endocrine: Negative  Genitourinary: Negative  Musculoskeletal: Negative  Skin: Negative  Allergic/Immunologic: Negative  Neurological: Negative  Hematological: Negative  Psychiatric/Behavioral: Negative  Physical Exam  Vitals:    08/29/22 1306   BP: 138/65   Pulse: 91   Temp: 98 6 °F (37 °C)   SpO2: 97%   He is well appearing  Affect is appropriate  His BMI is Body mass index is 28 7 kg/m²  Gerhardt Sep He is awake alert and oriented  Hearing and vision are grossly intact  His pupils are equal round reactive to light  His extraocular movements are intact  His face is symmetric  Tongue is midline  Facial sensation is intact and symmetric throughout  Shoulder shrug is 5/5  There is no drift or dysmetria  He has full strength in his bilateral upper and lower extremities  He has normal muscle tone muscle bulk  His biceps reflexes and patellar reflexes are 2+ and symmetric  Berry sign negative bilaterally  Sensation intact to light touch and pinprick throughout  His gait is normal     His heart rate is regular  Normal respiratory effort  Abdomen nondistended  Radial pulses 2+  Incision healing well    One small area of scabbing    The following portions of the patient's history were reviewed and updated as appropriate: allergies, current medications, past family history, past medical history, past social history, past surgical history and problem list     Active Ambulatory Problems     Diagnosis Date Noted    Essential hypertension 03/03/2020    Anxiety 03/05/2021    Acute encephalopathy 04/15/2021    Alcohol abuse 04/18/2021    Status post craniectomy 07/13/2021    Seizure [facility] ) 08/25/2021    Infection of bone flap ([facility] ) 11/19/2021     Resolved Ambulatory Problems     Diagnosis Date Noted    SDH (subdural hematoma) ([facility] ) 04/15/2021    Hyperchloremia 04/15/2021    [facility] 04/15/2021    Dysphagia 04/16/2021    Status post insertion of percutaneous endoscopic gastrost[facility] (PEG[facility] tube [facility] ) 05/19/2021    PICC (peripherally inserted central catheter) in place 12/08/2021     Past Medical History:   Diagnosis Date    Cataracts, bilateral     GERD (gastroesophageal reflux disease)     Hematoma, subdural, with loss of consciousness, traumatic ([facility] )     Hypertension        Past Surgical History:   Procedure Laterality Date    BRAIN HEMATOMA EVACUATION Right 4/15/2021    Procedure: Right CRANIOTOMY FOR SUBDURAL HEMATOMA;  Surgeon: Linette Gamboa MD;  [facility];  Service: Neurosurgery    BRAIN HEMATOMA EVACUATION Right 11/16/2021    Procedure: CRANIOTOMY FOR EVACUATION OF EPIDURAL HEMATOMA;  Surgeon: Linette Gamboa MD;  [facility];  Service: Neurosurgery    BRAIN HEMATOMA EVACUATION Right 11/16/2021    Procedure: Exploration and revision of right craniotomy incision with removal of cranial plate;  Surgeon: Linette Gamboa MD;  [facility];  Service: Neurosurgery    CRANIOPLASTY Right 7/19/2022    Procedure: Right re-opening of craniotomy incision, and debridement with craniectomy;  Surgeon: Linette Gamboa MD;  [facility];  Service: Neurosurgery    CRANIOTOMY Right     HAND SURGERY Left     PEG TUBE PLACEMENT      PEG TUBE REMOVAL      NE REPLACE SKULL PLATE/FLAP Right 1/34/3677    Procedure: RIGHT AUTOLOGOUS CRANIOPLASTY;  Surgeon: Linette Gamboa MD;  [facility];  Service: Neurosurgery    NE REPLACE SKULL PLATE/FLAP Right 6/3/2260    Procedure: right allographic CRANIOPLASTY; Surgeon: Kat Renteria MD;  [facility];  Service: Neurosurgery    ROTATOR CUFF REPAIR Left          Current Outpatient Medications:     halobetasol (ULTRAVATE) 0 05 % cream, Apply topically 2 (two) times a day, Disp: 50 g, Rfl: 1    levETIRAcetam (KEPPRA) 750 mg tablet, Take 1 tablet (750 mg total) by mouth every 12 (twelve) hours, Disp: 180 tablet, Rfl: 3    lisinopril (ZESTRIL) 20 mg tablet, Take 1 tablet (20 mg total) by mouth daily, Disp: 90 tablet, Rfl: 1    LORazepam (ATIVAN) 1 mg tablet, Take 1 tablet (1 mg total) by mouth 3 (three) times a day as needed for anxiety, Disp: 60 tablet, Rfl: 0    methocarbamol (ROBAXIN) 750 mg tablet, Take 1 tablet (750 mg total) by mouth every 6 (six) hours (Patient taking differently: Take 750 mg by mouth every 6 (six) hours as needed), Disp: 30 tablet, Rfl: 0    metoprolol tartrate (LOPRESSOR) 25 mg tablet, Take 1 tablet (25 mg total) by mouth every 12 (twelve) hours, Disp: 180 tablet, Rfl: 1    omeprazole (PriLOSEC) 20 mg delayed release capsule, Take 20 mg by mouth daily, Disp: , Rfl:     Sodium Chloride Flush (SALINE FLUSH IV), Inject 10 mL into a catheter in a vein every 12 (twelve) hours  Indications: FLUSHING, Disp: , Rfl:     vancomycin (VANCOCIN), Inject 1,250 mg into a catheter in a vein every 8 (eight) hours Dose increased 8/5/22 based on trough, Disp: , Rfl:     acetaminophen (TYLENOL) 325 mg tablet, Take 3 tablets (975 mg total) by mouth every 8 (eight) hours (Patient not taking: No sig reported), Disp: , Rfl: 0    Clindamycin Phosphate foam, Apply 1 Dose topically 2 (two) times a day (Patient not taking: No sig reported), Disp: 50 g, Rfl: 0    [START ON 8/30/2022] doxycycline monohydrate (MONODOX) 100 mg capsule, Take 1 capsule (100 mg total) by mouth 2 (two) times a day Pt to start doxycycline on 8/31 (Patient not taking: No sig reported), Disp: 84 capsule, Rfl: 0    Results/Data:  He has a new CT today which was reviewed

## 2022-08-30 LAB
MYCOBACTERIUM SPEC CULT: NORMAL
RHODAMINE-AURAMINE STN SPEC: NORMAL

## 2022-08-31 ENCOUNTER — HOME CARE VISIT (OUTPATIENT)
Dept: HOME HEALTH SERVICES | Facility: HOME HEALTHCARE | Age: 62
End: 2022-08-31
Payer: COMMERCIAL

## 2022-08-31 PROCEDURE — G0299 HHS/HOSPICE OF RN EA 15 MIN: HCPCS

## 2022-08-31 NOTE — Clinical Note
Patient discharged from homecare services with all goals met  Picc line removed and measured 42cm  Tip intact  No s s of infection  Pressure dressing applied   Pt to FU with MD

## 2022-09-02 VITALS
TEMPERATURE: 98.1 F | SYSTOLIC BLOOD PRESSURE: 120 MMHG | HEART RATE: 76 BPM | RESPIRATION RATE: 20 BRPM | OXYGEN SATURATION: 99 % | DIASTOLIC BLOOD PRESSURE: 68 MMHG

## 2022-09-06 LAB
MYCOBACTERIUM SPEC CULT: NORMAL
RHODAMINE-AURAMINE STN SPEC: NORMAL

## 2022-09-12 DIAGNOSIS — Z98.890 POST-OPERATIVE STATE: ICD-10-CM

## 2022-09-12 NOTE — TELEPHONE ENCOUNTER
Left VM on refill line requesting refill for Keppra   Last OV- 9/3/21    Sign pended script if agreeable

## 2022-09-12 NOTE — TELEPHONE ENCOUNTER
Dr Vance Suarez received request for refill of medication  Patient has not been seen in about a year, but was planned to have 3 month follow up when last seen  Please call patient to schedule an appointment so we can provide refills to last to that appointment

## 2022-09-13 RX ORDER — LEVETIRACETAM 750 MG/1
750 TABLET ORAL EVERY 12 HOURS SCHEDULED
Qty: 180 TABLET | Refills: 0 | Status: SHIPPED | OUTPATIENT
Start: 2022-09-13 | End: 2022-09-15 | Stop reason: SDUPTHER

## 2022-09-13 NOTE — TELEPHONE ENCOUNTER
Pt scheduled appt with Dr Jase Pedersen in [place] 9/15 at 10 am  Lenin Bae  Thank you  Dr Jase Pedersen please fill script

## 2022-09-15 ENCOUNTER — OFFICE VISIT (OUTPATIENT)
Dept: NEUROLOGY | Facility: CLINIC | Age: 62
End: 2022-09-15
Payer: COMMERCIAL

## 2022-09-15 ENCOUNTER — APPOINTMENT (OUTPATIENT)
Dept: LAB | Facility: CLINIC | Age: 62
End: 2022-09-15
Payer: COMMERCIAL

## 2022-09-15 VITALS
RESPIRATION RATE: 14 BRPM | SYSTOLIC BLOOD PRESSURE: 124 MMHG | HEART RATE: 74 BPM | WEIGHT: 198.6 LBS | TEMPERATURE: 96.6 F | BODY MASS INDEX: 28.5 KG/M2 | DIASTOLIC BLOOD PRESSURE: 70 MMHG

## 2022-09-15 DIAGNOSIS — R73.09 ELEVATED GLUCOSE: ICD-10-CM

## 2022-09-15 DIAGNOSIS — Z98.890 POST-OPERATIVE STATE: ICD-10-CM

## 2022-09-15 DIAGNOSIS — Z98.890 STATUS POST CRANIECTOMY: Chronic | ICD-10-CM

## 2022-09-15 DIAGNOSIS — R56.9 SEIZURE (HCC): Primary | Chronic | ICD-10-CM

## 2022-09-15 DIAGNOSIS — T84.7XXD INFECTION OF BONE FLAP, SUBSEQUENT ENCOUNTER: ICD-10-CM

## 2022-09-15 DIAGNOSIS — E78.1 HYPERTRIGLYCERIDEMIA: ICD-10-CM

## 2022-09-15 DIAGNOSIS — Z12.5 SCREENING FOR MALIGNANT NEOPLASM OF PROSTATE: ICD-10-CM

## 2022-09-15 DIAGNOSIS — Z98.890 STATUS POST CRANIOTOMY: ICD-10-CM

## 2022-09-15 LAB
ALBUMIN SERPL BCP-MCNC: 3.6 G/DL (ref 3.5–5)
ALP SERPL-CCNC: 87 U/L (ref 46–116)
ALT SERPL W P-5'-P-CCNC: 43 U/L (ref 12–78)
ANION GAP SERPL CALCULATED.3IONS-SCNC: 8 MMOL/L (ref 4–13)
AST SERPL W P-5'-P-CCNC: 31 U/L (ref 5–45)
BASOPHILS # BLD AUTO: 0.02 THOUSANDS/ΜL (ref 0–0.1)
BASOPHILS NFR BLD AUTO: 0 % (ref 0–1)
BILIRUB SERPL-MCNC: 0.99 MG/DL (ref 0.2–1)
BUN SERPL-MCNC: 14 MG/DL (ref 5–25)
CALCIUM SERPL-MCNC: 9 MG/DL (ref 8.3–10.1)
CHLORIDE SERPL-SCNC: 106 MMOL/L (ref 96–108)
CHOLEST SERPL-MCNC: 154 MG/DL
CO2 SERPL-SCNC: 24 MMOL/L (ref 21–32)
CREAT SERPL-MCNC: 0.99 MG/DL (ref 0.6–1.3)
CRP SERPL QL: 4.6 MG/L
EOSINOPHIL # BLD AUTO: 0.09 THOUSAND/ΜL (ref 0–0.61)
EOSINOPHIL NFR BLD AUTO: 2 % (ref 0–6)
ERYTHROCYTE [DISTWIDTH] IN BLOOD BY AUTOMATED COUNT: 15.7 % (ref 11.6–15.1)
GFR SERPL CREATININE-BSD FRML MDRD: 82 ML/MIN/1.73SQ M
GLUCOSE P FAST SERPL-MCNC: 118 MG/DL (ref 65–99)
HCT VFR BLD AUTO: 35.9 % (ref 36.5–49.3)
HDLC SERPL-MCNC: 60 MG/DL
HGB BLD-MCNC: 10.5 G/DL (ref 12–17)
IMM GRANULOCYTES # BLD AUTO: 0.02 THOUSAND/UL (ref 0–0.2)
IMM GRANULOCYTES NFR BLD AUTO: 0 % (ref 0–2)
LDLC SERPL CALC-MCNC: 78 MG/DL (ref 0–100)
LYMPHOCYTES # BLD AUTO: 1.31 THOUSANDS/ΜL (ref 0.6–4.47)
LYMPHOCYTES NFR BLD AUTO: 24 % (ref 14–44)
MCH RBC QN AUTO: 23.5 PG (ref 26.8–34.3)
MCHC RBC AUTO-ENTMCNC: 29.2 G/DL (ref 31.4–37.4)
MCV RBC AUTO: 81 FL (ref 82–98)
MONOCYTES # BLD AUTO: 0.51 THOUSAND/ΜL (ref 0.17–1.22)
MONOCYTES NFR BLD AUTO: 9 % (ref 4–12)
NEUTROPHILS # BLD AUTO: 3.49 THOUSANDS/ΜL (ref 1.85–7.62)
NEUTS SEG NFR BLD AUTO: 65 % (ref 43–75)
NRBC BLD AUTO-RTO: 0 /100 WBCS
PLATELET # BLD AUTO: 178 THOUSANDS/UL (ref 149–390)
PMV BLD AUTO: 12.7 FL (ref 8.9–12.7)
POTASSIUM SERPL-SCNC: 3.8 MMOL/L (ref 3.5–5.3)
PROT SERPL-MCNC: 8.4 G/DL (ref 6.4–8.4)
PSA SERPL-MCNC: 2.2 NG/ML (ref 0–4)
RBC # BLD AUTO: 4.46 MILLION/UL (ref 3.88–5.62)
SODIUM SERPL-SCNC: 138 MMOL/L (ref 135–147)
TRIGL SERPL-MCNC: 81 MG/DL
WBC # BLD AUTO: 5.44 THOUSAND/UL (ref 4.31–10.16)

## 2022-09-15 PROCEDURE — 80061 LIPID PANEL: CPT

## 2022-09-15 PROCEDURE — 86140 C-REACTIVE PROTEIN: CPT

## 2022-09-15 PROCEDURE — 99213 OFFICE O/P EST LOW 20 MIN: CPT | Performed by: PSYCHIATRY & NEUROLOGY

## 2022-09-15 PROCEDURE — G0103 PSA SCREENING: HCPCS

## 2022-09-15 PROCEDURE — 80053 COMPREHEN METABOLIC PANEL: CPT

## 2022-09-15 PROCEDURE — 85025 COMPLETE CBC W/AUTO DIFF WBC: CPT

## 2022-09-15 PROCEDURE — 36415 COLL VENOUS BLD VENIPUNCTURE: CPT

## 2022-09-15 RX ORDER — LEVETIRACETAM 750 MG/1
750 TABLET ORAL EVERY 12 HOURS SCHEDULED
Qty: 180 TABLET | Refills: 3 | Status: SHIPPED | OUTPATIENT
Start: 2022-09-15

## 2022-09-15 NOTE — PROGRESS NOTES
[facility] [address] [facility] [address] [place] [address]  Follow Up Visit    Impression/Plan    Mr Stefan Salguero is a 62 y o  male with single generalized tonic clonic seizure in the setting of prior TBI/SDH and 3 weeks after cranioplasty that was eventually removed  There was also seizure years ago likely due to benzodiazepine withdrawal   He as not able to complete an EEG after last visit due to his multiple cranial surgeries  He anticipates another cranioplasty in the future  Will continue levetiracetam for now which he is tolerating  Return in one year  If withdrawal of levetiracetam is consider would obtain an EEG to estimate risk  Patient Instructions   1  Continue current seizure medications unchanged  2  Let us know if there are seizures or problems with your medication  3  Return in one year (AP)  Diagnoses and all orders for this visit:    Seizure ([address] )    Post-operative state  -     levETIRAcetam (KEPPRA) 750 mg tablet; Take 1 tablet (750 mg total) by mouth every 12 (twelve) hours    Status post craniectomy        Subjective    Mark Later is returning to the [facility] [facility] for follow up  Interval Events:   Seizures since last visit: None    Seen 9/3/2021  No seizures since last visit  Tolerating levetiracetam      Has had 2 failed cranioplasties related to infection  Recently completed IV antibiotics after cranioplasty removal  Continues to follow closely with neurosurgery and ID  Current AEDs:  Levetiracetam 750 mg b i d  Medication side effects: None  Medication adherence: Yes     Event/Seizure semiology:  · GTC with tongue biting lasting < 5 min  With post ictal state   Single event 3 weeks after cranioplasty      Special Features  Status epilepticus: no  Self Injury Seizures: no  Precipitating Factors: TBI     Epilepsy Risk Factors:  Head injury (moderate/severe)  Alcohol abuse     Prior AEDs:  None     Prior Evaluation:  CT 8/16/21: Stable mildly complex extradural collection underlying the large right hemispheric craniotomy consistent with 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   No significant mass effect upon the brain parenchyma or shift present  Stable encephalomalacia and gliosis within the inferior frontal lobes      History Reviewed: The following were reviewed and updated as appropriate: allergies, current medications, past family history, past medical history, past social history, past surgical history and problem list and ros     Psychiatric History:  Anxiety     Social History:   Driving: No  Lives Alone: No  Occupation: retired      Objective    /70 (BP Location: Right arm, Patient Position: Sitting, Cuff Size: Standard)   Pulse 74   Temp (!) 96 6 °F (35 9 °C) (Temporal)   Resp 14   Wt 90 1 kg (198 lb 9 6 oz)   BMI 28 50 kg/m²      General Exam  No acute distress  Large right skull defect, wore bicycle helmet to appointment  Neurologic Exam  Mental Status:  Alert and oriented x 3  Language: normal fluency and comprehension  Cranial Nerves:  VFFTC  EOMI, no nystagums  Slight flattening of left NLF  No dysarthria  Motor:  Slight left pronator drift  Strength 5/5 throughout  Coordination: Finger to nose intact  DTRs: Normal and symmetric (biceps, patella)  Gait: Normal casual gait  ROS:    Review of Systems   Constitutional: Negative  Negative for appetite change and fever  HENT: Negative  Negative for hearing loss, tinnitus, trouble swallowing and voice change  Eyes: Negative  Negative for photophobia and pain  Respiratory: Negative  Negative for shortness of breath  Cardiovascular: Negative  Negative for palpitations  Gastrointestinal: Negative  Negative for nausea and vomiting  Endocrine: Negative  Negative for cold intolerance  Genitourinary: Negative  Negative for dysuria, frequency and urgency  Musculoskeletal: Negative  Negative for myalgias and neck pain  Skin: Negative  Negative for rash  Neurological: Negative  Negative for dizziness, tremors, seizures, syncope, facial asymmetry, speech difficulty, weakness, light-headedness, numbness and headaches  Hematological: Negative  Does not bruise/bleed easily  Psychiatric/Behavioral: Negative  Negative for confusion, hallucinations and sleep disturbance  ROS reviewed and updated as appropriate

## 2022-09-15 NOTE — PATIENT INSTRUCTIONS
Continue current seizure medications unchanged  Let us know if there are seizures or problems with your medication  Return in one year (AP)

## 2022-09-19 ENCOUNTER — TELEMEDICINE (OUTPATIENT)
Dept: INFECTIOUS DISEASES | Facility: CLINIC | Age: 62
End: 2022-09-19
Payer: COMMERCIAL

## 2022-09-19 DIAGNOSIS — F10.10 ALCOHOL ABUSE: ICD-10-CM

## 2022-09-19 DIAGNOSIS — Z98.890 STATUS POST CRANIECTOMY: Chronic | ICD-10-CM

## 2022-09-19 DIAGNOSIS — T84.7XXD INFECTION OF BONE FLAP, SUBSEQUENT ENCOUNTER: Primary | Chronic | ICD-10-CM

## 2022-09-19 DIAGNOSIS — R56.9 SEIZURE (HCC): Chronic | ICD-10-CM

## 2022-09-19 DIAGNOSIS — Z79.2 LONG TERM (CURRENT) USE OF ANTIBIOTICS: ICD-10-CM

## 2022-09-19 PROCEDURE — 99213 OFFICE O/P EST LOW 20 MIN: CPT | Performed by: INTERNAL MEDICINE

## 2022-09-19 NOTE — PROGRESS NOTES
Progress Note - Infectious Disease   David Soliz 62 y o  male MRN: [id number]  Unit/Bed#:  Encounter: [id number]    REQUIRED DOCUMENTATION:   1  This service was provided via Telemedicine  2  Provider located at [address] [place] [address] office  3  TeleMed provider: Lanie Gomez DO   4  Identify all parties in room with patient during tele consult: Pt and his wife at the [facility] in [place] ([place])  5  After connecting through [employer], patient was identified by name and date of birth  Patient was then informed that this was a Telemedicine visit and that the exam was being conducted confidentially over secure lines  My office door was closed  Patient acknowledged consent and understanding of privacy and security of the Telemedicine visit  I informed the patient that I have reviewed their record in Epic and presented the opportunity for them to ask any questions regarding the visit today  The patient agreed to participate  Impression/Recommendations:   1   Recurrent cranioplasty infection   In the setting of #2   Initially had Staph epidermidis native bone flap infection November 2021- status post flap removal, 6 weeks IV vancomycin   Underwent allograft cranioplasty March 3816   Complicated by wound nonhealing  Status post I&D, flap removal on 7/19/22   Intraoperative findings notable for gross purulence under the cranioplasty in the epidural space surrounding the entire posterior aspect/inner table of the cranioplasty   OR cultures with mixed skin kenny from broth only   ESR has normalized, Crp has plateaued  Pt is tolerating antibiotics well  ? Continue doxycycline 100 mg PO Q12 for 6 weeks (started on 8/31)  This could also be extended as a bridge until neurosurgery is scheduled  ? Natalee Sam from ID standpoint to proceed with neurosurgery as there is no evidence of active infection at this time and pt has completed 6 week course of IV vancomycin on 8/30  ?  Side effects of doxycycline discussed with patient to include but not limited to nausea, vomiting, diarrhea, rash  Patient advised to avoid excessive sun exposure while on doxycycline  ? Ongoing follow-up with Neurosurgery service  ? RTO in 3 weeks on Oct 10th at 11 am, virtual visit is acceptable     2   History of SAH/SDH   Status post craniectomy 4/15/21, bone flap replacement 5/80/74   Complicated by #1      3   Seizure disorder   As complication of #2 in setting of #4  Stable  on Keppra      4   Alcohol abuse      My recommendations were discussed with the patient and his wife in detail who verbalized understanding  Thank you for allowing me to participate in the care of this patient  The ID service will follow  History   Subjective: Patient seen for follow-up regarding recurrent cranioplasty infection on oral doxycycline  Pt says he feels well  He is tolerating oral doxycycline  No drainage from scalp wound is reported  Pt has no fever, chills, sweats; no nausea, vomiting, diarrhea; no cough, shortness of breath; no rash      Antibiotics: oral doxycycline    Current Outpatient Medications:     acetaminophen (TYLENOL) 325 mg tablet, Take 3 tablets (975 mg total) by mouth every 8 (eight) hours, Disp: , Rfl: 0    Clindamycin Phosphate foam, Apply 1 Dose topically 2 (two) times a day, Disp: 50 g, Rfl: 0    doxycycline monohydrate (MONODOX) 100 mg capsule, Take 1 capsule (100 mg total) by mouth 2 (two) times a day Pt to start doxycycline on 8/31, Disp: 84 capsule, Rfl: 0    halobetasol (ULTRAVATE) 0 05 % cream, Apply topically 2 (two) times a day, Disp: 50 g, Rfl: 1    levETIRAcetam (KEPPRA) 750 mg tablet, Take 1 tablet (750 mg total) by mouth every 12 (twelve) hours, Disp: 180 tablet, Rfl: 3    lisinopril (ZESTRIL) 20 mg tablet, Take 1 tablet (20 mg total) by mouth daily, Disp: 90 tablet, Rfl: 1    LORazepam (ATIVAN) 1 mg tablet, Take 1 tablet (1 mg total) by mouth 3 (three) times a day as needed for anxiety, Disp: 60 tablet, Rfl: 0   methocarbamol (ROBAXIN) 750 mg tablet, Take 1 tablet (750 mg total) by mouth every 6 (six) hours (Patient taking differently: Take 750 mg by mouth every 6 (six) hours as needed), Disp: 30 tablet, Rfl: 0    metoprolol tartrate (LOPRESSOR) 25 mg tablet, Take 1 tablet (25 mg total) by mouth every 12 (twelve) hours, Disp: 180 tablet, Rfl: 1    omeprazole (PriLOSEC) 20 mg delayed release capsule, Take 20 mg by mouth daily, Disp: , Rfl:     Physical Exam   Limited exam due to tele health visit  General Appearance:  Appearing well, nontoxic, and in no distress   Eyes:  EOMI   Throat: Oropharynx moist    Lungs:   Respirations nonlabored   Skin: S/p RT cranial flap removal, midline wound is dry, no drainage or erythema  No rash or draining lesions  Neurologic: Alert and oriented to person, surroundings, conversant, fluent speech      Labs, Imaging, & Other Studies   Lab Results: I have personally reviewed pertinent labs  Results from last 7 days   Lab Units 09/15/22  0757   WBC Thousand/uL 5 44   HEMOGLOBIN g/dL 10 5*   PLATELETS Thousands/uL 178     Results from last 7 days   Lab Units 09/15/22  0757   POTASSIUM mmol/L 3 8   CHLORIDE mmol/L 106   CO2 mmol/L 24   BUN mg/dL 14   CREATININE mg/dL 0 99   EGFR ml/min/1 73sq m 82   CALCIUM mg/dL 9 0   AST U/L 31   ALT U/L 43   ALK PHOS U/L 87     9/15/22 Crp 4 6    Imaging Studies:   8/24/22 CT head wo contr: Right hemicraniectomy with interval removal of the previously noted subgaleal drain  Stable bifrontal and right temporal encephalomalacia, sequela of prior traumatic injury   Stable subcentimeter colloid cyst  Chronic microtraumatic ischemic changes  Counseling/Coordination of care: Total 25 minutes encounter including direct communication with the patient via telehealth, chart review and coordination of care  Labs, medical tests and imaging studies were independently reviewed by me as noted above       VIRTUAL VISIT DISCLAIMER  The patient has consented to an online visit or consultation  The patient understands that the online visit is based solely on information provided by them, and that, in the absence of a face-to-face physical evaluation by the physician, the diagnosis they receive are both limited and provisional in terms of accuracy and completeness  This is not intended to replace a full medical face-to-face evaluation by the physician  The patient understands and accepts these terms

## 2022-09-21 ENCOUNTER — OFFICE VISIT (OUTPATIENT)
Dept: NEUROSURGERY | Facility: CLINIC | Age: 62
End: 2022-09-21
Payer: COMMERCIAL

## 2022-09-21 VITALS
HEIGHT: 70 IN | HEART RATE: 80 BPM | TEMPERATURE: 98 F | RESPIRATION RATE: 16 BRPM | DIASTOLIC BLOOD PRESSURE: 60 MMHG | BODY MASS INDEX: 27.6 KG/M2 | WEIGHT: 192.8 LBS | SYSTOLIC BLOOD PRESSURE: 140 MMHG

## 2022-09-21 DIAGNOSIS — Z98.890 STATUS POST CRANIECTOMY: Primary | ICD-10-CM

## 2022-09-21 PROCEDURE — 99215 OFFICE O/P EST HI 40 MIN: CPT | Performed by: NEUROLOGICAL SURGERY

## 2022-09-21 PROCEDURE — 3077F SYST BP >= 140 MM HG: CPT | Performed by: NEUROLOGICAL SURGERY

## 2022-09-21 PROCEDURE — 3078F DIAST BP <80 MM HG: CPT | Performed by: NEUROLOGICAL SURGERY

## 2022-09-21 RX ORDER — CEFAZOLIN SODIUM 2 G/50ML
2000 SOLUTION INTRAVENOUS ONCE
Status: CANCELLED | OUTPATIENT
Start: 2022-09-21 | End: 2022-09-21

## 2022-09-21 RX ORDER — CHLORHEXIDINE GLUCONATE 0.12 MG/ML
15 RINSE ORAL ONCE
Status: CANCELLED | OUTPATIENT
Start: 2022-09-21 | End: 2022-09-21

## 2022-09-21 NOTE — PROGRESS NOTES
Patient Id: Yuri Madsen is a 62 y o  male        Handedness: Right      Assessment/Plan:    Diagnoses and all orders for this visit:    Status post craniectomy  -     Case request operating room: RIGHT [address]; Standing  -     PAT Covid Screening; Future  -     Case request operating room: RIGHT [address]    Other orders  -     Diet NPO; Sips with meds; Standing  -     Nursing Communication [facility] Interventions Implemented; Standing  -     Nursing Communication CHG bath, have staff wash entire body (neck down) per pre-op bathing protocol  Routine, evening prior to, and day of surgery ; Standing  -     Nursing Communication Swab both nares with Povidone-Iodine solution, EXCLUDE if patient has shellfish/Iodine allergy  Routine, day of surgery, on call to OR; Standing  -     chlorhexidine (PERIDEX) 0 12 % oral rinse 15 mL  -     Void on call to OR; Standing  -     Insert peripheral IV; Standing  -     ceFAZolin (ANCEF) IVPB (premix in dextrose) 2,000 mg 50 mL  -     vancomycin (VANCOCIN) 1500 mg in sodium chloride 0 9% 250 mL IVPB        Discussion Summary:   1  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, 04/15/2021  Status post cranioplasty 07/13/2021 infection necessitating craniectomy   Synthetic cranioplasty 3/8/22  Repeat craniectomy 07/09/2022  Now approximately 10 weeks status post his craniectomy and has completed his IV antibiotics and then extended on oral antibiotics  We will continue his oral doxycycline and plan for a repeat cranioplasty  We will continue him on doxycycline for minimum of 6-12 weeks postprocedure    A signed surgical consent today understanding the risks and benefits of the procedure        Chief Complaint: post-op visit      HPI:   This is a 62year-old gentleman who presented to the hospital after a transfer from rehab after sustaining a TBI and subdural hematoma in [place]   Fortunately his exam significantly deteriorated to where he would barely open eyes and minimally localized his bilateral uppers and lowers   He had significant bifrontal contusions as well as a right-sided subdural with significant amount of compression and mass effect with shift   As such she underwent a right-sided craniectomy   His brain was found to be quite contused at the time of surgery   The hematoma was evacuated with a craniectomy   Ultimately he was discharged to [facility] he made a significant recovery        Despite his significant clinical improvement he has had multiple surgeries for infections secondary to his cranioplasties  He is now status post his 2nd craniectomy due to infection  He remains on oral antibiotics  He has been cleared for surgery by ID     Review of systems obtained by the MA reviewed and updated below  Review of Systems   All other systems reviewed and are negative  Physical Exam  Vitals:    09/21/22 1452   BP: 140/60   Pulse: 80   Resp: 16   Temp: 98 °F (36 7 °C)   He is well appearing  Affect is appropriate  His BMI is Body mass index is 27 66 kg/m²  Jacinto Manifold He is awake alert and oriented  Hearing and vision are grossly intact  His pupils are equal round reactive to light  His extraocular movements are intact  His face is symmetric  Tongue is midline  Facial sensation is intact and symmetric throughout  Shoulder shrug is 5/5  There is no drift or dysmetria  He has full strength in his bilateral upper and lower extremities  He has normal muscle tone muscle bulk  His biceps reflexes and patellar reflexes are 2+ and symmetric  Berry sign negative bilaterally  Sensation intact to light touch and pinprick throughout  His gait is normal     His heart rate is regular  Normal respiratory effort  Abdomen nondistended  Radial pulses 2+  Craniectomy soft  Incision well healed  Small pinhole scab by ear         The following portions of the patient's history were reviewed and updated as appropriate: allergies, current medications, past family history, past medical history, past social history, past surgical history and problem list     Active Ambulatory Problems     Diagnosis Date Noted    Essential hypertension 03/03/2020    Anxiety 03/05/2021    Acute encephalopathy 04/15/2021    Alcohol abuse 04/18/2021    Status post craniectomy 07/13/2021    Seizure [facility] ) 08/25/2021    Infection of bone flap ([facility] ) 11/19/2021     Resolved Ambulatory Problems     Diagnosis Date Noted    SDH (subdural hematoma) ([facility] ) 04/15/2021    Hyperchloremia 04/15/2021    [facility] failure [facility] 04/15/2021    Dysphagia 04/16/2021    Status post insertion of percutaneous endoscopic gastrostomy (PEG) tube ([facility] ) 05/19/2021    PICC (peripherally inserted central catheter) in place 12/08/2021     Past Medical History:   Diagnosis Date    Cataracts, bilateral     GERD (gastroesophageal reflux disease)     Hematoma, subdural, with loss of consciousness, [facility]     Hypertension        Past Surgical History:   Procedure Laterality Date    BRAIN HEMATOMA EVACUATION Right 4/15/2021    Procedure: Right CRANIOTOMY FOR SUBDURAL HEMATOMA;  Surgeon: Linette Gamboa MD;  [facility];  Service: Neurosurgery    BRAIN HEMATOMA EVACUATION Right 11/16/2021    Procedure: CRANIOTOMY FOR EVACUATION OF EPIDURAL HEMATOMA;  Surgeon: Linette Gamboa MD;  [facility];  Service: Neurosurgery    BRAIN HEMATOMA EVACUATION Right 11/16/2021    Procedure: Exploration and revision of right craniotomy incision with removal of cranial plate;  Surgeon: Linette Gamboa MD;  [facility];  Service: Neurosurgery    CRANIOPLASTY Right 7/19/2022    Procedure: Right re-opening of craniotomy incision, and debridement with craniectomy;  Surgeon: Linette Gamboa MD;  [facility];  Service: Neurosurgery    CRANIOTOMY Right     HAND SURGERY Left     PEG TUBE PLACEMENT  PEG TUBE REMOVAL      AR REPLACE SKULL PLATE/FLAP Right 6/58/9373    Procedure: RIGHT AUTOLOGOUS CRANIOPLASTY;  Surgeon: Kulwinder Romo MD;  [facility];  Service: Neurosurgery    AR REPLACE SKULL PLATE/FLAP Right 6/3/5284    Procedure: right allographic CRANIOPLASTY;  Surgeon: Kulwinder Romo MD;  [facility];  Service: Neurosurgery    ROTATOR CUFF REPAIR Left          Current Outpatient Medications:     acetaminophen (TYLENOL) 325 mg tablet, Take 3 tablets (975 mg total) by mouth every 8 (eight) hours, Disp: , Rfl: 0    Clindamycin Phosphate foam, Apply 1 Dose topically 2 (two) times a day, Disp: 50 g, Rfl: 0    doxycycline monohydrate (MONODOX) 100 mg capsule, Take 1 capsule (100 mg total) by mouth 2 (two) times a day Pt to start doxycycline on 8/31, Disp: 84 capsule, Rfl: 0    halobetasol (ULTRAVATE) 0 05 % cream, Apply topically 2 (two) times a day, Disp: 50 g, Rfl: 1    levETIRAcetam (KEPPRA) 750 mg tablet, Take 1 tablet (750 mg total) by mouth every 12 (twelve) hours, Disp: 180 tablet, Rfl: 3    lisinopril (ZESTRIL) 20 mg tablet, Take 1 tablet (20 mg total) by mouth daily, Disp: 90 tablet, Rfl: 1    LORazepam (ATIVAN) 1 mg tablet, Take 1 tablet (1 mg total) by mouth 3 (three) times a day as needed for anxiety, Disp: 60 tablet, Rfl: 0    methocarbamol (ROBAXIN) 750 mg tablet, Take 1 tablet (750 mg total) by mouth every 6 (six) hours (Patient taking differently: Take 750 mg by mouth every 6 (six) hours as needed), Disp: 30 tablet, Rfl: 0    metoprolol tartrate (LOPRESSOR) 25 mg tablet, Take 1 tablet (25 mg total) by mouth every 12 (twelve) hours, Disp: 180 tablet, Rfl: 1    omeprazole (PriLOSEC) 20 mg delayed release capsule, Take 20 mg by mouth daily, Disp: , Rfl:     Results/Data: no new imaging

## 2022-10-04 DIAGNOSIS — F41.9 ANXIETY: ICD-10-CM

## 2022-10-05 RX ORDER — LORAZEPAM 1 MG/1
1 TABLET ORAL 3 TIMES DAILY PRN
Qty: 60 TABLET | Refills: 0 | Status: SHIPPED | OUTPATIENT
Start: 2022-10-05

## 2022-10-06 DIAGNOSIS — T84.7XXD INFECTION OF BONE FLAP, SUBSEQUENT ENCOUNTER: ICD-10-CM

## 2022-10-06 RX ORDER — DOXYCYCLINE 100 MG/1
100 CAPSULE ORAL 2 TIMES DAILY
Qty: 84 CAPSULE | Refills: 0 | Status: SHIPPED | OUTPATIENT
Start: 2022-10-06 | End: 2022-10-27

## 2022-10-06 NOTE — TELEPHONE ENCOUNTER
Pt's wife calls office today for refill on pt's doxy  Wife states pt has approximately 6 days left of doxy  Wife states they did not get a surgery date from neuro yet  They will call office to let us know once they know  Dr Sujatha Meraz ok with pt not following up at this time  Informed wife if they needed anything to reach out as needed  Will refill doxy and send to pharmacy

## 2022-10-17 ENCOUNTER — APPOINTMENT (OUTPATIENT)
Dept: LAB | Facility: CLINIC | Age: 62
End: 2022-10-17
Payer: COMMERCIAL

## 2022-10-17 ENCOUNTER — LAB REQUISITION (OUTPATIENT)
Dept: LAB | Facility: HOSPITAL | Age: 62
End: 2022-10-17
Payer: COMMERCIAL

## 2022-10-17 DIAGNOSIS — Z01.818 ENCOUNTER FOR OTHER PREPROCEDURAL EXAMINATION: ICD-10-CM

## 2022-10-17 DIAGNOSIS — Z01.818 PREOP TESTING: ICD-10-CM

## 2022-10-17 DIAGNOSIS — Z98.890 STATUS POST CRANIECTOMY: ICD-10-CM

## 2022-10-17 LAB
ABO GROUP BLD: NORMAL
APTT PPP: 26 SECONDS (ref 23–37)
BACTERIA UR QL AUTO: ABNORMAL /HPF
BILIRUB UR QL STRIP: NEGATIVE
BLD GP AB SCN SERPL QL: NEGATIVE
CLARITY UR: CLEAR
COLOR UR: YELLOW
EST. AVERAGE GLUCOSE BLD GHB EST-MCNC: 105 MG/DL
GLUCOSE UR STRIP-MCNC: NEGATIVE MG/DL
HBA1C MFR BLD: 5.3 %
HGB UR QL STRIP.AUTO: NEGATIVE
INR PPP: 1.11 (ref 0.84–1.19)
KETONES UR STRIP-MCNC: NEGATIVE MG/DL
LEUKOCYTE ESTERASE UR QL STRIP: NEGATIVE
MUCOUS THREADS UR QL AUTO: ABNORMAL
NITRITE UR QL STRIP: NEGATIVE
NON-SQ EPI CELLS URNS QL MICRO: ABNORMAL /HPF
PH UR STRIP.AUTO: 6 [PH]
PROT UR STRIP-MCNC: ABNORMAL MG/DL
PROTHROMBIN TIME: 14.5 SECONDS (ref 11.6–14.5)
RBC #/AREA URNS AUTO: ABNORMAL /HPF
RH BLD: POSITIVE
SP GR UR STRIP.AUTO: 1.02 (ref 1–1.03)
SPECIMEN EXPIRATION DATE: NORMAL
UROBILINOGEN UR STRIP-ACNC: <2 MG/DL
WBC #/AREA URNS AUTO: ABNORMAL /HPF

## 2022-10-17 PROCEDURE — 81001 URINALYSIS AUTO W/SCOPE: CPT | Performed by: NEUROLOGICAL SURGERY

## 2022-10-17 PROCEDURE — 83036 HEMOGLOBIN GLYCOSYLATED A1C: CPT

## 2022-10-17 PROCEDURE — 85730 THROMBOPLASTIN TIME PARTIAL: CPT

## 2022-10-17 PROCEDURE — 86901 BLOOD TYPING SEROLOGIC RH(D): CPT | Performed by: NEUROLOGICAL SURGERY

## 2022-10-17 PROCEDURE — 85610 PROTHROMBIN TIME: CPT

## 2022-10-17 PROCEDURE — 86900 BLOOD TYPING SEROLOGIC ABO: CPT | Performed by: NEUROLOGICAL SURGERY

## 2022-10-17 PROCEDURE — 87081 CULTURE SCREEN ONLY: CPT

## 2022-10-17 PROCEDURE — 36415 COLL VENOUS BLD VENIPUNCTURE: CPT

## 2022-10-17 PROCEDURE — 86850 RBC ANTIBODY SCREEN: CPT | Performed by: NEUROLOGICAL SURGERY

## 2022-10-18 ENCOUNTER — ANESTHESIA EVENT (OUTPATIENT)
Dept: PERIOP | Facility: HOSPITAL | Age: 62
DRG: 858 | End: 2022-10-18
Payer: COMMERCIAL

## 2022-10-18 LAB — MRSA NOSE QL CULT: NORMAL

## 2022-10-18 NOTE — PRE-PROCEDURE INSTRUCTIONS
Pre-Surgery Instructions:   Medication Instructions   • acetaminophen (TYLENOL) 325 mg tablet Uses PRN- OK to take day of surgery   • Clindamycin Phosphate foam Hold day of surgery  • doxycycline monohydrate (MONODOX) 100 mg capsule Take day of surgery  • halobetasol (ULTRAVATE) 0 05 % cream Hold day of surgery  • levETIRAcetam (KEPPRA) 750 mg tablet Take day of surgery  • lisinopril (ZESTRIL) 20 mg tablet hold 10/24 and 10/25   • LORazepam (ATIVAN) 1 mg tablet Uses PRN- OK to take day of surgery   • methocarbamol (ROBAXIN) 750 mg tablet Uses PRN- OK to take day of surgery   • metoprolol tartrate (LOPRESSOR) 25 mg tablet Take day of surgery  • omeprazole (PriLOSEC) 20 mg delayed release capsule Take day of surgery

## 2022-10-24 ENCOUNTER — DOCUMENTATION (OUTPATIENT)
Dept: NEUROSURGERY | Facility: CLINIC | Age: 62
End: 2022-10-24

## 2022-10-24 NOTE — ANESTHESIA PREPROCEDURE EVALUATION
Procedure:  RIGHT ALLOGRAFFIC CRANIOPLASTY (Right Head)    Relevant Problems   CARDIO   (+) Essential hypertension      NEURO/PSYCH   (+) Anxiety   (+) Seizure (HCC)     PONV  GERD, Dysphagia, h/o PEG tube  2-3 beers daily    Physical Exam    Airway    Mallampati score: II  TM Distance: >3 FB  Neck ROM: full     Dental       Cardiovascular  Rate: normal,     Pulmonary  Pulmonary exam normal     Other Findings        Anesthesia Plan  ASA Score- 3     Anesthesia Type- general with ASA Monitors  Additional Monitors:   Airway Plan: ETT  Comment: PONV - scope patch and zofran have worked well in the past, per patient  Plan Factors-    Chart reviewed  EKG reviewed  Existing labs reviewed  Patient summary reviewed  Obstructive sleep apnea risk education given perioperatively  Induction- intravenous  Postoperative Plan- Plan for postoperative opioid use  Planned trial extubation    Informed Consent- Anesthetic plan and risks discussed with patient  I personally reviewed this patient with the CRNA  Discussed and agreed on the Anesthesia Plan with the CRNA  Deandra Banks

## 2022-10-25 ENCOUNTER — APPOINTMENT (OUTPATIENT)
Dept: RADIOLOGY | Facility: HOSPITAL | Age: 62
DRG: 858 | End: 2022-10-25
Payer: COMMERCIAL

## 2022-10-25 ENCOUNTER — TELEPHONE (OUTPATIENT)
Dept: NEUROSURGERY | Facility: CLINIC | Age: 62
End: 2022-10-25

## 2022-10-25 ENCOUNTER — HOSPITAL ENCOUNTER (INPATIENT)
Facility: HOSPITAL | Age: 62
LOS: 2 days | Discharge: HOME | DRG: 858 | End: 2022-10-27
Attending: NEUROLOGICAL SURGERY | Admitting: NEUROLOGICAL SURGERY
Payer: COMMERCIAL

## 2022-10-25 ENCOUNTER — ANESTHESIA (OUTPATIENT)
Dept: PERIOP | Facility: HOSPITAL | Age: 62
DRG: 858 | End: 2022-10-25
Payer: COMMERCIAL

## 2022-10-25 DIAGNOSIS — I10 ESSENTIAL HYPERTENSION: ICD-10-CM

## 2022-10-25 DIAGNOSIS — F10.10 ALCOHOL ABUSE: ICD-10-CM

## 2022-10-25 DIAGNOSIS — T84.7XXS: Chronic | ICD-10-CM

## 2022-10-25 DIAGNOSIS — G93.40 ACUTE ENCEPHALOPATHY: ICD-10-CM

## 2022-10-25 DIAGNOSIS — F41.9 ANXIETY: ICD-10-CM

## 2022-10-25 DIAGNOSIS — L70.0 CYSTIC ACNE: ICD-10-CM

## 2022-10-25 DIAGNOSIS — Z98.890 STATUS POST CRANIECTOMY: Chronic | ICD-10-CM

## 2022-10-25 DIAGNOSIS — T84.7XXD INFECTION OF BONE FLAP, SUBSEQUENT ENCOUNTER: ICD-10-CM

## 2022-10-25 DIAGNOSIS — Z98.890 POST-OPERATIVE STATE: ICD-10-CM

## 2022-10-25 DIAGNOSIS — R56.9 SEIZURE (HCC): Primary | Chronic | ICD-10-CM

## 2022-10-25 LAB
ANION GAP SERPL CALCULATED.3IONS-SCNC: 7 MMOL/L (ref 4–13)
BUN SERPL-MCNC: 9 MG/DL (ref 5–25)
CALCIUM SERPL-MCNC: 8.5 MG/DL (ref 8.3–10.1)
CHLORIDE SERPL-SCNC: 111 MMOL/L (ref 96–108)
CO2 SERPL-SCNC: 21 MMOL/L (ref 21–32)
CREAT SERPL-MCNC: 0.81 MG/DL (ref 0.6–1.3)
GFR SERPL CREATININE-BSD FRML MDRD: 96 ML/MIN/1.73SQ M
GLUCOSE SERPL-MCNC: 124 MG/DL (ref 65–140)
GLUCOSE SERPL-MCNC: 135 MG/DL (ref 65–140)
POTASSIUM SERPL-SCNC: 3.7 MMOL/L (ref 3.5–5.3)
SODIUM SERPL-SCNC: 139 MMOL/L (ref 135–147)

## 2022-10-25 PROCEDURE — C1713 ANCHOR/SCREW BN/BN,TIS/BN: HCPCS | Performed by: NEUROLOGICAL SURGERY

## 2022-10-25 PROCEDURE — 82948 REAGENT STRIP/BLOOD GLUCOSE: CPT

## 2022-10-25 PROCEDURE — 0NQ60ZZ REPAIR LEFT TEMPORAL BONE, OPEN APPROACH: ICD-10-PCS | Performed by: NEUROLOGICAL SURGERY

## 2022-10-25 PROCEDURE — 80048 BASIC METABOLIC PNL TOTAL CA: CPT | Performed by: PHYSICIAN ASSISTANT

## 2022-10-25 PROCEDURE — G1004 CDSM NDSC: HCPCS

## 2022-10-25 PROCEDURE — 70450 CT HEAD/BRAIN W/O DYE: CPT

## 2022-10-25 DEVICE — 5444-0-411 X-LARGE IMPLANT (2)
Type: IMPLANTABLE DEVICE | Site: CRANIAL | Status: FUNCTIONAL
Brand: MEDPOR

## 2022-10-25 DEVICE — IMPLANTABLE DEVICE
Type: IMPLANTABLE DEVICE | Site: CRANIAL | Status: FUNCTIONAL
Brand: THINFLAP SYSTEM

## 2022-10-25 RX ORDER — ONDANSETRON 2 MG/ML
4 INJECTION INTRAMUSCULAR; INTRAVENOUS ONCE AS NEEDED
Status: COMPLETED | OUTPATIENT
Start: 2022-10-25 | End: 2022-10-25

## 2022-10-25 RX ORDER — PANTOPRAZOLE SODIUM 40 MG/1
40 TABLET, DELAYED RELEASE ORAL
Status: DISCONTINUED | OUTPATIENT
Start: 2022-10-26 | End: 2022-10-27 | Stop reason: HOSPADM

## 2022-10-25 RX ORDER — PROPOFOL 10 MG/ML
INJECTION, EMULSION INTRAVENOUS CONTINUOUS PRN
Status: DISCONTINUED | OUTPATIENT
Start: 2022-10-25 | End: 2022-10-25

## 2022-10-25 RX ORDER — GLYCOPYRROLATE 0.2 MG/ML
INJECTION INTRAMUSCULAR; INTRAVENOUS AS NEEDED
Status: DISCONTINUED | OUTPATIENT
Start: 2022-10-25 | End: 2022-10-25

## 2022-10-25 RX ORDER — FENTANYL CITRATE 50 UG/ML
INJECTION, SOLUTION INTRAMUSCULAR; INTRAVENOUS AS NEEDED
Status: DISCONTINUED | OUTPATIENT
Start: 2022-10-25 | End: 2022-10-25

## 2022-10-25 RX ORDER — CEFAZOLIN SODIUM 2 G/50ML
SOLUTION INTRAVENOUS AS NEEDED
Status: DISCONTINUED | OUTPATIENT
Start: 2022-10-25 | End: 2022-10-25

## 2022-10-25 RX ORDER — LISINOPRIL 20 MG/1
20 TABLET ORAL DAILY
Status: DISCONTINUED | OUTPATIENT
Start: 2022-10-26 | End: 2022-10-27 | Stop reason: HOSPADM

## 2022-10-25 RX ORDER — CALCIUM CARBONATE 200(500)MG
1000 TABLET,CHEWABLE ORAL 2 TIMES DAILY PRN
Status: DISCONTINUED | OUTPATIENT
Start: 2022-10-25 | End: 2022-10-27 | Stop reason: HOSPADM

## 2022-10-25 RX ORDER — KETAMINE HCL IN NACL, ISO-OSM 100MG/10ML
SYRINGE (ML) INJECTION AS NEEDED
Status: DISCONTINUED | OUTPATIENT
Start: 2022-10-25 | End: 2022-10-25

## 2022-10-25 RX ORDER — ACETAMINOPHEN 325 MG/1
975 TABLET ORAL EVERY 8 HOURS SCHEDULED
Status: DISCONTINUED | OUTPATIENT
Start: 2022-10-25 | End: 2022-10-27 | Stop reason: HOSPADM

## 2022-10-25 RX ORDER — FENTANYL CITRATE/PF 50 MCG/ML
25 SYRINGE (ML) INJECTION
Status: DISCONTINUED | OUTPATIENT
Start: 2022-10-25 | End: 2022-10-25 | Stop reason: HOSPADM

## 2022-10-25 RX ORDER — ONDANSETRON 2 MG/ML
4 INJECTION INTRAMUSCULAR; INTRAVENOUS ONCE AS NEEDED
Status: DISCONTINUED | OUTPATIENT
Start: 2022-10-25 | End: 2022-10-25 | Stop reason: HOSPADM

## 2022-10-25 RX ORDER — SODIUM CHLORIDE 9 MG/ML
INJECTION, SOLUTION INTRAVENOUS CONTINUOUS PRN
Status: DISCONTINUED | OUTPATIENT
Start: 2022-10-25 | End: 2022-10-25

## 2022-10-25 RX ORDER — ONDANSETRON 2 MG/ML
4 INJECTION INTRAMUSCULAR; INTRAVENOUS EVERY 4 HOURS PRN
Status: DISCONTINUED | OUTPATIENT
Start: 2022-10-25 | End: 2022-10-27 | Stop reason: HOSPADM

## 2022-10-25 RX ORDER — AMOXICILLIN 250 MG
2 CAPSULE ORAL DAILY
Status: DISCONTINUED | OUTPATIENT
Start: 2022-10-26 | End: 2022-10-27 | Stop reason: HOSPADM

## 2022-10-25 RX ORDER — ONDANSETRON 2 MG/ML
INJECTION INTRAMUSCULAR; INTRAVENOUS AS NEEDED
Status: DISCONTINUED | OUTPATIENT
Start: 2022-10-25 | End: 2022-10-25

## 2022-10-25 RX ORDER — VANCOMYCIN HYDROCHLORIDE 1 G/20ML
INJECTION, POWDER, LYOPHILIZED, FOR SOLUTION INTRAVENOUS AS NEEDED
Status: DISCONTINUED | OUTPATIENT
Start: 2022-10-25 | End: 2022-10-25 | Stop reason: HOSPADM

## 2022-10-25 RX ORDER — OXYCODONE HYDROCHLORIDE 10 MG/1
10 TABLET ORAL EVERY 4 HOURS PRN
Status: DISCONTINUED | OUTPATIENT
Start: 2022-10-25 | End: 2022-10-27 | Stop reason: HOSPADM

## 2022-10-25 RX ORDER — CALCIUM CARBONATE 200(500)MG
1000 TABLET,CHEWABLE ORAL DAILY PRN
Status: DISCONTINUED | OUTPATIENT
Start: 2022-10-25 | End: 2022-10-25

## 2022-10-25 RX ORDER — LORAZEPAM 1 MG/1
1 TABLET ORAL EVERY 8 HOURS PRN
Status: DISCONTINUED | OUTPATIENT
Start: 2022-10-25 | End: 2022-10-27 | Stop reason: HOSPADM

## 2022-10-25 RX ORDER — HYDROMORPHONE HCL/PF 1 MG/ML
0.5 SYRINGE (ML) INJECTION
Status: DISCONTINUED | OUTPATIENT
Start: 2022-10-25 | End: 2022-10-27

## 2022-10-25 RX ORDER — HYDROMORPHONE HCL/PF 1 MG/ML
0.5 SYRINGE (ML) INJECTION
Status: DISCONTINUED | OUTPATIENT
Start: 2022-10-25 | End: 2022-10-25 | Stop reason: HOSPADM

## 2022-10-25 RX ORDER — SODIUM CHLORIDE, SODIUM LACTATE, POTASSIUM CHLORIDE, CALCIUM CHLORIDE 600; 310; 30; 20 MG/100ML; MG/100ML; MG/100ML; MG/100ML
INJECTION, SOLUTION INTRAVENOUS CONTINUOUS PRN
Status: DISCONTINUED | OUTPATIENT
Start: 2022-10-25 | End: 2022-10-25

## 2022-10-25 RX ORDER — ACETAMINOPHEN 325 MG/1
975 TABLET ORAL EVERY 8 HOURS SCHEDULED
Status: DISCONTINUED | OUTPATIENT
Start: 2022-10-25 | End: 2022-10-25

## 2022-10-25 RX ORDER — OXYCODONE HYDROCHLORIDE 5 MG/1
5 TABLET ORAL EVERY 4 HOURS PRN
Status: DISCONTINUED | OUTPATIENT
Start: 2022-10-25 | End: 2022-10-27 | Stop reason: HOSPADM

## 2022-10-25 RX ORDER — PROMETHAZINE HYDROCHLORIDE 25 MG/ML
12.5 INJECTION, SOLUTION INTRAMUSCULAR; INTRAVENOUS ONCE AS NEEDED
Status: DISCONTINUED | OUTPATIENT
Start: 2022-10-25 | End: 2022-10-25 | Stop reason: HOSPADM

## 2022-10-25 RX ORDER — SODIUM CHLORIDE 9 MG/ML
125 INJECTION, SOLUTION INTRAVENOUS CONTINUOUS
Status: DISCONTINUED | OUTPATIENT
Start: 2022-10-25 | End: 2022-10-26

## 2022-10-25 RX ORDER — CHLORHEXIDINE GLUCONATE 0.12 MG/ML
15 RINSE ORAL ONCE
Status: COMPLETED | OUTPATIENT
Start: 2022-10-25 | End: 2022-10-25

## 2022-10-25 RX ORDER — CEFAZOLIN SODIUM 2 G/50ML
2000 SOLUTION INTRAVENOUS ONCE
Status: DISCONTINUED | OUTPATIENT
Start: 2022-10-25 | End: 2022-10-25 | Stop reason: HOSPADM

## 2022-10-25 RX ORDER — TOBRAMYCIN 1.2 G/30ML
1200 INJECTION, POWDER, LYOPHILIZED, FOR SOLUTION INTRAVENOUS ONCE
Status: DISCONTINUED | OUTPATIENT
Start: 2022-10-25 | End: 2022-10-27 | Stop reason: HOSPADM

## 2022-10-25 RX ORDER — PROPOFOL 10 MG/ML
INJECTION, EMULSION INTRAVENOUS AS NEEDED
Status: DISCONTINUED | OUTPATIENT
Start: 2022-10-25 | End: 2022-10-25

## 2022-10-25 RX ORDER — LEVETIRACETAM 750 MG/1
750 TABLET ORAL EVERY 12 HOURS SCHEDULED
Status: DISCONTINUED | OUTPATIENT
Start: 2022-10-25 | End: 2022-10-27 | Stop reason: HOSPADM

## 2022-10-25 RX ORDER — GINSENG 100 MG
CAPSULE ORAL AS NEEDED
Status: DISCONTINUED | OUTPATIENT
Start: 2022-10-25 | End: 2022-10-25 | Stop reason: HOSPADM

## 2022-10-25 RX ORDER — CLINDAMYCIN PHOSPHATE 10 MG/G
1 AEROSOL, FOAM TOPICAL 2 TIMES DAILY
Status: DISCONTINUED | OUTPATIENT
Start: 2022-10-25 | End: 2022-10-27 | Stop reason: HOSPADM

## 2022-10-25 RX ORDER — ROCURONIUM BROMIDE 10 MG/ML
INJECTION, SOLUTION INTRAVENOUS AS NEEDED
Status: DISCONTINUED | OUTPATIENT
Start: 2022-10-25 | End: 2022-10-25

## 2022-10-25 RX ORDER — SCOLOPAMINE TRANSDERMAL SYSTEM 1 MG/1
1 PATCH, EXTENDED RELEASE TRANSDERMAL ONCE
Status: DISCONTINUED | OUTPATIENT
Start: 2022-10-25 | End: 2022-10-27 | Stop reason: HOSPADM

## 2022-10-25 RX ORDER — MIDAZOLAM HYDROCHLORIDE 2 MG/2ML
INJECTION, SOLUTION INTRAMUSCULAR; INTRAVENOUS AS NEEDED
Status: DISCONTINUED | OUTPATIENT
Start: 2022-10-25 | End: 2022-10-25

## 2022-10-25 RX ORDER — VANCOMYCIN HYDROCHLORIDE 1 G/200ML
INJECTION, SOLUTION INTRAVENOUS AS NEEDED
Status: DISCONTINUED | OUTPATIENT
Start: 2022-10-25 | End: 2022-10-25

## 2022-10-25 RX ORDER — LIDOCAINE HYDROCHLORIDE 10 MG/ML
INJECTION, SOLUTION EPIDURAL; INFILTRATION; INTRACAUDAL; PERINEURAL AS NEEDED
Status: DISCONTINUED | OUTPATIENT
Start: 2022-10-25 | End: 2022-10-25

## 2022-10-25 RX ORDER — TOBRAMYCIN 1.2 G/30ML
INJECTION, POWDER, LYOPHILIZED, FOR SOLUTION INTRAVENOUS AS NEEDED
Status: DISCONTINUED | OUTPATIENT
Start: 2022-10-25 | End: 2022-10-25 | Stop reason: HOSPADM

## 2022-10-25 RX ORDER — METHOCARBAMOL 750 MG/1
750 TABLET, FILM COATED ORAL EVERY 6 HOURS PRN
Status: DISCONTINUED | OUTPATIENT
Start: 2022-10-25 | End: 2022-10-27 | Stop reason: HOSPADM

## 2022-10-25 RX ADMIN — LIDOCAINE HYDROCHLORIDE 50 MG: 10 INJECTION, SOLUTION EPIDURAL; INFILTRATION; INTRACAUDAL; PERINEURAL at 07:42

## 2022-10-25 RX ADMIN — LORAZEPAM 1 MG: 1 TABLET ORAL at 22:34

## 2022-10-25 RX ADMIN — SCOPALAMINE 1 PATCH: 1 PATCH, EXTENDED RELEASE TRANSDERMAL at 07:16

## 2022-10-25 RX ADMIN — VANCOMYCIN HYDROCHLORIDE 1500 MG: 1 INJECTION, POWDER, LYOPHILIZED, FOR SOLUTION INTRAVENOUS at 22:08

## 2022-10-25 RX ADMIN — HYDROMORPHONE HYDROCHLORIDE 0.5 MG: 1 INJECTION, SOLUTION INTRAMUSCULAR; INTRAVENOUS; SUBCUTANEOUS at 22:08

## 2022-10-25 RX ADMIN — ROCURONIUM BROMIDE 50 MG: 50 INJECTION INTRAVENOUS at 07:42

## 2022-10-25 RX ADMIN — MIDAZOLAM 2 MG: 1 INJECTION INTRAMUSCULAR; INTRAVENOUS at 07:22

## 2022-10-25 RX ADMIN — ACETAMINOPHEN 975 MG: 325 TABLET, FILM COATED ORAL at 14:56

## 2022-10-25 RX ADMIN — FENTANYL CITRATE 25 MCG: 50 INJECTION INTRAMUSCULAR; INTRAVENOUS at 10:59

## 2022-10-25 RX ADMIN — VANCOMYCIN HYDROCHLORIDE 1000 MG: 1 INJECTION, SOLUTION INTRAVENOUS at 08:00

## 2022-10-25 RX ADMIN — Medication 50 MG: at 07:42

## 2022-10-25 RX ADMIN — PHENYLEPHRINE HYDROCHLORIDE 50 MCG/MIN: 10 INJECTION INTRAVENOUS at 08:14

## 2022-10-25 RX ADMIN — CEFAZOLIN SODIUM 2000 MG: 2 SOLUTION INTRAVENOUS at 10:38

## 2022-10-25 RX ADMIN — ONDANSETRON 4 MG: 2 INJECTION INTRAMUSCULAR; INTRAVENOUS at 11:17

## 2022-10-25 RX ADMIN — FENTANYL CITRATE 50 MCG: 50 INJECTION INTRAMUSCULAR; INTRAVENOUS at 10:25

## 2022-10-25 RX ADMIN — SUGAMMADEX 200 MG: 100 INJECTION, SOLUTION INTRAVENOUS at 10:34

## 2022-10-25 RX ADMIN — CEFAZOLIN SODIUM 2000 MG: 2 SOLUTION INTRAVENOUS at 07:41

## 2022-10-25 RX ADMIN — HYDROMORPHONE HYDROCHLORIDE 0.5 MG: 1 INJECTION, SOLUTION INTRAMUSCULAR; INTRAVENOUS; SUBCUTANEOUS at 18:08

## 2022-10-25 RX ADMIN — OXYCODONE HYDROCHLORIDE 10 MG: 10 TABLET ORAL at 14:57

## 2022-10-25 RX ADMIN — Medication 25 MCG: at 11:36

## 2022-10-25 RX ADMIN — LEVETIRACETAM 750 MG: 750 TABLET, FILM COATED ORAL at 20:06

## 2022-10-25 RX ADMIN — FENTANYL CITRATE 25 MCG: 50 INJECTION INTRAMUSCULAR; INTRAVENOUS at 10:55

## 2022-10-25 RX ADMIN — FENTANYL CITRATE 100 MCG: 50 INJECTION INTRAMUSCULAR; INTRAVENOUS at 07:32

## 2022-10-25 RX ADMIN — ROCURONIUM BROMIDE 30 MG: 50 INJECTION INTRAVENOUS at 08:27

## 2022-10-25 RX ADMIN — ACETAMINOPHEN 975 MG: 325 TABLET, FILM COATED ORAL at 22:07

## 2022-10-25 RX ADMIN — ONDANSETRON 4 MG: 2 INJECTION INTRAMUSCULAR; INTRAVENOUS at 09:28

## 2022-10-25 RX ADMIN — HYDROMORPHONE HYDROCHLORIDE 0.5 MG: 1 INJECTION, SOLUTION INTRAMUSCULAR; INTRAVENOUS; SUBCUTANEOUS at 13:29

## 2022-10-25 RX ADMIN — SODIUM CHLORIDE: 0.9 INJECTION, SOLUTION INTRAVENOUS at 08:11

## 2022-10-25 RX ADMIN — ONDANSETRON 4 MG: 2 INJECTION INTRAMUSCULAR; INTRAVENOUS at 07:22

## 2022-10-25 RX ADMIN — HYDROMORPHONE HYDROCHLORIDE 0.5 MG: 1 INJECTION, SOLUTION INTRAMUSCULAR; INTRAVENOUS; SUBCUTANEOUS at 12:50

## 2022-10-25 RX ADMIN — Medication 25 MCG: at 11:53

## 2022-10-25 RX ADMIN — PROPOFOL 100 MCG/KG/MIN: 10 INJECTION, EMULSION INTRAVENOUS at 08:36

## 2022-10-25 RX ADMIN — HYDROMORPHONE HYDROCHLORIDE 0.5 MG: 1 INJECTION, SOLUTION INTRAMUSCULAR; INTRAVENOUS; SUBCUTANEOUS at 12:23

## 2022-10-25 RX ADMIN — METOPROLOL TARTRATE 25 MG: 25 TABLET, FILM COATED ORAL at 20:05

## 2022-10-25 RX ADMIN — CHLORHEXIDINE GLUCONATE 15 ML: 1.2 SOLUTION ORAL at 06:25

## 2022-10-25 RX ADMIN — ONDANSETRON 4 MG: 2 INJECTION INTRAMUSCULAR; INTRAVENOUS at 18:13

## 2022-10-25 RX ADMIN — FENTANYL CITRATE 50 MCG: 50 INJECTION INTRAMUSCULAR; INTRAVENOUS at 08:54

## 2022-10-25 RX ADMIN — PROPOFOL 200 MG: 10 INJECTION, EMULSION INTRAVENOUS at 07:42

## 2022-10-25 RX ADMIN — SODIUM CHLORIDE 125 ML/HR: 0.9 INJECTION, SOLUTION INTRAVENOUS at 19:32

## 2022-10-25 RX ADMIN — GLYCOPYRROLATE 0.1 MG: 0.2 INJECTION, SOLUTION INTRAMUSCULAR; INTRAVENOUS at 07:22

## 2022-10-25 RX ADMIN — SODIUM CHLORIDE, SODIUM LACTATE, POTASSIUM CHLORIDE, AND CALCIUM CHLORIDE: .6; .31; .03; .02 INJECTION, SOLUTION INTRAVENOUS at 07:11

## 2022-10-25 RX ADMIN — OXYCODONE HYDROCHLORIDE 10 MG: 10 TABLET ORAL at 20:06

## 2022-10-25 NOTE — ANESTHESIA POSTPROCEDURE EVALUATION
Post-Op Assessment Note    CV Status:  Stable  Pain Score: 0    Pain management: adequate     Mental Status:  Sleepy   Hydration Status:  Stable   PONV Controlled:  None   Airway Patency:  Patent      Post Op Vitals Reviewed: Yes      Staff: CRNA         No complications documented      /76 (10/25/22 1100)    Temp (!) 97 3 °F (36 3 °C) (10/25/22 1100)    Pulse 79 (10/25/22 1100)   Resp 20 (10/25/22 1100)    SpO2 100 % (10/25/22 1100)

## 2022-10-25 NOTE — H&P
Discussion Summary:   1  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, 04/15/2021  Status post cranioplasty 07/13/2021 infection necessitating craniectomy   Synthetic cranioplasty 3/8/22   Repeat craniectomy 07/09/2022  Plan for cranioplasty today        Chief Complaint: post-op visit        HPI:   This is a 62year-old gentleman who presented to the hospital after a transfer from rehab after sustaining a TBI and subdural hematoma in [place]   Fortunately his exam significantly deteriorated to where he would barely open eyes and minimally localized his bilateral uppers and lowers   He had significant bifrontal contusions as well as a right-sided subdural with significant amount of compression and mass effect with shift   As such she underwent a right-sided craniectomy   His brain was found to be quite contused at the time of surgery   The hematoma was evacuated with a craniectomy   Ultimately he was discharged to [address] [place] [address] he made a significant recovery        Despite his significant clinical improvement he has had multiple surgeries for infections secondary to his cranioplasties  Ban Gutierrez is now status post his 2nd craniectomy due to infection       He remains on oral antibiotics  He has been cleared for surgery by ID       Returns to hospital for cranioplasty  ROS negative      Temp:  [97 5 °F (36 4 °C)] 97 5 °F (36 4 °C)  HR:  [78] 78  Resp:  [16] 16  BP: (152)/(77) 152/77  He is well appearing  Affect is appropriate  His BMI is Body mass index is 26 69 kg/m²  Meghan Birkenhead He is awake alert and oriented  Hearing and vision are grossly intact  His pupils are equal round reactive to light  His extraocular movements are intact  His face is symmetric  Tongue is midline  Facial sensation is intact and symmetric throughout  Shoulder shrug is 5/5  There is no drift or dysmetria  He has full strength in his bilateral upper and lower extremities    He has normal muscle tone muscle bulk  His biceps reflexes and patellar reflexes are 2+ and symmetric  Berry sign negative bilaterally  Sensation intact to light touch and pinprick throughout  His gait is normal     His heart rate is regular  Normal respiratory effort  Abdomen nondistended  Radial pulses 2+  Craniectomy soft   Well healed

## 2022-10-25 NOTE — OP NOTE
OPERATIVE REPORT  PATIENT NAME: Georgie Sandifer    :  1960  MRN: [id number]  Pt Location:  OR ROOM 17    SURGERY DATE: 10/25/2022    Surgeon(s) and Role:     * Cheikh Buck MD - Primary     * Celestine Rick PA-C - Assisting    Preop Diagnosis:  Status post craniectomy [Z98 890]    Post-Op Diagnosis Codes:     * Status post craniectomy [Z98 890]    Procedure(s) (LRB):  RIGHT ALLOGRAFFIC CRANIOPLASTY (Right)    Specimen(s):  * No specimens in log *    Estimated Blood Loss:   Minimal    Drains:  Closed/Suction Drain Right Scalp Bulb 10 Fr  (Active)   Number of days: 0       Anesthesia Type:   General    Operative Indications:  Status post craniectomy [B53263]  71-year-old gentleman with a remote history of trauma necessitating craniectomy  Unfortunately after multiple cranioplasty attempts he has had multiple infections  He returns after a long-term course of antibiotics today for a 3rd time cranioplasty  He has family understood the risks and benefits of surgery and elected to proceed  Operative Findings:  Cranioplasty greater than 10 cm  Complications:   None    Procedure and Technique:  After obtaining written informed consent the patient was brought to the operating room  General endotracheal anesthesia was induced  The head was then placed on a horseshoe with the head turned exposing the prior craniectomy  The hair was then clipped and the head scrubbed in the usual fashion  Patient was then prepped and draped in the usual sterile fashion  A surgical time-out was performed  The Medpor cranioplasty was soaked in a solution of tobramycin vancomycin and neomycin  Using Metzenbaum scissors and a 10 blade the prior incision was opened  Bharath clips were placed along the large flap  The temporalis was then isolated and opened  The myocutaneous flap was reflected anteriorly    Using a periosteal and monopolar cautery the plane between the prior dural repair and the temporalis was elevated  The bony edges of the craniectomy were exposed and the temporalis reflected anteriorly  Three central tack-up stitches were placed  Allograft Medpor cranioplasty was then rinsed and fit into place  He was secured in place with the titanium plating system  Copious amounts of antibiotic irrigation were used  Once that was satisfied with hemostasis a 10 Armenian PVC drain was tunneled under the temporalis muscle  Due to the multiple surgical interventions significant amount of subgaleal dissection was required in order to mobilize tissue and reapproximate the skin edges  Portion of the posterior incision was difficult to reapproximate and necessitated the use of 2-0 nylon in vertical mattress stitches  Ultimately I was able to bring the skin edges together  A 3-0 running nylon stitch was then used over this  Due to the amount of significant scarring, need for dissection, and undermining this took greater than 50% more than the regular time and increased the complexity by more than 50%  The drain stitch was secured with a 2 0 nylon  A sterile dressing and head wrap was placed  There were 2 uninterrupted and correct surgical counts  Surgical sign-out was performed  The patient was then woken from general anesthesia and found to be in baseline neurologic condition  There was no qualified resident aid in this case  As such, due to its complex nature JESSICA Philippe, was present and assisted for the duration of the case including exposure, cranioplasty, and complex closure        I was present for the entire procedure    Patient Disposition:  PACU         SIGNATURE: Akin Trent MD  DATE: October 25, 2022  TIME: 10:54 AM

## 2022-10-25 NOTE — TELEPHONE ENCOUNTER
10/31/22- [facility] SCHEDULED 11/25    10/28/22- PT DISCHARGED HOME   PLEASE SEE MAMIE'S NOTE  *WAITING ON CTH TO BE SCHEDULED    10/27/22- [facility]    10/26/22- [facility]    10/25/22- [facility]  2WK POV SCHEDULED 11/8/22  6WK POV SCHEDULED 11/30/22  PT WILL NEED CTH

## 2022-10-25 NOTE — PERIOPERATIVE NURSING NOTE
Patient transported to [address] [place] [address] with RN for Ct scan of the head  Dr Angel Luis Ley made aware that the scan was completed

## 2022-10-25 NOTE — DISCHARGE INSTR - AVS FIRST PAGE
At discharge:     Minocycline 100mg BID   Clindamycin 1% topical lotion daily  Benzyl peroxide wash (neutrogenia clear pore) daily

## 2022-10-25 NOTE — PROGRESS NOTES
Neurosurgery Post Op Note    Day of Surgery Procedure(s):  RIGHT ALLOGRAFFIC CRANIOPLASTY    Post op exam:  General appearance:  Drowsy but conversive, appears stated age, cooperative and no distress  Head: headwrap in place  YANETH drain with bloody output  Eyes: EOMI  Lungs: non labored breathing  Heart: regular heart rate  Neurologic:   Mental status: Alert, answers questions appropriately  Cranial nerves: grossly intact (Cranial nerves II-XII) except mild left facial weakness  Sensory: grossly inact LT  Motor: moving all extremities without focal weakness    Plan:  • Continue to neurological checks every 2 hours  • STAT CT head with any neurological changes  • Maintain head wrap  • Maintain YANETH drain and document output every shift  • CT head completed and reviewed with expected postoperative changes  • Plan to admit to step-down level 2  • From an infectious standpoint -  o Plan for IV vancomycin through duration of his hospitalization  Will plan to transition to p o  Doxycycline at discharge for anticipated 6-12 weeks  o Use CHG soap to bait every day for two weeks then every other day  • Continue home [address]  • Mobilize with physical occupational therapy  • DVT prophylaxis:  Bilateral SCDs  May consider pharmacological DVT prophylaxis tomorrow    Neurosurgery will continue follow as primary  Call questions or concerns

## 2022-10-26 LAB
ANION GAP SERPL CALCULATED.3IONS-SCNC: 6 MMOL/L (ref 4–13)
APTT PPP: 30 SECONDS (ref 23–37)
BUN SERPL-MCNC: 9 MG/DL (ref 5–25)
CALCIUM SERPL-MCNC: 8.3 MG/DL (ref 8.3–10.1)
CHLORIDE SERPL-SCNC: 111 MMOL/L (ref 96–108)
CO2 SERPL-SCNC: 23 MMOL/L (ref 21–32)
CREAT SERPL-MCNC: 0.84 MG/DL (ref 0.6–1.3)
ERYTHROCYTE [DISTWIDTH] IN BLOOD BY AUTOMATED COUNT: 18.6 % (ref 11.6–15.1)
GFR SERPL CREATININE-BSD FRML MDRD: 95 ML/MIN/1.73SQ M
GLUCOSE SERPL-MCNC: 125 MG/DL (ref 65–140)
HCT VFR BLD AUTO: 32 % (ref 36.5–49.3)
HGB BLD-MCNC: 9.2 G/DL (ref 12–17)
INR PPP: 1.16 (ref 0.84–1.19)
MCH RBC QN AUTO: 23.5 PG (ref 26.8–34.3)
MCHC RBC AUTO-ENTMCNC: 28.8 G/DL (ref 31.4–37.4)
MCV RBC AUTO: 82 FL (ref 82–98)
PLATELET # BLD AUTO: 147 THOUSANDS/UL (ref 149–390)
PMV BLD AUTO: 11.9 FL (ref 8.9–12.7)
POTASSIUM SERPL-SCNC: 3.6 MMOL/L (ref 3.5–5.3)
PROTHROMBIN TIME: 15 SECONDS (ref 11.6–14.5)
RBC # BLD AUTO: 3.92 MILLION/UL (ref 3.88–5.62)
SODIUM SERPL-SCNC: 140 MMOL/L (ref 135–147)
VANCOMYCIN TROUGH SERPL-MCNC: 15 UG/ML (ref 10–20)
WBC # BLD AUTO: 5.39 THOUSAND/UL (ref 4.31–10.16)

## 2022-10-26 PROCEDURE — 85027 COMPLETE CBC AUTOMATED: CPT | Performed by: PHYSICIAN ASSISTANT

## 2022-10-26 PROCEDURE — 85730 THROMBOPLASTIN TIME PARTIAL: CPT | Performed by: PHYSICIAN ASSISTANT

## 2022-10-26 PROCEDURE — 97162 PT EVAL MOD COMPLEX 30 MIN: CPT

## 2022-10-26 PROCEDURE — 80048 BASIC METABOLIC PNL TOTAL CA: CPT | Performed by: PHYSICIAN ASSISTANT

## 2022-10-26 PROCEDURE — 97166 OT EVAL MOD COMPLEX 45 MIN: CPT

## 2022-10-26 PROCEDURE — 80202 ASSAY OF VANCOMYCIN: CPT | Performed by: NEUROLOGICAL SURGERY

## 2022-10-26 PROCEDURE — 85610 PROTHROMBIN TIME: CPT | Performed by: PHYSICIAN ASSISTANT

## 2022-10-26 RX ADMIN — LORAZEPAM 1 MG: 1 TABLET ORAL at 16:28

## 2022-10-26 RX ADMIN — HYDROMORPHONE HYDROCHLORIDE 0.5 MG: 1 INJECTION, SOLUTION INTRAMUSCULAR; INTRAVENOUS; SUBCUTANEOUS at 17:07

## 2022-10-26 RX ADMIN — OXYCODONE HYDROCHLORIDE 10 MG: 10 TABLET ORAL at 04:55

## 2022-10-26 RX ADMIN — ONDANSETRON 4 MG: 2 INJECTION INTRAMUSCULAR; INTRAVENOUS at 08:59

## 2022-10-26 RX ADMIN — ONDANSETRON 4 MG: 2 INJECTION INTRAMUSCULAR; INTRAVENOUS at 01:05

## 2022-10-26 RX ADMIN — OXYCODONE HYDROCHLORIDE 10 MG: 10 TABLET ORAL at 00:10

## 2022-10-26 RX ADMIN — LEVETIRACETAM 750 MG: 750 TABLET, FILM COATED ORAL at 21:19

## 2022-10-26 RX ADMIN — OXYCODONE HYDROCHLORIDE 10 MG: 10 TABLET ORAL at 10:22

## 2022-10-26 RX ADMIN — METHOCARBAMOL TABLETS 750 MG: 750 TABLET, COATED ORAL at 01:05

## 2022-10-26 RX ADMIN — LISINOPRIL 20 MG: 20 TABLET ORAL at 08:33

## 2022-10-26 RX ADMIN — HYDROMORPHONE HYDROCHLORIDE 0.5 MG: 1 INJECTION, SOLUTION INTRAMUSCULAR; INTRAVENOUS; SUBCUTANEOUS at 01:05

## 2022-10-26 RX ADMIN — ONDANSETRON 4 MG: 2 INJECTION INTRAMUSCULAR; INTRAVENOUS at 21:18

## 2022-10-26 RX ADMIN — OXYCODONE HYDROCHLORIDE 10 MG: 10 TABLET ORAL at 15:46

## 2022-10-26 RX ADMIN — HYDROMORPHONE HYDROCHLORIDE 0.5 MG: 1 INJECTION, SOLUTION INTRAMUSCULAR; INTRAVENOUS; SUBCUTANEOUS at 13:47

## 2022-10-26 RX ADMIN — VANCOMYCIN HYDROCHLORIDE 1500 MG: 1 INJECTION, POWDER, LYOPHILIZED, FOR SOLUTION INTRAVENOUS at 10:16

## 2022-10-26 RX ADMIN — ACETAMINOPHEN 975 MG: 325 TABLET, FILM COATED ORAL at 04:55

## 2022-10-26 RX ADMIN — VANCOMYCIN HYDROCHLORIDE 1500 MG: 1 INJECTION, POWDER, LYOPHILIZED, FOR SOLUTION INTRAVENOUS at 21:30

## 2022-10-26 RX ADMIN — METOPROLOL TARTRATE 25 MG: 25 TABLET, FILM COATED ORAL at 08:33

## 2022-10-26 RX ADMIN — LORAZEPAM 1 MG: 1 TABLET ORAL at 08:33

## 2022-10-26 RX ADMIN — ACETAMINOPHEN 975 MG: 325 TABLET, FILM COATED ORAL at 21:18

## 2022-10-26 RX ADMIN — PANTOPRAZOLE SODIUM 40 MG: 40 TABLET, DELAYED RELEASE ORAL at 04:56

## 2022-10-26 RX ADMIN — HYDROMORPHONE HYDROCHLORIDE 0.5 MG: 1 INJECTION, SOLUTION INTRAMUSCULAR; INTRAVENOUS; SUBCUTANEOUS at 08:33

## 2022-10-26 RX ADMIN — ONDANSETRON 4 MG: 2 INJECTION INTRAMUSCULAR; INTRAVENOUS at 04:58

## 2022-10-26 RX ADMIN — METOPROLOL TARTRATE 25 MG: 25 TABLET, FILM COATED ORAL at 21:18

## 2022-10-26 RX ADMIN — LEVETIRACETAM 750 MG: 750 TABLET, FILM COATED ORAL at 08:33

## 2022-10-26 RX ADMIN — ACETAMINOPHEN 975 MG: 325 TABLET, FILM COATED ORAL at 13:47

## 2022-10-26 RX ADMIN — ONDANSETRON 4 MG: 2 INJECTION INTRAMUSCULAR; INTRAVENOUS at 13:58

## 2022-10-26 RX ADMIN — OXYCODONE HYDROCHLORIDE 10 MG: 10 TABLET ORAL at 21:19

## 2022-10-26 NOTE — PHYSICAL THERAPY NOTE
Physical Therapy Evaluation    Patient's Name: Gigi Bradshaw    Admitting Diagnosis  Status post craniectomy [Z98 890]    Problem List  Patient Active Problem List   Diagnosis    Essential hypertension    Anxiety    Acute encephalopathy    Alcohol abuse    Status post craniectomy    Seizure (HCC)    Infection of bone flap (HCC)       Past Medical History  Past Medical History:   Diagnosis Date    Anxiety     Cataracts, bilateral     Dysphagia 04/16/2021    GERD (gastroesophageal reflux disease)     Hematoma, subdural, with loss of consciousness, traumatic (HCC)     Hypertension     PONV (postoperative nausea and vomiting)     Status post insertion of percutaneous endoscopic gastrostomy (PEG) tube ([facility] [address] ) 05/19/2021       Past Surgical History  Past Surgical History:   Procedure Laterality Date    BRAIN HEMATOMA EVACUATION Right 4/15/2021    Procedure: Right CRANIOTOMY FOR SUBDURAL HEMATOMA;  Surgeon: Heidy Kaye MD;  [facility];  Service: Neurosurgery    BRAIN HEMATOMA EVACUATION Right 11/16/2021    Procedure: CRANIOTOMY FOR EVACUATION OF EPIDURAL HEMATOMA;  Surgeon: Heidy Kaye MD;  [facility];  Service: Neurosurgery    BRAIN HEMATOMA EVACUATION Right 11/16/2021    Procedure: Exploration and revision of right craniotomy incision with removal of cranial plate;  Surgeon: Heidy Kaye MD;  [facility];  Service: Neurosurgery    CRANIOPLASTY Right 7/19/2022    Procedure: Right re-opening of craniotomy incision, and debridement with craniectomy;  Surgeon: Heidy Kaye MD;  [facility];  Service: Neurosurgery    CRANIOTOMY Right     HAND SURGERY Left     PEG TUBE PLACEMENT      PEG TUBE REMOVAL      MI REPLACE SKULL PLATE/FLAP Right 4/13/7753    Procedure: RIGHT AUTOLOGOUS CRANIOPLASTY;  Surgeon: Heidy Kaye MD;  [facility];  Service: Neurosurgery    MI REPLACE SKULL PLATE/FLAP Right 2/6/4641    Procedure: right allographic CRANIOPLASTY;  Surgeon: Alice Salgado MD;  [facility];  Service: Neurosurgery    ROTATOR CUFF REPAIR Left         10/26/22 0925   PT Last Visit   PT Visit Date 10/26/22   Note Type   Note type Evaluation   Pain Assessment   Pain Assessment Tool 0-10   Pain Score No Pain   Restrictions/Precautions   Weight Bearing Precautions Per Order No   Other Precautions Multiple lines;Telemetry   Home Living   Type of [address] Two level; Able to live on main level with bedroom/bathroom;Stairs to enter with rails  (4 [address])   Bathroom Shower/Tub Tub/shower unit   Bathroom Toilet Raised   Bathroom Equipment Shower chair   Home Equipment Walker;Cane   Prior Function   Level of [place] Independent with ADLs; Independent with functional mobility   Lives With [address] in the last 6 months   (keeps referring to fall in April 2021, unclear whether any additional falls after this)   Vocational Retired   Comments At baseline pt is I w/ ambulation w/o AD  General   Family/Caregiver Present Yes  (supportive spouse)   Cognition   Attention Attends with cues to redirect   Orientation Level Oriented X4   Memory Within functional limits   Following Commands Follows multistep commands without difficulty   Comments pleasant + cooperative, good safety   Subjective   Subjective Pt agreeable to mobilize  RLE Assessment   RLE Assessment   (5/5)   LLE Assessment   LLE Assessment   (5/5)   Coordination   Movements are Fluid and Coordinated 1   Heel to Shin Intact   Bed Mobility   Supine to Sit Unable to assess   Sit to Supine Unable to assess   Additional Comments Pt greeted ambulating w/ spouse in hallway  Transfers   Sit to Stand 5  Supervision   Stand to Sit 5  Supervision   Additional Comments no AD   Ambulation/Elevation   Gait pattern Excessively slow; Short stride   Gait Assistance 5  Supervision   Assistive Device None   Distance 200'   Stair Management Assistance 5  Supervision   Stair Management Technique One rail L;Nonreciprocal   Number of Stairs 4  (limited by length of IV line)   Balance   Static Sitting Good   Dynamic Sitting Fair +   Static Standing Fair   Dynamic Standing Carlos Denny [id number]   Endurance Deficit   Endurance Deficit No   Activity Tolerance   Activity Tolerance Patient tolerated treatment well   Medical Staff Made Aware OT Sherry Montenegro, CM Cinthia   Nurse Made Aware yes - cleared for therapy   Assessment   Assessment Pt is 62 y o  male seen for a moderate complexity PT evaluation s/p admit to [facility] on 10/25/2022  Pt presented to the hospital after sustaining a TBI; pt had significant bifrontal contusions as well as a R-sided subdural w/ significant amount of compression + mass effect w/ shift + underwent a R-sided craniectomy  He has had multiple surgeries for infections 2* his cranioplasties  He is now s/p 2nd craniectomy due to infection  He is now s/p cranioplasty 10/25/22  Please see above for other active problem list / PMH  PT now consulted to assess functional mobility and needs for safe d/c planning  Prior to admission, pt was I + active w/o AD, lives w/ supportive spouse in a house w/ stairs  Currently pt requires S for functional transfers; S for ambulation w/o AD; S for stair negotiation  Pt presents near functional baseline  No further skilled acute PT needs  Pt + pt's spouse encouraged to ambulate at least TID  Pt's spouse has demonstrated ability to safely manage the IV  Will d/c from caseload     Barriers to Discharge None   Goals   Patient Goals to walk   PT Treatment Day 0   Plan   PT Frequency   (d/c PT)   Recommendation   PT Discharge Recommendation (S)  No rehabilitation needs   AM-PAC Basic Mobility Inpatient   Turning in Bed Without Bedrails 4   Lying on Back to Sitting on Edge of Flat Bed 4   Moving Bed to Chair 3   Standing Up From Chair 3   Walk in Room 3   Climb 3-5 Stairs 3   Basic Mobility Inpatient Raw Score 20   Basic Mobility Standardized Score 43 99   Highest Level Of Mobility   -HLM Goal 6: Walk 10 steps or more   JH-HLM Achieved 7: Walk 25 feet or more   End of Consult   Patient Position at End of Consult Bedside chair; All needs within reach  (on waffle cushion, all lines in tact, spouse + AP at bedside)     Ardath Yen, PT, DPT

## 2022-10-26 NOTE — CASE MANAGEMENT
Case Management Assessment & Discharge Planning Note    Patient name Yasir Stoll  Location [address] [place] [address] [facility] [id number]  : 1960 Date 10/26/2022       Current Admission Date: 10/25/2022  Current Admission Diagnosis:Status post craniectomy   Patient Active Problem List    Diagnosis Date Noted   • Infection of bone flap ([address] ) 2021   • Seizure ([address] ) 2021   • Status post craniectomy 2021   • Alcohol abuse 2021   • Acute encephalopathy 04/15/2021   • Anxiety 2021   • Essential hypertension 2020      LOS (days): 1  Geometric Mean LOS (GMLOS) (days):   Days to GMLOS:     OBJECTIVE:    Risk of Unplanned Readmission Score: 11 34         Current admission status: Inpatient       Preferred Pharmacy:    [address] [facility]  [facility]  [facility] [address]  Phone: [phone] Fax: [phone] - [place] - [address] [place] [address]  Phone: [phone] Fax: [phone]    Primary Care Provider: ANIA Amanda    Primary Insurance: Maciel Rock  Secondary Insurance:     ASSESSMENT:  Derick Dominguez Proxies    There are no active Health Care Proxies on file  Readmission Root Cause  30 Day Readmission: No    Patient Information  Admitted from[de-identified] Home  Mental Status: Alert         DISCHARGE DETAILS:           Additional Comments: Pt adm from home where he lives with his spouse  He is functionally independent at baseline  Per PT/OT evaluation completed on this date the is able to return home without follow up therapy services  CM remains available to assist with disposition planning as needed   It is anticipated the pt will be able to return home without formal service referral

## 2022-10-26 NOTE — UTILIZATION REVIEW
Initial Clinical Review    Elective inpatient surgical procedure  Age/Sex: 62 y o  male  Presents for cranioplasty after sustaining a subdural hematoma for which a right craniectomy was performed  Complicated by infection  Received acute TBI rehab with good recovery  Remains on oral antibiotics  Plan for cranioplasty  Anesthesia Start Date/Time: 10/25/22 0730   Procedure: RIGHT ALLOGRAFFIC CRANIOPLASTY (Right Head)   Anesthesia type: general   Diagnosis: Status post craniectomy [Z98 890]   Pre-op diagnosis: Status post craniectomy [Z98 890]       Procedure(s) (LRB):  RIGHT ALLOGRAFFIC CRANIOPLASTY (Right)       Estimated Blood Loss:   Minimal     Drains:  Closed/Suction Drain Right Scalp Bulb 10 Fr  (Active     Operative Indications:  Status post craniectomy [U86014]  28-year-old gentleman with a remote history of trauma necessitating craniectomy  Unfortunately after multiple cranioplasty attempts he has had multiple infections  He returns after a long-term course of antibiotics today for a 3rd time cranioplasty  He has family understood the risks and benefits of surgery and elected to proceed      Operative Findings:  Cranioplasty greater than 10 cm       Complications:   None        POD#1 Progress Note: 10-26-22 step down    Patient with pain 8/10 receiving prn iv and po analgesia  Wean off dilaudid  GCS=15  Continue neuro checks  Jan drain with 65 ml output overnight and 20 ml currently  Continue drain and reassess output in the  afternoon  Continue iv vancomycin for the duration on this hospitalization  Hold heparin  Continue iv fluids  Add lisinopril and po protonix daily          Admission Orders: Date/Time/Statement:   Admission Orders (From admission, onward)     Ordered        10/25/22 1517  Inpatient Admission  Once                      Orders Placed This Encounter   Procedures   • Inpatient Admission     Standing Status:   Standing     Number of Occurrences:   1     Order Specific Question:   Level of Care     Answer:   Level 2 Stepdown / HOT [14]     Order Specific Question:   Estimated length of stay     Answer:   Inpatient Only Surgery     Vital Signs: /90   Pulse 101   Temp 98 °F (36 7 °C)   Resp 16   Ht 5' 10" (1 778 m)   Wt 84 4 kg (186 lb)   SpO2 91%   BMI 26 69 kg/m²       Date/Time Temp Pulse Resp BP MAP (mmHg) SpO2 O2 Flow Rate (L/min) O2 Device   10/26/22 0800 -- -- -- -- -- -- -- None (Room air)   10/26/22 07:27:34 98 °F (36 7 °C) 101 16 146/90 109 91 % -- --   10/26/22 04:10:38 -- 73 -- 146/79 101 94 % -- --   10/25/22 22:07:10 97 6 °F (36 4 °C) 81 -- 146/85 105 96 % -- --   10/25/22 20:04:46 -- 111 Abnormal  -- 153/100 118 93 % -- --   10/25/22 15:59:50 97 2 °F (36 2 °C)   Abnormal  89 -- 146/83 104 93 % -- --   10/25/22 1100 97 3 °F (36 3 °C)   Abnormal  79 20 146/85 106 100 % -- None (Room air)   10/25/22 1054 97 3 °F (36 3 °C)   Abnormal  82 13 151/76 110 100 % 6 L/min Simple mask   10/25/22 0616 97 5 °F (36 4 °C) 78 16 152/77 -- 100 % -- None (Room air)     Date and Time R Pupil Size (mm) L Pupil Size (mm) R Pupil Reaction L Pupil Reaction   10/26/22 0800 3 3 Sluggish Sluggish   10/26/22 0600 3 3 Sluggish Sluggish   10/26/22 0400 3 3 Sluggish Sluggish   10/26/22 0200 3 3 Sluggish Sluggish   10/26/22 0000 3 3 Sluggish Sluggish   10/25/22 2200 3 3 Sluggish Sluggish   10/25/22 2000 3 3 Sluggish Sluggish   10/25/22 1800 3 3 Sluggish Sluggish   10/25/22 1600 3 3 Sluggish Sluggish   10/25/22 1400 3 3 Sluggish Sluggish   10/25/22 1300 3 3 Sluggish Sluggish   10/25/22 1230 3 3 Sluggish Sluggish   10/25/22 1145 3 3 Sluggish Sluggish   10/25/22 1130 3 3 Sluggish Sluggish   10/25/22 1115 3 3 Sluggish Sluggish   10/25/22 1100 3 3 Sluggish Sluggish       Date and Time Eye Opening Best Verbal Response Best Motor Response Chay Coma Scale Score   10/26/22 0800 4 5 6 15   10/26/22 0600 4 5 6 15   10/26/22 0400 4 5 6 15   10/26/22 0200 4 5 6 15   10/26/22 0000 4 5 6 15 10/25/22 2200 4 5 6 15   10/25/22 2000 4 5 6 15   10/25/22 1800 4 5 6 15   10/25/22 1600 4 5 6 15   10/25/22 1400 4 5 6 15   10/25/22 1300 4 5 6 15   10/25/22 1230 4 5 6 15   10/25/22 1145 4 5 6 15   10/25/22 1130 4 5 6 15   10/25/22 1115 4 5 6 15   10/25/22 1100 4 5 6 15             Pertinent Labs/Diagnostic Test Results:     CT head wo contrast   Final  (10/25 1530)      Postsurgical changes related to placement of a new right-sided porouspolyethylene cranioplasty (i e  Medpor cranioplasty)  Small crescentic extra-axial fluid collection in along right cerebral convexity deep to the cranioplasty with small volume    pneumocephalus and small hyperdense blood products posteriorly, likely postsurgical change              Results from last 7 days   Lab Units 10/26/22  0525   WBC Thousand/uL 5 39   HEMOGLOBIN g/dL 9 2*   HEMATOCRIT % 32 0*   PLATELETS Thousands/uL 147*         Results from last 7 days   Lab Units 10/26/22  0525 10/25/22  2010   SODIUM mmol/L 140 139   POTASSIUM mmol/L 3 6 3 7   CHLORIDE mmol/L 111* 111*   CO2 mmol/L 23 21   ANION GAP mmol/L 6 7   BUN mg/dL 9 9   CREATININE mg/dL 0 84 0 81   EGFR ml/min/1 73sq m 95 96   CALCIUM mg/dL 8 3 8 5         Results from last 7 days   Lab Units 10/25/22  1059   POC GLUCOSE mg/dl 124     Results from last 7 days   Lab Units 10/26/22  0525 10/25/22  2010   GLUCOSE RANDOM mg/dL 125 135       Results from last 7 days   Lab Units 10/26/22  0525   PROTIME seconds 15 0*   INR  1 16   PTT seconds 30       Diet: regular  Mobility: out of bed   DVT Prophylaxis: sequential compression device only    Medications/Pain Control:  Pain 8/10  received iv dilaudid 2208 / [place] Leggett / 0833   Oxycodone at [address] / Lorena Flores / 1022      Scheduled Medications:  acetaminophen, 975 mg, Oral, Q8H JOSEFINA  Clindamycin Phosphate, 1 Dose, Topical, BID  levETIRAcetam, 750 mg, Oral, Q12H JOSEFINA  lisinopril, 20 mg, Oral, Daily  metoprolol tartrate, 25 mg, Oral, Q12H JOSEFINA  pantoprazole, 40 mg, Oral, Early Morning  scopolamine, 1 patch, Transdermal, Once  senna-docusate sodium, 2 tablet, Oral, Daily  tobramycin, 1,200 mg, Topical, Once  vancomycin, 1,500 mg, Intravenous, Q12H      Continuous IV Infusions:  sodium chloride, 125 mL/hr, Intravenous, Continuous      PRN Meds:  calcium carbonate, 1,000 mg, Oral, BID PRN  HYDROmorphone, 0 5 mg, Intravenous, Q3H PRN  LORazepam, 1 mg, Oral, Q8H PRN  methocarbamol, 750 mg, Oral, Q6H PRN  ondansetron, 4 mg, Intravenous, Q4H PRN  oxyCODONE, 10 mg, Oral, Q4H PRN  oxyCODONE, 5 mg, Oral, Q4H PRN        Network Utilization Review Department  ATTENTION: Please call with any questions or concerns to [phone] and carefully listen to the prompts so that you are directed to the right person  All voicemails are confidential   Monette Gosselin all requests for admission clinical reviews, approved or denied determinations and any other requests to dedicated fax number below belonging to the campus where the patient is receiving treatment   List of dedicated fax numbers for the Facilities:  [address] DENIALS (Administrative/Medical Necessity) [phone]   [address] (Maternity/NICU/Pediatrics) [phone]   8 Lesly Morales [phone]   [facility] [address] 77 [phone]   [address] [address] Abraham Garcia [phone]   1550 [facility] Nadya Maza [facility] 134 815 [facility] [phone]

## 2022-10-26 NOTE — UTILIZATION REVIEW
NOTIFICATION OF INPATIENT ADMISSION   AUTHORIZATION REQUEST   SERVICING FACILITY:   [facility]  Address: [address] [place] [address], [address] [place] [address]  Tax ID: [id number]  NPI: [id number] ATTENDING PROVIDER:  Attending Name and NPI#: Anthony Rashid Md [[id number]]  Address: [address] [place] [address]  Phone: [phone]   ADMISSION INFORMATION:  Place of Service: Inpatient [facility]  [address]  Place of Service Code: 21  Inpatient Admission Date/Time: 10/25/22  3:17 PM  Discharge Date/Time: No discharge date for patient encounter  Admitting Diagnosis Code/Description:  Status post craniectomy [Z98 890]     UTILIZATION REVIEW CONTACT:  Lauryn Ramirez Utilization   Network Utilization Review Department  Phone: [phone]  Fax: [phone]  Email: [email or web address]  Contact for approvals/pending authorizations, clinical reviews, and discharge  PHYSICIAN ADVISORY SERVICES:  Medical Necessity Denial & [id number]-[id number] Review  Phone: [phone]  Fax: [phone]  Email: [email or web address]

## 2022-10-26 NOTE — PROGRESS NOTES
Vancomycin IV Pharmacy-to-Dose Consultation    Wendy Winter is a 62 y o  male who is currently receiving Vancomycin IV with management by the Pharmacy Consult service  Relevant clinical data and objective / subjective history reviewed  Vancomycin Assessment:  Indication: surgical prophylaxis  Renal Function: CrCl = 87 2  Potential Nephrotoxicity Factors:  Medications: Aminoglycosides, ACEIs/ARBs  Days of Therapy: 1  Current Dose: 1,000 mg q12h  Goal Trough: 10-15 mcg/mL  Goal AUC(24h): 400-600  Last Level: N/A      Vancomycin Plan:  New Dosing: change to 1,500 mg q12h  Next Level: 10/26/22 @2111  Renal Function Monitoring: assess Scr for CrCl daily      Pharmacy will continue to follow closely for s/sx of nephrotoxicity, infusion reactions and appropriateness of therapy  BMP and CBC will be ordered per protocol  We will continue to follow the patient’s culture results and clinical progress daily  Dinorah IBLL  Ph

## 2022-10-26 NOTE — OCCUPATIONAL THERAPY NOTE
Occupational Therapy Evaluation     Patient Name: Shirin Patel  PCPNL'J Date: 10/26/2022  Problem List  Principal Problem:    Status post craniectomy    Past Medical History  Past Medical History:   Diagnosis Date    Anxiety     Cataracts, bilateral     Dysphagia 04/16/2021    GERD (gastroesophageal reflux disease)     Hematoma, subdural, with loss of consciousness, traumatic (HCC)     Hypertension     PONV (postoperative nausea and vomiting)     Status post insertion of percutaneous endoscopic gastrostomy (PEG) tube ([facility] [address] ) 05/19/2021     Past Surgical History  Past Surgical History:   Procedure Laterality Date    BRAIN HEMATOMA EVACUATION Right 4/15/2021    Procedure: Right CRANIOTOMY FOR SUBDURAL HEMATOMA;  Surgeon: Sharonda Dobson MD;  [facility];  Service: Neurosurgery    BRAIN HEMATOMA EVACUATION Right 11/16/2021    Procedure: CRANIOTOMY FOR EVACUATION OF EPIDURAL HEMATOMA;  Surgeon: Sharonda Dobson MD;  [facility];  Service: Neurosurgery    BRAIN HEMATOMA EVACUATION Right 11/16/2021    Procedure: Exploration and revision of right craniotomy incision with removal of cranial plate;  Surgeon: Sharonda Dobson MD;  [facility];  Service: Neurosurgery    CRANIOPLASTY Right 7/19/2022    Procedure: Right re-opening of craniotomy incision, and debridement with craniectomy;  Surgeon: Sharonda Dobson MD;  [facility];  Service: Neurosurgery    CRANIOTOMY Right     HAND SURGERY Left     PEG TUBE PLACEMENT      PEG TUBE REMOVAL      IL REPLACE SKULL PLATE/FLAP Right 4/51/3175    Procedure: RIGHT AUTOLOGOUS CRANIOPLASTY;  Surgeon: Sharonda Dobson MD;  [facility];  Service: Neurosurgery    IL REPLACE SKULL PLATE/FLAP Right 3/4/4745    Procedure: right allographic CRANIOPLASTY;  Surgeon: Sharonda Dobson MD;  [facility];  Service: Neurosurgery    ROTATOR CUFF REPAIR Left          10/26/22 0910   OT Last Visit   OT Visit Date 10/26/22   Pain Assessment   Pain Assessment Tool 0-10   Pain Score No Pain   Restrictions/Precautions   Weight Bearing Precautions Per Order No   Other Precautions Telemetry;Multiple lines  (+YANETH drain to incision site)   Home Living   Type of [address] Two [address]; Able to live on main level with bedroom/bathroom  (+4STE)   Bathroom Shower/Tub Tub/shower unit   [employer] Raised  (comfort height toilet)   Bathroom Equipment Shower chair;Grab bars in shower   [address]   (No AD at baseline)   Prior Function   Level of [place] Independent with ADLs   Lives With Spouse  (supportive spouse)   Receives Help From Family   Falls in the last 6 months 0   Vocational Retired   Lifestyle   Autonomy I with ADL's, shares homemaking tasks with spouse, no AD with functional mobility (-) drives, spouse assists   Reciprocal Relationships spouse -retired and home to assist pt as needed  Service to Others retired   [employer]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[employer]   General   Family/Caregiver Present Yes  (spouse present and confirming pt's social history and medical event time line )   Subjective   Subjective "I dont have children thats why we had a 40 foot boat"   ADL   Eating Assistance 7  Independent   Grooming Assistance 7  Independent   UB Bathing Assistance 5  Supervision/Setup   LB Bathing Assistance 5  Supervision/Setup   UB Dressing Assistance 5  Supervision/Setup   LB Dressing Assistance 5  [address]  5  Supervision/Setup   Bed Mobility   Supine to Sit Unable to assess   Sit to Supine Unable to assess   Additional Comments pt remained in chair upon departure with spouse present     Transfers   Sit to Stand 5  Supervision   Additional items Assist x 1   Stand to Sit 5  Supervision   Additional items Assist x 1   Functional Mobility   Functional Mobility 5  Supervision   Additional Comments S without AD community distance functional mobility with good ability to divide attention, answer evaluative questions correctly, good activity tolerance during mobility tasks  Additional items (no AD)   Balance   Static Sitting Good   Dynamic Sitting Fair +   Static Standing Fair   Dynamic Standing Fair   Ambulatory Fair   Activity Tolerance   Activity Tolerance Patient tolerated treatment well   Medical Staff Made Aware PT sirenaLAURA jesse due to the patient's co-morbidities, clinically unstable presentation, and present impairments which are a regression from the patient's baseline  Nurse Made Aware RN cleared pt for therapy   RUE Assessment   RUE Assessment WFL   LUE Assessment   LUE Assessment WFL   Hand Function   Gross Motor Coordination Functional   Fine Motor Coordination Functional   Vision-Basic Assessment   Current Vision No visual deficits   Vision - Complex Assessment   Acuity Able to read clock/calendar on wall without difficulty   Cognition   Overall Cognitive Status [facility]   Arousal/Participation Alert; Responsive; Cooperative   Attention Attends with cues to redirect   Orientation Level Oriented X4   Memory Within functional limits   Following Commands Follows multistep commands without difficulty   Comments pt alert and oriented, good ability to follow directives, good attention/safety with evaluation  Assessment   Assessment Pt is a 62 y o  male who was admitted to [facility] on 10/25/2022 with Status post craniectomy 2* to infection on 10/25 -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, 04/15/2021, Status post cranioplasty 07/13/2021 infection necessitating craniectomy  Synthetic cranioplasty 3/8/22  Repeat craniectomy 07/09/2022   Pt's problem list also includes PMH of  has a past medical history of Anxiety, Cataracts, bilateral, Dysphagia (04/16/2021), GERD (gastroesophageal reflux disease), Hematoma, subdural, with loss of consciousness, traumatic ([facility] [address] ), Hypertension, PONV (postoperative nausea and vomiting), and Status post insertion of percutaneous endoscopic gastrostomy (PEG) tube ([facility] [address] ) (05/19/2021)    At baseline pt was completing I with ADL's/IaDL's, no AD with functional ambulation  Pt lives with spouse in a [address] with LIONEL  Currently pt requires S/set-up for overall ADLS and S without AD for functional mobility/transfers  Pt currently presents with impairments in the following categories -steps to enter environment and difficulty performing IADLS  These impairments, as well as pt's risk for falls  limit pt's ability to safely engage in all baseline areas of occupation, includinglaundry , driving, house maintenance, social participation  and leisure activities  The patient's raw score on the AM-PAC Daily Activity inpatient short form is 24, standardized score is 57 54, greater than 39 4  Patients at this level are likely to benefit from discharge to home  Please refer to the recommendation of the Occupational Therapist for safe discharge planning  From OT standpoint, recommend home with increased family support and no DME needs upon D/C  OT will continue to follow to address the below stated goals     Goals   Patient Goals "walk"   Recommendation   OT Discharge Recommendation No rehabilitation needs   Equipment Recommended   (No DME needs)   AM-PAC Daily Activity Inpatient   Lower Body Dressing 4   Bathing 4   Toileting 4   Upper Body Dressing 4   Grooming 4   Eating 4   Daily Activity Raw Score 24   Daily Activity Standardized Score (Calc for Raw Score >=11) 57 54   AM-PAC Applied Cognition Inpatient   Following a Speech/Presentation 3   Understanding Ordinary Conversation 4   Taking Medications 4   Remembering Where Things Are Placed or Put Away 4   Remembering List of 4-5 Errands 4   Taking Care of Complicated Tasks 4   Applied Cognition Raw Score 23   Applied Cognition Standardized Score 53 08     Honey LAWRENCE, OTR/L

## 2022-10-26 NOTE — ASSESSMENT & PLAN NOTE
POD1 RIGHT ALLOGRAFFIC CRANIOPLASTY  • Original decompressive craniectomy for subdural hematoma and traumatic brain injury April 15, 2021  • Cranioplasty July 13, 2021, craniectomy for infection November 16, 2021, evacuation of epidural hematoma on November 17, 2021, right allograft cranioplasty March 8, 2022 and craniectomy for infection July 19, 2022    Imaging:   • CT head without 10/25/22:  Postsurgical changes related to placement of new right sided force polyethylene cranioplasty  Small crescentic extra-axial fluid collection along the right cerebral convexity deep to the cranioplasty with small volume pneumocephalus and small hyperdense blood products posterior likely postsurgical changes  Unchanged 0 4 cm colloid cyst in the left foramen of [place]ro region  Plan:   • Continue monitor neurological exam with frequent neuro checks  • CT head results reviewed with patient his wife  • YANETH drain 250ml overnight with 65 mL output documented this a m  With an additional 20 mL involved  Maintain at this time  Trend output  • Pain control as needed  Will wean Dilaudid off today  • Continue IV vancomycin for the duration of this hospitalization  • Plan of care discussed with infectious disease as well as dermatology  Recommendations at discharge:  o Minocycline 100 mg b i d   o Clindamycin 1% topical lotion daily  o Benzyl peroxide wash daily - recommended changing in a clear poor  • Mobilize with physical occupational therapy  • DVT prophylaxis:  Bilateral SCDs  Will hold heparin today with possible discontinuation of drain later this afternoon  Early mobilization    Neurosurgery will continue follow as primary  Call questions or concerns

## 2022-10-26 NOTE — PROGRESS NOTES
[address] [facility]  Progress Note - Walker Stefanie 1960, 62 y o  male MRN: [id number]  Unit/Bed#: [facility] 713-01 Encounter: [id number]  Primary Care Provider: Prosper Yeboah   Date and time admitted to hospital: 10/25/2022  5:33 AM    * Status post craniectomy  Assessment & Plan  POD1 RIGHT ALLOGRAFFIC CRANIOPLASTY  • Original decompressive craniectomy for subdural hematoma and traumatic brain injury April 15, 2021  • Cranioplasty July 13, 2021, craniectomy for infection November 16, 2021, evacuation of epidural hematoma on November 17, 2021, right allograft cranioplasty March 8, 2022 and craniectomy for infection July 19, 2022    Imaging:   • CT head without 10/25/22:  Postsurgical changes related to placement of new right sided force polyethylene cranioplasty  Small crescentic extra-axial fluid collection along the right cerebral convexity deep to the cranioplasty with small volume pneumocephalus and small hyperdense blood products posterior likely postsurgical changes  Unchanged 0 4 cm colloid cyst in the left foramen of Monro region  Plan:   • Continue monitor neurological exam with frequent neuro checks  • CT head results reviewed with patient his wife  • YANETH drain 250ml overnight with 65 mL output documented this a m  With an additional 20 mL involved  Maintain at this time  Trend output  • Pain control as needed  Will wean Dilaudid off today  • Continue IV vancomycin for the duration of this hospitalization  • Plan of care discussed with infectious disease as well as dermatology  Recommendations at discharge:  o Minocycline 100 mg b i d   o Clindamycin 1% topical lotion daily  o Benzyl peroxide wash daily - recommended changing in a clear poor  • Mobilize with physical occupational therapy  • DVT prophylaxis:  Bilateral SCDs  Will hold heparin today with possible discontinuation of drain later this afternoon    Early mobilization    Neurosurgery will continue follow as primary  Call questions or concerns  Subjective/Objective   Chief Complaint:  Postoperative follow-up    Subjective:  Patient endorses difficulty sleeping overnight secondary for 10 checks  Endorses head pain  No vision or speech changes  No new weakness or sensation changes  Voiding well  Tolerating IV antibiotics  Objective:  Sitting up in chair  NAD  I/O       10/24 0701  10/25 0700 10/25 0701  10/26 0700 10/26 0701  10/27 0700    I V  (mL/kg)  1200 (14 2)     Total Intake(mL/kg)  1200 (14 2)     Urine (mL/kg/hr)  1550 (0 8) 150 (0 4)    Drains  250 65    Blood  50     Total Output  1850 215    Net  -650 -215                 Invasive Devices  Report    Peripheral Intravenous Line  Duration           Peripheral IV 10/25/22 Left Hand 1 day    Peripheral IV 10/25/22 Right Hand 1 day          Drain  Duration           Closed/Suction Drain Right Scalp Bulb 10 Fr  1 day                Physical Exam:  Vitals: Blood pressure 146/90, pulse 101, temperature 98 °F (36 7 °C), resp  rate 16, height 5' 10" (1 778 m), weight 84 4 kg (186 lb), SpO2 91 %  ,Body mass index is 26 69 kg/m²  General appearance: alert, appears stated age, cooperative and no distress  Head: Normocephalic, head wrap in place    YANETH drain with bloody output  Eyes: EOMI, PERRL  Neck: supple, symmetrical, trachea midline   Lungs: non labored breathing  Heart: regular heart rate  Neurologic:   Mental status: Alert, oriented, thought content appropriate  Cranial nerves: grossly intact (Cranial nerves II-XII) except left facial weakness (stable from yesterday)  Sensory: normal to LT  Motor: moving all extremities without focal weakness  Coordination: finger to nose normal bilaterally, minimal left drift     Lab Results:  Results from last 7 days   Lab Units 10/26/22  0525   WBC Thousand/uL 5 39   HEMOGLOBIN g/dL 9 2*   HEMATOCRIT % 32 0*   PLATELETS Thousands/uL 147*     Results from last 7 days   Lab Units 10/26/22  0525 10/25/22  2010   POTASSIUM mmol/L 3 6 3 7   CHLORIDE mmol/L 111* 111*   CO2 mmol/L 23 21   BUN mg/dL 9 9   CREATININE mg/dL 0 84 0 81   CALCIUM mg/dL 8 3 8 5             Results from last 7 days   Lab Units 10/26/22  0525   INR  1 16   PTT seconds 30     No results found for: TROPONINT  ABG:  Lab Results   Component Value Date    PHART 7 362 11/17/2021    XFQ9YFZ 34 9 (L) 11/17/2021    PO2ART 141 3 (H) 11/17/2021    XVQ1HHQ 19 4 (L) 11/17/2021    BEART -5 3 11/17/2021    SOURCE Line, Arterial 11/17/2021       Imaging Studies: I have personally reviewed pertinent reports  and I have personally reviewed pertinent films in PACS    CT head wo contrast    Result Date: 10/25/2022  Impression: Postsurgical changes related to placement of a new right-sided porouspolyethylene cranioplasty (i e  Medpor cranioplasty)  Small crescentic extra-axial fluid collection in along right cerebral convexity deep to the cranioplasty with small volume pneumocephalus and small hyperdense blood products posteriorly, likely postsurgical change  Unchanged 0 4 cm colloid cyst in left foramen of [place]ro region   Workstation performed: [id number]     EKG, Pathology, and Other Studies: none    VTE Pharmacologic Prophylaxis:  Defer for possible drain removal later today    VTE Mechanical Prophylaxis: sequential compression device

## 2022-10-26 NOTE — RESTORATIVE TECHNICIAN NOTE
Restorative Technician Note      Patient Name: Saqib Crump     Note Type: Mobility  Patient Position Upon Consult: Seated edge of bed  Activity Performed: Ambulated; Dangled; Stood  Assistive Device: Other (Comment) (none)  Education Provided: Yes  Patient Position at End of Consult: All needs within reach;  Seated edge of bed      Sudheer Cotter BS, Restorative Technician, [employer]

## 2022-10-26 NOTE — PLAN OF CARE
Problem: Potential for Falls  Goal: Patient will remain free of falls  Description: INTERVENTIONS:  - Educate patient/family on patient safety including physical limitations  - Instruct patient to call for assistance with activity   - Consult OT/PT to assist with strengthening/mobility   - Keep Call bell within reach  - Keep bed low and locked with side rails adjusted as appropriate  - Keep care items and personal belongings within reach  - Initiate and maintain comfort rounds  - Make Fall Risk Sign visible to staff  - Offer Toileting every 2 Hours, in advance of need  - Initiate/Maintain bed alarm  - Obtain necessary fall risk management equipment: alarms  - Apply yellow socks and bracelet for high fall risk patients  - Consider moving patient to room near nurses station  Outcome: Progressing     Problem: MOBILITY - ADULT  Goal: Maintain or return to baseline ADL function  Description: INTERVENTIONS:  -  Assess patient's ability to carry out ADLs; assess patient's baseline for ADL function and identify physical deficits which impact ability to perform ADLs (bathing, care of mouth/teeth, toileting, grooming, dressing, etc )  - Assess/evaluate cause of self-care deficits   - Assess range of motion  - Assess patient's mobility; develop plan if impaired  - Assess patient's need for assistive devices and provide as appropriate  - Encourage maximum independence but intervene and supervise when necessary  - Involve family in performance of ADLs  - Assess for home care needs following discharge   - Consider OT consult to assist with ADL evaluation and planning for discharge  - Provide patient education as appropriate  Outcome: Progressing  Goal: Maintains/Returns to pre admission functional level  Description: INTERVENTIONS:  - Perform BMAT or MOVE assessment daily    - Set and communicate daily mobility goal to care team and patient/family/caregiver     - Collaborate with rehabilitation services on mobility goals if consulted  - Perform Range of Motion 3 times a day  - Reposition patient every 2 hours    - Dangle patient 3 times a day  - Stand patient 3 times a day  - Ambulate patient 3 times a day  - Out of bed to chair 3 times a day   - Out of bed for meals 3 times a day  - Out of bed for toileting  - Record patient progress and toleration of activity level   Outcome: Progressing

## 2022-10-27 VITALS
DIASTOLIC BLOOD PRESSURE: 60 MMHG | WEIGHT: 186 LBS | HEIGHT: 70 IN | SYSTOLIC BLOOD PRESSURE: 121 MMHG | BODY MASS INDEX: 26.63 KG/M2 | RESPIRATION RATE: 16 BRPM | TEMPERATURE: 98 F | OXYGEN SATURATION: 93 % | HEART RATE: 75 BPM

## 2022-10-27 RX ORDER — MINOCYCLINE HYDROCHLORIDE 100 MG/1
100 TABLET ORAL 2 TIMES DAILY
Qty: 60 TABLET | Refills: 0 | Status: SHIPPED | OUTPATIENT
Start: 2022-10-27 | End: 2022-11-26

## 2022-10-27 RX ORDER — METHOCARBAMOL 750 MG/1
750 TABLET, FILM COATED ORAL EVERY 6 HOURS PRN
Qty: 28 TABLET | Refills: 0 | Status: SHIPPED | OUTPATIENT
Start: 2022-10-27 | End: 2022-11-03

## 2022-10-27 RX ORDER — OXYCODONE HYDROCHLORIDE 10 MG/1
10 TABLET ORAL EVERY 8 HOURS PRN
Qty: 21 TABLET | Refills: 0 | Status: SHIPPED | OUTPATIENT
Start: 2022-10-27 | End: 2022-11-03

## 2022-10-27 RX ORDER — ONDANSETRON 4 MG/1
4 TABLET, FILM COATED ORAL EVERY 8 HOURS PRN
Qty: 20 TABLET | Refills: 0 | Status: SHIPPED | OUTPATIENT
Start: 2022-10-27

## 2022-10-27 RX ORDER — HYDROMORPHONE HCL/PF 1 MG/ML
0.5 SYRINGE (ML) INJECTION EVERY 6 HOURS PRN
Status: DISCONTINUED | OUTPATIENT
Start: 2022-10-27 | End: 2022-10-27 | Stop reason: HOSPADM

## 2022-10-27 RX ADMIN — LEVETIRACETAM 750 MG: 750 TABLET, FILM COATED ORAL at 09:06

## 2022-10-27 RX ADMIN — OXYCODONE HYDROCHLORIDE 10 MG: 10 TABLET ORAL at 01:18

## 2022-10-27 RX ADMIN — ACETAMINOPHEN 975 MG: 325 TABLET, FILM COATED ORAL at 05:15

## 2022-10-27 RX ADMIN — METOPROLOL TARTRATE 25 MG: 25 TABLET, FILM COATED ORAL at 09:06

## 2022-10-27 RX ADMIN — LORAZEPAM 1 MG: 1 TABLET ORAL at 09:48

## 2022-10-27 RX ADMIN — LORAZEPAM 1 MG: 1 TABLET ORAL at 01:18

## 2022-10-27 RX ADMIN — LISINOPRIL 20 MG: 20 TABLET ORAL at 09:06

## 2022-10-27 RX ADMIN — ACETAMINOPHEN 975 MG: 325 TABLET, FILM COATED ORAL at 13:24

## 2022-10-27 RX ADMIN — VANCOMYCIN HYDROCHLORIDE 1500 MG: 1 INJECTION, POWDER, LYOPHILIZED, FOR SOLUTION INTRAVENOUS at 09:15

## 2022-10-27 RX ADMIN — PANTOPRAZOLE SODIUM 40 MG: 40 TABLET, DELAYED RELEASE ORAL at 05:15

## 2022-10-27 RX ADMIN — OXYCODONE HYDROCHLORIDE 10 MG: 10 TABLET ORAL at 09:48

## 2022-10-27 RX ADMIN — ONDANSETRON 4 MG: 2 INJECTION INTRAMUSCULAR; INTRAVENOUS at 07:21

## 2022-10-27 RX ADMIN — OXYCODONE HYDROCHLORIDE 10 MG: 10 TABLET ORAL at 05:23

## 2022-10-27 RX ADMIN — HYDROMORPHONE HYDROCHLORIDE 0.5 MG: 1 INJECTION, SOLUTION INTRAMUSCULAR; INTRAVENOUS; SUBCUTANEOUS at 13:24

## 2022-10-27 NOTE — ASSESSMENT & PLAN NOTE
POD 2 RIGHT ALLOGRAFFIC CRANIOPLASTY  • Original decompressive craniectomy for subdural hematoma and traumatic brain injury April 15, 2021  • Cranioplasty July 13, 2021, craniectomy for infection November 16, 2021, evacuation of epidural hematoma on November 17, 2021, right allograft cranioplasty March 8, 2022 and craniectomy for infection July 19, 2022    Imaging  • CT head without 10/25/22:  Postsurgical changes related to placement of new right sided force polyethylene cranioplasty  Small crescentic extra-axial fluid collection along the right cerebral convexity deep to the cranioplasty with small volume pneumocephalus and small hyperdense blood products posterior likely postsurgical changes  Unchanged 0 4 cm colloid cyst in the left foramen of [place]ro region  Plan:   • Continue monitor neurological exam with frequent neuro checks  • CT head results again discussed  • YANETH drain switched to gravity with no increase in output over 4 hours  Removed and suture tied in place  Patient tolerated well  • Pain control on oral medication   • Continue IV vancomycin for the duration of this hospitalization  • Plan of care discussed with infectious disease as well as dermatology  Recommendations at discharge:  o Minocycline 100 mg b i d   o Clindamycin 1% topical lotion daily  o Benzyl peroxide wash daily - recommended changing in a clear poor  • Mobilize with physical occupational therapy  • DVT prophylaxis:  Bilateral SCDs  Neurosurgery removed YANETH drain and cleared patient for DC home  Patient wife at bedside in agreement with plan  Call questions or concerns

## 2022-10-27 NOTE — RESTORATIVE TECHNICIAN NOTE
Restorative Technician Note      Patient Name: Jorge Worthington     Note Type: Mobility  Patient Position Upon Consult: Supine  Activity Performed: Ambulated; Dangled; Stood  Assistive Device: Other (Comment) (none)  Education Provided: Yes  Patient Position at End of Consult: Seated edge of bed;  All needs within reach    [facility] NORBERTO CAMPBELL, Restorative Technician, [employer]

## 2022-10-27 NOTE — PROGRESS NOTES
Vancomycin Assessment    Herberth Rousseau is a 62 y o  male who is currently receiving vancomycin 1,000 mg q12h for other surgical prophylaxis   Relevant clinical data and objective history reviewed:  Creatinine   Date Value Ref Range Status   10/26/2022 0 84 0 60 - 1 30 mg/dL Final     Comment:     Standardized to IDMS reference method   10/25/2022 0 81 0 60 - 1 30 mg/dL Final     Comment:     Standardized to IDMS reference method   09/15/2022 0 99 0 60 - 1 30 mg/dL Final     Comment:     Standardized to IDMS reference method     /73   Pulse 91   Temp 98 °F (36 7 °C)   Resp 16   Ht 5' 10" (1 778 m)   Wt 84 4 kg (186 lb)   SpO2 98%   BMI 26 69 kg/m²   I/O last 3 completed shifts:  In: -   Out: 1440 [Urine:1100; Drains:340]  Lab Results   Component Value Date/Time    BUN 9 10/26/2022 05:25 AM    WBC 5 39 10/26/2022 05:25 AM    HGB 9 2 (L) 10/26/2022 05:25 AM    HCT 32 0 (L) 10/26/2022 05:25 AM    MCV 82 10/26/2022 05:25 AM     (L) 10/26/2022 05:25 AM     Temp Readings from Last 3 Encounters:   10/27/22 98 °F (36 7 °C)   09/21/22 98 °F (36 7 °C) (Temporal)   09/15/22 (!) 96 6 °F (35 9 °C) (Temporal)     Vancomycin Days of Therapy: 3    Assessment/Plan  The patient is currently on vancomycin utilizing scheduled dosing based on actual body weight  Baseline risks associated with therapy include:  none identified at this time   The patient is currently receiving 1500 mg q 12 hr and is clinically appropriate and dose will be continued  Pharmacy will also follow closely for s/sx of nephrotoxicity, infusion reactions, and appropriateness of therapy  BMP and CBC will be ordered per protocol  Patient's last trough on 10/26 was nat[place] at 15 0  Plan for trough on Sun 10/30  at [address] to verify if patient is still at steady state with a vanco trough goal of 10-15  Due to infection severity, will target a trough of 10-15 (mild infection/cellulitis)     Per doctor's order, continue vancomycin for length of hospitalization  Pharmacy will continue to follow the patient’s culture results and clinical progress daily      Chirag Bob, Pharmacist

## 2022-10-27 NOTE — DISCHARGE INSTRUCTIONS
Discharge Instructions  Cranioplasty    Activity:  Do not lift, pushing or pulling more than 10 pounds for 2 weeks  Avoid bending, lifting and twisting for 2 weeks  Walk as tolerated  Recommend at least four short walks daily  No driving for at least 2 weeks or until cleared by Neurosurgery  Do not use a hair dryer, and avoid hair products such as mousse, oils, and gels  Do not brush your hair away from the incision since this will put strain on the suture line  Do not dye or perm hair for 6 weeks or until cleared by physician  Continue to change bed linens and pajamas more frequently  Wear clean clothes daily  Surgical incision care:  Keep dressings in place for 3 days  After 3 days, incisions may be left open to air, but should remain clean  Keep incisions dry for 3 days  May shower after 3 days using a baby shampoo including head incision  Rinse off shampoo and pat dry  Avoid, rubbing incision but gently massage hair  Continue to use clean towel and washcloth with each shower for 2 weeks post-op  Do not immerse the incisions in water for 6 weeks or until cleared  Do not apply any creams or ointments to the incision, unless otherwise instructed by [facility]  Contact office if increasing redness, drainage, pain or swelling occurs around the incisions or if you develop a fever greater than 101F  Do not dye/perm hair or use any hair products until cleared by Neurosurgery  Postoperative medication:  [facility] will provide pain medication in the postoperative period  All prescriptions must come from a single provider  Take medications as prescribed  Call office with any questions/concerns  May use over the counter Tylenol  No NSAIDs (ie  Ibuprofen, Aleve, Advil, Naproxen, etc )  Please contact office for questions regarding dosage and modifications    No antiplatelet or anticoagulation medication until cleared by [facility], unless otherwise instructed  Please contact [facility] if you have any questions about the effects of any of your medications on blood clotting  Do not operate heavy machinery or vehicles while taking sedating medications  Use a bowel regimen while on opioids as they induce constipation  Ie  Senokot-S, Miralax, Colace, etc  Increase fiber and water intake  ** Please notify the office if incision becomes red, swollen, tender, or has increased drainage, and temp>101  Return to the ER if you experience increased headache, difficulty walking, change in vision or speech, drowsiness, weakness, nausea/vomiting, or seizures  **

## 2022-10-27 NOTE — DISCHARGE SUMMARY
[facility]  Discharge- Cherri Rodney 1960, 62 y o  male MRN: [id number]  [facility] Encounter: [id number]  Primary Care Provider: Jaiden Hdz   Date and time admitted to hospital: 10/25/2022  5:33 AM    * Status post craniectomy  Assessment & Plan  POD 2 RIGHT ALLOGRAFFIC CRANIOPLASTY  • Original decompressive craniectomy for subdural hematoma and traumatic brain injury April 15, 2021  • Cranioplasty July 13, 2021, craniectomy for infection November 16, 2021, evacuation of epidural hematoma on November 17, 2021, right allograft cranioplasty March 8, 2022 and craniectomy for infection July 19, 2022    Imaging  • CT head without 10/25/22:  Postsurgical changes related to placement of new right sided force polyethylene cranioplasty  Small crescentic extra-axial fluid collection along the right cerebral convexity deep to the cranioplasty with small volume pneumocephalus and small hyperdense blood products posterior likely postsurgical changes  Unchanged 0 4 cm colloid cyst in the left foramen of [place] region  Plan:   • Continue monitor neurological exam with frequent neuro checks  • CT head results again discussed  • YANETH drain switched to gravity with no increase in output over 4 hours  Removed and suture tied in place  Patient tolerated well  • Pain control on oral medication   • Continue IV vancomycin for the duration of this hospitalization  • Plan of care discussed with infectious disease as well as dermatology  Recommendations at discharge:  o Minocycline 100 mg b i d   o Clindamycin 1% topical lotion daily  o Benzyl peroxide wash daily - recommended changing in a clear poor  • Mobilize with physical occupational therapy  • DVT prophylaxis:  Bilateral SCDs  Neurosurgery removed YANETH drain and cleared patient for DC home  Patient wife at bedside in agreement with plan  Call questions or concerns        Discharge Date: 10/27/2022    Admitting Diagnosis: Status post craniectomy [[id number]]    Discharge Diagnosis: See above  Medical Problems             Resolved Problems  Date Reviewed: 9/19/2022   None                 Attending Physician: Dr Caitlin Bolaños Physician: None     Procedures Performed: Procedure(s):  RIGHT [facility] CRANIOPLASTY    Pathology: None     Hospital Course: Carlos Coronel is a 61 y/o M who presented to the outpatient office for evaluation for cranioplasty  Patient has very complicated history of craniectomies and replacement cranioplasties with infection  Patient underwent the above stated procedure under general anesthesia with minimal blood loss and no complications  Patient was kept in the PACU until stable and then moved to the floor  CT head was obtained showing expected post surgical changes  Ulyess Juany was placed perioperatively and removed on POD 2; patient tolerated well  PT and OT were consulted and recommend home with family support  Patient was cleared medically for discharge  Prior to discharge, postoperative instructions were discussed with patient  Addressed all questions and concerns  Reviewed new medication with patient and wife  Patient will follow up outpatient in 2 weeks for an incision check and 6 weeks with repeat CT head as scheduled already  Anticipate sutures to remain in place for 4 weeks  Physical Exam  Constitutional:       Appearance: Normal appearance  HENT:      Head: Normocephalic  Comments: R sided crani incision  Closed with sutures  Removed dressings and drain  Drain stitch in place  Mild temporal wasting  Eyes:      Extraocular Movements: Extraocular movements intact  Pupils: Pupils are equal, round, and reactive to light  Cardiovascular:      Rate and Rhythm: Normal rate  Pulmonary:      Effort: Pulmonary effort is normal    Musculoskeletal:         General: No tenderness  Normal range of motion        Cervical back: Normal range of motion and neck supple  No tenderness  Skin:     General: Skin is warm and dry  Neurological:      General: No focal deficit present  Mental Status: He is alert and oriented to person, place, and time  Cranial Nerves: No cranial nerve deficit  Sensory: No sensory deficit  Motor: No weakness  Psychiatric:         Mood and Affect: Mood normal          Thought Content: Thought content normal        Condition at Discharge: good     Discharge Instructions/Information to Patient and Family:   See after visit summary for information provided to patient and family  Provisions for Follow-Up Care:  See after visit summary for information related to follow-up care and any pertinent home health orders  Disposition: Home    Planned Readmission: No    Discharge Statement   I spent >30 minutes discharging the patient  This time was spent on the day of discharge  I had direct contact with the patient on the day of discharge  >30 minutes was spent secondary to large amount of questions and complexity of case with close frequent follow up required  Discharge Medications:  See after visit summary for reconciled discharge medications provided to patient and family

## 2022-10-28 ENCOUNTER — TRANSITIONAL CARE MANAGEMENT (OUTPATIENT)
Dept: FAMILY MEDICINE CLINIC | Facility: CLINIC | Age: 62
End: 2022-10-28

## 2022-10-28 NOTE — UTILIZATION REVIEW
NOTIFICATION OF ADMISSION DISCHARGE   This is a Notification of Discharge from [facility]  Please be advised that this patient has been discharge from our facility  Below you will find the admission and discharge date and time including the patient’s disposition  UTILIZATION REVIEW CONTACT:  Quyen Garnica  Utilization   Network Utilization Review Department  Phone: [phone] x carefully listen to the prompts  All voicemails are confidential   Email: [email or web address]     ADMISSION INFORMATION  PRESENTATION DATE: 10/25/2022  5:33 AM  OBERVATION ADMISSION DATE:   INPATIENT ADMISSION DATE: 10/25/22  3:17 PM   DISCHARGE DATE: 10/27/2022  3:52 PM  DISPOSITION: Home/Self Care Home/Self Care      IMPORTANT INFORMATION:  Send all requests for admission clinical reviews, approved or denied determinations and any other requests to dedicated fax number below belonging to the campus where the patient is receiving treatment   List of dedicated fax numbers:  [address] DENIALS (Administrative/Medical Necessity) [phone]   [address] (Maternity/NICU/Pediatrics) [phone]   [facility] [phone]   [place] [address] [phone]   [address] [phone]   [address] [facility] [phone] [address] [facility] [phone]   [address] [place], [place] [address] [place] [facility] [phone]   [facility]  [phone]   [facility] [phone]   [address] [place] [address] [facility] [phone]

## 2022-10-28 NOTE — TELEPHONE ENCOUNTER
1st attempt - [facility] on primary contact number after surgery 10/25/2022 to check in on recovery and provide post surgical instructions  Got voicemail, left message to callback  Will make another attempt if no callback is received

## 2022-10-31 NOTE — TELEPHONE ENCOUNTER
Called patient to see how he is doing after surgery  Patient reports he is doing well overall and denies any incisional issues or fevers  Patient is able to ambulate around the house and complete ADLs  Educated the patient about the importance of preventing blood clots and provided measures how to prevent them  Patient has moved his bowels since the surgery  Encouraged patient to take an over the counter stool softener, if he is taking narcotic pain medication  Encouraged fiber intake and fluids  Reviewed incision care with the patient  Advised that after three days he may take a shower and gently wash the surgical site with soap and water  Use clean wash cloth, towels, and clothing  Do not submerge in water until cleared by the surgeon  Do not apply any creams, ointments, or lotions to the site  Patient is aware to call the office if any redness, swelling, drainage, dehiscence of incision, or fever >100 F occurs  Patient is aware to call the office if any concerns or questions may arise  Reminded patient of his upcoming appointments with the date/time/location  Patient was appreciative for the call  Spoke with patient's spouse Arianna Fajardo who is aware of CT needed for 6 week visit  Provided her with central scheduling phone number to schedule the scan a few days prior to the appt

## 2022-11-07 ENCOUNTER — OFFICE VISIT (OUTPATIENT)
Dept: FAMILY MEDICINE CLINIC | Facility: CLINIC | Age: 62
End: 2022-11-07

## 2022-11-07 VITALS
HEART RATE: 73 BPM | BODY MASS INDEX: 27.57 KG/M2 | TEMPERATURE: 97.6 F | SYSTOLIC BLOOD PRESSURE: 138 MMHG | OXYGEN SATURATION: 99 % | DIASTOLIC BLOOD PRESSURE: 78 MMHG | WEIGHT: 192.6 LBS | HEIGHT: 70 IN

## 2022-11-07 DIAGNOSIS — Z13.29 SCREENING FOR THYROID DISORDER: ICD-10-CM

## 2022-11-07 DIAGNOSIS — I10 ESSENTIAL HYPERTENSION: ICD-10-CM

## 2022-11-07 DIAGNOSIS — Z76.89 ENCOUNTER FOR SUPPORT AND COORDINATION OF TRANSITION OF CARE: Primary | ICD-10-CM

## 2022-11-07 DIAGNOSIS — F41.9 ANXIETY: ICD-10-CM

## 2022-11-07 RX ORDER — LORAZEPAM 1 MG/1
1 TABLET ORAL 3 TIMES DAILY PRN
Qty: 60 TABLET | Refills: 0 | Status: SHIPPED | OUTPATIENT
Start: 2022-11-07

## 2022-11-07 NOTE — PROGRESS NOTES
Transition of Care  Follow-up After Hospitalization    Victor Manuel Dumont 65 y o  male   Date:  11/7/2022    TCM Call     Date and time call was made  10/28/2022  8:33 AM    Hospital care reviewed  Records reviewed    Patient was hospitialized at  [address]  Infection of craniotomy plate     Date of Admission  10/25/22    Date of discharge  10/27/22    Diagnosis  Status post craniectomy    Disposition  Home    Were the patients medications reviewed and updated  Yes      TCM Call     Should patient be enrolled in anticoag monitoring? No    Scheduled for follow up? Yes    Did you obtain your prescribed medications  Yes    Do you need help managing your prescriptions or medications  No    Is transportation to your appointment needed  No    I have advised the patient to call PCP with any new or worsening symptoms  Gricelda Andrukaitis RMA    Living Arrangements  Spouse or Significiant other    Are you recieving any outpatient services  No    Are you recieving home care services  No    Are you using any community resources  No    Current waiver services  No    Have you fallen in the last 12 months  No    How many times  1    Interperter language line needed  No    Counseling  Patient    Counseling topics  Prognosis          Hospital records were reviewed  Medications upon discharge reviewed/updated  Medication Changes: minocyline  Imaging: CTH  Consults: none  Follow up visits with other specialists: neurosurgeon      Assessment and Plan:    Que Guzman was seen today for transition of care management  Diagnoses and all orders for this visit:    Encounter for support and coordination of transition of care    Anxiety  -     LORazepam (ATIVAN) 1 mg tablet; Take 1 tablet (1 mg total) by mouth 3 (three) times a day as needed for anxiety    Essential hypertension    Screening for thyroid disorder  -     TSH, 3rd generation with Free T4 reflex;  Future      Victor Manuel Dumont present for TCM visit s/p hospitalization for right cranioplasty  Unfortunately Pt has had several cranioplasties on right which have been getting infected weeks to months after Sx procedure  Pt has avoided wearing hats, Pt felt that infection may be coming from the acne of chest, back, and neck  Pt was given clindamycin TOP for acne prevention inconjunction with Pt being D/C with minocycline  Pt will F /U with neurosurgery tomorrow for wound management and establish timeline for suture removal  Sx wound appears CDI w/o redness aside from area superior to the right ear  Pt is instructed to monitor this area and if possible he should removed the right arm of sunglasses/reading glasses to minimize irritation, infection, and getting caught on sutures in area  ROS: Review of Systems   All other systems reviewed and are negative        Past Medical History:   Diagnosis Date   • Anxiety    • Cataracts, bilateral    • Dysphagia 04/16/2021   • GERD (gastroesophageal reflux disease)    • Hematoma, subdural, with loss of consciousness, traumatic (HCC)    • Hypertension    • PONV (postoperative nausea and vomiting)    • Status post insertion of percutaneous endoscopic gastrostomy (PEG) tube ([address] ) 05/19/2021       Past Surgical History:   Procedure Laterality Date   • BRAIN HEMATOMA EVACUATION Right 4/15/2021    Procedure: Right CRANIOTOMY FOR SUBDURAL HEMATOMA;  Surgeon: Jayjay Jeffries MD;  [facility];  Service: Neurosurgery   • BRAIN HEMATOMA EVACUATION Right 11/16/2021    Procedure: CRANIOTOMY FOR EVACUATION OF EPIDURAL HEMATOMA;  Surgeon: Jayjay Jeffries MD;  [facility];  Service: Neurosurgery   • BRAIN HEMATOMA EVACUATION Right 11/16/2021    Procedure: Exploration and revision of right craniotomy incision with removal of cranial plate;  Surgeon: Jayjay Jeffries MD;  [facility];  Service: Neurosurgery   • CRANIOPLASTY Right 7/19/2022    Procedure: Right re-opening of craniotomy incision, and debridement with craniectomy;  Surgeon: Altagracia Elise MD;  [facility];  Service: Neurosurgery   • CRANIOTOMY Right    • HAND SURGERY Left    • PEG TUBE PLACEMENT     • PEG TUBE REMOVAL     • IA REPLACE SKULL PLATE/FLAP Right 7/08/4033    Procedure: RIGHT AUTOLOGOUS CRANIOPLASTY;  Surgeon: Altagracia Elise MD;  [facility];  Service: Neurosurgery   • IA REPLACE SKULL PLATE/FLAP Right 2/1/5433    Procedure: right allographic CRANIOPLASTY;  Surgeon: Altagracia Elise MD;  [facility];  Service: Neurosurgery   • IA REPLACE SKULL PLATE/FLAP Right 29/77/1174    Procedure: RIGHT [address];  Surgeon: Altagracia Elise MD;  [facility];  Service: Neurosurgery   • ROTATOR CUFF REPAIR Left        Social History     Socioeconomic History   • Marital status: /Civil Union     Spouse name: None   • Number of children: None   • Years of education: None   • Highest education level: None   Occupational History   • Occupation: Retired   Tobacco Use   • Smoking status: Never Smoker   • Smokeless tobacco: Never Used   Vaping Use   • Vaping Use: Never used   Substance and Sexual Activity   • Alcohol use: Yes     Alcohol/week: 14 0 standard drinks     Types: 14 Cans of beer per week     Comment: 2-3 beers a day   • Drug use: Not Currently   • Sexual activity: Yes   Other Topics Concern   • None   Social History Narrative   • None     Social Determinants of Health     Financial Resource Strain: Not on file   Food Insecurity: No Food Insecurity   • Worried About Running Out of Food in the Last Year: Never true   • Ran Out of Food in the Last Year: Never true   Transportation Needs: No Transportation Needs   • Lack of Transportation (Medical): No   • Lack of Transportation (Non-Medical):  No   Physical Activity: Not on file   Stress: Not on file   Social Connections: Not on file   Intimate Partner Violence: Not on file   Housing Stability: Low Risk    • Unable to Pay for Housing in the Last Year: No   • Number of Places Lived in the Last Year: 1   • Unstable Housing in the Last Year: No       Family History   Problem Relation Age of Onset   • Heart disease Mother    • Hypertension Father    • Dementia Father        No Known Allergies      Current Outpatient Medications:   •  acetaminophen (TYLENOL) 325 mg tablet, Take 3 tablets (975 mg total) by mouth every 8 (eight) hours, Disp: , Rfl: 0  •  Clindamycin Phosphate foam, Apply 1 Dose topically 2 (two) times a day, Disp: 50 g, Rfl: 1  •  halobetasol (ULTRAVATE) 0 05 % cream, Apply topically 2 (two) times a day, Disp: 50 g, Rfl: 1  •  levETIRAcetam (KEPPRA) 750 mg tablet, Take 1 tablet (750 mg total) by mouth every 12 (twelve) hours, Disp: 180 tablet, Rfl: 3  •  lisinopril (ZESTRIL) 20 mg tablet, Take 1 tablet (20 mg total) by mouth daily, Disp: 90 tablet, Rfl: 1  •  LORazepam (ATIVAN) 1 mg tablet, Take 1 tablet (1 mg total) by mouth 3 (three) times a day as needed for anxiety, Disp: 60 tablet, Rfl: 0  •  methocarbamol (ROBAXIN) 750 mg tablet, Take 1 tablet (750 mg total) by mouth every 6 (six) hours as needed for muscle spasms for up to 7 days, Disp: 28 tablet, Rfl: 0  •  metoprolol tartrate (LOPRESSOR) 25 mg tablet, Take 1 tablet (25 mg total) by mouth every 12 (twelve) hours, Disp: 180 tablet, Rfl: 1  •  minocycline (DYNACIN) 100 MG tablet, Take 1 tablet (100 mg total) by mouth 2 (two) times a day, Disp: 60 tablet, Rfl: 0  •  omeprazole (PriLOSEC) 20 mg delayed release capsule, Take 20 mg by mouth daily, Disp: , Rfl:   •  ondansetron (ZOFRAN) 4 mg tablet, Take 1 tablet (4 mg total) by mouth every 8 (eight) hours as needed for nausea or vomiting, Disp: 20 tablet, Rfl: 0      Physical Exam:  /78   Pulse 73   Temp 97 6 °F (36 4 °C) (Tympanic)   Ht 5' 10" (1 778 m)   Wt 87 4 kg (192 lb 9 6 oz)   SpO2 99%   BMI 27 64 kg/m²     Physical Exam  Vitals and nursing note reviewed  Constitutional:       Appearance: Normal appearance  He is well-developed  Interventions: Face mask in place  HENT:      Head: Normocephalic and atraumatic  Right Ear: Tympanic membrane, ear canal and external ear normal       Left Ear: Tympanic membrane, ear canal and external ear normal       Nose: Nose normal       Mouth/Throat:      Mouth: Mucous membranes are moist       Pharynx: Uvula midline  Eyes:      General: Lids are normal       Conjunctiva/sclera: Conjunctivae normal       Pupils: Pupils are equal, round, and reactive to light  Neck:      Thyroid: No thyroid mass  Vascular: No JVD  Trachea: Trachea and phonation normal    Cardiovascular:      Rate and Rhythm: Normal rate and regular rhythm  Pulses: Normal pulses  Heart sounds: Normal heart sounds, S1 normal and S2 normal  No murmur heard  No friction rub  No gallop  Pulmonary:      Effort: Pulmonary effort is normal       Breath sounds: Normal breath sounds  Abdominal:      General: Bowel sounds are normal       Palpations: Abdomen is soft  Tenderness: There is no abdominal tenderness  Genitourinary:     Comments: Deferred  Musculoskeletal:         General: Normal range of motion  Cervical back: Full passive range of motion without pain, normal range of motion and neck supple  Right lower leg: No edema  Left lower leg: No edema  Lymphadenopathy:      Head:      Right side of head: No submental, submandibular, tonsillar, preauricular, posterior auricular or occipital adenopathy  Left side of head: No submental, submandibular, tonsillar, preauricular, posterior auricular or occipital adenopathy  Cervical: No cervical adenopathy  Skin:     General: Skin is warm and dry  Capillary Refill: Capillary refill takes less than 2 seconds  Neurological:      General: No focal deficit present  Mental Status: He is alert and oriented to person, place, and time  Cranial Nerves: Cranial nerves are intact  Sensory: Sensation is intact        Motor: Motor function is intact  Coordination: Coordination is intact  Gait: Gait is intact  Psychiatric:         Attention and Perception: Attention and perception normal          Mood and Affect: Mood and affect normal          Speech: Speech normal          Behavior: Behavior normal  Behavior is cooperative  Thought Content:  Thought content normal          Cognition and Memory: Cognition normal          Judgment: Judgment normal              Labs:  Lab Results   Component Value Date    WBC 5 39 10/26/2022    HGB 9 2 (L) 10/26/2022    HCT 32 0 (L) 10/26/2022    MCV 82 10/26/2022     (L) 10/26/2022     Lab Results   Component Value Date    K 3 6 10/26/2022     (H) 10/26/2022    CO2 23 10/26/2022    BUN 9 10/26/2022    CREATININE 0 84 10/26/2022    GLUCOSE 212 (H) 11/17/2021    GLUF 118 (H) 09/15/2022    CALCIUM 8 3 10/26/2022    CORRECTEDCA 8 7 11/17/2021    AST 31 09/15/2022    ALT 43 09/15/2022    ALKPHOS 87 09/15/2022    EGFR 95 10/26/2022

## 2022-11-08 ENCOUNTER — CLINICAL SUPPORT (OUTPATIENT)
Dept: NEUROSURGERY | Facility: CLINIC | Age: 62
End: 2022-11-08

## 2022-11-08 VITALS — TEMPERATURE: 98.2 F | DIASTOLIC BLOOD PRESSURE: 70 MMHG | SYSTOLIC BLOOD PRESSURE: 138 MMHG

## 2022-11-08 DIAGNOSIS — Z98.890 POST-OPERATIVE STATE: Primary | ICD-10-CM

## 2022-11-08 NOTE — PROGRESS NOTES
Assessment done. Accucheck obtained. PO fed. Post-Op Visit- Neurosurgery    Keven Cloud 65 y o  male MRN: [id number]    Chief Complaint:  Patient presents post: Right Allograffic Cranioplasty - Right    History of Present Illness:  Patient presents for 2 week POV for incision check  Arrived accompanied by his wife  He is wearing a hat  He ambulated well without assistive device  Reports pain is 0/10 and denies headaches, visual disturbance, confusion, slurred speech, no drainage, fevers, or incisional pain  Assessment:   Vitals:    11/08/22 1038   BP: 138/70   Temp: 98 2 °F (36 8 °C)       Wound Exam: Incision is clean, dry, and in tact, well approximated without drainage, edema, erythema or dehiscence  See below:                 Procedure:  Surgical site assessment  Nylon suturing left in tact and will be removed at next OV  Complications: None  Discussion/Summary:  Doing well postoperatively  Park Billy that he should avoid wearing a hat and ensure that he is wiping the arms of his glasses with alcohol pad, or remove the arm of his glasses that presses on his surgical incision  Reviewed incision care with patient including daily observation for s/s infection including: increased erythema, edema, drainage, dehiscence of incision or fever >101  Should these be observed, he understands that he is to call and/or return immediately for reassessment  Advised patient to continue cleansing area with mild soap and water and pat dry  Not to apply any lotions, creams, or ointments, & not to submerge in any water for 4 more weeks  He is to maintain activity restrictions until cleared by the surgeon  Activity levels were also reviewed with the patient in detail, he is to lift no greater than 10 pounds and ambulation is encouraged as tolerated  Verified date/time/location of upcoming POV and reminded him to complete CT prior to his next appointment   He is to call the office with any further questions or concerns, or if any incisional issues or fevers would arise

## 2022-11-22 ENCOUNTER — DOCUMENTATION (OUTPATIENT)
Dept: NEUROSURGERY | Facility: CLINIC | Age: 62
End: 2022-11-22

## 2022-11-22 DIAGNOSIS — L70.0 CYSTIC ACNE: ICD-10-CM

## 2022-11-22 RX ORDER — MINOCYCLINE HYDROCHLORIDE 100 MG/1
100 TABLET ORAL 2 TIMES DAILY
Qty: 14 TABLET | Refills: 0 | Status: SHIPPED | OUTPATIENT
Start: 2022-11-22 | End: 2022-11-30 | Stop reason: SDUPTHER

## 2022-11-22 NOTE — PROGRESS NOTES
Received the following pictures emailed from Danni  She also questioned order to be placed for abx to cover until his next appt

## 2022-11-25 ENCOUNTER — HOSPITAL ENCOUNTER (OUTPATIENT)
Dept: CT IMAGING | Facility: HOSPITAL | Age: 62
End: 2022-11-25

## 2022-11-25 DIAGNOSIS — Z98.890 STATUS POST CRANIECTOMY: Chronic | ICD-10-CM

## 2022-11-25 DIAGNOSIS — T84.7XXD INFECTION OF BONE FLAP, SUBSEQUENT ENCOUNTER: ICD-10-CM

## 2022-11-26 DIAGNOSIS — L70.0 CYSTIC ACNE: ICD-10-CM

## 2022-11-28 RX ORDER — CLINDAMYCIN PHOSPHATE 10 MG/G
1 AEROSOL, FOAM TOPICAL 2 TIMES DAILY
Qty: 50 G | Refills: 0 | Status: SHIPPED | OUTPATIENT
Start: 2022-11-28

## 2022-11-30 ENCOUNTER — OFFICE VISIT (OUTPATIENT)
Dept: NEUROSURGERY | Facility: CLINIC | Age: 62
End: 2022-11-30

## 2022-11-30 VITALS
BODY MASS INDEX: 28.06 KG/M2 | TEMPERATURE: 97.9 F | HEART RATE: 82 BPM | WEIGHT: 196 LBS | RESPIRATION RATE: 16 BRPM | DIASTOLIC BLOOD PRESSURE: 78 MMHG | SYSTOLIC BLOOD PRESSURE: 150 MMHG | HEIGHT: 70 IN

## 2022-11-30 DIAGNOSIS — H92.01 EAR PAIN, RIGHT: Primary | ICD-10-CM

## 2022-11-30 DIAGNOSIS — L70.0 CYSTIC ACNE: ICD-10-CM

## 2022-11-30 RX ORDER — MINOCYCLINE HYDROCHLORIDE 100 MG/1
100 TABLET ORAL 2 TIMES DAILY
Qty: 84 TABLET | Refills: 0 | Status: SHIPPED | OUTPATIENT
Start: 2022-11-30 | End: 2023-01-11

## 2022-11-30 NOTE — PROGRESS NOTES
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, 04/15/2021  Status post cranioplasty 07/13/2021 infection necessitating craniectomy   Synthetic cranioplasty 3/8/22   Repeat craniectomy 07/09/2022  Cranioplasty 10/25/22  Now approximately 6 weeks status post cranioplasty  Incision is healing well  Stitches removed  Will continue minocycline for another 6 weeks as he has re-presented with infection as far as 3 months out previously  I would like him to see the dermatologist for his acne  Additionally he is complaining about fluid in his ear, eye lichen see ENT for this  2  Small colloid cyst     On retrospective review this has been stable on multiple scans as well as historically  Will plan for follow-up annually with CT scan          Chief Complaint: Follow-up        HPI:   This is a 65year-old gentleman who presented to the hospital after a transfer from rehab after sustaining a TBI and subdural hematoma in [place]   Fortunately his exam significantly deteriorated to where he would barely open eyes and minimally localized his bilateral uppers and lowers  Samy Expose had significant bifrontal contusions as well as a right-sided subdural with significant amount of compression and mass effect with shift   As such she underwent a right-sided craniectomy   His brain was found to be quite contused at the time of surgery   The hematoma was evacuated with a craniectomy   Ultimately he was discharged to [address] [place] [address] he made a significant recovery       He remains on minocycline  States that his acne has significantly improved  Denies any drainage fevers or wound complications     Review of Systems   HENT: Positive for hearing loss (right ear wax/blood filled, hears a crackling noise in right ear )  Negative for tinnitus  Eyes: Negative for visual disturbance  Respiratory: Negative for shortness of breath and wheezing  Cardiovascular: Negative for chest pain  Gastrointestinal: Negative  Genitourinary: Negative  Musculoskeletal: Negative for back pain, gait problem and neck pain  Neurological: Positive for seizures (hx of )  Negative for dizziness, tremors, speech difficulty, weakness, numbness and headaches  Physical Exam  Vitals:    11/30/22 1038   BP: 150/78   Pulse: 82   Resp: 16   Temp: 97 9 °F (36 6 °C)   He is well appearing  Affect is appropriate  His BMI is Body mass index is 28 12 kg/m²  [facility] He is awake alert and oriented  Hearing and vision are grossly intact  His pupils are equal round reactive to light  His extraocular movements are intact  His face is symmetric  Tongue is midline  Facial sensation is intact and symmetric throughout  Shoulder shrug is 5/5  There is no drift or dysmetria  He has full strength in his bilateral upper and lower extremities  His gait is normal     His heart rate is regular  Normal respiratory effort  Abdomen nondistended  Radial pulses 2+  Incision of mild scabbing and flakiness  No evidence of erythema induration or drainage      The following portions of the patient's history were reviewed and updated as appropriate: allergies, current medications, past family history, past medical history, past social history, past surgical history and problem list     Active Ambulatory Problems     Diagnosis Date Noted   • Essential hypertension 03/03/2020   • Anxiety 03/05/2021   • Acute encephalopathy 04/15/2021   • Alcohol abuse 04/18/2021   • Status post craniectomy 07/13/2021   • Seizure ([facility] ) 08/25/2021   • Infection of bone flap ([place] [id number] ) 11/19/2021     Resolved Ambulatory Problems     Diagnosis Date Noted   • SDH (subdural hematoma) 04/15/2021   • Hyperchloremia 04/15/2021   • [facility] ) 04/15/2021   • Dysphagia 04/16/2021   • Status post insertion of percutaneous endoscopic gastrostomy (PEG) tube ([place] [address] ) 05/19/2021   • PICC (peripherally inserted central catheter) in place 12/08/2021     Past Medical History:   Diagnosis Date   • Cataracts, bilateral    • GERD (gastroesophageal reflux disease)    • Hematoma, subdural, with loss of consciousness, traumatic (HCC)    • Hypertension    • PONV (postoperative nausea and vomiting)        Past Surgical History:   Procedure Laterality Date   • BRAIN HEMATOMA EVACUATION Right 4/15/2021    Procedure: Right CRANIOTOMY FOR SUBDURAL HEMATOMA;  Surgeon: Leena Herrera MD;  [facility];  Service: Neurosurgery   • BRAIN HEMATOMA EVACUATION Right 11/16/2021    Procedure: CRANIOTOMY FOR EVACUATION OF EPIDURAL HEMATOMA;  Surgeon: Leena Herrera MD;  [facility];  Service: Neurosurgery   • BRAIN HEMATOMA EVACUATION Right 11/16/2021    Procedure: Exploration and revision of right craniotomy incision with removal of cranial plate;  Surgeon: Leena Herrera MD;  [facility];  Service: Neurosurgery   • CRANIOPLASTY Right 7/19/2022    Procedure: Right re-opening of craniotomy incision, and debridement with craniectomy;  Surgeon: Leena Herrera MD;  [facility];  Service: Neurosurgery   • CRANIOTOMY Right    • HAND SURGERY Left    • PEG TUBE PLACEMENT     • PEG TUBE REMOVAL     • [place] REPLACE SKULL PLATE/FLAP Right 7/60/3327    Procedure: RIGHT AUTOLOGOUS CRANIOPLASTY;  Surgeon: Leena Herrera MD;  [facility];  Service: Neurosurgery   • [place] REPLACE SKULL PLATE/FLAP Right 7/6/3091    Procedure: right allographic CRANIOPLASTY;  Surgeon: Leena Herrera MD;  [facility];  Service: Neurosurgery   • [place] REPLACE SKULL PLATE/FLAP Right 12/86/1924    Procedure: RIGHT ALLOGRAFFIC CRANIOPLASTY;  Surgeon: Geoff Tavares MD;  [facility];  Service: Neurosurgery   • ROTATOR CUFF REPAIR Left          Current Outpatient Medications:   •  acetaminophen (TYLENOL) 325 mg tablet, Take 3 tablets (975 mg total) by mouth every 8 (eight) hours, Disp: , Rfl: 0  •  Clindamycin Phosphate foam, Apply 1 Dose topically 2 (two) times a day, Disp: 50 g, Rfl: 0  •  halobetasol (ULTRAVATE) 0 05 % cream, Apply topically 2 (two) times a day, Disp: 50 g, Rfl: 1  •  levETIRAcetam (KEPPRA) 750 mg tablet, Take 1 tablet (750 mg total) by mouth every 12 (twelve) hours, Disp: 180 tablet, Rfl: 3  •  lisinopril (ZESTRIL) 20 mg tablet, Take 1 tablet (20 mg total) by mouth daily, Disp: 90 tablet, Rfl: 1  •  LORazepam (ATIVAN) 1 mg tablet, Take 1 tablet (1 mg total) by mouth 3 (three) times a day as needed for anxiety, Disp: 60 tablet, Rfl: 0  •  methocarbamol (ROBAXIN) 750 mg tablet, Take 1 tablet (750 mg total) by mouth every 6 (six) hours as needed for muscle spasms for up to 7 days, Disp: 28 tablet, Rfl: 0  •  metoprolol tartrate (LOPRESSOR) 25 mg tablet, Take 1 tablet (25 mg total) by mouth every 12 (twelve) hours, Disp: 180 tablet, Rfl: 1  •  minocycline (DYNACIN) 100 MG tablet, Take 1 tablet (100 mg total) by mouth 2 (two) times a day, Disp: 84 tablet, Rfl: 0  •  omeprazole (PriLOSEC) 20 mg delayed release capsule, Take 20 mg by mouth daily, Disp: , Rfl:   •  ondansetron (ZOFRAN) 4 mg tablet, Take 1 tablet (4 mg total) by mouth every 8 (eight) hours as needed for nausea or vomiting, Disp: 20 tablet, Rfl: 0    Results/Data: We reviewed his imaging

## 2022-12-08 DIAGNOSIS — I10 ESSENTIAL HYPERTENSION: ICD-10-CM

## 2022-12-08 RX ORDER — LISINOPRIL 20 MG/1
TABLET ORAL
Qty: 90 TABLET | Refills: 0 | Status: SHIPPED | OUTPATIENT
Start: 2022-12-08

## 2022-12-19 DIAGNOSIS — L40.9 PSORIASIS: ICD-10-CM

## 2022-12-20 DIAGNOSIS — F41.9 ANXIETY: ICD-10-CM

## 2022-12-21 RX ORDER — LORAZEPAM 1 MG/1
1 TABLET ORAL 3 TIMES DAILY PRN
Qty: 60 TABLET | Refills: 0 | Status: SHIPPED | OUTPATIENT
Start: 2022-12-21

## 2023-01-11 ENCOUNTER — OFFICE VISIT (OUTPATIENT)
Dept: NEUROSURGERY | Facility: CLINIC | Age: 63
End: 2023-01-11

## 2023-01-11 VITALS — DIASTOLIC BLOOD PRESSURE: 70 MMHG | SYSTOLIC BLOOD PRESSURE: 166 MMHG | TEMPERATURE: 97.7 F | HEART RATE: 67 BPM

## 2023-01-11 DIAGNOSIS — Z98.890 STATUS POST CRANIECTOMY: Primary | Chronic | ICD-10-CM

## 2023-01-11 DIAGNOSIS — Q04.6 COLLOID CYST OF THIRD VENTRICLE (HCC): ICD-10-CM

## 2023-01-11 DIAGNOSIS — T84.7XXD INFECTION OF BONE FLAP, SUBSEQUENT ENCOUNTER: Chronic | ICD-10-CM

## 2023-01-11 NOTE — PROGRESS NOTES
Patient Id: Reid Bettencourt is a 65 y o  male        Handedness: Right      Assessment/Plan:    Diagnoses and all orders for this visit:    Status post craniectomy  -     CT head wo contrast; Future    Infection of bone flap, subsequent encounter  -     CT head wo contrast; Future    Colloid cyst of third ventricle (HCC)  -     CT head wo contrast; Future        Discussion Summary:   1  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, 04/15/2021  Status post cranioplasty 07/13/2021 infection necessitating craniectomy   Synthetic cranioplasty 3/8/22   Repeat craniectomy 07/09/2022  Cranioplasty 10/25/22  Now approximately 10 weeks status post cranioplasty  His incision is healing well  I do not see any areas of dehiscence or erythema  At this juncture I think I can see him back as needed in regards to this  He is finishing his minocycline  I have asked him to see a dermatologist   We will help him schedule this visit  2  Small colloid cyst     On retrospective review this has been stable on multiple scans as well as historically  Will plan for follow-up annually with CT scan  Chief Complaint: Follow-up        HPI:   This is a 65year-old gentleman who presented to the hospital after a transfer from rehab after sustaining a TBI and subdural hematoma in [place]   Fortunately his exam significantly deteriorated to where he would barely open eyes and minimally localized his bilateral uppers and lowers   He had significant bifrontal contusions as well as a right-sided subdural with significant amount of compression and mass effect with shift   As such she underwent a right-sided craniectomy   His brain was found to be quite contused at the time of surgery   The hematoma was evacuated with a craniectomy   Ultimately he was discharged to [address] [place] [address] he made a significant recovery        He is currently completing his minocycline    His acne is improved however it is causing an upset stomach  He denies any wound complications  He did not schedule his previous referred dermatology appointment  Review of systems obtained by the MA reviewed and updated below  Review of Systems   HENT: Negative for tinnitus  Eyes: Negative for visual disturbance  Respiratory: Negative for shortness of breath  Cardiovascular: Negative for chest pain  Gastrointestinal: Negative  Genitourinary: Negative  Musculoskeletal: Negative for back pain, gait problem and neck pain  Neurological: Negative for dizziness, seizures (on keppra), speech difficulty, weakness, numbness and headaches  Physical Exam  Vitals:    01/11/23 1302   BP: 166/70   Pulse: 67   Temp: 97 7 °F (36 5 °C)     He is well appearing  Affect is appropriate  His BMI is There is no height or weight on file to calculate BMI  Monia Le He is awake alert and oriented  Hearing and vision are grossly intact  His pupils are equal round reactive to light  His extraocular movements are intact  His face is symmetric  Tongue is midline  Facial sensation is intact and symmetric throughout  Shoulder shrug is 5/5  There is no drift or dysmetria  He has full strength in his bilateral upper and lower extremities  He has normal muscle tone muscle bulk  His biceps reflexes and patellar reflexes are 2+ and symmetric  Berry sign negative bilaterally  Sensation intact to light touch and pinprick throughout  His gait is normal     His heart rate is regular  Normal respiratory effort  Abdomen nondistended  Radial pulses 2+  Incision healing well  The following portions of the patient's history were reviewed and updated as appropriate: allergies, current medications, past family history, past medical history, past social history and past surgical history      Active Ambulatory Problems     Diagnosis Date Noted   • Essential hypertension 03/03/2020   • Anxiety 03/05/2021   • Acute encephalopathy 04/15/2021   • Alcohol abuse 04/18/2021   • Status post craniectomy 07/13/2021   • [email or web address] 08/25/2021   • Infection of bone flap ([email or web address] ) 11/19/2021   • Colloid cyst of third ventricle ([email or web address] ) 01/11/2023     Resolved Ambulatory Problems     Diagnosis Date Noted   • SDH (subdural hematoma) 04/15/2021   • Hyperchloremia 04/15/2021   • [facility] failure ([facility] ) 04/15/2021   • Dysphagia 04/16/2021   • Status post insertion of percutaneous endoscopic gastrostomy (PEG) tube ([place] [id number] ) 05/19/2021   • PICC (peripherally inserted central catheter) in place 12/08/2021     Past Medical History:   Diagnosis Date   • Cataracts, bilateral    • GERD (gastroesophageal reflux disease)    • Hematoma, subdural, with loss of consciousness, traumatic (HCC)    • Hypertension    • PONV (postoperative nausea and vomiting)        Past Surgical History:   Procedure Laterality Date   • BRAIN HEMATOMA EVACUATION Right 4/15/2021    Procedure: Right CRANIOTOMY FOR SUBDURAL HEMATOMA;  Surgeon: Yann Vela MD;  [facility];  Service: Neurosurgery   • BRAIN HEMATOMA EVACUATION Right 11/16/2021    Procedure: CRANIOTOMY FOR EVACUATION OF EPIDURAL HEMATOMA;  Surgeon: Yann Vela MD;  [facility];  Service: Neurosurgery   • BRAIN HEMATOMA EVACUATION Right 11/16/2021    Procedure: Exploration and revision of right craniotomy incision with removal of cranial plate;  Surgeon: Yann Vela MD;  [facility];  Service: Neurosurgery   • CRANIOPLASTY Right 7/19/2022    Procedure: Right re-opening of craniotomy incision, and debridement with craniectomy;  Surgeon: Yann Vela MD;  [facility];  Service: Neurosurgery   • CRANIOTOMY Right    • HAND SURGERY Left    • PEG TUBE PLACEMENT     • PEG TUBE REMOVAL     • [place] RPLCMT BONE FLAP/PROSTHETIC PLATE SKULL Right 5/72/6794    Procedure: RIGHT AUTOLOGOUS CRANIOPLASTY;  Surgeon: Yann Vela MD;  [facility];  Service: Neurosurgery   • [place] RPLCMT BONE FLAP/PROSTHETIC PLATE SKULL Right 1/7/4630    Procedure: right allographic CRANIOPLASTY;  Surgeon: Dexter Lantigua MD;  [facility];  Service: Neurosurgery   • [place] RPLCMT BONE FLAP/PROSTHETIC PLATE SKULL Right 82/05/9093    Procedure: RIGHT [address];  Surgeon: Dexter Lantigua MD;  [facility];  Service: Neurosurgery   • ROTATOR CUFF REPAIR Left          Current Outpatient Medications:   •  acetaminophen (TYLENOL) 325 mg tablet, Take 3 tablets (975 mg total) by mouth every 8 (eight) hours, Disp: , Rfl: 0  •  Clindamycin Phosphate foam, Apply 1 Dose topically 2 (two) times a day, Disp: 50 g, Rfl: 0  •  halobetasol (ULTRAVATE) 0 05 % cream, Apply topically 2 (two) times a day, Disp: 50 g, Rfl: 0  •  levETIRAcetam (KEPPRA) 750 mg tablet, Take 1 tablet (750 mg total) by mouth every 12 (twelve) hours, Disp: 180 tablet, Rfl: 3  •  lisinopril (ZESTRIL) 20 mg tablet, TAKE ONE TABLET DAILY   , Disp: 90 tablet, Rfl: 0  •  LORazepam (ATIVAN) 1 mg tablet, Take 1 tablet (1 mg total) by mouth 3 (three) times a day as needed for anxiety, Disp: 60 tablet, Rfl: 0  •  methocarbamol (ROBAXIN) 750 mg tablet, Take 1 tablet (750 mg total) by mouth every 6 (six) hours as needed for muscle spasms for up to 7 days, Disp: 28 tablet, Rfl: 0  •  metoprolol tartrate (LOPRESSOR) 25 mg tablet, TAKE  (1)  TABLET  EVERY TWELVE HOURS , Disp: 180 tablet, Rfl: 0  •  minocycline (DYNACIN) 100 MG tablet, Take 1 tablet (100 mg total) by mouth 2 (two) times a day, Disp: 84 tablet, Rfl: 0  •  omeprazole (PriLOSEC) 20 mg delayed release capsule, Take 20 mg by mouth daily, Disp: , Rfl:   •  ondansetron (ZOFRAN) 4 mg tablet, Take 1 tablet (4 mg total) by mouth every 8 (eight) hours as needed for nausea or vomiting, Disp: 20 tablet, Rfl: 0    Results/Data: No new imaging

## 2023-02-20 RX ORDER — MINOCYCLINE HYDROCHLORIDE 100 MG/1
CAPSULE ORAL
COMMUNITY
Start: 2022-11-30 | End: 2023-02-23

## 2023-02-23 ENCOUNTER — OFFICE VISIT (OUTPATIENT)
Dept: FAMILY MEDICINE CLINIC | Facility: CLINIC | Age: 63
End: 2023-02-23

## 2023-02-23 VITALS
SYSTOLIC BLOOD PRESSURE: 128 MMHG | BODY MASS INDEX: 28.63 KG/M2 | OXYGEN SATURATION: 98 % | DIASTOLIC BLOOD PRESSURE: 68 MMHG | HEART RATE: 83 BPM | HEIGHT: 70 IN | WEIGHT: 200 LBS | TEMPERATURE: 97.4 F

## 2023-02-23 DIAGNOSIS — Z13.1 SCREENING FOR DIABETES MELLITUS: ICD-10-CM

## 2023-02-23 DIAGNOSIS — Z98.890 STATUS POST CRANIECTOMY: Chronic | ICD-10-CM

## 2023-02-23 DIAGNOSIS — Z13.0 SCREENING FOR DEFICIENCY ANEMIA: ICD-10-CM

## 2023-02-23 DIAGNOSIS — F41.9 ANXIETY: ICD-10-CM

## 2023-02-23 DIAGNOSIS — I10 PRIMARY HYPERTENSION: ICD-10-CM

## 2023-02-23 DIAGNOSIS — Z00.00 WELL ADULT EXAM: Primary | ICD-10-CM

## 2023-02-23 DIAGNOSIS — Z12.12 SCREENING FOR COLORECTAL CANCER: ICD-10-CM

## 2023-02-23 DIAGNOSIS — E78.1 HYPERTRIGLYCERIDEMIA: ICD-10-CM

## 2023-02-23 DIAGNOSIS — I10 ESSENTIAL HYPERTENSION: ICD-10-CM

## 2023-02-23 DIAGNOSIS — Z13.31 DEPRESSION SCREENING NEGATIVE: ICD-10-CM

## 2023-02-23 DIAGNOSIS — Z13.29 SCREENING FOR THYROID DISORDER: ICD-10-CM

## 2023-02-23 DIAGNOSIS — Z12.11 SCREENING FOR COLORECTAL CANCER: ICD-10-CM

## 2023-02-23 RX ORDER — LISINOPRIL 20 MG/1
20 TABLET ORAL DAILY
Qty: 90 TABLET | Refills: 0 | Status: SHIPPED | OUTPATIENT
Start: 2023-02-23

## 2023-02-23 RX ORDER — LORAZEPAM 1 MG/1
1 TABLET ORAL 3 TIMES DAILY PRN
Qty: 60 TABLET | Refills: 0 | Status: SHIPPED | OUTPATIENT
Start: 2023-02-23

## 2023-02-23 NOTE — PROGRESS NOTES
[facility] CARE    NAME: Grace Velazquez  AGE: 65 y o  SEX: male  : 1960     DATE: 2023     Assessment and Plan:     Problem List Items Addressed This Visit     Status post craniectomy (Chronic)    Essential hypertension    Relevant Medications    lisinopril (ZESTRIL) 20 mg tablet    metoprolol tartrate (LOPRESSOR) 25 mg tablet    Anxiety    Relevant Medications    LORazepam (ATIVAN) 1 mg tablet   Other Visit Diagnoses     Well adult exam    -  Primary    Relevant Orders    PSA, Total Screen    Lipid Panel with Direct LDL reflex    CBC and differential    TSH, 3rd generation with Free T4 reflex    Comprehensive metabolic panel    Screening for diabetes mellitus        Relevant Orders    Comprehensive metabolic panel    Screening for deficiency anemia        Relevant Orders    CBC and differential    Primary hypertension        Relevant Medications    lisinopril (ZESTRIL) 20 mg tablet    metoprolol tartrate (LOPRESSOR) 25 mg tablet    Hypertriglyceridemia        Relevant Orders    Lipid Panel with Direct LDL reflex    Screening for thyroid disorder        Relevant Orders    TSH, 3rd generation with Free T4 reflex    Screening for colorectal cancer        Relevant Orders    Ambulatory referral for colonoscopy    Depression screening negative              Immunizations and preventive care screenings were discussed with patient today  Appropriate education was printed on patient's after visit summary  Discussed risks and benefits of prostate cancer screening  We discussed the controversial history of PSA screening for prostate cancer in the [place] as well as the risk of over detection and over treatment of prostate cancer by way of PSA screening    The patient understands that PSA blood testing is an imperfect way to screen for prostate cancer and that elevated PSA levels in the blood may also be caused by infection, inflammation, prostatic trauma or manipulation, urological procedures, or by benign prostatic enlargement  The role of the digital rectal examination in prostate cancer screening was also discussed and I discussed with him that there is large interobserver variability in the findings of digital rectal examination  Counseling:  Alcohol/drug use: discussed moderation in alcohol intake, the recommendations for healthy alcohol use, and avoidance of illicit drug use  Dental Health: discussed importance of regular tooth brushing, flossing, and dental visits  Injury prevention: discussed safety/seat belts, safety helmets, smoke detectors, carbon dioxide detectors, and smoking near bedding or upholstery  Sexual health: discussed sexually transmitted diseases, partner selection, use of condoms, avoidance of unintended pregnancy, and contraceptive alternatives  Exercise: the importance of regular exercise/physical activity was discussed  Recommend exercise 3-5 times per week for at least 30 minutes  BMI Counseling: Body mass index is 28 7 kg/m²  The BMI is above normal  Nutrition recommendations include decreasing portion sizes, encouraging healthy choices of fruits and vegetables, decreasing fast food intake, consuming healthier snacks, limiting drinks that contain sugar, moderation in carbohydrate intake, increasing intake of lean protein, reducing intake of saturated and trans fat and reducing intake of cholesterol  Exercise recommendations include exercising 3-5 times per week  No pharmacotherapy was ordered  Rationale for BMI follow-up plan is due to patient being overweight or obese  Depression Screening and Follow-up Plan: Patient was screened for depression during today's encounter  They screened negative with a PHQ-2 score of 0  Return in about 6 months (around 8/23/2023)       Chief Complaint:     Chief Complaint   Patient presents with   • Physical Exam      History of Present Illness:     Adult Annual Physical   Patient here for a comprehensive physical exam  The patient reports no problems  Diet and Physical Activity  Diet/Nutrition: well balanced diet, limited junk food and consuming 3-5 servings of fruits/vegetables daily  Exercise: strength training exercises and 3-4 times a week on average  Depression Screening  PHQ-2/9 Depression Screening    Little interest or pleasure in doing things: 0 - not at all  Feeling down, depressed, or hopeless: 0 - not at all  PHQ-2 Score: 0  PHQ-2 Interpretation: Negative depression screen       General Health  Sleep: sleeps well and gets 7-8 hours of sleep on average  Hearing: normal - bilateral   Vision: no vision problems  Dental: no dental visits for >1 year, brushes teeth twice daily and does not floss        Health  Symptoms include: none     Review of Systems:     Review of Systems   All other systems reviewed and are negative       Past Medical History:     Past Medical History:   Diagnosis Date   • Anxiety    • Cataracts, bilateral    • Dysphagia 04/16/2021   • GERD (gastroesophageal reflux disease)    • Hematoma, subdural, with loss of consciousness, traumatic (HCC)    • Hypertension    • PONV (postoperative nausea and vomiting)    • Status post [facility] 05/19/2021      Past Surgical History:     Past Surgical History:   Procedure Laterality Date   • BRAIN HEMATOMA EVACUATION Right 4/15/2021    Procedure: Right CRANIOTOMY FOR SUBDURAL HEMATOMA;  Surgeon: Asuncion Soliz MD;  [facility];  Service: Neurosurgery   • BRAIN HEMATOMA EVACUATION Right 11/16/2021    Procedure: CRANIOTOMY FOR EVACUATION OF EPIDURAL HEMATOMA;  Surgeon: Asuncion Soliz MD;  [facility];  Service: Neurosurgery   • BRAIN HEMATOMA EVACUATION Right 11/16/2021    Procedure: Exploration and revision of right craniotomy incision with removal of cranial plate;  Surgeon: Asuncion Soliz MD; [facility];  Service: Neurosurgery   • CRANIOPLASTY Right 7/19/2022    Procedure: Right re-opening of craniotomy incision, and debridement with craniectomy;  Surgeon: Evin Newsome MD;  [facility];  Service: Neurosurgery   • CRANIOTOMY Right    • HAND SURGERY Left    • PEG TUBE PLACEMENT     • PEG TUBE REMOVAL     • IL RPLCMT BONE FLAP/PROSTHETIC PLATE SKULL Right 9/39/6087    Procedure: RIGHT AUTOLOGOUS CRANIOPLASTY;  Surgeon: Evin Newsome MD;  [facility];  Service: Neurosurgery   • IL RPLCMT BONE FLAP/PROSTHETIC PLATE SKULL Right 6/2/8833    Procedure: right allographic CRANIOPLASTY;  Surgeon: Evin Newsome MD;  [facility];  Service: Neurosurgery   • IL RPLCMT BONE FLAP/PROSTHETIC PLATE SKULL Right 03/02/1628    Procedure: RIGHT Blu Fire;  Surgeon: Evin Newsome MD;  [facility];  Service: Neurosurgery   • ROTATOR CUFF REPAIR Left       Family History:     Family History   Problem Relation Age of Onset   • Heart disease Mother    • Hypertension Father    • Dementia Father       Social History:     Social History     Socioeconomic History   • Marital status: /Civil Union     Spouse name: None   • Number of children: None   • Years of education: None   • Highest education level: None   Occupational History   • Occupation: Retired   Tobacco Use   • Smoking status: Never   • Smokeless tobacco: Never   Vaping Use   • Vaping Use: Never used   Substance and Sexual Activity   • Alcohol use:  Yes     Alcohol/week: 14 0 standard drinks     Types: 14 Cans of beer per week     Comment: 2-3 beers a day   • Drug use: Not Currently   • Sexual activity: Yes   Other Topics Concern   • None   Social History Narrative   • None     Social Determinants of Health     Financial Resource Strain: Not on file   Food Insecurity: No Food Insecurity   • Worried About Running Out of Food in the Last Year: Never true   • Ran Out of Food in the Last Year: Never true Transportation Needs: No Transportation Needs   • Lack of Transportation (Medical): No   • Lack of Transportation (Non-Medical): No   Physical Activity: Not on file   Stress: Not on file   Social Connections: Not on file   Intimate Partner Violence: Not on file   Housing Stability: Low Risk    • Unable to Pay for Housing in the Last Year: No   • Number of Places Lived in the Last Year: 1   • Unstable Housing in the Last Year: No      Current Medications:     Current Outpatient Medications   Medication Sig Dispense Refill   • acetaminophen (TYLENOL) 325 mg tablet Take 3 tablets (975 mg total) by mouth every 8 (eight) hours  0   • Clindamycin Phosphate foam Apply 1 Dose topically 2 (two) times a day 50 g 0   • halobetasol (ULTRAVATE) 0 05 % cream Apply topically 2 (two) times a day 50 g 0   • levETIRAcetam (KEPPRA) 750 mg tablet Take 1 tablet (750 mg total) by mouth every 12 (twelve) hours 180 tablet 3   • lisinopril (ZESTRIL) 20 mg tablet Take 1 tablet (20 mg total) by mouth daily 90 tablet 0   • LORazepam (ATIVAN) 1 mg tablet Take 1 tablet (1 mg total) by mouth 3 (three) times a day as needed for anxiety 60 tablet 0   • metoprolol tartrate (LOPRESSOR) 25 mg tablet Take 1 tablet (25 mg total) by mouth every 12 (twelve) hours 180 tablet 0   • omeprazole (PriLOSEC) 20 mg delayed release capsule Take 20 mg by mouth daily       No current facility-administered medications for this visit  Allergies:     No Known Allergies   Physical Exam:     /68 (BP Location: Left arm, Patient Position: Sitting, Cuff Size: Large)   Pulse 83   Temp (!) 97 4 °F (36 3 °C) (Tympanic)   Ht 5' 10" (1 778 m)   Wt 90 7 kg (200 lb)   SpO2 98%   BMI 28 70 kg/m²     Physical Exam  Vitals and nursing note reviewed  Constitutional:       Appearance: Normal appearance  He is well-developed  Interventions: Face mask in place  HENT:      Head: Normocephalic and atraumatic        Right Ear: External ear normal       Left Ear: External ear normal       Nose: Nose normal    Eyes:      Conjunctiva/sclera: Conjunctivae normal       Pupils: Pupils are equal, round, and reactive to light  Cardiovascular:      Rate and Rhythm: Normal rate and regular rhythm  Heart sounds: Normal heart sounds  Pulmonary:      Effort: Pulmonary effort is normal       Breath sounds: Normal breath sounds  Abdominal:      General: Bowel sounds are normal       Palpations: Abdomen is soft  Genitourinary:     Comments: Deferred  Musculoskeletal:         General: Normal range of motion  Cervical back: Normal range of motion and neck supple  Skin:     General: Skin is warm and dry  Capillary Refill: Capillary refill takes less than 2 seconds  Neurological:      Mental Status: He is alert and oriented to person, place, and time  Psychiatric:         Behavior: Behavior normal          Thought Content:  Thought content normal          Judgment: Judgment normal           ANIA Almanzar Bolivar 72

## 2023-05-26 DIAGNOSIS — I10 ESSENTIAL HYPERTENSION: ICD-10-CM

## 2023-05-26 RX ORDER — LISINOPRIL 20 MG/1
20 TABLET ORAL DAILY
Qty: 90 TABLET | Refills: 0 | Status: SHIPPED | OUTPATIENT
Start: 2023-05-26

## 2023-06-01 DIAGNOSIS — F41.9 ANXIETY: ICD-10-CM

## 2023-06-01 RX ORDER — LORAZEPAM 1 MG/1
1 TABLET ORAL 3 TIMES DAILY PRN
Qty: 60 TABLET | Refills: 0 | Status: SHIPPED | OUTPATIENT
Start: 2023-06-01

## 2023-08-22 DIAGNOSIS — F41.9 ANXIETY: ICD-10-CM

## 2023-08-22 DIAGNOSIS — I10 ESSENTIAL HYPERTENSION: ICD-10-CM

## 2023-08-22 RX ORDER — LISINOPRIL 20 MG/1
20 TABLET ORAL DAILY
Qty: 90 TABLET | Refills: 1 | Status: SHIPPED | OUTPATIENT
Start: 2023-08-22

## 2023-08-22 RX ORDER — LORAZEPAM 1 MG/1
1 TABLET ORAL 3 TIMES DAILY PRN
Qty: 60 TABLET | Refills: 0 | Status: SHIPPED | OUTPATIENT
Start: 2023-08-22

## 2023-10-03 ENCOUNTER — APPOINTMENT (OUTPATIENT)
Dept: LAB | Facility: CLINIC | Age: 63
End: 2023-10-03
Payer: COMMERCIAL

## 2023-10-03 DIAGNOSIS — Z00.00 WELL ADULT EXAM: ICD-10-CM

## 2023-10-03 DIAGNOSIS — Z13.1 SCREENING FOR DIABETES MELLITUS: ICD-10-CM

## 2023-10-03 DIAGNOSIS — E78.1 HYPERTRIGLYCERIDEMIA: ICD-10-CM

## 2023-10-03 DIAGNOSIS — Z13.0 SCREENING FOR DEFICIENCY ANEMIA: ICD-10-CM

## 2023-10-03 DIAGNOSIS — Z13.29 SCREENING FOR THYROID DISORDER: ICD-10-CM

## 2023-10-03 LAB
ALBUMIN SERPL BCP-MCNC: 3.9 G/DL (ref 3.5–5)
ALP SERPL-CCNC: 74 U/L (ref 34–104)
ALT SERPL W P-5'-P-CCNC: 47 U/L (ref 7–52)
ANION GAP SERPL CALCULATED.3IONS-SCNC: 9 MMOL/L
AST SERPL W P-5'-P-CCNC: 43 U/L (ref 13–39)
BASOPHILS # BLD AUTO: 0.03 THOUSANDS/ÂΜL (ref 0–0.1)
BASOPHILS NFR BLD AUTO: 1 % (ref 0–1)
BILIRUB SERPL-MCNC: 1.27 MG/DL (ref 0.2–1)
BUN SERPL-MCNC: 9 MG/DL (ref 5–25)
CALCIUM SERPL-MCNC: 9.2 MG/DL (ref 8.4–10.2)
CHLORIDE SERPL-SCNC: 102 MMOL/L (ref 96–108)
CHOLEST SERPL-MCNC: 130 MG/DL
CO2 SERPL-SCNC: 27 MMOL/L (ref 21–32)
CREAT SERPL-MCNC: 0.79 MG/DL (ref 0.6–1.3)
EOSINOPHIL # BLD AUTO: 0.08 THOUSAND/ÂΜL (ref 0–0.61)
EOSINOPHIL NFR BLD AUTO: 2 % (ref 0–6)
ERYTHROCYTE [DISTWIDTH] IN BLOOD BY AUTOMATED COUNT: 19.9 % (ref 11.6–15.1)
GFR SERPL CREATININE-BSD FRML MDRD: 96 ML/MIN/1.73SQ M
GLUCOSE P FAST SERPL-MCNC: 135 MG/DL (ref 65–99)
HCT VFR BLD AUTO: 35.1 % (ref 36.5–49.3)
HDLC SERPL-MCNC: 47 MG/DL
HGB BLD-MCNC: 9.8 G/DL (ref 12–17)
IMM GRANULOCYTES # BLD AUTO: 0.01 THOUSAND/UL (ref 0–0.2)
IMM GRANULOCYTES NFR BLD AUTO: 0 % (ref 0–2)
LDLC SERPL CALC-MCNC: 66 MG/DL (ref 0–100)
LYMPHOCYTES # BLD AUTO: 1.19 THOUSANDS/ÂΜL (ref 0.6–4.47)
LYMPHOCYTES NFR BLD AUTO: 25 % (ref 14–44)
MCH RBC QN AUTO: 21.2 PG (ref 26.8–34.3)
MCHC RBC AUTO-ENTMCNC: 27.9 G/DL (ref 31.4–37.4)
MCV RBC AUTO: 76 FL (ref 82–98)
MONOCYTES # BLD AUTO: 0.55 THOUSAND/ÂΜL (ref 0.17–1.22)
MONOCYTES NFR BLD AUTO: 12 % (ref 4–12)
NEUTROPHILS # BLD AUTO: 2.91 THOUSANDS/ÂΜL (ref 1.85–7.62)
NEUTS SEG NFR BLD AUTO: 60 % (ref 43–75)
NRBC BLD AUTO-RTO: 0 /100 WBCS
PLATELET # BLD AUTO: 177 THOUSANDS/UL (ref 149–390)
POTASSIUM SERPL-SCNC: 4.3 MMOL/L (ref 3.5–5.3)
PROT SERPL-MCNC: 8.3 G/DL (ref 6.4–8.4)
PSA SERPL-MCNC: 2.58 NG/ML (ref 0–4)
RBC # BLD AUTO: 4.63 MILLION/UL (ref 3.88–5.62)
SODIUM SERPL-SCNC: 138 MMOL/L (ref 135–147)
TRIGL SERPL-MCNC: 86 MG/DL
TSH SERPL DL<=0.05 MIU/L-ACNC: 2.99 UIU/ML (ref 0.45–4.5)
WBC # BLD AUTO: 4.77 THOUSAND/UL (ref 4.31–10.16)

## 2023-10-03 PROCEDURE — 85025 COMPLETE CBC W/AUTO DIFF WBC: CPT

## 2023-10-03 PROCEDURE — 36415 COLL VENOUS BLD VENIPUNCTURE: CPT

## 2023-10-03 PROCEDURE — 84443 ASSAY THYROID STIM HORMONE: CPT

## 2023-10-03 PROCEDURE — G0103 PSA SCREENING: HCPCS

## 2023-10-03 PROCEDURE — 80061 LIPID PANEL: CPT

## 2023-10-03 PROCEDURE — 80053 COMPREHEN METABOLIC PANEL: CPT

## 2023-10-09 ENCOUNTER — APPOINTMENT (OUTPATIENT)
Dept: LAB | Facility: CLINIC | Age: 63
End: 2023-10-09
Payer: COMMERCIAL

## 2023-10-09 ENCOUNTER — TELEPHONE (OUTPATIENT)
Dept: ADMINISTRATIVE | Facility: OTHER | Age: 63
End: 2023-10-09

## 2023-10-09 ENCOUNTER — OFFICE VISIT (OUTPATIENT)
Dept: FAMILY MEDICINE CLINIC | Facility: CLINIC | Age: 63
End: 2023-10-09
Payer: COMMERCIAL

## 2023-10-09 VITALS
DIASTOLIC BLOOD PRESSURE: 68 MMHG | BODY MASS INDEX: 29.58 KG/M2 | HEART RATE: 89 BPM | SYSTOLIC BLOOD PRESSURE: 132 MMHG | HEIGHT: 70 IN | OXYGEN SATURATION: 98 % | WEIGHT: 206.6 LBS | TEMPERATURE: 98.4 F

## 2023-10-09 DIAGNOSIS — R73.01 ELEVATED FASTING GLUCOSE: ICD-10-CM

## 2023-10-09 DIAGNOSIS — Z12.12 SCREENING FOR COLORECTAL CANCER: ICD-10-CM

## 2023-10-09 DIAGNOSIS — L70.0 CYSTIC ACNE: ICD-10-CM

## 2023-10-09 DIAGNOSIS — I10 ESSENTIAL HYPERTENSION: Primary | ICD-10-CM

## 2023-10-09 DIAGNOSIS — D53.9 MACROCYTIC ANEMIA: ICD-10-CM

## 2023-10-09 DIAGNOSIS — Z12.11 SCREENING FOR COLORECTAL CANCER: ICD-10-CM

## 2023-10-09 DIAGNOSIS — F41.9 ANXIETY: ICD-10-CM

## 2023-10-09 DIAGNOSIS — Z98.890 STATUS POST CRANIECTOMY: Chronic | ICD-10-CM

## 2023-10-09 DIAGNOSIS — R73.03 PREDIABETES: ICD-10-CM

## 2023-10-09 LAB
FERRITIN SERPL-MCNC: 8 NG/ML (ref 24–336)
FOLATE SERPL-MCNC: >22.3 NG/ML
IRON SATN MFR SERPL: 4 % (ref 15–50)
IRON SERPL-MCNC: 33 UG/DL (ref 50–212)
SL AMB POCT HEMOGLOBIN AIC: 5.7 (ref ?–6.5)
TIBC SERPL-MCNC: 746 UG/DL (ref 250–450)
UIBC SERPL-MCNC: 713 UG/DL (ref 155–355)
VIT B12 SERPL-MCNC: 555 PG/ML (ref 180–914)

## 2023-10-09 PROCEDURE — 99214 OFFICE O/P EST MOD 30 MIN: CPT | Performed by: NURSE PRACTITIONER

## 2023-10-09 PROCEDURE — 83540 ASSAY OF IRON: CPT

## 2023-10-09 PROCEDURE — 82607 VITAMIN B-12: CPT

## 2023-10-09 PROCEDURE — 82746 ASSAY OF FOLIC ACID SERUM: CPT

## 2023-10-09 PROCEDURE — 36415 COLL VENOUS BLD VENIPUNCTURE: CPT

## 2023-10-09 PROCEDURE — 82728 ASSAY OF FERRITIN: CPT

## 2023-10-09 PROCEDURE — 83550 IRON BINDING TEST: CPT

## 2023-10-09 PROCEDURE — 83036 HEMOGLOBIN GLYCOSYLATED A1C: CPT | Performed by: NURSE PRACTITIONER

## 2023-10-09 RX ORDER — CLINDAMYCIN PHOSPHATE 10 MG/G
1 AEROSOL, FOAM TOPICAL 2 TIMES DAILY
Qty: 50 G | Refills: 0 | Status: SHIPPED | OUTPATIENT
Start: 2023-10-09

## 2023-10-09 RX ORDER — LORAZEPAM 1 MG/1
1 TABLET ORAL 3 TIMES DAILY PRN
Qty: 60 TABLET | Refills: 0 | Status: CANCELLED | OUTPATIENT
Start: 2023-10-09

## 2023-10-09 NOTE — TELEPHONE ENCOUNTER
Upon review of the In Basket request we have found this is a duplicate request and no further action is needed. This request was completed upon initial request, the patient chart is up to date, and this message will now be closed. Any additional questions or concerns should be emailed to the Practice Liaisons via the appropriate education email address, please do not reply via In Basket.     Thank you  Gomez Campos

## 2023-10-09 NOTE — TELEPHONE ENCOUNTER
----- Message from Junious Plater sent at 10/9/2023 11:51 AM EDT -----  Regarding: Colonoscopy  10/09/23 11:51 AM    Hello, our patient Cordell Reilly has had a colonoscopy completed/performed at [facility] in [place]. [phone]. Please assist in updating the patient chart by [[email or web address]:Pt states it was about 8 years ago.     Thank you,  Meri Ramirez  [phone]

## 2023-10-09 NOTE — PATIENT INSTRUCTIONS
CARBOHYDRATES  As a carbohydrate is digested through our bodies it becomes a sugar. There are TWO main things I want you to know about CARBOHYDRATES:     1. NUMBER     How many carbohydrates are in each serving of food you are about to eat matters to weight loss/gain, diabetes control, and sugar levels. A food product is in the LOW carbohydrate level if it has less than 15grams carbohydrates per serving. These are always good choices in general for a snack. A meal can include anywhere from 15-45 grams carbohydrates in the meal.      TIP: Sugar Free foods contain carbohydrates which become sugar in the body. If you are going to consider eating sugar free foods, you MUST follow serving size carefully. These foods will still result in high glucose levels. 2. TYPE     The type of carbohydrate is VERY important. A slice of bread (white or wheat) will have same amount of carbohydrates but both break down at different paces in the body. Carbohydrates that are more complex like Rye, whole wheat, and multi grain process slower through our bodies, therefore, making sugar levels rise slowly and steady and over long period of time. WHITE carbohydrates such as white bread, white pasta and white rice digest quickly in your body and raise your glucose levels fast just like eating a candy bar. TIP: Read the INGREDIENTS on each item to make sure the first ingredient is WHOLE WHEAT or Rye  (not enriched white flour) rather than reading front label of the product in order to verify whole wheat product. Simple carbohydrates digest through your body QUICKLY causing the conversion of carbohydrates to become sugars if you are not using them immediately for energy. Complex carbohydrates will become sugars SLOWLY over time as your body breaks them down in your digestive system. Below is a graph demonstrating these two concepts.   The rate of sugar breakdown can easily affect your energy, metabolism, ability to gain or lose weight and more. The goal is a slow and steady release of sugar into your body and to avoid sugar spikes in order to feel your best and manage your health. Simple Carbohydrates: Candy bar, white bread, white pasta, white rice, white flour, cookies, sweets, cakes, corn, string beans etc               A little information about carbs . .. ·         Try for a low carbohydrate diet. This means less than 100 grams per day. ·         Try to get a high amount of protein per day - shoot for 60-70 grams per day. ·         To really lose weight you may need to try an ultra low carb diet - this means 10-15 grams per meal. And 5 - 10 grams per snack. This is usually under the supervision of a physician weight loss program.   ·         Carbohydrates are in starches and turn to sugars. Lower your intake of bread, pasta, potatoes, rice. Never drink your carbs - switch to water. No juice or soda. ·         When you do consume carbs, try for high fiber choices like fruits and veggies. Try for complex carbohydrates found in oats and whole grains. A daily fiber supplement can also be helpful. Here is a lot more information …     Understanding Carbohydrates  A car needs the right type of fuel to run. And you need the right kind of food to function. To keep your energy level up, your body needs food that has carbohydrates. But carbohydrates raise blood sugar levels higher and faster than other kinds of food. Starches - AVOID   Starches are found in grains, some vegetables, and beans. Grain products include bread, pasta, cereal, and tortillas. Starchy vegetables include potatoes, peas, corn, lima beans, yams, and squash. Kidney beans, gunter beans, black beans, garbanzo beans, and lentils also contain starches. Sugars - AVOID  Sugars are found naturally in many foods. Or sugar can be added.  Foods that contain natural sugar include fruits and fruit juices, dairy products, honey, and molasses. Added sugars are found in most desserts, processed foods, candy, regular soda, and fruit drinks. These are very helpful for treating low blood sugar, or hypoglycemia. They provide sugar quickly. Fiber - A BETTER CHOICE  Fiber comes from plant foods. Most fiber isn’t digested by the body. Instead of raising blood sugar levels like other carbohydrates, it actually stops blood sugar from rising too fast. Fiber is found in fruits, vegetables, whole grains, beans, peas, and many nuts. Carb counting  It’s important to keep track of the amount of carbohydrates you eat. This can help you keep the right balance of physical activity and medicine. The amount of carbohydrates needed will vary for each person. It depends on many things such as your health, the medicines you take, and how active you are. You may start with around 45 to 60 grams of carbohydrate per meal, depending on your situation. Counting your carbohydrates is a good way to control how many you eat. You can do this by keeping a food diary. There are great apps to help with this too like My Fitness Pal.      Carbohydrates come from a variety of foods. These include grains, starchy vegetables, fruit, milk, beans, and snack foods. You can either count carbohydrate grams or carbohydrate servings. When you count carbohydrate servings, 1 carbohydrate serving = 15 grams of carbohydrates.      Here are some examples of foods containing about 15 grams of carbohydrates (1 serving of carbohydrates):  ·      1/2 cup of canned or frozen fruit  ·      A small piece of fresh fruit (4 ounces)  ·      1 slice of bread  ·      1/2 cup of oatmeal  ·      1/3 cup of rice  ·      4 to 6 crackers  ·      1/2 English muffin  ·      1/2 cup of black beans  ·      1/4 of a large baked potato (3 ounces)  ·      2/3 cup of plain fat-free yogurt  ·      1 cup of soup  ·      1/2 cup of casserole  ·      6 chicken nuggets  ·      2-inch square brownie or cake without frosting  · 2 small cookies  ·      1/2 cup of ice cream or sherbet     Carb counting is easier when food labels are available. Look at the label to see how many grams of total carbohydrates the food contains. Then you can figure out how much you should eat. It’s also important to be consistent with the amount and time you eat when taking a fixed dose of diabetes medicine. Make your meal plan  A meal plan gives guidelines for the types and amounts of food you should eat. Watch serving sizes  Your meal plan will group foods by servings. To learn how much a serving is, start by measuring food portions at each meal. Soon you’ll know what a serving looks like on your plate. A quick rule is a serving size of meat is about the size of your fist.   Eat from all the food groups  The basis of a healthy meal plan is variety (eating lots of different foods). Choose lean meats, fresh fruits and vegetables, whole grains, and low-fat or nonfat dairy products. Eating a wide variety of foods provides the nutrients your body needs. It can also keep you from getting bored with your meal plan. Learn about carbohydrates, fats, and protein  ·      Carbohydrates are starches, sugars, and fiber. They are found in many foods, including fruit, bread, pasta, milk, and sweets. Of all the foods you eat, carbohydrates have the most effect on your blood sugar. ·      Fats have the most calories. They also have the most effect on your weight and your risk of heart disease. It’s important to control your weight and protect your heart. Foods that are high in fat include whole milk, cheese, snack foods, and desserts. ·      Protein is important for building and repairing muscles and bones. Choose low-fat protein sources, such as fish, egg whites, and skinless chicken. Reduce liquid sugars  Extra calories from sodas, sports drinks, and fruit drinks make it hard to keep blood sugar in range.  Cut as many liquid sugars from your meal plan as you can.  This includes most fruit juices, which are often high in natural or added sugar. Instead, drink plenty of water and other sugar-free beverages. Eat less fat  If you need to lose weight, try to reduce the amount of fat in your diet. This can also help lower your cholesterol level to keep blood vessels healthier. Cut fat by using only small amounts of liquid oil for cooking. Read food labels carefully to avoid foods with unhealthy trans fats. Timing your meals  When it comes to blood sugar control, when you eat is as important as what you eat. You may need to eat several small meals spaced evenly throughout the day to stay in your target range. So don’t skip breakfast or wait until late in the day to get most of your calories. Doing so can cause your blood sugar to rise too high or fall too low. Understanding Carbohydrates, Fats, and Protein  Food is a source of fuel and nourishment for your body. It’s also a source of pleasure. Having diabetes doesn’t mean you have to eat special foods or give up desserts. Instead, your dietitian can show you how to plan meals to suit your body. To start, learn how different foods affect blood sugar. Carbohydrates  Carbohydrates are the main source of fuel for the body. Carbohydrates raise blood sugar. Many people think carbohydrates are only found in pasta or bread. But carbohydrates are actually in many kinds of foods:  ·      Sugars occur naturally in foods such as fruit, milk, honey, and molasses. Sugars can also be added to many foods, from cereals and yogurt to candy and desserts. Sugars raise blood sugar. ·      Starches are found in bread, cereals, pasta, and dried beans. They’re also found in corn, peas, potatoes, yam, acorn squash, and butternut squash. Starches also raise blood sugar. ·      Fiber is found in foods such as vegetables, fruits, beans, and whole grains. Unlike other carbs, fiber isn’t digested or absorbed.  So it doesn’t raise blood sugar. In fact, fiber can help keep blood sugar from rising too fast. It also helps keep blood cholesterol at a healthy level. Did you know? Even though carbohydrates raise blood sugar, it’s best to have some in every meal. They are an important part of a healthy diet. Fat  Fat is an energy source that can be stored until needed. Fat does not raise blood sugar. However, it can raise blood cholesterol, increasing the risk of heart disease. Fat is also high in calories, which can cause weight gain. Not all types of fat are the same. More Healthy:  ·      Monounsaturated fats are mostly found in vegetable oils, such as olive, canola, and peanut oils. They are also found in avocados and some nuts. Monounsaturated fats are healthy for your heart. That’s because they lower LDL (unhealthy) cholesterol. ·      Polyunsaturated fats are mostly found in vegetable oils, such as corn, safflower, and soybean oils. They are also found in some seeds, nuts, and fish. Polyunsaturated fats lower LDL (unhealthy) cholesterol. So, choosing them instead of saturated fats is healthy for your heart. Certain unsaturated fats can help lower triglycerides. Less Healthy:  ·      Saturated fats are found in animal products, such as meat, poultry, whole milk, lard, and butter. Saturated fats raise LDL cholesterol and are not healthy for your heart. ·      Hydrogenated oils and trans fats are formed when vegetable oils are processed into solid fats. They are found in many processed foods. Hydrogenated oils and trans fats raise LDL cholesterol and lower HDL (healthy) cholesterol. They are not healthy for your heart. Protein  Protein helps the body build and repair muscle and other tissue. Protein has little or no effect on blood sugar. However, many foods that contain protein also contain saturated fat.  By choosing low-fat protein sources, you can get the benefits of protein without the extra fat:  ·      Plant protein is found in dry beans and peas, nuts, and soy products, such as tofu and soymilk. These sources tend to be cholesterol-free and low in saturated fat. ·      Animal protein is found in fish, poultry, meat, cheese, milk, and eggs. These contain cholesterol and can be high in saturated fat. Aim for lean, lower-fat choices. Reading food labels  Pay close attention to food labels. The information on them will help you choose healthy foods that make managing your blood sugar easier. Look for the Nutrition Facts label on packaged foods. This label tells you how many carbohydrates and how much sugar, fat, and fiber are in each serving. Then you can decide whether the food fits your meal plan. Reading food labels helps you keep track of your carbohydrate intake. Remember to pay attention to serving sizes. If you eat this entire box, he will have eaten 34 grams of carbohydrate. ·      Serving size: This number is very important and tells you how much of the food makes up a single serving. If you eat more than one serving, all other numbers, like calories and carbohydrates, also increase. ·      Total carbohydrates: This number tells you how much carbohydrate is in each serving. If you are carb counting, this number will help you fit the food into your meal plan. Also, keep in mind the number of servings you eat. If  you eat two servings, you’ll need to double the amount of carbohydrates listed on the box. ·      Sugars: This number includes both natural and added sugars. Sugars count as part of your carbohydrate intake, and are included in the total carbohydrate number on the label. ·      Fat: This number tells you the total amount of fat in each serving. Watch out for saturated fats, which raise cholesterol. Limit fats, especially if you are trying to lose weight. ·      Trans fat: This number tells you if the food includes trans fat. Liquid oils made into a solid fat, such as margarine, have a lot of trans fat.  Trans fat is bad for the heart. Try to avoid foods containing trans fat. ·      Dietary fiber: This number tells you how much of the carbohydrate in the food is fiber. Foods high in fiber are healthy. They also help keep blood sugar levels steady. Learning portion sizes  Portion control is an important part of healthy eating. How much food you eat affects your blood sugar. And until you learn what portion sizes look like, use measuring cups and spoons to be sure portions are accurate. Adapted from:  © 5329-5300 [employer] [address] [place] [address] [place], [place], [address] [facility]. All rights reserved. This information is not intended as a substitute for professional medical care. Always follow your healthcare professional's instructions.

## 2023-10-09 NOTE — PROGRESS NOTES
Assessment/Plan:    Problem List Items Addressed This Visit     Status post craniectomy (Chronic)    Essential hypertension - Primary    Anxiety   Other Visit Diagnoses     Cystic acne        Relevant Medications    Clindamycin Phosphate foam    Elevated fasting glucose        Relevant Orders    POCT hemoglobin A1c (Completed)    Macrocytic anemia        Relevant Orders    Folate    Vitamin B12    Iron Panel (Includes Ferritin, Iron Sat%, Iron, and TIBC)    Screening for colorectal cancer        Relevant Orders    Ambulatory Referral to Gastroenterology    Prediabetes        Relevant Orders    HEMOGLOBIN A1C W/ EAG ESTIMATION           Diagnoses and all orders for this visit:    Essential hypertension    Status post craniectomy    Anxiety    Cystic acne  -     Clindamycin Phosphate foam; Apply 1 Dose topically 2 (two) times a day    Elevated fasting glucose  -     POCT hemoglobin A1c    Macrocytic anemia  -     Folate; Future  -     Vitamin B12; Future  -     Iron Panel (Includes Ferritin, Iron Sat%, Iron, and TIBC); Future    Screening for colorectal cancer  -     Ambulatory Referral to Gastroenterology; Future    Prediabetes  -     Cancel: HEMOGLOBIN A1C W/ EAG ESTIMATION; Future  -     HEMOGLOBIN A1C W/ EAG ESTIMATION; Future        No problem-specific Assessment & Plan notes found for this encounter. Subjective:      Patient ID: Fidelina Loera is a 65 y.o. male. Patient presents for routine 6-month follow-up visit. Note the patient's fasting blood glucose was 132. Did an A1c which resulted at 5.7. Previous A1c was 5.4. However, this may be related to patient's surgical complications over the last year with infected craniectomy flap. Discussed a low glycemic diet which includes low carbs and refraining from sweets and alcohol. Patient does endorse that he does drink alcohol periodically. Hypertension  This is a chronic problem. The problem is controlled.  Associated symptoms include anxiety. There are no associated agents to hypertension. Risk factors for coronary artery disease include male gender and diabetes mellitus. Past treatments include beta blockers and ACE inhibitors. The current treatment provides moderate improvement. There are no compliance problems. Anxiety  Presents for follow-up visit. The quality of sleep is good. Nighttime awakenings: occasional.       Heartburn  This is a chronic problem. The symptoms are aggravated by certain foods. There are no known risk factors. He has tried a PPI for the symptoms. The treatment provided moderate relief. A1c noted   Lab Results   Component Value Date    HGBA1C 5.7 10/09/2023        Recommend lifestyle and dietary changes which include plant based low glycemic diet. Counseled at length on the importance of diet and exercise regarding the control of their diabetes. Will monitor in 6  months. The following portions of the patient's history were reviewed and updated as appropriate:   He has a past medical history of Anxiety, Cataracts, bilateral, Dysphagia (04/16/2021), GERD (gastroesophageal reflux disease), Hematoma, subdural, with loss of consciousness, traumatic ([address]), Hypertension, PONV (postoperative nausea and vomiting), Seizures ([address]) (8/4/2021), and Status post insertion of percutaneous endoscopic gastrostomy (PEG) tube ([address]) (05/19/2021). ,  does not have any pertinent problems on file. ,   has a past surgical history that includes Rotator cuff repair (Left); Hand surgery (Left); Brain hematoma evacuation (Right, 4/15/2021); PEG tube placement; PEG tube removal; Craniotomy (Right); pr rplcmt bone flap/prosthetic plate skull (Right, 8/53/6041); Brain hematoma evacuation (Right, 11/16/2021); Brain hematoma evacuation (Right, 11/16/2021); pr rplcmt bone flap/prosthetic plate skull (Right, 9/5/3158); CRANIOPLASTY (Right, 7/19/2022); and pr rplcmt bone flap/prosthetic plate skull (Right, 90/51/6888). ,  family history includes Dementia in his father; Heart disease in his mother; Hypertension in his father. ,   reports that he has never smoked. He has never used smokeless tobacco. He reports current alcohol use of about 6.0 standard drinks of alcohol per week. He reports that he does not use drugs. ,  has No Known Allergies. .  Current Outpatient Medications   Medication Sig Dispense Refill   • acetaminophen (TYLENOL) 325 mg tablet Take 3 tablets (975 mg total) by mouth every 8 (eight) hours  0   • Clindamycin Phosphate foam Apply 1 Dose topically 2 (two) times a day 50 g 0   • halobetasol (ULTRAVATE) 0.05 % cream Apply topically 2 (two) times a day 50 g 0   • levETIRAcetam (KEPPRA) 750 mg tablet Take 1 tablet (750 mg total) by mouth every 12 (twelve) hours 180 tablet 3   • lisinopril (ZESTRIL) 20 mg tablet Take 1 tablet (20 mg total) by mouth daily 90 tablet 1   • LORazepam (ATIVAN) 1 mg tablet Take 1 tablet (1 mg total) by mouth 3 (three) times a day as needed for anxiety 60 tablet 0   • metoprolol tartrate (LOPRESSOR) 25 mg tablet Take 1 tablet (25 mg total) by mouth every 12 (twelve) hours 180 tablet 1   • omeprazole (PriLOSEC) 20 mg delayed release capsule Take 20 mg by mouth daily       No current facility-administered medications for this visit. Depression Screening and Follow-up Plan: Patient was screened for depression during today's encounter. They screened negative with a PHQ-2 score of 0. Review of Systems   All other systems reviewed and are negative. Objective:  Vitals:    10/09/23 1055   BP: 132/68   Pulse: 89   Temp: 98.4 °F (36.9 °C)   TempSrc: Tympanic   SpO2: 98%   Weight: 93.7 kg (206 lb 9.6 oz)   Height: 5' 10" (1.778 m)     Body mass index is 29.64 kg/m². Physical Exam  Vitals and nursing note reviewed. Constitutional:       Appearance: Normal appearance. He is well-developed. HENT:      Head: Normocephalic and atraumatic.       Right Ear: Tympanic membrane, ear canal and external ear normal.      Left Ear: Tympanic membrane, ear canal and external ear normal.      Nose: Nose normal.      Mouth/Throat:      Mouth: Mucous membranes are moist.      Pharynx: Uvula midline. Eyes:      General: Lids are normal.      Conjunctiva/sclera: Conjunctivae normal.      Pupils: Pupils are equal, round, and reactive to light. Neck:      Thyroid: No thyroid mass. Vascular: No JVD. Trachea: Trachea and phonation normal.   Cardiovascular:      Rate and Rhythm: Normal rate and regular rhythm. Pulses: Normal pulses. Heart sounds: Normal heart sounds, S1 normal and S2 normal. No murmur heard. No friction rub. No gallop. Pulmonary:      Effort: Pulmonary effort is normal.      Breath sounds: Normal breath sounds. Abdominal:      General: Bowel sounds are normal.      Palpations: Abdomen is soft. Tenderness: There is no abdominal tenderness. Genitourinary:     Comments: Deferred  Musculoskeletal:         General: Normal range of motion. Cervical back: Full passive range of motion without pain, normal range of motion and neck supple. Right lower leg: No edema. Left lower leg: No edema. Lymphadenopathy:      Head:      Right side of head: No submental, submandibular, tonsillar, preauricular, posterior auricular or occipital adenopathy. Left side of head: No submental, submandibular, tonsillar, preauricular, posterior auricular or occipital adenopathy. Cervical: No cervical adenopathy. Skin:     General: Skin is warm and dry. Capillary Refill: Capillary refill takes less than 2 seconds. Neurological:      General: No focal deficit present. Mental Status: He is alert and oriented to person, place, and time. Sensory: Sensation is intact. Motor: Motor function is intact. Coordination: Coordination is intact. Gait: Gait is intact.    Psychiatric:         Attention and Perception: Attention and perception normal.         Mood and Affect: Mood and affect normal.         Speech: Speech normal.         Behavior: Behavior normal. Behavior is cooperative. Thought Content: Thought content normal.         Cognition and Memory: Cognition normal.         Judgment: Judgment normal.         Office Visit on 10/09/2023   Component Date Value Ref Range Status   • Hemoglobin A1C 10/09/2023 5.7  6.5 Final   Appointment on 10/03/2023   Component Date Value Ref Range Status   • PSA 10/03/2023 2.58  0.00 - 4.00 ng/mL Final    S[employer] Access chemiluminescent immunoassay. Confirm baseline values for patients being serially monitored. • Cholesterol 10/03/2023 130  See Comment mg/dL Final    Cholesterol:         Pediatric <18 Years        Desirable          <170 mg/dL      Borderline High    170-199 mg/dL      High               >=200 mg/dL        Adult >=18 Years            Desirable         <200 mg/dL      Borderline High   200-239 mg/dL      High              >239 mg/dL     • Triglycerides 10/03/2023 86  See Comment mg/dL Final    Triglyceride:     0-9Y            <75mg/dL     10Y-17Y         <90 mg/dL       >=18Y     Normal          <150 mg/dL     Borderline High 150-199 mg/dL     High            200-499 mg/dL        Very High       >499 mg/dL    Specimen collection should occur prior to Metamizole administration due to the potential for falsely depressed results. • HDL, Direct 10/03/2023 47  >=40 mg/dL Final   • LDL Calculated 10/03/2023 66  0 - 100 mg/dL Final    LDL Cholesterol:     Optimal           <100 mg/dl     Near Optimal      100-129 mg/dl     Above Optimal       Borderline High 130-159 mg/dl       High            160-189 mg/dl       Very High       >189 mg/dl         This screening LDL is a calculated result. It does not have the accuracy of the Direct Measured LDL in the monitoring of patients with hyperlipidemia and/or statin therapy. Direct Measure LDL (JMR523) must be ordered separately in these patients.    • WBC 10/03/2023 4.77  4.31 - 10.16 Thousand/uL Final   • RBC 10/03/2023 4.63  3.88 - 5.62 Million/uL Final   • Hemoglobin 10/03/2023 9.8 (L)  12.0 - 17.0 g/dL Final   • Hematocrit 10/03/2023 35.1 (L)  36.5 - 49.3 % Final   • MCV 10/03/2023 76 (L)  82 - 98 fL Final   • MCH 10/03/2023 21.2 (L)  26.8 - 34.3 pg Final   • MCHC 10/03/2023 27.9 (L)  31.4 - 37.4 g/dL Final   • RDW 10/03/2023 19.9 (H)  11.6 - 15.1 % Final   • Platelets 72/15/3380 177  149 - 390 Thousands/uL Final   • nRBC 10/03/2023 0  /100 WBCs Final   • Neutrophils Relative 10/03/2023 60  43 - 75 % Final   • Immat GRANS % 10/03/2023 0  0 - 2 % Final   • Lymphocytes Relative 10/03/2023 25  14 - 44 % Final   • Monocytes Relative 10/03/2023 12  4 - 12 % Final   • Eosinophils Relative 10/03/2023 2  0 - 6 % Final   • Basophils Relative 10/03/2023 1  0 - 1 % Final   • Neutrophils Absolute 10/03/2023 2.91  1.85 - 7.62 Thousands/µL Final   • Immature Grans Absolute 10/03/2023 0.01  0.00 - 0.20 Thousand/uL Final   • Lymphocytes Absolute 10/03/2023 1.19  0.60 - 4.47 Thousands/µL Final   • Monocytes Absolute 10/03/2023 0.55  0.17 - 1.22 Thousand/µL Final   • Eosinophils Absolute 10/03/2023 0.08  0.00 - 0.61 Thousand/µL Final   • Basophils Absolute 10/03/2023 0.03  0.00 - 0.10 Thousands/µL Final   • TSH 3RD GENERATON 10/03/2023 2.986  0.450 - 4.500 uIU/mL Final    Adult TSH (3rd generation) reference range follows the recommended guidelines of the American Thyroid Association, January, 2020.    • Sodium 10/03/2023 138  135 - 147 mmol/L Final   • Potassium 10/03/2023 4.3  3.5 - 5.3 mmol/L Final   • Chloride 10/03/2023 102  96 - 108 mmol/L Final   • CO2 10/03/2023 27  21 - 32 mmol/L Final   • ANION GAP 10/03/2023 9  mmol/L Final   • BUN 10/03/2023 9  5 - 25 mg/dL Final   • Creatinine 10/03/2023 0.79  0.60 - 1.30 mg/dL Final    Standardized to IDMS reference method   • Glucose, Fasting 10/03/2023 135 (H)  65 - 99 mg/dL Final   • Calcium 10/03/2023 9.2  8.4 - 10.2 mg/dL Final • AST 10/03/2023 43 (H)  13 - 39 U/L Final   • ALT 10/03/2023 47  7 - 52 U/L Final    Specimen collection should occur prior to Sulfasalazine administration due to the potential for falsely depressed results. • Alkaline Phosphatase 10/03/2023 74  34 - 104 U/L Final   • Total Protein 10/03/2023 8.3  6.4 - 8.4 g/dL Final   • Albumin 10/03/2023 3.9  3.5 - 5.0 g/dL Final   • Total Bilirubin 10/03/2023 1.27 (H)  0.20 - 1.00 mg/dL Final    Use of this assay is not recommended for patients undergoing treatment with eltrombopag due to the potential for falsely elevated results. N-acetyl-p-benzoquinone imine (metabolite of Acetaminophen) will generate erroneously low results in samples for patients that have taken an overdose of Acetaminophen. • eGFR 10/03/2023 96  ml/min/1.73sq m Final     I have reviewed all the lab results. There are some abnormalities that are not critical to the patient's health, I have discussed these in person at this office appointment. Portions of the record may have been created with voice recognition software. Occasional wrong word or "sound a like" substitutions may have occurred due to the inherent limitations of voice recognition software. Read the chart carefully and recognize, using context, where substitutions have occurred. Contact me with any questions.

## 2023-10-10 ENCOUNTER — TELEPHONE (OUTPATIENT)
Dept: FAMILY MEDICINE CLINIC | Facility: CLINIC | Age: 63
End: 2023-10-10

## 2023-10-10 DIAGNOSIS — D50.8 IRON DEFICIENCY ANEMIA SECONDARY TO INADEQUATE DIETARY IRON INTAKE: Primary | ICD-10-CM

## 2023-10-10 RX ORDER — DOCUSATE SODIUM 100 MG/1
100 CAPSULE, LIQUID FILLED ORAL 2 TIMES DAILY
Qty: 180 CAPSULE | Refills: 1 | Status: SHIPPED | OUTPATIENT
Start: 2023-10-10

## 2023-10-10 RX ORDER — FERROUS SULFATE 324(65)MG
324 TABLET, DELAYED RELEASE (ENTERIC COATED) ORAL
Qty: 180 TABLET | Refills: 1 | Status: SHIPPED | OUTPATIENT
Start: 2023-10-10

## 2023-10-10 RX ORDER — MULTIVIT WITH MINERALS/LUTEIN
250 TABLET ORAL DAILY
Qty: 180 TABLET | Refills: 1 | Status: SHIPPED | OUTPATIENT
Start: 2023-10-10

## 2023-11-07 DIAGNOSIS — L40.9 PSORIASIS: ICD-10-CM

## 2023-11-07 DIAGNOSIS — F41.9 ANXIETY: ICD-10-CM

## 2023-11-09 NOTE — TELEPHONE ENCOUNTER
Pt called to check on medication refills that he requested on Tuesday.  It was for the lorazepam and the Halobetasol cream.

## 2023-11-10 RX ORDER — LORAZEPAM 1 MG/1
1 TABLET ORAL 3 TIMES DAILY PRN
Qty: 60 TABLET | Refills: 0 | Status: SHIPPED | OUTPATIENT
Start: 2023-11-10

## 2023-11-13 ENCOUNTER — TELEPHONE (OUTPATIENT)
Dept: NEUROSURGERY | Facility: CLINIC | Age: 63
End: 2023-11-13

## 2023-11-13 NOTE — TELEPHONE ENCOUNTER
Received call from [facility] regarding prior authorization needed for CT planned for 1/3. Attempted to contact her to better understand how we can help. Left a detailed message encouraging a call back.

## 2023-11-18 DIAGNOSIS — L70.0 CYSTIC ACNE: ICD-10-CM

## 2023-11-20 RX ORDER — CLINDAMYCIN PHOSPHATE 10 MG/G
1 AEROSOL, FOAM TOPICAL 2 TIMES DAILY
Qty: 50 G | Refills: 1 | Status: SHIPPED | OUTPATIENT
Start: 2023-11-20

## 2023-11-21 DIAGNOSIS — Z98.890 POST-OPERATIVE STATE: ICD-10-CM

## 2023-11-21 RX ORDER — LEVETIRACETAM 750 MG/1
750 TABLET ORAL EVERY 12 HOURS SCHEDULED
Qty: 180 TABLET | Refills: 1 | Status: SHIPPED | OUTPATIENT
Start: 2023-11-21

## 2023-11-22 DIAGNOSIS — I10 ESSENTIAL HYPERTENSION: ICD-10-CM

## 2023-11-22 RX ORDER — LISINOPRIL 20 MG/1
20 TABLET ORAL DAILY
Qty: 90 TABLET | Refills: 0 | Status: SHIPPED | OUTPATIENT
Start: 2023-11-22

## 2023-12-12 DIAGNOSIS — L40.9 PSORIASIS: ICD-10-CM

## 2023-12-12 DIAGNOSIS — L70.0 CYSTIC ACNE: ICD-10-CM

## 2023-12-12 DIAGNOSIS — F41.9 ANXIETY: ICD-10-CM

## 2023-12-12 RX ORDER — LORAZEPAM 1 MG/1
1 TABLET ORAL 3 TIMES DAILY PRN
Qty: 60 TABLET | Refills: 0 | Status: SHIPPED | OUTPATIENT
Start: 2023-12-12

## 2023-12-12 RX ORDER — CLINDAMYCIN PHOSPHATE 10 MG/G
1 AEROSOL, FOAM TOPICAL 2 TIMES DAILY
Qty: 50 G | Refills: 5 | Status: SHIPPED | OUTPATIENT
Start: 2023-12-12

## 2024-01-03 ENCOUNTER — HOSPITAL ENCOUNTER (OUTPATIENT)
Dept: CT IMAGING | Facility: HOSPITAL | Age: 64
Discharge: HOME | End: 2024-01-03
Attending: NEUROLOGICAL SURGERY
Payer: COMMERCIAL

## 2024-01-03 DIAGNOSIS — Q04.6 COLLOID CYST OF THIRD VENTRICLE (HCC): ICD-10-CM

## 2024-01-03 DIAGNOSIS — Z98.890 STATUS POST CRANIECTOMY: Chronic | ICD-10-CM

## 2024-01-03 DIAGNOSIS — T84.7XXD INFECTION OF BONE FLAP, SUBSEQUENT ENCOUNTER: Chronic | ICD-10-CM

## 2024-01-03 PROCEDURE — 70450 CT HEAD/BRAIN W/O DYE: CPT

## 2024-01-03 PROCEDURE — G1004 CDSM NDSC: HCPCS

## 2024-01-16 NOTE — ASSESSMENT & PLAN NOTE
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 Arceo from the below surgeries, cyst was first noted on imaging at that time.   Hx of 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 4/15/21 --> s/p cranioplasty 7/13/21.  Developed wound dehiscence and underlying infection requiring re-do craniectomy 11/16/21 with evacuation of epidural hematoma 11/17/21 --> s/p right allographic cranioplasty 3/8/22.    Developed recurrent infection, wound dehiscence requiring re-do craniectomy 7/9/22 --> s/p allograft cranioplasty 10/25/22   Doing well. No complaints, notes ongoing issues with sense of smell and taste since surgery in 2022  Exam: healed cranioplasty. Non-focal exam    Imaging:  CT head 1/3/24: 1. Stable postsurgical changes of right hemicraniectomy with cranioplasty. Interval decrease in small right-sided extra-axial collection along the right cerebral convexity deep to the cranioplasty flap. 2. Stable 0.4 cm colloid cyst of the third ventricle. 3. Stable mild microangiopathic changes.    Plan:  Reviewed images with patient, wife and Dr. Arceo. Stable, small colloid cyst.   No neurosurgery intervention recommended.   These are benign, congenital cystic fluid collections.   It is possible that they may grow and if that occurs, can cause hydrocephalus/obstruction in the flow of CSF.   Reviewed red flag s/s.  Follow up in 1 year with CT head as joint appointment with Dr. Arceo  Call sooner with any questions or concerns.

## 2024-01-17 ENCOUNTER — OFFICE VISIT (OUTPATIENT)
Dept: NEUROSURGERY | Facility: CLINIC | Age: 64
End: 2024-01-17
Payer: COMMERCIAL

## 2024-01-17 VITALS
RESPIRATION RATE: 18 BRPM | SYSTOLIC BLOOD PRESSURE: 142 MMHG | OXYGEN SATURATION: 99 % | DIASTOLIC BLOOD PRESSURE: 80 MMHG | HEIGHT: 70 IN | WEIGHT: 210 LBS | HEART RATE: 90 BPM | TEMPERATURE: 98.3 F | BODY MASS INDEX: 30.06 KG/M2

## 2024-01-17 DIAGNOSIS — Z98.890 STATUS POST CRANIECTOMY: Chronic | ICD-10-CM

## 2024-01-17 DIAGNOSIS — Q04.6 COLLOID CYST OF THIRD VENTRICLE (HCC): Primary | ICD-10-CM

## 2024-01-17 PROCEDURE — 99213 OFFICE O/P EST LOW 20 MIN: CPT | Performed by: NEUROLOGICAL SURGERY

## 2024-01-17 NOTE — PROGRESS NOTES
Neurosurgery Office Note  Stan Guy 63 y.o. male MRN: [id number]      Assessment/Plan     Colloid cyst of third ventricle (HCC)  Returns for follow up of a 3rd ventricular colloid cyst  Well known to Dr. Arceo from the below surgeries, cyst was first noted on imaging at that time.   Hx of 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 4/15/21 --> s/p cranioplasty 7/13/21.  Developed wound dehiscence and underlying infection requiring re-do craniectomy 11/16/21 with evacuation of epidural hematoma 11/17/21 --> s/p right allographic cranioplasty 3/8/22.    Developed recurrent infection, wound dehiscence requiring re-do craniectomy 7/9/22 --> s/p allograft cranioplasty 10/25/22   Doing well. No complaints, notes ongoing issues with sense of smell and taste since surgery in 2022  Exam: healed cranioplasty. Non-focal exam    Imaging:  CT head 1/3/24: 1. Stable postsurgical changes of right hemicraniectomy with cranioplasty. Interval decrease in small right-sided extra-axial collection along the right cerebral convexity deep to the cranioplasty flap. 2. Stable 0.4 cm colloid cyst of the third ventricle. 3. Stable mild microangiopathic changes.    Plan:  Reviewed images with patient, wife and Dr. Arceo. Stable, small colloid cyst.   No neurosurgery intervention recommended.   These are benign, congenital cystic fluid collections.   It is possible that they may grow and if that occurs, can cause hydrocephalus/obstruction in the flow of CSF.   Reviewed red flag s/s.  Follow up in 1 year with CT head as joint appointment with Dr. Arceo  Call sooner with any questions or concerns.        Diagnoses and all orders for this visit:    Colloid cyst of third ventricle (HCC)  -     CT head wo contrast; Future    Status post craniectomy          I have spent a total time of 25 minutes on 01/17/24 in caring for this patient including Diagnostic results, Instructions for management, Patient and family  education, Impressions, Counseling / Coordination of care, Documenting in the medical record, Reviewing / ordering tests, medicine, procedures  , Obtaining or reviewing history  , and Communicating with other healthcare professionals .      CHIEF COMPLAINT    Chief Complaint   Patient presents with    Follow-up     1 YR SHARED KAIN CT HEAD       HISTORY    History of Present Illness     63 y.o. year old male     63 year old gentleman seen for 1 year follow up of a colloid cyst. This was noted on CT scan done during work up of a SDH and TBI in 2021. He is well known to Dr. Arceo and underwent 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 4/15/21 with subsequent cranioplasty 7/13/21. He developed a wound dehiscence and underlying infection requiring re-do craniectomy 11/16/21 with evacuation of epidural hematoma 11/17/21. Then underwent right allographic cranioplasty 3/8/22.  unfortunately he developed recurrent infection, wound dehiscence requiring re-do craniectomy 7/9/22 with allograft cranioplasty 10/25/22. He is doing well. No new neurologic complaints except for ongoing decreased sense of smell and taste since 2022.        See Discussion    REVIEW OF SYSTEMS    Review of Systems   Constitutional:  Negative for fatigue.   HENT:  Negative for tinnitus.         Sense of smell is gone can smell things occasionally and his sense of smell has improved.    Eyes:  Negative for visual disturbance.   Gastrointestinal: Negative.    Genitourinary: Negative.    Musculoskeletal:  Negative for gait problem.   Neurological:  Negative for dizziness, speech difficulty, weakness, light-headedness, numbness and headaches.   Psychiatric/Behavioral:  Negative for confusion and sleep disturbance.        ROS obtained by MA. Reviewed. See HPI.     Meds/Allergies     Current Outpatient Medications   Medication Sig Dispense Refill    ascorbic acid (VITAMIN C) 250 mg tablet Take 1 tablet (250 mg total) by mouth daily Take  with Iron 180 tablet 1    Clindamycin Phosphate foam Apply 1 Dose topically 2 (two) times a day (Patient taking differently: Apply 1 Dose topically if needed) 50 g 5    halobetasol (ULTRAVATE) 0.05 % cream Apply topically 2 (two) times a day (Patient taking differently: Apply topically if needed) 50 g 5    levETIRAcetam (KEPPRA) 750 mg tablet TAKE 1 TABLET (750 MG TOTAL) BY MOUTH EVERY 12 (TWELVE) HOURS 180 tablet 1    lisinopril (ZESTRIL) 20 mg tablet TAKE 1 TABLET DAILY. 90 tablet 0    LORazepam (ATIVAN) 1 mg tablet Take 1 tablet (1 mg total) by mouth 3 (three) times a day as needed for anxiety (Patient taking differently: Take 1 mg by mouth if needed for anxiety) 60 tablet 0    metoprolol tartrate (LOPRESSOR) 25 mg tablet TAKE 1 TABLET EVERY 12 HOURS 180 tablet 0    omeprazole (PriLOSEC) 20 mg delayed release capsule Take 20 mg by mouth daily      acetaminophen (TYLENOL) 325 mg tablet Take 3 tablets (975 mg total) by mouth every 8 (eight) hours (Patient not taking: Reported on 1/17/2024)  0    docusate sodium (COLACE) 100 mg capsule Take 1 capsule (100 mg total) by mouth 2 (two) times a day (Patient not taking: Reported on 1/17/2024) 180 capsule 1    ferrous sulfate 324 (65 Fe) mg Take 1 tablet (324 mg total) by mouth 2 (two) times a day before meals Take with Vit C (Patient not taking: Reported on 1/17/2024) 180 tablet 1     No current facility-administered medications for this visit.       No Known Allergies    PAST HISTORY    Past Medical History:   Diagnosis Date    Anxiety     Cataracts, bilateral     Dysphagia 04/16/2021    GERD (gastroesophageal reflux disease)     Hematoma, subdural, with loss of consciousness, traumatic ([facility])     Hypertension     PONV (postoperative nausea and vomiting)     Seizures ([facility]) 8/4/2021    Status post insertion of percutaneous endoscopic gastrostomy (PEG) tube ([facility]) 05/19/2021       Past Surgical History:   Procedure Laterality Date    BRAIN HEMATOMA EVACUATION Right 4/15/2021     Procedure: Right CRANIOTOMY FOR SUBDURAL HEMATOMA;  Surgeon: Forest Arceo MD;  Location: [facility];  Service: Neurosurgery    BRAIN HEMATOMA EVACUATION Right 11/16/2021    Procedure: CRANIOTOMY FOR EVACUATION OF EPIDURAL HEMATOMA;  Surgeon: Forest Arceo MD;  Location: [facility];  Service: Neurosurgery    BRAIN HEMATOMA EVACUATION Right 11/16/2021    Procedure: Exploration and revision of right craniotomy incision with removal of cranial plate;  Surgeon: Forest Arceo MD;  Location: [facility];  Service: Neurosurgery    CRANIOPLASTY Right 7/19/2022    Procedure: Right re-opening of craniotomy incision, and debridement with craniectomy;  Surgeon: Forest Arceo MD;  Location[facility];  Service: Neurosurgery    CRANIOTOMY Right     HAND SURGERY Left     PEG TUBE PLACEMENT      PEG TUBE REMOVAL      SC RPLCMT BONE FLAP/PROSTHETIC PLATE SKULL Right 7/13/2021    Procedure: RIGHT AUTOLOGOUS CRANIOPLASTY;  Surgeon: Forest Arceo MD;  [facility];  Service: Neurosurgery    SC RPLCMT BONE FLAP/PROSTHETIC PLATE SKULL Right 3/8/2022    Procedure: right allographic CRANIOPLASTY;  Surgeon: Forest Arceo MD;  [facility];  Service: Neurosurgery    SC RPLCMT BONE FLAP/PROSTHETIC PLATE SKULL Right 10/25/2022    Procedure: RIGHT ALLOGRAFFIC CRANIOPLASTY;  Surgeon: Forest Arceo MD;  [facility];  Service: Neurosurgery    ROTATOR CUFF REPAIR Left        Social History     Tobacco Use    Smoking status: Never    Smokeless tobacco: Never   Vaping Use    Vaping status: Never Used   Substance Use Topics    Alcohol use: Yes     Alcohol/week: 6.0 standard drinks of alcohol     Types: 6 Cans of beer per week     Comment: 2-3 beers a day    Drug use: Never       Family History   Problem Relation Age of Onset    Heart disease Mother     Hypertension Father     Dementia Father          Above history personally reviewed.       EXAM    Vitals:Blood pressure 142/80, pulse 90, temperature 98.3 °F (36.8  "°C), temperature source Temporal, resp. rate 18, height 5' 10\" (1.778 m), weight 95.3 kg (210 lb), SpO2 99%.,Body mass index is 30.13 kg/m².     Physical Exam  Vitals reviewed.   Constitutional:       General: He is awake.      Appearance: Normal appearance.   HENT:      Head: Atraumatic.      Comments: Healed right cranioplasty, small right temporal divot  Eyes:      Extraocular Movements: EOM normal.      Conjunctiva/sclera: Conjunctivae normal.      Pupils: Pupils are equal, round, and reactive to light.   Cardiovascular:      Rate and Rhythm: Normal rate.   Pulmonary:      Effort: Pulmonary effort is normal.   Skin:     General: Skin is warm and dry.   Neurological:      Mental Status: He is alert and oriented to person, place, and time.      Motor: Motor strength is normal.     Coordination: Finger-Nose-Finger Test normal.      Gait: Gait is intact.      Deep Tendon Reflexes:      Reflex Scores:       Bicep reflexes are 2+ on the right side and 2+ on the left side.       Brachioradialis reflexes are 2+ on the right side and 2+ on the left side.       Patellar reflexes are 2+ on the right side and 2+ on the left side.  Psychiatric:         Attention and Perception: Attention and perception normal.         Mood and Affect: Mood and affect normal.         Speech: Speech normal.         Behavior: Behavior normal. Behavior is cooperative.         Thought Content: Thought content normal.         Cognition and Memory: Cognition and memory normal.         Judgment: Judgment normal.         Neurologic Exam     Mental Status   Oriented to person, place, and time.   Oriented to city.   Follows 2 step commands.   Attention: normal. Concentration: normal.   Speech: speech is normal   Level of consciousness: alert  Knowledge: good.   Able to name object. Normal comprehension.     Cranial Nerves     CN III, IV, VI   Pupils are equal, round, and reactive to light.  Extraocular motions are normal.   Right pupil: Shape: regular. " Reactivity: brisk. Consensual response: intact.   Left pupil: Shape: regular. Reactivity: brisk. Consensual response: intact.   CN III: no CN III palsy  CN VI: no CN VI palsy  Nystagmus: none   Ophthalmoparesis: none  Upgaze: normal  Downgaze: normal  Conjugate gaze: present    CN V   Facial sensation intact.     CN VII   Facial expression full, symmetric.     CN VIII   Hearing: intact    CN XI   Right trapezius strength: normal  Left trapezius strength: normal    CN XII   CN XII normal.     Motor Exam   Muscle bulk: normal  Overall muscle tone: normal  Right arm pronator drift: absent  Left arm pronator drift: absent    Strength   Strength 5/5 throughout.     Sensory Exam   Light touch normal.     Gait, Coordination, and Reflexes     Gait  Gait: normal    Coordination   Finger to nose coordination: normal    Tremor   Resting tremor: absent  Intention tremor: absent  Action tremor: absent    Reflexes   Right brachioradialis: 2+  Left brachioradialis: 2+  Right biceps: 2+  Left biceps: 2+  Right patellar: 2+  Left patellar: 2+  Right [place]: absent  Left [place]: absent  Right ankle clonus: absent  Left ankle clonus: absent        MEDICAL DECISION MAKING    Imaging Studies:     CT head wo contrast    Result Date: 1/8/2024  Narrative: CT BRAIN - WITHOUT CONTRAST INDICATION:   Z98.890: Other specified postprocedural states T84.7XXD: Infection and inflammatory reaction due to other internal orthopedic prosthetic devices, implants and grafts, subsequent encounter Q04.6: Congenital cerebral cysts. History of colloid cyst of third ventricle, status post craniectomy with infection. COMPARISON: CT head without contrast 11/25/2022, 10/25/2022 TECHNIQUE:  CT examination of the brain was performed.  Multiplanar 2D reformatted images were created from the source data. Radiation dose length product (DLP) for this visit:  907.37 mGy-cm .  This examination, like all CT scans performed in the [facility], was  performed utilizing techniques to minimize radiation dose exposure, including the use of iterative  reconstruction and automated exposure control. IMAGE QUALITY:  Diagnostic. FINDINGS: PARENCHYMA: Postsurgical changes of right hemicraniectomy and cranioplasty with stable underlying dural thickening. Previously noted extra-axial collection along the right cerebral convexity deep to the cranioplasty flap appears slightly improved. Chronic encephalomalacia involving the bilateral frontal and right temporal lobes. Decreased attenuation is noted in periventricular and subcortical white matter demonstrating an appearance that is statistically most likely to represent mild microangiopathic change; this appearance is similar when compared to most recent prior examination. No CT signs of acute infarction.  No mass effect or midline shift.  No acute parenchymal hemorrhage. Moderate atherosclerotic calcification of the intracranial carotid arteries. VENTRICLES AND EXTRA-AXIAL SPACES: Small extra-axial fluid collection seen outside of the right-sided duraplasty measuring up to 0.2 cm (series 400, image 70), slightly decreased from prior 1/25/2022 study. Stable 0.4 cm colloid cyst of the third ventricle (series 2, image 26). Stable appearance of ventricles with mild ex vacuo dilatation of the right lateral ventricle. VISUALIZED ORBITS: Status post bilateral lens surgery. PARANASAL SINUSES: Mild mucosal thickening of bilateral maxillary sinus with mucous retention cyst. CALVARIUM AND EXTRACRANIAL SOFT TISSUES: Stable appearance of right-sided hemicraniectomy with cranioplasty.     Impression: 1. Stable postsurgical changes of right hemicraniectomy with cranioplasty. Interval decrease in small right-sided extra-axial collection along the right cerebral convexity deep to the cranioplasty flap. 2. Stable 0.4 cm colloid cyst of the third ventricle. 3. Stable mild microangiopathic changes. Resident: COCO BOLDEN I, the attending  radiologist, have reviewed the images and agree with the final report above. Workstation performed: [id number]       I have personally reviewed pertinent reports.   and I have personally reviewed pertinent films in PACS

## 2024-02-29 DIAGNOSIS — F41.9 ANXIETY: ICD-10-CM

## 2024-02-29 DIAGNOSIS — I10 ESSENTIAL HYPERTENSION: ICD-10-CM

## 2024-02-29 RX ORDER — LORAZEPAM 1 MG/1
1 TABLET ORAL 3 TIMES DAILY PRN
Qty: 60 TABLET | Refills: 0 | Status: SHIPPED | OUTPATIENT
Start: 2024-02-29

## 2024-02-29 RX ORDER — LISINOPRIL 20 MG/1
20 TABLET ORAL DAILY
Qty: 90 TABLET | Refills: 1 | Status: SHIPPED | OUTPATIENT
Start: 2024-02-29

## 2024-04-08 ENCOUNTER — APPOINTMENT (OUTPATIENT)
Dept: LAB | Facility: CLINIC | Age: 64
End: 2024-04-08
Payer: COMMERCIAL

## 2024-04-08 DIAGNOSIS — R73.03 PREDIABETES: ICD-10-CM

## 2024-04-08 DIAGNOSIS — D50.8 IRON DEFICIENCY ANEMIA SECONDARY TO INADEQUATE DIETARY IRON INTAKE: ICD-10-CM

## 2024-04-08 LAB
BASOPHILS # BLD AUTO: 0.03 THOUSANDS/ÂΜL (ref 0–0.1)
BASOPHILS NFR BLD AUTO: 1 % (ref 0–1)
EOSINOPHIL # BLD AUTO: 0.1 THOUSAND/ÂΜL (ref 0–0.61)
EOSINOPHIL NFR BLD AUTO: 2 % (ref 0–6)
ERYTHROCYTE [DISTWIDTH] IN BLOOD BY AUTOMATED COUNT: 19.7 % (ref 11.6–15.1)
EST. AVERAGE GLUCOSE BLD GHB EST-MCNC: 123 MG/DL
FERRITIN SERPL-MCNC: 12 NG/ML (ref 24–336)
HBA1C MFR BLD: 5.9 %
HCT VFR BLD AUTO: 40.1 % (ref 36.5–49.3)
HGB BLD-MCNC: 11.2 G/DL (ref 12–17)
IMM GRANULOCYTES # BLD AUTO: 0.01 THOUSAND/UL (ref 0–0.2)
IMM GRANULOCYTES NFR BLD AUTO: 0 % (ref 0–2)
IRON SATN MFR SERPL: 5 % (ref 15–50)
IRON SERPL-MCNC: 33 UG/DL (ref 50–212)
LYMPHOCYTES # BLD AUTO: 1.34 THOUSANDS/ÂΜL (ref 0.6–4.47)
LYMPHOCYTES NFR BLD AUTO: 24 % (ref 14–44)
MCH RBC QN AUTO: 22.2 PG (ref 26.8–34.3)
MCHC RBC AUTO-ENTMCNC: 27.9 G/DL (ref 31.4–37.4)
MCV RBC AUTO: 79 FL (ref 82–98)
MONOCYTES # BLD AUTO: 0.61 THOUSAND/ÂΜL (ref 0.17–1.22)
MONOCYTES NFR BLD AUTO: 11 % (ref 4–12)
NEUTROPHILS # BLD AUTO: 3.55 THOUSANDS/ÂΜL (ref 1.85–7.62)
NEUTS SEG NFR BLD AUTO: 62 % (ref 43–75)
NRBC BLD AUTO-RTO: 0 /100 WBCS
PLATELET # BLD AUTO: 178 THOUSANDS/UL (ref 149–390)
RBC # BLD AUTO: 5.05 MILLION/UL (ref 3.88–5.62)
TIBC SERPL-MCNC: 713 UG/DL (ref 250–450)
UIBC SERPL-MCNC: 680 UG/DL (ref 155–355)
WBC # BLD AUTO: 5.64 THOUSAND/UL (ref 4.31–10.16)

## 2024-04-08 PROCEDURE — 82728 ASSAY OF FERRITIN: CPT

## 2024-04-08 PROCEDURE — 36415 COLL VENOUS BLD VENIPUNCTURE: CPT

## 2024-04-08 PROCEDURE — 85025 COMPLETE CBC W/AUTO DIFF WBC: CPT

## 2024-04-08 PROCEDURE — 83550 IRON BINDING TEST: CPT

## 2024-04-08 PROCEDURE — 83540 ASSAY OF IRON: CPT

## 2024-04-08 PROCEDURE — 83036 HEMOGLOBIN GLYCOSYLATED A1C: CPT

## 2024-04-11 ENCOUNTER — OFFICE VISIT (OUTPATIENT)
Dept: FAMILY MEDICINE CLINIC | Facility: CLINIC | Age: 64
End: 2024-04-11

## 2024-04-11 VITALS
WEIGHT: 208 LBS | BODY MASS INDEX: 29.78 KG/M2 | OXYGEN SATURATION: 98 % | HEART RATE: 84 BPM | HEIGHT: 70 IN | TEMPERATURE: 98.2 F | SYSTOLIC BLOOD PRESSURE: 142 MMHG | DIASTOLIC BLOOD PRESSURE: 78 MMHG

## 2024-04-11 DIAGNOSIS — Z13.29 SCREENING FOR THYROID DISORDER: ICD-10-CM

## 2024-04-11 DIAGNOSIS — Z12.5 SCREENING FOR MALIGNANT NEOPLASM OF PROSTATE: ICD-10-CM

## 2024-04-11 DIAGNOSIS — I10 ESSENTIAL HYPERTENSION: Primary | ICD-10-CM

## 2024-04-11 DIAGNOSIS — E61.1 IRON DEFICIENCY: ICD-10-CM

## 2024-04-11 DIAGNOSIS — Z13.0 SCREENING FOR DEFICIENCY ANEMIA: ICD-10-CM

## 2024-04-11 DIAGNOSIS — Z13.220 SCREENING FOR LIPID DISORDERS: ICD-10-CM

## 2024-04-11 DIAGNOSIS — R73.03 PREDIABETES: ICD-10-CM

## 2024-04-11 NOTE — PROGRESS NOTES
ADULT ANNUAL PHYSICAL  [facility] CARE    NAME: Stan Guy  AGE: 63 y.o. SEX: male  : 1960     DATE: 2024     Assessment and Plan:     Problem List Items Addressed This Visit     Essential hypertension - Primary   Other Visit Diagnoses     Screening for thyroid disorder        Relevant Orders    TSH, 3rd generation with Free T4 reflex    Screening for deficiency anemia        Relevant Orders    CBC and differential    Screening for lipid disorders        Relevant Orders    Lipid Panel with Direct LDL reflex    Screening for malignant neoplasm of prostate        Relevant Orders    PSA, Total Screen    Prediabetes        Relevant Orders    Comprehensive metabolic panel    Hemoglobin A1C    Iron deficiency        Relevant Orders    Ambulatory Referral to Hematology / Oncology          Immunizations and preventive care screenings were discussed with patient today. Appropriate education was printed on patient's after visit summary.      Counseling:  Alcohol/drug use: discussed moderation in alcohol intake, the recommendations for healthy alcohol use, and avoidance of illicit drug use.  Dental Health: discussed importance of regular tooth brushing, flossing, and dental visits.  Injury prevention: discussed safety/seat belts, safety helmets, smoke detectors, carbon dioxide detectors, and smoking near bedding or upholstery.  Sexual health: discussed sexually transmitted diseases, partner selection, use of condoms, avoidance of unintended pregnancy, and contraceptive alternatives.  Exercise: the importance of regular exercise/physical activity was discussed. Recommend exercise 3-5 times per week for at least 30 minutes.       Depression Screening and Follow-up Plan: Patient was screened for depression during today's encounter. They screened negative with a PHQ-2 score of 0.        Return in about 6 months (around 10/11/2024).     Chief  Complaint:     Chief Complaint   Patient presents with   • Annual Exam     Over due for colon screening. (CRC required)      History of Present Illness:     Adult Annual Physical   Patient here for a comprehensive physical exam. The patient reports no problems.    Iron deficiency remains despite Pt having a high Fe+ diet and taking PO. He states with PO reported gastric issues; diarrhea. Pt continues to have reduced stamina with exercise, may be r/t decondition verse Fe deficiency and macrocytosis.     Hypertension  This is a chronic problem. The problem is controlled. Associated symptoms include anxiety. There are no associated agents to hypertension. Risk factors for coronary artery disease include male gender. Past treatments include ACE inhibitors and beta blockers. The current treatment provides moderate improvement. Compliance problems include exercise.    Anxiety  Presents for follow-up visit. Symptoms include nervous/anxious behavior. Symptoms occur most days. The severity of symptoms is moderate. The quality of sleep is fair. Nighttime awakenings: none.     Compliance with medications is %.         Diet and Physical Activity  Diet/Nutrition: well balanced diet, limited junk food, high fat diet, intermittent fasting, consuming 3-5 servings of fruits/vegetables daily, and higher iron diet .   Exercise:  cleared to exercise daily by neurosurgery and uses resistance bands .      Depression Screening  PHQ-2/9 Depression Screening    Little interest or pleasure in doing things: 0 - not at all  Feeling down, depressed, or hopeless: 0 - not at all  Trouble falling or staying asleep, or sleeping too much: 0 - not at all  Feeling tired or having little energy: 0 - not at all  Poor appetite or overeatin - not at all  Feeling bad about yourself - or that you are a failure or have let yourself or your family down: 0 - not at all  Trouble concentrating on things, such as reading the newspaper or watching  television: 0 - not at all  Moving or speaking so slowly that other people could have noticed. Or the opposite - being so fidgety or restless that you have been moving around a lot more than usual: 0 - not at all  Thoughts that you would be better off dead, or of hurting yourself in some way: 0 - not at all  PHQ-2 Score: 0  PHQ-2 Interpretation: Negative depression screen  PHQ-9 Score: 0  PHQ-9 Interpretation: No or Minimal depression       General Health  Sleep: gets 7-8 hours of sleep on average.   Hearing: normal - bilateral.  Vision: no vision problems and most recent eye exam <1 year ago.   Dental: no dental visits for >1 year, brushes teeth twice daily, and does not floss.        Health  Symptoms include: none    Advanced Care Planning  Do you have an advanced directive? yes  Do you have a durable medical power of ? yes  ACP document given to patient? no     Review of Systems:     Review of Systems   Psychiatric/Behavioral:  The patient is nervous/anxious.    All other systems reviewed and are negative.     Past Medical History:     Past Medical History:   Diagnosis Date   • Anxiety    • Cataracts, bilateral    • Dysphagia 04/16/2021   • GERD (gastroesophageal reflux disease)    • Hematoma, subdural, with loss of consciousness, traumatic (HCC)    • Hypertension    • PONV (postoperative nausea and vomiting)    • Seizures ([facility]) 8/4/2021   • Status post insertion of percutaneous endoscopic gastrostomy (PEG) tube ([facility]) 05/19/2021      Past Surgical History:     Past Surgical History:   Procedure Laterality Date   • BRAIN HEMATOMA EVACUATION Right 4/15/2021    Procedure: Right CRANIOTOMY FOR SUBDURAL HEMATOMA;  Surgeon: Forest Arceo MD;  [facility];  Service: Neurosurgery   • BRAIN HEMATOMA EVACUATION Right 11/16/2021    Procedure: CRANIOTOMY FOR EVACUATION OF EPIDURAL HEMATOMA;  Surgeon: Forest Arceo MD;  [facility];  Service: Neurosurgery   • BRAIN HEMATOMA EVACUATION Right  11/16/2021    Procedure: Exploration and revision of right craniotomy incision with removal of cranial plate;  Surgeon: Forest Arceo MD;  [facility];  Service: Neurosurgery   • CRANIOPLASTY Right 7/19/2022    Procedure: Right re-opening of craniotomy incision, and debridement with craniectomy;  Surgeon: Forest Arceo MD;  [facility];  Service: Neurosurgery   • CRANIOTOMY Right    • HAND SURGERY Left    • PEG TUBE PLACEMENT     • PEG TUBE REMOVAL     • MN RPLCMT BONE FLAP/PROSTHETIC PLATE SKULL Right 7/13/2021    Procedure: RIGHT AUTOLOGOUS CRANIOPLASTY;  Surgeon: Forest Arceo MD;  [facility];  Service: Neurosurgery   • MN RPLCMT BONE FLAP/PROSTHETIC PLATE SKULL Right 3/8/2022    Procedure: right allographic CRANIOPLASTY;  Surgeon: Forest Arceo MD;  [facility];  Service: Neurosurgery   • MN RPLCMT BONE FLAP/PROSTHETIC PLATE SKULL Right 10/25/2022    Procedure: RIGHT ALLOGRAFFIC CRANIOPLASTY;  Surgeon: Forest Arceo MD;  [facility];  Service: Neurosurgery   • ROTATOR CUFF REPAIR Left       Family History:     Family History   Problem Relation Age of Onset   • Heart disease Mother    • Hypertension Father    • Dementia Father       Social History:     Social History     Socioeconomic History   • Marital status: /Civil Union     Spouse name: None   • Number of children: None   • Years of education: None   • Highest education level: None   Occupational History   • Occupation: Retired   Tobacco Use   • Smoking status: Never   • Smokeless tobacco: Never   Vaping Use   • Vaping status: Never Used   Substance and Sexual Activity   • Alcohol use: Yes     Alcohol/week: 6.0 standard drinks of alcohol     Types: 6 Cans of beer per week     Comment: 2-3 beers a day   • Drug use: Never   • Sexual activity: Yes     Partners: Female     Birth control/protection: None   Other Topics Concern   • None   Social History Narrative   • None     Social Determinants of Health      Financial Resource Strain: Not on file   Food Insecurity: No Food Insecurity (7/20/2022)    Hunger Vital Sign    • Worried About Running Out of Food in the Last Year: Never true    • Ran Out of Food in the Last Year: Never true   Transportation Needs: No Transportation Needs (7/20/2022)    PRAPARE - Transportation    • Lack of Transportation (Medical): No    • Lack of Transportation (Non-Medical): No   Physical Activity: Not on file   Stress: Not on file   Social Connections: Not on file   Intimate Partner Violence: Not on file   Housing Stability: Low Risk  (7/20/2022)    Housing Stability Vital Sign    • Unable to Pay for Housing in the Last Year: No    • Number of Places Lived in the Last Year: 1    • Unstable Housing in the Last Year: No      Current Medications:     Current Outpatient Medications   Medication Sig Dispense Refill   • ascorbic acid (VITAMIN C) 250 mg tablet Take 1 tablet (250 mg total) by mouth daily Take with Iron 180 tablet 1   • Clindamycin Phosphate foam Apply 1 Dose topically 2 (two) times a day (Patient taking differently: Apply 1 Dose topically if needed) 50 g 5   • halobetasol (ULTRAVATE) 0.05 % cream Apply topically 2 (two) times a day (Patient taking differently: Apply topically if needed) 50 g 5   • levETIRAcetam (KEPPRA) 750 mg tablet TAKE 1 TABLET (750 MG TOTAL) BY MOUTH EVERY 12 (TWELVE) HOURS 180 tablet 1   • lisinopril (ZESTRIL) 20 mg tablet Take 1 tablet (20 mg total) by mouth daily 90 tablet 1   • LORazepam (ATIVAN) 1 mg tablet Take 1 tablet (1 mg total) by mouth 3 (three) times a day as needed for anxiety 60 tablet 0   • metoprolol tartrate (LOPRESSOR) 25 mg tablet Take 1 tablet (25 mg total) by mouth every 12 (twelve) hours 180 tablet 1   • omeprazole (PriLOSEC) 20 mg delayed release capsule Take 20 mg by mouth daily     • acetaminophen (TYLENOL) 325 mg tablet Take 3 tablets (975 mg total) by mouth every 8 (eight) hours (Patient not taking: Reported on 1/17/2024)  0   •  "ferrous sulfate 324 (65 Fe) mg Take 1 tablet (324 mg total) by mouth 2 (two) times a day before meals Take with Vit C (Patient not taking: Reported on 4/11/2024) 180 tablet 1     No current facility-administered medications for this visit.      Allergies:     No Known Allergies   Physical Exam:     /78   Pulse 84   Temp 98.2 °F (36.8 °C) (Tympanic)   Ht 5' 10\" (1.778 m)   Wt 94.3 kg (208 lb)   SpO2 98%   BMI 29.84 kg/m²     Physical Exam  Vitals and nursing note reviewed.   Constitutional:       Appearance: Normal appearance. He is well-developed.   HENT:      Head: Normocephalic and atraumatic.      Right Ear: External ear normal.      Left Ear: External ear normal.      Nose: Nose normal.   Eyes:      Conjunctiva/sclera: Conjunctivae normal.      Pupils: Pupils are equal, round, and reactive to light.   Cardiovascular:      Rate and Rhythm: Normal rate and regular rhythm.      Heart sounds: Normal heart sounds.   Pulmonary:      Effort: Pulmonary effort is normal.      Breath sounds: Normal breath sounds.   Abdominal:      General: Bowel sounds are normal.      Palpations: Abdomen is soft.   Genitourinary:     Comments: Deferred  Musculoskeletal:         General: Normal range of motion.      Cervical back: Normal range of motion and neck supple.   Skin:     General: Skin is warm and dry.      Capillary Refill: Capillary refill takes less than 2 seconds.   Neurological:      Mental Status: He is alert and oriented to person, place, and time.   Psychiatric:         Behavior: Behavior normal.         Thought Content: Thought content normal.         Judgment: Judgment normal.        Appointment on 04/08/2024   Component Date Value Ref Range Status   • Hemoglobin A1C 04/08/2024 5.9 (H)  Normal 4.0-5.6%; PreDiabetic 5.7-6.4%; Diabetic >=6.5%; Glycemic control for adults with diabetes <7.0% % Final   • EAG 04/08/2024 123  mg/dl Final   • WBC 04/08/2024 5.64  4.31 - 10.16 Thousand/uL Final   • RBC 04/08/2024 " 5.05  3.88 - 5.62 Million/uL Final   • Hemoglobin 04/08/2024 11.2 (L)  12.0 - 17.0 g/dL Final   • Hematocrit 04/08/2024 40.1  36.5 - 49.3 % Final   • MCV 04/08/2024 79 (L)  82 - 98 fL Final   • MCH 04/08/2024 22.2 (L)  26.8 - 34.3 pg Final   • MCHC 04/08/2024 27.9 (L)  31.4 - 37.4 g/dL Final   • RDW 04/08/2024 19.7 (H)  11.6 - 15.1 % Final   • Platelets 04/08/2024 178  149 - 390 Thousands/uL Final   • nRBC 04/08/2024 0  /100 WBCs Final   • Segmented % 04/08/2024 62  43 - 75 % Final   • Immature Grans % 04/08/2024 0  0 - 2 % Final   • Lymphocytes % 04/08/2024 24  14 - 44 % Final   • Monocytes % 04/08/2024 11  4 - 12 % Final   • Eosinophils Relative 04/08/2024 2  0 - 6 % Final   • Basophils Relative 04/08/2024 1  0 - 1 % Final   • Absolute Neutrophils 04/08/2024 3.55  1.85 - 7.62 Thousands/µL Final   • Absolute Immature Grans 04/08/2024 0.01  0.00 - 0.20 Thousand/uL Final   • Absolute Lymphocytes 04/08/2024 1.34  0.60 - 4.47 Thousands/µL Final   • Absolute Monocytes 04/08/2024 0.61  0.17 - 1.22 Thousand/µL Final   • Eosinophils Absolute 04/08/2024 0.10  0.00 - 0.61 Thousand/µL Final   • Basophils Absolute 04/08/2024 0.03  0.00 - 0.10 Thousands/µL Final   • Iron Saturation 04/08/2024 5 (L)  15 - 50 % Final   • TIBC 04/08/2024 713 (H)  250 - 450 ug/dL Final   • Iron 04/08/2024 33 (L)  50 - 212 ug/dL Final    Patients treated with metal-binding drugs (ie. Deferoxamine) may have depressed iron values.   • UIBC 04/08/2024 680 (H)  155 - 355 ug/dL Final   • Ferritin 04/08/2024 12 (L)  24 - 336 ng/mL Final         ANIA Ruiz  [facility]

## 2024-04-12 ENCOUNTER — TELEPHONE (OUTPATIENT)
Dept: HEMATOLOGY ONCOLOGY | Facility: CLINIC | Age: 64
End: 2024-04-12

## 2024-04-12 NOTE — TELEPHONE ENCOUNTER
I called Stan in response to a referral that was received for patient to establish care with Hematology.     Outreach was made to schedule a consultation.    Faby is going to give us a call back to schedule, she I currently not home.   The referral has been closed.

## 2024-05-06 DIAGNOSIS — F41.9 ANXIETY: ICD-10-CM

## 2024-05-07 RX ORDER — LORAZEPAM 1 MG/1
1 TABLET ORAL 3 TIMES DAILY PRN
Qty: 60 TABLET | Refills: 0 | Status: SHIPPED | OUTPATIENT
Start: 2024-05-07

## 2024-05-20 DIAGNOSIS — Z98.890 POST-OPERATIVE STATE: ICD-10-CM

## 2024-05-20 RX ORDER — LEVETIRACETAM 750 MG/1
750 TABLET ORAL EVERY 12 HOURS SCHEDULED
Qty: 180 TABLET | Refills: 1 | OUTPATIENT
Start: 2024-05-20

## 2024-05-20 NOTE — TELEPHONE ENCOUNTER
Hello Good Morning,     Patient and wife have called back and schedule for the first available OVS with  per pt only in [place] I did offer sooner in [facility] but declined due to distance, I placed on the waiting list and patient is asking if you can  please refill his medications ?    Thank you in advance,     [place]

## 2024-05-21 NOTE — TELEPHONE ENCOUNTER
October 2024 follow-up is too far away; please have the patient schedule with Keiry KUMAR) for a follow-up visit.

## 2024-05-22 RX ORDER — LEVETIRACETAM 750 MG/1
750 TABLET ORAL EVERY 12 HOURS SCHEDULED
Qty: 180 TABLET | Refills: 1 | Status: SHIPPED | OUTPATIENT
Start: 2024-05-22

## 2024-05-23 NOTE — TELEPHONE ENCOUNTER
Called patient as requested. Accepted open slot on 5/31 at 1015am w/ Keiry Patient was out of town until, requested appt for next week.

## 2024-05-31 ENCOUNTER — OFFICE VISIT (OUTPATIENT)
Dept: NEUROLOGY | Facility: CLINIC | Age: 64
End: 2024-05-31
Payer: COMMERCIAL

## 2024-05-31 VITALS
WEIGHT: 213 LBS | HEIGHT: 70 IN | OXYGEN SATURATION: 98 % | DIASTOLIC BLOOD PRESSURE: 68 MMHG | SYSTOLIC BLOOD PRESSURE: 130 MMHG | TEMPERATURE: 98.7 F | HEART RATE: 88 BPM | BODY MASS INDEX: 30.49 KG/M2

## 2024-05-31 DIAGNOSIS — R56.9 SEIZURE (HCC): Primary | Chronic | ICD-10-CM

## 2024-05-31 DIAGNOSIS — Z98.890 STATUS POST CRANIECTOMY: Chronic | ICD-10-CM

## 2024-05-31 DIAGNOSIS — Q04.6 COLLOID CYST OF THIRD VENTRICLE (HCC): ICD-10-CM

## 2024-05-31 PROCEDURE — 99214 OFFICE O/P EST MOD 30 MIN: CPT | Performed by: PHYSICIAN ASSISTANT

## 2024-05-31 NOTE — PROGRESS NOTES
Patient ID: Stan Guy is a 63 y.o. male.    Assessment:  Mr. Guy is a 63 y.o. male with single generalized tonic clonic seizure in the setting of prior TBI/SDH and 3 weeks after craniotomy in 2021.  He had a prior seizure years ago, likely due to benzodiazepine withdrawal.     He was last seen nearly 2 years ago.  He has not had any seizures.  He had 1 additional surgery (cranioplasty) just following his last visit here in 2022.  He follows with neurosurgery regularly, now to monitor a 3rd ventricle colloid cyst.  He was previously unable to get EEGs due to multiple cranial surgeries.    We discussed it has been nearly 3 years since the seizure.  We discussed we could consider trying to come off levetiracetam, however his prior brain injury and multiple surgeries would put him at risk for seizures in the future.  Patient feels he is tolerating levetiracetam well and right now is most comfortable remaining on the medication.  If withdrawal of levetiracetam is considered in the future, would obtain an EEG to estimate risk.    Plan:  - continue levetiracetam 750mg BID  - continue to follow with neurosurgery as indicated  - call the office if any seizures or problems with the medication   - return in 1 year or sooner if needed      Diagnoses and all orders for this visit:    Seizure (HCC)    Status post craniectomy    Colloid cyst of third ventricle (HCC)           Subjective:    HPI    Stan Guy is returning to the [facility] for follow up.      Interval Events:   Seizures since last visit: None     Patient was last seen 9/15/2022.  At that time, decision was made to continue levetiracetam due to patient planning another cranioplasty in the future.  Also, could not have EEG due to recent cranial surgeries.  It was discussed that if withdrawal of levetiracetam would be considered in the future, checking EEG to estimate risk would be advised.    Today, patient  presents with his wife.  He reports he has been doing well since his last visit.  He has been compliant with levetiracetam and tolerates well without apparent side effects.  He denies any events concerning for seizure.  He feels he is back to his old self except he lost some of his sense of taste and smell after the second surgery.  It has recovered a little.      He has continued to follow with neurosurgery.  Following his last visit with us, he had allograft cranioplasty 10/25/22.  He follows with neurosurgery for monitoring of a 3rd ventricle colloid cyst.  Plan for yearly imaging (CT) at this time.       Current AEDs:  Levetiracetam 750 mg b.i.d.  Medication side effects: None  Medication adherence: Yes     Event/Seizure semiology:  GTC with tongue biting lasting < 5 min. With post ictal state. Single event 3 weeks after cranioplasty.    Prior history:  Stan suffered a traumatic brain injury on April 4th 2021 after helping his neighbor to move a couch when he fell backwards hitting the back of his head on a concrete floor causing him to lose consciousness. Patient was taken to a non Trauma Center where CT head showed a subdural hematoma. No intervention was done. Hospital course was complicated by difficulty swallowing. On April 14 patient was Admitted to rehab for TBI patient's however upon evaluation the next morning he was determined to need transfer of care to Neurosurgery. At this time patient was transferred to [facility] and immediately underwent a craniotomy with Dr. Arceo. Patient returned to rehab at [facility] and on the 13th of July he had a cranioplasty. After the cranioplasty patient states that he lost the sense of taste and smell which he is still trying to recover. Post cranioplasty patient was placed on prophylactic Keppra for 1 week which he completed course.      On August 4th patient woke up with symptoms of nausea (no vomiting). Patient had poor food and water intake that  "day due to feeling unwell. At around 10:30 p.m. that night patient was in bed with his wife when she noticed he sat up on the edge of the bed, became stiff and then \"flipped backwards\" and started to shake throughout his body in a rhythmic fashion. Patient bit his tongue. Did denies loss of bowel or bladder continence. Wife states that after the seizure he was making groaning noises.  The seizure episode lasted a couple minutes. The wife does not believe this was more than 5 minutes. Stan has no recollection of the event. The next thing he remembers was being put on the stretcher and into the ambulance. Wife is not sure if he was given abortive medication at that time. Patient taken to the ED where CT head did not show any changes. Patient was started on 500 mg of Keppra. In terms of his drinking history patient used to drink 3-4 beers daily On a regular basis. Since his accident patient had resumed drinking. He states that he had a drink the day prior to the seizure. The week prior he had had occasional beer sporadically.     In the past patient had been on lorazepam from approximately 2011 to 2017. This was due to a history of anxiety. Patient developed tolerance to it and at 1 point was up to 8 mg a day. He was trying to develop a plan with his prescriber to taper down and off lorazepam however during this time his prescriber lost his license due to overprescribing practices. 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 As he was out of Ativan rx, he tried to substitute with alcohol after being off lorazepam for a couple of days. Patient then developed symptoms of withdrawal including sweating and anxiety. He initially went to the ED and no intervention was done. The following day patient had a seizure. He returned to emergency department and had a 2nd seizure involving full body convulsions and tongue biting. After this patient was referred to an outpatient withdrawal clinic. He has " "been off lorazepam since then.      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  No significant mass effect upon the brain parenchyma or shift present. Stable encephalomalacia and gliosis within the inferior frontal lobes.     History Reviewed:   The following were reviewed and updated as appropriate: allergies, current medications, past family history, past medical history, past social history, past surgical history and problem list and ros     Psychiatric History:  Anxiety     Social History:   Driving: yes   Lives Alone: No  Occupation: retired    The following portions of the patient's history were reviewed and updated as appropriate: current medications, past family history, past medical history, past social history, past surgical history, and problem list.       Objective:    Blood pressure 130/68, pulse 88, temperature 98.7 °F (37.1 °C), temperature source Temporal, height 5' 10\" (1.778 m), weight 96.6 kg (213 lb), SpO2 98%.    Physical Exam  Constitutional:       Appearance: Normal appearance.   HENT:      Head:      Comments: Right temporal divot from prior craniotomy    Eyes:      Extraocular Movements: EOM normal.      Pupils: Pupils are equal, round, and reactive to light.   Neurological:      Mental Status: He is alert.      Motor: Motor strength is normal.  Psychiatric:         Mood and Affect: Mood normal.         Speech: Speech normal.         Behavior: Behavior normal.         Neurological Exam  Mental Status  Alert. Oriented to person, place, time and situation. Speech is normal. Language is fluent with no aphasia. Attention and concentration are normal.    Cranial " Nerves  CN II: Visual fields full to confrontation.  CN III, IV, VI: Extraocular movements intact bilaterally. Pupils equal round and reactive to light bilaterally.  CN V: Facial sensation is normal.  CN VII: Full and symmetric facial movement.  CN VIII: Hearing is normal.  CN IX, X: Palate elevates symmetrically  CN XI: Shoulder shrug strength is normal.  CN XII: Tongue midline without atrophy or fasciculations.    Motor   Normal muscle tone. Strength is 5/5 throughout all four extremities.    Sensory  Light touch is normal in upper and lower extremities.     Coordination  Right: Finger-to-nose normal.Left: Finger-to-nose normal.    Gait  Casual gait is normal including stance, stride, and arm swing.        ROS:    Review of Systems   Constitutional: Negative.    HENT: Negative.     Eyes: Negative.    Respiratory: Negative.     Cardiovascular: Negative.    Gastrointestinal: Negative.    Endocrine: Negative.    Genitourinary: Negative.    Musculoskeletal: Negative.    Skin: Negative.    Allergic/Immunologic: Negative.    Neurological: Negative.    Hematological: Negative.    Psychiatric/Behavioral: Negative.       I personally reviewed and updated the ROS as appropriate

## 2024-05-31 NOTE — PATIENT INSTRUCTIONS
Continue levetiracetam 750mg twice a day  Let us know if any events concerning for seizure or problems with your medication  Follow up in 1 year or sooner if needed

## 2024-07-08 DIAGNOSIS — F41.9 ANXIETY: ICD-10-CM

## 2024-07-08 DIAGNOSIS — L40.9 PSORIASIS: ICD-10-CM

## 2024-07-09 RX ORDER — LORAZEPAM 1 MG/1
1 TABLET ORAL 3 TIMES DAILY PRN
Qty: 60 TABLET | Refills: 0 | Status: SHIPPED | OUTPATIENT
Start: 2024-07-09

## 2024-08-28 DIAGNOSIS — F41.9 ANXIETY: ICD-10-CM

## 2024-08-28 DIAGNOSIS — I10 ESSENTIAL HYPERTENSION: ICD-10-CM

## 2024-08-28 RX ORDER — LISINOPRIL 20 MG/1
20 TABLET ORAL DAILY
Qty: 90 TABLET | Refills: 1 | Status: SHIPPED | OUTPATIENT
Start: 2024-08-28

## 2024-08-28 RX ORDER — LORAZEPAM 1 MG/1
1 TABLET ORAL 3 TIMES DAILY PRN
Qty: 60 TABLET | Refills: 0 | Status: SHIPPED | OUTPATIENT
Start: 2024-08-28

## 2024-08-28 RX ORDER — METOPROLOL TARTRATE 25 MG/1
25 TABLET, FILM COATED ORAL EVERY 12 HOURS
Qty: 180 TABLET | Refills: 1 | Status: SHIPPED | OUTPATIENT
Start: 2024-08-28

## 2024-10-07 DIAGNOSIS — F41.9 ANXIETY: ICD-10-CM

## 2024-10-08 RX ORDER — LORAZEPAM 1 MG/1
1 TABLET ORAL 3 TIMES DAILY PRN
Qty: 90 TABLET | Refills: 0 | Status: SHIPPED | OUTPATIENT
Start: 2024-10-08

## 2024-11-24 DIAGNOSIS — Z98.890 POST-OPERATIVE STATE: ICD-10-CM

## 2024-11-25 RX ORDER — LEVETIRACETAM 750 MG/1
750 TABLET ORAL EVERY 12 HOURS SCHEDULED
Qty: 180 TABLET | Refills: 3 | Status: SHIPPED | OUTPATIENT
Start: 2024-11-25

## 2025-01-14 ENCOUNTER — HOSPITAL ENCOUNTER (OUTPATIENT)
Dept: CT IMAGING | Facility: HOSPITAL | Age: 65
Discharge: HOME | End: 2025-01-14
Payer: COMMERCIAL

## 2025-01-14 DIAGNOSIS — Q04.6 COLLOID CYST OF THIRD VENTRICLE (HCC): ICD-10-CM

## 2025-01-14 PROCEDURE — G1004 CDSM NDSC: HCPCS

## 2025-01-14 PROCEDURE — 70450 CT HEAD/BRAIN W/O DYE: CPT

## 2025-01-15 ENCOUNTER — TELEPHONE (OUTPATIENT)
Age: 65
End: 2025-01-15

## 2025-01-15 NOTE — TELEPHONE ENCOUNTER
Pt called in with wife to see if appt on 1/20  @ 3:00 with Josefina could be changed over to virtual.     Please assist and make pt aware if appt can be changed.     Thank you.

## 2025-01-17 NOTE — ASSESSMENT & PLAN NOTE
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 Arceo from the below surgeries, cyst was first noted on imaging at that time.   Hx of 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 4/15/21 --> s/p cranioplasty 7/13/21.  Developed wound dehiscence and underlying infection requiring re-do craniectomy 11/16/21 with evacuation of epidural hematoma 11/17/21 --> s/p right allographic cranioplasty 3/8/22.    Developed recurrent infection, wound dehiscence requiring re-do craniectomy 7/9/22 --> s/p allograft cranioplasty 10/25/22   No new neurologic complaints, notes ongoing issues with sense of smell and taste since surgery in 2022  Exam limited via video: No dysarthria, face is symmetric, tongue midline, EOMI, MCKEON x4    Imaging:  CT head 1/14/25: Stable small colloid cyst. No hydrocephalus. Stable postsurgical changes status post right craniectomy and cranioplasty. Stable bifrontal and right temporal encephalomalacia    Plan:  Reviewed images with patient and wife. Stable, small colloid cyst.   No neurosurgery intervention recommended.   These are benign, congenital cystic fluid collections.   It is possible that they may grow and if that occurs, can cause hydrocephalus/obstruction in the flow of CSF.   Reviewed red flag s/s.  Follow up in 1 year with CT head as joint appointment with Dr. Arceo  Call sooner with any questions or concerns.     Orders:    CT head wo contrast; Future

## 2025-01-20 ENCOUNTER — TELEMEDICINE (OUTPATIENT)
Dept: NEUROSURGERY | Facility: CLINIC | Age: 65
End: 2025-01-20
Payer: COMMERCIAL

## 2025-01-20 DIAGNOSIS — Q04.6 COLLOID CYST OF THIRD VENTRICLE (HCC): Primary | ICD-10-CM

## 2025-01-20 PROCEDURE — 98005 SYNCH AUDIO-VIDEO EST LOW 20: CPT | Performed by: PHYSICIAN ASSISTANT

## 2025-01-20 NOTE — PROGRESS NOTES
Virtual Regular Visit  Name: Stan Guy      : 1960      MRN: [id number]  Encounter Provider: Josefina Puckett PA-C  Encounter Date: 2025   Encounter department: [facility]      Verification of patient location:  Patient is located at {Amb Virtual Patient Location:[address]} in the following state in which I hold an active license {[facility] virtual patient location:[address]04} :Assessment & Plan        Encounter provider Josefina Puckett PA-C    The patient was identified by name and date of birth. Stan Guy was informed that this is a telemedicine visit and that the visit is being conducted through {AMB VIRTUAL VISIT MEDIUM:07535}.  {Telemedicine confidentiality :55472} {Telemedicine participants:51347}  He acknowledged consent and understanding of privacy and security of the video platform. The patient has agreed to participate and understands they can discontinue the visit at any time.    Patient is aware this is a billable service.     History of Present Illness {?Quick Links Encounters * My Last Note * Last Note in Specialty * Snapshot * Since Last Visit * History :74964}    HPI  Review of Systems    Objective {?Quick Links Trend Vitals * Enter New Vitals * Results Review * Timeline (Adult) * Labs * Imaging * Cardiology * Procedures * Lung Cancer Screening * Surgical eConsent :14169}  There were no vitals taken for this visit.    Physical Exam    Visit Time  Total Visit Duration: ***

## 2025-01-20 NOTE — PROGRESS NOTES
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 Arceo from the below surgeries, cyst was first noted on imaging at that time.   Hx of 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 4/15/21 --> s/p cranioplasty 21.  Developed wound dehiscence and underlying infection requiring re-do craniectomy 21 with evacuation of epidural hematoma 21 --> s/p right allographic cranioplasty 3/8/22.    Developed recurrent infection, wound dehiscence requiring re-do craniectomy 22 --> s/p allograft cranioplasty 10/25/22   No new neurologic complaints, notes ongoing issues with sense of smell and taste since surgery in   Exam limited via video: No dysarthria, face is symmetric, tongue midline, EOMI, MCKEON x4    Imaging:  CT head 25: Stable small colloid cyst. No hydrocephalus. Stable postsurgical changes status post right craniectomy and cranioplasty. Stable bifrontal and right temporal encephalomalacia    Plan:  Reviewed images with patient and wife. Stable, small colloid cyst.   No neurosurgery intervention recommended.   These are benign, congenital cystic fluid collections.   It is possible that they may grow and if that occurs, can cause hydrocephalus/obstruction in the flow of CSF.   Reviewed red flag s/s.  Follow up in 1 year with CT head as joint appointment with Dr. Arceo  Call sooner with any questions or concerns.     Orders:    CT head wo contrast; Future        Encounter provider Josefina Puckett PA-C    The patient was identified by name and date of  birth. Stan Guy was informed that this is a telemedicine visit and that the visit is being conducted through the Epic Embedded platform. He agrees to proceed..  My office door was closed. No one else was in the room.  He acknowledged consent and understanding of privacy and security of the video platform. The patient has agreed to participate and understands they can discontinue the visit at any time.    Patient is aware this is a billable service.     History of Present Illness     64 year old gentleman seen for 1 year follow up of a colloid cyst. This was noted on CT scan done during work up of a SDH and TBI in 2021. He is well known to Dr. Arceo and underwent 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 4/15/21 with subsequent cranioplasty 7/13/21. He developed a wound dehiscence and underlying infection requiring re-do craniectomy 11/16/21 with evacuation of epidural hematoma 11/17/21. Then underwent right allographic cranioplasty 3/8/22.  He developed recurrent infection, wound dehiscence requiring re-do craniectomy 7/9/22 with allograft cranioplasty 10/25/22. He is doing well since last seen. No new neurologic complaints except for ongoing decreased sense of smell and taste since 2022.      Review of Systems   Constitutional:  Negative for fatigue.   HENT:  Negative for tinnitus.         Sense of smell is gone can smell things occasionally and his sense of smell has improved.    Eyes:  Negative for visual disturbance.   Gastrointestinal: Negative.    Genitourinary: Negative.    Musculoskeletal:  Negative for gait problem.   Neurological:  Negative for dizziness, speech difficulty, weakness, light-headedness, numbness and headaches.   Psychiatric/Behavioral:  Negative for confusion and sleep disturbance.        Objective   There were no vitals taken for this visit.    Physical Exam  Constitutional:       Appearance: Normal appearance.   Eyes:      Conjunctiva/sclera: Conjunctivae  normal.   Pulmonary:      Effort: Pulmonary effort is normal.   Skin:     General: Skin is warm and dry.   Neurological:      Mental Status: He is alert.      GCS: GCS eye subscore is 4. GCS verbal subscore is 5. GCS motor subscore is 6.      Comments: No dysarthria, face is symmetric, tongue midline, EOMI, MCKEON x4   Psychiatric:         Mood and Affect: Mood normal.         Behavior: Behavior normal.         Thought Content: Thought content normal.         Judgment: Judgment normal.         Visit Time  Total Visit Duration: 18 minutes

## 2025-01-20 NOTE — PROGRESS NOTES
Virtual Regular Visit  Name: Stan Guy      : 1960      MRN: [id number]  Encounter Provider: Josefina Puckett PA-C  Encounter Date: 2025   Encounter department: [facility]      Verification of patient location:  Patient is located at {Amb Virtual Patient Location:[address]} in the following state in which I hold an active license {[facility] virtual patient location:38096} :Assessment & Plan        Encounter provider Josefina Puckett PA-C    The patient was identified by name and date of birth. Stan Guy was informed that this is a telemedicine visit and that the visit is being conducted through {AMB VIRTUAL VISIT MEDIUM:64900}.  {Telemedicine confidentiality :89011} {Telemedicine participants:49598}  He acknowledged consent and understanding of privacy and security of the video platform. The patient has agreed to participate and understands they can discontinue the visit at any time.    Patient is aware this is a billable service.     History of Present Illness {?Quick Links Encounters * My Last Note * Last Note in Specialty * Snapshot * Since Last Visit * History :33720}    HPI  Review of Systems    Objective {?Quick Links Trend Vitals * Enter New Vitals * Results Review * Timeline (Adult) * Labs * Imaging * Cardiology * Procedures * Lung Cancer Screening * Surgical eConsent :47407}  There were no vitals taken for this visit.    Physical Exam    Visit Time  Total Visit Duration: ***

## 2025-02-24 DIAGNOSIS — I10 ESSENTIAL HYPERTENSION: ICD-10-CM

## 2025-02-24 RX ORDER — METOPROLOL TARTRATE 25 MG/1
25 TABLET, FILM COATED ORAL EVERY 12 HOURS
Qty: 104 TABLET | Refills: 0 | Status: SHIPPED | OUTPATIENT
Start: 2025-02-24 | End: 2025-04-17

## 2025-02-24 RX ORDER — LISINOPRIL 20 MG/1
20 TABLET ORAL DAILY
Qty: 52 TABLET | Refills: 0 | Status: SHIPPED | OUTPATIENT
Start: 2025-02-24 | End: 2025-04-17

## 2025-02-24 NOTE — TELEPHONE ENCOUNTER
Patient called to schedule an apt with Dr Bridges. Apt scheduled for 4/17.     Patient stated that he will be out of his blood pressure medication by next week. Patient is inquiring if this can be prescribed so he has enough to get him to 4/17.     I spoke with [facility] staff who suggested to leave request with [place] office because patient was previously seen by Zbigniew Leong.     Please advise  Thank you

## 2025-04-17 ENCOUNTER — OFFICE VISIT (OUTPATIENT)
Dept: FAMILY MEDICINE CLINIC | Facility: CLINIC | Age: 65
End: 2025-04-17
Payer: COMMERCIAL

## 2025-04-17 VITALS
BODY MASS INDEX: 31.07 KG/M2 | DIASTOLIC BLOOD PRESSURE: 70 MMHG | HEIGHT: 70 IN | OXYGEN SATURATION: 98 % | WEIGHT: 217 LBS | HEART RATE: 79 BPM | SYSTOLIC BLOOD PRESSURE: 130 MMHG

## 2025-04-17 DIAGNOSIS — E61.1 IRON DEFICIENCY: ICD-10-CM

## 2025-04-17 DIAGNOSIS — Z12.11 SCREENING FOR COLORECTAL CANCER: ICD-10-CM

## 2025-04-17 DIAGNOSIS — Z12.12 SCREENING FOR COLORECTAL CANCER: ICD-10-CM

## 2025-04-17 DIAGNOSIS — Z76.89 ESTABLISHING CARE WITH NEW DOCTOR, ENCOUNTER FOR: ICD-10-CM

## 2025-04-17 DIAGNOSIS — R56.9 SEIZURE (HCC): Chronic | ICD-10-CM

## 2025-04-17 DIAGNOSIS — Z98.890 STATUS POST CRANIECTOMY: Chronic | ICD-10-CM

## 2025-04-17 DIAGNOSIS — R01.1 CARDIAC MURMUR: ICD-10-CM

## 2025-04-17 DIAGNOSIS — R73.03 PREDIABETES: ICD-10-CM

## 2025-04-17 DIAGNOSIS — I10 ESSENTIAL HYPERTENSION: ICD-10-CM

## 2025-04-17 DIAGNOSIS — Z00.00 ANNUAL PHYSICAL EXAM: Primary | ICD-10-CM

## 2025-04-17 DIAGNOSIS — F41.9 ANXIETY: ICD-10-CM

## 2025-04-17 PROBLEM — G93.40 ACUTE ENCEPHALOPATHY: Status: RESOLVED | Noted: 2021-04-15 | Resolved: 2025-04-17

## 2025-04-17 PROCEDURE — 99396 PREV VISIT EST AGE 40-64: CPT | Performed by: STUDENT IN AN ORGANIZED HEALTH CARE EDUCATION/TRAINING PROGRAM

## 2025-04-17 RX ORDER — LORAZEPAM 1 MG/1
1 TABLET ORAL DAILY PRN
COMMUNITY
End: 2025-04-17 | Stop reason: SDUPTHER

## 2025-04-17 RX ORDER — LORAZEPAM 1 MG/1
1 TABLET ORAL DAILY PRN
Start: 2025-04-17

## 2025-04-17 NOTE — PROGRESS NOTES
"Adult Annual Physical  Name: Stan Guy      : 1960      MRN: [id number]  Encounter Provider: Arun Bridges MD  Encounter Date: 2025   Encounter department: [facility]    :  Assessment & Plan  Annual physical exam         Prediabetes  Will need repeat blood work   Will reassess after labs   Orders:    Ambulatory Referral to Gastroenterology; Future    Comprehensive metabolic panel; Future    Lipid Panel with Direct LDL reflex; Future    Hemoglobin A1C; Future    TSH, 3rd generation with Free T4 reflex; Future    CBC and differential; Future    Iron deficiency  Will need repeat blood work   Will reassess after labs   Orders:    Ambulatory Referral to Gastroenterology; Future    Comprehensive metabolic panel; Future    Lipid Panel with Direct LDL reflex; Future    Hemoglobin A1C; Future    TSH, 3rd generation with Free T4 reflex; Future    CBC and differential; Future    Iron Panel (Includes Ferritin, Iron Sat%, Iron, and TIBC); Future    Essential hypertension    Orders:    Ambulatory Referral to Gastroenterology; Future    Comprehensive metabolic panel; Future    Lipid Panel with Direct LDL reflex; Future    Hemoglobin A1C; Future    TSH, 3rd generation with Free T4 reflex; Future    CBC and differential; Future    Status post craniectomy  Follows closely with Neuro/Surg  In 2017 had a fall, and was found to have subdural hematoma  Craniotomy and drainage by Dr. Arceo   Complications 2/2 infection         Establishing care with new doctor, encounter for         Anxiety  Reports chronic anxiety  Previously on 2mg ativan QID  Self titrated off due to his previous psych \"losing his license\"   Reports using PRN for medical anxiety  Reports using 1-2x per month  Reports having about 50tabs left     I had an extensive conversation with the patient regarding both the long and short term consequences benzodiazepine use.  We spoke about how benzodiazepines are not the " preferred long-term therapy for anxiety/depression,  as well as had a discussion about the adverse effects of benzodiazepine use include:  rebound anxiety,  sleep disturbances,  abuse,  and physiological dependence.              Seizure (HCC)  Likely 2/2 benzo withdrawal   Has since tapered off the ativan   Follows with neuro  Avoid seizure inducing medication  Try to avoid prescribing benzos  Alcohol avoidance     Orders:    LORazepam (ATIVAN) 1 mg tablet; Take 1 tablet (1 mg total) by mouth daily as needed for anxiety    Screening for colorectal cancer  Had a colonoscopy in 2021, recommend to have a 1 year     Orders:    Ambulatory Referral to Gastroenterology; Future    Cardiac murmur    Orders:    Echo complete w/ contrast if indicated; Future            Immunizations:  - Immunizations due: Tdap         History of Present Illness     Adult Annual Physical:  Patient presents for annual physical.     Diet and Physical Activity:  - Diet/Nutrition: no special diet.  - Exercise: walking.    Depression Screening:  - PHQ-2 Score: 0    General Health:  - Sleep: sleeps well.  - Hearing: normal hearing right ear and normal hearing left ear.  - Vision: no vision problems and most recent eye exam > 1 year ago.  - Dental: no dental visits for > 1 year.    /GYN Health:    - History of STDs: no     Health:  - History of STDs: no.     Review of Systems   Constitutional:  Negative for activity change, appetite change, chills, fatigue and fever.   HENT:  Negative for congestion, dental problem, drooling, ear discharge, ear pain, facial swelling, postnasal drip, rhinorrhea and sinus pain.    Eyes:  Negative for photophobia, pain, discharge and itching.   Respiratory:  Negative for apnea, cough, chest tightness and shortness of breath.    Cardiovascular:  Negative for chest pain and leg swelling.   Gastrointestinal:  Negative for abdominal distention, abdominal pain, anal bleeding, constipation, diarrhea and nausea.  "  Endocrine: Negative for cold intolerance, heat intolerance and polydipsia.   Genitourinary:  Negative for difficulty urinating.   Musculoskeletal:  Negative for arthralgias, gait problem, joint swelling and myalgias.   Skin:  Negative for color change and pallor.   Allergic/Immunologic: Negative for immunocompromised state.   Neurological:  Negative for dizziness, seizures, facial asymmetry, weakness, light-headedness, numbness and headaches.   Psychiatric/Behavioral:  Negative for agitation, behavioral problems, confusion, decreased concentration and dysphoric mood.    All other systems reviewed and are negative.        Objective   /70 (BP Location: Left arm, Patient Position: Sitting, Cuff Size: Adult)   Pulse 79   Ht 5' 10\" (1.778 m)   Wt 98.4 kg (217 lb)   SpO2 98%   BMI 31.14 kg/m²     Physical Exam  Constitutional:       Appearance: He is well-developed.   HENT:      Head: Normocephalic.   Eyes:      General: Scleral icterus: gastro.      Pupils: Pupils are equal, round, and reactive to light.   Cardiovascular:      Rate and Rhythm: Normal rate and regular rhythm.      Heart sounds: Murmur heard.   Pulmonary:      Effort: Pulmonary effort is normal.      Breath sounds: Normal breath sounds.   Abdominal:      General: Bowel sounds are normal.      Palpations: Abdomen is soft.   Musculoskeletal:         General: Normal range of motion.      Cervical back: Normal range of motion and neck supple.   Skin:     General: Skin is warm.      Comments: B/l spoon nails.         "

## 2025-04-17 NOTE — ASSESSMENT & PLAN NOTE
Likely 2/2 benzo withdrawal   Has since tapered off the ativan   Follows with neuro  Avoid seizure inducing medication  Try to avoid prescribing benzos  Alcohol avoidance     Orders:    LORazepam (ATIVAN) 1 mg tablet; Take 1 tablet (1 mg total) by mouth daily as needed for anxiety

## 2025-04-17 NOTE — ASSESSMENT & PLAN NOTE
"Reports chronic anxiety  Previously on 2mg ativan QID  Self titrated off due to his previous psych \"losing his license\"   Reports using PRN for medical anxiety  Reports using 1-2x per month  Reports having about 50tabs left     I had an extensive conversation with the patient regarding both the long and short term consequences benzodiazepine use.  We spoke about how benzodiazepines are not the preferred long-term therapy for anxiety/depression,  as well as had a discussion about the adverse effects of benzodiazepine use include:  rebound anxiety,  sleep disturbances,  abuse,  and physiological dependence.              "

## 2025-04-17 NOTE — ASSESSMENT & PLAN NOTE
Follows closely with Neuro/Surg  In 2017 had a fall, and was found to have subdural hematoma  Craniotomy and drainage by Dr. Arceo   Complications 2/2 infection

## 2025-04-17 NOTE — ASSESSMENT & PLAN NOTE
Orders:    Ambulatory Referral to Gastroenterology; Future    Comprehensive metabolic panel; Future    Lipid Panel with Direct LDL reflex; Future    Hemoglobin A1C; Future    TSH, 3rd generation with Free T4 reflex; Future    CBC and differential; Future

## 2025-04-18 DIAGNOSIS — I10 ESSENTIAL HYPERTENSION: ICD-10-CM

## 2025-04-18 RX ORDER — LISINOPRIL 20 MG/1
20 TABLET ORAL DAILY
Qty: 30 TABLET | Refills: 0 | Status: SHIPPED | OUTPATIENT
Start: 2025-04-18 | End: 2025-06-09

## 2025-04-18 RX ORDER — METOPROLOL TARTRATE 25 MG/1
25 TABLET, FILM COATED ORAL EVERY 12 HOURS
Qty: 180 TABLET | Refills: 1 | Status: SHIPPED | OUTPATIENT
Start: 2025-04-18 | End: 2025-10-15

## 2025-04-18 NOTE — TELEPHONE ENCOUNTER
Patient needs updated blood work and has previously placed orders. Please contact patient to go for labs. Courtesy refill provided.

## 2025-04-18 NOTE — TELEPHONE ENCOUNTER
Reason for call:   [x] Refill   [] Prior Auth  [] Other:     Office:   [x] PCP/Provider -   [] Specialty/Provider -     Medication:   lisinopril (ZESTRIL) 20 mg/Take 1 tablet (20 mg total) by mouth daily     metoprolol tartrate (LOPRESSOR) 25 mg/Take 1 tablet (25 mg total) by mouth every 12 (twelve) hours     Quantity:     Pharmacy: [facility] - [facility] - [address] [place] [address] Pharmacy   Does the patient have enough for 3 days?   [x] Yes   [] No - Send as HP to POD    Mail Away Pharmacy   Does the patient have enough for 10 days?   [] Yes   [] No - Send as HP to POD

## 2025-05-02 ENCOUNTER — HOSPITAL ENCOUNTER (OUTPATIENT)
Dept: NON INVASIVE DIAGNOSTICS | Facility: CLINIC | Age: 65
Discharge: HOME | End: 2025-05-02
Attending: STUDENT IN AN ORGANIZED HEALTH CARE EDUCATION/TRAINING PROGRAM
Payer: COMMERCIAL

## 2025-05-02 VITALS
SYSTOLIC BLOOD PRESSURE: 130 MMHG | BODY MASS INDEX: 31.07 KG/M2 | HEART RATE: 84 BPM | DIASTOLIC BLOOD PRESSURE: 70 MMHG | WEIGHT: 217 LBS | HEIGHT: 70 IN

## 2025-05-02 DIAGNOSIS — R01.1 CARDIAC MURMUR: ICD-10-CM

## 2025-05-02 LAB
AORTIC ROOT: 3.2 CM
AORTIC VALVE MEAN VELOCITY: 17.3 M/S
ASCENDING AORTA: 2.9 CM
AV AREA BY CONTINUOUS VTI: 1.7 CM2
AV AREA PEAK VELOCITY: 1.7 CM2
AV LVOT MEAN GRADIENT: 4 MMHG
AV LVOT PEAK GRADIENT: 8 MMHG
AV MEAN PRESS GRAD SYS DOP V1V2: 14 MMHG
AV ORIFICE AREA US: 1.71 CM2
AV PEAK GRADIENT: 29 MMHG
AV VELOCITY RATIO: 0.54
AV VMAX SYS DOP: 2.8 M/S
BSA FOR ECHO PROCEDURE: 2.16 M2
DOP CALC AO VTI: 56.81 CM
DOP CALC LVOT AREA: 3.14 CM2
DOP CALC LVOT CARDIAC INDEX: 3.39 L/MIN/M2
DOP CALC LVOT CARDIAC OUTPUT: 7.32 L/MIN
DOP CALC LVOT DIAMETER: 2 CM
DOP CALC LVOT PEAK VEL VTI: 30.96 CM
DOP CALC LVOT PEAK VEL: 1.42 M/S
DOP CALC LVOT STROKE INDEX: 46.3 ML/M2
DOP CALC LVOT STROKE VOLUME: 97.21
E WAVE DECELERATION TIME: 160 MS
E/A RATIO: 1.08
FRACTIONAL SHORTENING: 35 (ref 28–44)
INTERVENTRICULAR SEPTUM IN DIASTOLE (PARASTERNAL SHORT AXIS VIEW): 1.3 CM
INTERVENTRICULAR SEPTUM: 1.3 CM (ref 0.6–1.1)
LAAS-AP2: 21.1 CM2
LAAS-AP4: 20.6 CM2
LEFT ATRIUM SIZE: 4.7 CM
LEFT ATRIUM VOLUME (MOD BIPLANE): 60 ML
LEFT ATRIUM VOLUME INDEX (MOD BIPLANE): 27.8 ML/M2
LEFT INTERNAL DIMENSION IN SYSTOLE: 3.1 CM (ref 2.1–4)
LEFT VENTRICULAR INTERNAL DIMENSION IN DIASTOLE: 4.8 CM (ref 3.5–6)
LEFT VENTRICULAR POSTERIOR WALL IN END DIASTOLE: 1.1 CM
LEFT VENTRICULAR STROKE VOLUME: 67 ML
LV EF US.2D.A4C+ESTIMATED: 60 %
LVSV (TEICH): 67 ML
MV E'TISSUE VEL-SEP: 8 CM/S
MV PEAK A VEL: 0.91 M/S
MV PEAK E VEL: 98 CM/S
MV STENOSIS PRESSURE HALF TIME: 47 MS
MV VALVE AREA P 1/2 METHOD: 4.68
RIGHT ATRIUM AREA SYSTOLE A4C: 10.9 CM2
RIGHT VENTRICLE ID DIMENSION: 2.4 CM
SL CV LEFT ATRIUM LENGTH A2C: 5.8 CM
SL CV LV EF: 60
SL CV PED ECHO LEFT VENTRICLE DIASTOLIC VOLUME (MOD BIPLANE) 2D: 105 ML
SL CV PED ECHO LEFT VENTRICLE SYSTOLIC VOLUME (MOD BIPLANE) 2D: 38 ML
TR MAX PG: 20 MMHG
TR PEAK VELOCITY: 2.2 M/S
TRICUSPID ANNULAR PLANE SYSTOLIC EXCURSION: 2.5 CM
TRICUSPID VALVE PEAK REGURGITATION VELOCITY: 2.21 M/S

## 2025-05-02 PROCEDURE — 93306 TTE W/DOPPLER COMPLETE: CPT

## 2025-05-02 PROCEDURE — 93306 TTE W/DOPPLER COMPLETE: CPT | Performed by: INTERNAL MEDICINE

## 2025-05-15 ENCOUNTER — TELEPHONE (OUTPATIENT)
Dept: NEUROLOGY | Facility: CLINIC | Age: 65
End: 2025-05-15

## 2025-05-15 NOTE — TELEPHONE ENCOUNTER
LMOM for Pt to call back to R/S apt with Keiry Guajardo PA-C on 5/19 due to provider is working off site that morning.

## 2025-05-28 DIAGNOSIS — I10 ESSENTIAL HYPERTENSION: ICD-10-CM

## 2025-05-29 RX ORDER — LISINOPRIL 20 MG/1
20 TABLET ORAL DAILY
Qty: 30 TABLET | Refills: 0 | Status: SHIPPED | OUTPATIENT
Start: 2025-05-29 | End: 2025-07-20

## 2025-06-18 ENCOUNTER — OFFICE VISIT (OUTPATIENT)
Dept: NEUROLOGY | Facility: CLINIC | Age: 65
End: 2025-06-18
Payer: COMMERCIAL

## 2025-06-18 VITALS
WEIGHT: 210 LBS | DIASTOLIC BLOOD PRESSURE: 68 MMHG | HEIGHT: 70 IN | BODY MASS INDEX: 30.06 KG/M2 | SYSTOLIC BLOOD PRESSURE: 130 MMHG | HEART RATE: 77 BPM | TEMPERATURE: 98.1 F

## 2025-06-18 DIAGNOSIS — Q04.6 COLLOID CYST OF THIRD VENTRICLE (HCC): ICD-10-CM

## 2025-06-18 DIAGNOSIS — R56.9 SEIZURE (HCC): Primary | Chronic | ICD-10-CM

## 2025-06-18 PROCEDURE — 99214 OFFICE O/P EST MOD 30 MIN: CPT | Performed by: PHYSICIAN ASSISTANT

## 2025-06-18 RX ORDER — MULTIVITAMIN
1 TABLET ORAL DAILY
COMMUNITY

## 2025-06-18 NOTE — PATIENT INSTRUCTIONS
Continue levetiracetam 750mg twice a day  Continue to follow with neurosurgery as indicated  Call the office if there are any seizures or problems with your medication  Return in 1 year or sooner if needed

## 2025-06-18 NOTE — PROGRESS NOTES
Name: Stan Guy      : 1960      MRN: [id number]  Encounter Provider: Keiry Guajardo PA-C  Encounter Date: 2025   Encounter department: [facility]  :  Assessment & Plan  Seizure (HCC)  Mr. Guy is a 64 y.o. male with single generalized tonic clonic seizure in the setting of prior TBI/SDH and 3 weeks after craniotomy in . He had a prior seizure years ago, likely due to benzodiazepine withdrawal.  He had several cranial surgeries in  and .  He was previously unable to get EEGs due to multiple cranial surgeries.      He has remained on levetiracetam and remains seizure-free.  We had previously discussed possibility of trying to come off levetiracetam, however with prior brain injury and multiple surgeries, he is at higher risk for seizures in the future. We decided that he should continue on levetiracetam long-term, as he tolerates well.  If withdrawal of levetiracetam is considered in the future, would obtain an EEG to estimate risk.    I did mention that if he remains stable, we could see if his PCP would be comfortable taking over the levetiracetam Rx and providing refills.  Until then, will see him yearly.      Plan:  - continue levetiracetam 750mg BID  - call the office if any seizures or problems with the medication   - return in 1 year or sooner if needed        Colloid cyst of third ventricle (HCC)  Patient continues to follow with neurosurgery.  They are monitoring with yearly head CTs             History of Present Illness   HPI   Stan Guy is returning to the [facility] for follow up.      Interval Events:   Seizures since last visit: None    Patient last seen 24, prior to that had not been seen since 2022.  At timing of 2025 appt, he remained on levetiracetam, no apparent seizures.  He was still following with neurosurgery.    Today, patient reports he is doing well, denies any  "events concerning for seizure.  He is taking levetiracetam as prescribed, no issues with the medication.  He has no new neurologic complaints--denies headaches, vision changes, imbalance/gait difficulty, etc.       He was last seen by neurosurgery in Jan 2025, ongoing follow up for 3rd ventricle colloid cyst.  They will be following with yearly CTH.     Current AEDs:  Levetiracetam 750 mg b.i.d.  Medication side effects: None  Medication adherence: Yes     Event/Seizure semiology:  GTC with tongue biting lasting < 5 min. With post ictal state. Single event 3 weeks after cranioplasty.     Prior history:  Stan suffered a traumatic brain injury on April 4th 2021 after helping his neighbor to move a couch when he fell backwards hitting the back of his head on a concrete floor causing him to lose consciousness. Patient was taken to a non Trauma Center where CT head showed a subdural hematoma. No intervention was done. Hospital course was complicated by difficulty swallowing. On April 14 patient was Admitted to rehab for TBI patient's however upon evaluation the next morning he was determined to need transfer of care to Neurosurgery. At this time patient was transferred to [facility] and immediately underwent a craniotomy with Dr. Arceo. Patient returned to rehab at [facility] and on the 13th of July he had a cranioplasty. After the cranioplasty patient states that he lost the sense of taste and smell which he is still trying to recover. Post cranioplasty patient was placed on prophylactic Keppra for 1 week which he completed course.      On August 4th patient woke up with symptoms of nausea (no vomiting). Patient had poor food and water intake that day due to feeling unwell. At around 10:30 p.m. that night patient was in bed with his wife when she noticed he sat up on the edge of the bed, became stiff and then \"flipped backwards\" and started to shake throughout his body in a rhythmic fashion. Patient " bit his tongue. Did denies loss of bowel or bladder continence. Wife states that after the seizure he was making groaning noises. The seizure episode lasted a couple minutes. The wife does not believe this was more than 5 minutes. Stan has no recollection of the event. The next thing he remembers was being put on the stretcher and into the ambulance. Wife is not sure if he was given abortive medication at that time. Patient taken to the ED where CT head did not show any changes. Patient was started on 500 mg of Keppra. In terms of his drinking history patient used to drink 3-4 beers daily On a regular basis. Since his accident patient had resumed drinking. He states that he had a drink the day prior to the seizure. The week prior he had had occasional beer sporadically.     In the past patient had been on lorazepam from approximately 2011 to 2017. This was due to a history of anxiety. Patient developed tolerance to it and at 1 point was up to 8 mg a day. He was trying to develop a plan with his prescriber to taper down and off lorazepam however during this time his prescriber lost his license due to overprescribing practices. 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 As he was out of Ativan rx, he tried to substitute with alcohol after being off lorazepam for a couple of days. Patient then developed symptoms of withdrawal including sweating and anxiety. He initially went to the ED and no intervention was done. The following day patient had a seizure. He returned to emergency department and had a 2nd seizure involving full body convulsions and tongue biting. After this patient was referred to an outpatient withdrawal clinic. He has been off lorazepam since then.       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  No significant mass effect upon the brain parenchyma or shift present. Stable encephalomalacia and gliosis within the inferior frontal lobes.     Psychiatric History:  Anxiety     Social History:   Driving: yes   Lives Alone: No  Occupation: retired    Review of Systems   Constitutional: Negative.  Negative for chills, fatigue and fever.   HENT: Negative.  Negative for hearing loss, tinnitus and trouble swallowing.    Eyes:  Negative for photophobia, pain and visual disturbance.   Respiratory: Negative.  Negative for cough and shortness of breath.    Cardiovascular: Negative.  Negative for chest pain and palpitations.   Gastrointestinal:  Negative for constipation, diarrhea, nausea and vomiting.   Endocrine: Negative.    Genitourinary: Negative.  Negative for difficulty urinating and urgency.   Musculoskeletal: Negative.  Negative for gait problem.   Skin: Negative.  Negative for rash.   Neurological: Negative.  Negative for dizziness, tremors, seizures, weakness, numbness and headaches.   Hematological: Negative.    Psychiatric/Behavioral:  Negative for confusion and sleep disturbance.        Medications Ordered Prior to Encounter[1]      Objective   /68 (BP Location: Right arm, Patient Position: Sitting, Cuff Size: Adult)   Pulse 77   Temp 98.1 °F (36.7 °C) (Temporal)   Ht 5' 10\" (1.778 m)   Wt 95.3 kg (210 lb)   BMI 30.13 kg/m²     Physical Exam  Constitutional:       Appearance: Normal appearance.   HENT:      Head:      Comments: Right temporal divot from prior craniotomy      Eyes:      Extraocular Movements: EOM normal.      Pupils: Pupils are equal, round, and reactive to light.       Neurological:      Mental Status: He is alert.      Motor: Motor strength is normal.    Psychiatric:         Mood and Affect: Mood normal.         " Speech: Speech normal.         Behavior: Behavior normal.       Neurological Exam  Mental Status  Alert. Oriented to person, place, time and situation. Speech is normal. Language is fluent with no aphasia. Attention and concentration are normal.    Cranial Nerves  CN II: Visual fields full to confrontation.  CN III, IV, VI: Extraocular movements intact bilaterally. Pupils equal round and reactive to light bilaterally.  CN V: Facial sensation is normal.  CN VII: Full and symmetric facial movement.  CN VIII: Hearing is normal.  CN IX, X: Palate elevates symmetrically  CN XI: Shoulder shrug strength is normal.  CN XII: Tongue midline without atrophy or fasciculations.    Motor   Normal muscle tone. Strength is 5/5 throughout all four extremities.    Sensory  Light touch is normal in upper and lower extremities.     Coordination  Right: Finger-to-nose normal.Left: Finger-to-nose normal.    Gait  Casual gait is normal including stance, stride, and arm swing.           [1]   Current Outpatient Medications on File Prior to Visit   Medication Sig Dispense Refill    acetaminophen (TYLENOL) 325 mg tablet Take 3 tablets (975 mg total) by mouth every 8 (eight) hours (Patient taking differently: Take 975 mg by mouth as needed)  0    halobetasol (ULTRAVATE) 0.05 % cream Apply topically 2 (two) times a day 50 g 2    levETIRAcetam (KEPPRA) 750 mg tablet TAKE 1 TABLET (750 MG TOTAL) BY MOUTH EVERY 12 (TWELVE) HOURS 180 tablet 3    lisinopril (ZESTRIL) 20 mg tablet Take 1 tablet (20 mg total) by mouth daily 30 tablet 0    LORazepam (ATIVAN) 1 mg tablet Take 1 tablet (1 mg total) by mouth daily as needed for anxiety      metoprolol tartrate (LOPRESSOR) 25 mg tablet Take 1 tablet (25 mg total) by mouth every 12 (twelve) hours 180 tablet 1    Multiple Vitamin (multivitamin) tablet Take 1 tablet by mouth daily      omeprazole (PriLOSEC) 20 mg delayed release capsule Take 20 mg by mouth in the morning.      ascorbic acid (VITAMIN C) 250  mg tablet Take 1 tablet (250 mg total) by mouth daily Take with Iron (Patient not taking: Reported on 6/18/2025) 180 tablet 1     No current facility-administered medications on file prior to visit.      no back pain, no gout, no musculoskeletal pain, no neck pain, and no weakness.

## 2025-06-18 NOTE — ASSESSMENT & PLAN NOTE
Mr. Guy is a 64 y.o. male with single generalized tonic clonic seizure in the setting of prior TBI/SDH and 3 weeks after craniotomy in 2021. He had a prior seizure years ago, likely due to benzodiazepine withdrawal.  He had several cranial surgeries in 2021 and 2022.  He was previously unable to get EEGs due to multiple cranial surgeries.      He has remained on levetiracetam and remains seizure-free.  We had previously discussed possibility of trying to come off levetiracetam, however with prior brain injury and multiple surgeries, he is at higher risk for seizures in the future. We decided that he should continue on levetiracetam long-term, as he tolerates well.  If withdrawal of levetiracetam is considered in the future, would obtain an EEG to estimate risk.    I did mention that if he remains stable, we could see if his PCP would be comfortable taking over the levetiracetam Rx and providing refills.  Until then, will see him yearly.      Plan:  - continue levetiracetam 750mg BID  - call the office if any seizures or problems with the medication   - return in 1 year or sooner if needed

## 2025-06-30 ENCOUNTER — APPOINTMENT (OUTPATIENT)
Dept: LAB | Facility: CLINIC | Age: 65
End: 2025-06-30
Payer: COMMERCIAL

## 2025-06-30 DIAGNOSIS — I10 ESSENTIAL HYPERTENSION: ICD-10-CM

## 2025-06-30 DIAGNOSIS — E61.1 IRON DEFICIENCY: ICD-10-CM

## 2025-06-30 DIAGNOSIS — R73.03 PREDIABETES: ICD-10-CM

## 2025-06-30 LAB
ALBUMIN SERPL BCG-MCNC: 3.9 G/DL (ref 3.5–5)
ALP SERPL-CCNC: 122 U/L (ref 34–104)
ALT SERPL W P-5'-P-CCNC: 43 U/L (ref 7–52)
ANION GAP SERPL CALCULATED.3IONS-SCNC: 10 MMOL/L (ref 4–13)
AST SERPL W P-5'-P-CCNC: 52 U/L (ref 13–39)
BASOPHILS # BLD AUTO: 0.04 THOUSANDS/ÂΜL (ref 0–0.1)
BASOPHILS NFR BLD AUTO: 1 % (ref 0–1)
BILIRUB SERPL-MCNC: 1.13 MG/DL (ref 0.2–1)
BUN SERPL-MCNC: 10 MG/DL (ref 5–25)
CALCIUM SERPL-MCNC: 9 MG/DL (ref 8.4–10.2)
CHLORIDE SERPL-SCNC: 102 MMOL/L (ref 96–108)
CHOLEST SERPL-MCNC: 142 MG/DL (ref ?–200)
CO2 SERPL-SCNC: 27 MMOL/L (ref 21–32)
CREAT SERPL-MCNC: 0.59 MG/DL (ref 0.6–1.3)
EOSINOPHIL # BLD AUTO: 0.06 THOUSAND/ÂΜL (ref 0–0.61)
EOSINOPHIL NFR BLD AUTO: 1 % (ref 0–6)
ERYTHROCYTE [DISTWIDTH] IN BLOOD BY AUTOMATED COUNT: 20.5 % (ref 11.6–15.1)
EST. AVERAGE GLUCOSE BLD GHB EST-MCNC: 131 MG/DL
FERRITIN SERPL-MCNC: 10 NG/ML (ref 30–336)
GFR SERPL CREATININE-BSD FRML MDRD: 107 ML/MIN/1.73SQ M
GLUCOSE P FAST SERPL-MCNC: 130 MG/DL (ref 65–99)
HBA1C MFR BLD: 6.2 %
HCT VFR BLD AUTO: 37 % (ref 36.5–49.3)
HDLC SERPL-MCNC: 53 MG/DL
HGB BLD-MCNC: 10.7 G/DL (ref 12–17)
IMM GRANULOCYTES # BLD AUTO: 0.02 THOUSAND/UL (ref 0–0.2)
IMM GRANULOCYTES NFR BLD AUTO: 0 % (ref 0–2)
IRON SATN MFR SERPL: 6 % (ref 15–50)
IRON SERPL-MCNC: 34 UG/DL (ref 50–212)
LDLC SERPL CALC-MCNC: 70 MG/DL (ref 0–100)
LYMPHOCYTES # BLD AUTO: 1 THOUSANDS/ÂΜL (ref 0.6–4.47)
LYMPHOCYTES NFR BLD AUTO: 18 % (ref 14–44)
MCH RBC QN AUTO: 22.4 PG (ref 26.8–34.3)
MCHC RBC AUTO-ENTMCNC: 28.9 G/DL (ref 31.4–37.4)
MCV RBC AUTO: 77 FL (ref 82–98)
MONOCYTES # BLD AUTO: 0.54 THOUSAND/ÂΜL (ref 0.17–1.22)
MONOCYTES NFR BLD AUTO: 9 % (ref 4–12)
NEUTROPHILS # BLD AUTO: 4.06 THOUSANDS/ÂΜL (ref 1.85–7.62)
NEUTS SEG NFR BLD AUTO: 71 % (ref 43–75)
NRBC BLD AUTO-RTO: 0 /100 WBCS
PLATELET # BLD AUTO: 144 THOUSANDS/UL (ref 149–390)
POTASSIUM SERPL-SCNC: 4.7 MMOL/L (ref 3.5–5.3)
PROT SERPL-MCNC: 8.6 G/DL (ref 6.4–8.4)
RBC # BLD AUTO: 4.78 MILLION/UL (ref 3.88–5.62)
SODIUM SERPL-SCNC: 139 MMOL/L (ref 135–147)
TIBC SERPL-MCNC: 547.4 UG/DL (ref 250–450)
TRANSFERRIN SERPL-MCNC: 391 MG/DL (ref 203–362)
TRIGL SERPL-MCNC: 94 MG/DL (ref ?–150)
TSH SERPL DL<=0.05 MIU/L-ACNC: 3.72 UIU/ML (ref 0.45–4.5)
UIBC SERPL-MCNC: 513 UG/DL (ref 155–355)
WBC # BLD AUTO: 5.72 THOUSAND/UL (ref 4.31–10.16)

## 2025-06-30 PROCEDURE — 83540 ASSAY OF IRON: CPT

## 2025-06-30 PROCEDURE — 80061 LIPID PANEL: CPT

## 2025-06-30 PROCEDURE — 83550 IRON BINDING TEST: CPT

## 2025-06-30 PROCEDURE — 82728 ASSAY OF FERRITIN: CPT

## 2025-06-30 PROCEDURE — 84443 ASSAY THYROID STIM HORMONE: CPT

## 2025-06-30 PROCEDURE — 36415 COLL VENOUS BLD VENIPUNCTURE: CPT

## 2025-06-30 PROCEDURE — 83036 HEMOGLOBIN GLYCOSYLATED A1C: CPT

## 2025-06-30 PROCEDURE — 80053 COMPREHEN METABOLIC PANEL: CPT

## 2025-06-30 PROCEDURE — 85025 COMPLETE CBC W/AUTO DIFF WBC: CPT

## 2025-06-30 RX ORDER — LISINOPRIL 20 MG/1
20 TABLET ORAL DAILY
Qty: 90 TABLET | Refills: 0 | Status: SHIPPED | OUTPATIENT
Start: 2025-06-30 | End: 2025-08-21

## 2025-07-01 ENCOUNTER — RESULTS FOLLOW-UP (OUTPATIENT)
Dept: FAMILY MEDICINE CLINIC | Facility: CLINIC | Age: 65
End: 2025-07-01

## 2025-07-01 NOTE — TELEPHONE ENCOUNTER
Patient called back and read verbatim what provider put, patient states he will take iron supplements on his own and he doesn't need them to be sent in

## 2025-07-21 DIAGNOSIS — R56.9 SEIZURE (HCC): Chronic | ICD-10-CM

## 2025-07-24 RX ORDER — LORAZEPAM 1 MG/1
1 TABLET ORAL DAILY PRN
Refills: 0 | OUTPATIENT
Start: 2025-07-24

## 2025-07-28 DIAGNOSIS — R56.9 SEIZURE (HCC): Chronic | ICD-10-CM

## 2025-07-28 RX ORDER — LORAZEPAM 1 MG/1
1 TABLET ORAL DAILY PRN
Qty: 10 TABLET | Refills: 0 | Status: SHIPPED | OUTPATIENT
Start: 2025-07-28

## 2025-07-28 NOTE — TELEPHONE ENCOUNTER
Per last office note patient reported that he was no longer taking the Ativan hence it was not refilled

## 2025-07-28 NOTE — TELEPHONE ENCOUNTER
Left message for patient to return call both seem to be saying something different he can make ov to discuss

## 2025-07-28 NOTE — TELEPHONE ENCOUNTER
Patient states he takes it as needed and just would just like the medication on hand. Pt states he did discuss this with  on the OV-4/17. Office staff please make PCP aware.  Thank you

## (undated) DEVICE — CULTURE TUBE ANAEROBIC

## (undated) DEVICE — DISPOSABLE EQUIPMENT COVER: Brand: SMALL TOWEL DRAPE

## (undated) DEVICE — GLOVE SRG BIOGEL 7.5

## (undated) DEVICE — TELFA ADHESIVE ISLAND DRESSING: Brand: TELFA

## (undated) DEVICE — JP PERF DRN SIL FLT 10MM FULL: Brand: CARDINAL HEALTH

## (undated) DEVICE — 3M™ IOBAN™ 2 ANTIMICROBIAL INCISE DRAPE 6650EZ: Brand: IOBAN™ 2

## (undated) DEVICE — OCCLUSIVE GAUZE STRIP,3% BISMUTH TRIBROMOPHENATE IN PETROLATUM BLEND: Brand: XEROFORM

## (undated) DEVICE — PAD GROUNDING ADULT

## (undated) DEVICE — SPONGE 4 X 4 XRAY 16 PLY STRL LF RFD

## (undated) DEVICE — PROXIMATE SKIN STAPLERS (35 WIDE) CONTAINS 35 STAINLESS STEEL STAPLES (FIXED HEAD): Brand: PROXIMATE

## (undated) DEVICE — SUT ETHILON 2-0 FSLX 30 IN 1674H

## (undated) DEVICE — INTENDED FOR TISSUE SEPARATION, AND OTHER PROCEDURES THAT REQUIRE A SHARP SURGICAL BLADE TO PUNCTURE OR CUT.: Brand: BARD-PARKER SAFETY BLADES SIZE 10, STERILE

## (undated) DEVICE — UTILITY MARKER,BLACK WITH LABELS: Brand: DEVON

## (undated) DEVICE — DRAIN JACKSON PRATT 10FR 1/8END: Brand: CARDINAL HEALTH

## (undated) DEVICE — JP PERF DRN SIL FLT 7MM FULL: Brand: CARDINAL HEALTH

## (undated) DEVICE — CHLORAPREP HI-LITE 26ML ORANGE

## (undated) DEVICE — SUT ETHILON 3-0 FSLX 30 IN 1673H

## (undated) DEVICE — GLOVE INDICATOR PI UNDERGLOVE SZ 8 BLUE

## (undated) DEVICE — ANTIBACTERIAL VIOLET BRAIDED (POLYGLACTIN 910), SYNTHETIC ABSORBABLE SUTURE: Brand: COATED VICRYL

## (undated) DEVICE — DRAPE INTESTINAL ISOLATION BAG

## (undated) DEVICE — INTENDED FOR TISSUE SEPARATION, AND OTHER PROCEDURES THAT REQUIRE A SHARP SURGICAL BLADE TO PUNCTURE OR CUT.: Brand: BARD-PARKER ® CARBON RIB-BACK BLADES

## (undated) DEVICE — SPONGE SCRUB 4 PCT CHLORHEXIDINE

## (undated) DEVICE — SUT PROLENE 2-0 CT-2 30 IN 8411H

## (undated) DEVICE — ELECTRODE BLADE MOD E-Z CLEAN 2.5IN 6.4CM -0012M

## (undated) DEVICE — HEMOSTATIC MATRIX SURGIFLO 8ML W/THROMBIN

## (undated) DEVICE — BASIC SINGLE BASIN 2-LF: Brand: MEDLINE INDUSTRIES, INC.

## (undated) DEVICE — CURITY STRETCH BANDAGE: Brand: CURITY

## (undated) DEVICE — IMPLANTABLE DEVICE
Type: IMPLANTABLE DEVICE | Site: CRANIAL | Status: NON-FUNCTIONAL
Brand: THINFLAP SYSTEM

## (undated) DEVICE — DRAPE SHEET THREE QUARTER

## (undated) DEVICE — LIGHT HANDLE COVER SLEEVE DISP BLUE STELLAR

## (undated) DEVICE — CHLORHEXIDINE 4PCT 4 OZ

## (undated) DEVICE — JACKSON-PRATT 100CC BULB RESERVOIR: Brand: CARDINAL HEALTH

## (undated) DEVICE — TOOL F2/8TA23 LEGEND 8CM 2.3MM TAPER: Brand: MIDAS REX™

## (undated) DEVICE — SURGIFOAM 8.5 X 12.5

## (undated) DEVICE — RANEY SCALP CLIP STERILE: Brand: AESCULAP

## (undated) DEVICE — KERLIX BANDAGE ROLL: Brand: KERLIX

## (undated) DEVICE — POV-IOD SOLUTION 4OZ BT

## (undated) DEVICE — PACK CRANIOTOMY PBDS RF

## (undated) DEVICE — GAUZE SPONGES,16 PLY: Brand: CURITY

## (undated) DEVICE — STRETCH BANDAGE: Brand: CURITY

## (undated) DEVICE — PERFORATOR CRANIAL 14MM

## (undated) DEVICE — Device: Brand: IQ SYSTEM

## (undated) DEVICE — CULTURE TUBE AEROBIC

## (undated) DEVICE — PROXIMATE PLUS MD MULTI-DIRECTIONAL RELEASE SKIN STAPLERS CONTAINS 35 STAINLESS STEEL STAPLES APPROXIMATE CLOSED DIMENSIONS: 6.9MM X 3.9MM WIDE: Brand: PROXIMATE

## (undated) DEVICE — ALL PURPOSE SPONGES,NONWOVEN, 4 PLY: Brand: CURITY

## (undated) DEVICE — TUBING SUCTION 5MM X 12 FT

## (undated) DEVICE — STOCKINETTE REGULAR

## (undated) DEVICE — SUT NUROLON 4-0 TF CR/8 18 IN C584D

## (undated) DEVICE — TRAY FOLEY 16FR URIMETER SURESTEP

## (undated) DEVICE — BANDAGE MT SPANDAGE SZ 9

## (undated) DEVICE — 3M™ IOBAN™ 2 ANTIMICROBIAL INCISE DRAPE 6640EZ: Brand: IOBAN™ 2

## (undated) DEVICE — DRESSING MEPILEX AG BORDER 4 X 10 IN

## (undated) DEVICE — TELFA NON-ADHERENT ABSORBENT DRESSING: Brand: TELFA